# Patient Record
Sex: FEMALE | Race: WHITE | NOT HISPANIC OR LATINO | Employment: UNEMPLOYED | URBAN - METROPOLITAN AREA
[De-identification: names, ages, dates, MRNs, and addresses within clinical notes are randomized per-mention and may not be internally consistent; named-entity substitution may affect disease eponyms.]

---

## 2017-01-02 DIAGNOSIS — N30.10 IC (INTERSTITIAL CYSTITIS): ICD-10-CM

## 2017-01-03 RX ORDER — METHENAMINE, SODIUM PHOSPHATE, MONOBASIC, MONOHYDRATE, PHENYL SALICYLATE, METHYLENE BLUE, AND HYOSCYAMINE SULFATE 118; 40.8; 36; 10; .12 MG/1; MG/1; MG/1; MG/1; MG/1
CAPSULE ORAL
Qty: 120 CAPSULE | Refills: 0 | Status: SHIPPED | OUTPATIENT
Start: 2017-01-03 | End: 2017-05-19 | Stop reason: SDUPTHER

## 2017-01-05 RX ORDER — TIZANIDINE 4 MG/1
TABLET ORAL
Qty: 90 TABLET | Refills: 3 | Status: SHIPPED | OUTPATIENT
Start: 2017-01-05 | End: 2017-08-04 | Stop reason: SDUPTHER

## 2017-01-21 RX ORDER — ESOMEPRAZOLE MAGNESIUM 40 MG/1
CAPSULE, DELAYED RELEASE ORAL
Qty: 30 CAPSULE | Refills: 0 | Status: SHIPPED | OUTPATIENT
Start: 2017-01-21 | End: 2017-02-01

## 2017-01-24 DIAGNOSIS — N30.10 IC (INTERSTITIAL CYSTITIS): ICD-10-CM

## 2017-01-25 RX ORDER — METHENAMINE, SODIUM PHOSPHATE, MONOBASIC, MONOHYDRATE, PHENYL SALICYLATE, METHYLENE BLUE, AND HYOSCYAMINE SULFATE 118; 40.8; 36; 10; .12 MG/1; MG/1; MG/1; MG/1; MG/1
CAPSULE ORAL
Qty: 120 CAPSULE | Refills: 0 | Status: SHIPPED | OUTPATIENT
Start: 2017-01-25 | End: 2017-02-02 | Stop reason: SDUPTHER

## 2017-01-31 ENCOUNTER — TELEPHONE (OUTPATIENT)
Dept: GASTROENTEROLOGY | Facility: CLINIC | Age: 47
End: 2017-01-31

## 2017-02-01 ENCOUNTER — OFFICE VISIT (OUTPATIENT)
Dept: GASTROENTEROLOGY | Facility: CLINIC | Age: 47
End: 2017-02-01
Payer: COMMERCIAL

## 2017-02-01 VITALS
DIASTOLIC BLOOD PRESSURE: 82 MMHG | BODY MASS INDEX: 33.52 KG/M2 | WEIGHT: 166.25 LBS | HEIGHT: 59 IN | SYSTOLIC BLOOD PRESSURE: 118 MMHG | HEART RATE: 110 BPM

## 2017-02-01 DIAGNOSIS — K21.9 GASTROESOPHAGEAL REFLUX DISEASE, ESOPHAGITIS PRESENCE NOT SPECIFIED: ICD-10-CM

## 2017-02-01 DIAGNOSIS — E66.9 OBESITY (BMI 30-39.9): Primary | ICD-10-CM

## 2017-02-01 DIAGNOSIS — K58.0 IRRITABLE BOWEL SYNDROME WITH DIARRHEA: ICD-10-CM

## 2017-02-01 PROCEDURE — 99214 OFFICE O/P EST MOD 30 MIN: CPT | Mod: S$GLB,,, | Performed by: INTERNAL MEDICINE

## 2017-02-01 PROCEDURE — 99999 PR PBB SHADOW E&M-EST. PATIENT-LVL III: CPT | Mod: PBBFAC,,, | Performed by: INTERNAL MEDICINE

## 2017-02-01 RX ORDER — ALOSETRON HYDROCHLORIDE 0.5 MG/1
0.5 TABLET ORAL 2 TIMES DAILY
Qty: 60 TABLET | Refills: 0 | Status: SHIPPED | OUTPATIENT
Start: 2017-02-01 | End: 2017-05-01 | Stop reason: SDUPTHER

## 2017-02-01 RX ORDER — RABEPRAZOLE SODIUM 20 MG/1
20 TABLET, DELAYED RELEASE ORAL DAILY
Qty: 30 TABLET | Refills: 11 | Status: SHIPPED | OUTPATIENT
Start: 2017-02-01 | End: 2018-02-10 | Stop reason: SDUPTHER

## 2017-02-01 RX ORDER — PROMETHAZINE HYDROCHLORIDE 25 MG/1
25 TABLET ORAL EVERY 6 HOURS PRN
Qty: 60 TABLET | Refills: 0 | Status: SHIPPED | OUTPATIENT
Start: 2017-02-01 | End: 2017-04-18 | Stop reason: SDUPTHER

## 2017-02-01 NOTE — PROGRESS NOTES
REASON FOR VISIT:  Chronic diarrhea.    HISTORY OF PRESENT ILLNESS:  Ms. Simons was last seen in GI Clinic in June 2016.    She has been taking Lotronex with good control of her chronic diarrhea.    Currently, she takes it as needed instead of on a daily basis and she continues   to be very appreciative of taking this medication.  Prior to that, she was   having significant fecal incontinence.  As far as her chronic reflux symptoms   are concerned, she feels like Nexium is not helping her as much and she was not   very happy with Protonix and therefore today we talked about a trial of Aciphex   to be taken instead of Nexium.  She has previously had grade B esophagitis, and   therefore if she has no significant improvement on Aciphex after a month and a   half or so, we will proceed with an endoscopic evaluation.  She is also   requesting Phenergan for her intermittent nausea; otherwise, she is doing   relatively well.  Her other major struggle is weight loss.  She has not been   able to control her diet and today we discussed about consulting with Dr. Gena Alan with Bariatric Medicine to see if she could help her to create a   stable regimen for her to try and get stable weight loss.    OTHERWISE, PAST MEDICAL, SURGICAL, SOCIAL AND FAMILY HISTORY:  Reviewed.    MEDICATIONS AND ALLERGIES:  Reviewed.    REVIEW OF SYSTEMS:  CONSTITUTIONAL:  No fever, no chills, no weight loss.  CARDIOVASCULAR:  No angina or palpitations.  RESPIRATORY:  No shortness of breath or wheezing.  GASTROINTESTINAL:  No blood in the stool.  Diarrhea well controlled.    PHYSICAL EXAMINATION:  VITAL SIGNS:  See EPIC.  GENERAL:  Awake, alert and oriented x3, in no acute distress.  No physical exam done today.  About 25 minutes spent with the patient, more than   50% in counseling regarding use of Lotronex and about use of all the   above-mentioned medications.    IMPRESSION:  1.  IBS, diarrhea, controlled on Lotronex.  2.  GERD, not well  controlled on Nexium.  We will switch to Aciphex.  3.  Obesity with BMI of 34, having difficulty controlling her diet.  We will   recommend consultation with Dr. Gena Alan.    RECOMMENDATIONS:  1.  Start Aciphex instead of Nexium.  2.  Referral to Dr. Gena Alan with Bariatric Medicine for weight loss.  3.  Phenergan p.o. p.r.n. for nausea.  4.  Follow up in GI Clinic based on the response above.      ACT/HN  dd: 02/01/2017 16:15:19 (CST)  td: 02/02/2017 06:40:37 (CST)  Doc ID   #[id number]  Job ID #[id number]    CC:

## 2017-02-01 NOTE — MR AVS SNAPSHOT
[facility]  [address]  [facility] [address]  Phone: [phone]  Fax: [phone]                  Marilee Simons   2017 3:30 PM   Office Visit    Description:  Female : 1970   Provider:  Venkat He MD   Department:  [facility]           Reason for Visit     Follow-up           Diagnoses this Visit        Comments    Obesity (BMI 30-39.9)    -  Primary            To Do List           Future Appointments        Provider Department Dept Phone    2017 1:00 PM [facility] [phone]    2017 3:00 PM Ben Samaniego MD [facility] - Rheumatology [phone]    3/7/2017 1:00 PM MD [facility] - Bariatric Surgery [phone]      Goals (5 Years of Data)     None       These Medications        Disp Refills Start End    rabeprazole (ACIPHEX) 20 mg tablet 30 tablet 11 2017    Take 1 tablet (20 mg total) by mouth once daily. - Oral    Pharmacy: [facility] [id number] [place], [place] [address] - [address] AT [facility] Ph #: [phone]       alosetron (LOTRONEX) 0.5 MG tablet 60 tablet 0 2017     Take 1 tablet (0.5 mg total) by mouth 2 (two) times daily. - Oral    Pharmacy: [facility] "[employer] [facility] [id number] [facility] [facility] - [address] AT [facility] Ph #: [phone]       promethazine (PHENERGAN) 25 MG tablet 60 tablet 0 2017     Take 1 tablet (25 mg total) by mouth every 6 (six) hours as needed. - Oral    Pharmacy: [facility] [id number]  [place] - [address] AT [facility] Ph #: [phone]         [facility] On Call     [facility] On Call Nurse Care Line -  Assistance  Registered nurses in the [facility] provide clinical advisement, health education, appointment booking, and other advisory services.  Call for this free service at [phone].              Medications           Message regarding Medications     Verify the changes and/or additions to your medication regime listed below are the same as discussed with your clinician today.  If any of these changes or additions are incorrect, please notify your healthcare provider.        START taking these NEW medications        Refills    rabeprazole (ACIPHEX) 20 mg tablet 11    Sig: Take 1 tablet (20 mg total) by mouth once daily.    Class: Normal    Route: Oral      CHANGE how you are taking these medications     Start Taking Instead of    alosetron (LOTRONEX) 0.5 MG tablet alosetron (LOTRONEX) 0.5 MG tablet    Dosage:  Take 1 tablet (0.5 mg total) by mouth 2 (two) times daily. Dosage:  TAKE 1 TABLET(0.5 MG) BY MOUTH TWICE DAILY    Reason for Change:  Reorder     promethazine (PHENERGAN) 25 MG tablet promethazine (PHENERGAN) 25 MG tablet    Dosage:  Take 1 tablet (25 mg total) by mouth every 6 (six) hours as needed. Dosage:  TAKE 1 TABLET BY MOUTH EVERY 6 HOURS AS NEEDED    Reason for Change:  Reorder       STOP taking these medications     esomeprazole (NEXIUM) 40 MG capsule TAKE 1 CAPSULE(40 MG) BY MOUTH BEFORE BREAKFAST    esomeprazole (NEXIUM) 40 MG capsule            Verify that the below list of medications is an accurate representation of the medications you are currently taking.  If none reported, the list may be blank. If incorrect, please contact your healthcare provider. Carry this list with you in case of emergency.           Current Medications     alosetron (LOTRONEX) 0.5 MG tablet Take 1 tablet (0.5 mg total) by mouth 2 (two) times daily.    amitriptyline (ELAVIL) 10 MG tablet Take 1 tablet (10 mg total) by mouth nightly as needed.    aripiprazole (ABILIFY) 15 MG Tab Take 1 tablet (15 mg total) by mouth once daily.    BUPAP  mg Tab TAKE 1 TABLET BY MOUTH EVERY 4 HOURS    calcium glycerophosphate (PRELIEF) 65 mg Tab Take 2 tablets by mouth before meals and at bedtime as needed.    clonazePAM  (KLONOPIN) 1 MG tablet Take 1 tablet (1 mg total) by mouth 3 (three) times daily.    cyclobenzaprine (FLEXERIL) 10 MG tablet Take 1 tablet (10 mg total) by mouth 2 (two) times daily.    diazePAM (VALIUM) 5 MG tablet Take 1 tablet (5 mg total) by mouth every 12 (twelve) hours as needed for Anxiety. Insert the tablet either crushed or intact inside of the vagina at night prn pelvic pain and spasm    duloxetine (CYMBALTA) 60 MG capsule TAKE ONE CAPSULE BY MOUTH EVERY NIGHT AT BEDTIME TO HELP BODY ACHES    etanercept (ENBREL) 50 mg/mL (0.98 mL) injection Inject 1 mL (50 mg total) into the skin once a week.    gabapentin (NEURONTIN) 800 MG tablet Take 1 tablet (800 mg total) by mouth 3 (three) times daily.    hydroxychloroquine (PLAQUENIL) 200 mg tablet TAKE 1 TABLET BY MOUTH TWICE DAILY    hydrOXYzine HCl (ATARAX) 25 MG tablet Take 1 tablet (25 mg total) by mouth every evening.    ibuprofen (ADVIL,MOTRIN) 800 MG tablet Take 800 mg by mouth 3 (three) times daily.    levothyroxine (SYNTHROID) 50 MCG tablet Take 1 tablet (50 mcg total) by mouth once daily.    multivitamin with minerals tablet Take 1 tablet by mouth once daily.    oxybutynin (DITROPAN) 5 MG Tab TK 1 T PO BID    potassium citrate (UROCIT-K) 10 mEq (1,080 mg) TbSR Take 1 tablet (10 mEq total) by mouth 2 (two) times daily with meals.    promethazine (PHENERGAN) 25 MG tablet Take 1 tablet (25 mg total) by mouth every 6 (six) hours as needed.    rabeprazole (ACIPHEX) 20 mg tablet Take 1 tablet (20 mg total) by mouth once daily.    tizanidine (ZANAFLEX) 4 MG tablet TAKE 1 TABLET(4 MG) BY MOUTH EVERY 8 HOURS    trazodone (DESYREL) 50 MG tablet TAKE 1 TO 2 TABLETS BY MOUTH EVERY NIGHT AT BEDTIME AS NEEDED FOR INSOMNIA    triamcinolone acetonide 0.1% (KENALOG) 0.1 % cream APPLY TO THE AFFECTED AREA TWICE DAILY    URIBEL 118-10-40.8-36 mg Cap TAKE 1 CAPSULE BY MOUTH FOUR TIMES DAILY    URIBEL 118-10-40.8-36 mg Cap TAKE 1 CAPSULE BY MOUTH FOUR TIMES DAILY          "  Clinical Reference Information           Vital Signs - Last Recorded  Most recent update: 2/1/2017  3:54 PM by Taryn Ramos MA    BP Pulse Ht Wt BMI    118/82 110 4' 11" (1.499 m) 75.4 kg (166 lb 3.6 oz) 33.57 kg/m2      Blood Pressure          Most Recent Value    BP  118/82      Allergies as of 2/1/2017     Cephalexin    Penicillins      Immunizations Administered on Date of Encounter - 2/1/2017     None      Orders Placed During Today's Visit      Normal Orders This Visit    Ambulatory consult to Bariatric Medicine       "

## 2017-02-02 ENCOUNTER — HOSPITAL ENCOUNTER (OUTPATIENT)
Dept: RADIOLOGY | Facility: HOSPITAL | Age: 47
Discharge: HOME | End: 2017-02-02
Attending: INTERNAL MEDICINE
Payer: COMMERCIAL

## 2017-02-02 ENCOUNTER — OFFICE VISIT (OUTPATIENT)
Dept: RHEUMATOLOGY | Facility: CLINIC | Age: 47
End: 2017-02-02
Payer: COMMERCIAL

## 2017-02-02 VITALS
HEART RATE: 93 BPM | BODY MASS INDEX: 34.64 KG/M2 | SYSTOLIC BLOOD PRESSURE: 114 MMHG | DIASTOLIC BLOOD PRESSURE: 72 MMHG | WEIGHT: 171.5 LBS

## 2017-02-02 DIAGNOSIS — F32.A DEPRESSION, UNSPECIFIED DEPRESSION TYPE: Primary | ICD-10-CM

## 2017-02-02 DIAGNOSIS — E03.9 HYPOTHYROIDISM, UNSPECIFIED TYPE: ICD-10-CM

## 2017-02-02 DIAGNOSIS — L40.50 PSA (PSORIATIC ARTHRITIS): ICD-10-CM

## 2017-02-02 DIAGNOSIS — L65.9 ALOPECIA: ICD-10-CM

## 2017-02-02 DIAGNOSIS — F41.1 GENERALIZED ANXIETY DISORDER: ICD-10-CM

## 2017-02-02 DIAGNOSIS — M79.7 FIBROMYALGIA: ICD-10-CM

## 2017-02-02 PROCEDURE — 76536 US EXAM OF HEAD AND NECK: CPT | Mod: 26,,, | Performed by: RADIOLOGY

## 2017-02-02 PROCEDURE — 76536 US EXAM OF HEAD AND NECK: CPT | Mod: TC,PO

## 2017-02-02 PROCEDURE — 96372 THER/PROPH/DIAG INJ SC/IM: CPT | Mod: S$GLB,,, | Performed by: INTERNAL MEDICINE

## 2017-02-02 PROCEDURE — 99215 OFFICE O/P EST HI 40 MIN: CPT | Mod: 25,S$GLB,, | Performed by: INTERNAL MEDICINE

## 2017-02-02 PROCEDURE — 99999 PR PBB SHADOW E&M-EST. PATIENT-LVL III: CPT | Mod: PBBFAC,,, | Performed by: INTERNAL MEDICINE

## 2017-02-02 RX ORDER — TRAMADOL HYDROCHLORIDE 50 MG/1
50 TABLET ORAL EVERY 12 HOURS PRN
Qty: 60 TABLET | Refills: 1 | Status: SHIPPED | OUTPATIENT
Start: 2017-02-02 | End: 2017-02-12

## 2017-02-02 RX ORDER — KETOROLAC TROMETHAMINE 30 MG/ML
60 INJECTION, SOLUTION INTRAMUSCULAR; INTRAVENOUS
Status: COMPLETED | OUTPATIENT
Start: 2017-02-02 | End: 2017-02-02

## 2017-02-02 RX ORDER — FLUOXETINE HYDROCHLORIDE 40 MG/1
40 CAPSULE ORAL DAILY
Qty: 30 CAPSULE | Refills: 5 | Status: ON HOLD | OUTPATIENT
Start: 2017-02-02 | End: 2017-06-10

## 2017-02-02 RX ORDER — METHYLPREDNISOLONE ACETATE 80 MG/ML
160 INJECTION, SUSPENSION INTRA-ARTICULAR; INTRALESIONAL; INTRAMUSCULAR; SOFT TISSUE
Status: COMPLETED | OUTPATIENT
Start: 2017-02-02 | End: 2017-02-02

## 2017-02-02 RX ORDER — IBUPROFEN AND FAMOTIDINE 26.6; 8 MG/1; MG/1
1 TABLET ORAL 3 TIMES DAILY
Qty: 90 TABLET | Refills: 11 | Status: ON HOLD | OUTPATIENT
Start: 2017-02-02 | End: 2017-10-05

## 2017-02-02 RX ADMIN — KETOROLAC TROMETHAMINE 60 MG: 30 INJECTION, SOLUTION INTRAMUSCULAR; INTRAVENOUS at 04:02

## 2017-02-02 RX ADMIN — METHYLPREDNISOLONE ACETATE 160 MG: 80 INJECTION, SUSPENSION INTRA-ARTICULAR; INTRALESIONAL; INTRAMUSCULAR; SOFT TISSUE at 04:02

## 2017-02-02 ASSESSMENT — ROUTINE ASSESSMENT OF PATIENT INDEX DATA (RAPID3)
PAIN SCORE: 10
AM STIFFNESS SCORE: 1, YES
TOTAL RAPID3 SCORE: 7
FATIGUE SCORE: 6
WHEN YOU AWAKENED IN THE MORNING OVER THE LAST WEEK, PLEASE INDICATE THE AMOUNT OF TIME IT TAKES UNTIL YOU ARE AS LIMBER AS YOU WILL BE FOR THE DAY: ALL DAY
PSYCHOLOGICAL DISTRESS SCORE: 5.5
PATIENT GLOBAL ASSESSMENT SCORE: 5
MDHAQ FUNCTION SCORE: 1.8

## 2017-02-02 NOTE — PROGRESS NOTES
Administered 2 cc ( 30 mg/ml ) of toradol to the left upper outer gluteal. Informed of s/s to report verbalized understanding. No adverse reactions noted.    Lot # [id number]  Expiration 1 may 2018    Administered 2 cc ( 80 mg/ml ) of depomedrol to the right upper outer gluteal. Informed of s/s to report verbalized understanding. No adverse reactions noted.    Lot # [id number]  Expiration 10/2017

## 2017-02-02 NOTE — PROGRESS NOTES
Subjective:       Patient ID: Marilee Simons is a 46 y.o. female.    Chief Complaint: Follow-up    HPI Comments: Follow up   PSA and flaring , had inter therapy and had  mcp, pip, wrist, knees and ankles feet and L spine pain on plaquenil.  Pain is located in multiple joints, both shoulder(s), both elbow(s), both wrist(s), both MCP(s): 1st, 2nd, 3rd, 4th and 5th, both PIP(s): 1st, 2nd, 3rd, 4th and 5th, both DIP(s): 1st and 2nd, both hip(s), both knee(s) and both MTP(s): 1st, 2nd, 3rd, 4th and 5th, is described as aching, pulsating, shooting and throbbing, and is constant, moderate .  Associated symptoms include: crepitation, decreased range of motion, edema, effusion, tenderness and warmth.                Fibromyalgia    The disease course has been worsening.     She complains of pain. She complains of lasting longer than 2 hours after awakening.            Fibromyalgia        She complains of pain. She complains of lasting longer than 2 hours after awakening.        Review of Systems   Constitutional: Positive for activity change and chills. Negative for appetite change, diaphoresis and unexpected weight change.   HENT: Negative for congestion, dental problem, ear discharge, ear pain, facial swelling, mouth sores, nosebleeds, postnasal drip, rhinorrhea, sinus pressure, sneezing, sore throat, tinnitus and voice change.    Eyes: Negative for photophobia, pain, discharge, redness and itching.   Respiratory: Negative for apnea, cough, chest tightness, shortness of breath and wheezing.    Cardiovascular: Positive for leg swelling. Negative for chest pain and palpitations.   Gastrointestinal: Positive for abdominal pain. Negative for abdominal distention, constipation, diarrhea, nausea and vomiting.   Endocrine: Negative for cold intolerance, heat intolerance, polydipsia and polyuria.   Genitourinary: Negative for decreased urine volume, difficulty urinating, flank pain, frequency, hematuria and urgency.    Musculoskeletal: Positive for arthralgias, back pain, gait problem, neck pain and neck stiffness.   Skin: Negative for pallor, rash and wound.   Allergic/Immunologic: Negative for immunocompromised state.   Neurological: Positive for weakness. Negative for dizziness, tremors and numbness.   Hematological: Negative for adenopathy. Does not bruise/bleed easily.   Psychiatric/Behavioral: Negative for sleep disturbance. The patient is not nervous/anxious.          Objective:     Visit Vitals    /72 (BP Location: Right arm, Patient Position: Sitting, BP Method: Automatic)    Pulse 93    Wt 77.8 kg (171 lb 8.3 oz)    BMI 34.64 kg/m2        Physical Exam   Nursing note and vitals reviewed.  Constitutional: She is oriented to person, place, and time. She appears distressed.   HENT:   Head: Normocephalic and atraumatic.   Mouth/Throat: Oropharynx is clear and moist.   Eyes: EOM are normal. Pupils are equal, round, and reactive to light.   Neck: Neck supple. No thyromegaly present.   Cardiovascular: Normal rate, regular rhythm and normal heart sounds.  Exam reveals no gallop and no friction rub.    No murmur heard.  Pulmonary/Chest: Breath sounds normal. She has no wheezes. She has no rales. She exhibits no tenderness.   Abdominal: There is no tenderness. There is no rebound and no guarding.       Right Side Rheumatological Exam     The patient is tender to palpation of the shoulder, elbow, wrist, knee, 1st PIP, 1st MCP, 2nd PIP, 2nd MCP, 3rd PIP, 3rd MCP, 4th PIP, 4th MCP, 5th PIP and 5th MCP    She has swelling of the wrist, 1st PIP, 1st MCP, 2nd PIP, 2nd MCP, 3rd PIP, 3rd MCP, 4th PIP, 4th MCP, 5th PIP and 5th MCP    The patient has an enlarged wrist, knee, 1st PIP, 1st MCP, 2nd PIP, 2nd MCP, 3rd PIP, 3rd MCP, 4th PIP, 4th MCP, 5th PIP and 5th MCP    Shoulder Exam   Tenderness Location: biceps tendon and clavicle    Range of Motion   Active Abduction: abnormal   Adduction: abnormal  Sensation: normal    Knee  Exam   Patellofemoral Crepitus: positive  Effusion: positive  Sensation: normal    Hip Exam   Tenderness Location: posterior, greater trochanter and lateral  Sensation: normal    Elbow/Wrist Exam   Tenderness Location: lateral epicondyle and medial epicondyle  Sensation: normal    Foot Exam   Right foot exam exhibits signs of inflamed dorsum  Right foot exam exhibits signs of no podagra, no tophus and no plantar fasciitis    Muscle Strength (0-5 scale):  : 4/5     Left Side Rheumatological Exam     The patient is tender to palpation of the shoulder, elbow, wrist, knee, 1st PIP, 1st MCP, 2nd PIP, 2nd MCP, 3rd PIP, 3rd MCP, 4th PIP, 4th MCP, 5th PIP and 5th MCP.    She has swelling of the wrist, 1st PIP, 1st MCP, 2nd PIP, 2nd MCP, 3rd PIP, 3rd MCP, 4th PIP, 4th MCP, 5th PIP and 5th MCP    The patient has an enlarged wrist and knee.    Shoulder Exam   Tenderness Location: biceps tendon and clavicle    Range of Motion   Active Abduction: abnormal   Sensation: normal    Knee Exam     Patellofemoral Crepitus: positive  Effusion: negative  Sensation: normal    Hip Exam   Tenderness Location: posterior, greater trochanter and lateral  Sensation: normal    Elbow/Wrist Exam   Tenderness Location: lateral epicondyle and medial epicondyle  Sensation: normal    Foot Exam   Left foot exam exhibits signs of inflamed dorsum  Left foot exam exhibits signs of no podagra and no plantar fasciitis    Muscle Strength (0-5 scale):  :  4/5       Back/Neck Exam   General Inspection   Gait: normal       Tenderness Right paramedian tenderness of the Occ, Upper C-Spine, Lower L-Spine and SI Joint.Left paramedian tenderness of the Occ, Upper C-Spine, Lower L-Spine and SI Joint.      Comments:  15 out of 18 tender points    Lymphadenopathy:     She has no cervical adenopathy.   Neurological: She is alert and oriented to person, place, and time.   Skin: No rash noted. No erythema. No pallor.     Psychiatric: Affect normal. Her mood  appears anxious. She exhibits a depressed mood. She expresses no suicidal plans and no homicidal plans.   Musculoskeletal: She exhibits edema, tenderness and deformity.   Sausage digits, Synovitis pip 2,3 B and B wrist with warmth to the touch         Results for orders placed or performed in visit on 07/28/16   CBC W/ AUTO DIFFERENTIAL   Result Value Ref Range    WBC 10.95 3.90 - 12.70 K/uL    RBC 4.48 4.00 - 5.40 M/uL    Hemoglobin 13.0 12.0 - 16.0 g/dL    Hematocrit 40.2 37.0 - 48.5 %    MCV 90 82 - 98 fL    MCH 29.0 27.0 - 31.0 pg    MCHC 32.3 32.0 - 36.0 %    RDW 12.3 11.5 - 14.5 %    Platelets 308 150 - 350 K/uL    MPV 9.4 9.2 - 12.9 fL    Gran # 6.2 1.8 - 7.7 K/uL    Lymph # 3.4 1.0 - 4.8 K/uL    Mono # 0.9 0.3 - 1.0 K/uL    Eos # 0.4 0.0 - 0.5 K/uL    Baso # 0.04 0.00 - 0.20 K/uL    Gran% 56.2 38.0 - 73.0 %    Lymph% 30.8 18.0 - 48.0 %    Mono% 8.2 4.0 - 15.0 %    Eosinophil% 3.9 0.0 - 8.0 %    Basophil% 0.4 0.0 - 1.9 %    Differential Method Automated    COMPREHENSIVE METABOLIC PANEL   Result Value Ref Range    Sodium 138 136 - 145 mmol/L    Potassium 4.4 3.5 - 5.1 mmol/L    Chloride 102 95 - 110 mmol/L    CO2 22 (L) 23 - 29 mmol/L    Glucose 85 70 - 110 mg/dL    BUN, Bld 20 6 - 20 mg/dL    Creatinine 0.9 0.5 - 1.4 mg/dL    Calcium 10.0 8.7 - 10.5 mg/dL    Total Protein 7.2 6.0 - 8.4 g/dL    Albumin 4.0 3.5 - 5.2 g/dL    Total Bilirubin 0.2 0.1 - 1.0 mg/dL    Alkaline Phosphatase 73 55 - 135 U/L    AST 19 10 - 40 U/L    ALT 26 10 - 44 U/L    Anion Gap 14 8 - 16 mmol/L    eGFR if African American >60.0 >60 mL/min/1.73 m^2    eGFR if non African American >60.0 >60 mL/min/1.73 m^2   PATRICIA   Result Value Ref Range    PATRICIA Screen Negative <1:160 Negative <1:160   T4, FREE   Result Value Ref Range    Free T4 1.02 0.71 - 1.51 ng/dL   T3, FREE   Result Value Ref Range    T3, Free 3.0 2.3 - 4.2 pg/mL   Vitam B7, H (Biotin)   Result Value Ref Range    Vitamin B7, H (Biotin) >3600.0 (H) 221.0 - 3004.0 pg/mL   Sjogrens  syndrome-A extractable nuclear antibody   Result Value Ref Range    Anti-SSA Antibody 1.11 0.00 - 19.99 EU    Anti-SSA Interpretation Negative Negative   ANTI-SSB ANTIBODY   Result Value Ref Range    Anti-SSB Antibody 0.48 0.00 - 19.99 EU    Anti-SSB Interpretation Negative Negative   ANTI-DNA ANTIBODY, DOUBLE-STRANDED   Result Value Ref Range    ds DNA Ab Negative 1:10 Negative 1:10   Anti-Smith antibody   Result Value Ref Range    Anti Sm Antibody 0.74 0.00 - 19.99 EU    Anti-Sm Interpretation Negative Negative   THYROID PEROXIDASE ANTIBODY   Result Value Ref Range    Thyroperoxidase Antibodies <6.0 <6.0 IU/mL   TSH   Result Value Ref Range    TSH 2.220 0.400 - 4.000 uIU/mL   SEDIMENTATION RATE, MANUAL   Result Value Ref Range    Sed Rate 20 0 - 20 mm/Hr   C-REACTIVE PROTEIN   Result Value Ref Range    CRP 15.1 (H) 0.0 - 8.2 mg/L       Assessment:       Encounter Diagnoses   Name Primary?    Depression, unspecified depression type Yes    Alopecia     Fibromyalgia     Generalized anxiety disorder     PSA (psoriatic arthritis)     Hypothyroidism, unspecified type          Plan:       Depression, unspecified depression type  -     fluoxetine (PROZAC) 40 MG capsule; Take 1 capsule (40 mg total) by mouth once daily.  Dispense: 30 capsule; Refill: 5  -     ibuprofen-famotidine (DUEXIS) 800-26.6 mg Tab; Take 1 tablet by mouth 3 (three) times daily.  Dispense: 90 tablet; Refill: 11  -     Comprehensive metabolic panel; Future; Expected date: 2/2/17  -     CBC auto differential; Future; Expected date: 2/2/17  -     C-reactive protein; Future; Expected date: 2/2/17  -     Sedimentation rate, manual; Future; Expected date: 2/2/17  -     Rheumatoid factor; Future; Expected date: 2/2/17  -     Cyclic citrul peptide antibody, IgG; Future; Expected date: 2/2/17  -     TSH; Future; Expected date: 2/2/17  -     T4, free; Future; Expected date: 2/2/17  -     T3, free; Future; Expected date: 2/2/17  -     methylPREDNISolone  acetate injection 160 mg; Inject 2 mLs (160 mg total) into the muscle one time.  -     ketorolac injection 60 mg; Inject 2 mLs (60 mg total) into the muscle one time.  -     tramadol (ULTRAM) 50 mg tablet; Take 1 tablet (50 mg total) by mouth every 12 (twelve) hours as needed for Pain.  Dispense: 60 tablet; Refill: 1    Alopecia  -     etanercept (ENBREL) 50 mg/mL (0.98 mL) injection; Inject 1 mL (50 mg total) into the skin once a week.  Dispense: 4 mL; Refill: 11  -     ibuprofen-famotidine (DUEXIS) 800-26.6 mg Tab; Take 1 tablet by mouth 3 (three) times daily.  Dispense: 90 tablet; Refill: 11  -     Comprehensive metabolic panel; Future; Expected date: 2/2/17  -     CBC auto differential; Future; Expected date: 2/2/17  -     C-reactive protein; Future; Expected date: 2/2/17  -     Sedimentation rate, manual; Future; Expected date: 2/2/17  -     Rheumatoid factor; Future; Expected date: 2/2/17  -     Cyclic citrul peptide antibody, IgG; Future; Expected date: 2/2/17  -     TSH; Future; Expected date: 2/2/17  -     T4, free; Future; Expected date: 2/2/17  -     T3, free; Future; Expected date: 2/2/17  -     methylPREDNISolone acetate injection 160 mg; Inject 2 mLs (160 mg total) into the muscle one time.  -     ketorolac injection 60 mg; Inject 2 mLs (60 mg total) into the muscle one time.  -     tramadol (ULTRAM) 50 mg tablet; Take 1 tablet (50 mg total) by mouth every 12 (twelve) hours as needed for Pain.  Dispense: 60 tablet; Refill: 1    Fibromyalgia  -     etanercept (ENBREL) 50 mg/mL (0.98 mL) injection; Inject 1 mL (50 mg total) into the skin once a week.  Dispense: 4 mL; Refill: 11  -     ibuprofen-famotidine (DUEXIS) 800-26.6 mg Tab; Take 1 tablet by mouth 3 (three) times daily.  Dispense: 90 tablet; Refill: 11  -     Comprehensive metabolic panel; Future; Expected date: 2/2/17  -     CBC auto differential; Future; Expected date: 2/2/17  -     C-reactive protein; Future; Expected date: 2/2/17  -      Sedimentation rate, manual; Future; Expected date: 2/2/17  -     Rheumatoid factor; Future; Expected date: 2/2/17  -     Cyclic citrul peptide antibody, IgG; Future; Expected date: 2/2/17  -     TSH; Future; Expected date: 2/2/17  -     T4, free; Future; Expected date: 2/2/17  -     T3, free; Future; Expected date: 2/2/17  -     methylPREDNISolone acetate injection 160 mg; Inject 2 mLs (160 mg total) into the muscle one time.  -     ketorolac injection 60 mg; Inject 2 mLs (60 mg total) into the muscle one time.  -     tramadol (ULTRAM) 50 mg tablet; Take 1 tablet (50 mg total) by mouth every 12 (twelve) hours as needed for Pain.  Dispense: 60 tablet; Refill: 1    Generalized anxiety disorder  -     etanercept (ENBREL) 50 mg/mL (0.98 mL) injection; Inject 1 mL (50 mg total) into the skin once a week.  Dispense: 4 mL; Refill: 11  -     ibuprofen-famotidine (DUEXIS) 800-26.6 mg Tab; Take 1 tablet by mouth 3 (three) times daily.  Dispense: 90 tablet; Refill: 11  -     Comprehensive metabolic panel; Future; Expected date: 2/2/17  -     CBC auto differential; Future; Expected date: 2/2/17  -     C-reactive protein; Future; Expected date: 2/2/17  -     Sedimentation rate, manual; Future; Expected date: 2/2/17  -     Rheumatoid factor; Future; Expected date: 2/2/17  -     Cyclic citrul peptide antibody, IgG; Future; Expected date: 2/2/17  -     TSH; Future; Expected date: 2/2/17  -     T4, free; Future; Expected date: 2/2/17  -     T3, free; Future; Expected date: 2/2/17  -     methylPREDNISolone acetate injection 160 mg; Inject 2 mLs (160 mg total) into the muscle one time.  -     ketorolac injection 60 mg; Inject 2 mLs (60 mg total) into the muscle one time.  -     tramadol (ULTRAM) 50 mg tablet; Take 1 tablet (50 mg total) by mouth every 12 (twelve) hours as needed for Pain.  Dispense: 60 tablet; Refill: 1    PSA (psoriatic arthritis)  -     etanercept (ENBREL) 50 mg/mL (0.98 mL) injection; Inject 1 mL (50 mg total) into  the skin once a week.  Dispense: 4 mL; Refill: 11  -     ibuprofen-famotidine (DUEXIS) 800-26.6 mg Tab; Take 1 tablet by mouth 3 (three) times daily.  Dispense: 90 tablet; Refill: 11  -     Comprehensive metabolic panel; Future; Expected date: 2/2/17  -     CBC auto differential; Future; Expected date: 2/2/17  -     C-reactive protein; Future; Expected date: 2/2/17  -     Sedimentation rate, manual; Future; Expected date: 2/2/17  -     Rheumatoid factor; Future; Expected date: 2/2/17  -     Cyclic citrul peptide antibody, IgG; Future; Expected date: 2/2/17  -     TSH; Future; Expected date: 2/2/17  -     T4, free; Future; Expected date: 2/2/17  -     T3, free; Future; Expected date: 2/2/17  -     methylPREDNISolone acetate injection 160 mg; Inject 2 mLs (160 mg total) into the muscle one time.  -     ketorolac injection 60 mg; Inject 2 mLs (60 mg total) into the muscle one time.  -     tramadol (ULTRAM) 50 mg tablet; Take 1 tablet (50 mg total) by mouth every 12 (twelve) hours as needed for Pain.  Dispense: 60 tablet; Refill: 1    Hypothyroidism, unspecified type  -     etanercept (ENBREL) 50 mg/mL (0.98 mL) injection; Inject 1 mL (50 mg total) into the skin once a week.  Dispense: 4 mL; Refill: 11  -     ibuprofen-famotidine (DUEXIS) 800-26.6 mg Tab; Take 1 tablet by mouth 3 (three) times daily.  Dispense: 90 tablet; Refill: 11  -     Comprehensive metabolic panel; Future; Expected date: 2/2/17  -     CBC auto differential; Future; Expected date: 2/2/17  -     C-reactive protein; Future; Expected date: 2/2/17  -     Sedimentation rate, manual; Future; Expected date: 2/2/17  -     Rheumatoid factor; Future; Expected date: 2/2/17  -     Cyclic citrul peptide antibody, IgG; Future; Expected date: 2/2/17  -     TSH; Future; Expected date: 2/2/17  -     T4, free; Future; Expected date: 2/2/17  -     T3, free; Future; Expected date: 2/2/17  -     methylPREDNISolone acetate injection 160 mg; Inject 2 mLs (160 mg total)  into the muscle one time.  -     ketorolac injection 60 mg; Inject 2 mLs (60 mg total) into the muscle one time.  -     tramadol (ULTRAM) 50 mg tablet; Take 1 tablet (50 mg total) by mouth every 12 (twelve) hours as needed for Pain.  Dispense: 60 tablet; Refill: 1  we will try zanaflex tid if not helping will return to flexeril tid, continue plaquenil

## 2017-02-08 ENCOUNTER — TELEPHONE (OUTPATIENT)
Dept: PSYCHIATRY | Facility: CLINIC | Age: 47
End: 2017-02-08

## 2017-02-08 NOTE — TELEPHONE ENCOUNTER
----- Message from Catracho Clemente MA sent at 2/8/2017  1:49 PM CST -----  Contact: pt  Good afternoon your 3:00pm today cancelled. Pt stated she had an emergency, her daughter passed out at school.

## 2017-02-22 ENCOUNTER — OFFICE VISIT (OUTPATIENT)
Dept: PSYCHIATRY | Facility: CLINIC | Age: 47
End: 2017-02-22
Payer: COMMERCIAL

## 2017-02-22 DIAGNOSIS — F43.10 PTSD (POST-TRAUMATIC STRESS DISORDER): ICD-10-CM

## 2017-02-22 DIAGNOSIS — F41.9 ANXIETY: ICD-10-CM

## 2017-02-22 DIAGNOSIS — F33.0 MAJOR DEPRESSIVE DISORDER, RECURRENT EPISODE, MILD: Primary | ICD-10-CM

## 2017-02-22 PROCEDURE — 90834 PSYTX W PT 45 MINUTES: CPT | Mod: S$GLB,,, | Performed by: SOCIAL WORKER

## 2017-02-23 NOTE — PROGRESS NOTES
Individual Psychotherapy (PhD/LCSW)    2/22/2017    Site:  [facility]         Therapeutic Intervention: Met with patient.  Outpatient - Insight oriented psychotherapy 45 min - CPT code 99114    Chief complaint/reason for encounter: depression, anxiety and ptsd     Interval history and content of current session: Pt states she has been very stressed and unable to get back in as her daughter has been having medical problems.  Daughter has missed a lot of school and Mihir Ricks has told pt that maybe she should leave the school.  Daughter has migraines which cause her severe stomach problems with vomiting and pain.  Pt also states than in addition to this, she began having flashbacks and bad dreams after going to a [employer] parade at her aunt's [place] home and they stood in the same spot where she and her sister used to watch parades.  This triggered a lot of feelings which she is having trouble coping with.  Pt states she sister was bipolar and she wonders if she is also as she has had periods of spending money frivolously and has had affairs in the past.  She has been able to refrain from impulsive behaviors since her Abilify was increased but still has thoughts of them.     Treatment plan:  · Target symptoms: depression, anxiety , PTSD  · Why chosen therapy is appropriate versus another modality: relevant to diagnosis  · Outcome monitoring methods: self-report, observation  · Therapeutic intervention type: insight oriented psychotherapy    Risk parameters:  Patient reports no suicidal ideation  Patient reports no homicidal ideation  Patient reports no self-injurious behavior  Patient reports no violent behavior    Verbal deficits: None    Patient's response to intervention:  The patient's response to intervention is motivated.    Progress toward goals and other mental status changes:  The patient's progress toward goals is fair .    Diagnosis:     ICD-10-CM ICD-9-CM   1. Major depressive disorder, recurrent  episode, mild F33.0 296.31   2. Anxiety F41.9 300.00   3. PTSD (post-traumatic stress disorder) F43.10 309.81       Plan:  individual psychotherapy and medication management by physician    Return to clinic: as scheduled    Length of Service (minutes): 45

## 2017-03-08 ENCOUNTER — PATIENT MESSAGE (OUTPATIENT)
Dept: PSYCHIATRY | Facility: CLINIC | Age: 47
End: 2017-03-08

## 2017-03-08 RX ORDER — CLONAZEPAM 1 MG/1
1 TABLET ORAL 3 TIMES DAILY
Qty: 90 TABLET | Refills: 2 | Status: SHIPPED | OUTPATIENT
Start: 2017-03-08 | End: 2017-05-24 | Stop reason: SDUPTHER

## 2017-03-08 RX ORDER — TRAZODONE HYDROCHLORIDE 50 MG/1
TABLET ORAL
Qty: 60 TABLET | Refills: 2 | Status: SHIPPED | OUTPATIENT
Start: 2017-03-08 | End: 2017-05-24 | Stop reason: SDUPTHER

## 2017-03-15 ENCOUNTER — TELEPHONE (OUTPATIENT)
Dept: PSYCHIATRY | Facility: CLINIC | Age: 47
End: 2017-03-15

## 2017-03-15 DIAGNOSIS — N30.10 IC (INTERSTITIAL CYSTITIS): ICD-10-CM

## 2017-03-15 RX ORDER — METHENAMINE, SODIUM PHOSPHATE, MONOBASIC, MONOHYDRATE, PHENYL SALICYLATE, METHYLENE BLUE, AND HYOSCYAMINE SULFATE 118; 40.8; 36; 10; .12 MG/1; MG/1; MG/1; MG/1; MG/1
CAPSULE ORAL
Qty: 120 CAPSULE | Refills: 0 | Status: ON HOLD | OUTPATIENT
Start: 2017-03-15 | End: 2017-06-10

## 2017-03-15 NOTE — TELEPHONE ENCOUNTER
----- Message from Catracho Clemente MA sent at 3/15/2017  9:42 AM CDT -----  Contact: pt  Good morning your 3:00pm today cancelled. Pt is sick.

## 2017-03-16 RX ORDER — HYDROXYCHLOROQUINE SULFATE 200 MG/1
TABLET, FILM COATED ORAL
Qty: 60 TABLET | Refills: 3 | Status: SHIPPED | OUTPATIENT
Start: 2017-03-16 | End: 2017-08-04 | Stop reason: SDUPTHER

## 2017-04-05 DIAGNOSIS — E03.9 HYPOTHYROIDISM: ICD-10-CM

## 2017-04-05 RX ORDER — LEVOTHYROXINE SODIUM 50 UG/1
TABLET ORAL
Qty: 30 TABLET | Refills: 3 | Status: SHIPPED | OUTPATIENT
Start: 2017-04-05 | End: 2017-08-10 | Stop reason: SDUPTHER

## 2017-04-18 RX ORDER — PROMETHAZINE HYDROCHLORIDE 25 MG/1
TABLET ORAL
Qty: 60 TABLET | Refills: 0 | Status: SHIPPED | OUTPATIENT
Start: 2017-04-18 | End: 2017-08-28 | Stop reason: SDUPTHER

## 2017-04-20 ENCOUNTER — HOSPITAL ENCOUNTER (EMERGENCY)
Facility: HOSPITAL | Age: 47
Discharge: HOME | End: 2017-04-20
Attending: EMERGENCY MEDICINE
Payer: COMMERCIAL

## 2017-04-20 VITALS
HEIGHT: 59 IN | DIASTOLIC BLOOD PRESSURE: 76 MMHG | OXYGEN SATURATION: 98 % | RESPIRATION RATE: 18 BRPM | BODY MASS INDEX: 32.25 KG/M2 | WEIGHT: 160 LBS | SYSTOLIC BLOOD PRESSURE: 136 MMHG | TEMPERATURE: 98 F | HEART RATE: 75 BPM

## 2017-04-20 DIAGNOSIS — R11.2 NAUSEA VOMITING AND DIARRHEA: Primary | ICD-10-CM

## 2017-04-20 DIAGNOSIS — R19.7 NAUSEA VOMITING AND DIARRHEA: Primary | ICD-10-CM

## 2017-04-20 DIAGNOSIS — E87.6 HYPOKALEMIA: ICD-10-CM

## 2017-04-20 DIAGNOSIS — R10.9 ABDOMINAL CRAMPING: ICD-10-CM

## 2017-04-20 LAB
ALBUMIN SERPL BCP-MCNC: 4.4 G/DL
ALP SERPL-CCNC: 76 U/L
ALT SERPL W/O P-5'-P-CCNC: 18 U/L
AMYLASE SERPL-CCNC: 61 U/L
ANION GAP SERPL CALC-SCNC: 13 MMOL/L
AST SERPL-CCNC: 15 U/L
BACTERIA #/AREA URNS HPF: ABNORMAL /HPF
BASOPHILS # BLD AUTO: 0.04 K/UL
BASOPHILS NFR BLD: 0.4 %
BILIRUB SERPL-MCNC: 0.5 MG/DL
BILIRUB UR QL STRIP: ABNORMAL
BUN SERPL-MCNC: 15 MG/DL
CALCIUM SERPL-MCNC: 10.2 MG/DL
CHLORIDE SERPL-SCNC: 101 MMOL/L
CLARITY UR: ABNORMAL
CO2 SERPL-SCNC: 27 MMOL/L
COLOR UR: YELLOW
CREAT SERPL-MCNC: 0.9 MG/DL
DIFFERENTIAL METHOD: ABNORMAL
EOSINOPHIL # BLD AUTO: 0.1 K/UL
EOSINOPHIL NFR BLD: 0.8 %
ERYTHROCYTE [DISTWIDTH] IN BLOOD BY AUTOMATED COUNT: 12.9 %
EST. GFR  (AFRICAN AMERICAN): >60 ML/MIN/1.73 M^2
EST. GFR  (NON AFRICAN AMERICAN): >60 ML/MIN/1.73 M^2
GLUCOSE SERPL-MCNC: 81 MG/DL
GLUCOSE UR QL STRIP: NEGATIVE
HCT VFR BLD AUTO: 40.8 %
HGB BLD-MCNC: 14.1 G/DL
HGB UR QL STRIP: ABNORMAL
HYALINE CASTS #/AREA URNS LPF: 0 /LPF
KETONES UR QL STRIP: NEGATIVE
LEUKOCYTE ESTERASE UR QL STRIP: NEGATIVE
LIPASE SERPL-CCNC: 41 U/L
LYMPHOCYTES # BLD AUTO: 2.7 K/UL
LYMPHOCYTES NFR BLD: 25.1 %
MCH RBC QN AUTO: 29.7 PG
MCHC RBC AUTO-ENTMCNC: 34.6 %
MCV RBC AUTO: 86 FL
MICROSCOPIC COMMENT: ABNORMAL
MONOCYTES # BLD AUTO: 0.9 K/UL
MONOCYTES NFR BLD: 8 %
NEUTROPHILS # BLD AUTO: 6.9 K/UL
NEUTROPHILS NFR BLD: 65.2 %
NITRITE UR QL STRIP: NEGATIVE
PH UR STRIP: 6 [PH] (ref 5–8)
PLATELET # BLD AUTO: 305 K/UL
PMV BLD AUTO: 8.7 FL
POTASSIUM SERPL-SCNC: 3.2 MMOL/L
PROT SERPL-MCNC: 8.3 G/DL
PROT UR QL STRIP: ABNORMAL
RBC # BLD AUTO: 4.74 M/UL
RBC #/AREA URNS HPF: 5 /HPF (ref 0–4)
SODIUM SERPL-SCNC: 141 MMOL/L
SP GR UR STRIP: >=1.03 (ref 1–1.03)
URN SPEC COLLECT METH UR: ABNORMAL
UROBILINOGEN UR STRIP-ACNC: NEGATIVE EU/DL
WBC # BLD AUTO: 10.63 K/UL
WBC #/AREA URNS HPF: 2 /HPF (ref 0–5)

## 2017-04-20 PROCEDURE — 81000 URINALYSIS NONAUTO W/SCOPE: CPT

## 2017-04-20 PROCEDURE — 80053 COMPREHEN METABOLIC PANEL: CPT

## 2017-04-20 PROCEDURE — 96374 THER/PROPH/DIAG INJ IV PUSH: CPT

## 2017-04-20 PROCEDURE — 96361 HYDRATE IV INFUSION ADD-ON: CPT

## 2017-04-20 PROCEDURE — 25000003 PHARM REV CODE 250: Performed by: PHYSICIAN ASSISTANT

## 2017-04-20 PROCEDURE — 85025 COMPLETE CBC W/AUTO DIFF WBC: CPT

## 2017-04-20 PROCEDURE — 99284 EMERGENCY DEPT VISIT MOD MDM: CPT | Mod: 25

## 2017-04-20 PROCEDURE — 63600175 PHARM REV CODE 636 W HCPCS: Performed by: PHYSICIAN ASSISTANT

## 2017-04-20 PROCEDURE — 83690 ASSAY OF LIPASE: CPT

## 2017-04-20 PROCEDURE — 96375 TX/PRO/DX INJ NEW DRUG ADDON: CPT

## 2017-04-20 PROCEDURE — 82150 ASSAY OF AMYLASE: CPT

## 2017-04-20 PROCEDURE — 63600175 PHARM REV CODE 636 W HCPCS: Performed by: EMERGENCY MEDICINE

## 2017-04-20 RX ORDER — PROMETHAZINE HYDROCHLORIDE 25 MG/1
25 SUPPOSITORY RECTAL EVERY 6 HOURS PRN
Qty: 10 SUPPOSITORY | Refills: 0 | Status: ON HOLD | OUTPATIENT
Start: 2017-04-20 | End: 2017-06-10

## 2017-04-20 RX ORDER — PROMETHAZINE HYDROCHLORIDE 25 MG/ML
INJECTION, SOLUTION INTRAMUSCULAR; INTRAVENOUS
Status: DISCONTINUED
Start: 2017-04-20 | End: 2017-04-20 | Stop reason: HOSPADM

## 2017-04-20 RX ORDER — METOCLOPRAMIDE HYDROCHLORIDE 5 MG/ML
10 INJECTION INTRAMUSCULAR; INTRAVENOUS
Status: COMPLETED | OUTPATIENT
Start: 2017-04-20 | End: 2017-04-20

## 2017-04-20 RX ORDER — KETOROLAC TROMETHAMINE 30 MG/ML
15 INJECTION, SOLUTION INTRAMUSCULAR; INTRAVENOUS
Status: COMPLETED | OUTPATIENT
Start: 2017-04-20 | End: 2017-04-20

## 2017-04-20 RX ORDER — CIPROFLOXACIN 500 MG/1
500 TABLET ORAL
Status: ON HOLD | COMMUNITY
End: 2017-06-10

## 2017-04-20 RX ORDER — POTASSIUM CHLORIDE 20 MEQ/1
40 TABLET, EXTENDED RELEASE ORAL
Status: COMPLETED | OUTPATIENT
Start: 2017-04-20 | End: 2017-04-20

## 2017-04-20 RX ORDER — DICYCLOMINE HYDROCHLORIDE 20 MG/1
20 TABLET ORAL 2 TIMES DAILY
Qty: 20 TABLET | Refills: 0 | Status: SHIPPED | OUTPATIENT
Start: 2017-04-20 | End: 2017-05-20

## 2017-04-20 RX ADMIN — SODIUM CHLORIDE 1000 ML: 0.9 INJECTION, SOLUTION INTRAVENOUS at 05:04

## 2017-04-20 RX ADMIN — LIDOCAINE HYDROCHLORIDE: 20 SOLUTION ORAL; TOPICAL at 06:04

## 2017-04-20 RX ADMIN — METOCLOPRAMIDE 10 MG: 5 INJECTION, SOLUTION INTRAMUSCULAR; INTRAVENOUS at 07:04

## 2017-04-20 RX ADMIN — KETOROLAC TROMETHAMINE 15 MG: 30 INJECTION, SOLUTION INTRAMUSCULAR at 06:04

## 2017-04-20 RX ADMIN — PROMETHAZINE HYDROCHLORIDE 12.5 MG: 25 INJECTION INTRAMUSCULAR; INTRAVENOUS at 05:04

## 2017-04-20 RX ADMIN — SODIUM CHLORIDE 1000 ML: 0.9 INJECTION, SOLUTION INTRAVENOUS at 06:04

## 2017-04-20 RX ADMIN — POTASSIUM CHLORIDE 40 MEQ: 20 TABLET, EXTENDED RELEASE ORAL at 06:04

## 2017-04-20 NOTE — ED NOTES
Pt c/o n/v/d since Saturday.  Pt states that she has interstitial cystitis and was diagnosed with UTI on Friday.  Pt states she took two doses of Cipro and then began having n/v/d and hasnt taken any since.  Pt states that she last had episode of emesis was this am and had relief from phenergan.  Pt states she had approx 3 episodes of diarrhea daily since Saturday. Pt also c/o abd pain.

## 2017-04-20 NOTE — ED AVS SNAPSHOT
[facility] [address]  [place] [address]               Marilee Simons   2017  3:22 PM   ED    Description:  Female : 1970   Department:  [facility]           Your Care was Coordinated By:     Provider Role From To    Ana Rodriguez MD Attending Provider 17 [id number] --    MARTA Christina Physician Assistant 17 [id number] --      Reason for Visit     Emesis           Diagnoses this Visit        Comments    Nausea vomiting and diarrhea    -  Primary     Abdominal cramping           ED Disposition     None           To Do List           Follow-up Information     Follow up with Nadeem Zheng MD. Go in 1 week.    Specialty:  Family Medicine    Contact information:     [facility]  [place] [address]  [phone]         These Medications        Disp Refills Start End    dicyclomine (BENTYL) 20 mg tablet 20 tablet 0 2017    Take 1 tablet (20 mg total) by mouth 2 (two) times daily. - Oral    Pharmacy: [id number] - [place], [place] - [address] AT [facility] Ph #: [phone]       promethazine (PHENERGAN) 25 MG suppository 10 suppository 0 2017     Place 1 suppository (25 mg total) rectally every 6 (six) hours as needed for Nausea. - Rectal    [facility]: [id number] - [place] - [address] AT [facility] Ph #: [phone]         [facility]     [facility] Care [facility] - 24 Assistance  Unless otherwise directed by your provider, please contact [facility], our nurse care line that is available for  assistance.     Registered nurses in the [facility] provide: appointment scheduling, clinical advisement, health education, and other advisory services.  Call: [phone] (toll free)               Medications           Message regarding Medications     Verify the changes  and/or additions to your medication regime listed below are the same as discussed with your clinician today.  If any of these changes or additions are incorrect, please notify your healthcare provider.        START taking these NEW medications        Refills    dicyclomine (BENTYL) 20 mg tablet 0    Sig: Take 1 tablet (20 mg total) by mouth 2 (two) times daily.    Class: Print    Route: Oral    promethazine (PHENERGAN) 25 MG suppository 0    Sig: Place 1 suppository (25 mg total) rectally every 6 (six) hours as needed for Nausea.    Class: Print    Route: Rectal      These medications were administered today        Dose Freq    sodium chloride 0.9% bolus 1,000 mL 1,000 mL Once    Sig: Inject 1,000 mLs into the vein once.    Class: Normal    Route: Intravenous    Cosign for Ordering: Accepted by Ana Rodriguez MD on 4/20/2017  5:45 PM    promethazine (PHENERGAN) 12.5 mg in dextrose 5 % 50 mL IVPB 12.5 mg ED 1 Time    Sig: Inject 12.5 mg into the vein ED 1 Time.    Class: Normal    Route: Intravenous    Cosign for Ordering: Accepted by Ana Rodriguez MD on 4/20/2017  5:45 PM    promethazine (PHENERGAN) 25 mg/mL injection      Notes to Pharmacy: Created by cabinet override    sodium chloride 0.9% bolus 1,000 mL 1,000 mL ED 1 Time    Sig: Inject 1,000 mLs into the vein ED 1 Time.    Class: Normal    Route: Intravenous    Cosign for Ordering: Accepted by Ana Rodriguez MD on 4/20/2017  5:45 PM    potassium chloride SA CR tablet 40 mEq 40 mEq ED 1 Time    Sig: Take 2 tablets (40 mEq total) by mouth ED 1 Time.    Class: Normal    Route: Oral    Cosign for Ordering: Accepted by Ana Rodriguez MD on 4/20/2017  5:45 PM    (pyxis) gi cocktail (mylanta 30 mL, lidocaine 2 % viscous 10 mL, dicyclomine 10 mL) 50 mL  ED 1 Time    Sig: Take by mouth ED 1 Time.    Class: Normal    Route: Oral    Cosign for Ordering: Accepted by Ana Rodriguez MD on 4/20/2017  5:55 PM    ketorolac injection 15 mg 15 mg ED 1 Time    Sig: Inject 15 mg into  the vein ED 1 Time.    Class: Normal    Route: Intravenous    Cosign for Ordering: Accepted by Ana Rodriguez MD on 4/20/2017  5:55 PM           Verify that the below list of medications is an accurate representation of the medications you are currently taking.  If none reported, the list may be blank. If incorrect, please contact your healthcare provider. Carry this list with you in case of emergency.           Current Medications     ciprofloxacin HCl (CIPRO) 500 MG tablet Take 500 mg by mouth every 12 (twelve) hours.    (pyxis) gi cocktail (mylanta 30 mL, lidocaine 2 % viscous 10 mL, dicyclomine 10 mL) 50 mL Take by mouth ED 1 Time.    alosetron (LOTRONEX) 0.5 MG tablet Take 1 tablet (0.5 mg total) by mouth 2 (two) times daily.    amitriptyline (ELAVIL) 10 MG tablet Take 1 tablet (10 mg total) by mouth nightly as needed.    aripiprazole (ABILIFY) 15 MG Tab Take 1 tablet (15 mg total) by mouth once daily.    BUPAP  mg Tab TAKE 1 TABLET BY MOUTH EVERY 4 HOURS    calcium glycerophosphate (PRELIEF) 65 mg Tab Take 2 tablets by mouth before meals and at bedtime as needed.    clonazePAM (KLONOPIN) 1 MG tablet Take 1 tablet (1 mg total) by mouth 3 (three) times daily.    cyclobenzaprine (FLEXERIL) 10 MG tablet Take 1 tablet (10 mg total) by mouth 2 (two) times daily.    diazePAM (VALIUM) 5 MG tablet Take 1 tablet (5 mg total) by mouth every 12 (twelve) hours as needed for Anxiety. Insert the tablet either crushed or intact inside of the vagina at night prn pelvic pain and spasm    dicyclomine (BENTYL) 20 mg tablet Take 1 tablet (20 mg total) by mouth 2 (two) times daily.    duloxetine (CYMBALTA) 60 MG capsule TAKE ONE CAPSULE BY MOUTH EVERY NIGHT AT BEDTIME TO HELP BODY ACHES    etanercept (ENBREL) 50 mg/mL (0.98 mL) injection Inject 1 mL (50 mg total) into the skin once a week.    fluoxetine (PROZAC) 40 MG capsule Take 1 capsule (40 mg total) by mouth once daily.    gabapentin (NEURONTIN) 800 MG tablet Take 1  tablet (800 mg total) by mouth 3 (three) times daily.    hydroxychloroquine (PLAQUENIL) 200 mg tablet TAKE 1 TABLET BY MOUTH TWICE DAILY    hydrOXYzine HCl (ATARAX) 25 MG tablet Take 1 tablet (25 mg total) by mouth every evening.    ibuprofen (ADVIL,MOTRIN) 800 MG tablet Take 800 mg by mouth 3 (three) times daily.    ibuprofen-famotidine (DUEXIS) 800-26.6 mg Tab Take 1 tablet by mouth 3 (three) times daily.    ketorolac injection 15 mg Inject 15 mg into the vein ED 1 Time.    levothyroxine (SYNTHROID) 50 MCG tablet TAKE 1 TABLET(50 MCG) BY MOUTH EVERY DAY    multivitamin with minerals tablet Take 1 tablet by mouth once daily.    oxybutynin (DITROPAN) 5 MG Tab TK 1 T PO BID    potassium chloride SA CR tablet 40 mEq Take 2 tablets (40 mEq total) by mouth ED 1 Time.    potassium citrate (UROCIT-K) 10 mEq (1,080 mg) TbSR Take 1 tablet (10 mEq total) by mouth 2 (two) times daily with meals.    promethazine (PHENERGAN) 25 MG suppository Place 1 suppository (25 mg total) rectally every 6 (six) hours as needed for Nausea.    promethazine (PHENERGAN) 25 MG tablet TAKE 1 TABLET(25 MG) BY MOUTH EVERY 6 HOURS AS NEEDED    promethazine (PHENERGAN) 25 mg/mL injection     rabeprazole (ACIPHEX) 20 mg tablet Take 1 tablet (20 mg total) by mouth once daily.    sodium chloride 0.9% bolus 1,000 mL Inject 1,000 mLs into the vein ED 1 Time.    tizanidine (ZANAFLEX) 4 MG tablet TAKE 1 TABLET(4 MG) BY MOUTH EVERY 8 HOURS    trazodone (DESYREL) 50 MG tablet TAKE 1 TO 2 TABLETS BY MOUTH EVERY NIGHT AT BEDTIME AS NEEDED FOR INSOMNIA    triamcinolone acetonide 0.1% (KENALOG) 0.1 % cream APPLY TO THE AFFECTED AREA TWICE DAILY    URIBEL 118-10-40.8-36 mg Cap TAKE 1 CAPSULE BY MOUTH FOUR TIMES DAILY    URIBEL 118-10-40.8-36 mg Cap TAKE 1 CAPSULE BY MOUTH FOUR TIMES DAILY           Clinical Reference Information           Your Vitals Were     BP Pulse Temp Resp Height Weight    113/64 (BP Location: Right arm, Patient Position: Standing, BP Method:  "Automatic) 88 98.3 °F (36.8 °C) 20 4' 11" (1.499 m) 72.6 kg (160 lb)    SpO2 BMI             100% 32.32 kg/m2         Allergies as of 4/20/2017        Reactions    Cephalexin Itching    Zofran [Ondansetron Hcl (Pf)] Hives    Penicillins Rash      Immunizations Administered on Date of Encounter - 4/20/2017     None      ED Micro, Lab, POCT     Start Ordered       Status Ordering Provider    04/20/17 1621 04/20/17 1621  Urinalysis Microscopic  Once      Final result     04/20/17 1619 04/20/17 1621  CBC W/ AUTO DIFFERENTIAL  Once      Final result     04/20/17 1619 04/20/17 1621  Comp. Metabolic Panel  STAT      Final result     04/20/17 1619 04/20/17 1621  Lipase  STAT      Final result     04/20/17 1619 04/20/17 1621  Urinalysis - Clean Catch  STAT      Final result     04/20/17 1619 04/20/17 1621  Amylase  STAT      Final result       ED Imaging Orders     None      Discharge References/Attachments     VOMITING AND DIARRHEA, SELF-CARE FOR (ENGLISH)      Your Scheduled Appointments     Jun 29, 2017  1:30 PM CDT   Established Patient Visit with Ben Samaniego MD   [facility] ([facility] [place])    [address] [facility] [address] [place] [id number] [phone]               [facility] complies with applicable Federal civil rights laws and does not discriminate on the basis of race, color, national origin, age, disability, or sex.        Language Assistance Services     ATTENTION: Language assistance services are available, free of charge. Please call [phone].      ATENCIÓN: Si habla español, tiene a camara disposición servicios gratuitos de asistencia lingüística. Llame al [phone].     CHÚ Ý: N?u b?n nói Ti?ng Vi?t, có các d?ch v? h? tr? ngôn ng? mi?n phí dành cho b?n. G?i s? [phone].        "

## 2017-04-20 NOTE — ED PROVIDER NOTES
Encounter Date: 2017       History     Chief Complaint   Patient presents with    Emesis     diarrhea, and abdominal pain since Saturday     Review of patient's allergies indicates:   Allergen Reactions    Cephalexin Itching    Zofran [ondansetron hcl (pf)] Hives    Penicillins Rash     HPI Comments: Marilee Simons 47 y.o. female with PMH of IBS, interstitial cystitis, depression, GERD, back pain, lupus, fibromyalgia, UTI's,  And RA presented to the ED with C/O nausea, vomiting and diarrhea for the past several days. She states that she began with few episodes of nonbilous/nonbloody vomiting, upper abdominal pain and multiple episodes of loose stools. She states that the last vomiting episode was today however she has tolerated PO today after phenergan. She reports continues with abdominal cramping that is worse just right before BM. She denies any fever, chills, URI symptoms, hematuria, dysuria, vaginal discharge, vaginal bleeding or bloody stool. She reports that she went to urgent care Friday as she had some dysuria and was diagnosed with UTI. She took two doses of Cipro and stopped due to the onset of other symptoms.    The history is provided by the patient.     Past Medical History:   Diagnosis Date    Acid reflux     Allergy     Alopecia     Anxiety     Back pain     Depression     Dry eyes     from meds    Fever blister     Fibromyalgia     Interstitial cystitis     Irritable bowel syndrome     Kidney stone     Major depressive disorder, recurrent episode, in partial or unspecified remission 2013    OAB (overactive bladder) 2015    Rheumatoid arthritis     Systemic lupus erythematosus     Thyroid disease     Urinary tract infection     Vaginal infection      Past Surgical History:   Procedure Laterality Date    BLADDER SURGERY       SECTION, LOW TRANSVERSE      x 2    EYE SURGERY      Lasik-bilateral    HYSTERECTOMY      PARTIAL HYSTERECTOMY       Family  History   Problem Relation Age of Onset    Irritable bowel syndrome Mother     Irritable bowel syndrome Sister     Lupus Sister     Rheum arthritis Sister     Fibromyalgia Sister     Irritable bowel syndrome Sister     Celiac disease Neg Hx     Cirrhosis Neg Hx     Colon cancer Neg Hx     Colon polyps Neg Hx     Crohn's disease Neg Hx     Cystic fibrosis Neg Hx     Esophageal cancer Neg Hx     Hemochromatosis Neg Hx     Inflammatory bowel disease Neg Hx     Liver cancer Neg Hx     Liver disease Neg Hx     Rectal cancer Neg Hx     Stomach cancer Neg Hx     Ulcerative colitis Neg Hx     Boris's disease Neg Hx     Melanoma Neg Hx      Social History   Substance Use Topics    Smoking status: Never Smoker    Smokeless tobacco: Never Used    Alcohol use Yes      Comment: social     Review of Systems   Constitutional: Positive for appetite change. Negative for activity change, chills and fever.   HENT: Negative for congestion and sore throat.    Eyes: Negative for visual disturbance.   Respiratory: Negative for cough and shortness of breath.    Cardiovascular: Negative for chest pain.   Gastrointestinal: Positive for abdominal pain, diarrhea, nausea and vomiting. Negative for blood in stool and constipation.   Genitourinary: Negative for dysuria, flank pain, pelvic pain, vaginal bleeding and vaginal discharge.   Musculoskeletal: Negative for arthralgias, back pain and myalgias.   Skin: Negative for rash.   Neurological: Negative for dizziness, weakness, light-headedness and headaches.   Hematological: Does not bruise/bleed easily.       Physical Exam   Initial Vitals   BP Pulse Resp Temp SpO2   04/20/17 1415 04/20/17 1415 04/20/17 1415 04/20/17 1415 --   142/76 84 20 98.3 °F (36.8 °C)      Physical Exam    Nursing note and vitals reviewed.  Constitutional: She appears well-developed and well-nourished. She is cooperative.  Non-toxic appearance. She does not appear ill.   HENT:   Head:  Normocephalic and atraumatic.   Mouth/Throat: Oropharynx is clear and moist. Mucous membranes are not pale and dry.   Eyes: Conjunctivae and lids are normal.   Neck: Neck supple. No rigidity.   Cardiovascular: Normal rate and regular rhythm.   Pulmonary/Chest: Breath sounds normal. No respiratory distress. She has no wheezes. She has no rhonchi.   Abdominal: Soft. Normal appearance and bowel sounds are normal. There is no tenderness. There is no rigidity, no rebound, no guarding and no CVA tenderness.   Musculoskeletal: Normal range of motion.   Neurological: She is alert and oriented to person, place, and time. GCS eye subscore is 4. GCS verbal subscore is 5. GCS motor subscore is 6.   Skin: Skin is warm, dry and intact. No rash noted.   Psychiatric: She has a normal mood and affect. Her speech is normal and behavior is normal. Thought content normal.         ED Course   Procedures  Labs Reviewed   CBC W/ AUTO DIFFERENTIAL   COMPREHENSIVE METABOLIC PANEL   LIPASE   URINALYSIS   AMYLASE       Marilee Simons 47 y.o. female with PMH of IBS, interstitial cystitis, depression, GERD, back pain, lupus, fibromyalgia, UTI's,  And RA presented to the ED with C/O nausea, vomiting and diarrhea for the past several days. She states that she began with few episodes of nonbilous/nonbloody vomiting, upper abdominal pain and multiple episodes of loose stools. She states that the last vomiting episode was today however she has tolerated PO today after phenergan. She reports continues with abdominal cramping that is worse just right before BM. She denies any fever, chills, URI symptoms, hematuria, dysuria, vaginal discharge, vaginal bleeding or bloody stool. She reports that she went to urgent care Friday as she had some dysuria and was diagnosed with UTI. She took two doses of Cipro and stopped due to the onset of other symptoms. ROS positive for N/V/D and abdominal pain.  Physical exam reveals patient well appearing in minimal  distress. Mucous membranes dry and free of pallor. Lungs clear, heart regular rate and rhythm. abdomen is soft no TTP, rebound or rigidity with no CVA tenderness. FROM of neck and all extremities with strength 5/5 bilaterally. Skin free of rash, pallor or diaphoresis.     DDX: gastroenteritis, gastritis, esophagitis, GERD, pancreatitis, cholecystitis, UTI, dehydration, electrolyte abnormality    ED management: labs significant for mild hypokalemia; with all others WNL. UA with no signs of infection at this time. Symptoms reduced with fluids, bentyl and antiemetics with successful PO challenge in the ED    Impression/Plan: Patient informed of diagnosis The primary encounter diagnosis was Nausea vomiting and diarrhea. Diagnoses of Abdominal cramping and Hypokalemia were also pertinent to this visit.  Discharged with phenergan and bentyl. Patient will follow up with Primary.  Patient cautioned on when to return to ED.  Pt. Understands and agrees with current treatment plan                      Attending Attestation:     Physician Attestation Statement for NP/PA:   I have conducted a face to face encounter with this patient in addition to the NP/PA, due to NP/PA Request    Other NP/PA Attestation Additions:    History of Present Illness: abd pain with n/v/d since SAturday    Medical Decision Making: No fever, does not look toxic.  No focal abd ttp.  Labs with hypokalemia.  No elevated pancreatic or liver/gallbladder enzymes.  Symptoms improved with IVFs, pain meds, and anti-emetics in ER.  Likely viral - continue symptomatic care.                 ED Course     Clinical Impression:   The primary encounter diagnosis was Nausea vomiting and diarrhea. Diagnoses of Abdominal cramping and Hypokalemia were also pertinent to this visit.          MARTA Christina  04/25/17 0917       Ana Rodriguez MD  04/29/17 2657

## 2017-05-01 RX ORDER — ALOSETRON HYDROCHLORIDE 0.5 MG/1
TABLET ORAL
Qty: 60 TABLET | Refills: 0 | Status: ON HOLD | OUTPATIENT
Start: 2017-05-01 | End: 2017-06-12

## 2017-05-19 DIAGNOSIS — N30.10 IC (INTERSTITIAL CYSTITIS): ICD-10-CM

## 2017-05-19 NOTE — TELEPHONE ENCOUNTER
----- Message from Izzy Samano sent at 5/19/2017 12:42 PM CDT -----  Contact: pt [phone]  Prescription Refill Request  Name of medication and dose   URIBEL 118-10-40.8-36 mg Cap    Pharmacy   [facility] [address] [place] [address]   [id number] AT [facility] [place]   [phone] (Phone)  [phone] (Fax)    Are you completely out of your medication No    Additional Information:   Pt states she only has 3 pills left and can not be with this medication.

## 2017-05-24 ENCOUNTER — OFFICE VISIT (OUTPATIENT)
Dept: PSYCHIATRY | Facility: CLINIC | Age: 47
End: 2017-05-24
Payer: COMMERCIAL

## 2017-05-24 VITALS
WEIGHT: 160.81 LBS | SYSTOLIC BLOOD PRESSURE: 131 MMHG | DIASTOLIC BLOOD PRESSURE: 67 MMHG | BODY MASS INDEX: 32.42 KG/M2 | HEIGHT: 59 IN | HEART RATE: 80 BPM

## 2017-05-24 DIAGNOSIS — F33.41 RECURRENT MAJOR DEPRESSION IN PARTIAL REMISSION: Primary | ICD-10-CM

## 2017-05-24 DIAGNOSIS — F32.A DEPRESSION, UNSPECIFIED DEPRESSION TYPE: ICD-10-CM

## 2017-05-24 DIAGNOSIS — N30.10 IC (INTERSTITIAL CYSTITIS): ICD-10-CM

## 2017-05-24 PROCEDURE — 99999 PR PBB SHADOW E&M-EST. PATIENT-LVL III: CPT | Mod: PBBFAC,,, | Performed by: PSYCHIATRY & NEUROLOGY

## 2017-05-24 PROCEDURE — 99214 OFFICE O/P EST MOD 30 MIN: CPT | Mod: S$GLB,,, | Performed by: PSYCHIATRY & NEUROLOGY

## 2017-05-24 PROCEDURE — 1160F RVW MEDS BY RX/DR IN RCRD: CPT | Mod: S$GLB,,, | Performed by: PSYCHIATRY & NEUROLOGY

## 2017-05-24 RX ORDER — TRAZODONE HYDROCHLORIDE 50 MG/1
TABLET ORAL
Qty: 60 TABLET | Refills: 2
Start: 2017-05-24 | End: 2017-11-02 | Stop reason: SDUPTHER

## 2017-05-24 RX ORDER — CLONAZEPAM 1 MG/1
1 TABLET ORAL 3 TIMES DAILY
Qty: 90 TABLET | Refills: 3 | Status: SHIPPED | OUTPATIENT
Start: 2017-05-24 | End: 2017-11-02 | Stop reason: SDUPTHER

## 2017-05-24 RX ORDER — AMITRIPTYLINE HYDROCHLORIDE 10 MG/1
10 TABLET, FILM COATED ORAL NIGHTLY PRN
Qty: 30 TABLET | Refills: 11 | Status: SHIPPED | OUTPATIENT
Start: 2017-05-24 | End: 2018-06-12 | Stop reason: SDUPTHER

## 2017-05-24 NOTE — PROGRESS NOTES
Outpatient Psychiatry Follow-Up Visit (MD/NP)    5/24/2017    Clinical Status of Patient:  Outpatient (Ambulatory)    Chief Complaint:  Marilee Simons is a 47 y.o. female who presents today for follow-up of depression and anxiety.  Met with patient.      Interval History and Content of Current Session:  Interim Events/Subjective Report/Content of Current Session: She is taking care of her mother in her home.  Mother has been falling.  Also her daughter has missed a lot of school because of headaches and may not be able to return to her high school.  Her medications were reviewed and simplified when possible.      Psychotherapy:  · Target symptoms: depression, anxiety   · Why chosen therapy is appropriate versus another modality: relevant to diagnosis  · Outcome monitoring methods: self-report, observation  · Therapeutic intervention type: supportive psychotherapy  · Topics discussed/themes: relationships difficulties, parenting issues, illness/death of a loved one, stress related to medical comorbidities, building skills sets for symptom management, symptom recognition  · The patient's response to the intervention is accepting. The patient's progress toward treatment goals is good.   · Duration of intervention: 10 minutes.    Review of Systems   · PSYCHIATRIC: Pertinant items are noted in the narrative.    Past Medical, Family and Social History: The patient's past medical, family and social history have been reviewed and updated as appropriate within the electronic medical record - see encounter notes.    Compliance: yes    Side effects: None    Risk Parameters:  Patient reports no suicidal ideation  Patient reports no homicidal ideation  Patient reports no self-injurious behavior  Patient reports no violent behavior    Exam (detailed: at least 9 elements; comprehensive: all 15 elements)   Constitutional  Vitals:  Most recent vital signs, dated less than 90 days prior to this appointment, were reviewed.   Vitals:  "   05/24/17 1045   BP: 131/67   Pulse: 80   Weight: 72.9 kg (160 lb 12.8 oz)   Height: 4' 11" (1.499 m)        General:  unremarkable, age appropriate     Musculoskeletal  Muscle Strength/Tone:  no tremor   Gait & Station:  non-ataxic     Psychiatric  Speech:  no latency; no press   Mood & Affect:  anxious  congruent and appropriate   Thought Process:  normal and logical   Associations:  intact   Thought Content:  normal, no suicidality, no homicidality, delusions, or paranoia   Insight:  intact   Judgement: behavior is adequate to circumstances   Orientation:  grossly intact   Memory: intact for content of interview   Language: grossly intact   Attention Span & Concentration:  able to focus   Fund of Knowledge:  intact and appropriate to age and level of education     Assessment and Diagnosis   Status/Progress: Based on the examination today, the patient's problem(s) is/are adequately but not ideally controlled.  New problems have not been presented today.   Co-morbidities are complicating management of the primary condition.  There are no active rule-out diagnoses for this patient at this time.     General Impression: Anxiety/depression      ICD-10-CM ICD-9-CM   1. IC (interstitial cystitis) N30.10 595.1   2. Depression, unspecified depression type F32.9 311       Intervention/Counseling/Treatment Plan   · Medication Management: Continue current medications. The risks and benefits of medication were discussed with the patient.      Return to Clinic: 3 months  "

## 2017-06-09 ENCOUNTER — HOSPITAL ENCOUNTER (INPATIENT)
Facility: HOSPITAL | Age: 47
LOS: 3 days | Discharge: HOME | DRG: 392 | End: 2017-06-12
Attending: EMERGENCY MEDICINE | Admitting: HOSPITALIST
Payer: COMMERCIAL

## 2017-06-09 ENCOUNTER — TELEPHONE (OUTPATIENT)
Dept: GASTROENTEROLOGY | Facility: CLINIC | Age: 47
End: 2017-06-09

## 2017-06-09 DIAGNOSIS — K62.5 BRBPR (BRIGHT RED BLOOD PER RECTUM): ICD-10-CM

## 2017-06-09 DIAGNOSIS — N39.41 URGENCY INCONTINENCE: ICD-10-CM

## 2017-06-09 DIAGNOSIS — K52.9 ACUTE HEMORRHAGIC COLITIS: ICD-10-CM

## 2017-06-09 DIAGNOSIS — F33.0 MAJOR DEPRESSIVE DISORDER, RECURRENT EPISODE, MILD: ICD-10-CM

## 2017-06-09 DIAGNOSIS — R31.29 HEMATURIA, MICROSCOPIC: ICD-10-CM

## 2017-06-09 DIAGNOSIS — K21.9 GASTROESOPHAGEAL REFLUX DISEASE, ESOPHAGITIS PRESENCE NOT SPECIFIED: ICD-10-CM

## 2017-06-09 DIAGNOSIS — F41.1 GENERALIZED ANXIETY DISORDER: ICD-10-CM

## 2017-06-09 DIAGNOSIS — N32.81 OAB (OVERACTIVE BLADDER): ICD-10-CM

## 2017-06-09 DIAGNOSIS — R19.7 DIARRHEA, UNSPECIFIED TYPE: ICD-10-CM

## 2017-06-09 DIAGNOSIS — F41.9 ANXIETY: ICD-10-CM

## 2017-06-09 DIAGNOSIS — K52.9 COLITIS: ICD-10-CM

## 2017-06-09 DIAGNOSIS — A41.9 SEPSIS, DUE TO UNSPECIFIED ORGANISM: Primary | ICD-10-CM

## 2017-06-09 DIAGNOSIS — N31.8 FREQUENCY-URGENCY SYNDROME: ICD-10-CM

## 2017-06-09 DIAGNOSIS — K62.5 BRIGHT RED BLOOD PER RECTUM: ICD-10-CM

## 2017-06-09 DIAGNOSIS — K58.0 IRRITABLE BOWEL SYNDROME WITH DIARRHEA: ICD-10-CM

## 2017-06-09 DIAGNOSIS — M79.7 FIBROMYALGIA: ICD-10-CM

## 2017-06-09 LAB
ABO + RH BLD: NORMAL
ALBUMIN SERPL BCP-MCNC: 3.8 G/DL
ALP SERPL-CCNC: 83 U/L
ALT SERPL W/O P-5'-P-CCNC: 23 U/L
ANION GAP SERPL CALC-SCNC: 14 MMOL/L
APTT BLDCRRT: <21 SEC
AST SERPL-CCNC: 16 U/L
BACTERIA #/AREA URNS AUTO: ABNORMAL /HPF
BASOPHILS # BLD AUTO: 0.04 K/UL
BASOPHILS NFR BLD: 0.2 %
BILIRUB SERPL-MCNC: 0.6 MG/DL
BILIRUB UR QL STRIP: NEGATIVE
BLD GP AB SCN CELLS X3 SERPL QL: NORMAL
BUN SERPL-MCNC: 10 MG/DL
CALCIUM SERPL-MCNC: 9.4 MG/DL
CHLORIDE SERPL-SCNC: 105 MMOL/L
CLARITY UR REFRACT.AUTO: ABNORMAL
CO2 SERPL-SCNC: 19 MMOL/L
COLOR UR AUTO: ABNORMAL
CREAT SERPL-MCNC: 0.8 MG/DL
CRP SERPL-MCNC: 41 MG/L
DIFFERENTIAL METHOD: ABNORMAL
EOSINOPHIL # BLD AUTO: 0 K/UL
EOSINOPHIL NFR BLD: 0.2 %
ERYTHROCYTE [DISTWIDTH] IN BLOOD BY AUTOMATED COUNT: 12.5 %
ERYTHROCYTE [SEDIMENTATION RATE] IN BLOOD BY WESTERGREN METHOD: 22 MM/HR
EST. GFR  (AFRICAN AMERICAN): >60 ML/MIN/1.73 M^2
EST. GFR  (NON AFRICAN AMERICAN): >60 ML/MIN/1.73 M^2
GLUCOSE SERPL-MCNC: 97 MG/DL
GLUCOSE UR QL STRIP: NEGATIVE
HCT VFR BLD AUTO: 40.1 %
HGB BLD-MCNC: 13.4 G/DL
HGB UR QL STRIP: NEGATIVE
HIV1+2 IGG SERPL QL IA.RAPID: NEGATIVE
HYALINE CASTS UR QL AUTO: 7 /LPF
INR PPP: 1
KETONES UR QL STRIP: NEGATIVE
LACTATE SERPL-SCNC: 1.6 MMOL/L
LEUKOCYTE ESTERASE UR QL STRIP: NEGATIVE
LIPASE SERPL-CCNC: 14 U/L
LYMPHOCYTES # BLD AUTO: 2.5 K/UL
LYMPHOCYTES NFR BLD: 13.4 %
MCH RBC QN AUTO: 29 PG
MCHC RBC AUTO-ENTMCNC: 33.4 %
MCV RBC AUTO: 87 FL
MICROSCOPIC COMMENT: ABNORMAL
MONOCYTES # BLD AUTO: 1.3 K/UL
MONOCYTES NFR BLD: 6.8 %
NEUTROPHILS # BLD AUTO: 14.8 K/UL
NEUTROPHILS NFR BLD: 79.1 %
NITRITE UR QL STRIP: NEGATIVE
PH UR STRIP: 7 [PH] (ref 5–8)
PLATELET # BLD AUTO: 339 K/UL
PMV BLD AUTO: 9 FL
POTASSIUM SERPL-SCNC: 4 MMOL/L
PROT SERPL-MCNC: 7.4 G/DL
PROT UR QL STRIP: ABNORMAL
PROTHROMBIN TIME: 10.3 SEC
RBC # BLD AUTO: 4.62 M/UL
RBC #/AREA URNS AUTO: 5 /HPF (ref 0–4)
SODIUM SERPL-SCNC: 138 MMOL/L
SP GR UR STRIP: 1.02 (ref 1–1.03)
SQUAMOUS #/AREA URNS AUTO: 6 /HPF
TSH SERPL DL<=0.005 MIU/L-ACNC: 0.66 UIU/ML
URN SPEC COLLECT METH UR: ABNORMAL
UROBILINOGEN UR STRIP-ACNC: NEGATIVE EU/DL
WBC # BLD AUTO: 18.69 K/UL
WBC #/AREA URNS AUTO: 1 /HPF (ref 0–5)

## 2017-06-09 PROCEDURE — 63600175 PHARM REV CODE 636 W HCPCS: Performed by: EMERGENCY MEDICINE

## 2017-06-09 PROCEDURE — 87040 BLOOD CULTURE FOR BACTERIA: CPT

## 2017-06-09 PROCEDURE — 25000003 PHARM REV CODE 250: Performed by: EMERGENCY MEDICINE

## 2017-06-09 PROCEDURE — 99285 EMERGENCY DEPT VISIT HI MDM: CPT | Mod: 25

## 2017-06-09 PROCEDURE — 80053 COMPREHEN METABOLIC PANEL: CPT

## 2017-06-09 PROCEDURE — 86703 HIV-1/HIV-2 1 RESULT ANTBDY: CPT

## 2017-06-09 PROCEDURE — 96361 HYDRATE IV INFUSION ADD-ON: CPT

## 2017-06-09 PROCEDURE — 85610 PROTHROMBIN TIME: CPT

## 2017-06-09 PROCEDURE — 83605 ASSAY OF LACTIC ACID: CPT

## 2017-06-09 PROCEDURE — 25500020 PHARM REV CODE 255: Performed by: EMERGENCY MEDICINE

## 2017-06-09 PROCEDURE — 96375 TX/PRO/DX INJ NEW DRUG ADDON: CPT

## 2017-06-09 PROCEDURE — 84443 ASSAY THYROID STIM HORMONE: CPT

## 2017-06-09 PROCEDURE — 83690 ASSAY OF LIPASE: CPT

## 2017-06-09 PROCEDURE — 81003 URINALYSIS AUTO W/O SCOPE: CPT

## 2017-06-09 PROCEDURE — 86850 RBC ANTIBODY SCREEN: CPT

## 2017-06-09 PROCEDURE — 12000002 HC ACUTE/MED SURGE SEMI-PRIVATE ROOM

## 2017-06-09 PROCEDURE — 85730 THROMBOPLASTIN TIME PARTIAL: CPT

## 2017-06-09 PROCEDURE — 96372 THER/PROPH/DIAG INJ SC/IM: CPT

## 2017-06-09 PROCEDURE — 85025 COMPLETE CBC W/AUTO DIFF WBC: CPT

## 2017-06-09 PROCEDURE — 86900 BLOOD TYPING SEROLOGIC ABO: CPT

## 2017-06-09 PROCEDURE — 99285 EMERGENCY DEPT VISIT HI MDM: CPT | Mod: ,,, | Performed by: EMERGENCY MEDICINE

## 2017-06-09 PROCEDURE — 96366 THER/PROPH/DIAG IV INF ADDON: CPT

## 2017-06-09 PROCEDURE — 96365 THER/PROPH/DIAG IV INF INIT: CPT

## 2017-06-09 PROCEDURE — 81001 URINALYSIS AUTO W/SCOPE: CPT

## 2017-06-09 PROCEDURE — 86140 C-REACTIVE PROTEIN: CPT

## 2017-06-09 PROCEDURE — 85651 RBC SED RATE NONAUTOMATED: CPT

## 2017-06-09 PROCEDURE — 96368 THER/DIAG CONCURRENT INF: CPT

## 2017-06-09 RX ORDER — PROCHLORPERAZINE EDISYLATE 5 MG/ML
10 INJECTION INTRAMUSCULAR; INTRAVENOUS
Status: DISCONTINUED | OUTPATIENT
Start: 2017-06-09 | End: 2017-06-10

## 2017-06-09 RX ORDER — HYDROMORPHONE HYDROCHLORIDE 1 MG/ML
0.5 INJECTION, SOLUTION INTRAMUSCULAR; INTRAVENOUS; SUBCUTANEOUS
Status: COMPLETED | OUTPATIENT
Start: 2017-06-09 | End: 2017-06-09

## 2017-06-09 RX ORDER — METRONIDAZOLE 500 MG/100ML
500 INJECTION, SOLUTION INTRAVENOUS
Status: COMPLETED | OUTPATIENT
Start: 2017-06-09 | End: 2017-06-09

## 2017-06-09 RX ORDER — DICYCLOMINE HYDROCHLORIDE 10 MG/ML
20 INJECTION INTRAMUSCULAR
Status: DISCONTINUED | OUTPATIENT
Start: 2017-06-09 | End: 2017-06-10

## 2017-06-09 RX ORDER — DICYCLOMINE HYDROCHLORIDE 10 MG/ML
20 INJECTION INTRAMUSCULAR
Status: COMPLETED | OUTPATIENT
Start: 2017-06-09 | End: 2017-06-09

## 2017-06-09 RX ORDER — CIPROFLOXACIN 2 MG/ML
400 INJECTION, SOLUTION INTRAVENOUS
Status: COMPLETED | OUTPATIENT
Start: 2017-06-09 | End: 2017-06-09

## 2017-06-09 RX ORDER — PROCHLORPERAZINE EDISYLATE 5 MG/ML
10 INJECTION INTRAMUSCULAR; INTRAVENOUS ONCE
Status: COMPLETED | OUTPATIENT
Start: 2017-06-09 | End: 2017-06-09

## 2017-06-09 RX ADMIN — SODIUM CHLORIDE, SODIUM LACTATE, POTASSIUM CHLORIDE, AND CALCIUM CHLORIDE 2000 ML: .6; .31; .03; .02 INJECTION, SOLUTION INTRAVENOUS at 08:06

## 2017-06-09 RX ADMIN — SODIUM CHLORIDE 1000 ML: 0.9 INJECTION, SOLUTION INTRAVENOUS at 08:06

## 2017-06-09 RX ADMIN — CIPROFLOXACIN 400 MG: 2 INJECTION, SOLUTION INTRAVENOUS at 10:06

## 2017-06-09 RX ADMIN — METRONIDAZOLE 500 MG: 500 INJECTION, SOLUTION INTRAVENOUS at 10:06

## 2017-06-09 RX ADMIN — IOHEXOL 75 ML: 350 INJECTION, SOLUTION INTRAVENOUS at 09:06

## 2017-06-09 RX ADMIN — DICYCLOMINE HYDROCHLORIDE 20 MG: 20 INJECTION, SOLUTION INTRAMUSCULAR at 08:06

## 2017-06-09 RX ADMIN — HYDROMORPHONE HYDROCHLORIDE 0.5 MG: 1 INJECTION, SOLUTION INTRAMUSCULAR; INTRAVENOUS; SUBCUTANEOUS at 08:06

## 2017-06-09 RX ADMIN — PROCHLORPERAZINE EDISYLATE 10 MG: 5 INJECTION INTRAMUSCULAR; INTRAVENOUS at 08:06

## 2017-06-09 NOTE — ED NOTES
Pt identifiers Marilee Simons was checked and is correct  LOC: The patient is awake, alert, aware of environment with an appropriate affect.   APPEARANCE: Pt resting comfortably, in no acute distress,   SKIN: Skin warm, dry and intact, normal skin turgor, moist mucus membranes  RESPIRATORY: Airway is open and patent, respirations are spontaneous,  CARDIAC: Normal rate and rhythm, no peripheral edema noted  ABDOMEN: Soft, nontender, nondistended. Bowel sounds present   NEUROLOGIC: normal sensation.  Follows all commands appropriately  MUSCULOSKELETAL: No obvious deformities.

## 2017-06-09 NOTE — PROVIDER PROGRESS NOTES - EMERGENCY DEPT.
"Encounter Date: 6/9/2017    ED Physician Progress Notes       SCRIBE NOTE: I, Victoria Bowles, am scribing for, and in the presence of,  Dr. Terry.  Physician Statement: I, Dr. Terry, personally performed the services described in this documentation as scribed by Victoria Bowles in my presence, and it is both accurate and complete.     Physician Note:   Time seen by intake provider: 6:29 PM    This is a 47 y.o. female with a PMHx of IBS who presents with complaint of bleeding per rectum with associated diarrhea, nausea, chills, and severe abdominal pain. Abdominal pain described as feeling like pregnancy contractions. She explains that she woke up in the middle of the night with diarrhea, first soft, then liquid, then eventually bright red blood. She endorses a "roller coaster" history of constipation and diarrhea. She is followed by Dr. Venkat He (GI).     I initially evaluated this patient and ordered workup while in intake.  The patient will receive a full evaluation in an ED pod when space is available.  All results from tests ordered in intake will not be followed by the intake team, including myself. All results will be followed by the ED Pod team.        "

## 2017-06-09 NOTE — TELEPHONE ENCOUNTER
Received message from phone staff pt is having bleeding and wishes to speak to nurse.    Spoke to Ms. Simons  She reports 15 bowel movements in past 24 hours  Reports bright red bleeding that began this morning  Reports she feels as though she has a fever, however unable to find thermometer.  Reports dizziness     Let pt know she needs to go the ER for evaluation.  Pt states her mom is on her way to her house and can drive her.  Let her know if she feels dizzy and unable to have someone drive her, she needs to call 911.      Pt expressed if she should go to [facility] or to [facility], let pt know I advise her to go to nearest ER.    Pt verbalizes understanding of above and expresses she will be coming to [facility] for evaluation.

## 2017-06-09 NOTE — ED TRIAGE NOTES
Pt states started with diarrhea since 1am . Pt has nausea,chills and now having rectal bleeding with bloody diarrhea x 15 times today.

## 2017-06-09 NOTE — TELEPHONE ENCOUNTER
----- Message from Stephanie Garcia sent at 6/9/2017  1:52 PM CDT -----  Contact: Pt# [phone]  Pt states she is in pain and passing a lot of blood. pt would like to speak to the nurse about going to Er

## 2017-06-10 PROBLEM — K62.5 BRBPR (BRIGHT RED BLOOD PER RECTUM): Status: ACTIVE | Noted: 2017-06-10

## 2017-06-10 PROBLEM — K52.9 COLITIS: Status: ACTIVE | Noted: 2017-06-10

## 2017-06-10 LAB
ALBUMIN SERPL BCP-MCNC: 2.8 G/DL
ALP SERPL-CCNC: 54 U/L
ALT SERPL W/O P-5'-P-CCNC: 13 U/L
ANION GAP SERPL CALC-SCNC: 9 MMOL/L
APTT BLDCRRT: 25 SEC
AST SERPL-CCNC: 13 U/L
BASOPHILS # BLD AUTO: 0.04 K/UL
BASOPHILS NFR BLD: 0.4 %
BILIRUB SERPL-MCNC: 0.6 MG/DL
BUN SERPL-MCNC: 7 MG/DL
C DIFF GDH STL QL: NEGATIVE
C DIFF TOX A+B STL QL IA: NEGATIVE
CALCIUM SERPL-MCNC: 8 MG/DL
CHLORIDE SERPL-SCNC: 106 MMOL/L
CO2 SERPL-SCNC: 21 MMOL/L
CREAT SERPL-MCNC: 0.7 MG/DL
DIFFERENTIAL METHOD: ABNORMAL
EOSINOPHIL # BLD AUTO: 0.2 K/UL
EOSINOPHIL NFR BLD: 1.8 %
ERYTHROCYTE [DISTWIDTH] IN BLOOD BY AUTOMATED COUNT: 12.8 %
EST. GFR  (AFRICAN AMERICAN): >60 ML/MIN/1.73 M^2
EST. GFR  (NON AFRICAN AMERICAN): >60 ML/MIN/1.73 M^2
GLUCOSE SERPL-MCNC: 92 MG/DL
HCT VFR BLD AUTO: 31.7 %
HCT VFR BLD AUTO: 32.5 %
HGB BLD-MCNC: 10.3 G/DL
HGB BLD-MCNC: 10.5 G/DL
INR PPP: 1
LYMPHOCYTES # BLD AUTO: 2.5 K/UL
LYMPHOCYTES NFR BLD: 27.6 %
MAGNESIUM SERPL-MCNC: 1.3 MG/DL
MCH RBC QN AUTO: 28.7 PG
MCHC RBC AUTO-ENTMCNC: 32.5 %
MCV RBC AUTO: 88 FL
MONOCYTES # BLD AUTO: 0.9 K/UL
MONOCYTES NFR BLD: 9.8 %
NEUTROPHILS # BLD AUTO: 5.4 K/UL
NEUTROPHILS NFR BLD: 60.2 %
PHOSPHATE SERPL-MCNC: 2.9 MG/DL
PLATELET # BLD AUTO: 218 K/UL
PMV BLD AUTO: 8.7 FL
POCT GLUCOSE: 98 MG/DL (ref 70–110)
POTASSIUM SERPL-SCNC: 3.7 MMOL/L
PROT SERPL-MCNC: 5.2 G/DL
PROTHROMBIN TIME: 10.5 SEC
RBC # BLD AUTO: 3.59 M/UL
SODIUM SERPL-SCNC: 136 MMOL/L
WBC # BLD AUTO: 8.98 K/UL
WBC #/AREA STL HPF: ABNORMAL /[HPF]

## 2017-06-10 PROCEDURE — 87081 CULTURE SCREEN ONLY: CPT

## 2017-06-10 PROCEDURE — 85014 HEMATOCRIT: CPT

## 2017-06-10 PROCEDURE — 87427 SHIGA-LIKE TOXIN AG IA: CPT | Mod: 59

## 2017-06-10 PROCEDURE — 84100 ASSAY OF PHOSPHORUS: CPT

## 2017-06-10 PROCEDURE — 25000003 PHARM REV CODE 250: Performed by: HOSPITALIST

## 2017-06-10 PROCEDURE — 63600175 PHARM REV CODE 636 W HCPCS: Performed by: HOSPITALIST

## 2017-06-10 PROCEDURE — 85018 HEMOGLOBIN: CPT

## 2017-06-10 PROCEDURE — 89055 LEUKOCYTE ASSESSMENT FECAL: CPT

## 2017-06-10 PROCEDURE — 87449 NOS EACH ORGANISM AG IA: CPT

## 2017-06-10 PROCEDURE — 83630 LACTOFERRIN FECAL (QUAL): CPT

## 2017-06-10 PROCEDURE — 87209 SMEAR COMPLEX STAIN: CPT

## 2017-06-10 PROCEDURE — 87045 FECES CULTURE AEROBIC BACT: CPT

## 2017-06-10 PROCEDURE — 99223 1ST HOSP IP/OBS HIGH 75: CPT | Mod: ,,, | Performed by: HOSPITALIST

## 2017-06-10 PROCEDURE — 25000003 PHARM REV CODE 250: Performed by: ANESTHESIOLOGY

## 2017-06-10 PROCEDURE — 87329 GIARDIA AG IA: CPT

## 2017-06-10 PROCEDURE — 87040 BLOOD CULTURE FOR BACTERIA: CPT

## 2017-06-10 PROCEDURE — 63600175 PHARM REV CODE 636 W HCPCS: Performed by: ANESTHESIOLOGY

## 2017-06-10 PROCEDURE — 36415 COLL VENOUS BLD VENIPUNCTURE: CPT

## 2017-06-10 PROCEDURE — 83735 ASSAY OF MAGNESIUM: CPT

## 2017-06-10 PROCEDURE — 87046 STOOL CULTR AEROBIC BACT EA: CPT | Mod: 59

## 2017-06-10 PROCEDURE — 85025 COMPLETE CBC W/AUTO DIFF WBC: CPT

## 2017-06-10 PROCEDURE — 85730 THROMBOPLASTIN TIME PARTIAL: CPT

## 2017-06-10 PROCEDURE — 85610 PROTHROMBIN TIME: CPT

## 2017-06-10 PROCEDURE — 80053 COMPREHEN METABOLIC PANEL: CPT

## 2017-06-10 PROCEDURE — 11000001 HC ACUTE MED/SURG PRIVATE ROOM

## 2017-06-10 RX ORDER — DICYCLOMINE HYDROCHLORIDE 10 MG/ML
20 INJECTION INTRAMUSCULAR ONCE
Status: COMPLETED | OUTPATIENT
Start: 2017-06-10 | End: 2017-06-10

## 2017-06-10 RX ORDER — HYDROXYCHLOROQUINE SULFATE 200 MG/1
200 TABLET, FILM COATED ORAL 2 TIMES DAILY
Status: DISCONTINUED | OUTPATIENT
Start: 2017-06-10 | End: 2017-06-10

## 2017-06-10 RX ORDER — MAGNESIUM SULFATE HEPTAHYDRATE 40 MG/ML
2 INJECTION, SOLUTION INTRAVENOUS
Status: COMPLETED | OUTPATIENT
Start: 2017-06-10 | End: 2017-06-10

## 2017-06-10 RX ORDER — INSULIN ASPART 100 [IU]/ML
0-5 INJECTION, SOLUTION INTRAVENOUS; SUBCUTANEOUS
Status: DISCONTINUED | OUTPATIENT
Start: 2017-06-10 | End: 2017-06-12 | Stop reason: HOSPADM

## 2017-06-10 RX ORDER — IBUPROFEN 200 MG
16 TABLET ORAL
Status: DISCONTINUED | OUTPATIENT
Start: 2017-06-10 | End: 2017-06-12 | Stop reason: HOSPADM

## 2017-06-10 RX ORDER — FLUOXETINE HYDROCHLORIDE 20 MG/1
40 CAPSULE ORAL DAILY
Status: DISCONTINUED | OUTPATIENT
Start: 2017-06-10 | End: 2017-06-12 | Stop reason: HOSPADM

## 2017-06-10 RX ORDER — FLUOXETINE HYDROCHLORIDE 40 MG/1
40 CAPSULE ORAL DAILY
COMMUNITY
End: 2017-10-10 | Stop reason: SDUPTHER

## 2017-06-10 RX ORDER — OXYBUTYNIN CHLORIDE 5 MG/1
5 TABLET ORAL 2 TIMES DAILY
Status: DISCONTINUED | OUTPATIENT
Start: 2017-06-10 | End: 2017-06-12 | Stop reason: HOSPADM

## 2017-06-10 RX ORDER — LEVOTHYROXINE SODIUM 50 UG/1
50 TABLET ORAL
Status: DISCONTINUED | OUTPATIENT
Start: 2017-06-10 | End: 2017-06-12 | Stop reason: HOSPADM

## 2017-06-10 RX ORDER — METRONIDAZOLE 500 MG/100ML
500 INJECTION, SOLUTION INTRAVENOUS
Status: DISCONTINUED | OUTPATIENT
Start: 2017-06-10 | End: 2017-06-12

## 2017-06-10 RX ORDER — CLONAZEPAM 1 MG/1
1 TABLET ORAL 2 TIMES DAILY PRN
Status: DISCONTINUED | OUTPATIENT
Start: 2017-06-10 | End: 2017-06-12 | Stop reason: HOSPADM

## 2017-06-10 RX ORDER — AMITRIPTYLINE HYDROCHLORIDE 10 MG/1
10 TABLET, FILM COATED ORAL NIGHTLY PRN
Status: DISCONTINUED | OUTPATIENT
Start: 2017-06-10 | End: 2017-06-12 | Stop reason: HOSPADM

## 2017-06-10 RX ORDER — SODIUM CHLORIDE, SODIUM LACTATE, POTASSIUM CHLORIDE, CALCIUM CHLORIDE 600; 310; 30; 20 MG/100ML; MG/100ML; MG/100ML; MG/100ML
INJECTION, SOLUTION INTRAVENOUS CONTINUOUS
Status: DISCONTINUED | OUTPATIENT
Start: 2017-06-10 | End: 2017-06-12 | Stop reason: HOSPADM

## 2017-06-10 RX ORDER — ACETAMINOPHEN 325 MG/1
650 TABLET ORAL EVERY 6 HOURS PRN
Status: DISCONTINUED | OUTPATIENT
Start: 2017-06-10 | End: 2017-06-12 | Stop reason: HOSPADM

## 2017-06-10 RX ORDER — IBUPROFEN 200 MG
24 TABLET ORAL
Status: DISCONTINUED | OUTPATIENT
Start: 2017-06-10 | End: 2017-06-12 | Stop reason: HOSPADM

## 2017-06-10 RX ORDER — GABAPENTIN 400 MG/1
800 CAPSULE ORAL 3 TIMES DAILY
Status: DISCONTINUED | OUTPATIENT
Start: 2017-06-10 | End: 2017-06-12 | Stop reason: HOSPADM

## 2017-06-10 RX ORDER — CIPROFLOXACIN 2 MG/ML
400 INJECTION, SOLUTION INTRAVENOUS
Status: DISCONTINUED | OUTPATIENT
Start: 2017-06-10 | End: 2017-06-12

## 2017-06-10 RX ORDER — PROMETHAZINE HYDROCHLORIDE 25 MG/1
25 TABLET ORAL EVERY 6 HOURS PRN
Status: DISCONTINUED | OUTPATIENT
Start: 2017-06-10 | End: 2017-06-12 | Stop reason: HOSPADM

## 2017-06-10 RX ORDER — OXYCODONE HYDROCHLORIDE 5 MG/1
5 TABLET ORAL EVERY 4 HOURS PRN
Status: DISCONTINUED | OUTPATIENT
Start: 2017-06-10 | End: 2017-06-12 | Stop reason: HOSPADM

## 2017-06-10 RX ORDER — PANTOPRAZOLE SODIUM 40 MG/1
40 TABLET, DELAYED RELEASE ORAL DAILY
Status: DISCONTINUED | OUTPATIENT
Start: 2017-06-10 | End: 2017-06-12 | Stop reason: HOSPADM

## 2017-06-10 RX ORDER — GLUCAGON 1 MG
1 KIT INJECTION
Status: DISCONTINUED | OUTPATIENT
Start: 2017-06-10 | End: 2017-06-12 | Stop reason: HOSPADM

## 2017-06-10 RX ADMIN — GABAPENTIN 800 MG: 400 CAPSULE ORAL at 09:06

## 2017-06-10 RX ADMIN — SODIUM CHLORIDE 1000 ML: 0.9 INJECTION, SOLUTION INTRAVENOUS at 08:06

## 2017-06-10 RX ADMIN — SODIUM CHLORIDE, SODIUM LACTATE, POTASSIUM CHLORIDE, AND CALCIUM CHLORIDE: .6; .31; .03; .02 INJECTION, SOLUTION INTRAVENOUS at 08:06

## 2017-06-10 RX ADMIN — OXYBUTYNIN CHLORIDE 5 MG: 5 TABLET ORAL at 08:06

## 2017-06-10 RX ADMIN — SODIUM CHLORIDE, SODIUM LACTATE, POTASSIUM CHLORIDE, AND CALCIUM CHLORIDE: .6; .31; .03; .02 INJECTION, SOLUTION INTRAVENOUS at 12:06

## 2017-06-10 RX ADMIN — OXYBUTYNIN CHLORIDE 5 MG: 5 TABLET ORAL at 09:06

## 2017-06-10 RX ADMIN — DICYCLOMINE HYDROCHLORIDE 20 MG: 20 INJECTION, SOLUTION INTRAMUSCULAR at 11:06

## 2017-06-10 RX ADMIN — METRONIDAZOLE 500 MG: 500 INJECTION, SOLUTION INTRAVENOUS at 05:06

## 2017-06-10 RX ADMIN — CIPROFLOXACIN 400 MG: 2 INJECTION, SOLUTION INTRAVENOUS at 10:06

## 2017-06-10 RX ADMIN — PANTOPRAZOLE SODIUM 40 MG: 40 TABLET, DELAYED RELEASE ORAL at 08:06

## 2017-06-10 RX ADMIN — LEVOTHYROXINE SODIUM 50 MCG: 50 TABLET ORAL at 05:06

## 2017-06-10 RX ADMIN — FLUOXETINE 40 MG: 20 CAPSULE ORAL at 08:06

## 2017-06-10 RX ADMIN — ACETAMINOPHEN 650 MG: 325 TABLET ORAL at 09:06

## 2017-06-10 RX ADMIN — METRONIDAZOLE 500 MG: 500 INJECTION, SOLUTION INTRAVENOUS at 09:06

## 2017-06-10 RX ADMIN — GABAPENTIN 800 MG: 400 CAPSULE ORAL at 05:06

## 2017-06-10 RX ADMIN — MAGNESIUM SULFATE IN WATER 2 G: 40 INJECTION, SOLUTION INTRAVENOUS at 11:06

## 2017-06-10 RX ADMIN — GABAPENTIN 800 MG: 400 CAPSULE ORAL at 01:06

## 2017-06-10 RX ADMIN — METRONIDAZOLE 500 MG: 500 INJECTION, SOLUTION INTRAVENOUS at 01:06

## 2017-06-10 RX ADMIN — OXYCODONE HYDROCHLORIDE 5 MG: 5 TABLET ORAL at 01:06

## 2017-06-10 RX ADMIN — MAGNESIUM SULFATE IN WATER 2 G: 40 INJECTION, SOLUTION INTRAVENOUS at 01:06

## 2017-06-10 RX ADMIN — SODIUM CHLORIDE, SODIUM LACTATE, POTASSIUM CHLORIDE, AND CALCIUM CHLORIDE: .6; .31; .03; .02 INJECTION, SOLUTION INTRAVENOUS at 03:06

## 2017-06-10 NOTE — PROGRESS NOTES
Pt finished the bolus and BP rechecked 99/57 , pt reported she feels a little better . IM 4 Dr Kelley Notified, and she said she will increase the rate of the continuous IVF .  Will keep monitoring .

## 2017-06-10 NOTE — PROGRESS NOTES
Progress Note  Hospital Medicine                                                              Team: [facility]OSP MED 4    Patient Name: Marilee Simons  YOB: 1970    Admit Date: 6/9/2017                     LOS: 1    SUBJECTIVE:     Reason for Admission:  Diarrhea  Marilee Simons is a 47 y.o. female w/ significant PMHx of SLE, RA, UTIs, interstitial cystitis, fecal incontinence, hypothyroidism, fibromyalgia, IBS with chronic diarrhea presenting to the ED with 1 day history of multiple episodes of diarrhea associated with nausea, BRBPR and lightheadedness found to have infectious vs inflammatory colitis on CT imaging, now on IV cipro and flagyl and NPO for bowel rest.     Interval history: Doing well this AM; states abdominal discomfort has improved. Continues to have BRBPR loose stools. Denies fevers and chills. Having some stomach cramps but denies vomiting       OBJECTIVE:     Vital Signs Range (Last 24H):  Temp:  [98 °F (36.7 °C)-99.6 °F (37.6 °C)]   Pulse:  []   Resp:  [17-18]   BP: ()/(48-73)   SpO2:  [95 %-100 %] Body mass index is 32.32 kg/m².  Wt Readings from Last 1 Encounters:   06/09/17 1749 72.6 kg (160 lb)       I & O (Last 24H):  Intake/Output Summary (Last 24 hours) at 06/10/17 1021  Last data filed at 06/10/17 0835   Gross per 24 hour   Intake                0 ml   Output                0 ml   Net                0 ml         Physical Exam:  General: alert, oriented and appears stated age  HENT: NC/AT.Conjunctivae/corneas clear. PERRL, EOMI. Nares normal. Septum midline. Mucosa normal. No drainage or sinus tenderness.  Neck: no adenopathy, no carotid bruit, no JVD, supple, symmetrical, trachea midline and thyroid not enlarged, symmetric, no tenderness/mass/nodules  Back: symmetric, no curvature. ROM normal. No CVA tenderness.  Lungs: clear to auscultation bilaterally, respiratory effort normal  CV: RRR, S1 and S2 Normal. No chest wall tenderness  Abdomen: S, slightly tender to  palpation, ND. Bowel sounds normal   Extremities: WWP, intact distal pulses. no cyanosis or edema  Skin: Skin color, texture, turgor normal. No rashes or lesions  Lymph nodes: Cervical, supraclavicular, and axillary nodes normal.  Neurologic: Grossly normal      Diagnostic Results:  Lab Results   Component Value Date    WBC 8.98 06/10/2017    HGB 10.3 (L) 06/10/2017    HCT 31.7 (L) 06/10/2017    MCV 88 06/10/2017     06/10/2017       Recent Labs  Lab 06/10/17  0705   GLU 92      K 3.7      CO2 21*   BUN 7   CREATININE 0.7   CALCIUM 8.0*   MG 1.3*     Lab Results   Component Value Date    INR 1.0 06/10/2017    INR 1.0 06/09/2017     No results found for: HGBA1C  No results for input(s): POCTGLUCOSE in the last 72 hours.     CT abdomen/pel 6/9  Diffuse wall thickening and mild stranding around the distal transverse and descending colon compatible with nonspecific infectious or inflammatory colitis.    Interval placement of neurostimulator device in the subcutaneous tissues overlying the left gluteal muscles with the lead extending through the S3 neural foramen into the right pararectal fat.    Additional findings as detailed above.      ASSESSMENT/PLAN:   Marilee Simons is a 47 y.o. female w/ significant PMHx of SLE, RA, UTIs, interstitial cystitis, fecal incontinence, hypothyroidism, fibromyalgia, IBS with chronic diarrhea presenting to the ED with 1 day history of multiple episodes of diarrhea associated with nausea, BRBPR and lightheadedness found to have infectious vs inflammatory colitis on CT imaging, now on IV cipro and flagyl and NPO for bowel rest.     IBS/ Colitis   Chronic Diarrhea  BRBPR  - continue IV abx; cipro and flagyl  - continue IV fluids  - GI consulted   - feel it is likely ischemic but possible given hx of Lotronex use possible    - recommend continuing abx and IVF  - f/u stool cultures including c diff  - monitor H/H    SLE  RA  -Hold plaquenil for possible  infection       IC  Urge incontinence   -Continue TCA and Oxybutynin   -Uses URIBEL at home     Hypothyroidism   - continue Synthroid     GERD  - continue protonix      PTSD  - continue prozac     Diet: NPO  DVTPx: XENA SCD as recent bleed  PUPx: Protonix  Code: Full  Dispo:   - continue IVF support and IV abx   - GI following   - PT/OT    Peg Chen MD  IM4

## 2017-06-10 NOTE — ED PROVIDER NOTES
Encounter Date: 2017    SCRIBE #1 NOTE: I, Tracy Yu, am scribing for, and in the presence of,  Dr. Hawkins. I have scribed the following portions of the note - the Resident attestation.       History     Chief Complaint   Patient presents with    Rectal Bleeding     onset 1am, having nausea and bloating w/ abdominal cramps.  Diarrhea is bloody. Referred here by gastro    Abdominal Pain    Diarrhea     Review of patient's allergies indicates:   Allergen Reactions    Cephalexin Itching    Zofran [ondansetron hcl (pf)] Hives    Penicillins Rash     48 y/o F with h/o SLE, RA, UTIs, OAB, interstitial cystitis, fecal incontinence, vag infxs, thyroid dz, fibromyalgia, IBS, GERD, c-sections x2, hysterectomy, bladder surgery p/w acute onset constant BLQ abd cramping, profuse continual watery diarrhea followed by BRBPR, nausea, lightheadedness, gen weakness, chills x 1am this morning. Was feeling OK before this. Has had ~15 BMs x this AM and describes pain similar to menstrual cramps. Taking plaquenil and other meds for SLE. No recent hospitalizns, abx, sick contacts, travel, unint wt loss. No  habit changes. Has not yet had an EGD or colonoscopy yet.               Past Medical History:   Diagnosis Date    Acid reflux     Allergy     Alopecia     Anxiety     Back pain     Depression     Dry eyes     from meds    Fever blister     Fibromyalgia     Interstitial cystitis     Irritable bowel syndrome     Kidney stone     Major depressive disorder, recurrent episode, in partial or unspecified remission 2013    OAB (overactive bladder) 2015    Rheumatoid arthritis     Systemic lupus erythematosus     Thyroid disease     Urinary tract infection     Vaginal infection      Past Surgical History:   Procedure Laterality Date    BLADDER SURGERY       SECTION, LOW TRANSVERSE      x 2    EYE SURGERY      Lasik-bilateral    HYSTERECTOMY      PARTIAL HYSTERECTOMY       Family History    Problem Relation Age of Onset    Irritable bowel syndrome Mother     Irritable bowel syndrome Sister     Lupus Sister     Rheum arthritis Sister     Fibromyalgia Sister     Irritable bowel syndrome Sister     Celiac disease Neg Hx     Cirrhosis Neg Hx     Colon cancer Neg Hx     Colon polyps Neg Hx     Crohn's disease Neg Hx     Cystic fibrosis Neg Hx     Esophageal cancer Neg Hx     Hemochromatosis Neg Hx     Inflammatory bowel disease Neg Hx     Liver cancer Neg Hx     Liver disease Neg Hx     Rectal cancer Neg Hx     Stomach cancer Neg Hx     Ulcerative colitis Neg Hx     Boris's disease Neg Hx     Melanoma Neg Hx      Social History   Substance Use Topics    Smoking status: Never Smoker    Smokeless tobacco: Never Used    Alcohol use Yes      Comment: social     Review of Systems   Constitutional: Positive for appetite change, chills and fatigue. Negative for diaphoresis, fever and unexpected weight change.   HENT: Negative for congestion, rhinorrhea, sinus pressure and sore throat.    Eyes: Negative for visual disturbance.   Respiratory: Negative for cough and shortness of breath.    Cardiovascular: Negative for chest pain and palpitations.   Gastrointestinal: Positive for abdominal pain, anal bleeding, blood in stool, diarrhea and nausea. Negative for abdominal distention, constipation and vomiting.   Genitourinary: Negative for difficulty urinating, dysuria, flank pain, frequency, hematuria, pelvic pain, urgency, vaginal bleeding and vaginal discharge.   Musculoskeletal: Negative for back pain and neck pain.   Skin: Negative for rash.   Neurological: Negative for dizziness, syncope, weakness, light-headedness and headaches.   Hematological: Does not bruise/bleed easily.   Psychiatric/Behavioral: Negative for behavioral problems and confusion.       Physical Exam     Initial Vitals [06/09/17 1749]   BP Pulse Resp Temp SpO2   110/72 (!) 122 18 99.6 °F (37.6 °C) 98 %     Physical  Exam    Nursing note and vitals reviewed.  Constitutional: She appears well-developed. She is not diaphoretic. No distress.   Ill, uncomfortable, dry appearing middle aged WF in NAD   HENT:   Head: Normocephalic and atraumatic.   Mouth/Throat: No oropharyngeal exudate.   Dry MM; OP clear   Eyes: EOM are normal. Pupils are equal, round, and reactive to light. No scleral icterus.   Neck: Normal range of motion. Neck supple. No tracheal deviation present. No JVD present.   Cardiovascular: Regular rhythm and intact distal pulses. Exam reveals no gallop and no friction rub.    No murmur heard.  Tachy RR   Pulmonary/Chest: Breath sounds normal. No stridor. She has no wheezes. She has no rhonchi. She has no rales.   Abdominal: Soft. She exhibits no distension and no mass. There is tenderness (+mild TTP across BLQ / suprapubic). There is no rebound and no guarding.   Hyperactive nl pitched BS   Musculoskeletal: She exhibits no edema or tenderness.   Neurological: She is alert and oriented to person, place, and time.   Skin: Skin is warm and dry. Capillary refill takes less than 2 seconds. No rash noted.   Feels warm   Psychiatric: She has a normal mood and affect. Thought content normal.         ED Course   Procedures  Labs Reviewed   CBC W/ AUTO DIFFERENTIAL - Abnormal; Notable for the following:        Result Value    WBC 18.69 (*)     MPV 9.0 (*)     Gran # 14.8 (*)     Mono # 1.3 (*)     Gran% 79.1 (*)     Lymph% 13.4 (*)     All other components within normal limits   COMPREHENSIVE METABOLIC PANEL - Abnormal; Notable for the following:     CO2 19 (*)     All other components within normal limits   URINALYSIS, REFLEX TO URINE CULTURE - Abnormal; Notable for the following:     Color, UA Green (*)     Appearance, UA Hazy (*)     Protein, UA 1+ (*)     All other components within normal limits   C-REACTIVE PROTEIN - Abnormal; Notable for the following:     CRP 41.0 (*)     All other components within normal limits     Narrative:     add on [id number], [id number] per dr. downing  06/09/2017    20:14    SEDIMENTATION RATE, MANUAL - Abnormal; Notable for the following:     Sed Rate 22 (*)     All other components within normal limits    Narrative:     add on [id number], [id number] per dr. downing  06/09/2017    20:14    URINALYSIS MICROSCOPIC - Abnormal; Notable for the following:     RBC, UA 5 (*)     Hyaline Casts, UA 7 (*)     All other components within normal limits   LIPASE   LACTIC ACID, PLASMA   APTT   C-REACTIVE PROTEIN   PROTIME-INR   SEDIMENTATION RATE, MANUAL   TSH   RAPID HIV   PROTIME-INR   APTT   LIPASE    Narrative:     add on [id number], [id number] per dr. downing  06/09/2017    20:14    TSH    Narrative:     add on [id number], [id number] per dr. downing  06/09/2017    20:14    POCT OCCULT BLOOD STOOL   TYPE & SCREEN             Medical Decision Making:   History:   Old Medical Records: I decided to obtain old medical records.  Initial Assessment:   46 y/o F with h/o SLE, RA, UTIs, OAB, interstitial cystitis, fecal incontinence, vag infxs, thyroid dz, fibromyalgia, IBS, GERD, c-sections x2, hysterectomy, bladder surgery p/w acute onset constant BLQ abd cramping, profuse continual watery diarrhea followed by BRBPR, nausea, lightheadedness.  Differential Diagnosis:   Colitis   Gastroenteritis   Surgical abdomen   Clinical Tests:   Lab Tests: Ordered and Reviewed  Radiological Study: Ordered and Reviewed  ED Management:  Pain control   Fluid  ABx  CTA abdomen showed Diffuse wall thickening and mild stranding around the distal transverse and descending colon compatible with nonspecific infectious or inflammatory colitis.  Other:   I discussed test(s) with the performing physician.       APC / Resident Notes:   HO-3 MDM: Pt presented as above- ill- dry, tachycardic to 120s; abd soft with mild lower TTP; no peritoneal si. initial labs reveal leukocytosis 18, bicarb 19 (AG 14). Initial ddx incl  but not limited to: diverticulitis, colitis such as r/t community acquired C diff, other infx diarrhea, AGE, appendicitis, pSBO, abscess, hemorrhoids, angiodysplasia, hyperthyroid, malignancy, SLE flare, IBD, post-op complicn. Apparently on plaquenil and other meds for SLE. Expanded initial w/u to include inflamm markers, rapid HIV, lactate, pan-Cx's including stool studies, coags, CTabd/pelv. Currently being moved from Intake to Pod bed. Placing contact precautions. Will perform ERNST once placed in Pod bed. Given pt's presentation with presumed sepsis, with dehydration and significant diarrhea, in setting of possible immunocomp (on plaquenil), anticipate admission pending CTabd/pelv to help w/u above ddx and help dispo pt to appropriate service. Case and plan d/w Dr. Hawkins.  Nilay Ramos MD, PGY-3 EM  6/9/2017 7:40 PM    HO-3 update: Pt was moved to Pod bed and changed into gown; states she feels improved; HR improved to 90s while receiving IVF; contact precautions yet to be placed. Pending CTabd/pelv (she is next in line), rectal exam. We have yet to initiate empiric abx however pt is non-toxic and improving at the moment; team will ask pt about her allergies to PCN, cephalexin before initiating this. Care turned over to resident Dr. Leyva pending CTabd/pelv (she is next in line), rectal exam, abx choice and reassessmt; anticipated admission to appropriate service, likely medicine, for further eval and tx.  Nilay Ramos MD, PGY-3 EM  6/9/2017 9:48 PM         Scribe Attestation:   Scribe #1: I performed the above scribed service and the documentation accurately describes the services I performed. I attest to the accuracy of the note.    Attending Attestation:   Physician Attestation Statement for Resident:  As the supervising MD   Physician Attestation Statement: I have personally seen and examined this patient.   I agree with the above history. -: 47 y.o. female with multiple medical hx presents with abdominal  cramping and diarrhea that is bloody. Work-up initiated by triage provider. Lab work concerning for elevated WBC of 18. Stable heart rate improved with fluids. Will send for CT scan.   As the supervising MD I agree with the above PE.    As the supervising MD I agree with the above treatment, course, plan, and disposition.  I have reviewed and agree with the residents interpretation of the following: lab data and CT scans.          Physician Attestation for Scribe:  Physician Attestation Statement for Scribe #1: I, Dr. Hawkins, reviewed documentation, as scribed by Tracy Yu in my presence, and it is both accurate and complete.         Attending ED Notes:   CT scan with colitis. Antibiotics started. Will admit for further resuscitation.           ED Course     Clinical Impression:   The primary encounter diagnosis was Sepsis, due to unspecified organism. Diagnoses of Diarrhea, unspecified type, Bright red blood per rectum, and Colitis were also pertinent to this visit.    Disposition:   Disposition: Admitted  Condition: Fair       Nilay Ramos MD  Resident  06/11/17 3959       Yas Hawkins MD  06/12/17 3917

## 2017-06-10 NOTE — ASSESSMENT & PLAN NOTE
DDx includes infectious vs. Ischemic colitis. Less likely IBD , but not completely ruled out.  Her use of alosetron puts her at risk for ischemic colitis. If determined to be ischemic, she would need to avoid alosetron indefinitively.  Will need to rule out infection first.    Plan:  Rule out infectious causes with stool studies:  Cdiff, giardia crypto, stool O&P, stool culture, ecoli     IF stool studies negative, will plan flex sig Monday with biospies to determine etiology and rule out ischemic colitis which would  regarding alosetron medication    HOLD alosetron (lotronex)  Continue cipro / flagyl

## 2017-06-10 NOTE — PROGRESS NOTES
Pt's BP is 84/48 , HR 72 . Pt is awake and alert , pt C/O weakness and dizziness . IM 4 Dr grullon . Waiting for answer . Will keep monitoring .

## 2017-06-10 NOTE — SUBJECTIVE & OBJECTIVE
Past Medical History:   Diagnosis Date    Acid reflux     Allergy     Alopecia     Anxiety     Back pain     Depression     Dry eyes     from meds    Fever blister     Fibromyalgia     Interstitial cystitis     Irritable bowel syndrome     Kidney stone     Major depressive disorder, recurrent episode, in partial or unspecified remission 2013    OAB (overactive bladder) 2015    Rheumatoid arthritis     Systemic lupus erythematosus     Thyroid disease     Urinary tract infection     Vaginal infection        Past Surgical History:   Procedure Laterality Date    BLADDER SURGERY       SECTION, LOW TRANSVERSE      x 2    EYE SURGERY      Lasik-bilateral    HYSTERECTOMY      PARTIAL HYSTERECTOMY         Review of patient's allergies indicates:   Allergen Reactions    Cephalexin Itching    Zofran [ondansetron hcl (pf)] Hives    Penicillins Rash     Family History     Problem Relation (Age of Onset)    Fibromyalgia Sister    Irritable bowel syndrome Mother, Sister, Sister    Lupus Sister    Rheum arthritis Sister        Social History Main Topics    Smoking status: Never Smoker    Smokeless tobacco: Never Used    Alcohol use Yes      Comment: social    Drug use: No    Sexual activity: No     Review of Systems   Constitutional: Positive for appetite change and fatigue. Negative for chills and fever.   HENT: Negative for mouth sores, nosebleeds and trouble swallowing.    Eyes: Negative for pain and redness.   Respiratory: Negative for cough and shortness of breath.    Cardiovascular: Negative for chest pain and palpitations.   Gastrointestinal: Positive for abdominal pain, anal bleeding, blood in stool, diarrhea and nausea.   Endocrine: Negative for polydipsia and polyphagia.   Genitourinary: Negative for dysuria and hematuria.   Musculoskeletal: Negative for arthralgias and joint swelling.   Skin: Negative for rash.   Neurological: Negative for seizures and facial asymmetry.    Psychiatric/Behavioral: Negative for agitation and confusion.     Objective:     Vital Signs (Most Recent):  Temp: 98 °F (36.7 °C) (06/10/17 0818)  Pulse: 70 (06/10/17 1100)  Resp: 17 (06/10/17 0818)  BP: (!) 99/57 (06/10/17 1027)  SpO2: 96 % (06/10/17 0818) Vital Signs (24h Range):  Temp:  [98 °F (36.7 °C)-99.6 °F (37.6 °C)] 98 °F (36.7 °C)  Pulse:  [] 70  Resp:  [17-18] 17  SpO2:  [95 %-100 %] 96 %  BP: ()/(48-73) 99/57     Weight: 72.6 kg (160 lb) (06/09/17 1749)  Body mass index is 32.32 kg/m².      Intake/Output Summary (Last 24 hours) at 06/10/17 1328  Last data filed at 06/10/17 0835   Gross per 24 hour   Intake                0 ml   Output                0 ml   Net                0 ml       Lines/Drains/Airways     Peripheral Intravenous Line                 Peripheral IV - Single Lumen 06/10/17 1100 Right Forearm less than 1 day         Peripheral IV - Single Lumen 06/10/17 1100 Right Hand less than 1 day                Physical Exam   Constitutional: She is oriented to person, place, and time.   Nontoxic well apearing   HENT:   Head: Normocephalic and atraumatic.   Eyes: Conjunctivae are normal. No scleral icterus.   Neck: Neck supple.   Cardiovascular: Normal rate and regular rhythm.    Pulmonary/Chest: Breath sounds normal. No stridor. No respiratory distress.   Abdominal: Soft. She exhibits no distension. There is tenderness (there is mild TTP in lower quadrants without rebound). There is no rebound and no guarding.   Musculoskeletal: She exhibits no tenderness or deformity.   Neurological: She is alert and oriented to person, place, and time.   Skin: Skin is warm and dry. No rash noted.   Psychiatric: She has a normal mood and affect.   Vitals reviewed.      Significant Labs:  Amylase: No results for input(s): AMYLASE in the last 48 hours.  Blood Culture:   Recent Labs  Lab 06/09/17 2036   LABBLOO No Growth to date     CBC:   Recent Labs  Lab 06/09/17  1806 06/10/17  0705   WBC 18.69*  8.98   HGB 13.4 10.3*   HCT 40.1 31.7*    218     CMP:   Recent Labs  Lab 06/10/17  0705   GLU 92   CALCIUM 8.0*   ALBUMIN 2.8*   PROT 5.2*      K 3.7   CO2 21*      BUN 7   CREATININE 0.7   ALKPHOS 54*   ALT 13   AST 13   BILITOT 0.6     Coagulation:   Recent Labs  Lab 06/10/17  0705   INR 1.0   APTT 25.0     CRP:   Recent Labs  Lab 06/09/17  1806   CRP 41.0*     ESR:   Recent Labs  Lab 06/09/17  1806   SEDRATE 22*     H.Pylori Ab IgG: No results for input(s): HPYLORIIGG in the last 48 hours.  Lipase:   Recent Labs  Lab 06/09/17  1806   LIPASE 14     Liver Function Test:   Recent Labs  Lab 06/09/17  1806 06/10/17  0705   ALT 23 13   AST 16 13   ALKPHOS 83 54*   BILITOT 0.6 0.6   PROT 7.4 5.2*   ALBUMIN 3.8 2.8*     Stool C. diff: No results for input(s): CDIFFICILEAN, CDIFFTOX in the last 48 hours.  Stool Culture: No results for input(s): STOOLCULTURE in the last 48 hours.  Stool Ova/Cysts/Parasites: No results for input(s): STLEXAMOCP in the last 48 hours.  Stool Giardia/Crypto: No results for input(s): GIARDIAANTIG, CRSPAG in the last 48 hours.  Stool WBCs:   Recent Labs  Lab 06/10/17  0807   STOOLWBC Occ neutrophils seen*       Significant Imaging:  CT: I have reviewed all results within the past 24 hours and my personal findings are:  left sided nonspecific colitis.

## 2017-06-10 NOTE — H&P
HISTORY & PHYSICAL  Hospital Medicine     Team: [facility] 4    Patient Name: Marilee Simons  YOB: 1970    Admit Date: 6/9/2017                   LOS: 1    PRESENTING HISTORY     Chief Complaint/Reason for Admission: Diarrhea    History of Present Illness:  Ms. Marilee Simons is a 47 y.o. female w/ significant PMHx of SLE, RA, UTIs, interstitial cystitis, fecal incontinence, hypothyroidism, fibromyalgia, IBS with chronic diarrhea presenting to the ED with multiple episodes of diarrhea associated with nausea, BRBPR and lightheadedness. This all started last night around 1 am when she noticed cramping abd pain after which she had mutiple BMs and few times it was associated with BRB. She also felt febrile and had chills associated with these episodes. Denies having such bloody BMs in the past and denies having come in contact with anyone sick or hx of travel. She was taking plaquenil for her SLE and RA.     Of note, she feel down about 3 days ago and reports to have lost consciousness for a brief period of time. She apparently did hit her head but denies having any numbness/weakness since then. The symptoms did not recur and she attributes it to dehydration. No bruises/swelling noted on the head    Review of Systems:  Constitutional: + fever/ chills  Eyes: no visual changes  ENT: no nasal congestion or sore throat  Respiratory: no cough or shortness of breath  Cardiovascular: no chest pain or palpitations  Gastrointestinal:  + nausea + vomiting, +  abdominal pain + diarrhea  Genitourinary: no hematuria or dysuria  Musculoskeletal: no arthralgias or myalgias  Neurological: no seizures or tremors      PAST HISTORY:     Past Medical History:   Diagnosis Date    Acid reflux     Allergy     Alopecia     Anxiety     Back pain     Depression     Dry eyes     from meds    Fever blister     Fibromyalgia     Interstitial cystitis     Irritable bowel syndrome     Kidney stone     Major  depressive disorder, recurrent episode, in partial or unspecified remission 2013    OAB (overactive bladder) 2015    Rheumatoid arthritis     Systemic lupus erythematosus     Thyroid disease     Urinary tract infection     Vaginal infection        Past Surgical History:   Procedure Laterality Date    BLADDER SURGERY       SECTION, LOW TRANSVERSE      x 2    EYE SURGERY      Lasik-bilateral    HYSTERECTOMY      PARTIAL HYSTERECTOMY         Family History   Problem Relation Age of Onset    Irritable bowel syndrome Mother     Irritable bowel syndrome Sister     Lupus Sister     Rheum arthritis Sister     Fibromyalgia Sister     Irritable bowel syndrome Sister     Celiac disease Neg Hx     Cirrhosis Neg Hx     Colon cancer Neg Hx     Colon polyps Neg Hx     Crohn's disease Neg Hx     Cystic fibrosis Neg Hx     Esophageal cancer Neg Hx     Hemochromatosis Neg Hx     Inflammatory bowel disease Neg Hx     Liver cancer Neg Hx     Liver disease Neg Hx     Rectal cancer Neg Hx     Stomach cancer Neg Hx     Ulcerative colitis Neg Hx     Boris's disease Neg Hx     Melanoma Neg Hx        Social History     Social History    Marital status:      Spouse name: N/A    Number of children: N/A    Years of education: N/A     Occupational History     Other     Social History Main Topics    Smoking status: Never Smoker    Smokeless tobacco: Never Used    Alcohol use Yes      Comment: social    Drug use: No    Sexual activity: No     Other Topics Concern    Are You Pregnant Or Think You May Be? No    Breast-Feeding No     Social History Narrative    No narrative on file       MEDICATIONS & ALLERGIES:      ciprofloxacin  400 mg Intravenous Q12H    dicyclomine  20 mg Intramuscular ED 1 Time    fluoxetine  40 mg Oral Daily    gabapentin  800 mg Oral TID    hydroxychloroquine  200 mg Oral BID    levothyroxine  50 mcg Oral Before breakfast    metronidazole  500 mg  Intravenous Q8H    oxybutynin  5 mg Oral BID    pantoprazole  40 mg Oral Daily    prochlorperazine  10 mg Intravenous ED 1 Time        lactated ringers 100 mL/hr at 06/10/17 0046       amitriptyline, clonazePAM, dextrose 50%, dextrose 50%, glucagon (human recombinant), glucose, glucose, insulin aspart, oxycodone, promethazine    No current facility-administered medications on file prior to encounter.      Current Outpatient Prescriptions on File Prior to Encounter   Medication Sig    alosetron (LOTRONEX) 0.5 MG tablet TAKE 1 TABLET(0.5 MG) BY MOUTH TWICE DAILY    amitriptyline (ELAVIL) 10 MG tablet Take 1 tablet (10 mg total) by mouth nightly as needed.    BUPAP  mg Tab TAKE 1 TABLET BY MOUTH EVERY 4 HOURS    calcium glycerophosphate (PRELIEF) 65 mg Tab Take 2 tablets by mouth before meals and at bedtime as needed.    ciprofloxacin HCl (CIPRO) 500 MG tablet Take 500 mg by mouth every 12 (twelve) hours.    clonazePAM (KLONOPIN) 1 MG tablet Take 1 tablet (1 mg total) by mouth 3 (three) times daily.    cyclobenzaprine (FLEXERIL) 10 MG tablet Take 1 tablet (10 mg total) by mouth 2 (two) times daily.    diazePAM (VALIUM) 5 MG tablet Take 1 tablet (5 mg total) by mouth every 12 (twelve) hours as needed for Anxiety. Insert the tablet either crushed or intact inside of the vagina at night prn pelvic pain and spasm    etanercept (ENBREL) 50 mg/mL (0.98 mL) injection Inject 1 mL (50 mg total) into the skin once a week.    fluoxetine (PROZAC) 40 MG capsule Take 1 capsule (40 mg total) by mouth once daily.    gabapentin (NEURONTIN) 800 MG tablet Take 1 tablet (800 mg total) by mouth 3 (three) times daily.    hydroxychloroquine (PLAQUENIL) 200 mg tablet TAKE 1 TABLET BY MOUTH TWICE DAILY    hydrOXYzine HCl (ATARAX) 25 MG tablet Take 1 tablet (25 mg total) by mouth every evening.    ibuprofen (ADVIL,MOTRIN) 800 MG tablet Take 800 mg by mouth 3 (three) times daily.    ibuprofen-famotidine (DUEXIS) 800-26.6  mg Tab Take 1 tablet by mouth 3 (three) times daily.    levothyroxine (SYNTHROID) 50 MCG tablet TAKE 1 TABLET(50 MCG) BY MOUTH EVERY DAY    methen-m.blue-s.phos-phsal-hyo (URIBEL) 118-10-40.8-36 mg Cap Take 1 capsule by mouth 4 (four) times daily.    multivitamin with minerals tablet Take 1 tablet by mouth once daily.    oxybutynin (DITROPAN) 5 MG Tab TK 1 T PO BID    potassium citrate (UROCIT-K) 10 mEq (1,080 mg) TbSR Take 1 tablet (10 mEq total) by mouth 2 (two) times daily with meals.    promethazine (PHENERGAN) 25 MG suppository Place 1 suppository (25 mg total) rectally every 6 (six) hours as needed for Nausea.    promethazine (PHENERGAN) 25 MG tablet TAKE 1 TABLET(25 MG) BY MOUTH EVERY 6 HOURS AS NEEDED    rabeprazole (ACIPHEX) 20 mg tablet Take 1 tablet (20 mg total) by mouth once daily.    tizanidine (ZANAFLEX) 4 MG tablet TAKE 1 TABLET(4 MG) BY MOUTH EVERY 8 HOURS    trazodone (DESYREL) 50 MG tablet TAKE 1/2  TABLET BY MOUTH EVERY NIGHT AT BEDTIME AS NEEDED FOR INSOMNIA    triamcinolone acetonide 0.1% (KENALOG) 0.1 % cream APPLY TO THE AFFECTED AREA TWICE DAILY    URIBEL 118-10-40.8-36 mg Cap TAKE 1 CAPSULE BY MOUTH FOUR TIMES DAILY       Review of patient's allergies indicates:   Allergen Reactions    Cephalexin Itching    Zofran [ondansetron hcl (pf)] Hives    Penicillins Rash       OBJECTIVE:     Vital Signs:  Recent Results (from the past 24 hour(s))   Type & Screen    Collection Time: 06/09/17  6:05 PM   Result Value Ref Range    Group & Rh A POS     Indirect Du NEG    CBC auto differential    Collection Time: 06/09/17  6:06 PM   Result Value Ref Range    WBC 18.69 (H) 3.90 - 12.70 K/uL    RBC 4.62 4.00 - 5.40 M/uL    Hemoglobin 13.4 12.0 - 16.0 g/dL    Hematocrit 40.1 37.0 - 48.5 %    MCV 87 82 - 98 fL    MCH 29.0 27.0 - 31.0 pg    MCHC 33.4 32.0 - 36.0 %    RDW 12.5 11.5 - 14.5 %    Platelets 339 150 - 350 K/uL    MPV 9.0 (L) 9.2 - 12.9 fL    Gran # 14.8 (H) 1.8 - 7.7 K/uL    Lymph  # 2.5 1.0 - 4.8 K/uL    Mono # 1.3 (H) 0.3 - 1.0 K/uL    Eos # 0.0 0.0 - 0.5 K/uL    Baso # 0.04 0.00 - 0.20 K/uL    Gran% 79.1 (H) 38.0 - 73.0 %    Lymph% 13.4 (L) 18.0 - 48.0 %    Mono% 6.8 4.0 - 15.0 %    Eosinophil% 0.2 0.0 - 8.0 %    Basophil% 0.2 0.0 - 1.9 %    Differential Method Automated    Comprehensive metabolic panel    Collection Time: 06/09/17  6:06 PM   Result Value Ref Range    Sodium 138 136 - 145 mmol/L    Potassium 4.0 3.5 - 5.1 mmol/L    Chloride 105 95 - 110 mmol/L    CO2 19 (L) 23 - 29 mmol/L    Glucose 97 70 - 110 mg/dL    BUN, Bld 10 6 - 20 mg/dL    Creatinine 0.8 0.5 - 1.4 mg/dL    Calcium 9.4 8.7 - 10.5 mg/dL    Total Protein 7.4 6.0 - 8.4 g/dL    Albumin 3.8 3.5 - 5.2 g/dL    Total Bilirubin 0.6 0.1 - 1.0 mg/dL    Alkaline Phosphatase 83 55 - 135 U/L    AST 16 10 - 40 U/L    ALT 23 10 - 44 U/L    Anion Gap 14 8 - 16 mmol/L    eGFR if African American >60.0 >60 mL/min/1.73 m^2    eGFR if non African American >60.0 >60 mL/min/1.73 m^2   Lipase    Collection Time: 06/09/17  6:06 PM   Result Value Ref Range    Lipase 14 4 - 60 U/L   TSH    Collection Time: 06/09/17  6:06 PM   Result Value Ref Range    TSH 0.655 0.400 - 4.000 uIU/mL   C-reactive protein    Collection Time: 06/09/17  6:06 PM   Result Value Ref Range    CRP 41.0 (H) 0.0 - 8.2 mg/L   Sedimentation rate, manual    Collection Time: 06/09/17  6:06 PM   Result Value Ref Range    Sed Rate 22 (H) 0 - 20 mm/Hr   Lactic acid, plasma    Collection Time: 06/09/17  8:01 PM   Result Value Ref Range    Lactate (Lactic Acid) 1.6 0.5 - 2.2 mmol/L   Rapid HIV    Collection Time: 06/09/17  8:01 PM   Result Value Ref Range    HIV Rapid Testing Negative Negative   Protime-INR    Collection Time: 06/09/17  8:01 PM   Result Value Ref Range    Prothrombin Time 10.3 9.0 - 12.5 sec    INR 1.0 0.8 - 1.2   APTT    Collection Time: 06/09/17  8:01 PM   Result Value Ref Range    aPTT <21.0 21.0 - 32.0 sec   Urinalysis, Reflex to Urine Culture    Collection  Time: 06/09/17  8:36 PM   Result Value Ref Range    Specimen UA Urine, Clean Catch     Color, UA Green (A) Yellow, Straw, Almaz    Appearance, UA Hazy (A) Clear    pH, UA 7.0 5.0 - 8.0    Specific Gravity, UA 1.020 1.005 - 1.030    Protein, UA 1+ (A) Negative    Glucose, UA Negative Negative    Ketones, UA Negative Negative    Bilirubin (UA) Negative Negative    Occult Blood UA Negative Negative    Nitrite, UA Negative Negative    Urobilinogen, UA Negative <2.0 EU/dL    Leukocytes, UA Negative Negative   Urinalysis Microscopic    Collection Time: 06/09/17  8:36 PM   Result Value Ref Range    RBC, UA 5 (H) 0 - 4 /hpf    WBC, UA 1 0 - 5 /hpf    Bacteria, UA Rare None-Occ /hpf    Squam Epithel, UA 6 /hpf    Hyaline Casts, UA 7 (A) 0-1/lpf /lpf    Microscopic Comment SEE COMMENT         Physical Exam:  General:  Well developed, well nourished, no acute distress   HEENT:  Normocephalic, atraumatic, PERRL, EOMI, clear sclera, ears normal, throat clear without erythema or exudates  CVS:  RRR, S1 and S2 normal, no murmurs, rubs, gallops  Resp:  Lungs clear to auscultation, no wheezes, rales, rhonchi, cough  GI:  Abdomen soft, slight tenderness on palpation, non-distended, normoactive bowel sounds, no masses  MSK:  No muscle atrophy, cyanosis, peripheral edema, full range of motion  Skin:  No rashes, ulcers, erythema  Neuro: Alert and oriented to person, place, and time      IMAGING:   Ct Abdomen Pelvis With Contrast    Result Date: 6/9/2017  CT abdomen pelvis with contrast Technique: Axial 5 mm images of the lung bases through the pelvis were obtained after the ministration of 75 cc Omnipaque 350 intravenous contrast. No oral contrast was administered. Coronal and sagittal reformats were also obtained. Delayed images of the abdomen and pelvis were also obtained. Comparison: CT urogram 3/27/14 Findings: The lung bases are unremarkable. No significant volume of pleural fluid is seen. The visualized heart is not enlarged. No  abnormal pericardial fluid is identified. The liver is normal in size and attenuation without focal abnormality identified. The gallbladder is not well-distended but without radiopaque stones or pericholecystic fluid. There is no intra-or extrahepatic biliary ductal dilatation. The stomach, spleen, pancreas, and bilateral adrenal glands are unremarkable. Normal size and location without evidence for nephrolithiasis or hydronephrosis. The kidneys concentrate and excrete contrast properly and delayed imaging. The ureters are normal in course to the urinary bladder which is unremarkable. Patient is status post hysterectomy. Bilateral adnexa are unremarkable. The aorta is normal in caliber, contour, and course without significant atherosclerotic calcifications. Visualized loops of small bowel show no evidence of obstruction or inflammation. The appendix is visualized and appears unremarkable. There is diffuse wall thickening and surrounding fat stranding of the distal transverse and descending colon with mild fatty fat stranding. Findings are nonspecific and may represent an infectious or inflammatory colitis. The cecum, ascending, and proximal transverse colon are without evidence of obstruction or inflammation. There is no intraperitoneal free air. No abnormal lymph node enlargement in the abdomen or pelvis. No significant volume of free fluid in the abdomen or pelvis. The osseous structures are unremarkable. The extraperitoneal soft tissues demonstrate interval placement of a neurostimulator device in the subcutaneous tissues overlying the left gluteal muscles with a lead extending through the right S3 neural foramen into the right perirectal fat. The soft tissues are otherwise unremarkable.    Diffuse wall thickening and mild stranding around the distal transverse and descending colon compatible with nonspecific infectious or inflammatory colitis. Interval placement of neurostimulator device in the subcutaneous  tissues overlying the left gluteal muscles with the lead extending through the S3 neural foramen into the right pararectal fat. Additional findings as detailed above. ______________________________________ Electronically signed by resident: PAM MACIEL MD Date:     06/09/17 Time:    22:13 As the supervising and teaching physician, I personally reviewed the images and resident's interpretation and I agree with the findings. Electronically signed by: ALYSSA DEAN MD Date:     06/09/17 Time:    22:26          ASSESSMENT & PLAN:     Ms. Marilee Simons is a 47 y.o. female with IBS with chronic diarrhea presenting with BRBPR    Current Problems List:  Active Hospital Problems    Diagnosis  POA    *Diarrhea [R19.7]  Yes    Colitis [K52.9]  Unknown    Fibromyalgia [M79.7]  Yes    Anxiety [F41.9]  Yes    Frequency-urgency syndrome [N31.8]  Yes    Urgency incontinence [N39.41]  Yes    Generalized anxiety disorder [F41.1]  Yes    GERD (gastroesophageal reflux disease) [K21.9]  Yes    IC (interstitial cystitis) [N30.10]  Yes    Hypothyroidism [E03.9]  Yes    IBS (irritable bowel syndrome) [K58.9]  Yes      Resolved Hospital Problems    Diagnosis Date Resolved POA   No resolved problems to display.       Problem Assessment & Treatment Plan:    IBS/ Colitis   Chronic Diarrhea  BRBPR  Likely cause the diarrhea is infectious vs IBS flare vs other IBD? Considering BRB with stool   CT scan indicative of infectious vs inflammatory colitis  Will start her on Cipro and Flagyl   Will provide IVF support  Will send out stool studies to rule out other infectious causes of diarrhea   Suffers from chronic diarrhea for which she sees Dr. He, was on Lotronex  Will hold plaquenil for possible infection  Will consult GI; apprec assistance   F/u C diff although no recent abx or hospitalization   BRBPR => previously had internal hemorrhoids, likely from there? Will follow H/H and monitor for bleeding     SLE  RA  Hold plaquenil  for possible infection  May consider consulting Rheum for assistance in meds    IC  Urge incontinence   Continue TCA and Oxybutynin   Uses URIBEL at home    Hypothyroidism   Synthroid    GERD  Protonix     PTSD  Prozac    Diet: NPO  DVTPx: XENA SCD as recent bleed  PUPx: Protonix  Code: Full  Dispo: Admit for colitis management       Ashly Jimenes MD  Internal Medicine PGY-2  #[phone]

## 2017-06-10 NOTE — PLAN OF CARE
Problem: Fall Risk (Adult)  Goal: Absence of Falls  Patient will demonstrate the desired outcomes by discharge/transition of care.   Outcome: Ongoing (interventions implemented as appropriate)  Pt is free of falls and injuries per shift , fall precautions maintained and family at bedside .     Problem: Patient Care Overview  Goal: Plan of Care Review  Outcome: Ongoing (interventions implemented as appropriate)  Pt is awake ,alert and oriented x4 . Stable V/s . Pt has a low BP this morning 84/48  , N/S 1000 cc bolus given and BP comes up to 143/61. Pt still has watery bloody stool during the day , stool sample collected and sent to the lab . Pt is able to ambulate in the room independently . Pt is NPO and on continuous IVF LR @ 200 cc/hr . Contact isolation precautions maintained .

## 2017-06-10 NOTE — HPI
This is a 46 y/o female who presents with acute onset bloody diarrhea.  Hx of SLE, RA, and IBS - D on alosetron.   Pt was in usual state of health until Wednesday night when she developed lightheadedness, weakness, dizzyness and eventually minor syncopal episode. The next day she began having signifcant lower abd cramping and nausea. She then had profuse diarrhea which was quickly followed by passing bright red blood.  Never had this before. Has been on alosetron x 3 years with Dr. He and gets good benefit. No hx of IBD. No prior colonoscopy.    CT abd:  Diffuse wall thickening and mild stranding around the distal transverse and descending colon compatible with nonspecific infectious or inflammatory colitis.    Interval placement of neurostimulator device in the subcutaneous tissues overlying the left gluteal muscles with the lead extending through the S3 neural foramen into the right pararectal fat.

## 2017-06-10 NOTE — PROGRESS NOTES
Spoke with IM 4 Dr. SCHAEFER done and result is 98 , N/S bolus 1000 cc started as ordered . Will keep monitoring

## 2017-06-10 NOTE — CONSULTS
[facility]  Gastroenterology  Consult Note    Patient Name: Marilee Simons  MRN: [id number]  Admission Date: 6/9/2017  Hospital Length of Stay: 1 days  Code Status: Full Code   Attending Provider: Boris Lancaster MD   Consulting Provider: Jose Lora MD  Primary Care Physician: Nadeem Zheng MD  Principal Problem:Diarrhea    Inpatient consult to Gastroenterology  Consult performed by: OJSE LORA  Consult ordered by: MAYTE LNIG        Subjective:     HPI:  This is a 46 y/o female who presents with acute onset bloody diarrhea.  Hx of SLE, RA, and IBS - D on alosetron.   Pt was in usual state of health until Wednesday night when she developed lightheadedness, weakness, dizzyness and eventually minor syncopal episode. The next day she began having signifcant lower abd cramping and nausea. She then had profuse diarrhea which was quickly followed by passing bright red blood.  Never had this before. Has been on alosetron x 3 years with Dr. He and gets good benefit. No hx of IBD. No prior colonoscopy.    CT abd:  Diffuse wall thickening and mild stranding around the distal transverse and descending colon compatible with nonspecific infectious or inflammatory colitis.    Interval placement of neurostimulator device in the subcutaneous tissues overlying the left gluteal muscles with the lead extending through the S3 neural foramen into the right pararectal fat.    Past Medical History:   Diagnosis Date    Acid reflux     Allergy     Alopecia     Anxiety     Back pain     Depression     Dry eyes     from meds    Fever blister     Fibromyalgia     Interstitial cystitis     Irritable bowel syndrome     Kidney stone     Major depressive disorder, recurrent episode, in partial or unspecified remission 6/25/2013    OAB (overactive bladder) 7/21/2015    Rheumatoid arthritis     Systemic lupus erythematosus     Thyroid disease     Urinary tract infection      Vaginal infection        Past Surgical History:   Procedure Laterality Date    BLADDER SURGERY       SECTION, LOW TRANSVERSE      x 2    EYE SURGERY      Lasik-bilateral    HYSTERECTOMY      PARTIAL HYSTERECTOMY         Review of patient's allergies indicates:   Allergen Reactions    Cephalexin Itching    Zofran [ondansetron hcl (pf)] Hives    Penicillins Rash     Family History     Problem Relation (Age of Onset)    Fibromyalgia Sister    Irritable bowel syndrome Mother, Sister, Sister    Lupus Sister    Rheum arthritis Sister        Social History Main Topics    Smoking status: Never Smoker    Smokeless tobacco: Never Used    Alcohol use Yes      Comment: social    Drug use: No    Sexual activity: No     Review of Systems   Constitutional: Positive for appetite change and fatigue. Negative for chills and fever.   HENT: Negative for mouth sores, nosebleeds and trouble swallowing.    Eyes: Negative for pain and redness.   Respiratory: Negative for cough and shortness of breath.    Cardiovascular: Negative for chest pain and palpitations.   Gastrointestinal: Positive for abdominal pain, anal bleeding, blood in stool, diarrhea and nausea.   Endocrine: Negative for polydipsia and polyphagia.   Genitourinary: Negative for dysuria and hematuria.   Musculoskeletal: Negative for arthralgias and joint swelling.   Skin: Negative for rash.   Neurological: Negative for seizures and facial asymmetry.   Psychiatric/Behavioral: Negative for agitation and confusion.     Objective:     Vital Signs (Most Recent):  Temp: 98 °F (36.7 °C) (06/10/17 0818)  Pulse: 70 (06/10/17 1100)  Resp: 17 (06/10/17 0818)  BP: (!) 99/57 (06/10/17 1027)  SpO2: 96 % (06/10/17 08) Vital Signs (24h Range):  Temp:  [98 °F (36.7 °C)-99.6 °F (37.6 °C)] 98 °F (36.7 °C)  Pulse:  [] 70  Resp:  [17-18] 17  SpO2:  [95 %-100 %] 96 %  BP: ()/(48-73) 99/57     Weight: 72.6 kg (160 lb) (17 1749)  Body mass index is 32.32  kg/m².      Intake/Output Summary (Last 24 hours) at 06/10/17 1328  Last data filed at 06/10/17 0835   Gross per 24 hour   Intake                0 ml   Output                0 ml   Net                0 ml       Lines/Drains/Airways     Peripheral Intravenous Line                 Peripheral IV - Single Lumen 06/10/17 1100 Right Forearm less than 1 day         Peripheral IV - Single Lumen 06/10/17 1100 Right Hand less than 1 day                Physical Exam   Constitutional: She is oriented to person, place, and time.   Nontoxic well apearing   HENT:   Head: Normocephalic and atraumatic.   Eyes: Conjunctivae are normal. No scleral icterus.   Neck: Neck supple.   Cardiovascular: Normal rate and regular rhythm.    Pulmonary/Chest: Breath sounds normal. No stridor. No respiratory distress.   Abdominal: Soft. She exhibits no distension. There is tenderness (there is mild TTP in lower quadrants without rebound). There is no rebound and no guarding.   Musculoskeletal: She exhibits no tenderness or deformity.   Neurological: She is alert and oriented to person, place, and time.   Skin: Skin is warm and dry. No rash noted.   Psychiatric: She has a normal mood and affect.   Vitals reviewed.      Significant Labs:  Amylase: No results for input(s): AMYLASE in the last 48 hours.  Blood Culture:   Recent Labs  Lab 06/09/17 2036   LABBLOO No Growth to date     CBC:   Recent Labs  Lab 06/09/17  1806 06/10/17  0705   WBC 18.69* 8.98   HGB 13.4 10.3*   HCT 40.1 31.7*    218     CMP:   Recent Labs  Lab 06/10/17  0705   GLU 92   CALCIUM 8.0*   ALBUMIN 2.8*   PROT 5.2*      K 3.7   CO2 21*      BUN 7   CREATININE 0.7   ALKPHOS 54*   ALT 13   AST 13   BILITOT 0.6     Coagulation:   Recent Labs  Lab 06/10/17  0705   INR 1.0   APTT 25.0     CRP:   Recent Labs  Lab 06/09/17 1806   CRP 41.0*     ESR:   Recent Labs  Lab 06/09/17 1806   SEDRATE 22*     H.Pylori Ab IgG: No results for input(s): HPYLORIIGG in the last 48  hours.  Lipase:   Recent Labs  Lab 06/09/17  1806   LIPASE 14     Liver Function Test:   Recent Labs  Lab 06/09/17  1806 06/10/17  0705   ALT 23 13   AST 16 13   ALKPHOS 83 54*   BILITOT 0.6 0.6   PROT 7.4 5.2*   ALBUMIN 3.8 2.8*     Stool C. diff: No results for input(s): CDIFFICILEAN, CDIFFTOX in the last 48 hours.  Stool Culture: No results for input(s): STOOLCULTURE in the last 48 hours.  Stool Ova/Cysts/Parasites: No results for input(s): STLEXAMOCP in the last 48 hours.  Stool Giardia/Crypto: No results for input(s): GIARDIAANTIG, CRSPAG in the last 48 hours.  Stool WBCs:   Recent Labs  Lab 06/10/17  0807   STOOLWBC Occ neutrophils seen*       Significant Imaging:  CT: I have reviewed all results within the past 24 hours and my personal findings are:  left sided nonspecific colitis.    Assessment/Plan:     BRBPR (bright red blood per rectum)    DDx includes infectious vs. Ischemic colitis. Less likely IBD , but not completely ruled out.  Her use of alosetron puts her at risk for ischemic colitis. If determined to be ischemic, she would need to avoid alosetron indefinitively.  Will need to rule out infection first.    Plan:  Rule out infectious causes with stool studies:  Cdiff, giardia crypto, stool O&P, stool culture, ecoli     IF stool studies negative, will plan flex sig Monday with biospies to determine etiology and rule out ischemic colitis which would  regarding alosetron medication    HOLD alosetron (lotronex)  Continue cipro / flagyl        Acute hemorrhagic colitis    As above            Thank you for your consult. I will follow-up with patient. Please contact us if you have any additional questions.    Eliazar Lora MD  Gastroenterology  [facility]

## 2017-06-11 PROBLEM — K62.5 BRBPR (BRIGHT RED BLOOD PER RECTUM): Status: ACTIVE | Noted: 2017-06-11

## 2017-06-11 LAB
ALBUMIN SERPL BCP-MCNC: 3 G/DL
ALP SERPL-CCNC: 55 U/L
ALT SERPL W/O P-5'-P-CCNC: 14 U/L
ANION GAP SERPL CALC-SCNC: 7 MMOL/L
AST SERPL-CCNC: 15 U/L
BASOPHILS # BLD AUTO: 0.04 K/UL
BASOPHILS NFR BLD: 0.6 %
BILIRUB SERPL-MCNC: 0.4 MG/DL
BUN SERPL-MCNC: 5 MG/DL
CALCIUM SERPL-MCNC: 8.3 MG/DL
CHLORIDE SERPL-SCNC: 108 MMOL/L
CO2 SERPL-SCNC: 24 MMOL/L
CREAT SERPL-MCNC: 0.7 MG/DL
CRYPTOSP AG STL QL IA: NEGATIVE
DIFFERENTIAL METHOD: ABNORMAL
EOSINOPHIL # BLD AUTO: 0.2 K/UL
EOSINOPHIL NFR BLD: 2.8 %
ERYTHROCYTE [DISTWIDTH] IN BLOOD BY AUTOMATED COUNT: 12.4 %
EST. GFR  (AFRICAN AMERICAN): >60 ML/MIN/1.73 M^2
EST. GFR  (NON AFRICAN AMERICAN): >60 ML/MIN/1.73 M^2
G LAMBLIA AG STL QL IA: NEGATIVE
GLUCOSE SERPL-MCNC: 81 MG/DL
HCT VFR BLD AUTO: 32.4 %
HGB BLD-MCNC: 10.4 G/DL
LYMPHOCYTES # BLD AUTO: 1.6 K/UL
LYMPHOCYTES NFR BLD: 24.7 %
MAGNESIUM SERPL-MCNC: 2.1 MG/DL
MCH RBC QN AUTO: 28.3 PG
MCHC RBC AUTO-ENTMCNC: 32.1 %
MCV RBC AUTO: 88 FL
MONOCYTES # BLD AUTO: 0.7 K/UL
MONOCYTES NFR BLD: 10.4 %
NEUTROPHILS # BLD AUTO: 4 K/UL
NEUTROPHILS NFR BLD: 61.3 %
PHOSPHATE SERPL-MCNC: 3.2 MG/DL
PLATELET # BLD AUTO: 230 K/UL
PMV BLD AUTO: 8.8 FL
POTASSIUM SERPL-SCNC: 4.4 MMOL/L
PROT SERPL-MCNC: 5.5 G/DL
RBC # BLD AUTO: 3.67 M/UL
SODIUM SERPL-SCNC: 139 MMOL/L
WBC # BLD AUTO: 6.53 K/UL

## 2017-06-11 PROCEDURE — 99232 SBSQ HOSP IP/OBS MODERATE 35: CPT | Mod: ,,, | Performed by: HOSPITALIST

## 2017-06-11 PROCEDURE — 84100 ASSAY OF PHOSPHORUS: CPT

## 2017-06-11 PROCEDURE — 36415 COLL VENOUS BLD VENIPUNCTURE: CPT

## 2017-06-11 PROCEDURE — 85025 COMPLETE CBC W/AUTO DIFF WBC: CPT

## 2017-06-11 PROCEDURE — 25000003 PHARM REV CODE 250: Performed by: HOSPITALIST

## 2017-06-11 PROCEDURE — 25000003 PHARM REV CODE 250: Performed by: ANESTHESIOLOGY

## 2017-06-11 PROCEDURE — 83735 ASSAY OF MAGNESIUM: CPT

## 2017-06-11 PROCEDURE — 99223 1ST HOSP IP/OBS HIGH 75: CPT | Mod: ,,, | Performed by: INTERNAL MEDICINE

## 2017-06-11 PROCEDURE — 80053 COMPREHEN METABOLIC PANEL: CPT

## 2017-06-11 PROCEDURE — 63600175 PHARM REV CODE 636 W HCPCS: Performed by: HOSPITALIST

## 2017-06-11 PROCEDURE — 11000001 HC ACUTE MED/SURG PRIVATE ROOM

## 2017-06-11 RX ADMIN — SODIUM CHLORIDE, SODIUM LACTATE, POTASSIUM CHLORIDE, AND CALCIUM CHLORIDE: .6; .31; .03; .02 INJECTION, SOLUTION INTRAVENOUS at 08:06

## 2017-06-11 RX ADMIN — OXYBUTYNIN CHLORIDE 5 MG: 5 TABLET ORAL at 09:06

## 2017-06-11 RX ADMIN — SODIUM CHLORIDE, SODIUM LACTATE, POTASSIUM CHLORIDE, AND CALCIUM CHLORIDE: .6; .31; .03; .02 INJECTION, SOLUTION INTRAVENOUS at 06:06

## 2017-06-11 RX ADMIN — SODIUM CHLORIDE, SODIUM LACTATE, POTASSIUM CHLORIDE, AND CALCIUM CHLORIDE: .6; .31; .03; .02 INJECTION, SOLUTION INTRAVENOUS at 02:06

## 2017-06-11 RX ADMIN — CIPROFLOXACIN 400 MG: 2 INJECTION, SOLUTION INTRAVENOUS at 10:06

## 2017-06-11 RX ADMIN — ACETAMINOPHEN 650 MG: 325 TABLET ORAL at 01:06

## 2017-06-11 RX ADMIN — CIPROFLOXACIN 400 MG: 2 INJECTION, SOLUTION INTRAVENOUS at 09:06

## 2017-06-11 RX ADMIN — GABAPENTIN 800 MG: 400 CAPSULE ORAL at 09:06

## 2017-06-11 RX ADMIN — LEVOTHYROXINE SODIUM 50 MCG: 50 TABLET ORAL at 06:06

## 2017-06-11 RX ADMIN — METRONIDAZOLE 500 MG: 500 INJECTION, SOLUTION INTRAVENOUS at 02:06

## 2017-06-11 RX ADMIN — GABAPENTIN 800 MG: 400 CAPSULE ORAL at 06:06

## 2017-06-11 RX ADMIN — GABAPENTIN 800 MG: 400 CAPSULE ORAL at 01:06

## 2017-06-11 RX ADMIN — METRONIDAZOLE 500 MG: 500 INJECTION, SOLUTION INTRAVENOUS at 09:06

## 2017-06-11 RX ADMIN — PANTOPRAZOLE SODIUM 40 MG: 40 TABLET, DELAYED RELEASE ORAL at 09:06

## 2017-06-11 RX ADMIN — METRONIDAZOLE 500 MG: 500 INJECTION, SOLUTION INTRAVENOUS at 06:06

## 2017-06-11 RX ADMIN — FLUOXETINE 40 MG: 20 CAPSULE ORAL at 09:06

## 2017-06-11 NOTE — PROGRESS NOTES
Progress Note  Hospital Medicine                                                              [facility]: [facility]    Patient Name: Marilee Simons  YOB: 1970    Admit Date: 6/9/2017                     LOS: 2    SUBJECTIVE:     Reason for Admission:  Diarrhea  Marilee Simons is a 47 y.o. female w/ significant PMHx of SLE, RA, UTIs, interstitial cystitis, fecal incontinence, hypothyroidism, fibromyalgia, IBS with chronic diarrhea presenting to the ED with 1 day history of multiple episodes of diarrhea associated with nausea, BRBPR and lightheadedness found to have infectious vs inflammatory colitis on CT imaging, now on IV cipro and flagyl and NPO for bowel rest.     Interval history: Feeling much better this morning. Has not had BM since yesterday morning. BP stable with maintenance fluids. Afebrile overnight.     OBJECTIVE:     Vital Signs Range (Last 24H):  Temp:  [97.8 °F (36.6 °C)-98.9 °F (37.2 °C)]   Pulse:  [63-81]   Resp:  [17-19]   BP: (106-143)/(57-63)   SpO2:  [95 %-98 %] Body mass index is 32.32 kg/m².  Wt Readings from Last 1 Encounters:   06/09/17 1749 72.6 kg (160 lb)       I & O (Last 24H):    Intake/Output Summary (Last 24 hours) at 06/11/17 1206  Last data filed at 06/11/17 0835   Gross per 24 hour   Intake                0 ml   Output                0 ml   Net                0 ml         Physical Exam:  General: alert, oriented and appears stated age  HENT: NC/AT.Conjunctivae/corneas clear. PERRL, EOMI. Nares normal. Septum midline. Mucosa normal. No drainage or sinus tenderness.  Neck: no adenopathy, no carotid bruit, no JVD, supple, symmetrical, trachea midline and thyroid not enlarged, symmetric, no tenderness/mass/nodules  Back: symmetric, no curvature. ROM normal. No CVA tenderness.  Lungs: clear to auscultation bilaterally, respiratory effort normal  CV: RRR, S1 and S2 Normal. No chest wall tenderness  Abdomen: S, slightly tender to palpation, ND. Bowel sounds normal    Extremities: WWP, intact distal pulses. no cyanosis or edema  Skin: Skin color, texture, turgor normal. No rashes or lesions  Lymph nodes: Cervical, supraclavicular, and axillary nodes normal.  Neurologic: Grossly normal      Diagnostic Results:  Lab Results   Component Value Date    WBC 6.53 06/11/2017    HGB 10.4 (L) 06/11/2017    HCT 32.4 (L) 06/11/2017    MCV 88 06/11/2017     06/11/2017       Recent Labs  Lab 06/11/17  0535   GLU 81      K 4.4      CO2 24   BUN 5*   CREATININE 0.7   CALCIUM 8.3*   MG 2.1     Lab Results   Component Value Date    INR 1.0 06/10/2017    INR 1.0 06/09/2017     No results found for: HGBA1C  Recent Labs      06/10/17   0855   POCTGLUCOSE  98        CT abdomen/pel 6/9  Diffuse wall thickening and mild stranding around the distal transverse and descending colon compatible with nonspecific infectious or inflammatory colitis.    Interval placement of neurostimulator device in the subcutaneous tissues overlying the left gluteal muscles with the lead extending through the S3 neural foramen into the right pararectal fat.    Additional findings as detailed above.      ASSESSMENT/PLAN:   Marilee Simons is a 47 y.o. female w/ significant PMHx of SLE, RA, UTIs, interstitial cystitis, fecal incontinence, hypothyroidism, fibromyalgia, IBS with chronic diarrhea presenting to the ED with 1 day history of multiple episodes of diarrhea associated with nausea, BRBPR and lightheadedness found to have infectious vs inflammatory colitis on CT imaging, now on IV cipro and flagyl and NPO for bowel rest.     Colitis   BRBPR  CT shows colitis in transverse and descending colon.   C diff negative. Prelim stool cultures negative.   No further bloody BM. H/H remains stable.   - continue IV abx; cipro and flagyl  - continue IV fluids  - GI consulted: Will perform flex sig tomorrow to rule out Ischemic colitis given hx of alosetron use which puts her at risk. Appreciate assistance. NPO at  midnight.   Will advance diet to clear liquids today.     Hypotension  Dehydration  Secondary to GI losses. Now resolved. Patient with BP of ~80s/60s yesterday morning. BP now stable with continuous IVFs.       SLE  RA  -Hold plaquenil for possible infection       IC  Urge incontinence   -Continue TCA and Oxybutynin   -Uses URIBEL at home     Hypothyroidism   - continue Synthroid     GERD  - continue protonix      PTSD  - continue prozac     Diet: NPO  DVTPx: XENA SCD as recent bleed  PUPx: Protonix  Code: Full  Dispo:   - continue IVF support and IV abx   - GI following   - PT/OT    NAZIA Whitman  IM4

## 2017-06-11 NOTE — TREATMENT PLAN
GI Follow-up Note      Chart reviewed.  Cdiff negative. Prelim stool culture negative. Giardia and crypto negative    Ecoli pending      Plan:  Clear liquids today.  NPO past mn  Flex sig tomorrow  Continue Abx      Please call with any additional concerns /questions    Eliazar Lora MD  Gastroenterology Fellow (PGY-IV)  Pager: [phone]

## 2017-06-11 NOTE — PLAN OF CARE
Problem: Patient Care Overview  Goal: Plan of Care Review  Outcome: Ongoing (interventions implemented as appropriate)  Pt started on clear liquid diet today and she tolerated it well . Pt had a small liquid brown stool after she ate . Pt will be NPO after midnight for sigmoidoscopy tomorrow .

## 2017-06-12 ENCOUNTER — ANESTHESIA EVENT (OUTPATIENT)
Dept: ENDOSCOPY | Facility: HOSPITAL | Age: 47
DRG: 392 | End: 2017-06-12
Payer: COMMERCIAL

## 2017-06-12 ENCOUNTER — ANESTHESIA (OUTPATIENT)
Dept: ENDOSCOPY | Facility: HOSPITAL | Age: 47
DRG: 392 | End: 2017-06-12
Payer: COMMERCIAL

## 2017-06-12 VITALS
OXYGEN SATURATION: 97 % | RESPIRATION RATE: 18 BRPM | DIASTOLIC BLOOD PRESSURE: 74 MMHG | TEMPERATURE: 99 F | WEIGHT: 160 LBS | HEIGHT: 59 IN | SYSTOLIC BLOOD PRESSURE: 157 MMHG | HEART RATE: 82 BPM | BODY MASS INDEX: 32.25 KG/M2

## 2017-06-12 PROBLEM — K62.5 BRBPR (BRIGHT RED BLOOD PER RECTUM): Status: RESOLVED | Noted: 2017-06-11 | Resolved: 2017-06-12

## 2017-06-12 PROBLEM — K52.9 ACUTE HEMORRHAGIC COLITIS: Status: RESOLVED | Noted: 2017-06-10 | Resolved: 2017-06-12

## 2017-06-12 LAB
ALBUMIN SERPL BCP-MCNC: 2.9 G/DL
ALP SERPL-CCNC: 52 U/L
ALT SERPL W/O P-5'-P-CCNC: 18 U/L
ANION GAP SERPL CALC-SCNC: 8 MMOL/L
AST SERPL-CCNC: 22 U/L
BACTERIA ANAL CULT: NORMAL
BASOPHILS # BLD AUTO: 0.03 K/UL
BASOPHILS NFR BLD: 0.5 %
BILIRUB SERPL-MCNC: 0.3 MG/DL
BUN SERPL-MCNC: 4 MG/DL
CALCIUM SERPL-MCNC: 8.3 MG/DL
CHLORIDE SERPL-SCNC: 108 MMOL/L
CO2 SERPL-SCNC: 25 MMOL/L
CREAT SERPL-MCNC: 0.8 MG/DL
DIFFERENTIAL METHOD: ABNORMAL
EOSINOPHIL # BLD AUTO: 0.2 K/UL
EOSINOPHIL NFR BLD: 2.8 %
ERYTHROCYTE [DISTWIDTH] IN BLOOD BY AUTOMATED COUNT: 12.5 %
EST. GFR  (AFRICAN AMERICAN): >60 ML/MIN/1.73 M^2
EST. GFR  (NON AFRICAN AMERICAN): >60 ML/MIN/1.73 M^2
GLUCOSE SERPL-MCNC: 93 MG/DL
HCT VFR BLD AUTO: 31.8 %
HGB BLD-MCNC: 10.3 G/DL
LYMPHOCYTES # BLD AUTO: 2.1 K/UL
LYMPHOCYTES NFR BLD: 36.1 %
MAGNESIUM SERPL-MCNC: 1.6 MG/DL
MCH RBC QN AUTO: 28.4 PG
MCHC RBC AUTO-ENTMCNC: 32.4 %
MCV RBC AUTO: 88 FL
MONOCYTES # BLD AUTO: 0.5 K/UL
MONOCYTES NFR BLD: 9.1 %
NEUTROPHILS # BLD AUTO: 2.9 K/UL
NEUTROPHILS NFR BLD: 51.1 %
O+P STL TRI STN: NORMAL
PHOSPHATE SERPL-MCNC: 3.2 MG/DL
PLATELET # BLD AUTO: 229 K/UL
PMV BLD AUTO: 9.1 FL
POTASSIUM SERPL-SCNC: 3.9 MMOL/L
PROT SERPL-MCNC: 5.4 G/DL
RBC # BLD AUTO: 3.63 M/UL
SODIUM SERPL-SCNC: 141 MMOL/L
WBC # BLD AUTO: 5.71 K/UL

## 2017-06-12 PROCEDURE — 45331 SIGMOIDOSCOPY AND BIOPSY: CPT | Mod: ,,, | Performed by: INTERNAL MEDICINE

## 2017-06-12 PROCEDURE — 25000003 PHARM REV CODE 250: Performed by: ANESTHESIOLOGY

## 2017-06-12 PROCEDURE — 88305 TISSUE EXAM BY PATHOLOGIST: CPT | Mod: 26,,,

## 2017-06-12 PROCEDURE — 25000003 PHARM REV CODE 250: Performed by: HOSPITALIST

## 2017-06-12 PROCEDURE — 37000008 HC ANESTHESIA 1ST 15 MINUTES: Performed by: INTERNAL MEDICINE

## 2017-06-12 PROCEDURE — D9220A PRA ANESTHESIA: Mod: ANES,,, | Performed by: ANESTHESIOLOGY

## 2017-06-12 PROCEDURE — G8979 MOBILITY GOAL STATUS: HCPCS | Mod: CH

## 2017-06-12 PROCEDURE — 27201012 HC FORCEPS, HOT/COLD, DISP: Performed by: INTERNAL MEDICINE

## 2017-06-12 PROCEDURE — 25000003 PHARM REV CODE 250: Performed by: NURSE ANESTHETIST, CERTIFIED REGISTERED

## 2017-06-12 PROCEDURE — D9220A PRA ANESTHESIA: Mod: CRNA,,, | Performed by: NURSE ANESTHETIST, CERTIFIED REGISTERED

## 2017-06-12 PROCEDURE — G8978 MOBILITY CURRENT STATUS: HCPCS | Mod: CH

## 2017-06-12 PROCEDURE — 63600175 PHARM REV CODE 636 W HCPCS: Performed by: NURSE ANESTHETIST, CERTIFIED REGISTERED

## 2017-06-12 PROCEDURE — 85025 COMPLETE CBC W/AUTO DIFF WBC: CPT

## 2017-06-12 PROCEDURE — 80053 COMPREHEN METABOLIC PANEL: CPT

## 2017-06-12 PROCEDURE — 36415 COLL VENOUS BLD VENIPUNCTURE: CPT

## 2017-06-12 PROCEDURE — 97165 OT EVAL LOW COMPLEX 30 MIN: CPT

## 2017-06-12 PROCEDURE — 45331 SIGMOIDOSCOPY AND BIOPSY: CPT | Performed by: INTERNAL MEDICINE

## 2017-06-12 PROCEDURE — G8980 MOBILITY D/C STATUS: HCPCS | Mod: CH

## 2017-06-12 PROCEDURE — 83735 ASSAY OF MAGNESIUM: CPT

## 2017-06-12 PROCEDURE — 25000003 PHARM REV CODE 250: Performed by: INTERNAL MEDICINE

## 2017-06-12 PROCEDURE — 97161 PT EVAL LOW COMPLEX 20 MIN: CPT

## 2017-06-12 PROCEDURE — 37000009 HC ANESTHESIA EA ADD 15 MINS: Performed by: INTERNAL MEDICINE

## 2017-06-12 PROCEDURE — 0DBN8ZX EXCISION OF SIGMOID COLON, VIA NATURAL OR ARTIFICIAL OPENING ENDOSCOPIC, DIAGNOSTIC: ICD-10-PCS | Performed by: INTERNAL MEDICINE

## 2017-06-12 PROCEDURE — 84100 ASSAY OF PHOSPHORUS: CPT

## 2017-06-12 PROCEDURE — 63600175 PHARM REV CODE 636 W HCPCS: Performed by: HOSPITALIST

## 2017-06-12 PROCEDURE — 88305 TISSUE EXAM BY PATHOLOGIST: CPT

## 2017-06-12 RX ORDER — CIPROFLOXACIN 250 MG/1
500 TABLET, FILM COATED ORAL EVERY 12 HOURS
Status: DISCONTINUED | OUTPATIENT
Start: 2017-06-12 | End: 2017-06-12

## 2017-06-12 RX ORDER — METRONIDAZOLE 500 MG/1
500 TABLET ORAL EVERY 8 HOURS
Status: DISCONTINUED | OUTPATIENT
Start: 2017-06-12 | End: 2017-06-12 | Stop reason: HOSPADM

## 2017-06-12 RX ORDER — CIPROFLOXACIN 250 MG/1
500 TABLET, FILM COATED ORAL EVERY 12 HOURS
Status: DISCONTINUED | OUTPATIENT
Start: 2017-06-12 | End: 2017-06-12 | Stop reason: HOSPADM

## 2017-06-12 RX ORDER — METRONIDAZOLE 500 MG/1
500 TABLET ORAL EVERY 8 HOURS
Qty: 9 TABLET | Refills: 0 | Status: SHIPPED | OUTPATIENT
Start: 2017-06-12 | End: 2017-06-15

## 2017-06-12 RX ORDER — PROPOFOL 10 MG/ML
VIAL (ML) INTRAVENOUS
Status: DISCONTINUED | OUTPATIENT
Start: 2017-06-12 | End: 2017-06-12

## 2017-06-12 RX ORDER — PROPOFOL 10 MG/ML
VIAL (ML) INTRAVENOUS CONTINUOUS PRN
Status: DISCONTINUED | OUTPATIENT
Start: 2017-06-12 | End: 2017-06-12

## 2017-06-12 RX ORDER — LIDOCAINE HCL/PF 100 MG/5ML
SYRINGE (ML) INTRAVENOUS
Status: DISCONTINUED | OUTPATIENT
Start: 2017-06-12 | End: 2017-06-12

## 2017-06-12 RX ORDER — CIPROFLOXACIN 500 MG/1
500 TABLET ORAL EVERY 12 HOURS
Qty: 6 TABLET | Refills: 0 | Status: SHIPPED | OUTPATIENT
Start: 2017-06-12 | End: 2017-06-15

## 2017-06-12 RX ORDER — SODIUM CHLORIDE 9 MG/ML
INJECTION, SOLUTION INTRAVENOUS CONTINUOUS
Status: DISCONTINUED | OUTPATIENT
Start: 2017-06-12 | End: 2017-06-12 | Stop reason: HOSPADM

## 2017-06-12 RX ORDER — GLYCOPYRROLATE 0.2 MG/ML
INJECTION INTRAMUSCULAR; INTRAVENOUS
Status: DISCONTINUED | OUTPATIENT
Start: 2017-06-12 | End: 2017-06-12

## 2017-06-12 RX ORDER — ALOSETRON HYDROCHLORIDE 0.5 MG/1
TABLET ORAL
Qty: 60 TABLET | Refills: 0 | Status: SHIPPED | OUTPATIENT
Start: 2017-06-15 | End: 2018-03-14 | Stop reason: SDUPTHER

## 2017-06-12 RX ADMIN — LIDOCAINE HYDROCHLORIDE 75 MG: 20 INJECTION, SOLUTION INTRAVENOUS at 09:06

## 2017-06-12 RX ADMIN — ACETAMINOPHEN 650 MG: 325 TABLET ORAL at 12:06

## 2017-06-12 RX ADMIN — OXYBUTYNIN CHLORIDE 5 MG: 5 TABLET ORAL at 11:06

## 2017-06-12 RX ADMIN — PROPOFOL 50 MG: 10 INJECTION, EMULSION INTRAVENOUS at 09:06

## 2017-06-12 RX ADMIN — SODIUM CHLORIDE, SODIUM LACTATE, POTASSIUM CHLORIDE, AND CALCIUM CHLORIDE: .6; .31; .03; .02 INJECTION, SOLUTION INTRAVENOUS at 03:06

## 2017-06-12 RX ADMIN — PROPOFOL 200 MCG/KG/MIN: 10 INJECTION, EMULSION INTRAVENOUS at 09:06

## 2017-06-12 RX ADMIN — FLUOXETINE 40 MG: 20 CAPSULE ORAL at 11:06

## 2017-06-12 RX ADMIN — CIPROFLOXACIN 400 MG: 2 INJECTION, SOLUTION INTRAVENOUS at 11:06

## 2017-06-12 RX ADMIN — PANTOPRAZOLE SODIUM 40 MG: 40 TABLET, DELAYED RELEASE ORAL at 11:06

## 2017-06-12 RX ADMIN — GABAPENTIN 800 MG: 400 CAPSULE ORAL at 05:06

## 2017-06-12 RX ADMIN — METRONIDAZOLE 500 MG: 500 INJECTION, SOLUTION INTRAVENOUS at 05:06

## 2017-06-12 RX ADMIN — SODIUM CHLORIDE: 0.9 INJECTION, SOLUTION INTRAVENOUS at 09:06

## 2017-06-12 RX ADMIN — LEVOTHYROXINE SODIUM 50 MCG: 50 TABLET ORAL at 05:06

## 2017-06-12 RX ADMIN — GLYCOPYRROLATE 0.1 MG: 0.2 INJECTION, SOLUTION INTRAMUSCULAR; INTRAVENOUS at 09:06

## 2017-06-12 NOTE — PLAN OF CARE
Problem: Occupational Therapy Goal  Goal: Occupational Therapy Goal  Pt is currently performing ADLs, functional mobility & t/fs without assistance and displays age-appropriate strength, endurance & balance. OT services are not recommended at this time and patient is safe to D/C home. Family is able to assist in home setting if required.  Outcome: Outcome(s) achieved Date Met: 06/12/17  FREDDY Morley/L      6/12/2017

## 2017-06-12 NOTE — ANESTHESIA PREPROCEDURE EVALUATION
06/12/2017  Marilee Simons is a 47 y.o., female.    Anesthesia Evaluation    I have reviewed the Patient Summary Reports.    I have reviewed the Nursing Notes.      Review of Systems  Anesthesia Hx:  No problems with previous Anesthesia    Hematology/Oncology:  Hematology Normal   Oncology Normal     EENT/Dental:EENT/Dental Normal   Cardiovascular:  Cardiovascular Normal     Pulmonary:  Pulmonary Normal    Renal/:   Chronic Renal Disease    Hepatic/GI:   GERD    Musculoskeletal:  Musculoskeletal Normal    Neurological:   Neuromuscular Disease, Headaches    Endocrine:   Hypothyroidism    Dermatological:  Skin Normal    Psych:   Psychiatric History          Physical Exam  General:  Well nourished, Obesity    Airway/Jaw/Neck:  Airway Findings: Mouth Opening: Normal Tongue: Normal  General Airway Assessment: Adult  Mallampati: II  TM Distance: Normal, at least 6 cm        Eyes/Ears/Nose:  EYES/EARS/NOSE FINDINGS: Normal   Dental:  Dental Findings: In tact   Chest/Lungs:  Chest/Lungs Clear    Heart/Vascular:  Heart Findings: Normal Heart murmur: negative Vascular Findings: Normal    Abdomen:  Abdomen Findings: Normal    Musculoskeletal:  Musculoskeletal Findings: Normal   Skin:  Skin Findings: Normal    Mental Status:  Mental Status Findings: Normal        Anesthesia Plan  Type of Anesthesia, risks & benefits discussed:  Anesthesia Type:  general  Patient's Preference:   Intra-op Monitoring Plan:   Intra-op Monitoring Plan Comments:   Post Op Pain Control Plan:   Post Op Pain Control Plan Comments:   Induction:   IV  Beta Blocker:  Patient is not currently on a Beta-Blocker (No further documentation required).       Informed Consent: Patient understands risks and agrees with Anesthesia plan.  Questions answered. Anesthesia consent signed with patient.  ASA Score: 2     Day of Surgery Review of History &  Physical:    H&P update referred to the surgeon.         Ready For Surgery From Anesthesia Perspective.

## 2017-06-12 NOTE — TREATMENT PLAN
GI Progress note    Flexsigm done - please see full report for details.     Erythematous mucosa in the recto-sigmoid colon which was biopsied. Findings are less likely ischemic.    Gabi Sims MD  Gastroenterology & Hepatology Fellow (PGY-IV)  Pager: [phone]

## 2017-06-12 NOTE — PLAN OF CARE
"CM to bedside - pt provided assessment info. Pt is independent w/ no DME in place, lives w/ spouse & dependent child. Pt will likely d/c home w/ no needs. CM will email GI  for f/u.    CM provided patient anticipated JEFFY which will be update by nursing staff. Patient provided a Blue "My Health Packet" for d/c planning and health tool. Patient verbalized understanding.    Future Appointments  Date Time Provider Department Center   6/29/2017 1:30 PM Ben Samaniego MD [facility] RHEUM [place]   8/23/2017 12:00 PM Tank Gray MD [facility] PSYCH [facility]        06/12/17 1319   Discharge Assessment   Assessment Type Discharge Planning Assessment   Confirmed/corrected address and phone number on facesheet? Yes   Assessment information obtained from? Patient;Caregiver   Expected Length of Stay (days) 3   Communicated expected length of stay with patient/caregiver yes   Prior to hospitilization cognitive status: Alert/Oriented   Prior to hospitalization functional status: Independent   Current cognitive status: Alert/Oriented   Current Functional Status: Independent   Arrived From home or self-care   Lives With spouse;child(javi), dependent   Able to Return to Prior Arrangements yes   Is patient able to care for self after discharge? Yes   How many people do you have in your home that can help with your care after discharge? 1   Who are your caregiver(s) and their phone number(s)? spouse - Charles [phone]   Patient's perception of discharge disposition home or selfcare   Readmission Within The Last 30 Days no previous admission in last 30 days   Patient currently being followed by outpatient case management? No   Patient currently receives home health services? No   Does the patient currently use HME? No   Patient currently receives private duty nursing? No   Patient currently receives any other outside agency services? No   Do you have any problems affording any of your prescribed medications? No   Is the patient " taking medications as prescribed? yes   Do you have any financial concerns preventing you from receiving the healthcare you need? No   Does the patient have transportation to healthcare appointments? Yes   Transportation Available car   On Dialysis? No   Does the patient receive services at the Coumadin Clinic? No   Are there any open cases? No   Discharge Plan A Home with family   Discharge Plan B Home with family;Home Health   Patient/Family In Agreement With Plan yes

## 2017-06-12 NOTE — PROGRESS NOTES
Discharge instructions given to Pt. Pt verbalized understanding. Left FA IV removed. Pt discharged by transport via wheelchair with family.

## 2017-06-12 NOTE — TRANSFER OF CARE
"Anesthesia Transfer of Care Note    Patient: Marilee Simons    Procedure(s) Performed: Procedure(s) (LRB):  SIGMOIDOSCOPY-FLEXIBLE (N/A)    Patient location: PACU    Anesthesia Type: general    Transport from OR: Transported from OR on 2-3 L/min O2 by NC with adequate spontaneous ventilation    Post pain: adequate analgesia    Post assessment: no apparent anesthetic complications    Post vital signs: stable    Level of consciousness: awake, alert and oriented    Nausea/Vomiting: no nausea/vomiting    Complications: none    Transfer of care protocol was followed      Last vitals:   Visit Vitals  BP (!) 153/91   Pulse 62   Temp 37.2 °C (99 °F) (Oral)   Resp 18   Ht 4' 11" (1.499 m)   Wt 72.6 kg (160 lb)   LMP  (LMP Unknown)   SpO2 98%   Breastfeeding? No   BMI 32.32 kg/m²     "

## 2017-06-12 NOTE — NURSING TRANSFER
Nursing Transfer Note      6/12/2017     Transfer From: [facility]    Transfer via stretcher    Transfer with cardiac monitoring    Transported by pct    Medicines sent: n/a    Chart send with patient: Yes    Notified: family at bs    Patient reassessed at: 06/12/2017 at 1055 (date, time)    Upon arrival to floor: cardiac monitor applied, patient oriented to room, call bell in reach and bed in lowest position

## 2017-06-12 NOTE — INTERVAL H&P NOTE
The patient has been examined and the H&P has been reviewed:    There is no interval changes since last encounter.    Flex Sig: BRBPR  Sedation: GA  ASA: Per anesthesia  Mallampati: Per anesthesia    Endoscopy risks, benefits and alternative options discussed and understood by patient/family.          Active Hospital Problems    Diagnosis  POA    *Diarrhea [R19.7]  Yes    BRBPR (bright red blood per rectum) [K62.5]  Yes    Acute hemorrhagic colitis [K52.9]  Yes    Fibromyalgia [M79.7]  Yes    Anxiety [F41.9]  Yes    Frequency-urgency syndrome [N31.8]  Yes    Urgency incontinence [N39.41]  Yes    Generalized anxiety disorder [F41.1]  Yes    GERD (gastroesophageal reflux disease) [K21.9]  Yes    IC (interstitial cystitis) [N30.10]  Yes    Hypothyroidism [E03.9]  Yes    IBS (irritable bowel syndrome) [K58.9]  Yes      Resolved Hospital Problems    Diagnosis Date Resolved POA   No resolved problems to display.

## 2017-06-12 NOTE — PLAN OF CARE
Problem: Patient Care Overview  Goal: Plan of Care Review  Outcome: Ongoing (interventions implemented as appropriate)  Pt is AA&O x 3. VSS. Denies pain. Compliant with NPO status @ mn for procedure. Telemetry monitoring; SR noted. Continuous infusion of LR noted @ 200 ml/hr. NAD. No falls or injuries noted. Safety maintained with bed in lowest position and call light within reach.

## 2017-06-12 NOTE — H&P (VIEW-ONLY)
[facility]  Gastroenterology  Consult Note    Patient Name: Marilee Simons  MRN: [id number]  Admission Date: 6/9/2017  Hospital Length of Stay: 1 days  Code Status: Full Code   Attending Provider: Boris Lancaster MD   Consulting Provider: Jose Lora MD  Primary Care Physician: Nadeem Zheng MD  Principal Problem:Diarrhea    Inpatient consult to Gastroenterology  Consult performed by: JOSE LORA  Consult ordered by: MAYTE LING        Subjective:     HPI:  This is a 48 y/o female who presents with acute onset bloody diarrhea.  Hx of SLE, RA, and IBS - D on alosetron.   Pt was in usual state of health until Wednesday night when she developed lightheadedness, weakness, dizzyness and eventually minor syncopal episode. The next day she began having signifcant lower abd cramping and nausea. She then had profuse diarrhea which was quickly followed by passing bright red blood.  Never had this before. Has been on alosetron x 3 years with Dr. He and gets good benefit. No hx of IBD. No prior colonoscopy.    CT abd:  Diffuse wall thickening and mild stranding around the distal transverse and descending colon compatible with nonspecific infectious or inflammatory colitis.    Interval placement of neurostimulator device in the subcutaneous tissues overlying the left gluteal muscles with the lead extending through the S3 neural foramen into the right pararectal fat.    Past Medical History:   Diagnosis Date    Acid reflux     Allergy     Alopecia     Anxiety     Back pain     Depression     Dry eyes     from meds    Fever blister     Fibromyalgia     Interstitial cystitis     Irritable bowel syndrome     Kidney stone     Major depressive disorder, recurrent episode, in partial or unspecified remission 6/25/2013    OAB (overactive bladder) 7/21/2015    Rheumatoid arthritis     Systemic lupus erythematosus     Thyroid disease     Urinary tract infection      Vaginal infection        Past Surgical History:   Procedure Laterality Date    BLADDER SURGERY       SECTION, LOW TRANSVERSE      x 2    EYE SURGERY      Lasik-bilateral    HYSTERECTOMY      PARTIAL HYSTERECTOMY         Review of patient's allergies indicates:   Allergen Reactions    Cephalexin Itching    Zofran [ondansetron hcl (pf)] Hives    Penicillins Rash     Family History     Problem Relation (Age of Onset)    Fibromyalgia Sister    Irritable bowel syndrome Mother, Sister, Sister    Lupus Sister    Rheum arthritis Sister        Social History Main Topics    Smoking status: Never Smoker    Smokeless tobacco: Never Used    Alcohol use Yes      Comment: social    Drug use: No    Sexual activity: No     Review of Systems   Constitutional: Positive for appetite change and fatigue. Negative for chills and fever.   HENT: Negative for mouth sores, nosebleeds and trouble swallowing.    Eyes: Negative for pain and redness.   Respiratory: Negative for cough and shortness of breath.    Cardiovascular: Negative for chest pain and palpitations.   Gastrointestinal: Positive for abdominal pain, anal bleeding, blood in stool, diarrhea and nausea.   Endocrine: Negative for polydipsia and polyphagia.   Genitourinary: Negative for dysuria and hematuria.   Musculoskeletal: Negative for arthralgias and joint swelling.   Skin: Negative for rash.   Neurological: Negative for seizures and facial asymmetry.   Psychiatric/Behavioral: Negative for agitation and confusion.     Objective:     Vital Signs (Most Recent):  Temp: 98 °F (36.7 °C) (06/10/17 0818)  Pulse: 70 (06/10/17 1100)  Resp: 17 (06/10/17 0818)  BP: (!) 99/57 (06/10/17 1027)  SpO2: 96 % (06/10/17 08) Vital Signs (24h Range):  Temp:  [98 °F (36.7 °C)-99.6 °F (37.6 °C)] 98 °F (36.7 °C)  Pulse:  [] 70  Resp:  [17-18] 17  SpO2:  [95 %-100 %] 96 %  BP: ()/(48-73) 99/57     Weight: 72.6 kg (160 lb) (17 1749)  Body mass index is 32.32  kg/m².      Intake/Output Summary (Last 24 hours) at 06/10/17 1328  Last data filed at 06/10/17 0835   Gross per 24 hour   Intake                0 ml   Output                0 ml   Net                0 ml       Lines/Drains/Airways     Peripheral Intravenous Line                 Peripheral IV - Single Lumen 06/10/17 1100 Right Forearm less than 1 day         Peripheral IV - Single Lumen 06/10/17 1100 Right Hand less than 1 day                Physical Exam   Constitutional: She is oriented to person, place, and time.   Nontoxic well apearing   HENT:   Head: Normocephalic and atraumatic.   Eyes: Conjunctivae are normal. No scleral icterus.   Neck: Neck supple.   Cardiovascular: Normal rate and regular rhythm.    Pulmonary/Chest: Breath sounds normal. No stridor. No respiratory distress.   Abdominal: Soft. She exhibits no distension. There is tenderness (there is mild TTP in lower quadrants without rebound). There is no rebound and no guarding.   Musculoskeletal: She exhibits no tenderness or deformity.   Neurological: She is alert and oriented to person, place, and time.   Skin: Skin is warm and dry. No rash noted.   Psychiatric: She has a normal mood and affect.   Vitals reviewed.      Significant Labs:  Amylase: No results for input(s): AMYLASE in the last 48 hours.  Blood Culture:   Recent Labs  Lab 06/09/17 2036   LABBLOO No Growth to date     CBC:   Recent Labs  Lab 06/09/17  1806 06/10/17  0705   WBC 18.69* 8.98   HGB 13.4 10.3*   HCT 40.1 31.7*    218     CMP:   Recent Labs  Lab 06/10/17  0705   GLU 92   CALCIUM 8.0*   ALBUMIN 2.8*   PROT 5.2*      K 3.7   CO2 21*      BUN 7   CREATININE 0.7   ALKPHOS 54*   ALT 13   AST 13   BILITOT 0.6     Coagulation:   Recent Labs  Lab 06/10/17  0705   INR 1.0   APTT 25.0     CRP:   Recent Labs  Lab 06/09/17 1806   CRP 41.0*     ESR:   Recent Labs  Lab 06/09/17 1806   SEDRATE 22*     H.Pylori Ab IgG: No results for input(s): HPYLORIIGG in the last 48  hours.  Lipase:   Recent Labs  Lab 06/09/17  1806   LIPASE 14     Liver Function Test:   Recent Labs  Lab 06/09/17  1806 06/10/17  0705   ALT 23 13   AST 16 13   ALKPHOS 83 54*   BILITOT 0.6 0.6   PROT 7.4 5.2*   ALBUMIN 3.8 2.8*     Stool C. diff: No results for input(s): CDIFFICILEAN, CDIFFTOX in the last 48 hours.  Stool Culture: No results for input(s): STOOLCULTURE in the last 48 hours.  Stool Ova/Cysts/Parasites: No results for input(s): STLEXAMOCP in the last 48 hours.  Stool Giardia/Crypto: No results for input(s): GIARDIAANTIG, CRSPAG in the last 48 hours.  Stool WBCs:   Recent Labs  Lab 06/10/17  0807   STOOLWBC Occ neutrophils seen*       Significant Imaging:  CT: I have reviewed all results within the past 24 hours and my personal findings are:  left sided nonspecific colitis.    Assessment/Plan:     BRBPR (bright red blood per rectum)    DDx includes infectious vs. Ischemic colitis. Less likely IBD , but not completely ruled out.  Her use of alosetron puts her at risk for ischemic colitis. If determined to be ischemic, she would need to avoid alosetron indefinitively.  Will need to rule out infection first.    Plan:  Rule out infectious causes with stool studies:  Cdiff, giardia crypto, stool O&P, stool culture, ecoli     IF stool studies negative, will plan flex sig Monday with biospies to determine etiology and rule out ischemic colitis which would  regarding alosetron medication    HOLD alosetron (lotronex)  Continue cipro / flagyl        Acute hemorrhagic colitis    As above            Thank you for your consult. I will follow-up with patient. Please contact us if you have any additional questions.    Eliazar Lora MD  Gastroenterology  [facility]

## 2017-06-12 NOTE — PROGRESS NOTES
Pt being transferred to Endo by transport. Left FA IV intact and patent. Pt on tele. Pt given 2 water enemas prior to transfer.

## 2017-06-12 NOTE — PT/OT/SLP EVAL
Physical Therapy  Evaluation/Discharge Summary    Marilee Simons   MRN: [id number]   Admitting Diagnosis: Diarrhea    PT Received On: 17  PT Start Time: 1128     PT Stop Time: 1141    PT Total Time (min): 13 min       Billable Minutes:  Evaluation 13    Personal factors/comorbidities: none.   Body systems elements affected:musculoskeletal system, cardiovascular, pulmonary system  Clinical Presentation: stable  Functional Outcome Tools: [facility]  Evaluation Complexity Level: LOW      Diagnosis: Diarrhea  17 SIGMOIDOSCOPY-FLEXIBLE      Past Medical History:   Diagnosis Date    Acid reflux     Allergy     Alopecia     Anxiety     Arthralgia     Back pain     Depression     Dry eyes     from meds    Fever blister     Fibromyalgia     Interstitial cystitis     Irritable bowel syndrome     Kidney stone     Major depressive disorder, recurrent episode, in partial or unspecified remission 2013    OAB (overactive bladder) 2015    PTSD (post-traumatic stress disorder)     Rheumatoid arthritis     Systemic lupus erythematosus     Thyroid disease     Urinary tract infection     Vaginal infection       Past Surgical History:   Procedure Laterality Date    BLADDER SURGERY       SECTION, LOW TRANSVERSE      x 2    COLONOSCOPY      ESOPHAGOGASTRODUODENOSCOPY      EYE SURGERY      Lasik-bilateral    HYSTERECTOMY      PARTIAL HYSTERECTOMY         Referring physician: Peg Chen MD  Date referred to PT: 06/10/17       General Precautions: Standard, NPO  Orthopedic Precautions: N/A   Braces: N/A       Do you have any cultural, spiritual, Catholic conflicts, given your current situation?: none stated    Patient History:  Pt lives with  and daughter in a H with 1 MALORIE. PTA, pt was (I) with all mobility and ADLs. Owns no DME. Bath has tub/shower. Has plenty family assist as needed upon d/c.   DME owned (not currently used): none    Previous Level of  "Function:  Ambulation Skills: independent  Transfer Skills: independent  ADL Skills: independent  Work/Leisure Activity: independent    Subjective:  Communicated with RN prior to session.  Pt agreeable to PT/OT session.       Chief Complaint: None stated  Patient goals: "I'm fine. I don't need PT." To go home and return to PLOF.     Pain/Comfort  Pain Rating 1: 0/10  Pain Rating Post-Intervention 1: 0/10      Objective:   Patient found with: peripheral IV, telemetry     Cognitive Exam:  Oriented to: Person, Place, Time and Situation    Follows Commands/attention: Follows multistep  commands  Communication: clear/fluent  Safety awareness/insight to disability: intact    Physical Exam:  Postural examination/scapula alignment: Rounded shoulder and Head forward    Skin integrity: Visible skin intact  Edema: None noted     Sensation:   Intact      Lower Extremity Range of Motion:  Right Lower Extremity: WFL  Left Lower Extremity: WFL    Lower Extremity Strength:  Right Lower Extremity: WFL  Left Lower Extremity: WFL     Fine motor coordination:  Intact    Gross motor coordination: WFL    Functional Mobility:  Bed Mobility:  Supine to Sit:  Modified Independent HOB elevated  Sit to supine: Modified Independent HOB flat  Scooting: Independent       Transfers:   Sit to stand:Modified Independent with No Assistive Device from EOB      Gait:   Patient ambulated ~100 feet with No Assistive Device with Supervision or Set-up Assistance.  Pt demonstrated swing throughout gait with equal step length bilaterally, appropriate weight-shift, balance, and foot clearance. Gait appears to be at baseline with no new deficits noted to decrease pt's safety or increase risk for falls.          Balance:  Static Sitting: Independent   Dynamic Sitting: Independent   Static Standing: Independent   Dynamic Standing: Independent       Therapeutic Activities and Exercises:  Pt performed static standing & dynamic standing tasks with OT at bathroom " sink. Demonstrated appropriate balance and weight-shift with no LOB or weakness noted.     Education:  Education provided to pt regarding: PT role/POC; safety with mobility. Verbalized understanding.     Whiteboard updated with correct mobility information. RN/PCT notified.  Pt ok to walk to bathroom with RN/PCT. Use Supervision for safety if pt feels weak or dizzy.      AM-PAC 6 CLICK MOBILITY  How much help from another person does this patient currently need?   1 = Unable, Total/Dependent Assistance  2 = A lot, Maximum/Moderate Assistance  3 = A little, Minimum/Contact Guard/Supervision  4 = None, Modified [place]/Independent    Turning over in bed (including adjusting bedclothes, sheets and blankets)?: 4  Sitting down on and standing up from a chair with arms (e.g., wheelchair, bedside commode, etc.): 4  Moving from lying on back to sitting on the side of the bed?: 4  Moving to and from a bed to a chair (including a wheelchair)?: 4  Need to walk in hospital room?: 4  Climbing 3-5 steps with a railing?: 4  Total Score: 24     AM-PAC Raw Score CMS G-Code Modifier Level of Impairment Assistance   6 % Total / Unable   7 - 9 CM 80 - 100% Maximal Assist   10 - 14 CL 60 - 80% Moderate Assist   15 - 19 CK 40 - 60% Moderate Assist   20 - 22 CJ 20 - 40% Minimal Assist   23 CI 1-20% SBA / CGA   24 CH 0% Independent/ Mod I     Patient left supine with all lines intact and call button in reach.    Assessment:   Marilee Simons is a 47 y.o. female with a medical diagnosis of Diarrhea and presents with decrease endurance with general mobility, but no new gait/balance deficits.  Pt tolerated session well. Pt is at baseline with mobility and demonstrates good safety awareness. Pt does not demonstrate a need for acute PT services. At this time, pt will be d/c from acute PT. Please re-consult should pt's functional status change. Recommend d/c to home with family assist as needed..  .    Rehab identified problem  list/impairments: Rehab identified problem list/impairments: impaired endurance    Rehab potential is good.    Activity tolerance: Good    Discharge recommendations: Discharge Facility/Level Of Care Needs: home     Barriers to discharge: Barriers to Discharge: None    Equipment recommendations: Equipment Needed After Discharge: none     GOALS:    Physical Therapy Goals     Not on file          Multidisciplinary Problems (Resolved)        Problem: Physical Therapy Goal    Goal Priority Disciplines Outcome Goal Variances Interventions   Physical Therapy Goal   (Resolved)     PT/OT, PT Outcome(s) achieved     Description:      No acute goals needed at this time.                     PLAN:  D/c acute PT services at this time. Pt is supervision/mod (I) with all mobility.     Functional Assessment Tool Used: Ampac  Score: 24  Functional Limitation: Mobility: Walking and moving around  Mobility: Walking and Moving Around Current Status (): CH  Mobility: Walking and Moving Around Goal Status ():   Mobility: Walking and Moving Around Discharge Status ():          Anahy Saunders, PT, DPT  6/12/2017

## 2017-06-12 NOTE — PT/OT/SLP EVAL
Occupational Therapy  Evaluation/ Discharge Summary    Marilee Simons   MRN: [id number]   Admitting Diagnosis: Diarrhea    OT Date of Treatment: 17   OT Start Time: 1126  OT Stop Time: 1145  OT Total Time (min): 19 min    Billable Minutes:  Evaluation 19    Diagnosis: Diarrhea       Past Medical History:   Diagnosis Date    Acid reflux     Allergy     Alopecia     Anxiety     Arthralgia     Back pain     Depression     Dry eyes     from meds    Fever blister     Fibromyalgia     Interstitial cystitis     Irritable bowel syndrome     Kidney stone     Major depressive disorder, recurrent episode, in partial or unspecified remission 2013    OAB (overactive bladder) 2015    PTSD (post-traumatic stress disorder)     Rheumatoid arthritis     Systemic lupus erythematosus     Thyroid disease     Urinary tract infection     Vaginal infection       Past Surgical History:   Procedure Laterality Date    BLADDER SURGERY       SECTION, LOW TRANSVERSE      x 2    COLONOSCOPY      ESOPHAGOGASTRODUODENOSCOPY      EYE SURGERY      Lasik-bilateral    HYSTERECTOMY      PARTIAL HYSTERECTOMY         Referring physician: Dr Chen  Date referred to OT: 6/10/17    General Precautions: Standard, NPO (for Endo procedure.)  Orthopedic Precautions: N/A  Braces: N/A    Do you have any cultural, spiritual, Hoahaoism conflicts, given your current situation?: None stated     Patient History:  Living Environment  Lives With: spouse, child(javi), dependent  Living Arrangements: house  Home Accessibility: stairs to enter home  Number of Stairs to Enter Home: 1  Stair Railings at Home: none  Transportation Available: car, family or friend will provide  Living Environment Comment: Pt lives with  and dependent children in single story home with 1 MALORIE. She has Tub shower combo and used no DME in home setting.  Pt's  is able to assist in home setting as needed  Equipment Currently Used at  Home: none    Prior level of function:   Bed Mobility/Transfers: independent  Grooming: independent  Bathing: independent  Upper Body Dressing: independent  Lower Body Dressing: independent  Toileting: independent  Home Management Skills: independent  Driving License: Yes  Mode of Transportation: Car, Family  Occupation: Other (Comment) (Homemaker)     Dominant hand: right    Subjective:  Communicated with nurse prior to session.    Chief Complaint: Diarrhea  Patient/Family stated goals: Pt want to go back home as soon as possible.    Pain/Comfort  Pain Rating 1: 0/10  Pain Rating Post-Intervention 1: 0/10    Objective:  Patient found with: telemetry, PICC line    Cognitive Exam:  Oriented to: Person, Place, Time and Situation  Follows Commands/attention: Follows two-step commands  Communication: clear/fluent  Memory:  No Deficits noted  Safety awareness/insight to disability: intact  Coping skills/emotional control: Appropriate to situation    Visual/perceptual:  Intact    Physical Exam:  Postural examination/scapula alignment: No postural abnormalities identified  Skin integrity: Visible skin intact  Edema: None noted (B) UEs    Sensation:   Intact    Upper Extremity Range of Motion:  Right Upper Extremity: WFL  Left Upper Extremity: WFL    Upper Extremity Strength:  Right Upper Extremity: WFL  Left Upper Extremity: WFL   Strength: WFLs    Fine motor coordination:   Intact    Gross motor coordination: WFL    Functional Mobility:  Bed Mobility:  Rolling/Turning to Left: Modified independent  Rolling/Turning Right: Modified independent  Scooting/Bridging: Modified Independent  Supine to Sit: Modified Independent  Sit to Supine: Modified Independent    Transfers:  Sit <> Stand Assistance: Modified Independent  Sit <> Stand Assistive Device: No Assistive Device  Bed <> Chair Transfer Assistance: Modified Independent  Tub Transfer Technique: Stand Pivot  Tub Bench Transfer Assistance: Modified Independent  Tub  "Transfer Assistive Device: No Assistive Device    Functional Ambulation: Pt ambulated from EOB to restroom 12 ft with no A/D, stood to groom sinkside and then ambulated 12 ft back to EOB all with (S) for safety .    Activities of Daily Living:  Feeding Level of Assistance: Activity did not occur    UE Dressing Level of Assistance: Set-up Assistance (with Pt donning /doffing hospital gown like a robe. Performed in standing with no assist required.)    LE Dressing Level of Assistance: Set-up Assistance (with no assist required.  Performed from EOB. )    Grooming Position: Standing at sink  Grooming Level of Assistance: Modified independent     Toileting Level of Assistance: Activity did not occur       Balance:   Static Sit: NORMAL: No deviations seen in posture held statically  Dynamic Sit: NORMAL: No deviations seen in posture held dynamically  Static Stand: GOOD-: Takes MODERATE challenges from all directions inconsistently  Dynamic stand: GOOD-: Needs SUPERVISION only during gait and able to self right with moderate     Therapeutic Activities :  OT evaluation performed.  White board updated.  Pt educated on role of OT, safety with functional mobility and ADLs    -PAC 6 CLICK ADL  How much help from another person does this patient currently need?  1 = Unable, Total/Dependent Assistance  2 = A lot, Maximum/Moderate Assistance  3 = A little, Minimum/Contact Guard/Supervision  4 = None, Modified [place]/Independent    Putting on and taking off regular lower body clothing? : 4  Bathing (including washing, rinsing, drying)?: 3  Toileting, which includes using toilet, bedpan, or urinal? : 4  Putting on and taking off regular upper body clothing?: 4  Taking care of personal grooming such as brushing teeth?: 4  Eating meals?: 4  Total Score: 23    AM-PAC Raw Score CMS "G-Code Modifier Level of Impairment Assistance   6 % Total / Unable   7 - 9 CM 80 - 100% Maximal Assist   10-14 CL 60 - 80% Moderate Assist "   15 - 19 CK 40 - 60% Moderate Assist   20 - 22 CJ 20 - 40% Minimal Assist   23 CI 1-20% SBA / CGA   24 CH 0% Independent/ Mod I       Patient left HOB elevated with all lines intact, call button in reach, nurse notified and family present    Assessment:  Marilee Simons is a 47 y.o. female with a medical diagnosis of Diarrhea .  Pt is currently performing ADLs, functional mobility & t/fs without assistance and displays age-appropriate strength, endurance & balance. OT services are not recommended at this time and patient is safe to D/C home. Family is available to assist as needed.      Rehab identified problem list/impairments: Rehab identified problem list/impairments: impaired endurance    Rehab potential is good.    Activity tolerance: Good    Discharge recommendations: Discharge Facility/Level Of Care Needs:   Home   Barriers to discharge: Barriers to Discharge: None    Equipment recommendations: none     GOALS:    Occupational Therapy Goals     Not on file          Multidisciplinary Problems (Resolved)        Problem: Occupational Therapy Goal    Goal Priority Disciplines Outcome Interventions   Occupational Therapy Goal   (Resolved)     OT, PT/OT Outcome(s) achieved    Description:  Pt is currently performing ADLs, functional mobility & t/fs without assistance and displays age-appropriate strength, endurance & balance. OT services are not recommended at this time and patient is safe to D/C home. Family is able to assist in home setting if required.                    PLAN:   (D/C Pt from IPOT secondary to no demonstrated IP OT needs.)  Plan of Care reviewed with: Patient, family         FREDDY Morley/MASSIMO  06/12/2017

## 2017-06-12 NOTE — DISCHARGE SUMMARY
DISCHARGE SUMMARY  Hospital Medicine    Team: [facility] [place] [facility] 4    Patient Name: Marilee Simons  YOB: 1970    Admit Date: 6/9/2017    Discharge Date: 06/12/2017    Discharge Attending Physician: Dr. Vahid Cerna     Diagnoses:  Active Hospital Problems    Diagnosis  POA    Fibromyalgia [M79.7]  Yes    Anxiety [F41.9]  Yes    Frequency-urgency syndrome [N31.8]  Yes    Urgency incontinence [N39.41]  Yes    Generalized anxiety disorder [F41.1]  Yes    GERD (gastroesophageal reflux disease) [K21.9]  Yes    IC (interstitial cystitis) [N30.10]  Yes    Hypothyroidism [E03.9]  Yes    IBS (irritable bowel syndrome) [K58.9]  Yes      Resolved Hospital Problems    Diagnosis Date Resolved POA    *Diarrhea [R19.7] 06/12/2017 Yes    BRBPR (bright red blood per rectum) [K62.5] 06/12/2017 Yes    Acute hemorrhagic colitis [K52.9] 06/12/2017 Yes       Discharged Condition: admit problems have stabilized       HOSPITAL COURSE:    Initial Presentation:     Ms. Marilee Simons is a 47 y.o. female w/ significant PMHx of SLE, RA, UTIs, interstitial cystitis, fecal incontinence, hypothyroidism, fibromyalgia, IBS with chronic diarrhea presenting to the ED with multiple episodes of diarrhea associated with nausea, BRBPR and lightheadedness. This all started last night around 1 am when she noticed cramping abd pain after which she had mutiple BMs and few times it was associated with BRB. She also felt febrile and had chills associated with these episodes. Denies having such bloody BMs in the past and denies having come in contact with anyone sick or hx of travel. She was taking plaquenil for her SLE and RA. On alosetron for the last 3 years with Dr. He for IBS with diarrhea, with good benefit on treatment. No prior colonoscopy.     Course of Principle Problem for Admission:    CT imaging was concerning for non specific changes likely due to infectious vs inflammatory colitis. She was treated on IV antibiotics  with ciprofloxacin and metronidazole. She underwent flexible sigmoidoscopy on 6/12 which only showed erythematous mucosa in the recto-sigmoid colon which was biopsied. Findings are less likely ischemic. She was discharged on PO ciprofloxacin and metronidazole until 6/14/17. Her diarrhea, BRBPR and hemtochezia resolved prior to being discharged.     She was asked to hold her home med alosetron (lotronex) 0.5 mg tablet until the results of the biopsy are reviewed. Dr. Navarro personally called and confirmed that she will indeed hold her Lotronex until biopsy results are reviewed. Pt voiced understanding.     On the day of discharge, she was clinically and hemodynamically stable.     Vitals: T 99.5 F, HR 62, /91, RR 18 and SpO2 98% on RA     Physical Exam:  General: resting comfortably, in NAD, appears stated age   HENT: NC/AT.Conjunctivae/corneas clear. PERRL, EOMI. Nares normal. MMM.   Neck: no adenopathy, no JVD, supple, symmetrical, trachea midline   Back: symmetric, no curvature. ROM normal. No CVA tenderness.  Lungs: clear to auscultation bilaterally, respiratory effort normal  CV: RRR, S1 and S2 Normal. No chest wall tenderness  Abdomen: S, slightly tender to palpation, ND. Bowel sounds normal   Extremities:2+ dorsalis pedis and 2+ radial aa bl; no clubbing, cyanosis or edema  Skin: Skin color, texture, turgor normal. No rashes or lesions  Neurologic: Grossly normal      CONSULTS:   GI   PT, OT     Other Pertinent Lab Findings:      Blood cx (drawn 6/10/17): NGTD x 2 days   Perianal cx for VRE negative   Stool Culture  No enteric niya   Stool Culture  No Salmonella,Shigella,Vibrio,Campylobacter,Yersinia isolated.   C diff antigen negative   C diff toxins A+B EIA negative         Pertinent/Significant Diagnostic Studies:      CT Abdomen Pelvis With Contrast  Impression         Diffuse wall thickening and mild stranding around the distal transverse and descending colon compatible with nonspecific infectious  or inflammatory colitis.    Interval placement of neurostimulator device in the subcutaneous tissues overlying the left gluteal muscles with the lead extending through the S3 neural foramen into the right pararectal fat.           Special Treatments/Procedures:     Flexible Sigmoidoscopy 6/12/2017   Findings:       A diffuse area of mildly erythematous mucosa was found in the        recto-sigmoid colon up to 40 cm. This was biopsied with a cold        forceps for histology. Estimated blood loss was minimal.       The exam was otherwise without abnormality.  Impression:           - Erythematous mucosa in the recto-sigmoid colon,                         biopsied. (Not classic for ischemic colitis).  Recommendation:       - [facility] patient to [facility] for [facility] care.                        - Await pathology results.                        - Advance diet as tolerated.                        - Hold Lotonex until biopsies reviewed.        Disposition:  Home      Future Scheduled Appointments:  Future Appointments  Date Time Provider Department Center   6/29/2017 1:30 PM Ben Samaniego MD [facility]   8/23/2017 12:00 PM Tank Gray MD [facility]       Follow-up Plans from This Hospitalization:  GI will call regarding biopsy results and discuss whether Lotronex can be safely restarted. Pt personally called and told to hold lotronex after being discharged until she is told to restart.     Last CBC/BMP/HgbA1c (if applicable):  Recent Results (from the past 336 hour(s))   CBC auto differential    Collection Time: 06/12/17  5:08 AM   Result Value Ref Range    WBC 5.71 3.90 - 12.70 K/uL    Hemoglobin 10.3 (L) 12.0 - 16.0 g/dL    Hematocrit 31.8 (L) 37.0 - 48.5 %    Platelets 229 150 - 350 K/uL   CBC auto differential    Collection Time: 06/11/17  5:35 AM   Result Value Ref Range    WBC 6.53 3.90 - 12.70 K/uL    Hemoglobin 10.4 (L) 12.0 - 16.0 g/dL    Hematocrit 32.4 (L) 37.0 - 48.5 %    Platelets  230 150 - 350 K/uL   CBC auto differential    Collection Time: 06/10/17  7:05 AM   Result Value Ref Range    WBC 8.98 3.90 - 12.70 K/uL    Hemoglobin 10.3 (L) 12.0 - 16.0 g/dL    Hematocrit 31.7 (L) 37.0 - 48.5 %    Platelets 218 150 - 350 K/uL     No results found for this or any previous visit (from the past 336 hour(s)).  No results found for: HGBA1C    Discharge Medication List:     Medication List      START taking these medications    ciprofloxacin HCl 500 MG tablet  Commonly known as:  CIPRO  Take 1 tablet (500 mg total) by mouth every 12 (twelve) hours.     metronidazole 500 MG tablet  Commonly known as:  FLAGYL  Take 1 tablet (500 mg total) by mouth every 8 (eight) hours.        CHANGE how you take these medications    alosetron 0.5 MG tablet  Commonly known as:  LOTRONEX  TAKE 1 TABLET(0.5 MG) BY MOUTH TWICE DAILY  Start taking on:  AFTER Results are discussed and GI team permits lotronex   What changed:  See the new instructions.     amitriptyline 10 MG tablet  Commonly known as:  ELAVIL  Take 1 tablet (10 mg total) by mouth nightly as needed.  What changed:  reasons to take this     BUPAP  mg Tab  Generic drug:  butalbital-acetaminophen  TAKE 1 TABLET BY MOUTH EVERY 4 HOURS  What changed:  See the new instructions.     ibuprofen-famotidine 800-26.6 mg Tab  Commonly known as:  DUEXIS  Take 1 tablet by mouth 3 (three) times daily.  What changed:   when to take this   reasons to take this     promethazine 25 MG tablet  Commonly known as:  PHENERGAN  TAKE 1 TABLET(25 MG) BY MOUTH EVERY 6 HOURS AS NEEDED  What changed:  See the new instructions.     tizanidine 4 MG tablet  Commonly known as:  ZANAFLEX  TAKE 1 TABLET(4 MG) BY MOUTH EVERY 8 HOURS  What changed:  See the new instructions.        CONTINUE taking these medications    clonazePAM 1 MG tablet  Commonly known as:  KLONOPIN  Take 1 tablet (1 mg total) by mouth 3 (three) times daily.     diazePAM 5 MG tablet  Commonly known as:  VALIUM  Take 1  tablet (5 mg total) by mouth every 12 (twelve) hours as needed for Anxiety. Insert the tablet either crushed or intact inside of the vagina at night prn pelvic pain and spasm     etanercept 50 mg/mL (0.98 mL) Syrg injection  Commonly known as:  ENBREL  Inject 1 mL (50 mg total) into the skin once a week.     fluoxetine 40 MG capsule  Commonly known as:  PROZAC     gabapentin 800 MG tablet  Commonly known as:  NEURONTIN  Take 1 tablet (800 mg total) by mouth 3 (three) times daily.     hydroxychloroquine 200 mg tablet  Commonly known as:  PLAQUENIL  TAKE 1 TABLET BY MOUTH TWICE DAILY     hydrOXYzine HCl 25 MG tablet  Commonly known as:  ATARAX  Take 1 tablet (25 mg total) by mouth every evening.     levothyroxine 50 MCG tablet  Commonly known as:  SYNTHROID  TAKE 1 TABLET(50 MCG) BY MOUTH EVERY DAY     methen-m.blue-s.phos-phsal-hyo 118-10-40.8-36 mg Cap  Commonly known as:  URIBEL  Take 1 capsule by mouth 4 (four) times daily.     multivitamin with minerals tablet     oxybutynin 5 MG Tab  Commonly known as:  DITROPAN     potassium citrate 10 mEq (1,080 mg) Tbsr  Commonly known as:  UROCIT-K  Take 1 tablet (10 mEq total) by mouth 2 (two) times daily with meals.     rabeprazole 20 mg tablet  Commonly known as:  ACIPHEX  Take 1 tablet (20 mg total) by mouth once daily.     trazodone 50 MG tablet  Commonly known as:  DESYREL  TAKE 1/2  TABLET BY MOUTH EVERY NIGHT AT BEDTIME AS NEEDED FOR INSOMNIA     triamcinolone acetonide 0.1% 0.1 % cream  Commonly known as:  KENALOG  APPLY TO THE AFFECTED AREA TWICE DAILY        STOP taking these medications    ibuprofen 800 MG tablet  Commonly known as:  ADVIL,MOTRIN           Where to Get Your Medications      These medications were sent to [id number]  [facility]  [address] AT [facility] [place]  [address] [address]    Hours:  24-hours Phone:  [phone]    alosetron 0.5 MG tablet   ciprofloxacin HCl 500 MG  tablet   metronidazole 500 MG tablet         Patient Instructions:    Discharge Procedure Orders  Diet general     Activity as tolerated     Call MD for:  increased confusion or weakness     Call MD for:  persistent nausea and vomiting or diarrhea         Signing Physician:  Lisandra Navarro MD

## 2017-06-12 NOTE — PLAN OF CARE
Pt d/c to home.     06/12/17 1615   Final Note   Assessment Type Final Discharge Note   Discharge Disposition Home   Discharge planning education complete? Yes   What phone number can be called within the next 1-3 days to see how you are doing after discharge? [phone]   Discharge/Hospital Encounter Summary to (non-[facility]) PCP n/a   Referral to / orders for Home Health Complete? n/a   Right Care Referral Info   Post Acute Recommendation No Care

## 2017-06-12 NOTE — DISCHARGE INSTRUCTIONS
Sigmoidoscopy    Sigmoidoscopy is a procedure used to view the lower colon and rectum. This test can help find the source of belly pain, rectal bleeding, and changes in bowel habits. Sigmoidoscopy is also used as part of the screening for colorectal cancer. It is done using a sigmoidoscope, a flexible tube with a viewing lens and light.  If youre 50 or over, the American Cancer Society recommends having this test in addition to stool tests, every 5 years to screen for colorectal cancer. Your healthcare provider may also recommend other colon cancer screening methods such as colonoscopy.   Getting ready  Here is how to prepare:  · Be sure to tell your healthcare provider about any medicines you take. Also tell your healthcare provider about any health conditions you may have.  · Ask your healthcare provider about the risks of the test. These include bleeding and bowel puncture.  · Your rectum and colon must be empty for the test, so be sure to follow the diet and bowel prep instructions. Otherwise the test may need to be rescheduled.  During the test  Here is what to expect:  · The test is done in the healthcare providers office or in a hospital endoscopy unit. You may wear a gown or a drape over your lower body.  · The procedure usually takes 10 to 20 minutes.  · The healthcare provider does a digital rectal exam to check for anal and rectal problems. The rectum is lubricated and the scope inserted.  · You may have a feeling similar to needing to have a bowel movement. You may also feel pressure when air is pumped into the colon This is done so that the healthcare provider can get a better view. Its expected that you will pass gas during the procedure.  After the test  Here is what to expect:  · Usually youll discuss the results with your healthcare provider right away, unless youre having other tests.  · If biopsies (tissue samples) were taken, you'll want to ask when to contact the for results.   · Try to  pass all the gas right after the test. Otherwise you may have bloating and cramping.  · After the test you can go back to your normal eating and other activities.  When to call your healthcare provider  Call if you have any of the following after the procedure:  · Pain in your belly  · Fever  · Dizziness or weakness  · Excessive rectal bleeding. Slight bleeding or spotting is normal, especially if a biopsy was taken.   Date Last Reviewed: 7/1/2016 © 2000-2016 [employer]. [address] [place] [address], [place] [address]. All rights reserved. This information is not intended as a substitute for professional medical care. Always follow your healthcare professional's instructions.

## 2017-06-12 NOTE — PATIENT INSTRUCTIONS
Discharge Summary/Instructions after an Endoscopic Procedure  Patient Name: Marilee Simons  Patient MRN: [id number]  Patient YOB: 1970 Monday, June 12, 2017  Venkat He MD  RESTRICTIONS ON ACTIVITY:  - DO NOT drive a car, operate machinery, make legal/financial decisions, or   drink alcohol until the day after the procedure.    - The following day: return to full activity including work, except no heavy   lifting, straining or running for 3 days if polyps were removed.  - Diet: Eat and drink normally unless instructed otherwise.  TREATMENT FOR COMMON SIDE EFFECTS:  - Mild abdominal pain, bloating or excessive gas: rest, eat lightly and use   a heating pad.  - Sore Throat - treat with throat lozenges. Gargle with warm salt water.  SYMPTOMS TO WATCH FOR AND REPORT TO YOUR PHYSICIAN:  1. Severe abdominal pain or bloating.  2. Pain in chest.  3. Chills or fever occurring within 24 hours after a procedure.  4. A large amount of rectal bleeding, which would show as bright red,   maroon, or black stools. (A small amount of blood from the rectum is not   serious, especially if hemorrhoids are present.)  5. Because air was used during the procedure, expelling large amounts of air   from your rectum or belching is normal.  6. If a bowel prep was taken, you may not have a bowel movement for 1-3   days.  This is normal.  7. Go directly to the emergency room if you notice any of the following:   Chills and/or fever over 101 F   Persistent vomiting   Severe abdominal pain, other than gas cramps   Severe chest pain   Black, tarry stools   Any bleeding - exceeding one tablespoon  Your doctor recommends these additional instructions:  If any biopsies were performed, my office will call you in 5 to 6 business   days with any results.  We are waiting for your pathology results.   Advance your diet as tolerated.  For questions, problems or results please call your physician - Venkat He MD at Work:  [phone].  [facility], EMERGENCY ROOM PHONE NUMBER: [phone]  IF A COMPLICATION OR EMERGENCY SITUATION ARISES AND YOU ARE UNABLE TO REACH   YOUR PHYSICIAN - GO TO THE EMERGENCY ROOM.  Venkat He MD  6/12/2017 10:11:16 AM  This report has been verified and signed electronically.

## 2017-06-12 NOTE — ANESTHESIA RELEASE NOTE
Post Anesthetic Evaluation    Patient: Marilee Simons    Procedure(s) Performed: Procedure(s) (LRB):  SIGMOIDOSCOPY-FLEXIBLE (N/A)    Anesthesia type: GA    Patient location: PACU    Post pain: Adequate analgesia    Post assessment: no apparent anesthetic complications    Last Vitals:   Vitals:    06/12/17 1015   BP: (!) 170/82   Pulse: 66   Resp: 18   Temp: 36.5 °C (97.7 °F)       Post vital signs: stable    Level of consciousness: awake    Complications: none    Follow-up Needed: No

## 2017-06-12 NOTE — MEDICAL/APP STUDENT
[facility] Hospital Medicine  Progress Note    Patient Name: Marilee Simons  MRN: [id number]  Patient Class: IP- Inpatient   Admission Date: 6/9/2017  Length of Stay: 3 days  Attending Physician: Vahid Cerna MD  Primary Care Provider: Nadeem Zheng MD    [facility] Medicine Team: [facility] Anisha Adhikarihéctor    Subjective:     Principal Problem:Diarrhea    HPI: Marilee Simons is a 47 y.o. female w/ significant PMHx of SLE, RA, UTIs, interstitial cystitis, fecal incontinence, hypothyroidism, fibromyalgia, IBS with chronic diarrhea presenting to the ED with 1 day history of multiple episodes of diarrhea associated with nausea, BRBPR and lightheadedness found to have infectious vs inflammatory colitis on CT imaging, now on IV cipro and flagyl.    Interval History: Ms. Simons was NPO overnight in preparation for her procedure - a flex sigmoidoscopy. She reports her stomach cramps and nausea as being much improved. She reports having had no diarrhea this morning. Yesterday afternoon she had 2 episodes of diarrhea with blood and at night she one mucusy episode with no blood. She has had no vomiting. She reports not having an appetite. No acute events overnight.     Review of Systems   Constitutional: Negative.  Negative for chills, fatigue and fever.   HENT: Negative for congestion, sneezing and sore throat.    Eyes: Negative.    Respiratory: Negative.  Negative for cough, shortness of breath and wheezing.    Cardiovascular: Negative.  Negative for chest pain, palpitations and leg swelling.   Gastrointestinal: Positive for abdominal distention, abdominal pain and diarrhea. Negative for anal bleeding, blood in stool, constipation, nausea and vomiting.   Genitourinary: Negative for difficulty urinating, dysuria and urgency.   Musculoskeletal: Negative.  Negative for arthralgias and myalgias.   Skin: Negative.  Negative for color change and pallor.   Neurological: Positive for headaches.  Negative for dizziness and light-headedness.   Psychiatric/Behavioral: Negative.      Objective:     Vital Signs (Most Recent):  Temp: 98.3 °F (36.8 °C) (06/12/17 0421)  Pulse: (!) 57 (06/12/17 0421)  Resp: 18 (06/12/17 0421)  BP: 119/78 (06/12/17 0421)  SpO2: 95 % (06/12/17 0421) Vital Signs (24h Range):  Temp:  [98.3 °F (36.8 °C)-99.1 °F (37.3 °C)] 98.3 °F (36.8 °C)  Pulse:  [56-81] 57  Resp:  [16-18] 18  SpO2:  [93 %-98 %] 95 %  BP: (109-137)/(55-78) 119/78     Weight: 72.6 kg (160 lb)  Body mass index is 32.32 kg/m².    Intake/Output Summary (Last 24 hours) at 06/12/17 0652  Last data filed at 06/12/17 0600   Gross per 24 hour   Intake              320 ml   Output                0 ml   Net              320 ml      Physical Exam   Constitutional: She is oriented to person, place, and time. She appears well-developed and well-nourished.   HENT:   Head: Normocephalic and atraumatic.   Eyes: EOM are normal. Pupils are equal, round, and reactive to light.   Neck: Normal range of motion. Neck supple.   Cardiovascular: Normal rate, regular rhythm, normal heart sounds and intact distal pulses.    Pulmonary/Chest: Effort normal and breath sounds normal. No respiratory distress. She has no wheezes.   Abdominal: Soft. Bowel sounds are normal. She exhibits distension. She exhibits no mass. There is tenderness. There is no guarding.   Musculoskeletal: Normal range of motion. She exhibits no edema.   Neurological: She is alert and oriented to person, place, and time.   Skin: Skin is warm and dry.   Psychiatric: She has a normal mood and affect.       Significant Labs: All pertinent labs within the past 24 hours have been reviewed.    Significant Imaging: I have reviewed and interpreted all pertinent imaging results/findings within the past 24 hours.    Assessment/Plan:      Active Diagnoses:    Diagnosis Date Noted POA    PRINCIPAL PROBLEM:  Diarrhea [R19.7] 01/18/2013 Yes    BRBPR (bright red blood per rectum) [K62.5]  06/11/2017 Yes    Acute hemorrhagic colitis [K52.9] 06/10/2017 Yes    Fibromyalgia [M79.7] 08/11/2016 Yes    Anxiety [F41.9] 11/16/2015 Yes    Frequency-urgency syndrome [N31.8] 09/03/2015 Yes    Urgency incontinence [N39.41] 08/19/2015 Yes    Generalized anxiety disorder [F41.1] 06/28/2015 Yes    GERD (gastroesophageal reflux disease) [K21.9] 01/21/2015 Yes    IC (interstitial cystitis) [N30.10] 03/18/2014 Yes    Hypothyroidism [E03.9] 07/16/2013 Yes    IBS (irritable bowel syndrome) [K58.9] 01/18/2013 Yes      Problems Resolved During this Admission:    Diagnosis Date Noted Date Resolved POA     VTE Risk Mitigation         Ordered     Medium Risk of VTE  Once      06/10/17 0020     Place sequential compression device  Until discontinued      06/10/17 0020     Place XENA hose  Until discontinued      06/10/17 0020        Assessment:  Marilee Simons is a 47 y.o. female w/ significant PMHx of SLE, RA, UTIs, interstitial cystitis, fecal incontinence, hypothyroidism, fibromyalgia, IBS with chronic diarrhea presenting to the ED with 1 day history of multiple episodes of diarrhea associated with nausea, bright red blood per rectum and lightheadedness found to have infectious vs inflammatory colitis on CT imaging, now on IV cipro and flagyl.     Colitis   BRBPR  CT shows colitis in transverse and descending colon.   C diff negative. Prelim stool cultures negative.   Last bloody BM yesterday. H/H remains stable.   - continue IV abx; cipro and flagyl. Transition to PO abx today  - continue IV fluids  - GI consulted: Flex sig today to rule out Ischemic colitis given hx of alosetron use which puts her at risk.   Will advance diet to clear liquids today.      Hypotension  Dehydration  Secondary to GI losses. Now resolved. Patient with BP of ~80s/60s yesterday morning. BP now stable with continuous IVFs.      SLE  RA  -Hold plaquenil for possible infection     IC  Urge incontinence   -Continue TCA and Oxybutynin    -Uses URIBEL at home     Hypothyroidism   - continue Synthroid     GERD  - continue protonix      PTSD  - continue prozac     Diet: NPO  DVTPx: XENA SCD as recent bleed  PUPx: Protonix  Code: Full  Dispo:   - continue IVF support and IV abx. Transition to PO.  - GI following   - PT/OT  - Possibly home today pending flex sig results    Anisha Denis  Department of Hospital Medicine   [facility]

## 2017-06-12 NOTE — ANESTHESIA POSTPROCEDURE EVALUATION
"Anesthesia Post Evaluation    Patient: Marilee Simons    Procedure(s) Performed: Procedure(s) (LRB):  SIGMOIDOSCOPY-FLEXIBLE (N/A)    Final Anesthesia Type: general  Patient location during evaluation: PACU  Patient participation: Yes- Able to Participate  Level of consciousness: awake and alert  Post-procedure vital signs: reviewed and stable  Pain management: adequate  Airway patency: patent  PONV status at discharge: No PONV  Anesthetic complications: no      Cardiovascular status: blood pressure returned to baseline  Respiratory status: spontaneous ventilation and room air  Hydration status: euvolemic  Follow-up not needed.        Visit Vitals  BP (!) 170/82 (BP Location: Left arm, Patient Position: Lying, BP Method: Automatic)   Pulse 66   Temp 36.5 °C (97.7 °F) (Temporal)   Resp 18   Ht 4' 11" (1.499 m)   Wt 72.6 kg (160 lb)   LMP  (LMP Unknown)   SpO2 100%   Breastfeeding? No   BMI 32.32 kg/m²       Pain/Jewel Score: Pain Assessment Performed: Yes (6/12/2017 10:15 AM)  Presence of Pain: denies (6/12/2017 10:15 AM)  Pain Rating Prior to Med Admin: 5 ([id number]) (6/11/2017  1:02 PM)  Pain Rating Post Med Admin: 2 (6/11/2017  2:02 PM)  Jewel Score: 9 (6/12/2017 10:15 AM)      "

## 2017-06-13 LAB
BACTERIA STL CULT: NORMAL
BACTERIA STL CULT: NORMAL

## 2017-06-14 ENCOUNTER — TELEPHONE (OUTPATIENT)
Dept: GASTROENTEROLOGY | Facility: CLINIC | Age: 47
End: 2017-06-14

## 2017-06-14 ENCOUNTER — PATIENT OUTREACH (OUTPATIENT)
Dept: ADMINISTRATIVE | Facility: CLINIC | Age: 47
End: 2017-06-14
Payer: COMMERCIAL

## 2017-06-14 LAB — BACTERIA BLD CULT: NORMAL

## 2017-06-14 NOTE — PATIENT INSTRUCTIONS

## 2017-06-14 NOTE — TELEPHONE ENCOUNTER
----- Message from Venkat He MD sent at 6/14/2017  1:31 PM CDT -----  Biopsies looked like mild colitis. Restart Lotronex in a week. Follow up in clinic in Aug/Sept.

## 2017-06-15 LAB
BACTERIA BLD CULT: NORMAL
LACTOFERRIN, STOOL: POSITIVE

## 2017-06-20 NOTE — PHYSICIAN QUERY
PT Name: Marilee Simons  MR #: [id number]     Physician Query Form - Etiology of Condition Clarification      CDS/: Kristine Scales               Contact information: [email or web address]    This form is a permanent document in the medical record.     Query Date: June 20, 2017    By submitting this query, we are merely seeking further clarification of documentation.  Please utilize your independent clinical judgment when addressing the question(s) below.     The medical record contains the following:    Findings Supporting Clinical Information Location in Medical Record   Acute hemorrhagic colitis  This all started last night around 1 am when she noticed cramping abd pain after which she had mutiple BMs and few times it was associated with BRB. She also felt febrile and had chills associated with these episodes    CT imaging was concerning for non specific changes likely due to infectious vs inflammatory colitis     She was treated on IV antibiotics with ciprofloxacin and metronidazole. She underwent flexible sigmoidoscopy on 6/12 which only showed erythematous mucosa in the recto-sigmoid colon which was biopsied. Findings are less likely ischemic    Flexible Sigmoidoscopy 6/12/2017   Findings:       A diffuse area of mildly erythematous mucosa was found in the        recto-sigmoid colon up to 40 cm. This was biopsied with a cold        forceps for histology. Estimated blood loss was minimal.       The exam was otherwise without abnormality.  Impression:           - Erythematous mucosa in the recto-sigmoid colon,                         biopsied. (Not classic for ischemic colitis).    FINAL PATHOLOGIC DIAGNOSIS  COLONIC MUCOSA WITH FOCAL CRYPTITIS, PROBABLY NONSPECIFIC.  NO DYSPLASIA IDENTIFIED. Discharge summary                                  Sigmoidoscopy report                              Pathology report     Please document your best medical opinion regarding the etiology of the colitis for which the  primary focus of treatment is/was directed.        [ x ] infectious     [   ] ischemic     [   ] inflammatory     [   ] unspecified     [   ] Other________________________________________          [    ]  Clinically Undetermined

## 2017-06-29 ENCOUNTER — LAB VISIT (OUTPATIENT)
Dept: LAB | Facility: HOSPITAL | Age: 47
End: 2017-06-29
Attending: INTERNAL MEDICINE
Payer: COMMERCIAL

## 2017-06-29 ENCOUNTER — OFFICE VISIT (OUTPATIENT)
Dept: RHEUMATOLOGY | Facility: CLINIC | Age: 47
End: 2017-06-29
Payer: COMMERCIAL

## 2017-06-29 VITALS
SYSTOLIC BLOOD PRESSURE: 107 MMHG | HEIGHT: 59 IN | WEIGHT: 160.69 LBS | HEART RATE: 92 BPM | BODY MASS INDEX: 32.4 KG/M2 | DIASTOLIC BLOOD PRESSURE: 71 MMHG

## 2017-06-29 DIAGNOSIS — K52.9 COLITIS: ICD-10-CM

## 2017-06-29 DIAGNOSIS — M06.00 SERONEGATIVE RHEUMATOID ARTHRITIS: ICD-10-CM

## 2017-06-29 DIAGNOSIS — R19.7 DIARRHEA, UNSPECIFIED TYPE: ICD-10-CM

## 2017-06-29 DIAGNOSIS — L40.50 PSA (PSORIATIC ARTHRITIS): ICD-10-CM

## 2017-06-29 DIAGNOSIS — M06.00 SERONEGATIVE RHEUMATOID ARTHRITIS: Primary | ICD-10-CM

## 2017-06-29 LAB
25(OH)D3+25(OH)D2 SERPL-MCNC: 23 NG/ML
ALBUMIN SERPL BCP-MCNC: 3.9 G/DL
ALP SERPL-CCNC: 73 U/L
ALT SERPL W/O P-5'-P-CCNC: 22 U/L
ANION GAP SERPL CALC-SCNC: 11 MMOL/L
AST SERPL-CCNC: 21 U/L
BASOPHILS # BLD AUTO: 0.05 K/UL
BASOPHILS NFR BLD: 0.6 %
BILIRUB SERPL-MCNC: 0.3 MG/DL
BUN SERPL-MCNC: 14 MG/DL
CALCIUM SERPL-MCNC: 9.9 MG/DL
CHLORIDE SERPL-SCNC: 106 MMOL/L
CO2 SERPL-SCNC: 26 MMOL/L
CREAT SERPL-MCNC: 1 MG/DL
CRP SERPL-MCNC: 11.8 MG/L
DIFFERENTIAL METHOD: ABNORMAL
EOSINOPHIL # BLD AUTO: 0.1 K/UL
EOSINOPHIL NFR BLD: 1.4 %
ERYTHROCYTE [DISTWIDTH] IN BLOOD BY AUTOMATED COUNT: 12.6 %
ERYTHROCYTE [SEDIMENTATION RATE] IN BLOOD BY WESTERGREN METHOD: 10 MM/HR
EST. GFR  (AFRICAN AMERICAN): >60 ML/MIN/1.73 M^2
EST. GFR  (NON AFRICAN AMERICAN): >60 ML/MIN/1.73 M^2
GLUCOSE SERPL-MCNC: 82 MG/DL
HCT VFR BLD AUTO: 40.4 %
HGB BLD-MCNC: 13 G/DL
LYMPHOCYTES # BLD AUTO: 2.5 K/UL
LYMPHOCYTES NFR BLD: 32.3 %
MAGNESIUM SERPL-MCNC: 2 MG/DL
MCH RBC QN AUTO: 28.3 PG
MCHC RBC AUTO-ENTMCNC: 32.2 %
MCV RBC AUTO: 88 FL
MONOCYTES # BLD AUTO: 0.7 K/UL
MONOCYTES NFR BLD: 8.7 %
NEUTROPHILS # BLD AUTO: 4.4 K/UL
NEUTROPHILS NFR BLD: 56.7 %
PLATELET # BLD AUTO: 372 K/UL
PMV BLD AUTO: 9.2 FL
POTASSIUM SERPL-SCNC: 4.9 MMOL/L
PROT SERPL-MCNC: 7.1 G/DL
RBC # BLD AUTO: 4.59 M/UL
SODIUM SERPL-SCNC: 143 MMOL/L
WBC # BLD AUTO: 7.71 K/UL

## 2017-06-29 PROCEDURE — 83735 ASSAY OF MAGNESIUM: CPT

## 2017-06-29 PROCEDURE — 99214 OFFICE O/P EST MOD 30 MIN: CPT | Mod: 25,S$GLB,, | Performed by: INTERNAL MEDICINE

## 2017-06-29 PROCEDURE — 85651 RBC SED RATE NONAUTOMATED: CPT | Mod: PO

## 2017-06-29 PROCEDURE — 96372 THER/PROPH/DIAG INJ SC/IM: CPT | Mod: S$GLB,,, | Performed by: INTERNAL MEDICINE

## 2017-06-29 PROCEDURE — 85025 COMPLETE CBC W/AUTO DIFF WBC: CPT

## 2017-06-29 PROCEDURE — 80053 COMPREHEN METABOLIC PANEL: CPT

## 2017-06-29 PROCEDURE — 86140 C-REACTIVE PROTEIN: CPT

## 2017-06-29 PROCEDURE — 99999 PR PBB SHADOW E&M-EST. PATIENT-LVL IV: CPT | Mod: PBBFAC,,, | Performed by: INTERNAL MEDICINE

## 2017-06-29 PROCEDURE — 82306 VITAMIN D 25 HYDROXY: CPT

## 2017-06-29 PROCEDURE — 36415 COLL VENOUS BLD VENIPUNCTURE: CPT | Mod: PO

## 2017-06-29 RX ORDER — DICYCLOMINE HYDROCHLORIDE 20 MG/1
20 TABLET ORAL EVERY 6 HOURS
Qty: 120 TABLET | Refills: 3 | Status: SHIPPED | OUTPATIENT
Start: 2017-06-29 | End: 2017-07-29

## 2017-06-29 RX ORDER — BUTALBITAL AND ACETAMINOPHEN 300; 50 MG/1; MG/1
1 TABLET ORAL EVERY 4 HOURS
Qty: 60 TABLET | Refills: 3 | Status: SHIPPED | OUTPATIENT
Start: 2017-06-29 | End: 2019-02-06 | Stop reason: SDUPTHER

## 2017-06-29 RX ORDER — METHYLPREDNISOLONE ACETATE 80 MG/ML
160 INJECTION, SUSPENSION INTRA-ARTICULAR; INTRALESIONAL; INTRAMUSCULAR; SOFT TISSUE
Status: COMPLETED | OUTPATIENT
Start: 2017-06-29 | End: 2017-06-29

## 2017-06-29 RX ORDER — HYDROCODONE BITARTRATE AND ACETAMINOPHEN 7.5; 325 MG/1; MG/1
1 TABLET ORAL EVERY 8 HOURS PRN
Qty: 60 TABLET | Refills: 0 | Status: SHIPPED | OUTPATIENT
Start: 2017-06-29 | End: 2017-08-04

## 2017-06-29 RX ORDER — KETOROLAC TROMETHAMINE 30 MG/ML
60 INJECTION, SOLUTION INTRAMUSCULAR; INTRAVENOUS
Status: COMPLETED | OUTPATIENT
Start: 2017-06-29 | End: 2017-06-29

## 2017-06-29 RX ADMIN — METHYLPREDNISOLONE ACETATE 160 MG: 80 INJECTION, SUSPENSION INTRA-ARTICULAR; INTRALESIONAL; INTRAMUSCULAR; SOFT TISSUE at 03:06

## 2017-06-29 RX ADMIN — KETOROLAC TROMETHAMINE 60 MG: 30 INJECTION, SOLUTION INTRAMUSCULAR; INTRAVENOUS at 03:06

## 2017-06-29 NOTE — PROGRESS NOTES
Administered 2 cc DepoMedrol 80mg/cc  to right ventrogluteal. Pt tolerated well. No acute reaction noted to site. Pt instructed on S/S to report. Advised patient to wait in lobby 15 minutes after receiving injection to monitor for any reactions..  Pt verbalized understanding.     Lot: [id number]  Exp: 06/2018  Administered 2 cc  Toradol 30mg/cc  to right dorsogluteal. Pt tolerated well. No acute reaction noted to site. Pt instructed on S/S to report. Advised patient to wait in lobby 15 minutes after receiving injection to monitor for any reactions. Pt verbalized understanding.     Lot: -[id number]  Exp: [id number]

## 2017-06-29 NOTE — PROGRESS NOTES
Subjective:       Patient ID: Marilee Simons is a 47 y.o. female.    Chief Complaint: Follow-up    Follow up   PSA and flaring , colitis, her  mcp, pip, wrist, knees and ankles feet and L spine pain on plaquenil.  Pain is located in multiple joints, both shoulder(s), both elbow(s), both wrist(s), both MCP(s): 1st, 2nd, 3rd, 4th and 5th, both PIP(s): 1st, 2nd, 3rd, 4th and 5th, both DIP(s): 1st and 2nd, both hip(s), both knee(s) and both MTP(s): 1st, 2nd, 3rd, 4th and 5th, is described as aching, pulsating, shooting and throbbing, and is constant, moderate .  Associated symptoms include: crepitation, decreased range of motion, edema, effusion, tenderness and warmth.                  Fibromyalgia    The disease course has been worsening.     She complains of pain. She complains of lasting longer than 2 hours after awakening.            Fibromyalgia        She complains of pain. She complains of lasting longer than 2 hours after awakening.        Review of Systems   Constitutional: Positive for activity change and chills. Negative for appetite change, diaphoresis and unexpected weight change.   HENT: Negative for congestion, dental problem, ear discharge, ear pain, facial swelling, mouth sores, nosebleeds, postnasal drip, rhinorrhea, sinus pressure, sneezing, sore throat, tinnitus and voice change.    Eyes: Negative for photophobia, pain, discharge, redness and itching.   Respiratory: Negative for apnea, cough, chest tightness, shortness of breath and wheezing.    Cardiovascular: Positive for leg swelling. Negative for chest pain and palpitations.   Gastrointestinal: Positive for abdominal pain. Negative for abdominal distention, constipation, diarrhea, nausea and vomiting.   Endocrine: Negative for cold intolerance, heat intolerance, polydipsia and polyuria.   Genitourinary: Negative for decreased urine volume, difficulty urinating, flank pain, frequency, hematuria and urgency.   Musculoskeletal: Positive for  "arthralgias, back pain, gait problem, neck pain and neck stiffness.   Skin: Negative for pallor, rash and wound.   Allergic/Immunologic: Negative for immunocompromised state.   Neurological: Positive for weakness. Negative for dizziness, tremors and numbness.   Hematological: Negative for adenopathy. Does not bruise/bleed easily.   Psychiatric/Behavioral: Negative for sleep disturbance. The patient is not nervous/anxious.          Objective:     /71   Pulse 92   Ht 4' 11" (1.499 m)   Wt 72.9 kg (160 lb 11.5 oz)   LMP  (LMP Unknown)   BMI 32.46 kg/m²      Physical Exam   Nursing note and vitals reviewed.  Constitutional: She is oriented to person, place, and time. No distress.   HENT:   Head: Normocephalic and atraumatic.   Mouth/Throat: Oropharynx is clear and moist.   Eyes: EOM are normal. Pupils are equal, round, and reactive to light.   Neck: Neck supple. No thyromegaly present.   Cardiovascular: Normal rate, regular rhythm and normal heart sounds.  Exam reveals no gallop and no friction rub.    No murmur heard.  Pulmonary/Chest: Breath sounds normal. She has no wheezes. She has no rales. She exhibits no tenderness.   Abdominal: There is no tenderness. There is no rebound and no guarding.       Right Side Rheumatological Exam     The patient is tender to palpation of the shoulder, elbow, wrist, knee, 1st PIP, 1st MCP, 2nd PIP, 2nd MCP, 3rd PIP, 3rd MCP, 4th PIP, 4th MCP, 5th PIP and 5th MCP    She has swelling of the wrist, 1st PIP, 1st MCP, 2nd PIP, 2nd MCP, 3rd PIP, 3rd MCP, 4th PIP, 4th MCP, 5th PIP and 5th MCP    The patient has an enlarged wrist, knee, 1st PIP, 1st MCP, 2nd PIP, 2nd MCP, 3rd PIP, 3rd MCP, 4th PIP, 4th MCP, 5th PIP and 5th MCP    Shoulder Exam   Tenderness Location: biceps tendon and clavicle    Range of Motion   Active Abduction: abnormal   Adduction: abnormal  Sensation: normal    Knee Exam   Patellofemoral Crepitus: positive  Effusion: positive  Sensation: normal    Hip Exam "   Tenderness Location: posterior, greater trochanter and lateral  Sensation: normal    Elbow/Wrist Exam   Tenderness Location: lateral epicondyle and medial epicondyle  Sensation: normal    Foot Exam   Right foot exam exhibits signs of inflamed dorsum  Right foot exam exhibits signs of no podagra, no tophus and no plantar fasciitis    Muscle Strength (0-5 scale):  : 4/5     Left Side Rheumatological Exam     The patient is tender to palpation of the shoulder, elbow, wrist, knee, 1st PIP, 1st MCP, 2nd PIP, 2nd MCP, 3rd PIP, 3rd MCP, 4th PIP, 4th MCP, 5th PIP and 5th MCP.    She has swelling of the wrist, 1st PIP, 1st MCP, 2nd PIP, 2nd MCP, 3rd PIP, 3rd MCP, 4th PIP, 4th MCP, 5th PIP and 5th MCP    The patient has an enlarged wrist and knee.    Shoulder Exam   Tenderness Location: biceps tendon and clavicle    Range of Motion   Active Abduction: abnormal   Sensation: normal    Knee Exam     Patellofemoral Crepitus: positive  Effusion: negative  Sensation: normal    Hip Exam   Tenderness Location: posterior, greater trochanter and lateral  Sensation: normal    Elbow/Wrist Exam   Tenderness Location: lateral epicondyle and medial epicondyle  Sensation: normal    Foot Exam   Left foot exam exhibits signs of inflamed dorsum  Left foot exam exhibits signs of no podagra and no plantar fasciitis    Muscle Strength (0-5 scale):  :  4/5       Back/Neck Exam   General Inspection   Gait: normal       Tenderness Right paramedian tenderness of the Occ, Upper C-Spine, Lower L-Spine and SI Joint.Left paramedian tenderness of the Occ, Upper C-Spine, Lower L-Spine and SI Joint.      Comments:  15 out of 18 tender points    Lymphadenopathy:     She has no cervical adenopathy.   Neurological: She is alert and oriented to person, place, and time.   Skin: No rash noted. No erythema. No pallor.     Psychiatric: Affect normal. Her mood appears anxious. She exhibits a depressed mood. She expresses no suicidal plans and no homicidal  plans.   Musculoskeletal: She exhibits tenderness and deformity.   Sausage digits, Synovitis pip 2,3 B and B wrist with warmth to the touch         Results for orders placed or performed during the hospital encounter of 06/09/17   Clostridium difficile EIA   Result Value Ref Range    C. diff Antigen Negative Negative    C difficile Toxins A+B, EIA Negative Negative   Culture, Perianal, For VRE   Result Value Ref Range    Perianal Culture for VRE No VRE isolated    Stool culture **CANNOT BE ORDERED STAT**   Result Value Ref Range    Stool Culture No enteric niya     Stool Culture       No Salmonella,Shigella,Vibrio,Campylobacter,Yersinia isolated.   Blood Culture #1 **CANNOT BE ORDERED STAT**   Result Value Ref Range    Blood Culture, Routine No growth after 5 days.    Blood Culture #2 **CANNOT BE ORDERED STAT**   Result Value Ref Range    Blood Culture, Routine No growth after 5 days.    CBC auto differential   Result Value Ref Range    WBC 18.69 (H) 3.90 - 12.70 K/uL    RBC 4.62 4.00 - 5.40 M/uL    Hemoglobin 13.4 12.0 - 16.0 g/dL    Hematocrit 40.1 37.0 - 48.5 %    MCV 87 82 - 98 fL    MCH 29.0 27.0 - 31.0 pg    MCHC 33.4 32.0 - 36.0 %    RDW 12.5 11.5 - 14.5 %    Platelets 339 150 - 350 K/uL    MPV 9.0 (L) 9.2 - 12.9 fL    Gran # 14.8 (H) 1.8 - 7.7 K/uL    Lymph # 2.5 1.0 - 4.8 K/uL    Mono # 1.3 (H) 0.3 - 1.0 K/uL    Eos # 0.0 0.0 - 0.5 K/uL    Baso # 0.04 0.00 - 0.20 K/uL    Gran% 79.1 (H) 38.0 - 73.0 %    Lymph% 13.4 (L) 18.0 - 48.0 %    Mono% 6.8 4.0 - 15.0 %    Eosinophil% 0.2 0.0 - 8.0 %    Basophil% 0.2 0.0 - 1.9 %    Differential Method Automated    Comprehensive metabolic panel   Result Value Ref Range    Sodium 138 136 - 145 mmol/L    Potassium 4.0 3.5 - 5.1 mmol/L    Chloride 105 95 - 110 mmol/L    CO2 19 (L) 23 - 29 mmol/L    Glucose 97 70 - 110 mg/dL    BUN, Bld 10 6 - 20 mg/dL    Creatinine 0.8 0.5 - 1.4 mg/dL    Calcium 9.4 8.7 - 10.5 mg/dL    Total Protein 7.4 6.0 - 8.4 g/dL    Albumin 3.8 3.5 -  5.2 g/dL    Total Bilirubin 0.6 0.1 - 1.0 mg/dL    Alkaline Phosphatase 83 55 - 135 U/L    AST 16 10 - 40 U/L    ALT 23 10 - 44 U/L    Anion Gap 14 8 - 16 mmol/L    eGFR if African American >60.0 >60 mL/min/1.73 m^2    eGFR if non African American >60.0 >60 mL/min/1.73 m^2   Lactic acid, plasma   Result Value Ref Range    Lactate (Lactic Acid) 1.6 0.5 - 2.2 mmol/L   Urinalysis, Reflex to Urine Culture   Result Value Ref Range    Specimen UA Urine, Clean Catch     Color, UA Green (A) Yellow, Straw, Almaz    Appearance, UA Hazy (A) Clear    pH, UA 7.0 5.0 - 8.0    Specific Gravity, UA 1.020 1.005 - 1.030    Protein, UA 1+ (A) Negative    Glucose, UA Negative Negative    Ketones, UA Negative Negative    Bilirubin (UA) Negative Negative    Occult Blood UA Negative Negative    Nitrite, UA Negative Negative    Urobilinogen, UA Negative <2.0 EU/dL    Leukocytes, UA Negative Negative   Rapid HIV   Result Value Ref Range    HIV Rapid Testing Negative Negative   Lactoferrin, fecal, quantitative   Result Value Ref Range    Lactoferrin, stool Positive (A) Negative   Protime-INR   Result Value Ref Range    Prothrombin Time 10.3 9.0 - 12.5 sec    INR 1.0 0.8 - 1.2   APTT   Result Value Ref Range    aPTT <21.0 21.0 - 32.0 sec   Lipase   Result Value Ref Range    Lipase 14 4 - 60 U/L   TSH   Result Value Ref Range    TSH 0.655 0.400 - 4.000 uIU/mL   C-reactive protein   Result Value Ref Range    CRP 41.0 (H) 0.0 - 8.2 mg/L   Sedimentation rate, manual   Result Value Ref Range    Sed Rate 22 (H) 0 - 20 mm/Hr   WBC, Stool   Result Value Ref Range    Stool WBC Occ neutrophils seen (A) No neutrophils seen   Urinalysis Microscopic   Result Value Ref Range    RBC, UA 5 (H) 0 - 4 /hpf    WBC, UA 1 0 - 5 /hpf    Bacteria, UA Rare None-Occ /hpf    Squam Epithel, UA 6 /hpf    Hyaline Casts, UA 7 (A) 0-1/lpf /lpf    Microscopic Comment SEE COMMENT    PT/INR   Result Value Ref Range    Prothrombin Time 10.5 9.0 - 12.5 sec    INR 1.0 0.8 - 1.2    PTT   Result Value Ref Range    aPTT 25.0 21.0 - 32.0 sec   CBC auto differential   Result Value Ref Range    WBC 8.98 3.90 - 12.70 K/uL    RBC 3.59 (L) 4.00 - 5.40 M/uL    Hemoglobin 10.3 (L) 12.0 - 16.0 g/dL    Hematocrit 31.7 (L) 37.0 - 48.5 %    MCV 88 82 - 98 fL    MCH 28.7 27.0 - 31.0 pg    MCHC 32.5 32.0 - 36.0 %    RDW 12.8 11.5 - 14.5 %    Platelets 218 150 - 350 K/uL    MPV 8.7 (L) 9.2 - 12.9 fL    Gran # 5.4 1.8 - 7.7 K/uL    Lymph # 2.5 1.0 - 4.8 K/uL    Mono # 0.9 0.3 - 1.0 K/uL    Eos # 0.2 0.0 - 0.5 K/uL    Baso # 0.04 0.00 - 0.20 K/uL    Gran% 60.2 38.0 - 73.0 %    Lymph% 27.6 18.0 - 48.0 %    Mono% 9.8 4.0 - 15.0 %    Eosinophil% 1.8 0.0 - 8.0 %    Basophil% 0.4 0.0 - 1.9 %    Differential Method Automated    Comprehensive metabolic panel   Result Value Ref Range    Sodium 136 136 - 145 mmol/L    Potassium 3.7 3.5 - 5.1 mmol/L    Chloride 106 95 - 110 mmol/L    CO2 21 (L) 23 - 29 mmol/L    Glucose 92 70 - 110 mg/dL    BUN, Bld 7 6 - 20 mg/dL    Creatinine 0.7 0.5 - 1.4 mg/dL    Calcium 8.0 (L) 8.7 - 10.5 mg/dL    Total Protein 5.2 (L) 6.0 - 8.4 g/dL    Albumin 2.8 (L) 3.5 - 5.2 g/dL    Total Bilirubin 0.6 0.1 - 1.0 mg/dL    Alkaline Phosphatase 54 (L) 55 - 135 U/L    AST 13 10 - 40 U/L    ALT 13 10 - 44 U/L    Anion Gap 9 8 - 16 mmol/L    eGFR if African American >60.0 >60 mL/min/1.73 m^2    eGFR if non African American >60.0 >60 mL/min/1.73 m^2   Magnesium   Result Value Ref Range    Magnesium 1.3 (L) 1.6 - 2.6 mg/dL   Phosphorus   Result Value Ref Range    Phosphorus 2.9 2.7 - 4.5 mg/dL   Stool Exam-Ova,Cysts,Parasites   Result Value Ref Range    Stool Exam-Ova,Cysts,Parasites No ova or parasites seen    Giardia / Cryptosporidum, EIA   Result Value Ref Range    Giardia Antigen - EIA Negative Negative    Cryptosporidium Antigen Negative Negative   Hemoglobin   Result Value Ref Range    Hemoglobin 10.5 (L) 12.0 - 16.0 g/dL   Hematocrit   Result Value Ref Range    Hematocrit 32.5 (L) 37.0 - 48.5 %    CBC auto differential   Result Value Ref Range    WBC 6.53 3.90 - 12.70 K/uL    RBC 3.67 (L) 4.00 - 5.40 M/uL    Hemoglobin 10.4 (L) 12.0 - 16.0 g/dL    Hematocrit 32.4 (L) 37.0 - 48.5 %    MCV 88 82 - 98 fL    MCH 28.3 27.0 - 31.0 pg    MCHC 32.1 32.0 - 36.0 %    RDW 12.4 11.5 - 14.5 %    Platelets 230 150 - 350 K/uL    MPV 8.8 (L) 9.2 - 12.9 fL    Gran # 4.0 1.8 - 7.7 K/uL    Lymph # 1.6 1.0 - 4.8 K/uL    Mono # 0.7 0.3 - 1.0 K/uL    Eos # 0.2 0.0 - 0.5 K/uL    Baso # 0.04 0.00 - 0.20 K/uL    Gran% 61.3 38.0 - 73.0 %    Lymph% 24.7 18.0 - 48.0 %    Mono% 10.4 4.0 - 15.0 %    Eosinophil% 2.8 0.0 - 8.0 %    Basophil% 0.6 0.0 - 1.9 %    Differential Method Automated    Comprehensive metabolic panel   Result Value Ref Range    Sodium 139 136 - 145 mmol/L    Potassium 4.4 3.5 - 5.1 mmol/L    Chloride 108 95 - 110 mmol/L    CO2 24 23 - 29 mmol/L    Glucose 81 70 - 110 mg/dL    BUN, Bld 5 (L) 6 - 20 mg/dL    Creatinine 0.7 0.5 - 1.4 mg/dL    Calcium 8.3 (L) 8.7 - 10.5 mg/dL    Total Protein 5.5 (L) 6.0 - 8.4 g/dL    Albumin 3.0 (L) 3.5 - 5.2 g/dL    Total Bilirubin 0.4 0.1 - 1.0 mg/dL    Alkaline Phosphatase 55 55 - 135 U/L    AST 15 10 - 40 U/L    ALT 14 10 - 44 U/L    Anion Gap 7 (L) 8 - 16 mmol/L    eGFR if African American >60.0 >60 mL/min/1.73 m^2    eGFR if non African American >60.0 >60 mL/min/1.73 m^2   Magnesium   Result Value Ref Range    Magnesium 2.1 1.6 - 2.6 mg/dL   Phosphorus   Result Value Ref Range    Phosphorus 3.2 2.7 - 4.5 mg/dL   CBC auto differential   Result Value Ref Range    WBC 5.71 3.90 - 12.70 K/uL    RBC 3.63 (L) 4.00 - 5.40 M/uL    Hemoglobin 10.3 (L) 12.0 - 16.0 g/dL    Hematocrit 31.8 (L) 37.0 - 48.5 %    MCV 88 82 - 98 fL    MCH 28.4 27.0 - 31.0 pg    MCHC 32.4 32.0 - 36.0 %    RDW 12.5 11.5 - 14.5 %    Platelets 229 150 - 350 K/uL    MPV 9.1 (L) 9.2 - 12.9 fL    Gran # 2.9 1.8 - 7.7 K/uL    Lymph # 2.1 1.0 - 4.8 K/uL    Mono # 0.5 0.3 - 1.0 K/uL    Eos # 0.2 0.0 - 0.5 K/uL    Baso #  0.03 0.00 - 0.20 K/uL    Gran% 51.1 38.0 - 73.0 %    Lymph% 36.1 18.0 - 48.0 %    Mono% 9.1 4.0 - 15.0 %    Eosinophil% 2.8 0.0 - 8.0 %    Basophil% 0.5 0.0 - 1.9 %    Differential Method Automated    Comprehensive metabolic panel   Result Value Ref Range    Sodium 141 136 - 145 mmol/L    Potassium 3.9 3.5 - 5.1 mmol/L    Chloride 108 95 - 110 mmol/L    CO2 25 23 - 29 mmol/L    Glucose 93 70 - 110 mg/dL    BUN, Bld 4 (L) 6 - 20 mg/dL    Creatinine 0.8 0.5 - 1.4 mg/dL    Calcium 8.3 (L) 8.7 - 10.5 mg/dL    Total Protein 5.4 (L) 6.0 - 8.4 g/dL    Albumin 2.9 (L) 3.5 - 5.2 g/dL    Total Bilirubin 0.3 0.1 - 1.0 mg/dL    Alkaline Phosphatase 52 (L) 55 - 135 U/L    AST 22 10 - 40 U/L    ALT 18 10 - 44 U/L    Anion Gap 8 8 - 16 mmol/L    eGFR if African American >60.0 >60 mL/min/1.73 m^2    eGFR if non African American >60.0 >60 mL/min/1.73 m^2   Magnesium   Result Value Ref Range    Magnesium 1.6 1.6 - 2.6 mg/dL   Phosphorus   Result Value Ref Range    Phosphorus 3.2 2.7 - 4.5 mg/dL   Type & Screen   Result Value Ref Range    Group & Rh A POS     Indirect Du NEG    POCT glucose   Result Value Ref Range    POCT Glucose 98 70 - 110 mg/dL       Assessment:       Encounter Diagnoses   Name Primary?    Seronegative rheumatoid arthritis Yes    PSA (psoriatic arthritis)     Colitis     Diarrhea, unspecified type          Plan:       Seronegative rheumatoid arthritis  -     dicyclomine (BENTYL) 20 mg tablet; Take 1 tablet (20 mg total) by mouth every 6 (six) hours.  Dispense: 120 tablet; Refill: 3  -     ketorolac injection 60 mg; Inject 2 mLs (60 mg total) into the muscle one time.  -     methylPREDNISolone acetate injection 160 mg; Inject 2 mLs (160 mg total) into the muscle one time.  -     CBC auto differential; Future; Expected date: 06/29/2017  -     Comprehensive metabolic panel; Future; Expected date: 06/29/2017  -     Sedimentation rate, manual; Future; Expected date: 06/29/2017  -     C-reactive protein; Future;  Expected date: 06/29/2017  -     Vitamin D; Future; Expected date: 06/29/2017  -     CBC auto differential; Future; Expected date: 06/29/2017  -     Comprehensive metabolic panel; Future; Expected date: 06/29/2017  -     Sedimentation rate, manual; Future; Expected date: 06/29/2017  -     C-reactive protein; Future; Expected date: 06/29/2017  -     Magnesium; Future; Expected date: 06/29/2017    PSA (psoriatic arthritis)  -     dicyclomine (BENTYL) 20 mg tablet; Take 1 tablet (20 mg total) by mouth every 6 (six) hours.  Dispense: 120 tablet; Refill: 3  -     ketorolac injection 60 mg; Inject 2 mLs (60 mg total) into the muscle one time.  -     methylPREDNISolone acetate injection 160 mg; Inject 2 mLs (160 mg total) into the muscle one time.  -     CBC auto differential; Future; Expected date: 06/29/2017  -     Comprehensive metabolic panel; Future; Expected date: 06/29/2017  -     Sedimentation rate, manual; Future; Expected date: 06/29/2017  -     C-reactive protein; Future; Expected date: 06/29/2017  -     Vitamin D; Future; Expected date: 06/29/2017  -     CBC auto differential; Future; Expected date: 06/29/2017  -     Comprehensive metabolic panel; Future; Expected date: 06/29/2017  -     Sedimentation rate, manual; Future; Expected date: 06/29/2017  -     C-reactive protein; Future; Expected date: 06/29/2017  -     Magnesium; Future; Expected date: 06/29/2017    Colitis  -     dicyclomine (BENTYL) 20 mg tablet; Take 1 tablet (20 mg total) by mouth every 6 (six) hours.  Dispense: 120 tablet; Refill: 3  -     ketorolac injection 60 mg; Inject 2 mLs (60 mg total) into the muscle one time.  -     methylPREDNISolone acetate injection 160 mg; Inject 2 mLs (160 mg total) into the muscle one time.  -     CBC auto differential; Future; Expected date: 06/29/2017  -     Comprehensive metabolic panel; Future; Expected date: 06/29/2017  -     Sedimentation rate, manual; Future; Expected date: 06/29/2017  -     C-reactive  protein; Future; Expected date: 06/29/2017  -     Vitamin D; Future; Expected date: 06/29/2017  -     CBC auto differential; Future; Expected date: 06/29/2017  -     Comprehensive metabolic panel; Future; Expected date: 06/29/2017  -     Sedimentation rate, manual; Future; Expected date: 06/29/2017  -     C-reactive protein; Future; Expected date: 06/29/2017  -     Magnesium; Future; Expected date: 06/29/2017    Diarrhea, unspecified type  -     dicyclomine (BENTYL) 20 mg tablet; Take 1 tablet (20 mg total) by mouth every 6 (six) hours.  Dispense: 120 tablet; Refill: 3  -     ketorolac injection 60 mg; Inject 2 mLs (60 mg total) into the muscle one time.  -     methylPREDNISolone acetate injection 160 mg; Inject 2 mLs (160 mg total) into the muscle one time.  -     CBC auto differential; Future; Expected date: 06/29/2017  -     Comprehensive metabolic panel; Future; Expected date: 06/29/2017  -     Sedimentation rate, manual; Future; Expected date: 06/29/2017  -     C-reactive protein; Future; Expected date: 06/29/2017  -     Vitamin D; Future; Expected date: 06/29/2017  -     CBC auto differential; Future; Expected date: 06/29/2017  -     Comprehensive metabolic panel; Future; Expected date: 06/29/2017  -     Sedimentation rate, manual; Future; Expected date: 06/29/2017  -     C-reactive protein; Future; Expected date: 06/29/2017  -     Magnesium; Future; Expected date: 06/29/2017    Other orders  -     butalbital-acetaminophen (BUPAP)  mg Tab; Take 1 tablet by mouth every 4 (four) hours.  Dispense: 60 tablet; Refill: 3  -     hydrocodone-acetaminophen 7.5-325mg (NORCO) 7.5-325 mg per tablet; Take 1 tablet by mouth every 8 (eight) hours as needed for Pain.  Dispense: 60 tablet; Refill: 0     dehydrate  Will start Humira once cleared.

## 2017-07-06 ENCOUNTER — TELEPHONE (OUTPATIENT)
Dept: RHEUMATOLOGY | Facility: CLINIC | Age: 47
End: 2017-07-06

## 2017-07-06 NOTE — TELEPHONE ENCOUNTER
----- Message from Yosef COLES Alberreymundo sent at 7/6/2017  2:29 PM CDT -----  Contact: Sonia[facility]  Sonia called in regarding the attached patient and regarding a pre auth for patients [facility] Rx. Office notes along with medical need forms and also other meds that patient had tried first must be sent over.  Fax number is: [phone] and call back number is: [phone], reference number [id number]

## 2017-07-07 DIAGNOSIS — N30.10 IC (INTERSTITIAL CYSTITIS): ICD-10-CM

## 2017-07-07 RX ORDER — METHENAMINE, SODIUM PHOSPHATE, MONOBASIC, MONOHYDRATE, PHENYL SALICYLATE, METHYLENE BLUE, AND HYOSCYAMINE SULFATE 118; 40.8; 36; 10; .12 MG/1; MG/1; MG/1; MG/1; MG/1
CAPSULE ORAL
Qty: 120 CAPSULE | Refills: 0 | Status: SHIPPED | OUTPATIENT
Start: 2017-07-07 | End: 2019-05-24 | Stop reason: SDUPTHER

## 2017-07-10 ENCOUNTER — TELEPHONE (OUTPATIENT)
Dept: RHEUMATOLOGY | Facility: CLINIC | Age: 47
End: 2017-07-10

## 2017-07-10 NOTE — TELEPHONE ENCOUNTER
Fadumo with dain states patient's insurance will not approve any self injectables. Patient will qualify for free drug. Patient needs to fill out [employer]. LVM for patient to return clinic's call.

## 2017-07-10 NOTE — TELEPHONE ENCOUNTER
----- Message from Ruthy Merida sent at 7/7/2017  2:43 PM CDT -----  Contact: Fadumo/Lisha Thorne is requesting to speak to the nurse regarding pt's medication. Pls call Fadumo back at [phone].

## 2017-07-24 ENCOUNTER — TELEPHONE (OUTPATIENT)
Dept: RHEUMATOLOGY | Facility: CLINIC | Age: 47
End: 2017-07-24

## 2017-07-24 RX ORDER — ALOSETRON HYDROCHLORIDE 0.5 MG/1
TABLET ORAL
Qty: 60 TABLET | Refills: 0 | Status: SHIPPED | OUTPATIENT
Start: 2017-07-24 | End: 2017-08-28 | Stop reason: SDUPTHER

## 2017-07-24 NOTE — TELEPHONE ENCOUNTER
Attempted to contact pt regarding safety net foundation. Left message for pt to call office or [facility] support nurse to sign up for safety net foundation.

## 2017-07-24 NOTE — TELEPHONE ENCOUNTER
Returned med management call. Theresa was unavailable. Spoke with Gertrude. Unable to locate information needed and reason for call to office. Nurse called kyra at [facility] support. Pt medication not covered. Pt needs to be enrolled in safety net program for medication. Will contact pt and inform.

## 2017-07-24 NOTE — TELEPHONE ENCOUNTER
----- Message from Brenna Pineda sent at 7/24/2017 11:10 AM CDT -----  Contact: Theresa with [facility] [phone]  Theresa with [facility] [phone]  requesting a call from nurse need an update on injection medication, need medication and frequency   Reference #[id number]

## 2017-08-04 DIAGNOSIS — N30.10 INTERSTITIAL CYSTITIS: ICD-10-CM

## 2017-08-04 DIAGNOSIS — R39.89 BLADDER PAIN: ICD-10-CM

## 2017-08-04 DIAGNOSIS — R10.2 PELVIC PAIN IN FEMALE: ICD-10-CM

## 2017-08-04 DIAGNOSIS — N30.10 IC (INTERSTITIAL CYSTITIS): ICD-10-CM

## 2017-08-04 RX ORDER — TRAMADOL HYDROCHLORIDE 50 MG/1
TABLET ORAL
Qty: 60 TABLET | Refills: 3 | Status: SHIPPED | OUTPATIENT
Start: 2017-08-04 | End: 2018-01-10 | Stop reason: SDUPTHER

## 2017-08-04 RX ORDER — HYDROXYCHLOROQUINE SULFATE 200 MG/1
TABLET, FILM COATED ORAL
Qty: 60 TABLET | Refills: 3 | Status: SHIPPED | OUTPATIENT
Start: 2017-08-04 | End: 2018-01-27 | Stop reason: SDUPTHER

## 2017-08-04 RX ORDER — TIZANIDINE 4 MG/1
TABLET ORAL
Qty: 90 TABLET | Refills: 3 | Status: SHIPPED | OUTPATIENT
Start: 2017-08-04 | End: 2018-01-10 | Stop reason: SDUPTHER

## 2017-08-07 RX ORDER — DIAZEPAM 5 MG/1
TABLET ORAL
Qty: 60 TABLET | Refills: 0 | Status: SHIPPED | OUTPATIENT
Start: 2017-08-07 | End: 2018-03-14

## 2017-08-07 RX ORDER — OXYBUTYNIN CHLORIDE 5 MG/1
TABLET ORAL
Qty: 60 TABLET | Refills: 0 | Status: SHIPPED | OUTPATIENT
Start: 2017-08-07 | End: 2017-11-15

## 2017-08-07 RX ORDER — HYDROXYZINE HYDROCHLORIDE 25 MG/1
TABLET, FILM COATED ORAL
Qty: 30 TABLET | Refills: 0 | Status: SHIPPED | OUTPATIENT
Start: 2017-08-07 | End: 2018-02-12 | Stop reason: SDUPTHER

## 2017-08-10 DIAGNOSIS — E03.9 HYPOTHYROIDISM, UNSPECIFIED TYPE: ICD-10-CM

## 2017-08-11 ENCOUNTER — TELEPHONE (OUTPATIENT)
Dept: GASTROENTEROLOGY | Facility: CLINIC | Age: 47
End: 2017-08-11

## 2017-08-11 ENCOUNTER — PATIENT MESSAGE (OUTPATIENT)
Dept: RHEUMATOLOGY | Facility: CLINIC | Age: 47
End: 2017-08-11

## 2017-08-11 RX ORDER — LEVOTHYROXINE SODIUM 50 UG/1
TABLET ORAL
Qty: 30 TABLET | Refills: 3 | Status: SHIPPED | OUTPATIENT
Start: 2017-08-11 | End: 2018-01-10 | Stop reason: SDUPTHER

## 2017-08-11 RX ORDER — CYCLOBENZAPRINE HCL 10 MG
10 TABLET ORAL 2 TIMES DAILY
Qty: 90 TABLET | Refills: 3 | Status: SHIPPED | OUTPATIENT
Start: 2017-08-11 | End: 2018-01-10 | Stop reason: SDUPTHER

## 2017-08-11 NOTE — TELEPHONE ENCOUNTER
----- Message from Caron Santana MA sent at 8/11/2017  3:24 PM CDT -----  Contact: self  [phone]  States she is calling to find out if she can see Dr. He sooner due to fecal incontinence.  States she was in the hospital due to colitis.

## 2017-08-14 ENCOUNTER — TELEPHONE (OUTPATIENT)
Dept: PHARMACY | Facility: CLINIC | Age: 47
End: 2017-08-14

## 2017-08-15 ENCOUNTER — TELEPHONE (OUTPATIENT)
Dept: PHARMACY | Facility: CLINIC | Age: 47
End: 2017-08-15

## 2017-08-15 ENCOUNTER — PATIENT MESSAGE (OUTPATIENT)
Dept: RHEUMATOLOGY | Facility: CLINIC | Age: 47
End: 2017-08-15

## 2017-08-15 ENCOUNTER — TELEPHONE (OUTPATIENT)
Dept: GASTROENTEROLOGY | Facility: CLINIC | Age: 47
End: 2017-08-15

## 2017-08-15 DIAGNOSIS — R19.7 DIARRHEA, UNSPECIFIED TYPE: Primary | ICD-10-CM

## 2017-08-15 DIAGNOSIS — L40.50 PSA (PSORIATIC ARTHRITIS): Primary | ICD-10-CM

## 2017-08-15 NOTE — TELEPHONE ENCOUNTER
Good afternoon,    We are currently working on the approval of Ms. Brigette Arcos; however, there is no up to date TB on file. Is there anyway she can be scheduled for this test in the near future?     Thanks,   Viviane Jara, PharmD  [facility]

## 2017-08-15 NOTE — TELEPHONE ENCOUNTER
Please zachary pt we need a TB test to get her meds approved and ask if she has had one and where are the results, last Tb test was 6/16/16. We need to repeat

## 2017-08-15 NOTE — TELEPHONE ENCOUNTER
Ma spoke with pt and she states that shes wearing diapers and have liquid diarrhea . Pt states shes taking Lotronex with imodium (not everyday). Pt states she weak not getting any nutrition when she eats she vomits , was given pain meds norco  Please advise

## 2017-08-15 NOTE — TELEPHONE ENCOUNTER
----- Message from Marilee Du sent at 8/15/2017  3:22 PM CDT -----  Contact: self   Neri - returning your phone call - please call patient at

## 2017-08-16 ENCOUNTER — PATIENT MESSAGE (OUTPATIENT)
Dept: RHEUMATOLOGY | Facility: CLINIC | Age: 47
End: 2017-08-16

## 2017-08-28 RX ORDER — PROMETHAZINE HYDROCHLORIDE 25 MG/1
TABLET ORAL
Qty: 60 TABLET | Refills: 0 | Status: SHIPPED | OUTPATIENT
Start: 2017-08-28 | End: 2018-01-11 | Stop reason: SDUPTHER

## 2017-08-28 RX ORDER — ALOSETRON HYDROCHLORIDE 0.5 MG/1
TABLET ORAL
Qty: 60 TABLET | Refills: 0 | Status: ON HOLD | OUTPATIENT
Start: 2017-08-28 | End: 2017-10-05

## 2017-09-12 ENCOUNTER — OFFICE VISIT (OUTPATIENT)
Dept: GASTROENTEROLOGY | Facility: CLINIC | Age: 47
End: 2017-09-12
Payer: COMMERCIAL

## 2017-09-12 ENCOUNTER — HOSPITAL ENCOUNTER (EMERGENCY)
Facility: HOSPITAL | Age: 47
Discharge: HOME | End: 2017-09-12
Attending: EMERGENCY MEDICINE
Payer: COMMERCIAL

## 2017-09-12 VITALS
TEMPERATURE: 99 F | HEIGHT: 59 IN | SYSTOLIC BLOOD PRESSURE: 137 MMHG | HEART RATE: 78 BPM | WEIGHT: 150 LBS | OXYGEN SATURATION: 99 % | BODY MASS INDEX: 30.24 KG/M2 | DIASTOLIC BLOOD PRESSURE: 78 MMHG | RESPIRATION RATE: 18 BRPM

## 2017-09-12 VITALS
HEART RATE: 84 BPM | DIASTOLIC BLOOD PRESSURE: 73 MMHG | SYSTOLIC BLOOD PRESSURE: 108 MMHG | WEIGHT: 154.56 LBS | BODY MASS INDEX: 31.16 KG/M2 | HEIGHT: 59 IN

## 2017-09-12 DIAGNOSIS — R42 LIGHTHEADEDNESS: ICD-10-CM

## 2017-09-12 DIAGNOSIS — R53.1 WEAKNESS GENERALIZED: ICD-10-CM

## 2017-09-12 DIAGNOSIS — E86.0 DEHYDRATION: Primary | ICD-10-CM

## 2017-09-12 DIAGNOSIS — K58.0 IRRITABLE BOWEL SYNDROME WITH DIARRHEA: Primary | ICD-10-CM

## 2017-09-12 DIAGNOSIS — R11.0 NAUSEA: ICD-10-CM

## 2017-09-12 LAB
ALBUMIN SERPL BCP-MCNC: 3.9 G/DL
ALP SERPL-CCNC: 96 U/L
ALT SERPL W/O P-5'-P-CCNC: 13 U/L
ANION GAP SERPL CALC-SCNC: 11 MMOL/L
AST SERPL-CCNC: 13 U/L
BASOPHILS # BLD AUTO: 0.04 K/UL
BASOPHILS NFR BLD: 0.4 %
BILIRUB SERPL-MCNC: 0.4 MG/DL
BILIRUB UR QL STRIP: NEGATIVE
BUN SERPL-MCNC: 17 MG/DL
CALCIUM SERPL-MCNC: 9.3 MG/DL
CHLORIDE SERPL-SCNC: 102 MMOL/L
CLARITY UR REFRACT.AUTO: ABNORMAL
CO2 SERPL-SCNC: 28 MMOL/L
COLOR UR AUTO: ABNORMAL
CREAT SERPL-MCNC: 0.8 MG/DL
DIFFERENTIAL METHOD: ABNORMAL
EOSINOPHIL # BLD AUTO: 0.2 K/UL
EOSINOPHIL NFR BLD: 1.7 %
ERYTHROCYTE [DISTWIDTH] IN BLOOD BY AUTOMATED COUNT: 13.8 %
ERYTHROCYTE [SEDIMENTATION RATE] IN BLOOD BY WESTERGREN METHOD: 19 MM/HR
EST. GFR  (AFRICAN AMERICAN): >60 ML/MIN/1.73 M^2
EST. GFR  (NON AFRICAN AMERICAN): >60 ML/MIN/1.73 M^2
GLUCOSE SERPL-MCNC: 100 MG/DL
GLUCOSE UR QL STRIP: NEGATIVE
HCT VFR BLD AUTO: 36.2 %
HGB BLD-MCNC: 12.5 G/DL
HGB UR QL STRIP: NEGATIVE
KETONES UR QL STRIP: NEGATIVE
LEUKOCYTE ESTERASE UR QL STRIP: NEGATIVE
LYMPHOCYTES # BLD AUTO: 2.7 K/UL
LYMPHOCYTES NFR BLD: 27.5 %
MCH RBC QN AUTO: 28.8 PG
MCHC RBC AUTO-ENTMCNC: 34.5 G/DL
MCV RBC AUTO: 83 FL
MONOCYTES # BLD AUTO: 0.8 K/UL
MONOCYTES NFR BLD: 7.8 %
NEUTROPHILS # BLD AUTO: 6.1 K/UL
NEUTROPHILS NFR BLD: 62.3 %
NITRITE UR QL STRIP: NEGATIVE
PH UR STRIP: 5 [PH] (ref 5–8)
PLATELET # BLD AUTO: 302 K/UL
PMV BLD AUTO: 8.8 FL
POTASSIUM SERPL-SCNC: 3.8 MMOL/L
PROT SERPL-MCNC: 7.4 G/DL
PROT UR QL STRIP: NEGATIVE
RBC # BLD AUTO: 4.34 M/UL
SODIUM SERPL-SCNC: 141 MMOL/L
SP GR UR STRIP: 1.02 (ref 1–1.03)
URN SPEC COLLECT METH UR: ABNORMAL
UROBILINOGEN UR STRIP-ACNC: NEGATIVE EU/DL
WBC # BLD AUTO: 9.78 K/UL

## 2017-09-12 PROCEDURE — 99284 EMERGENCY DEPT VISIT MOD MDM: CPT | Mod: ,,, | Performed by: EMERGENCY MEDICINE

## 2017-09-12 PROCEDURE — 99284 EMERGENCY DEPT VISIT MOD MDM: CPT | Mod: 25

## 2017-09-12 PROCEDURE — 25000003 PHARM REV CODE 250: Performed by: EMERGENCY MEDICINE

## 2017-09-12 PROCEDURE — 63600175 PHARM REV CODE 636 W HCPCS: Performed by: EMERGENCY MEDICINE

## 2017-09-12 PROCEDURE — 81003 URINALYSIS AUTO W/O SCOPE: CPT

## 2017-09-12 PROCEDURE — 85025 COMPLETE CBC W/AUTO DIFF WBC: CPT

## 2017-09-12 PROCEDURE — 85651 RBC SED RATE NONAUTOMATED: CPT

## 2017-09-12 PROCEDURE — 3008F BODY MASS INDEX DOCD: CPT | Mod: S$GLB,,, | Performed by: PHYSICIAN ASSISTANT

## 2017-09-12 PROCEDURE — 96375 TX/PRO/DX INJ NEW DRUG ADDON: CPT

## 2017-09-12 PROCEDURE — 80053 COMPREHEN METABOLIC PANEL: CPT

## 2017-09-12 PROCEDURE — 99215 OFFICE O/P EST HI 40 MIN: CPT | Mod: S$GLB,,, | Performed by: PHYSICIAN ASSISTANT

## 2017-09-12 PROCEDURE — 99999 PR PBB SHADOW E&M-EST. PATIENT-LVL III: CPT | Mod: PBBFAC,,, | Performed by: PHYSICIAN ASSISTANT

## 2017-09-12 PROCEDURE — 96361 HYDRATE IV INFUSION ADD-ON: CPT

## 2017-09-12 PROCEDURE — 96374 THER/PROPH/DIAG INJ IV PUSH: CPT

## 2017-09-12 RX ORDER — DIPHENHYDRAMINE HYDROCHLORIDE 50 MG/ML
25 INJECTION INTRAMUSCULAR; INTRAVENOUS
Status: COMPLETED | OUTPATIENT
Start: 2017-09-12 | End: 2017-09-12

## 2017-09-12 RX ORDER — PROCHLORPERAZINE EDISYLATE 5 MG/ML
10 INJECTION INTRAMUSCULAR; INTRAVENOUS ONCE
Status: COMPLETED | OUTPATIENT
Start: 2017-09-12 | End: 2017-09-12

## 2017-09-12 RX ADMIN — DIPHENHYDRAMINE HYDROCHLORIDE 25 MG: 50 INJECTION, SOLUTION INTRAMUSCULAR; INTRAVENOUS at 07:09

## 2017-09-12 RX ADMIN — PROCHLORPERAZINE EDISYLATE 10 MG: 5 INJECTION INTRAMUSCULAR; INTRAVENOUS at 07:09

## 2017-09-12 RX ADMIN — SODIUM CHLORIDE, SODIUM LACTATE, POTASSIUM CHLORIDE, AND CALCIUM CHLORIDE 1000 ML: .6; .31; .03; .02 INJECTION, SOLUTION INTRAVENOUS at 07:09

## 2017-09-12 NOTE — PATIENT INSTRUCTIONS
"Avoid lactose (milk, cheese, and butter)    Look up the book called "Master you IBS"    Low Fodmap Diet    Fodmaps (fructo,oligo,mono saccharides and polyols) are contained in certain foods and can cause significant bloating in some people. Reducing these foods can reduce bloating. Start off by cutting out anything with high fructose corn syrup in the ingredients.        Foods suitable on a low-fodmap diet  fruit  banana, blueberry, boysenberry, canteloupe, cranberry, durian, grape,  grapefruit, honeydew melon, kiwifruit, lemon,lime, mandarin, orange,  passionfruit, pawpaw, raspberry, rhubarb, rockmelon, star anise,  strawberry, tangelo  vegetables  alfalfa, artichoke, bamboo shoots, bean shoots, bok sheryl,  carrot, celery, choko, sheryl sum, endive, angélica, green beans,  lettuce, olives, parsnip, potato, pumpkin, red capsicum (bell pepper),  silver beet, spinach, summer squash (yellow), swede, sweet potato, taro, tomato,  turnip, yam, zucchini   herbs  basil, chili, coriander, angélica, lemongrass,   marjoram, mint, oregano, parsley, rosemary, thyme  milk  lactose-free milk, oat milk*, rice milk, soy milk*  cheeses  hard cheeses, and brie and camembert  yoghurt  lactose-free varieties  ice-cream substitutes  gelati, sorbet  butter substitutes  olive oilgrain foods  cereals  gluten-free bread or cereal products  bread  100% spelt bread  Rice, oats, polenta, other  arrowroot, millet, psyllium, quinoa, sorgum, tapioca  sweeteners  sugar* (sucrose), glucose, artificial sweeteners not ending in -ol  honey substitutes  soto syrup*, maple syrup*, molasses, treacle  *small quantities  *check for additives  Note: if fruit is dried, eat in small quantities    excess fructose lactose fructans galactans polyols      Eliminate foods containing fodmaps  fruit  apple, apricot, avocado, blackberry, cherry, lychee, nashi, nectarine, peach, pear, plum,  prune, watermelon, oseas, tinned fruit in natural juice,   sweeteners  sorbitol " (420)  mannitol (421)  isomalt (953)  maltitol (965)  xylitol (967)  legumes  baked beans, chickpeas, kidney beans, lentils  vegetables  asparagus, beetroot, broccoli, brussels sprouts, cabbage, eggplant, fennel, garlic,  julito, okra, onion (all), shallots, spring onion, cauliflower, green capsicum (bell pepper), mushroom, sweet corn  cereals  wheat and rye, in large amounts eg. Bread, crackers, cookies, couscous, pasta  fruit  custard apple, persimmon, watermelon  miscellaneous  chicory, dandelion, inulin  sweeteners  fructose, high fructose corn syrup  large total fructose dose  concentrated fruit sources, large servings of fruit, dried fruit, fruit juice  honey  corn syrup, fruisana  milk  milk from cows, goats or sheep, custard, ice cream, yoghurt  cheeses  soft unripened cheeses eg. cottage, cream, mascarpone    Additional FODMAPs Diet Reading List    IBS-Free At Last!: Second Edition: Change Your Carbs, Change Your Life with the FODMAP Elimination Diet, 2 nd edition by Riya Sebastian MS, RD     2012 copyright; [employer]   ISBN: 646-6-6441676-2-1    The Complete Idiots Guide To Living Well With IBS by Teagan Evans RD, LDN  [facility], 2010    The FODMAPs Approach:--Minimize Consumption of Fermentable Carbs to Manage Functional Gut Disorder Symptoms  by Teagan Evans RD, LDN article found in  Todays Dietitian, vol. 12, no. 8, p. 30    The Inside Tract: Your Good Gut Guide To Great Digestive Health by Osbaldo Spain MD and Rosie Adler, MS, RD, LDN  2011 copyright, [employer]   ISBN: [phone]    List assembled by: Laura Wu MS, RD, LDN, CDE [facility]  Last Updated: 7/13/12

## 2017-09-12 NOTE — ED TRIAGE NOTES
Pt arrived to the ED with CC of dehydration and diarrhea. Pt states she has been battling this for the past several months. States there are days that she doesn't have diarrhea but those days she doesn't eat much. Pt reports dry skin and dry lips.

## 2017-09-12 NOTE — PROGRESS NOTES
[facility] Consultation Note    Reason for Consult:  The primary encounter diagnosis was Irritable bowel syndrome with diarrhea. Diagnoses of Weakness generalized, Lightheadedness, and Nausea were also pertinent to this visit.    PCP:   Nadeem Zheng       Referring MD:  Jacqueline Self  No address on file    HPI:  This is a 47 y.o. female here for evaluation of diarrhea  She is a patient of Dr. He with a Hx of IBS-D on lotronex  Her flex-sig revealed rectos-sigmoid colitis.    Today she says she was doing well for while until Jun 2017  Having severe post-prandial diarrhea  She says she had had to lie on the bathroom floor, because she felt too weak to keep getting up to go to the bathroom   Taking lontronex 1 tablet twice daily, an imodium as needed  Nausea with meals- - taking phergan daily  Today she says she feels dehydrated  Denies vomiting   + Lightheadded, dizziness, weakness  + Weight loss  Denies rectal bleeding    Hx of gastroparesis with abnormal gastric emptying study in 2004 - 70% retention at 90 minutes    Avoiding  dairy products    ROS:  Constitutional: No fevers, chills, No weight loss  ENT: No allergies  CV: No chest pain  Pulm: No cough, No shortness of breath  Ophtho: No vision changes  GI: see HPI  Derm: No rash  Heme: No lymphadenopathy, No bruising  MSK: No arthritis  : No dysuria, No hematuria  Endo: No hot or cold intolerance  Neuro: No syncope, No seizure  Psych: No anxiety, No depression    Medical History:  has a past medical history of Acid reflux; Allergy; Alopecia; Anxiety; Arthralgia; Back pain; Depression; Dry eyes; Fever blister; Fibromyalgia; Interstitial cystitis; Irritable bowel syndrome; Kidney stone; Major depressive disorder, recurrent episode, in partial or unspecified remission (6/25/2013); OAB (overactive bladder) (7/21/2015); PTSD (post-traumatic stress disorder); Rheumatoid arthritis; Systemic lupus erythematosus; Thyroid disease;  Urinary tract infection; and Vaginal infection.    Surgical History:  has a past surgical history that includes Partial hysterectomy;  section, low transverse; Hysterectomy; Bladder surgery; Eye surgery; Colonoscopy; and Esophagogastroduodenoscopy.    Family History: family history includes Fibromyalgia in her sister; Irritable bowel syndrome in her mother, sister, and sister; Lupus in her sister; Rheum arthritis in her sister..     Social History:  reports that she has never smoked. She has never used smokeless tobacco. She reports that she does not drink alcohol or use drugs.    Review of patient's allergies indicates:   Allergen Reactions    Cephalexin Itching    Zofran [ondansetron hcl (pf)] Hives    Penicillins Rash       Current Outpatient Prescriptions on File Prior to Visit   Medication Sig Dispense Refill    adalimumab (HUMIRA PEN CROHN'S-UC-HS START) PnKt injection Inject 0.8 mLs (40 mg total) into the skin every 14 (fourteen) days. 1.6 mL 11    alosetron (LOTRONEX) 0.5 MG tablet TAKE 1 TABLET(0.5 MG) BY MOUTH TWICE DAILY 60 tablet 0    alosetron (LOTRONEX) 0.5 MG tablet TAKE 1 TABLET(0.5 MG) BY MOUTH TWICE DAILY 60 tablet 0    amitriptyline (ELAVIL) 10 MG tablet Take 1 tablet (10 mg total) by mouth nightly as needed. (Patient taking differently: Take 10 mg by mouth nightly as needed for Insomnia. ) 30 tablet 11    butalbital-acetaminophen (BUPAP)  mg Tab Take 1 tablet by mouth every 4 (four) hours. 60 tablet 3    clonazePAM (KLONOPIN) 1 MG tablet Take 1 tablet (1 mg total) by mouth 3 (three) times daily. 90 tablet 3    cyclobenzaprine (FLEXERIL) 10 MG tablet Take 1 tablet (10 mg total) by mouth 2 (two) times daily. 90 tablet 3    diazePAM (VALIUM) 5 MG tablet INSERT 1 TABLET EITHER CRUSHED/INTACT INSIDE THE VAGINA AT NIGHT AS NEEDED FOR PELVIC PAIN& SPASM 60 tablet 0    fluoxetine (PROZAC) 40 MG capsule Take 40 mg by mouth once daily.      gabapentin (NEURONTIN) 800 MG tablet  "Take 1 tablet (800 mg total) by mouth 3 (three) times daily. 90 tablet 11    hydroxychloroquine (PLAQUENIL) 200 mg tablet TAKE 1 TABLET BY MOUTH TWICE DAILY 60 tablet 3    hydrOXYzine HCl (ATARAX) 25 MG tablet TAKE 1 TABLET(25 MG) BY MOUTH EVERY EVENING 30 tablet 0    ibuprofen-famotidine (DUEXIS) 800-26.6 mg Tab Take 1 tablet by mouth 3 (three) times daily. (Patient taking differently: Take 1 tablet by mouth 3 (three) times daily as needed (for pain). ) 90 tablet 11    levothyroxine (SYNTHROID) 50 MCG tablet TAKE 1 TABLET(50 MCG) BY MOUTH EVERY DAY 30 tablet 3    methen-m.blue-s.phos-phsal-hyo (URIBEL) 118-10-40.8-36 mg Cap Take 1 capsule by mouth 4 (four) times daily. 120 capsule 3    multivitamin with minerals tablet Take 1 tablet by mouth once daily.      oxybutynin (DITROPAN) 5 MG Tab TAKE 1 TABLET BY MOUTH TWICE DAILY 60 tablet 0    promethazine (PHENERGAN) 25 MG tablet TAKE 1 TABLET(25 MG) BY MOUTH EVERY 6 HOURS AS NEEDED 60 tablet 0    rabeprazole (ACIPHEX) 20 mg tablet Take 1 tablet (20 mg total) by mouth once daily. 30 tablet 11    tizanidine (ZANAFLEX) 4 MG tablet TAKE 1 TABLET(4 MG) BY MOUTH EVERY 8 HOURS 90 tablet 3    tramadol (ULTRAM) 50 mg tablet TAKE 1 TABLET BY MOUTH EVERY 12 HOURS AS NEEDED FOR PAIN 60 tablet 3    trazodone (DESYREL) 50 MG tablet TAKE 1/2  TABLET BY MOUTH EVERY NIGHT AT BEDTIME AS NEEDED FOR INSOMNIA 60 tablet 2    triamcinolone acetonide 0.1% (KENALOG) 0.1 % cream APPLY TO THE AFFECTED AREA TWICE DAILY 454 g 0    URIBEL 118-10-40.8-36 mg Cap TAKE 1 CAPSULE BY MOUTH FOUR TIMES DAILY 120 capsule 0     No current facility-administered medications on file prior to visit.          Objective Findings:    Vital Signs:  /73   Pulse 84   Ht 4' 11" (1.499 m)   Wt 70.1 kg (154 lb 8.7 oz)   LMP  (LMP Unknown)   BMI 31.21 kg/m²   Body mass index is 31.21 kg/m².    Physical Exam:  General Appearance: Well appearing in no acute distress  Head:   Normocephalic, without " "obvious abnormality  Eyes:    No scleral icterus  ENT: Neck supple, Lips, mucosa, and tongue normal  Lungs: CTA bilaterally in anterior and posterior fields, no wheezes, no crackles.  Heart:  Regular rate and rhythm, S1, S2 normal, no murmurs heard  Abdomen: Soft, mild lower abdominal tenderness, non distended with positive bowel sounds in all four quadrants. No guarding or rebound tenderness  Extremities: no edema  Skin: No rash  Neurologic: AAO x 3      Labs:  Lab Results   Component Value Date    WBC 9.78 09/12/2017    HGB 12.5 09/12/2017    HCT 36.2 (L) 09/12/2017     09/12/2017    CHOL 221 (H) 01/07/2009    TRIG 217 (H) 01/07/2009    HDL 51 01/07/2009    ALT 13 09/12/2017    AST 13 09/12/2017     09/12/2017    K 3.8 09/12/2017     09/12/2017    CREATININE 0.8 09/12/2017    BUN 17 09/12/2017    CO2 28 09/12/2017    TSH 0.655 06/09/2017    INR 1.0 06/10/2017       Imaging:    Endoscopy:    flex-sig revealed rectos-sigmoid colitis - non specific focal cryptisis  Assessment:  1. Irritable bowel syndrome with diarrhea    2. Weakness generalized    3. Lightheadedness    4. Nausea      46yo F with hx of IBS-D. She was doing well for a while until 3 months ago. Now having severe post-prandial diarrhea,.      Pt is exhibiting signs of dehydration. She was advised to go to the ED for IV fluids    Recommendations:  1. Stool studies to rule out infection and inflammation and malabsorption    2. HIDA scan to rule out biliary dyskinesia    3. EGD to rule out ulcers    4. Colonoscopy with visualization of the terminal ileum to rule out colitis and confirm recto-sigmoid mucosal healing     Avoid lactose (milk, cheese, and butter)    Look up the book called "Master you IBS." IBS info given    Return in about 6 weeks (around 10/24/2017). with Dr. He after her EGD/colon. Will message him for approval of follow up visit      Order summary:  Orders Placed This Encounter    Clostridium difficile EIA    Stool " culture    NM Hepatobiliary Imaging with GB (HIDA)    WBC, Stool    Stool Exam-Ova,Cysts,Parasites    Stool Exam-Ova,Cysts,Parasites    Stool Exam-Ova,Cysts,Parasites    Giardia / Cryptosporidum, EIA    Giardia / Cryptosporidum, EIA    Fecal fat, qualitative    Case request GI: COLONOSCOPY, ESOPHAGOGASTRODUODENOSCOPY (EGD)         Thank you so much for allowing me to participate in the care of Marilee Simons    Shayne Anna PA-C

## 2017-09-12 NOTE — ED PROVIDER NOTES
Encounter Date: 2017    SCRIBE #1 NOTE: I, Leslie Li, am scribing for, and in the presence of,  Dr. Barth. I have scribed the entire note.       History     Chief Complaint   Patient presents with    Dehydration     N/D.      Time seen by provider: 6:29 PM    This is a 47 y.o. female with PM Hx of IBS, interstitial cystitis, anxiety, GERD, Lupus, rheumatoid arthritis, and fibromyalgia who presents with complaint of intractable diarrhea. Pt is followed by gastroenterology and was recently admitted in  for diarrhea and blood in stool. Presented to clinic visit today and was referred ED for further evaluation and treatment. Pt c/o nausea, vomiting (exacerbated by PO intake), abdominal pain, HA, and generalized weakness. She denies blood in stool or any other symptoms at this time.       The history is provided by the patient and medical records.     Review of patient's allergies indicates:   Allergen Reactions    Cephalexin Itching    Zofran [ondansetron hcl (pf)] Hives    Penicillins Rash     Past Medical History:   Diagnosis Date    Acid reflux     Allergy     Alopecia     Anxiety     Arthralgia     Back pain     Depression     Dry eyes     from meds    Fever blister     Fibromyalgia     Interstitial cystitis     Irritable bowel syndrome     Kidney stone     Major depressive disorder, recurrent episode, in partial or unspecified remission 2013    OAB (overactive bladder) 2015    PTSD (post-traumatic stress disorder)     Rheumatoid arthritis     Systemic lupus erythematosus     Thyroid disease     Urinary tract infection     Vaginal infection      Past Surgical History:   Procedure Laterality Date    BLADDER SURGERY       SECTION, LOW TRANSVERSE      x 2    COLONOSCOPY      ESOPHAGOGASTRODUODENOSCOPY      EYE SURGERY      Lasik-bilateral    HYSTERECTOMY      PARTIAL HYSTERECTOMY       Family History   Problem Relation Age of Onset    Irritable bowel  syndrome Mother     Irritable bowel syndrome Sister     Lupus Sister     Rheum arthritis Sister     Fibromyalgia Sister     Irritable bowel syndrome Sister     Celiac disease Neg Hx     Cirrhosis Neg Hx     Colon cancer Neg Hx     Colon polyps Neg Hx     Crohn's disease Neg Hx     Cystic fibrosis Neg Hx     Esophageal cancer Neg Hx     Hemochromatosis Neg Hx     Inflammatory bowel disease Neg Hx     Liver cancer Neg Hx     Liver disease Neg Hx     Rectal cancer Neg Hx     Stomach cancer Neg Hx     Ulcerative colitis Neg Hx     Boris's disease Neg Hx     Melanoma Neg Hx      Social History   Substance Use Topics    Smoking status: Never Smoker    Smokeless tobacco: Never Used    Alcohol use No     Review of Systems   Constitutional: Negative for fever.   HENT: Negative for congestion.    Eyes: Negative for visual disturbance.   Respiratory: Negative for shortness of breath.    Cardiovascular: Negative for chest pain.   Gastrointestinal: Positive for diarrhea, nausea and vomiting. Negative for blood in stool.   Genitourinary: Negative for difficulty urinating.   Musculoskeletal: Negative for back pain.   Skin: Negative for rash.   Neurological: Positive for weakness (generalized) and headaches.       Physical Exam     Initial Vitals [09/12/17 1625]   BP Pulse Resp Temp SpO2   119/76 86 16 98.5 °F (36.9 °C) 97 %      MAP       90.33         Physical Exam    Nursing note and vitals reviewed.  HENT:   Head: Normocephalic and atraumatic.   Mouth/Throat: Oropharynx is clear and moist.   Eyes: Conjunctivae and EOM are normal. Pupils are equal, round, and reactive to light.   Neck: Neck supple. No thyromegaly present.   Cardiovascular: Regular rhythm. Exam reveals no gallop.    No murmur heard.  Pulmonary/Chest: Breath sounds normal. No respiratory distress. She has no wheezes. She has no rhonchi. She has no rales.   Abdominal: Soft. Bowel sounds are normal. She exhibits no mass. There is no  tenderness.   Musculoskeletal: She exhibits no edema or tenderness.   Extremities: Pulses 2 +   Lymphadenopathy:     She has no cervical adenopathy.   Neurological: She is alert and oriented to person, place, and time. She has normal strength. No cranial nerve deficit or sensory deficit.   Gross motor and sensory intact.    Skin: Skin is warm and dry. There is pallor (slightly).   Normal turgor.          ED Course   Procedures  Labs Reviewed   CBC W/ AUTO DIFFERENTIAL - Abnormal; Notable for the following:        Result Value    Hematocrit 36.2 (*)     MPV 8.8 (*)     All other components within normal limits   URINALYSIS - Abnormal; Notable for the following:     Color, UA Green (*)     Appearance, UA Hazy (*)     All other components within normal limits   COMPREHENSIVE METABOLIC PANEL   SEDIMENTATION RATE, MANUAL   sed rate normal at 19.          Medical Decision Making:   History:   Old Medical Records: I decided to obtain old medical records.  Differential Diagnosis:   Initial considerations based upon present complaint include but are not limited to dehydration, metabolic abnormality, and exacerbation of autoimmune disease.   Clinical Tests:   Lab Tests: Ordered and Reviewed  ED Management:  8:10 PM  CBC normal. Chemistry panel is normal. UA shows specific gravity of 1.025 indicating dehydration. Creatinine 0.08. BUN mildly elevated up to 17 from what it has been in the past, again consistent with mild dehydration.     8:27 PM  Pt feeling significantly improved. Will discharge home to continue medications and will call tomorrow if sedimentation rate is markedly abnormal. Otherwise pt is to follow up with GI doctors and PCP.             Scribe Attestation:   Scribe #1: I performed the above scribed service and the documentation accurately describes the services I performed. I attest to the accuracy of the note.    Attending Attestation:           Physician Attestation for Scribe:  Physician Attestation  Statement for Scribe #1: I, Dr. Barth, reviewed documentation, as scribed by Leslie Li in my presence, and it is both accurate and complete.                 ED Course      Clinical Impression:   The encounter diagnosis was Dehydration.    Disposition:   Disposition: Discharged  Condition: Stable                        Frederick Barth MD  09/14/17 0003

## 2017-09-13 NOTE — DISCHARGE INSTRUCTIONS
Our goal in the emergency department is to always give you outstanding care and exceptional service. You may receive a survey by mail or e-mail in the next week regarding your experience in our ED. We would greatly appreciate your completing and returning the survey. Your feedback provides us with a way to recognize our staff who give very good care and it helps us learn how to improve when your experience was below our aspiration of excellence.     Increase your fluid intake.  Avoid milk or milk products.    Follow up with your GI doctors and your primary care physician if you start feeling worse.

## 2017-09-20 ENCOUNTER — TELEPHONE (OUTPATIENT)
Dept: RADIOLOGY | Facility: HOSPITAL | Age: 47
End: 2017-09-20

## 2017-09-21 ENCOUNTER — HOSPITAL ENCOUNTER (OUTPATIENT)
Dept: RADIOLOGY | Facility: HOSPITAL | Age: 47
Discharge: HOME | End: 2017-09-21
Attending: PHYSICIAN ASSISTANT
Payer: COMMERCIAL

## 2017-09-21 DIAGNOSIS — R11.0 NAUSEA: ICD-10-CM

## 2017-09-21 DIAGNOSIS — Z79.899 LONG TERM CURRENT USE OF IMMUNOSUPPRESSIVE DRUG: Primary | ICD-10-CM

## 2017-09-21 PROCEDURE — B4155 EF INCOMPLETE/MODULAR: HCPCS

## 2017-09-21 PROCEDURE — 78227 HEPATOBIL SYST IMAGE W/DRUG: CPT | Mod: 26,,, | Performed by: RADIOLOGY

## 2017-09-22 ENCOUNTER — OFFICE VISIT (OUTPATIENT)
Dept: GASTROENTEROLOGY | Facility: CLINIC | Age: 47
End: 2017-09-22
Payer: COMMERCIAL

## 2017-09-22 ENCOUNTER — TELEPHONE (OUTPATIENT)
Dept: GASTROENTEROLOGY | Facility: CLINIC | Age: 47
End: 2017-09-22

## 2017-09-22 VITALS
DIASTOLIC BLOOD PRESSURE: 74 MMHG | WEIGHT: 149.06 LBS | HEART RATE: 98 BPM | BODY MASS INDEX: 30.05 KG/M2 | SYSTOLIC BLOOD PRESSURE: 103 MMHG | HEIGHT: 59 IN

## 2017-09-22 DIAGNOSIS — K58.0 IRRITABLE BOWEL SYNDROME WITH DIARRHEA: Primary | ICD-10-CM

## 2017-09-22 DIAGNOSIS — R10.84 GENERALIZED ABDOMINAL PAIN: Primary | ICD-10-CM

## 2017-09-22 DIAGNOSIS — R19.7 ACUTE DIARRHEA: ICD-10-CM

## 2017-09-22 PROCEDURE — 99999 PR PBB SHADOW E&M-EST. PATIENT-LVL V: CPT | Mod: PBBFAC,,, | Performed by: PHYSICIAN ASSISTANT

## 2017-09-22 PROCEDURE — 3008F BODY MASS INDEX DOCD: CPT | Mod: S$GLB,,, | Performed by: PHYSICIAN ASSISTANT

## 2017-09-22 PROCEDURE — 99214 OFFICE O/P EST MOD 30 MIN: CPT | Mod: S$GLB,,, | Performed by: PHYSICIAN ASSISTANT

## 2017-09-22 RX ORDER — CIPROFLOXACIN 500 MG/1
500 TABLET ORAL 2 TIMES DAILY
Qty: 28 TABLET | Refills: 0 | Status: SHIPPED | OUTPATIENT
Start: 2017-09-22 | End: 2017-10-06

## 2017-09-22 RX ORDER — SODIUM, POTASSIUM,MAG SULFATES 17.5-3.13G
SOLUTION, RECONSTITUTED, ORAL ORAL
Qty: 1 BOTTLE | Refills: 0 | Status: ON HOLD | OUTPATIENT
Start: 2017-09-22 | End: 2017-10-05

## 2017-09-22 RX ORDER — METRONIDAZOLE 500 MG/1
500 TABLET ORAL 3 TIMES DAILY
Qty: 42 TABLET | Refills: 0 | Status: SHIPPED | OUTPATIENT
Start: 2017-09-22 | End: 2017-10-06

## 2017-09-22 NOTE — PROGRESS NOTES
[facility] Consultation Note    Reason for Consult:  The primary encounter diagnosis was Generalized abdominal pain. A diagnosis of Acute diarrhea was also pertinent to this visit.    PCP:   Nadeem Zheng       Referring MD:  No referring provider defined for this encounter.    HPI:  This is a 47 y.o. female here for evaluation of diarrhea  Last seen in office 10 days ago for this issue  She has not turned in her stool studies  She reports that after her  HIDA scan she started feeling weak and nauseous  Took 2 trazadone  Woke up with fecal incontinence    HIDA scan was normal  9/12/17 consult  She is a patient of Dr. He with a Hx of IBS-D on lotronex  Her flex-sig revealed rectos-sigmoid colitis.    Today she says she was doing well for while until Jun 2017  Having severe post-prandial diarrhea  She says she had had to lie on the bathroom floor, because she felt too weak to keep getting up to go to the bathroom   Taking lontronex 1 tablet twice daily, an imodium as needed  Nausea with meals- - taking phergan daily  Today she says she feels dehydrated  Denies vomiting   + Lightheadded, dizziness, weakness  + Weight loss  Denies rectal bleeding    Hx of gastroparesis with abnormal gastric emptying study in 2004 - 70% retention at 90 minutes    Avoiding  dairy products    ROS:  Constitutional: No fevers, chills, No weight loss  ENT: No allergies  CV: No chest pain  Pulm: No cough, No shortness of breath  Ophtho: No vision changes  GI: see HPI  Derm: No rash  Heme: No lymphadenopathy, No bruising  MSK: No arthritis  : No dysuria, No hematuria  Endo: No hot or cold intolerance  Neuro: No syncope, No seizure  Psych: No anxiety, No depression    Medical History:  has a past medical history of Acid reflux; Allergy; Alopecia; Anxiety; Arthralgia; Back pain; Depression; Dry eyes; Fever blister; Fibromyalgia; Interstitial cystitis; Irritable bowel syndrome; Kidney stone; Major depressive  disorder, recurrent episode, in partial or unspecified remission (2013); OAB (overactive bladder) (2015); PTSD (post-traumatic stress disorder); Rheumatoid arthritis; Systemic lupus erythematosus; Thyroid disease; Urinary tract infection; and Vaginal infection.    Surgical History:  has a past surgical history that includes Partial hysterectomy;  section, low transverse; Hysterectomy; Bladder surgery; Eye surgery; Colonoscopy; and Esophagogastroduodenoscopy.    Family History: family history includes Fibromyalgia in her sister; Irritable bowel syndrome in her mother, sister, and sister; Lupus in her sister; Rheum arthritis in her sister..     Social History:  reports that she has never smoked. She has never used smokeless tobacco. She reports that she does not drink alcohol or use drugs.    Review of patient's allergies indicates:   Allergen Reactions    Cephalexin Itching    Zofran [ondansetron hcl (pf)] Hives    Penicillins Rash       Current Outpatient Prescriptions on File Prior to Visit   Medication Sig Dispense Refill    adalimumab (HUMIRA PEN CROHN'S-UC-HS START) PnKt injection Inject 0.8 mLs (40 mg total) into the skin every 14 (fourteen) days. 1.6 mL 11    alosetron (LOTRONEX) 0.5 MG tablet TAKE 1 TABLET(0.5 MG) BY MOUTH TWICE DAILY 60 tablet 0    alosetron (LOTRONEX) 0.5 MG tablet TAKE 1 TABLET(0.5 MG) BY MOUTH TWICE DAILY 60 tablet 0    amitriptyline (ELAVIL) 10 MG tablet Take 1 tablet (10 mg total) by mouth nightly as needed. (Patient taking differently: Take 10 mg by mouth nightly as needed for Insomnia. ) 30 tablet 11    butalbital-acetaminophen (BUPAP)  mg Tab Take 1 tablet by mouth every 4 (four) hours. 60 tablet 3    clonazePAM (KLONOPIN) 1 MG tablet Take 1 tablet (1 mg total) by mouth 3 (three) times daily. 90 tablet 3    cyclobenzaprine (FLEXERIL) 10 MG tablet Take 1 tablet (10 mg total) by mouth 2 (two) times daily. 90 tablet 3    diazePAM (VALIUM) 5 MG tablet  "INSERT 1 TABLET EITHER CRUSHED/INTACT INSIDE THE VAGINA AT NIGHT AS NEEDED FOR PELVIC PAIN& SPASM 60 tablet 0    fluoxetine (PROZAC) 40 MG capsule Take 40 mg by mouth once daily.      gabapentin (NEURONTIN) 800 MG tablet Take 1 tablet (800 mg total) by mouth 3 (three) times daily. 90 tablet 11    hydroxychloroquine (PLAQUENIL) 200 mg tablet TAKE 1 TABLET BY MOUTH TWICE DAILY 60 tablet 3    hydrOXYzine HCl (ATARAX) 25 MG tablet TAKE 1 TABLET(25 MG) BY MOUTH EVERY EVENING 30 tablet 0    ibuprofen-famotidine (DUEXIS) 800-26.6 mg Tab Take 1 tablet by mouth 3 (three) times daily. (Patient taking differently: Take 1 tablet by mouth 3 (three) times daily as needed (for pain). ) 90 tablet 11    levothyroxine (SYNTHROID) 50 MCG tablet TAKE 1 TABLET(50 MCG) BY MOUTH EVERY DAY 30 tablet 3    methen-m.blue-s.phos-phsal-hyo (URIBEL) 118-10-40.8-36 mg Cap Take 1 capsule by mouth 4 (four) times daily. 120 capsule 3    multivitamin with minerals tablet Take 1 tablet by mouth once daily.      oxybutynin (DITROPAN) 5 MG Tab TAKE 1 TABLET BY MOUTH TWICE DAILY 60 tablet 0    promethazine (PHENERGAN) 25 MG tablet TAKE 1 TABLET(25 MG) BY MOUTH EVERY 6 HOURS AS NEEDED 60 tablet 0    rabeprazole (ACIPHEX) 20 mg tablet Take 1 tablet (20 mg total) by mouth once daily. 30 tablet 11    tizanidine (ZANAFLEX) 4 MG tablet TAKE 1 TABLET(4 MG) BY MOUTH EVERY 8 HOURS 90 tablet 3    tramadol (ULTRAM) 50 mg tablet TAKE 1 TABLET BY MOUTH EVERY 12 HOURS AS NEEDED FOR PAIN 60 tablet 3    trazodone (DESYREL) 50 MG tablet TAKE 1/2  TABLET BY MOUTH EVERY NIGHT AT BEDTIME AS NEEDED FOR INSOMNIA 60 tablet 2    triamcinolone acetonide 0.1% (KENALOG) 0.1 % cream APPLY TO THE AFFECTED AREA TWICE DAILY 454 g 0    URIBEL 118-10-40.8-36 mg Cap TAKE 1 CAPSULE BY MOUTH FOUR TIMES DAILY 120 capsule 0     No current facility-administered medications on file prior to visit.          Objective Findings:    Vital Signs:  /74   Pulse 98   Ht 4' 11" " (1.499 m)   Wt 67.6 kg (149 lb 0.5 oz)   LMP  (LMP Unknown)   BMI 30.10 kg/m²   Body mass index is 30.1 kg/m².    Physical Exam:  General Appearance: Well appearing in no acute distress  Head:   Normocephalic, without obvious abnormality  Eyes:    No scleral icterus  ENT: Neck supple, Lips, mucosa, and tongue normal  Lungs: CTA bilaterally in anterior and posterior fields, no wheezes, no crackles.  Heart:  Regular rate and rhythm, S1, S2 normal, no murmurs heard  Abdomen: Soft, mild epigastric abdominal tenderness, non distended with positive bowel sounds in all four quadrants. No guarding or rebound tenderness  Extremities: no edema  Skin: No rash  Neurologic: AAO x 3      Labs:  Lab Results   Component Value Date    WBC 9.78 09/12/2017    HGB 12.5 09/12/2017    HCT 36.2 (L) 09/12/2017     09/12/2017    CHOL 221 (H) 01/07/2009    TRIG 217 (H) 01/07/2009    HDL 51 01/07/2009    ALT 13 09/12/2017    AST 13 09/12/2017     09/12/2017    K 3.8 09/12/2017     09/12/2017    CREATININE 0.8 09/12/2017    BUN 17 09/12/2017    CO2 28 09/12/2017    TSH 0.655 06/09/2017    INR 1.0 06/10/2017       Imaging:    Endoscopy:    flex-sig revealed rectos-sigmoid colitis - non specific focal cryptisis  Assessment:  1. Generalized abdominal pain    2. Acute diarrhea      46yo F with hx of IBS-D. She was doing well for a while until 3 months ago. Now dyskinesia having severe post-prandial diarrhea,.       Recommendations:  1. Stool studies to rule out infection and inflammation and malabsorption    2. Start cipro/flagyl x 2 week after turning in stool studies    3. Schedule CT scan to rule out colitis    Return in about 6 weeks (around 11/3/2017). with Dr. He after her EGD/colon. Will message him for approval of follow up visit      Order summary:  Orders Placed This Encounter    CT Abdomen Pelvis With Contrast    ciprofloxacin HCl (CIPRO) 500 MG tablet    metronidazole (FLAGYL) 500 MG tablet         Thank  you so much for allowing me to participate in the care of Tiffanie Ronak Anna PA-C

## 2017-09-22 NOTE — TELEPHONE ENCOUNTER
----- Message from Marilee Du sent at 9/22/2017  8:11 AM CDT -----  Contact: self [phone]  Neri - had hida scan yesterday - has fecal incontinence, abdominal pain, nausea - should she go to er - please call patient at [phone]

## 2017-09-22 NOTE — TELEPHONE ENCOUNTER
----- Message from Caron Santana MA sent at 9/22/2017  8:38 AM CDT -----  Contact: self  [phone]    States after the test with nuclear medicine she is with pain, belching and fecal incontinence.  States she needs to know what to do and wants to know if she should go to the emergency room.

## 2017-09-22 NOTE — TELEPHONE ENCOUNTER
Spoke to Ms. Simons  -She reports new right sided abdominal pain 5/10 that is tender to the touch  -Reports nausea not relieved by 50 mg of phenergan (pt states she is allergic to Zofran)    Urgent appt made for pt with Shayne today at 2pm.  Pt verbalizes understanding.

## 2017-09-25 ENCOUNTER — TELEPHONE (OUTPATIENT)
Dept: GASTROENTEROLOGY | Facility: CLINIC | Age: 47
End: 2017-09-25

## 2017-09-25 NOTE — TELEPHONE ENCOUNTER
----- Message from Venkat He MD sent at 9/23/2017  2:32 PM CDT -----  Regarding: FW: request for add on appointment  Please schedule follow up after EGD/Colonoscopy.    ----- Message -----  From: Shayne Anna PA-C  Sent: 9/23/2017  11:23 AM  To: Venkat He MD  Subject: request for add on appointment                   Dr He,   I saw one of your patients in clinic with IBS-D on lontronex. Would you mind seeing if you can approve and schedule her for an add on appointment with you soon after her EGD/colon on 10/5/16. Thank you.

## 2017-09-27 ENCOUNTER — OFFICE VISIT (OUTPATIENT)
Dept: PSYCHIATRY | Facility: CLINIC | Age: 47
End: 2017-09-27
Payer: COMMERCIAL

## 2017-09-27 ENCOUNTER — LAB VISIT (OUTPATIENT)
Dept: LAB | Facility: HOSPITAL | Age: 47
End: 2017-09-27
Attending: PHYSICIAN ASSISTANT
Payer: COMMERCIAL

## 2017-09-27 DIAGNOSIS — F33.41 RECURRENT MAJOR DEPRESSION IN PARTIAL REMISSION: Primary | ICD-10-CM

## 2017-09-27 DIAGNOSIS — R19.7 DIARRHEA, UNSPECIFIED TYPE: ICD-10-CM

## 2017-09-27 DIAGNOSIS — K58.0 IRRITABLE BOWEL SYNDROME WITH DIARRHEA: ICD-10-CM

## 2017-09-27 DIAGNOSIS — F43.10 PTSD (POST-TRAUMATIC STRESS DISORDER): ICD-10-CM

## 2017-09-27 PROCEDURE — 90834 PSYTX W PT 45 MINUTES: CPT | Mod: S$GLB,,, | Performed by: SOCIAL WORKER

## 2017-09-27 PROCEDURE — 89125 SPECIMEN FAT STAIN: CPT

## 2017-09-27 PROCEDURE — 87329 GIARDIA AG IA: CPT

## 2017-09-27 PROCEDURE — 83993 ASSAY FOR CALPROTECTIN FECAL: CPT

## 2017-09-27 PROCEDURE — 87329 GIARDIA AG IA: CPT | Mod: 91

## 2017-09-27 PROCEDURE — 89055 LEUKOCYTE ASSESSMENT FECAL: CPT

## 2017-09-27 PROCEDURE — 87209 SMEAR COMPLEX STAIN: CPT | Mod: 91

## 2017-09-28 ENCOUNTER — TELEPHONE (OUTPATIENT)
Dept: GASTROENTEROLOGY | Facility: CLINIC | Age: 47
End: 2017-09-28

## 2017-09-28 LAB
CRYPTOSP AG STL QL IA: NEGATIVE
CRYPTOSP AG STL QL IA: NEGATIVE
FAT STL SUDAN IV STN: NORMAL
G LAMBLIA AG STL QL IA: NEGATIVE
G LAMBLIA AG STL QL IA: NEGATIVE
O+P STL TRI STN: NORMAL
O+P STL TRI STN: NORMAL
WBC #/AREA STL HPF: NORMAL /[HPF]

## 2017-10-01 ENCOUNTER — PATIENT MESSAGE (OUTPATIENT)
Dept: GASTROENTEROLOGY | Facility: CLINIC | Age: 47
End: 2017-10-01

## 2017-10-01 NOTE — PROGRESS NOTES
Individual Psychotherapy (PhD/LCSW)    9/27/2017    Site:  [facility]         Therapeutic Intervention: Met with patient.  Outpatient - Insight oriented psychotherapy 45 min - CPT code 37855    Chief complaint/reason for encounter: depression, anxiety and ptsd     Interval history and content of current session: Pt has not been seen by me since last February.  She returns with her  today and is very distraught.  It takes the entire session for her to tell the story of what happened due to her story wandering and not being clear.  The gist of what happened is that her daughter has had a boyfriend in [place] for about 9 months.  Due to the distance between them, the daughter has gradually convinced the parents to allow him to spend weekends at their house.  This has stressed pt out as she has had to take him home to [place] many times and is very anxious while he is there and feels she has to constantly supervise them.  In June she began to have a lot of problems with colitis and was feeling very bad physically.  In August she had texted her sister how frustrated she was one day using a lot of vulgar language and negative comments about the parents of the boy who she did not think did enough of the transportation.  However, she accidentally sent the message to the boy's father.  This brought about conflict with the boy's parents which pt's  has soothed over but they now do not want their son to come to her house.  Instead they want her daughter to come to their house.  Both pt and  are not entirely comfortable with this which as enraged daughter as she wants to see boyfriend.  They feel caught in a bind.  Father has a better relationship with the daughter than pt.  Daughter will not talk to pt for several weeks.  Pt feels extremely guilty about this situation and feels very depressed.  She states that since her favorite band head committed suicide recently she has been researching how  to hang yourself.  She is very emotional in the session and somewhat histrionic.  Attempted to give her support and empathy and to convince her that she allow time for the situation to calm down.  She is in the midst of testing for her colon problems and cannot go to the [facility] at this time.  She has also had [facility] previous stints in the [facility] and it is not clear to me that would be helpful to her at this time.  I will get her back in next week and will attempt to schedule the daughter to see me also.    Treatment plan:  · Target symptoms: depression, anxiety , PTSD  · Why chosen therapy is appropriate versus another modality: relevant to diagnosis  · Outcome monitoring methods: self-report, observation  · Therapeutic intervention type: insight oriented psychotherapy    Risk parameters:  Patient reports no suicidal ideation  Patient reports no homicidal ideation  Patient reports no self-injurious behavior  Patient reports no violent behavior    Verbal deficits: None    Patient's response to intervention:  The patient's response to intervention is motivated.    Progress toward goals and other mental status changes:  The patient's progress toward goals is fair .    Diagnosis:     ICD-10-CM ICD-9-CM   1. Recurrent major depression in partial remission F33.41 296.35   2. PTSD (post-traumatic stress disorder) F43.10 309.81       Plan:  individual psychotherapy and medication management by physician    Return to clinic: as scheduled    Length of Service (minutes): 45

## 2017-10-03 ENCOUNTER — PATIENT MESSAGE (OUTPATIENT)
Dept: PSYCHIATRY | Facility: CLINIC | Age: 47
End: 2017-10-03

## 2017-10-04 LAB — CALPROTECTIN STL-MCNT: 173.3 MCG/G

## 2017-10-05 ENCOUNTER — ANESTHESIA (OUTPATIENT)
Dept: ENDOSCOPY | Facility: HOSPITAL | Age: 47
End: 2017-10-05
Payer: COMMERCIAL

## 2017-10-05 ENCOUNTER — SURGERY (OUTPATIENT)
Age: 47
End: 2017-10-05

## 2017-10-05 ENCOUNTER — HOSPITAL ENCOUNTER (OUTPATIENT)
Facility: HOSPITAL | Age: 47
Discharge: HOME | End: 2017-10-05
Attending: INTERNAL MEDICINE | Admitting: INTERNAL MEDICINE
Payer: COMMERCIAL

## 2017-10-05 ENCOUNTER — ANESTHESIA EVENT (OUTPATIENT)
Dept: ENDOSCOPY | Facility: HOSPITAL | Age: 47
End: 2017-10-05
Payer: COMMERCIAL

## 2017-10-05 VITALS
DIASTOLIC BLOOD PRESSURE: 69 MMHG | WEIGHT: 150 LBS | RESPIRATION RATE: 16 BRPM | SYSTOLIC BLOOD PRESSURE: 137 MMHG | RESPIRATION RATE: 15 BRPM | OXYGEN SATURATION: 99 % | BODY MASS INDEX: 30.24 KG/M2 | HEART RATE: 76 BPM | TEMPERATURE: 98 F | HEIGHT: 59 IN

## 2017-10-05 DIAGNOSIS — R11.0 NAUSEA: Primary | ICD-10-CM

## 2017-10-05 DIAGNOSIS — R19.7 DIARRHEA, UNSPECIFIED TYPE: ICD-10-CM

## 2017-10-05 DIAGNOSIS — K58.0 IRRITABLE BOWEL SYNDROME WITH DIARRHEA: ICD-10-CM

## 2017-10-05 PROCEDURE — 43239 EGD BIOPSY SINGLE/MULTIPLE: CPT | Mod: 51,,, | Performed by: INTERNAL MEDICINE

## 2017-10-05 PROCEDURE — 27201012 HC FORCEPS, HOT/COLD, DISP: Performed by: INTERNAL MEDICINE

## 2017-10-05 PROCEDURE — 25000003 PHARM REV CODE 250: Performed by: INTERNAL MEDICINE

## 2017-10-05 PROCEDURE — 45380 COLONOSCOPY AND BIOPSY: CPT | Performed by: INTERNAL MEDICINE

## 2017-10-05 PROCEDURE — 45380 COLONOSCOPY AND BIOPSY: CPT | Mod: ,,, | Performed by: INTERNAL MEDICINE

## 2017-10-05 PROCEDURE — D9220A PRA ANESTHESIA: Mod: CRNA,,, | Performed by: NURSE ANESTHETIST, CERTIFIED REGISTERED

## 2017-10-05 PROCEDURE — 63600175 PHARM REV CODE 636 W HCPCS: Performed by: NURSE ANESTHETIST, CERTIFIED REGISTERED

## 2017-10-05 PROCEDURE — 43239 EGD BIOPSY SINGLE/MULTIPLE: CPT | Performed by: INTERNAL MEDICINE

## 2017-10-05 PROCEDURE — 25000003 PHARM REV CODE 250: Performed by: NURSE ANESTHETIST, CERTIFIED REGISTERED

## 2017-10-05 PROCEDURE — D9220A PRA ANESTHESIA: Mod: ANES,,, | Performed by: ANESTHESIOLOGY

## 2017-10-05 PROCEDURE — 88305 TISSUE EXAM BY PATHOLOGIST: CPT | Mod: 26,,, | Performed by: PATHOLOGY

## 2017-10-05 PROCEDURE — 37000008 HC ANESTHESIA 1ST 15 MINUTES: Performed by: INTERNAL MEDICINE

## 2017-10-05 PROCEDURE — 88305 TISSUE EXAM BY PATHOLOGIST: CPT | Performed by: PATHOLOGY

## 2017-10-05 PROCEDURE — 37000009 HC ANESTHESIA EA ADD 15 MINS: Performed by: INTERNAL MEDICINE

## 2017-10-05 RX ORDER — SODIUM CHLORIDE 9 MG/ML
INJECTION, SOLUTION INTRAVENOUS CONTINUOUS PRN
Status: DISCONTINUED | OUTPATIENT
Start: 2017-10-05 | End: 2017-10-05

## 2017-10-05 RX ORDER — LIDOCAINE HCL/PF 100 MG/5ML
SYRINGE (ML) INTRAVENOUS
Status: DISCONTINUED | OUTPATIENT
Start: 2017-10-05 | End: 2017-10-05

## 2017-10-05 RX ORDER — PROPOFOL 10 MG/ML
VIAL (ML) INTRAVENOUS
Status: DISCONTINUED | OUTPATIENT
Start: 2017-10-05 | End: 2017-10-05

## 2017-10-05 RX ORDER — MIDAZOLAM HYDROCHLORIDE 1 MG/ML
INJECTION, SOLUTION INTRAMUSCULAR; INTRAVENOUS
Status: DISCONTINUED | OUTPATIENT
Start: 2017-10-05 | End: 2017-10-05

## 2017-10-05 RX ORDER — KETAMINE HCL IN 0.9 % NACL 50 MG/5 ML
SYRINGE (ML) INTRAVENOUS
Status: DISCONTINUED | OUTPATIENT
Start: 2017-10-05 | End: 2017-10-05

## 2017-10-05 RX ORDER — SODIUM CHLORIDE 9 MG/ML
INJECTION, SOLUTION INTRAVENOUS CONTINUOUS
Status: DISCONTINUED | OUTPATIENT
Start: 2017-10-05 | End: 2017-10-05 | Stop reason: HOSPADM

## 2017-10-05 RX ORDER — PROPOFOL 10 MG/ML
VIAL (ML) INTRAVENOUS CONTINUOUS PRN
Status: DISCONTINUED | OUTPATIENT
Start: 2017-10-05 | End: 2017-10-05

## 2017-10-05 RX ADMIN — PROPOFOL 150 MCG/KG/MIN: 10 INJECTION, EMULSION INTRAVENOUS at 02:10

## 2017-10-05 RX ADMIN — SODIUM CHLORIDE: 0.9 INJECTION, SOLUTION INTRAVENOUS at 02:10

## 2017-10-05 RX ADMIN — PROPOFOL 80 MG: 10 INJECTION, EMULSION INTRAVENOUS at 02:10

## 2017-10-05 RX ADMIN — SODIUM CHLORIDE: 0.9 INJECTION, SOLUTION INTRAVENOUS at 01:10

## 2017-10-05 RX ADMIN — Medication 20 MG: at 02:10

## 2017-10-05 RX ADMIN — LIDOCAINE HYDROCHLORIDE 40 MG: 20 INJECTION, SOLUTION INTRAVENOUS at 02:10

## 2017-10-05 RX ADMIN — PROPOFOL 30 MG: 10 INJECTION, EMULSION INTRAVENOUS at 02:10

## 2017-10-05 RX ADMIN — MIDAZOLAM HYDROCHLORIDE 2 MG: 1 INJECTION, SOLUTION INTRAMUSCULAR; INTRAVENOUS at 02:10

## 2017-10-05 RX ADMIN — Medication 30 MG: at 02:10

## 2017-10-05 NOTE — ANESTHESIA PREPROCEDURE EVALUATION
10/05/2017  Marilee Simosn is a 47 y.o., female.    Anesthesia Evaluation    I have reviewed the Patient Summary Reports.    I have reviewed the Nursing Notes.      Review of Systems  Anesthesia Hx:  No problems with previous Anesthesia    Hematology/Oncology:  Hematology Normal   Oncology Normal     EENT/Dental:EENT/Dental Normal   Cardiovascular:  Cardiovascular Normal     Pulmonary:  Pulmonary Normal    Renal/:   Chronic Renal Disease    Hepatic/GI:   GERD    Musculoskeletal:  Musculoskeletal Normal    Neurological:   Neuromuscular Disease, Headaches    Endocrine:   Hypothyroidism    Dermatological:  Skin Normal    Psych:   Psychiatric History anxiety depression          Physical Exam  General:  Obesity    Airway/Jaw/Neck:  Airway Findings: Mouth Opening: Normal Tongue: Normal  General Airway Assessment: Adult  Mallampati: II  TM Distance: Normal, at least 6 cm        Eyes/Ears/Nose:  EYES/EARS/NOSE FINDINGS: Normal   Dental:  Dental Findings: In tact   Chest/Lungs:  Chest/Lungs Clear    Heart/Vascular:  Heart Findings: Normal Heart murmur: negative Vascular Findings: Normal    Abdomen:  Abdomen Findings: Normal    Musculoskeletal:  Musculoskeletal Findings: Normal   Skin:  Skin Findings: Normal    Mental Status:  Mental Status Findings: Normal        Anesthesia Plan  Type of Anesthesia, risks & benefits discussed:  Anesthesia Type:  general  Patient's Preference:   Intra-op Monitoring Plan:   Intra-op Monitoring Plan Comments:   Post Op Pain Control Plan:   Post Op Pain Control Plan Comments:   Induction:   IV  Beta Blocker:  Patient is not currently on a Beta-Blocker (No further documentation required).       Informed Consent: Patient understands risks and agrees with Anesthesia plan.  Questions answered. Anesthesia consent signed with patient.  ASA Score: 2     Day of Surgery Review of History &  Physical:    H&P update referred to the surgeon.         Ready For Surgery From Anesthesia Perspective.

## 2017-10-05 NOTE — ANESTHESIA POSTPROCEDURE EVALUATION
"Anesthesia Post Evaluation    Patient: Marilee Simons    Procedure(s) Performed: Procedure(s) (LRB):  ESOPHAGOGASTRODUODENOSCOPY (EGD) (N/A)  COLONOSCOPY (N/A)    Final Anesthesia Type: general  Patient location during evaluation: GI PACU  Patient participation: Yes- Able to Participate  Level of consciousness: awake and alert  Post-procedure vital signs: reviewed and stable  Pain management: adequate  Airway patency: patent  PONV status at discharge: No PONV  Anesthetic complications: no      Cardiovascular status: blood pressure returned to baseline  Respiratory status: unassisted, spontaneous ventilation and room air  Hydration status: euvolemic  Follow-up not needed.        Visit Vitals  /69 (BP Location: Left arm, Patient Position: Sitting)   Pulse 76   Temp 36.7 °C (98.1 °F) (Temporal)   Resp 16   Ht 4' 11" (1.499 m)   Wt 68 kg (150 lb)   LMP  (LMP Unknown)   SpO2 99%   Breastfeeding? No   BMI 30.30 kg/m²       Pain/Jewel Score: Pain Assessment Performed: Yes (10/5/2017  3:32 PM)  Presence of Pain: denies (10/5/2017  3:32 PM)  Jewel Score: 10 (10/5/2017  3:17 PM)      "

## 2017-10-05 NOTE — PATIENT INSTRUCTIONS
Discharge Summary/Instructions after an Endoscopic Procedure  Patient Name: Marilee Simons  Patient MRN: [id number]  Patient YOB: 1970 Thursday, October 05, 2017  Venkat He MD  RESTRICTIONS:  During your procedure today, you received medications for sedation.  These   medications may affect your judgment, balance and coordination.  Therefore,   for 24 hours, you have the following restrictions:   - DO NOT drive a car, operate machinery, make legal/financial decisions,   sign important papers or drink alcohol.    ACTIVITY:  The following day: return to full activity including work, except no heavy   lifting, straining or running for 3 days if polyps were removed.  DIET:  Eat and drink normally unless instructed otherwise.  TREATMENT FOR COMMON SIDE EFFECTS:  - Mild abdominal pain, belching, bloating or excessive gas: rest, eat   lightly and use a heating pad.  - Sore Throat: treat with throat lozenges and/or gargle with warm salt   water.  SYMPTOMS TO WATCH FOR AND REPORT TO YOUR PHYSICIAN:  1. Abdominal pain or bloating, other than gas cramps.  2. Chest pain.  3. Back pain.  4. Chills or fever occurring within 24 hours after the procedure.  5. Rectal bleeding, which would show as bright red, maroon, or black stools.   (A tablespoon of blood from the rectum is not serious, especially if   hemorrhoids are present.)  6. Vomiting.  7. Weakness or dizziness.  8. Because air was used during the procedure, expelling large amounts of air   from your rectum or belching is normal.  9. If a bowel prep was taken, you may not have a bowel movement for 1-3   days.  This is normal.  GO DIRECTLY TO THE EMERGENCY ROOM IF YOU HAVE ANY OF THE FOLLOWING:   Difficulty breathing   Chills and/or fever over 101 F   Persistent vomiting and/or vomiting blood   Severe abdominal pain   Severe chest pain   Black, tarry stools   Bleeding- more than one tablespoon  Your doctor recommends these additional instructions:  If any  biopsies were taken, your doctors clinic will call you in 1 to 2   weeks with any results.  You have a contact number available for emergencies.  The signs and symptoms   of potential delayed complications were discussed with you.  You may return   to normal activities tomorrow.  Written discharge instructions were   provided to you.   You are being discharged to home.   Resume your previous diet.   Continue your present medications.   We are waiting for your pathology results.  For questions, problems or results please call your physician - Venkat He MD at Work:  [phone].  [facility], EMERGENCY ROOM PHONE NUMBER: [phone]  IF A COMPLICATION OR EMERGENCY SITUATION ARISES AND YOU ARE UNABLE TO REACH   YOUR PHYSICIAN - GO DIRECTLY TO THE EMERGENCY ROOM.  Venkat He MD  10/5/2017 2:26:45 PM  This report has been verified and signed electronically.

## 2017-10-05 NOTE — TRANSFER OF CARE
"Anesthesia Transfer of Care Note    Patient: Marilee Simons    Procedure(s) Performed: Procedure(s) (LRB):  ESOPHAGOGASTRODUODENOSCOPY (EGD) (N/A)  COLONOSCOPY (N/A)    Patient location: PACU    Anesthesia Type: general    Transport from OR: Transported from OR on 2-3 L/min O2 by NC with adequate spontaneous ventilation    Post pain: adequate analgesia    Post assessment: no apparent anesthetic complications    Post vital signs: stable    Level of consciousness: awake and alert    Nausea/Vomiting: no nausea/vomiting    Complications: none    Transfer of care protocol was followed      Last vitals:   Visit Vitals  /71 (BP Location: Left arm, Patient Position: Lying)   Pulse 103   Temp 36.7 °C (98.1 °F) (Temporal)   Resp 16   Ht 4' 11" (1.499 m)   Wt 68 kg (150 lb)   LMP  (LMP Unknown)   SpO2 100%   Breastfeeding? No   BMI 30.30 kg/m²     "

## 2017-10-05 NOTE — INTERVAL H&P NOTE
The patient has been examined and the H&P has been reviewed:    There is no interval changes since last encounter.  EGD/Colonoscopy: Abdominal pain and diarrhea  Sedation: GA  ASA: Per anesthesia  Mallampati: Per anesthesia    Endoscopy risks, benefits and alternative options discussed and understood by patient/family.          There are no hospital problems to display for this patient.

## 2017-10-05 NOTE — H&P (VIEW-ONLY)
[facility] Consultation Note    Reason for Consult:  The primary encounter diagnosis was Irritable bowel syndrome with diarrhea. Diagnoses of Weakness generalized, Lightheadedness, and Nausea were also pertinent to this visit.    PCP:   Nadeem Zheng       Referring MD:  Jacqueline Self  No address on file    HPI:  This is a 47 y.o. female here for evaluation of diarrhea  She is a patient of Dr. He with a Hx of IBS-D on lotronex  Her flex-sig revealed rectos-sigmoid colitis.    Today she says she was doing well for while until Jun 2017  Having severe post-prandial diarrhea  She says she had had to lie on the bathroom floor, because she felt too weak to keep getting up to go to the bathroom   Taking lontronex 1 tablet twice daily, an imodium as needed  Nausea with meals- - taking phergan daily  Today she says she feels dehydrated  Denies vomiting   + Lightheadded, dizziness, weakness  + Weight loss  Denies rectal bleeding    Hx of gastroparesis with abnormal gastric emptying study in 2004 - 70% retention at 90 minutes    Avoiding  dairy products    ROS:  Constitutional: No fevers, chills, No weight loss  ENT: No allergies  CV: No chest pain  Pulm: No cough, No shortness of breath  Ophtho: No vision changes  GI: see HPI  Derm: No rash  Heme: No lymphadenopathy, No bruising  MSK: No arthritis  : No dysuria, No hematuria  Endo: No hot or cold intolerance  Neuro: No syncope, No seizure  Psych: No anxiety, No depression    Medical History:  has a past medical history of Acid reflux; Allergy; Alopecia; Anxiety; Arthralgia; Back pain; Depression; Dry eyes; Fever blister; Fibromyalgia; Interstitial cystitis; Irritable bowel syndrome; Kidney stone; Major depressive disorder, recurrent episode, in partial or unspecified remission (6/25/2013); OAB (overactive bladder) (7/21/2015); PTSD (post-traumatic stress disorder); Rheumatoid arthritis; Systemic lupus erythematosus; Thyroid disease;  Urinary tract infection; and Vaginal infection.    Surgical History:  has a past surgical history that includes Partial hysterectomy;  section, low transverse; Hysterectomy; Bladder surgery; Eye surgery; Colonoscopy; and Esophagogastroduodenoscopy.    Family History: family history includes Fibromyalgia in her sister; Irritable bowel syndrome in her mother, sister, and sister; Lupus in her sister; Rheum arthritis in her sister..     Social History:  reports that she has never smoked. She has never used smokeless tobacco. She reports that she does not drink alcohol or use drugs.    Review of patient's allergies indicates:   Allergen Reactions    Cephalexin Itching    Zofran [ondansetron hcl (pf)] Hives    Penicillins Rash       Current Outpatient Prescriptions on File Prior to Visit   Medication Sig Dispense Refill    adalimumab (HUMIRA PEN CROHN'S-UC-HS START) PnKt injection Inject 0.8 mLs (40 mg total) into the skin every 14 (fourteen) days. 1.6 mL 11    alosetron (LOTRONEX) 0.5 MG tablet TAKE 1 TABLET(0.5 MG) BY MOUTH TWICE DAILY 60 tablet 0    alosetron (LOTRONEX) 0.5 MG tablet TAKE 1 TABLET(0.5 MG) BY MOUTH TWICE DAILY 60 tablet 0    amitriptyline (ELAVIL) 10 MG tablet Take 1 tablet (10 mg total) by mouth nightly as needed. (Patient taking differently: Take 10 mg by mouth nightly as needed for Insomnia. ) 30 tablet 11    butalbital-acetaminophen (BUPAP)  mg Tab Take 1 tablet by mouth every 4 (four) hours. 60 tablet 3    clonazePAM (KLONOPIN) 1 MG tablet Take 1 tablet (1 mg total) by mouth 3 (three) times daily. 90 tablet 3    cyclobenzaprine (FLEXERIL) 10 MG tablet Take 1 tablet (10 mg total) by mouth 2 (two) times daily. 90 tablet 3    diazePAM (VALIUM) 5 MG tablet INSERT 1 TABLET EITHER CRUSHED/INTACT INSIDE THE VAGINA AT NIGHT AS NEEDED FOR PELVIC PAIN& SPASM 60 tablet 0    fluoxetine (PROZAC) 40 MG capsule Take 40 mg by mouth once daily.      gabapentin (NEURONTIN) 800 MG tablet  "Take 1 tablet (800 mg total) by mouth 3 (three) times daily. 90 tablet 11    hydroxychloroquine (PLAQUENIL) 200 mg tablet TAKE 1 TABLET BY MOUTH TWICE DAILY 60 tablet 3    hydrOXYzine HCl (ATARAX) 25 MG tablet TAKE 1 TABLET(25 MG) BY MOUTH EVERY EVENING 30 tablet 0    ibuprofen-famotidine (DUEXIS) 800-26.6 mg Tab Take 1 tablet by mouth 3 (three) times daily. (Patient taking differently: Take 1 tablet by mouth 3 (three) times daily as needed (for pain). ) 90 tablet 11    levothyroxine (SYNTHROID) 50 MCG tablet TAKE 1 TABLET(50 MCG) BY MOUTH EVERY DAY 30 tablet 3    methen-m.blue-s.phos-phsal-hyo (URIBEL) 118-10-40.8-36 mg Cap Take 1 capsule by mouth 4 (four) times daily. 120 capsule 3    multivitamin with minerals tablet Take 1 tablet by mouth once daily.      oxybutynin (DITROPAN) 5 MG Tab TAKE 1 TABLET BY MOUTH TWICE DAILY 60 tablet 0    promethazine (PHENERGAN) 25 MG tablet TAKE 1 TABLET(25 MG) BY MOUTH EVERY 6 HOURS AS NEEDED 60 tablet 0    rabeprazole (ACIPHEX) 20 mg tablet Take 1 tablet (20 mg total) by mouth once daily. 30 tablet 11    tizanidine (ZANAFLEX) 4 MG tablet TAKE 1 TABLET(4 MG) BY MOUTH EVERY 8 HOURS 90 tablet 3    tramadol (ULTRAM) 50 mg tablet TAKE 1 TABLET BY MOUTH EVERY 12 HOURS AS NEEDED FOR PAIN 60 tablet 3    trazodone (DESYREL) 50 MG tablet TAKE 1/2  TABLET BY MOUTH EVERY NIGHT AT BEDTIME AS NEEDED FOR INSOMNIA 60 tablet 2    triamcinolone acetonide 0.1% (KENALOG) 0.1 % cream APPLY TO THE AFFECTED AREA TWICE DAILY 454 g 0    URIBEL 118-10-40.8-36 mg Cap TAKE 1 CAPSULE BY MOUTH FOUR TIMES DAILY 120 capsule 0     No current facility-administered medications on file prior to visit.          Objective Findings:    Vital Signs:  /73   Pulse 84   Ht 4' 11" (1.499 m)   Wt 70.1 kg (154 lb 8.7 oz)   LMP  (LMP Unknown)   BMI 31.21 kg/m²   Body mass index is 31.21 kg/m².    Physical Exam:  General Appearance: Well appearing in no acute distress  Head:   Normocephalic, without " "obvious abnormality  Eyes:    No scleral icterus  ENT: Neck supple, Lips, mucosa, and tongue normal  Lungs: CTA bilaterally in anterior and posterior fields, no wheezes, no crackles.  Heart:  Regular rate and rhythm, S1, S2 normal, no murmurs heard  Abdomen: Soft, mild lower abdominal tenderness, non distended with positive bowel sounds in all four quadrants. No guarding or rebound tenderness  Extremities: no edema  Skin: No rash  Neurologic: AAO x 3      Labs:  Lab Results   Component Value Date    WBC 9.78 09/12/2017    HGB 12.5 09/12/2017    HCT 36.2 (L) 09/12/2017     09/12/2017    CHOL 221 (H) 01/07/2009    TRIG 217 (H) 01/07/2009    HDL 51 01/07/2009    ALT 13 09/12/2017    AST 13 09/12/2017     09/12/2017    K 3.8 09/12/2017     09/12/2017    CREATININE 0.8 09/12/2017    BUN 17 09/12/2017    CO2 28 09/12/2017    TSH 0.655 06/09/2017    INR 1.0 06/10/2017       Imaging:    Endoscopy:    flex-sig revealed rectos-sigmoid colitis - non specific focal cryptisis  Assessment:  1. Irritable bowel syndrome with diarrhea    2. Weakness generalized    3. Lightheadedness    4. Nausea      48yo F with hx of IBS-D. She was doing well for a while until 3 months ago. Now having severe post-prandial diarrhea,.      Pt is exhibiting signs of dehydration. She was advised to go to the ED for IV fluids    Recommendations:  1. Stool studies to rule out infection and inflammation and malabsorption    2. HIDA scan to rule out biliary dyskinesia    3. EGD to rule out ulcers    4. Colonoscopy with visualization of the terminal ileum to rule out colitis and confirm recto-sigmoid mucosal healing     Avoid lactose (milk, cheese, and butter)    Look up the book called "Master you IBS." IBS info given    Return in about 6 weeks (around 10/24/2017). with Dr. He after her EGD/colon. Will message him for approval of follow up visit      Order summary:  Orders Placed This Encounter    Clostridium difficile EIA    Stool " culture    NM Hepatobiliary Imaging with GB (HIDA)    WBC, Stool    Stool Exam-Ova,Cysts,Parasites    Stool Exam-Ova,Cysts,Parasites    Stool Exam-Ova,Cysts,Parasites    Giardia / Cryptosporidum, EIA    Giardia / Cryptosporidum, EIA    Fecal fat, qualitative    Case request GI: COLONOSCOPY, ESOPHAGOGASTRODUODENOSCOPY (EGD)         Thank you so much for allowing me to participate in the care of Marilee Simons    Shayne Anna PA-C

## 2017-10-05 NOTE — DISCHARGE INSTRUCTIONS
Upper GI Endoscopy     During endoscopy, a long, flexible tube is used to view the inside of your upper GI tract.      Upper GI endoscopy allows your healthcare provider to look directly into the beginning of your gastrointestinal (GI) tract. The esophagus, stomach, and duodenum (the first part of the small intestine) make up the upper GI tract.   Before the exam  Follow these and any other instructions you are given before your endoscopy. If you dont follow the healthcare providers instructions carefully, the test may need to be canceled or done over:  · Don't eat or drink anything after midnight the night before your exam. If your exam is in the afternoon, drink only clear liquids in the morning. Don't eat or drink anything for 8 hours before the exam. In some cases, you may be able to take medicines with sips of water until 2 hours before the procedure. Speak with your healthcare provider about this.   · Bring your X-rays and any other test results you have.  · Because you will be sedated, arrange for an adult to drive you home after the exam.  · Tell your healthcare provider before the exam if you are taking any medicines or have any medical problems.  The procedure  Here is what to expect:  · You will lie on the endoscopy table. Usually patients lie on the left side.  · You will be monitored and given oxygen.  · Your throat may be numbed with a spray or gargle. You are given medicine through an intravenous (IV) line that will help you relax and remain comfortable. You may be awake or asleep during the procedure.  · The healthcare provider will put the endoscope in your mouth and down your esophagus. It is thinner than most pieces of food that you swallow. It will not affect your breathing. The medicine helps keep you from gagging.  · Air is put into your GI tract to expand it. It can make you burp.  · During the procedure, the healthcare provider can take biopsies (tissue samples), remove abnormalities,  such as polyps, or treat abnormalities through a variety of devices placed through the endoscope. You will not feel this.   · The endoscope carries images of your upper GI tract to a video screen. If you are awake, you may be able to look at the images.  · After the procedure is done, you will rest for a time. An adult must drive you home.  When to call your healthcare provider  Contact your healthcare provider if you have:  · Black or tarry stools, or blood in your stool  · Fever  · Pain in your belly that does not go away  · Nausea and vomiting, or vomiting blood   Date Last Reviewed: 7/1/2016 © 2000-2017 [employer]. [address] [place] [address], [place] [address]. All rights reserved. This information is not intended as a substitute for professional medical care. Always follow your healthcare professional's instructions.        Colonoscopy     A camera attached to a flexible tube with a viewing lens is used to take video pictures.     Colonoscopy is a test to view the inside of your lower digestive tract (colon and rectum). Sometimes it can show the last part of the small intestine (ileum). During the test, small pieces of tissue may be removed for testing. This is called a biopsy. Small growths, such as polyps, may also be removed.   Why is colonoscopy done?  The test is done to help look for colon cancer. And it can help find the source of abdominal pain, bleeding, and changes in bowel habits. It may be needed once a year, depending on factors such as your:  · Age  · Health history  · Family health history  · Symptoms  · Results from any prior colonoscopy  Risks and possible complications  These include:  · Bleeding               · A puncture or tear in the colon   · Risks of anesthesia  · A cancer lesion not being seen  Getting ready   To prepare for the test:  · Talk with your healthcare provider about the risks of the test (see below). Also ask your healthcare provider about alternatives to the  test.  · Tell your healthcare provider about any medicines you take. Also tell him or her about any health conditions you may have.  · Make sure your rectum and colon are empty for the test. Follow the diet and bowel prep instructions exactly. If you dont, the test may need to be rescheduled.  · Plan for a friend or family member to drive you home after the test.     Colonoscopy provides an inside view of the entire colon.     You may discuss the results with your doctor right away or at a future visit.  During the test   The test is usually done in the hospital on an outpatient basis. This means you go home the same day. The procedure takes about 30 minutes. During that time:  · You are given relaxing (sedating) medicine through an IV line. You may be drowsy, or fully asleep.  · The healthcare provider will first give you a physical exam to check for anal and rectal problems.  · Then the anus is lubricated and the scope inserted.  · If you are awake, you may have a feeling similar to needing to have a bowel movement. You may also feel pressure as air is pumped into the colon. Its OK to pass gas during the procedure.  · Biopsy, polyp removal, or other treatments may be done during the test.  After the test   You may have gas right after the test. It can help to try to pass it to help prevent later bloating. Your healthcare provider may discuss the results with you right away. Or you may need to schedule a follow-up visit to talk about the results. After the test, you can go back to your normal eating and other activities. You may be tired from the sedation and need to rest for a few hours.  Date Last Reviewed: 11/1/2016 © 2000-2017 [employer]. [address] [place] [address]. All rights reserved. This information is not intended as a substitute for professional medical care. Always follow your healthcare professional's instructions.

## 2017-10-10 RX ORDER — FLUOXETINE HYDROCHLORIDE 40 MG/1
CAPSULE ORAL
Qty: 30 CAPSULE | Refills: 0 | Status: SHIPPED | OUTPATIENT
Start: 2017-10-10 | End: 2017-11-02

## 2017-10-12 ENCOUNTER — TELEPHONE (OUTPATIENT)
Dept: ENDOSCOPY | Facility: HOSPITAL | Age: 47
End: 2017-10-12

## 2017-10-16 ENCOUNTER — TELEPHONE (OUTPATIENT)
Dept: GASTROENTEROLOGY | Facility: CLINIC | Age: 47
End: 2017-10-16

## 2017-10-18 NOTE — TELEPHONE ENCOUNTER
"Good afternoon,    The PA submitted for Humira was denied. The denial states that "patient's plan does not appear to cover any biologics. - Benefit exclusion."     There is an option to start an appeal. Would you like me to proceed with an appeal at this time?    Thanks,   Viviane Jara, PharmD  [facility]   " WDL

## 2017-10-19 ENCOUNTER — OFFICE VISIT (OUTPATIENT)
Dept: PSYCHIATRY | Facility: CLINIC | Age: 47
End: 2017-10-19
Payer: COMMERCIAL

## 2017-10-19 DIAGNOSIS — F33.1 MAJOR DEPRESSIVE DISORDER, RECURRENT EPISODE, MODERATE: Primary | ICD-10-CM

## 2017-10-19 PROCEDURE — 90834 PSYTX W PT 45 MINUTES: CPT | Mod: S$GLB,,, | Performed by: SOCIAL WORKER

## 2017-10-19 NOTE — PROGRESS NOTES
Individual Psychotherapy (PhD/LCSW)    10/19/2017    Site:  [facility] [address]         Therapeutic Intervention: Met with patient.  Outpatient - Insight oriented psychotherapy 45 min - CPT code 51896    Chief complaint/reason for encounter: depression, anxiety and ptsd     Interval history and content of current session: Pt continues to be excessively stressed by her situation with her daughter not speaking to her and with her own health issues.  She is being worked up for her chronic diarrhea currently.  Her emotions are labile.  She is sobbing and talking about thoughts of suicide one moment and then carrying on a normal conversation the next.  She feels she has no coping skills on how to manage her emotions.  Her  and daughter talk and laugh and go places together and this makes her feel alone and not cared for.  She has tried to reach out and talk to her daughter but she refuses to acknowledge pt's presence.  We will have  bring daughter in to see me next week to see if anything can be done about this.  Discussed the BMU and pt was in it 2 years ago.  She is willing to try it again and will be referred to the program.    Treatment plan:  · Target symptoms: depression, anxiety , PTSD  · Why chosen therapy is appropriate versus another modality: relevant to diagnosis  · Outcome monitoring methods: self-report, observation  · Therapeutic intervention type: insight oriented psychotherapy    Risk parameters:  Patient reports no suicidal ideation  Patient reports no homicidal ideation  Patient reports no self-injurious behavior  Patient reports no violent behavior    Verbal deficits: None    Patient's response to intervention:  The patient's response to intervention is motivated.    Progress toward goals and other mental status changes:  The patient's progress toward goals is fair .    Diagnosis:     ICD-10-CM ICD-9-CM   1. Major depressive disorder, recurrent episode, moderate F33.1 296.32        Plan:  individual psychotherapy and medication management by physician    Return to clinic: as scheduled    Length of Service (minutes): 45

## 2017-10-20 ENCOUNTER — DOCUMENTATION ONLY (OUTPATIENT)
Dept: PSYCHIATRY | Facility: HOSPITAL | Age: 47
End: 2017-10-20

## 2017-10-20 NOTE — PROGRESS NOTES
Dakota received referral from Dr. Evans. Sw placed a call to pt to enroll in program. No answer sw left a vm for immediate call back.        Ruthy Pearson LMSW

## 2017-10-26 DIAGNOSIS — F43.10 PTSD (POST-TRAUMATIC STRESS DISORDER): ICD-10-CM

## 2017-10-26 DIAGNOSIS — F41.1 GENERALIZED ANXIETY DISORDER: ICD-10-CM

## 2017-10-26 DIAGNOSIS — M06.00 SERONEGATIVE RHEUMATOID ARTHRITIS: ICD-10-CM

## 2017-10-26 DIAGNOSIS — F33.1 MAJOR DEPRESSIVE DISORDER, RECURRENT EPISODE, MODERATE: ICD-10-CM

## 2017-10-26 DIAGNOSIS — M79.7 FIBROMYALGIA: ICD-10-CM

## 2017-10-26 DIAGNOSIS — R53.82 CHRONIC FATIGUE: ICD-10-CM

## 2017-10-27 RX ORDER — GABAPENTIN 800 MG/1
TABLET ORAL
Qty: 90 TABLET | Refills: 3 | Status: SHIPPED | OUTPATIENT
Start: 2017-10-27 | End: 2018-04-03 | Stop reason: SDUPTHER

## 2017-10-30 ENCOUNTER — TELEPHONE (OUTPATIENT)
Dept: GASTROENTEROLOGY | Facility: CLINIC | Age: 47
End: 2017-10-30

## 2017-10-30 RX ORDER — ALOSETRON HYDROCHLORIDE 0.5 MG/1
TABLET ORAL
Qty: 60 TABLET | Refills: 0 | Status: SHIPPED | OUTPATIENT
Start: 2017-10-30 | End: 2018-01-11 | Stop reason: SDUPTHER

## 2017-10-30 NOTE — TELEPHONE ENCOUNTER
----- Message from Sherrie Ramirez sent at 10/30/2017  2:34 PM CDT -----  Contact: Self- [phone]  Neri- pt called to reschedule her appt she canceled on 10/26- please call pt back at [phone]

## 2017-11-02 ENCOUNTER — OFFICE VISIT (OUTPATIENT)
Dept: PSYCHIATRY | Facility: CLINIC | Age: 47
End: 2017-11-02
Payer: COMMERCIAL

## 2017-11-02 VITALS
SYSTOLIC BLOOD PRESSURE: 127 MMHG | DIASTOLIC BLOOD PRESSURE: 76 MMHG | BODY MASS INDEX: 31.17 KG/M2 | HEART RATE: 104 BPM | HEIGHT: 59 IN | WEIGHT: 154.63 LBS

## 2017-11-02 DIAGNOSIS — F33.0 MAJOR DEPRESSIVE DISORDER, RECURRENT EPISODE, MILD: Primary | ICD-10-CM

## 2017-11-02 PROCEDURE — 99214 OFFICE O/P EST MOD 30 MIN: CPT | Mod: S$GLB,,, | Performed by: PSYCHIATRY & NEUROLOGY

## 2017-11-02 PROCEDURE — 99999 PR PBB SHADOW E&M-EST. PATIENT-LVL II: CPT | Mod: PBBFAC,,, | Performed by: PSYCHIATRY & NEUROLOGY

## 2017-11-02 RX ORDER — DULOXETIN HYDROCHLORIDE 60 MG/1
60 CAPSULE, DELAYED RELEASE ORAL DAILY
Qty: 30 CAPSULE | Refills: 11 | Status: SHIPPED | OUTPATIENT
Start: 2017-11-02 | End: 2018-10-04 | Stop reason: SDUPTHER

## 2017-11-02 RX ORDER — TRAZODONE HYDROCHLORIDE 50 MG/1
TABLET ORAL
Qty: 60 TABLET | Refills: 2 | Status: SHIPPED | OUTPATIENT
Start: 2017-11-02 | End: 2018-10-04

## 2017-11-02 RX ORDER — CLONAZEPAM 1 MG/1
1 TABLET ORAL 3 TIMES DAILY
Qty: 90 TABLET | Refills: 3 | Status: SHIPPED | OUTPATIENT
Start: 2017-11-02 | End: 2020-04-16 | Stop reason: SDUPTHER

## 2017-11-02 NOTE — PROGRESS NOTES
Outpatient Psychiatry Follow-Up Visit (MD/NP)    11/2/2017    Clinical Status of Patient:  Outpatient (Ambulatory)    Chief Complaint:  Marilee Simons is a 47 y.o. female who presents today for follow-up of depression and anxiety.  Met with patient.      Interval History and Content of Current Session:  Interim Events/Subjective Report/Content of Current Session: There is much family turmoil involving the daughter.  Dr. Evans is working with them to improve the situation.  BMU has been suggested.  I encouraged pt to follow through.  She is also dealing with multiple medical problems.  The latest has been severe disrhhea that may be Crohn's.  Sge requested to go back on Cymbalta instead of her Prozac.    Psychotherapy:  · Target symptoms: depression  · Why chosen therapy is appropriate versus another modality: relevant to diagnosis  · Outcome monitoring methods: self-report, observation  · Therapeutic intervention type: supportive psychotherapy  · Topics discussed/themes: relationships difficulties, parenting issues, stress related to medical comorbidities, difficulty managing affect in interpersonal relationships, building skills sets for symptom management, symptom recognition, financial stressors  · The patient's response to the intervention is accepting. The patient's progress toward treatment goals is fair.   · Duration of intervention: 10 minutes.    Review of Systems   · PSYCHIATRIC: Pertinant items are noted in the narrative.    Past Medical, Family and Social History: The patient's past medical, family and social history have been reviewed and updated as appropriate within the electronic medical record - see encounter notes.    Compliance: yes    Side effects: None    Risk Parameters:  Patient reports no suicidal ideation  Patient reports no homicidal ideation  Patient reports no self-injurious behavior  Patient reports no violent behavior    Exam (detailed: at least 9 elements; comprehensive: all 15  "elements)   Constitutional  Vitals:  Most recent vital signs, dated less than 90 days prior to this appointment, were reviewed.   Vitals:    11/02/17 1101   BP: 127/76   Pulse: 104   Weight: 70.1 kg (154 lb 9.6 oz)   Height: 4' 11" (1.499 m)        General:  unremarkable, age appropriate     Musculoskeletal  Muscle Strength/Tone:  no tremor   Gait & Station:  non-ataxic     Psychiatric  Speech:  no latency; no press   Mood & Affect:  anxious, depressed  congruent and appropriate   Thought Process:  normal and logical   Associations:  intact   Thought Content:  normal, no suicidality, no homicidality, delusions, or paranoia   Insight:  intact   Judgement: behavior is adequate to circumstances   Orientation:  grossly intact   Memory: intact for content of interview   Language: grossly intact   Attention Span & Concentration:  able to focus   Fund of Knowledge:  intact and appropriate to age and level of education     Assessment and Diagnosis   Status/Progress: Based on the examination today, the patient's problem(s) is/are adequately but not ideally controlled.  New problems have been presented today.   Co-morbidities are complicating management of the primary condition.  There are no active rule-out diagnoses for this patient at this time.     General Impression: Anxious      ICD-10-CM ICD-9-CM   1. Major depressive disorder, recurrent episode, mild F33.0 296.31       Intervention/Counseling/Treatment Plan   · Medication Management: Continue current medications. The risks and benefits of medication were discussed with the patient. except stop Prozac and start Cymbalta   · BMU      Return to Clinic: 1 month  "

## 2017-11-15 ENCOUNTER — HOSPITAL ENCOUNTER (OUTPATIENT)
Dept: PSYCHIATRY | Facility: HOSPITAL | Age: 47
Discharge: HOME | End: 2017-11-15
Attending: PSYCHIATRY & NEUROLOGY
Payer: COMMERCIAL

## 2017-11-15 VITALS
HEIGHT: 59 IN | SYSTOLIC BLOOD PRESSURE: 111 MMHG | TEMPERATURE: 98 F | RESPIRATION RATE: 18 BRPM | WEIGHT: 153.13 LBS | DIASTOLIC BLOOD PRESSURE: 72 MMHG | BODY MASS INDEX: 30.87 KG/M2 | HEART RATE: 114 BPM

## 2017-11-15 DIAGNOSIS — F41.9 ANXIETY: ICD-10-CM

## 2017-11-15 DIAGNOSIS — F41.1 GENERALIZED ANXIETY DISORDER: Primary | ICD-10-CM

## 2017-11-15 DIAGNOSIS — F60.89 CLUSTER B PERSONALITY DISORDER: ICD-10-CM

## 2017-11-15 DIAGNOSIS — F33.0 MAJOR DEPRESSIVE DISORDER, RECURRENT EPISODE, MILD: ICD-10-CM

## 2017-11-15 LAB
AMPHET+METHAMPHET UR QL: NEGATIVE
BARBITURATES UR QL SCN>200 NG/ML: NEGATIVE
BENZODIAZ UR QL SCN>200 NG/ML: NORMAL
BZE UR QL SCN: NEGATIVE
CANNABINOIDS UR QL SCN: NEGATIVE
CREAT UR-MCNC: 305 MG/DL
ETHANOL UR-MCNC: <10 MG/DL
METHADONE UR QL SCN>300 NG/ML: NEGATIVE
OPIATES UR QL SCN: NEGATIVE
PCP UR QL SCN>25 NG/ML: NEGATIVE
TOXICOLOGY INFORMATION: NORMAL

## 2017-11-15 PROCEDURE — 90853 GROUP PSYCHOTHERAPY: CPT

## 2017-11-15 PROCEDURE — 99223 1ST HOSP IP/OBS HIGH 75: CPT | Mod: ,,, | Performed by: PSYCHIATRY & NEUROLOGY

## 2017-11-15 PROCEDURE — 90853 GROUP PSYCHOTHERAPY: CPT | Mod: ,,, | Performed by: PSYCHOLOGIST

## 2017-11-15 PROCEDURE — 80307 DRUG TEST PRSMV CHEM ANLYZR: CPT

## 2017-11-15 NOTE — PROGRESS NOTES
Group Psychotherapy (PhD/LCSW)    Site: [facility]    Clinical status of patient: Partial Hospitalization    Date: 11/15/2017    Group Focus: Psychodynamic Group Psychotherapy    Length of service: 59428 - 45-50 minutes    Number of patients in attendance: 5    Referred by: Behavioral Medicine Unit Treatment Team    Target symptoms: Depression, Anxiety and Poor Coping Skills    Patient's response to treatment: Active Listening and Self-disclosure    Progress toward goals: Progressing adequately    Interval History: Pt introduced herself to the group. She brought up numerous traumas dating back to childhood and had difficulty staying rooted in any one topic. She was somewhat receptive to group feedback about working on what is going on in her life now and on making small steps toward doing so.    Diagnosis: F41.1, F60.9    Plan: Continue treatment on U
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
## 2017-11-15 NOTE — PROGRESS NOTES
Group Psychotherapy (PhD/LCSW)    Site: [facility]    Clinical status of patient: Intensive Outpatient Program    Date: 11/15/2017    Group Focus: ACT Group Psychotherapy    Length of service: 97642 - 45-50 minutes    Number of patients in attendance: 10    Referred by: Behavioral Medicine Unit Treatment Team    Target symptoms: Depression, Anxiety and Poor Coping Skills    Patient's response to treatment: Active Listening and Self-disclosure    Progress toward goals: Progressing adequately    Interval History: Session focus was Avoidance and Acceptance.  Patients were encouraged to identify a pain-provoking target and generate scenarios to practice acceptance.    Diagnosis: 296.32    Plan: Continue treatment on BMU         English

## 2017-11-16 ENCOUNTER — HOSPITAL ENCOUNTER (OUTPATIENT)
Dept: PSYCHIATRY | Facility: HOSPITAL | Age: 47
Discharge: HOME | End: 2017-11-16
Attending: PSYCHIATRY & NEUROLOGY
Payer: COMMERCIAL

## 2017-11-16 VITALS — HEART RATE: 109 BPM | DIASTOLIC BLOOD PRESSURE: 77 MMHG | SYSTOLIC BLOOD PRESSURE: 111 MMHG | RESPIRATION RATE: 16 BRPM

## 2017-11-16 DIAGNOSIS — F60.89 CLUSTER B PERSONALITY DISORDER: ICD-10-CM

## 2017-11-16 DIAGNOSIS — F41.1 GENERALIZED ANXIETY DISORDER: Primary | ICD-10-CM

## 2017-11-16 DIAGNOSIS — F41.9 ANXIETY: ICD-10-CM

## 2017-11-16 DIAGNOSIS — F33.0 MAJOR DEPRESSIVE DISORDER, RECURRENT EPISODE, MILD: ICD-10-CM

## 2017-11-16 PROCEDURE — 90853 GROUP PSYCHOTHERAPY: CPT | Mod: 59,,, | Performed by: PSYCHOLOGIST

## 2017-11-16 PROCEDURE — 90853 GROUP PSYCHOTHERAPY: CPT | Mod: ,,, | Performed by: PSYCHOLOGIST

## 2017-11-16 PROCEDURE — 90853 GROUP PSYCHOTHERAPY: CPT

## 2017-11-16 NOTE — PROGRESS NOTES
Group Psychotherapy (PhD/LCSW)    Site: [facility]    Clinical status of patient: Partial Hospitalization    Date: 11/16/2017    Group Focus: Stress Management       Length of service: 24446 - 45-50 minutes    Number of patients in attendance: 12    Referred by: Behavioral Medicine Unit Treatment Team    Target symptoms: Depression, Anxiety and Poor Coping Skills    Patient's response to treatment: Active Listening and Self-disclosure    Progress toward goals: Progressing adequately    Interval History:Discussed basic model of stress management and illustrated how to apply the strategies associated with each aspect of the model. Emphasized the role of self-talk and ways of changing negative to positive self-talk in order to manage stress effectively.     Diagnosis: F41.1, F60.9    Plan: Continue treatment on BMU
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
## 2017-11-16 NOTE — PROGRESS NOTES
"Group Psychotherapy (PhD/LCSW)    Site: [facility]    Clinical status of patient: Intensive Outpatient Program (IOP)     Date: 11/16/2017    Group Focus: Strength Training    Length of service: 70529 - 45-50 minutes    Number of patients in attendance: 5    Referred by: Behavioral Medicine Unit Treatment Team    Target symptoms: Depression, Anxiety and Poor Coping Skills    Patient's response to treatment: Active Listening and Self-disclosure    Progress toward goals: Progressing adequately    Interval History: PDiscussed various ways of creatively coping with the "inherent unfairness of life" as a step toward personal growth.    Diagnosis: F41.1, F60.9    Plan: Continue treatment on BMU        "

## 2017-11-16 NOTE — PROGRESS NOTES
Group Psychotherapy (PhD/LCSW)    Site: [facility]    Clinical status of patient: Partial Hospitalization    Date: 11/16/2017    Group Focus: Psychodynamic Group Psychotherapy    Length of service: 77408 - 45-50 minutes    Number of patients in attendance: 6    Referred by: Behavioral Medicine Unit Treatment Team    Target symptoms: Depression, Anxiety and Poor Coping Skills    Patient's response to treatment: Active Listening and Self-disclosure    Progress toward goals: Progressing adequately    Interval History: Pt reports that she slept much better last night and is feeling calmer today. She was much quieter in group today, and did not participate much in a conversation about grief and loss.    Diagnosis: F41.1, F60.9    Plan: Continue treatment on U

## 2017-11-20 ENCOUNTER — HOSPITAL ENCOUNTER (OUTPATIENT)
Dept: PSYCHIATRY | Facility: HOSPITAL | Age: 47
Discharge: HOME | End: 2017-11-20
Attending: PSYCHIATRY & NEUROLOGY
Payer: COMMERCIAL

## 2017-11-20 VITALS — HEART RATE: 121 BPM | DIASTOLIC BLOOD PRESSURE: 62 MMHG | RESPIRATION RATE: 16 BRPM | SYSTOLIC BLOOD PRESSURE: 109 MMHG

## 2017-11-20 DIAGNOSIS — F41.9 ANXIETY: ICD-10-CM

## 2017-11-20 DIAGNOSIS — F41.1 GENERALIZED ANXIETY DISORDER: Primary | ICD-10-CM

## 2017-11-20 DIAGNOSIS — F33.0 MAJOR DEPRESSIVE DISORDER, RECURRENT EPISODE, MILD: ICD-10-CM

## 2017-11-20 DIAGNOSIS — F60.89 CLUSTER B PERSONALITY DISORDER: ICD-10-CM

## 2017-11-20 PROCEDURE — 90853 GROUP PSYCHOTHERAPY: CPT | Performed by: SOCIAL WORKER

## 2017-11-20 PROCEDURE — 90853 GROUP PSYCHOTHERAPY: CPT | Mod: ,,, | Performed by: PSYCHOLOGIST

## 2017-11-20 PROCEDURE — 90853 GROUP PSYCHOTHERAPY: CPT

## 2017-11-20 PROCEDURE — 99232 SBSQ HOSP IP/OBS MODERATE 35: CPT | Mod: ,,,

## 2017-11-20 RX ORDER — PRAZOSIN HYDROCHLORIDE 1 MG/1
1 CAPSULE ORAL NIGHTLY
Qty: 30 CAPSULE | Refills: 11 | Status: SHIPPED | OUTPATIENT
Start: 2017-11-20 | End: 2018-12-11

## 2017-11-20 NOTE — PROGRESS NOTES
Group Psychotherapy (PhD/LCSW)    Site: [facility]    Clinical status of patient: Partial Hospitalization    Date: 11/20/2017    Group Focus: Psychodynamic Group Psychotherapy    Length of service: 91420 - 45-50 minutes    Number of patients in attendance: 7    Referred by: Behavioral Medicine Unit Treatment Team    Target symptoms: Depression, Anxiety and Poor Coping Skills    Patient's response to treatment: Active Listening and Self-disclosure    Progress toward goals: Progressing adequately    Interval History: Pt discussed her difficulty holding boundaries with her family, and used the upcoming Thanksgiving holiday as an example. She took in feedback from group about how she might work to more assertively communicate to her family and give alternative options.    Diagnosis: F41.1, F60.9    Plan: Continue treatment on BMU

## 2017-11-20 NOTE — PLAN OF CARE
11/20/17 1000   Activity/Group Therapy Checklist   Group Cognitive Therapy  (Weekend Check In)   Attendance Attended   Follows Direction Followed directions   Group Interactions/Observations Interacted appropriately   Affect/Mood Range Normal range   Affect/Mood Display Appropriate   Goal Progression Progressing

## 2017-11-20 NOTE — PROGRESS NOTES
Group Psychotherapy (PhD/LCSW)    Site: [facility]    Clinical status of patient: Intensive Outpatient Program (IOP)     Date: 11/20/2017    Group Focus: Communication Skills     Length of service: 39749 - 45-50 minutes    Number of patients in attendance:  12    Referred by: Behavioral Medicine Unit Treatment Team    Target symptoms: Depression, Anxiety and Poor Coping Skills    Patient's response to treatment: Active Listening     Progress toward goals: Progressing adequately    Interval History:  Discussed communication strategies for re-establishing trust where there has been a significant betrayal of trust. Emphasized the value of empathic listening when responding to mistrust and the value of I-messages when one fears being betrayed again.    Diagnosis: F41.1, F60.9    Plan: Continue treatment on BMU

## 2017-11-21 ENCOUNTER — PATIENT MESSAGE (OUTPATIENT)
Dept: ADMINISTRATIVE | Facility: OTHER | Age: 47
End: 2017-11-21

## 2017-11-21 ENCOUNTER — HOSPITAL ENCOUNTER (OUTPATIENT)
Dept: PSYCHIATRY | Facility: HOSPITAL | Age: 47
Discharge: HOME | End: 2017-11-21
Attending: PSYCHIATRY & NEUROLOGY
Payer: COMMERCIAL

## 2017-11-21 DIAGNOSIS — F33.0 MAJOR DEPRESSIVE DISORDER, RECURRENT EPISODE, MILD: Primary | ICD-10-CM

## 2017-11-21 DIAGNOSIS — F41.9 ANXIETY: ICD-10-CM

## 2017-11-21 DIAGNOSIS — F60.89 CLUSTER B PERSONALITY DISORDER: ICD-10-CM

## 2017-11-21 DIAGNOSIS — F41.1 GENERALIZED ANXIETY DISORDER: ICD-10-CM

## 2017-11-21 PROCEDURE — 90832 PSYTX W PT 30 MINUTES: CPT

## 2017-11-21 PROCEDURE — 99232 SBSQ HOSP IP/OBS MODERATE 35: CPT | Mod: ,,,

## 2017-11-21 PROCEDURE — 90853 GROUP PSYCHOTHERAPY: CPT | Mod: ,,, | Performed by: PSYCHOLOGIST

## 2017-11-21 PROCEDURE — 90853 GROUP PSYCHOTHERAPY: CPT
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
## 2017-11-21 NOTE — PROGRESS NOTES
Group Psychotherapy (PhD/LCSW)    Site: [facility]    Clinical status of patient: Intensive Outpatient Program    Date: 11/21/2017    Group Focus: ACT Group Psychotherapy    Length of service: 74702 - 45-50 minutes    Number of patients in attendance: 10    Referred by: Behavioral Medicine Unit Treatment Team    Target symptoms: Depression, Anxiety and Poor Coping Skills    Patient's response to treatment: Active Listening and Self-disclosure    Progress toward goals: Progressing adequately    Interval History: Session focus was Fusion and Defusion.  Patients were introduced to defusion, relational frame theory, and defusion exercises.    Diagnosis: F41.1, F60.9    Plan: Continue treatment on [facility]

## 2017-11-21 NOTE — PLAN OF CARE
11/21/17 1100   Activity/Group Therapy Checklist   Group Goals/Reflection   Attendance Attended   Follows Direction Followed directions   Group Interactions/Observations Interacted appropriately   Affect/Mood Range Normal range   Affect/Mood Display Appropriate   Goal Progression Progressing
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
## 2017-11-21 NOTE — PROGRESS NOTES
Group Psychotherapy (PhD/LCSW)    Site: [facility]    Clinical status of patient: IOP    Date: 11/21/2017    Group Focus: Psychodynamic Group Psychotherapy    Length of service: 80722 - 45-50 minutes    Number of patients in attendance: 6    Referred by: Behavioral Medicine Unit Treatment Team    Target symptoms: Depression, Anxiety and Poor Coping Skills    Patient's response to treatment: Active Listening and Self-disclosure    Progress toward goals: Progressing adequately    Interval History: Pt agreed to have Thanksgiving at her house but feels nervous about it. She explored this further with the group, and took in some suggestions about increasing accountability of others who have offered to help.    Diagnosis: F41.1, F60.9    Plan: Continue treatment on BMU

## 2017-11-22 ENCOUNTER — HOSPITAL ENCOUNTER (OUTPATIENT)
Dept: PSYCHIATRY | Facility: HOSPITAL | Age: 47
Discharge: HOME | End: 2017-11-22
Attending: PSYCHIATRY & NEUROLOGY
Payer: COMMERCIAL

## 2017-11-22 DIAGNOSIS — F33.0 MAJOR DEPRESSIVE DISORDER, RECURRENT EPISODE, MILD: ICD-10-CM

## 2017-11-22 DIAGNOSIS — F41.1 GENERALIZED ANXIETY DISORDER: Primary | ICD-10-CM

## 2017-11-22 DIAGNOSIS — F41.9 ANXIETY: ICD-10-CM

## 2017-11-22 DIAGNOSIS — F60.89 CLUSTER B PERSONALITY DISORDER: ICD-10-CM

## 2017-11-22 PROCEDURE — 90853 GROUP PSYCHOTHERAPY: CPT

## 2017-11-22 PROCEDURE — 90853 GROUP PSYCHOTHERAPY: CPT | Mod: ,,, | Performed by: PSYCHOLOGIST

## 2017-11-22 PROCEDURE — 90834 PSYTX W PT 45 MINUTES: CPT | Mod: ,,, | Performed by: PSYCHOLOGIST

## 2017-11-22 NOTE — PROGRESS NOTES
[facility]  Psychology  Progress Note  Individual Psychotherapy (PhD/LCSW)    Patient Name: Marilee Simons  MRN: [id number]    Patient Class: OP- Behavioral Recurring   Admission Date: 11/22/2017  Attending Physician: Fausto Mancia MD  Primary Care Provider: Nadeem Zheng MD    Therapeutic Intervention: Met with patient.  Inpatient/Partial Hospital - Insight oriented psychotherapy 45 min - CPT code 34316 and Inpatient/Partial Hospital - Behavior modifying psychotherapy 45 min - CPT code 95038    Chief Complaint/Reason for Encounter: depression and anxiety     Interval History and Content of Current Session: Met with pt during the course of her BMU program. Worked with pt to help set appropriate goals and parameters for the BMU, as she tends to talk a lot about traumas from the past or drift into stressors irrelevant to her current situation. Topic that resonated with her was making room to tend to herself instead of over-giving to others, as well as improving her ability to ask for help rather than martyr herself and seethe with resentment about things. On a related way, we explored the way her passivity tends to feed these two behaviors, and discussed working on increasing assertiveness.     Risk Parameters:  Patient reports no suicidal ideation  Patient reports no homicidal ideation  Patient reports no self-injurious behavior  Patient reports no violent behavior    Verbal Deficits: None    Patient's response to intervention:  The patient's response to intervention is accepting.    Progress toward goals and other mental status changes:  The patient's progress toward goals is fair .    Diagnostic Impression - Plan:       CHANTE, MDD, Personality Disorder    Treatment Plan:  · Target symptoms: depression, anxiety   · Why chosen therapy is appropriate versus another modality: relevant to diagnosis, patient responds to this modality  · Outcome monitoring methods: self-report,  observation  · Therapeutic intervention type: insight oriented psychotherapy, behavior modifying psychotherapy    Plan:  BMU - complete program as scheduled    Return to Clinic: as needed    Length of Service (minutes): 45    Corine Metcalf, PhD  Psychology  [facility]

## 2017-11-22 NOTE — PROGRESS NOTES
Group Psychotherapy (PhD/LCSW)    Site: [facility]    Clinical status of patient: IOP    Date: 11/22/2017    Group Focus: Psychodynamic Group Psychotherapy    Length of service: 71769 - 45-50 minutes    Number of patients in attendance: 7    Referred by: Behavioral Medicine Unit Treatment Team    Target symptoms: Depression, Anxiety and Poor Coping Skills    Patient's response to treatment: Active Listening and Self-disclosure    Progress toward goals: Progressing adequately    Interval History: Pt reports that she had a migraine yesterday and did not do much activity. She engaged in a conversation about self-defeating behavior.    Diagnosis: F41.1, F60.9    Plan: Continue treatment on U

## 2017-11-23 NOTE — PROGRESS NOTES
Group Psychotherapy (PhD/LCSW)    Site: [facility]    Clinical status of patient: IOP    Date: 11/22/2017    Group Focus: The Dynamics of Relationship     Length of service: 67889 - 45-50 minutes    Number of patients in attendance: 11    Referred by: Behavioral Medicine Unit Treatment Team    Target symptoms: Depression, Anxiety and Poor Coping Skills    Patient's response to treatment: Active Listening and Self-disclosure    Progress toward goals: Progressing adequately    Interval History:  Discussed the impact of fx of origin dynamics on current relationship issues and how to use that insight for personal growth and to improve cuirrent relationship dynamics. r.    Diagnosis: F41.1, F60.9    Plan: Continue treatment on BMU

## 2017-11-27 ENCOUNTER — HOSPITAL ENCOUNTER (OUTPATIENT)
Dept: PSYCHIATRY | Facility: HOSPITAL | Age: 47
Discharge: HOME | End: 2017-11-27
Attending: PSYCHIATRY & NEUROLOGY
Payer: COMMERCIAL

## 2017-11-27 DIAGNOSIS — F41.9 ANXIETY: ICD-10-CM

## 2017-11-27 DIAGNOSIS — F41.1 GENERALIZED ANXIETY DISORDER: Primary | ICD-10-CM

## 2017-11-27 DIAGNOSIS — F60.89 CLUSTER B PERSONALITY DISORDER: ICD-10-CM

## 2017-11-27 DIAGNOSIS — F33.0 MAJOR DEPRESSIVE DISORDER, RECURRENT EPISODE, MILD: ICD-10-CM

## 2017-11-27 PROCEDURE — 90853 GROUP PSYCHOTHERAPY: CPT | Mod: 59,,, | Performed by: PSYCHOLOGIST

## 2017-11-27 PROCEDURE — 90853 GROUP PSYCHOTHERAPY: CPT

## 2017-11-27 PROCEDURE — 90853 GROUP PSYCHOTHERAPY: CPT | Mod: ,,, | Performed by: PSYCHOLOGIST

## 2017-11-27 NOTE — PROGRESS NOTES
Group Psychotherapy (PhD/LCSW)    Site: [facility]    Clinical status of patient: IOP    Date: 11/27/2017    Group Focus: Communication Skills       Length of service: 57780 - 45-50 minutes    Number of patients in attendance: 11    Referred by: Behavioral Medicine Unit Treatment Team    Target symptoms: Depression, Anxiety and Poor Coping Skills    Patient's response to treatment: Active Listening      Progress toward goals: Progressing adequately    Interval History:  Discussed the differences between I-messages and You-messages. Noted how to turn You-messages into I-messages. Illustrated the value of I-messages for defining limits, promoting dialog, and communicating assertively.     Diagnosis: F41.1, F60.9    Plan: Continue treatment on BMU

## 2017-11-27 NOTE — PROGRESS NOTES
"Group Psychotherapy (PhD/LCSW)    Site: [facility]    Clinical status of patient: IOP    Date: 11/27/2017    Group Focus: Strength Training    Length of service: 79013 - 45-50 minutes    Number of patients in attendance: 10    Referred by: Behavioral Medicine Unit Treatment Team    Target symptoms: Depression, Anxiety      Patient's response to treatment: Active Listening      Progress toward goals: Progressing adequately    Interval History:  Discussed the challenge of finding a balance between taking responsibility for self-care and consideration of feelings and needs of others. Noted the importance of being able to say "no" when one tends to go too far in seeking to please others. Also discussed the role of fx or origin dynamics in tendencies to go too far in one direction or another.         Diagnosis: F41.1, F60.9    Plan: Continue treatment on BMU        "

## 2017-11-27 NOTE — PROGRESS NOTES
Group Psychotherapy (PhD/LCSW)    Site: [facility]    Clinical status of patient: IOP    Date: 11/27/2017    Group Focus: Psychodynamic Group Psychotherapy    Length of service: 41778 - 45-50 minutes    Number of patients in attendance: 7    Referred by: Behavioral Medicine Unit Treatment Team    Target symptoms: Depression, Anxiety and Poor Coping Skills    Patient's response to treatment: Active Listening and Self-disclosure    Progress toward goals: Progressing adequately    Interval History: Pt reports that she was depressed all Thanksgiving and looked forward to returning here. She shared with the group that she is very sad about her lack of support at home and the lack of care/understanding given to her by her spouse and daughter. Group gave support and suggestions to increase understanding.    Diagnosis: F41.1, F60.9    Plan: Continue treatment on BMU

## 2017-11-29 ENCOUNTER — HOSPITAL ENCOUNTER (OUTPATIENT)
Dept: PSYCHIATRY | Facility: HOSPITAL | Age: 47
Discharge: HOME | End: 2017-11-29
Attending: PSYCHIATRY & NEUROLOGY
Payer: COMMERCIAL

## 2017-11-29 DIAGNOSIS — F33.0 MAJOR DEPRESSIVE DISORDER, RECURRENT EPISODE, MILD: ICD-10-CM

## 2017-11-29 DIAGNOSIS — F60.89 CLUSTER B PERSONALITY DISORDER: ICD-10-CM

## 2017-11-29 DIAGNOSIS — F33.1 MAJOR DEPRESSIVE DISORDER, RECURRENT EPISODE, MODERATE: Primary | ICD-10-CM

## 2017-11-29 DIAGNOSIS — F41.9 ANXIETY: ICD-10-CM

## 2017-11-29 DIAGNOSIS — F41.1 GENERALIZED ANXIETY DISORDER: ICD-10-CM

## 2017-11-29 PROCEDURE — 90853 GROUP PSYCHOTHERAPY: CPT | Mod: ,,, | Performed by: PSYCHIATRY & NEUROLOGY

## 2017-11-29 PROCEDURE — 90853 GROUP PSYCHOTHERAPY: CPT | Mod: ,,, | Performed by: PSYCHOLOGIST

## 2017-11-29 PROCEDURE — 90853 GROUP PSYCHOTHERAPY: CPT

## 2017-11-29 NOTE — PROGRESS NOTES
Group Psychotherapy (PhD/LCSW)    Site: [facility]    Clinical status of patient: IOP    Date: 11/29/2017    Group Focus: Psychodynamic Group Psychotherapy    Length of service: 75100 - 45-50 minutes    Number of patients in attendance: 7    Referred by: Behavioral Medicine Unit Treatment Team    Target symptoms: Depression, Anxiety and Poor Coping Skills    Patient's response to treatment: Active Listening and Self-disclosure    Progress toward goals: Progressing adequately    Interval History: Pt reports that she is not feeling well today physically. However, she was more engaged with the group than usual, relating to other people and giving good interpersonal feedback.    Diagnosis: F41.1, F60.9    Plan: Continue treatment on U

## 2017-11-29 NOTE — PROGRESS NOTES
Group Psychotherapy (PhD/LCSW)    Site: [facility]    Clinical status of patient: IOP    Date: 11/29/2017    Group Focus: DBT-Based Group Psychotherapy    Length of service: 20022 - 45-50 minutes    Number of patients in attendance: 14    Referred by: Behavioral Medicine Unit Treatment Team    Target symptoms: Depression, Anxiety and Poor Coping Skills    Patient's response to treatment: Active Listening and Self-disclosure    Progress toward goals: Progressing adequately    Interval History: Session focus was Emotion Regulation:  Opposite Action.  Patients identified action urges for each emotion and learned how to engage in opposite action when the emotions are not justified or unhelpful.    Diagnosis: F41.1, F60.9    Plan: Continue treatment on U

## 2017-11-30 ENCOUNTER — HOSPITAL ENCOUNTER (OUTPATIENT)
Dept: PSYCHIATRY | Facility: HOSPITAL | Age: 47
Discharge: HOME | End: 2017-11-30
Attending: PSYCHIATRY & NEUROLOGY
Payer: COMMERCIAL

## 2017-11-30 DIAGNOSIS — F41.1 GENERALIZED ANXIETY DISORDER: Primary | ICD-10-CM

## 2017-11-30 DIAGNOSIS — F41.9 ANXIETY: ICD-10-CM

## 2017-11-30 DIAGNOSIS — F33.0 MAJOR DEPRESSIVE DISORDER, RECURRENT EPISODE, MILD: ICD-10-CM

## 2017-11-30 DIAGNOSIS — F60.89 CLUSTER B PERSONALITY DISORDER: ICD-10-CM

## 2017-11-30 PROCEDURE — 90853 GROUP PSYCHOTHERAPY: CPT | Mod: ,,, | Performed by: PSYCHOLOGIST

## 2017-11-30 PROCEDURE — 99232 SBSQ HOSP IP/OBS MODERATE 35: CPT | Mod: ,,, | Performed by: PSYCHIATRY & NEUROLOGY

## 2017-11-30 PROCEDURE — 90853 GROUP PSYCHOTHERAPY: CPT

## 2017-11-30 NOTE — PROGRESS NOTES
"Group Psychotherapy (PhD/LCSW)    Site: [facility]    Clinical status of patient: IOP    Date: 11/30/2017    Group Focus: Strength Training      Length of service: 92065 - 45-50 minutes    Number of patients in attendance: 8    Referred by: Behavioral Medicine Unit Treatment Team    Target symptoms: Depression, Anxiety and Poor Coping Skills    Patient's response to treatment: Active Listening; Self Disclosure     Progress toward goals: Progressing adequately    Interval History: SDiscussed coping with the stigma of mental illness in general, and depression in particular. Reviewed the disease model of depression and how it differs from ordinary, day-to-day periods of sadness (eg "the blues"). Noted how others who have not had significant depression tend to relate depression to their own experiences and can misjudge those people who are suffering with "clinical depression."     Diagnosis: F41.1, F60.9    Plan: Continue treatment on BMU        "

## 2017-11-30 NOTE — PROGRESS NOTES
Group Psychotherapy (PhD/LCSW)    Site: [facility]    Clinical status of patient: IOP    Date: 11/30/2017    Group Focus: Psychodynamic Group Psychotherapy    Length of service: 58992 - 45-50 minutes    Number of patients in attendance: 7    Referred by: Behavioral Medicine Unit Treatment Team    Target symptoms: Depression, Anxiety and Poor Coping Skills    Patient's response to treatment: Active Listening and Self-disclosure    Progress toward goals: Progressing adequately    Interval History: Pt reports that she feels hopeful about a new medication, as well as the recommendation for family therapy. She did not cry in group today and was more engaged in conversation.    Diagnosis: F41.1, F60.9    Plan: Continue treatment on U

## 2017-11-30 NOTE — PROGRESS NOTES
[facility]  Progress Note  Behavioral Medicine Unit    Date: 17  Time: 11:10 AM    Name: Marilee Simons    Age: 47 y.o.       : 1970            Admit Date:     SUBJECTIVE:  Patient is a 47 y.o. old female with a history of major depressive disorder, CHANTE, and PTSD. Patient witnessed her sister commit suicide by walking into oncoming traffic and being hit and subsequently dragged by a car. At the time of admission she reported passive suicidal ideation, amotivation, decreased appetite with weight loss, low energy. She also reported re-experiencing her sister's death through nightmares, which have increased in frequency to nightly over the past six months. She was taking cymbalta 60mg daily and klonopin 1mg three times daily as needed, as well as trazodone for sleep.      Today the patient reports that she is not started prazosin that was prescribed by Ms. Paula as her  was apparently told that the prescription was not at the pharmacy.  This is in contradiction to the electronic medical record.    She reports however that she generally feels better while attending the program however when she returns home her mood declines.  She reports crying on the way home at times.  She denies recent desire for death.  She is angry about her Thanksgiving experience because the celebration was held at her home in contrast to her wishes.  She frequently speaks about how her family does not understand her illness.  She states her  does not understand her decreased libido and feels that she is punishing him.        Current Medications:   Current Outpatient Prescriptions on File Prior to Encounter   Medication Sig Dispense Refill    adalimumab (HUMIRA PEN CROHN'S-UC-HS START) PnKt injection Inject 0.8 mLs (40 mg total) into the skin every 14 (fourteen) days. 1.6 mL 11    alosetron (LOTRONEX) 0.5 MG tablet TAKE 1 TABLET(0.5 MG) BY MOUTH TWICE DAILY 60 tablet 0    alosetron  (LOTRONEX) 0.5 MG tablet TAKE 1 TABLET(0.5 MG) BY MOUTH TWICE DAILY 60 tablet 0    amitriptyline (ELAVIL) 10 MG tablet Take 1 tablet (10 mg total) by mouth nightly as needed. (Patient taking differently: Take 10 mg by mouth nightly as needed for Insomnia. ) 30 tablet 11    butalbital-acetaminophen (BUPAP)  mg Tab Take 1 tablet by mouth every 4 (four) hours. 60 tablet 3    clonazePAM (KLONOPIN) 1 MG tablet Take 1 tablet (1 mg total) by mouth 3 (three) times daily. 90 tablet 3    cyclobenzaprine (FLEXERIL) 10 MG tablet Take 1 tablet (10 mg total) by mouth 2 (two) times daily. 90 tablet 3    diazePAM (VALIUM) 5 MG tablet INSERT 1 TABLET EITHER CRUSHED/INTACT INSIDE THE VAGINA AT NIGHT AS NEEDED FOR PELVIC PAIN& SPASM 60 tablet 0    DULoxetine (CYMBALTA) 60 MG capsule Take 1 capsule (60 mg total) by mouth once daily. 30 capsule 11    gabapentin (NEURONTIN) 800 MG tablet TAKE 1 TABLET BY MOUTH THREE TIMES DAILY 90 tablet 3    hydroxychloroquine (PLAQUENIL) 200 mg tablet TAKE 1 TABLET BY MOUTH TWICE DAILY 60 tablet 3    hydrOXYzine HCl (ATARAX) 25 MG tablet TAKE 1 TABLET(25 MG) BY MOUTH EVERY EVENING 30 tablet 0    levothyroxine (SYNTHROID) 50 MCG tablet TAKE 1 TABLET(50 MCG) BY MOUTH EVERY DAY 30 tablet 3    methen-m.blue-s.phos-phsal-hyo (URIBEL) 118-10-40.8-36 mg Cap Take 1 capsule by mouth 4 (four) times daily. 120 capsule 3    multivitamin with minerals tablet Take 1 tablet by mouth once daily.      prazosin (MINIPRESS) 1 MG Cap Take 1 capsule (1 mg total) by mouth every evening. 30 capsule 11    promethazine (PHENERGAN) 25 MG tablet TAKE 1 TABLET(25 MG) BY MOUTH EVERY 6 HOURS AS NEEDED 60 tablet 0    rabeprazole (ACIPHEX) 20 mg tablet Take 1 tablet (20 mg total) by mouth once daily. 30 tablet 11    tizanidine (ZANAFLEX) 4 MG tablet TAKE 1 TABLET(4 MG) BY MOUTH EVERY 8 HOURS 90 tablet 3    tramadol (ULTRAM) 50 mg tablet TAKE 1 TABLET BY MOUTH EVERY 12 HOURS AS NEEDED FOR PAIN 60 tablet 3     traZODone (DESYREL) 50 MG tablet TAKE 1/2  TABLET BY MOUTH EVERY NIGHT AT BEDTIME AS NEEDED FOR INSOMNIA 60 tablet 2    triamcinolone acetonide 0.1% (KENALOG) 0.1 % cream APPLY TO THE AFFECTED AREA TWICE DAILY 454 g 0    URIBEL 118-10-40.8-36 mg Cap TAKE 1 CAPSULE BY MOUTH FOUR TIMES DAILY 120 capsule 0     No current facility-administered medications on file prior to encounter.        Psychiatric ROS:  See Dr. Mancia's Admission Note of date 11/15/17 for ROS.    OBJECTIVE:  Vitals (Most Recent)   vitals were not taken for this visit.    Labs/Imaging/Studies:   No results found for this or any previous visit (from the past 48 hour(s)).   No results found for: PHENYTOIN, PHENOBARB, VALPROATE, CBMZ    Pain: 0/10    Musculoskeletal Exam:  Abnormal Involuntary Movements: None  Gait: Non-ataxic, unassisted     Mental Status Exam:  Appearance: Neatly groomed, casually dressed, appears stated age, overweight  Behavior/Cooperation: Cooperative, good eye contact, no PMA or PMR  Speech: Not pressured or loud, clearly audible, no slurring  Mood: Depressed, anxious  Affect: Mildly constricted, appropriate  Thought Process: Tangential  Thought Content: No SI, HI, AH, VH, delusions, or RIS  Orientation: grossly intact  Memory: Intact for the content of the interview  Attention Span/Concentration: Attends to interview without distraction  Fund of Knowledge: Adequate for interview  Estimate of Intelligence: Average from verbal skills and history  Cognition: grossly intact  Insight: patient has awareness of illness  Judgment: the patient's behavior is adequate to circumstances     ASSESSMENT/PLAN:      General Assessment:  Patient is a 47 y.o. female admitted to the [facility] partial [facility] program for the third time.  Patient has a long history of depression and anxiety.  Significant in her history is the fact that her father committed suicide when the patient was a child and her sister committed suicide 2 years ago.  The  patient witnessed the results of both of those attempts.  In addition she has multiple physical health concerns.     Diagnoses:  Major Depression recurrent moderate  PTSD  CHANTE  IBS  Interstitial cystitis  Fibromyalgia        Plan:  Continue BMU treatment  Continue cymbalta 60mg daily  Continue klonopin 1mg tid prn for anxiety  Start prazosin 1 mg nightly.  Reviewed most recent blood pressure.  Warned patient about the potential for hypotension as evidenced by dizziness and near fainting spells.    Fausto Mancia MD

## 2017-11-30 NOTE — PATIENT CARE CONFERENCE
Problem List:  Major Depression recurrent moderate  PTSD  CHANTE  IBS  Interstitial cystitis  Fibromyalgia        Pt was staffed with treatment team today. Staff discussed pt's h/o BMU tx with last occurring in 2015. Staff discussed pt's current issues related to missed BMU tx days, somatic issues, strained family dynamic, and personality characteristics. Staff discussed recommendation to family therapy. Staff discussed pt's reports of PTSD. Staff discussed med management and possibly starting prazosin.     Follow-Up Appointments:  Medication Management:Dr. Gray  Psychotherapy: [place]      Staff Present:  MD Dr. Tea Cerda, Dennis Frausto, YASMEEN Pearson LCSW

## 2017-11-30 NOTE — PROGRESS NOTES
Group Psychotherapy (PhD/LCSW)    Site: [facility]    Clinical status of patient: IOP    Date: 11/30/2017    Group Focus: DBT-Based Group Psychotherapy    Length of service: 36638 - 45-50 minutes    Number of patients in attendance: 13    Referred by: Behavioral Medicine Unit Treatment Team    Target symptoms: Depression, Anxiety and Poor Coping Skills    Patient's response to treatment: Active Listening    Progress toward goals: Progressing adequately    Interval History: Session focus was Emotion Regulation:  ABC PLEASE.  Patients were encouraged to reduce vulnerability to distress by accumulating positive emotions, building mastery, coping ahead, and by attending to physical well-being.  She was tearful after seeing the psychiatrist briefly during session and declined to participate at the end, but appeared to be attentive.    Diagnosis: F41.1, F60.9    Plan: Continue treatment on U

## 2017-12-04 ENCOUNTER — HOSPITAL ENCOUNTER (OUTPATIENT)
Dept: PSYCHIATRY | Facility: HOSPITAL | Age: 47
Discharge: HOME | End: 2017-12-04
Attending: PSYCHIATRY & NEUROLOGY
Payer: COMMERCIAL

## 2017-12-04 VITALS — SYSTOLIC BLOOD PRESSURE: 105 MMHG | RESPIRATION RATE: 16 BRPM | DIASTOLIC BLOOD PRESSURE: 79 MMHG | HEART RATE: 96 BPM

## 2017-12-04 DIAGNOSIS — N30.10 IC (INTERSTITIAL CYSTITIS): ICD-10-CM

## 2017-12-04 DIAGNOSIS — F33.0 MAJOR DEPRESSIVE DISORDER, RECURRENT EPISODE, MILD: Primary | ICD-10-CM

## 2017-12-04 DIAGNOSIS — F41.9 ANXIETY: ICD-10-CM

## 2017-12-04 DIAGNOSIS — F41.1 GENERALIZED ANXIETY DISORDER: ICD-10-CM

## 2017-12-04 DIAGNOSIS — F60.89 CLUSTER B PERSONALITY DISORDER: ICD-10-CM

## 2017-12-04 PROCEDURE — 90853 GROUP PSYCHOTHERAPY: CPT

## 2017-12-04 PROCEDURE — 90853 GROUP PSYCHOTHERAPY: CPT | Performed by: SOCIAL WORKER

## 2017-12-04 PROCEDURE — 90853 GROUP PSYCHOTHERAPY: CPT | Mod: ,,, | Performed by: PSYCHOLOGIST

## 2017-12-04 RX ORDER — METHENAMINE, SODIUM PHOSPHATE, MONOBASIC, MONOHYDRATE, PHENYL SALICYLATE, METHYLENE BLUE, AND HYOSCYAMINE SULFATE 118; 40.8; 36; 10; .12 MG/1; MG/1; MG/1; MG/1; MG/1
CAPSULE ORAL
Qty: 120 CAPSULE | Refills: 0 | Status: SHIPPED | OUTPATIENT
Start: 2017-12-04 | End: 2018-02-12 | Stop reason: SDUPTHER

## 2017-12-04 NOTE — PLAN OF CARE
12/04/17 1000   Activity/Group Therapy Checklist   Group Goals/Reflection  (Identifying Values)   Attendance Attended   Follows Direction Followed directions   Group Interactions/Observations Interacted appropriately   Affect/Mood Range Normal range   Affect/Mood Display Appropriate   Goal Progression Progressing

## 2017-12-04 NOTE — PROGRESS NOTES
Group Psychotherapy (PhD/LCSW)    Site: [facility]    Clinical status of patient: IOP    Date: 12/4/2017    Group Focus: Communication Skills       Length of service: 18001 - 45-50 minutes    Number of patients in attendance: 14    Referred by: Behavioral Medicine Unit Treatment Team    Target symptoms: Depression, Anxiety and Poor Coping Skills    Patient's response to treatment: Active Listening and Self-disclosure    Progress toward goals: Progressing adequately    Interval History: Discussed how differences in gender, fx of origin, and ethnicity can impede the communication process when not taken into account. Also discussed the use of I-messages and Reflective Listening skills to overcome obstacles in the communication process.    Diagnosis: F41.1, F60.9    Plan: Continue treatment on BMU

## 2017-12-04 NOTE — PROGRESS NOTES
Group Psychotherapy (PhD/LCSW)    Site: [facility]    Clinical status of patient: IOP    Date: 12/4/2017    Group Focus: Psychodynamic Group Psychotherapy    Length of service: 13081 - 45-50 minutes    Number of patients in attendance: 7    Referred by: Behavioral Medicine Unit Treatment Team    Target symptoms: Depression, Anxiety and Poor Coping Skills    Patient's response to treatment: Active Listening and Self-disclosure    Progress toward goals: Progressing adequately    Interval History: Pt reports that her mother had a fall on Friday and she has been preoccupied with helping take care of her ever since then. She noted an increase in anxiety at the idea of losing her mother since she has lost so many other people.    Diagnosis: F41.1, F60.9    Plan: Continue treatment on U

## 2017-12-05 ENCOUNTER — HOSPITAL ENCOUNTER (OUTPATIENT)
Dept: PSYCHIATRY | Facility: HOSPITAL | Age: 47
Discharge: HOME | End: 2017-12-05
Attending: PSYCHIATRY & NEUROLOGY
Payer: COMMERCIAL

## 2017-12-05 DIAGNOSIS — F41.1 GENERALIZED ANXIETY DISORDER: ICD-10-CM

## 2017-12-05 DIAGNOSIS — F41.9 ANXIETY: ICD-10-CM

## 2017-12-05 DIAGNOSIS — F33.0 MAJOR DEPRESSIVE DISORDER, RECURRENT EPISODE, MILD: Primary | ICD-10-CM

## 2017-12-05 DIAGNOSIS — F60.89 CLUSTER B PERSONALITY DISORDER: ICD-10-CM

## 2017-12-05 PROCEDURE — 90853 GROUP PSYCHOTHERAPY: CPT

## 2017-12-05 PROCEDURE — 90853 GROUP PSYCHOTHERAPY: CPT | Mod: ,,, | Performed by: PSYCHOLOGIST

## 2017-12-05 PROCEDURE — 99238 HOSP IP/OBS DSCHRG MGMT 30/<: CPT | Mod: ,,, | Performed by: PSYCHIATRY & NEUROLOGY

## 2017-12-05 NOTE — PLAN OF CARE
12/05/17 1100   Activity/Group Therapy Checklist   Group Educational   Attendance Attended   Follows Direction Followed directions   Group Interactions/Observations Interacted appropriately   Affect/Mood Range Normal range   Affect/Mood Display Appropriate   Goal Progression Progressing

## 2017-12-05 NOTE — PROGRESS NOTES
Group Psychotherapy (PhD/LCSW)    Site: [facility]    Clinical status of patient: IOP    Date: 12/5/2017    Group Focus: CBT Group Psychotherapy    Length of service: 53533 - 45-50 minutes    Number of patients in attendance: 12    Referred by: Behavioral Medicine Unit Treatment Team    Target symptoms: Depression, Anxiety and Poor Coping Skills    Patient's response to treatment: Active Listening and Self-disclosure    Progress toward goals: Progressing adequately    Interval History: Session focus was Cognitive Restructuring:  Identifying unhelpful and helpful thoughts.  Patients were provided with a worksheet on unhelpful thinking styles and how to identify helpful thoughts.      Diagnosis: F41.1, F60.9    Plan: Continue treatment on U

## 2017-12-05 NOTE — PROGRESS NOTES
Group Psychotherapy (PhD/LCSW)    Site: [facility]    Clinical status of patient: IOP    Date: 12/5/2017    Group Focus: Psychodynamic Group Psychotherapy    Length of service: 16575 - 45-50 minutes    Number of patients in attendance: 8    Referred by: Behavioral Medicine Unit Treatment Team    Target symptoms: Depression, Anxiety and Poor Coping Skills    Patient's response to treatment: Active Listening and Self-disclosure    Progress toward goals: Progressing adequately    Interval History: Pt reviewed her progress in treatment and discussed plans for aftercare.    Diagnosis: F41.1, F60.9    Plan: Complete BMU treatment; follow up with aftercare.

## 2017-12-05 NOTE — PROGRESS NOTES
"[facility]  Discharge Note  Behavioral Medicine Unit    Date: 17  Time: 10:46 AM    Name: Marilee Simons    Age: 47 y.o.       : 1970            Admit Date:     SUBJECTIVE:  Patient is a 47 y.o. old female with a history of major depressive disorder, CHANTE, and PTSD. Patient witnessed her sister commit suicide by walking into oncoming traffic and being hit and subsequently dragged by a car. At the time of admission she reported passive suicidal ideation, amotivation, decreased appetite with weight loss, low energy. She also reported re-experiencing her sister's death through nightmares, which have increased in frequency to nightly over the past six months. She was taking cymbalta 60mg daily and klonopin 1mg three times daily as needed, as well as trazodone for sleep.      Today is the patient's last day in the BMU. She states that she feels both "good and bad" about finishing the program. It has been good that she's been able to realize that she's not alone and other patients feel similarly to the way she does. She states that she is anxious about "going home" and leaving the structure of the BMU program. She states that she may not want to participate in the aftercare group therapy because she doesn't know if ABU patients also participate in that program. Because of drug addicts in her family, she feels uncomfortable around ABU patients. She states that her mood is "better" than when she first stated the BMU program. Her mood is "hopeful" today.    The patient endorses lower back and leg pain today. She has been using Ibuprofen in addition to Tylenol recently even though she knows she is not supposed to. She states that she doesn't know if she has enough medication to last her until she sees Dr. Gray in clinic, but she will send him a message if she thinks that she is going to run out. She states that she has learned more about how to cope with stress in this program. She also " learned better communication skills from Dr. Mckinney. She denies SI/HI/AVH.        Current Medications:   Current Outpatient Prescriptions on File Prior to Encounter   Medication Sig Dispense Refill    adalimumab (HUMIRA PEN CROHN'S-[facility]-HS START) PnKt injection Inject 0.8 mLs (40 mg total) into the skin every 14 (fourteen) days. 1.6 mL 11    alosetron (LOTRONEX) 0.5 MG tablet TAKE 1 TABLET(0.5 MG) BY MOUTH TWICE DAILY 60 tablet 0    alosetron (LOTRONEX) 0.5 MG tablet TAKE 1 TABLET(0.5 MG) BY MOUTH TWICE DAILY 60 tablet 0    amitriptyline (ELAVIL) 10 MG tablet Take 1 tablet (10 mg total) by mouth nightly as needed. (Patient taking differently: Take 10 mg by mouth nightly as needed for Insomnia. ) 30 tablet 11    butalbital-acetaminophen (BUPAP)  mg Tab Take 1 tablet by mouth every 4 (four) hours. 60 tablet 3    clonazePAM (KLONOPIN) 1 MG tablet Take 1 tablet (1 mg total) by mouth 3 (three) times daily. 90 tablet 3    cyclobenzaprine (FLEXERIL) 10 MG tablet Take 1 tablet (10 mg total) by mouth 2 (two) times daily. 90 tablet 3    diazePAM (VALIUM) 5 MG tablet INSERT 1 TABLET EITHER CRUSHED/INTACT INSIDE THE VAGINA AT NIGHT AS NEEDED FOR PELVIC PAIN& SPASM 60 tablet 0    DULoxetine (CYMBALTA) 60 MG capsule Take 1 capsule (60 mg total) by mouth once daily. 30 capsule 11    gabapentin (NEURONTIN) 800 MG tablet TAKE 1 TABLET BY MOUTH THREE TIMES DAILY 90 tablet 3    hydroxychloroquine (PLAQUENIL) 200 mg tablet TAKE 1 TABLET BY MOUTH TWICE DAILY 60 tablet 3    hydrOXYzine HCl (ATARAX) 25 MG tablet TAKE 1 TABLET(25 MG) BY MOUTH EVERY EVENING 30 tablet 0    levothyroxine (SYNTHROID) 50 MCG tablet TAKE 1 TABLET(50 MCG) BY MOUTH EVERY DAY 30 tablet 3    multivitamin with minerals tablet Take 1 tablet by mouth once daily.      prazosin (MINIPRESS) 1 MG Cap Take 1 capsule (1 mg total) by mouth every evening. 30 capsule 11    promethazine (PHENERGAN) 25 MG tablet TAKE 1 TABLET(25 MG) BY MOUTH EVERY 6 HOURS AS  "NEEDED 60 tablet 0    rabeprazole (ACIPHEX) 20 mg tablet Take 1 tablet (20 mg total) by mouth once daily. 30 tablet 11    tizanidine (ZANAFLEX) 4 MG tablet TAKE 1 TABLET(4 MG) BY MOUTH EVERY 8 HOURS 90 tablet 3    tramadol (ULTRAM) 50 mg tablet TAKE 1 TABLET BY MOUTH EVERY 12 HOURS AS NEEDED FOR PAIN 60 tablet 3    traZODone (DESYREL) 50 MG tablet TAKE 1/2  TABLET BY MOUTH EVERY NIGHT AT BEDTIME AS NEEDED FOR INSOMNIA 60 tablet 2    triamcinolone acetonide 0.1% (KENALOG) 0.1 % cream APPLY TO THE AFFECTED AREA TWICE DAILY 454 g 0    URIBEL 118-10-40.8-36 mg Cap TAKE 1 CAPSULE BY MOUTH FOUR TIMES DAILY 120 capsule 0    URIBEL 118-10-40.8-36 mg Cap TAKE 1 CAPSULE BY MOUTH FOUR TIMES DAILY 120 capsule 0     No current facility-administered medications on file prior to encounter.        Psychiatric ROS:  See Dr. Mancia's Admission Note of date 11/15/17 for ROS.    OBJECTIVE:  Vitals (Most Recent)   vitals were not taken for this visit.    Labs/Imaging/Studies:   No results found for this or any previous visit (from the past 48 hour(s)).   No results found for: PHENYTOIN, PHENOBARB, VALPROATE, CBMZ    Pain: 4/10    Musculoskeletal Exam:  Abnormal Involuntary Movements: None  Gait: Non-ataxic, unassisted     Mental Status Exam:  Appearance: Neatly groomed, casually dressed, appears stated age, overweight  Behavior/Cooperation: Cooperative, good eye contact, no PMA or PMR  Speech: Not pressured or loud, clearly audible, no slurring  Mood: "Hopeful"  Affect: Mildly constricted, appropriate  Thought Process: Tangential  Thought Content: No SI, HI, AH, VH, delusions, or RIS  Orientation: grossly intact  Memory: Intact for the content of the interview  Attention Span/Concentration: Attends to interview without distraction  Fund of Knowledge: Adequate for interview  Estimate of Intelligence: Average from verbal skills and history  Cognition: grossly intact  Insight: patient has awareness of illness  Judgment: the " patient's behavior is adequate to circumstances     ASSESSMENT/PLAN:      General Assessment:  Patient is a 47 y.o. female admitted to the BMU partial hospitalization program for the third time.  Patient has a long history of depression and anxiety.  Significant in her history is the fact that her father committed suicide when the patient was a child and her sister committed suicide 2 years ago.  The patient witnessed the results of both of those attempts.  In addition she has multiple physical health concerns.     Diagnoses:  Major Depression recurrent moderate  PTSD  CHANTE  IBS  Interstitial cystitis  Fibromyalgia        Plan:  Discharge from BMU today  Continue cymbalta 60mg daily  Continue klonopin 1mg tid prn for anxiety  Start prazosin 1 mg nightly.  Reviewed most recent blood pressure.  Warned patient about the potential for hypotension as evidenced by dizziness and near fainting spells.  Patient will message Dr. Gray if she needs refills of medication.    Leroy Zamorano MD     STAFF COMMENTS:  I have seen and evaluated the patient, and reviewed the history and exam.  I agree and concur with the assessment and plan.  In contrast to the above we will continue prazosin.  Discussed follow up with Dr. Gray and Dr. Evans as well as AC group.      Fausto Mancia M.D.

## 2017-12-12 NOTE — TELEPHONE ENCOUNTER
Dr. Samaniego and Staff,    [facility] has been attempting to reach Mrs. Simons regarding prescription for Humira. After numerous unsuccessful attempts, we are sending a postcard to the address on file. At this point in time, no further contact will be made from [facility] until patient responds to our mail correspondence.    If there is anything else we can do to further assist, please let us know.     Thanks,  Viviane Jara, PharmD  [facility]  [phone]

## 2017-12-16 NOTE — PROGRESS NOTES
Group Psychotherapy (MD)     Site: [facility]     Clinical status of patient: Intensive Outpatient Program (IOP)     Date: 11/27/17     Group Focus: Medical Model of Psychiatric Disease and Addiction: Overview and Concept of Self Medication     Length of service: 50 minutes     Number of patients in attendance: 11     Referred by: Behavioral Medicine Unit Treatment Team     Target symptoms:  anxiety, depression, risk of substance use     Patient's response to treatment: Active Listening     Progress toward goals: Progressing adequately     Interval History: Reviewed medical model of psychiatric disease . Reviewed basic neuroanatomy and applied it to improve patients understanding of major psychiatric disorders. Utilized medical model to reduce stigma and to identify helpful behaviors and interventions to effectively reduce anxiety long term. Introduced concepts of self-medication and self-care, discussed how self medication may alleviate psychological symptoms in short-term but ultimately perpetuates it. Reviewed constructive behaviors that ultimately help patients manage their disorder effectively long term. . Patients volunteered own symptoms, as well as examples of their self-medicating behaviors, and brainstormed alternative, more constructive behaviors to replace them.      Diagnosis: F41.1, F60.9   Plan: Continue treatment on U

## 2018-01-08 ENCOUNTER — TELEPHONE (OUTPATIENT)
Dept: RHEUMATOLOGY | Facility: CLINIC | Age: 48
End: 2018-01-08

## 2018-01-08 ENCOUNTER — PATIENT MESSAGE (OUTPATIENT)
Dept: RHEUMATOLOGY | Facility: CLINIC | Age: 48
End: 2018-01-08

## 2018-01-10 ENCOUNTER — PATIENT MESSAGE (OUTPATIENT)
Dept: GASTROENTEROLOGY | Facility: CLINIC | Age: 48
End: 2018-01-10

## 2018-01-10 ENCOUNTER — PATIENT MESSAGE (OUTPATIENT)
Dept: RHEUMATOLOGY | Facility: CLINIC | Age: 48
End: 2018-01-10

## 2018-01-10 DIAGNOSIS — E03.9 HYPOTHYROIDISM, UNSPECIFIED TYPE: ICD-10-CM

## 2018-01-10 RX ORDER — HYDROCODONE BITARTRATE AND ACETAMINOPHEN 7.5; 325 MG/1; MG/1
1 TABLET ORAL EVERY 8 HOURS PRN
Qty: 90 TABLET | Refills: 0 | Status: CANCELLED | OUTPATIENT
Start: 2018-01-10

## 2018-01-11 RX ORDER — ALOSETRON HYDROCHLORIDE 0.5 MG/1
TABLET ORAL
Qty: 60 TABLET | Refills: 0 | Status: SHIPPED | OUTPATIENT
Start: 2018-01-11 | End: 2018-02-10 | Stop reason: SDUPTHER

## 2018-01-11 RX ORDER — PROMETHAZINE HYDROCHLORIDE 25 MG/1
TABLET ORAL
Qty: 60 TABLET | Refills: 0 | Status: SHIPPED | OUTPATIENT
Start: 2018-01-11 | End: 2018-06-26

## 2018-01-14 RX ORDER — LEVOTHYROXINE SODIUM 50 UG/1
TABLET ORAL
Qty: 30 TABLET | Refills: 0 | Status: SHIPPED | OUTPATIENT
Start: 2018-01-14 | End: 2018-02-12 | Stop reason: SDUPTHER

## 2018-01-14 RX ORDER — CYCLOBENZAPRINE HCL 10 MG
10 TABLET ORAL 2 TIMES DAILY
Qty: 90 TABLET | Refills: 3 | Status: SHIPPED | OUTPATIENT
Start: 2018-01-14 | End: 2018-10-01 | Stop reason: SDUPTHER

## 2018-01-14 RX ORDER — TRAMADOL HYDROCHLORIDE 50 MG/1
TABLET ORAL
Qty: 60 TABLET | Refills: 0 | Status: SHIPPED | OUTPATIENT
Start: 2018-01-14 | End: 2018-03-14

## 2018-01-14 RX ORDER — TIZANIDINE 4 MG/1
TABLET ORAL
Qty: 90 TABLET | Refills: 0 | Status: SHIPPED | OUTPATIENT
Start: 2018-01-14 | End: 2018-02-12 | Stop reason: SDUPTHER

## 2018-01-15 NOTE — TELEPHONE ENCOUNTER
pAfter  6 months I will be unable to prescribe narcotics without an evaluation please inform pt and address scheduling. thanks

## 2018-01-28 RX ORDER — HYDROXYCHLOROQUINE SULFATE 200 MG/1
TABLET, FILM COATED ORAL
Qty: 60 TABLET | Refills: 3 | Status: SHIPPED | OUTPATIENT
Start: 2018-01-28 | End: 2018-06-12 | Stop reason: SDUPTHER

## 2018-01-31 ENCOUNTER — PATIENT MESSAGE (OUTPATIENT)
Dept: PSYCHIATRY | Facility: CLINIC | Age: 48
End: 2018-01-31

## 2018-02-12 DIAGNOSIS — E03.9 HYPOTHYROIDISM, UNSPECIFIED TYPE: ICD-10-CM

## 2018-02-12 DIAGNOSIS — N30.10 IC (INTERSTITIAL CYSTITIS): ICD-10-CM

## 2018-02-12 RX ORDER — ALOSETRON HYDROCHLORIDE 0.5 MG/1
TABLET ORAL
Qty: 60 TABLET | Refills: 0 | Status: SHIPPED | OUTPATIENT
Start: 2018-02-12 | End: 2018-03-14 | Stop reason: SDUPTHER

## 2018-02-12 RX ORDER — RABEPRAZOLE SODIUM 20 MG/1
TABLET, DELAYED RELEASE ORAL
Qty: 30 TABLET | Refills: 0 | Status: SHIPPED | OUTPATIENT
Start: 2018-02-12 | End: 2018-03-14 | Stop reason: SDUPTHER

## 2018-02-14 RX ORDER — METHENAMINE, SODIUM PHOSPHATE, MONOBASIC, MONOHYDRATE, PHENYL SALICYLATE, METHYLENE BLUE, AND HYOSCYAMINE SULFATE 118; 40.8; 36; 10; .12 MG/1; MG/1; MG/1; MG/1; MG/1
CAPSULE ORAL
Qty: 120 CAPSULE | Refills: 0 | Status: SHIPPED | OUTPATIENT
Start: 2018-02-14 | End: 2018-03-13 | Stop reason: SDUPTHER

## 2018-02-14 RX ORDER — HYDROXYZINE HYDROCHLORIDE 25 MG/1
TABLET, FILM COATED ORAL
Qty: 30 TABLET | Refills: 0 | Status: SHIPPED | OUTPATIENT
Start: 2018-02-14 | End: 2018-03-13 | Stop reason: SDUPTHER

## 2018-02-15 RX ORDER — LEVOTHYROXINE SODIUM 50 UG/1
TABLET ORAL
Qty: 30 TABLET | Refills: 3 | Status: SHIPPED | OUTPATIENT
Start: 2018-02-15 | End: 2018-06-20 | Stop reason: SDUPTHER

## 2018-02-15 RX ORDER — TIZANIDINE 4 MG/1
TABLET ORAL
Qty: 90 TABLET | Refills: 3 | Status: SHIPPED | OUTPATIENT
Start: 2018-02-15 | End: 2018-06-12 | Stop reason: SDUPTHER

## 2018-02-21 ENCOUNTER — HOSPITAL ENCOUNTER (OUTPATIENT)
Dept: RADIOLOGY | Facility: HOSPITAL | Age: 48
Discharge: HOME | End: 2018-02-21
Attending: FAMILY MEDICINE
Payer: COMMERCIAL

## 2018-02-21 ENCOUNTER — OFFICE VISIT (OUTPATIENT)
Dept: INTERNAL MEDICINE | Facility: CLINIC | Age: 48
End: 2018-02-21
Payer: COMMERCIAL

## 2018-02-21 VITALS
HEART RATE: 80 BPM | WEIGHT: 151.88 LBS | HEIGHT: 59 IN | TEMPERATURE: 98 F | DIASTOLIC BLOOD PRESSURE: 72 MMHG | SYSTOLIC BLOOD PRESSURE: 121 MMHG | BODY MASS INDEX: 30.62 KG/M2 | RESPIRATION RATE: 16 BRPM

## 2018-02-21 DIAGNOSIS — M79.7 FIBROMYALGIA: ICD-10-CM
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
## 2018-02-21 DIAGNOSIS — S49.91XA INJURY OF RIGHT SHOULDER, INITIAL ENCOUNTER: ICD-10-CM

## 2018-02-21 DIAGNOSIS — R53.82 CHRONIC FATIGUE: ICD-10-CM

## 2018-02-21 PROCEDURE — 73030 X-RAY EXAM OF SHOULDER: CPT | Mod: 26,RT,, | Performed by: RADIOLOGY

## 2018-02-21 PROCEDURE — 73030 X-RAY EXAM OF SHOULDER: CPT | Mod: TC,PO,RT

## 2018-02-21 PROCEDURE — 99214 OFFICE O/P EST MOD 30 MIN: CPT | Mod: S$GLB,,, | Performed by: FAMILY MEDICINE

## 2018-02-21 PROCEDURE — 99999 PR PBB SHADOW E&M-EST. PATIENT-LVL IV: CPT | Mod: PBBFAC,,, | Performed by: FAMILY MEDICINE

## 2018-02-21 PROCEDURE — 3008F BODY MASS INDEX DOCD: CPT | Mod: S$GLB,,, | Performed by: FAMILY MEDICINE

## 2018-02-21 RX ORDER — METHYLPREDNISOLONE 4 MG/1
TABLET ORAL
Qty: 1 PACKAGE | Refills: 0 | Status: SHIPPED | OUTPATIENT
Start: 2018-02-21 | End: 2018-03-14

## 2018-02-21 NOTE — PROGRESS NOTES
Subjective:       Patient ID: Marilee Simons is a 48 y.o. female.    Chief Complaint: Back Pain (arm pain from fall )    HPI 48-year-old white female with person on the disorder and fibromyalgia under psychiatric care rheumatologic care presents to clinic today secondary to a complaint of lower back pain and right arm pain status post fall approximately 2 weeks ago.  The patient has not seen her rheumatologist in over a year.  She is tangential with her medical history and reports that she has been in pain since her fall on Saturday; however, she then reports that this past Sunday she was cleaning her entire house without issue.  She is on multiple medications for fibromyalgia and reports that since Sunday her back pain has worsened and she has been taking 2 Zanaflex at a time without relief.  Physical exam findings are inconsistent as when the patient is distracted she denies pain but then reports pain with the exact same maneuver when not distracted.  Review of Systems   Constitutional: Negative for appetite change, chills, fatigue and fever.   HENT: Negative for congestion, ear pain, hearing loss, postnasal drip, rhinorrhea, sinus pressure, sore throat and tinnitus.    Eyes: Negative for redness, itching and visual disturbance.   Respiratory: Negative for cough, chest tightness and shortness of breath.    Cardiovascular: Negative for chest pain and palpitations.   Gastrointestinal: Negative for abdominal pain, constipation, diarrhea, nausea and vomiting.   Genitourinary: Negative for decreased urine volume, difficulty urinating, dysuria, frequency, hematuria and urgency.   Musculoskeletal: Positive for arthralgias (right shoulder pain with radiation to right arm) and back pain. Negative for myalgias, neck pain and neck stiffness.   Skin: Negative for rash.   Neurological: Negative for dizziness, light-headedness and headaches.   Psychiatric/Behavioral: Negative.        Objective:      Physical Exam    Constitutional: She is oriented to person, place, and time. She appears well-developed and well-nourished. No distress.   HENT:   Head: Normocephalic and atraumatic.   Right Ear: External ear normal.   Left Ear: External ear normal.   Nose: Nose normal.   Mouth/Throat: Oropharynx is clear and moist. No oropharyngeal exudate.   Eyes: Conjunctivae and EOM are normal. Pupils are equal, round, and reactive to light. Right eye exhibits no discharge. Left eye exhibits no discharge. No scleral icterus.   Neck: Normal range of motion. Neck supple. No JVD present. No tracheal deviation present. No thyromegaly present.   Cardiovascular: Normal rate, regular rhythm, normal heart sounds and intact distal pulses.  Exam reveals no gallop and no friction rub.    No murmur heard.  Pulmonary/Chest: Effort normal and breath sounds normal. No stridor. No respiratory distress. She has no wheezes. She has no rales.   Abdominal: Soft. Bowel sounds are normal. She exhibits no distension and no mass. There is no tenderness. There is no rebound and no guarding.   Musculoskeletal: Normal range of motion. She exhibits no edema or tenderness.        Right shoulder: She exhibits pain.        Lumbar back: She exhibits pain.   Patient's pain complaints are inconsistent with physical exam.   Lymphadenopathy:     She has no cervical adenopathy.   Neurological: She is alert and oriented to person, place, and time.   Skin: Skin is warm and dry. No rash noted. She is not diaphoretic. No erythema. No pallor.   Psychiatric: She has a normal mood and affect. Her behavior is normal. Judgment and thought content normal.   Nursing note and vitals reviewed.      Assessment:       1. Fall, initial encounter    2. Injury of right shoulder, initial encounter    3. Fibromyalgia    4. Chronic fatigue        Plan:     1.  Right shoulder x-ray now secondary to fall.  2.  I have discussed medication compliance with the patient and recommends that she only take the  "medication as prescribed and no more than what is prescribed.  3.  Medrol Dosepak use as directed.  4.  Follow-up with psychiatry and rheumatology as scheduled.  5.  Physical therapy per patient request.  6.  Return to clinic as needed if symptoms persist or worsen.  "This note will not be shared with the patient."  "

## 2018-02-23 ENCOUNTER — TELEPHONE (OUTPATIENT)
Dept: INTERNAL MEDICINE | Facility: CLINIC | Age: 48
End: 2018-02-23

## 2018-02-23 NOTE — TELEPHONE ENCOUNTER
She should continue use of Tylenol and Ibuprofen as needed for pain and also use a heating pad as needed.  Please inform the patient. Thank you.

## 2018-02-23 NOTE — TELEPHONE ENCOUNTER
----- Message from Xin eVrma sent at 2/23/2018  3:27 PM CST -----  Contact: self  [phone]  Patient would like to get test results.  Name of test (lab, mammo, etc.):  X-ray  Date of test:  02/21  Ordering provider:  Dr. Zheng   Where was the test performed: [facility]   Comments:

## 2018-02-23 NOTE — TELEPHONE ENCOUNTER
Spoke with pt re: xray WNL.  Pt states that she is still in severe pain & can barely move her arm. She has numbness & tingling.    Pt questioning if it is a torn rotator cuff??    Please advise

## 2018-02-23 NOTE — TELEPHONE ENCOUNTER
Shoulder X-ray is normal.  No fracture or dislocation.  Results posted to [facility]. Please inform the patient. Thank you.

## 2018-03-02 RX ORDER — TRAMADOL HYDROCHLORIDE 50 MG/1
TABLET ORAL
Qty: 60 TABLET | Refills: 0 | OUTPATIENT
Start: 2018-03-02

## 2018-03-06 ENCOUNTER — TELEPHONE (OUTPATIENT)
Dept: UROLOGY | Facility: CLINIC | Age: 48
End: 2018-03-06

## 2018-03-06 ENCOUNTER — PATIENT MESSAGE (OUTPATIENT)
Dept: PSYCHIATRY | Facility: CLINIC | Age: 48
End: 2018-03-06

## 2018-03-06 NOTE — TELEPHONE ENCOUNTER
----- Message from Caron Santana MA sent at 3/6/2018 10:07 AM CST -----  Contact: self  [phone]  States she is going to the emergency department due to bowel problems.  States she is sorry and wanted you to know why she is canceling.

## 2018-03-13 DIAGNOSIS — N30.10 IC (INTERSTITIAL CYSTITIS): ICD-10-CM

## 2018-03-13 RX ORDER — CLONAZEPAM 1 MG/1
TABLET ORAL
Qty: 90 TABLET | Refills: 2 | Status: SHIPPED | OUTPATIENT
Start: 2018-03-13 | End: 2018-03-14

## 2018-03-13 RX ORDER — CLONAZEPAM 1 MG/1
TABLET ORAL
Qty: 90 TABLET | Refills: 0 | OUTPATIENT
Start: 2018-03-13

## 2018-03-14 ENCOUNTER — HOSPITAL ENCOUNTER (OUTPATIENT)
Dept: RADIOLOGY | Facility: OTHER | Age: 48
Discharge: HOME | End: 2018-03-14
Attending: PHYSICAL MEDICINE & REHABILITATION
Payer: COMMERCIAL

## 2018-03-14 ENCOUNTER — OFFICE VISIT (OUTPATIENT)
Dept: GASTROENTEROLOGY | Facility: CLINIC | Age: 48
End: 2018-03-14
Payer: COMMERCIAL

## 2018-03-14 ENCOUNTER — OFFICE VISIT (OUTPATIENT)
Dept: SPINE | Facility: CLINIC | Age: 48
End: 2018-03-14
Attending: PHYSICAL MEDICINE & REHABILITATION
Payer: COMMERCIAL

## 2018-03-14 VITALS
WEIGHT: 150 LBS | HEART RATE: 127 BPM | BODY MASS INDEX: 30.24 KG/M2 | DIASTOLIC BLOOD PRESSURE: 78 MMHG | HEIGHT: 59 IN | SYSTOLIC BLOOD PRESSURE: 104 MMHG

## 2018-03-14 VITALS
HEART RATE: 112 BPM | BODY MASS INDEX: 30.4 KG/M2 | WEIGHT: 150.81 LBS | HEIGHT: 59 IN | SYSTOLIC BLOOD PRESSURE: 120 MMHG | DIASTOLIC BLOOD PRESSURE: 82 MMHG

## 2018-03-14 DIAGNOSIS — M50.30 DDD (DEGENERATIVE DISC DISEASE), CERVICAL: ICD-10-CM

## 2018-03-14 DIAGNOSIS — M54.2 NECK PAIN: ICD-10-CM

## 2018-03-14 DIAGNOSIS — K58.0 IRRITABLE BOWEL SYNDROME WITH DIARRHEA: Primary | ICD-10-CM

## 2018-03-14 DIAGNOSIS — M54.50 CHRONIC BILATERAL LOW BACK PAIN WITHOUT SCIATICA: ICD-10-CM

## 2018-03-14 DIAGNOSIS — M62.838 MUSCLE SPASM: ICD-10-CM

## 2018-03-14 DIAGNOSIS — M62.838 MUSCLE SPASM: Primary | ICD-10-CM

## 2018-03-14 DIAGNOSIS — G89.29 CHRONIC BILATERAL LOW BACK PAIN WITHOUT SCIATICA: ICD-10-CM

## 2018-03-14 DIAGNOSIS — K21.9 GASTROESOPHAGEAL REFLUX DISEASE, ESOPHAGITIS PRESENCE NOT SPECIFIED: ICD-10-CM

## 2018-03-14 PROCEDURE — 99999 PR PBB SHADOW E&M-EST. PATIENT-LVL III: CPT | Mod: PBBFAC,,, | Performed by: PHYSICAL MEDICINE & REHABILITATION

## 2018-03-14 PROCEDURE — 99213 OFFICE O/P EST LOW 20 MIN: CPT | Mod: S$GLB,,, | Performed by: INTERNAL MEDICINE

## 2018-03-14 PROCEDURE — 72052 X-RAY EXAM NECK SPINE 6/>VWS: CPT | Mod: 26,,, | Performed by: RADIOLOGY

## 2018-03-14 PROCEDURE — 72052 X-RAY EXAM NECK SPINE 6/>VWS: CPT | Mod: TC,FY

## 2018-03-14 PROCEDURE — 99204 OFFICE O/P NEW MOD 45 MIN: CPT | Mod: S$GLB,,, | Performed by: PHYSICAL MEDICINE & REHABILITATION

## 2018-03-14 PROCEDURE — 99999 PR PBB SHADOW E&M-EST. PATIENT-LVL III: CPT | Mod: PBBFAC,,, | Performed by: INTERNAL MEDICINE

## 2018-03-14 RX ORDER — RABEPRAZOLE SODIUM 20 MG/1
20 TABLET, DELAYED RELEASE ORAL DAILY
Qty: 30 TABLET | Refills: 6 | Status: SHIPPED | OUTPATIENT
Start: 2018-03-14 | End: 2018-10-23 | Stop reason: SDUPTHER

## 2018-03-14 RX ORDER — HYDROXYZINE HYDROCHLORIDE 25 MG/1
TABLET, FILM COATED ORAL
Qty: 30 TABLET | Refills: 0 | Status: SHIPPED | OUTPATIENT
Start: 2018-03-14 | End: 2018-04-18 | Stop reason: SDUPTHER

## 2018-03-14 RX ORDER — METHENAMINE, SODIUM PHOSPHATE, MONOBASIC, MONOHYDRATE, PHENYL SALICYLATE, METHYLENE BLUE, AND HYOSCYAMINE SULFATE 118; 40.8; 36; 10; .12 MG/1; MG/1; MG/1; MG/1; MG/1
CAPSULE ORAL
Qty: 120 CAPSULE | Refills: 0 | Status: SHIPPED | OUTPATIENT
Start: 2018-03-14 | End: 2018-03-14 | Stop reason: SDUPTHER

## 2018-03-14 RX ORDER — ALOSETRON HYDROCHLORIDE 0.5 MG/1
TABLET ORAL
Qty: 60 TABLET | Refills: 1 | Status: SHIPPED | OUTPATIENT
Start: 2018-03-14 | End: 2018-06-28 | Stop reason: SDUPTHER

## 2018-03-14 RX ORDER — DICLOFENAC SODIUM 75 MG/1
75 TABLET, DELAYED RELEASE ORAL 2 TIMES DAILY
Qty: 60 TABLET | Refills: 0 | Status: SHIPPED | OUTPATIENT
Start: 2018-03-14 | End: 2019-05-24

## 2018-03-14 NOTE — PROGRESS NOTES
REASON FOR VISIT:  IBS, diarrhea.    HISTORY OF PRESENT ILLNESS:  Ms. Simons was last seen by me more than a year and   a half ago.  In the interim, had seen Ms. Anna.  She has significant   diarrhea leading to incontinence for which Lotronex has been prescribed.  She   would usually take it once a day but at times take it twice a day when her   symptoms worsen.  She has had flex sig and colonoscopy, which have not revealed   inflammatory bowel disease.  She has never had ischemic colitis and today, we   discussed about potential complication of ischemic colitis on Lotronex and the   patient has been reeducated.  Today, we also reiterated the fact that she would   need to follow up with me every six months.  Currently, is doing well except for   a recent injury after a fall to where she is having pain from her right neck   all the way down her upper extremity on the right side.  Denies any nausea or   vomiting.  She does have chronic gastroesophageal reflux disease, well   controlled on Aciphex.    PAST MEDICAL, SURGICAL, SOCIAL AND FAMILY HISTORY:  Reviewed.    MEDICATIONS AND ALLERGIES:  Reviewed.    REVIEW OF SYSTEMS:  CONSTITUTIONAL:  No fever, no chills, no weight loss.  Appetite is normal.  EYES:  No visual changes.  ENT:  No odynophagia or hoarseness of voice.  CARDIOVASCULAR:  No angina or palpitations.  RESPIRATORY:  No shortness of breath or wheezing.  MUSCULOSKELETAL:  Right shoulder and upper arm pain.  GASTROINTESTINAL:  See HPI.  No blood in the stools.    PHYSICAL EXAMINATION:  VITAL SIGNS:  See EPIC.  GENERAL:  Awake, alert and oriented x3, in no acute distress.    About 15 minutes spent with the patient, more than 50% in counseling regarding   use of Lotronex.    IMPRESSION:  1.  IBSD controlled on Lotronex.  2.  GERD.    RECOMMENDATIONS:  1.  Continue Lotronex 0.5 mg once a day, titrate it to twice a day as needed.  2.  Continue Aciphex 20 mg daily.  3.  Follow up in GI Clinic in six  months.  4.  We will proceed with lab testing during the next visit for long-term use of   PPI including CBC, CMP, magnesium, vitamin D, B12 and consider DEXA scan as   well.      ACT/HN  dd: 03/14/2018 11:41:21 (CDT)  td: 03/15/2018 05:47:51 (CDT)  Doc ID   #[id number]  Job ID #[id number]    CC:

## 2018-03-14 NOTE — PROGRESS NOTES
Subjective:      Patient ID: Marilee Simons is a 48 y.o. female.    Chief Complaint: Low-back Pain and Neck Pain (right)    Referred by: Self, Jacqueline     Ms Simons is a 49 yo female here for evaluation of right shoulder pain.  She had a fall on 2/10 after parade.  She was leaning over and backpack strap got wrapped around her foot.  She started having some right arm pain.  The pain goes from the neck to the shoulder and she has pins and needles to her finger.  She has fibromyalgia, RA, psoriatic arthritis and lupus.  She has a bladder stim.  She has been spending lots of time in bed.  The pain is the worst in the right shoulder.  She has trouble using the right arm.  She has trouble driving, using key board.  She has pain with washing hair, and putting things in cabinet.  She does feel like the pain gets worse at night.  The pain is constant.  The pain is 8/10 now, worst 10/10 using in right arm, best 5/10 lying down and keeping her arm still in bed.  She has been taking ibuprofen, even though she is not suppose.  She has been taking ibuprofen 800 mg po BID.  She tried flexeril twice a day and one tizanidine, but did not feel like it was helping so stopped taking it.  She also does have full spine pain.  She feels like every vertebra hurts.  It is hard to do house work    X-ray 2/2018 right shoulder  Right   shoulder AP, axillary, and scapula Y views. No prior exam.    The skeletal structures are intact with good alignment . No fracture or dislocation is identified. Joint spaces are satisfactory without significant arthritis.  Impression      No fracture or acute abnormality.     Mri lumbar 2016  Alignment:  Within normal limits. Normal lumbar lordosis is preserved.  Vertebrae:  Body heights are well maintained. No osseous fracture or compression deformity.  No marrow signal abnormality suspicious for an infiltrative process.    Discs:  Intervertebral disk space heights are maintained.  Cord:  Visualized lower  thoracic spinal cord, conus medullaris and lumbar spinal nerve roots demonstrate normal signal.  The conus terminates at L1.  Soft tissue structures:  No significant abnormalities.    L1-2:  There is no focal disk abnormality.  No significant spinal canal stenosis.  No significant neural foraminal narrowing.    L2-3:  There is no focal disk abnormality.  No significant spinal canal stenosis.  No significant neural foraminal narrowing.    L3-4:  There is mild diffuse disc bulge.  No significant spinal canal stenosis.  No significant neural foraminal narrowing.    L4-5:  There is mild diffuse disc bulge.  No significant spinal canal stenosis.  No significant neural foraminal narrowing.    L5-S1:  There is mild diffuse disc bulge.  No significant spinal canal stenosis.  No significant neural foraminal narrowing.  Impression          Unremarkable lumbar spine MRI.      Past Medical History:  No date: Acid reflux  No date: Allergy  No date: Alopecia  No date: Anxiety  No date: Arthralgia  No date: Back pain  No date: Depression  No date: Dry eyes      Comment: from meds  No date: Fever blister  No date: Fibromyalgia  No date: Interstitial cystitis  No date: Irritable bowel syndrome  No date: Kidney stone  2013: Major depressive disorder, recurrent episode, *  2015: OAB (overactive bladder)  No date: PTSD (post-traumatic stress disorder)  No date: Rheumatoid arthritis  No date: Systemic lupus erythematosus  No date: Thyroid disease  No date: Urinary tract infection  No date: Vaginal infection    Past Surgical History:  No date: BLADDER SURGERY  No date:  SECTION, LOW TRANSVERSE      Comment: x 2  No date: COLONOSCOPY  10/5/2017: COLONOSCOPY N/A      Comment: Procedure: COLONOSCOPY;  Surgeon: Venkat He MD;  Location: [address];                 Service: Endoscopy;  Laterality: N/A;  No date: ESOPHAGOGASTRODUODENOSCOPY  No date: EYE SURGERY      Comment: Lasik-bilateral  No  date: HYSTERECTOMY  No date: PARTIAL HYSTERECTOMY    Review of patient's family history indicates:    Irritable bowel syndrome       Mother                    Irritable bowel syndrome       Sister                    Lupus                          Sister                    Rheum arthritis                Sister                    Fibromyalgia                   Sister                    Irritable bowel syndrome       Sister                    Celiac disease                 Neg Hx                    Cirrhosis                      Neg Hx                    Colon cancer                   Neg Hx                    Colon polyps                   Neg Hx                    Crohn's disease                Neg Hx                    Cystic fibrosis                Neg Hx                    Esophageal cancer              Neg Hx                    Hemochromatosis                Neg Hx                    Inflammatory bowel disease     Neg Hx                    Liver cancer                   Neg Hx                    Liver disease                  Neg Hx                    Rectal cancer                  Neg Hx                    Stomach cancer                 Neg Hx                    Ulcerative colitis             Neg Hx                    Boris's disease               Neg Hx                    Melanoma                       Neg Hx                      Social History    Marital status:              Spouse name:                       Years of education:                 Number of children:               Occupational History  Occupation          Employer            Comment                                   OTHER                   Social History Main Topics    Smoking status: Never Smoker                                                                Smokeless tobacco: Never Used                        Alcohol use: No              Drug use: No              Sexual activity: No                   Other Topics            Concern  Are  you pregnant or th* No  Breast-feeding          No        Current Outpatient Prescriptions:  adalimumab (HUMIRA PEN CROHN'S-[facility] START) PnKt injection, Inject 0.8 mLs (40 mg total) into the skin every 14 (fourteen) days., Disp: 1.6 mL, Rfl: 11  alosetron (LOTRONEX) 0.5 MG tablet, TAKE 1 TABLET(0.5 MG) BY MOUTH TWICE DAILY, Disp: 60 tablet, Rfl: 0  alosetron (LOTRONEX) 0.5 MG tablet, TAKE 1 TABLET(0.5 MG) BY MOUTH TWICE DAILY, Disp: 60 tablet, Rfl: 0  amitriptyline (ELAVIL) 10 MG tablet, Take 1 tablet (10 mg total) by mouth nightly as needed. (Patient taking differently: Take 10 mg by mouth nightly as needed for Insomnia. ), Disp: 30 tablet, Rfl: 11  butalbital-acetaminophen (BUPAP)  mg Tab, Take 1 tablet by mouth every 4 (four) hours., Disp: 60 tablet, Rfl: 3  clonazePAM (KLONOPIN) 1 MG tablet, Take 1 tablet (1 mg total) by mouth 3 (three) times daily., Disp: 90 tablet, Rfl: 3  clonazePAM (KLONOPIN) 1 MG tablet, TAKE 1 TABLET(1 MG) BY MOUTH THREE TIMES DAILY, Disp: 90 tablet, Rfl: 2  cyclobenzaprine (FLEXERIL) 10 MG tablet, Take 1 tablet (10 mg total) by mouth 2 (two) times daily., Disp: 90 tablet, Rfl: 3  diazePAM (VALIUM) 5 MG tablet, INSERT 1 TABLET EITHER CRUSHED/INTACT INSIDE THE VAGINA AT NIGHT AS NEEDED FOR PELVIC PAIN& SPASM, Disp: 60 tablet, Rfl: 0  DULoxetine (CYMBALTA) 60 MG capsule, Take 1 capsule (60 mg total) by mouth once daily., Disp: 30 capsule, Rfl: 11  gabapentin (NEURONTIN) 800 MG tablet, TAKE 1 TABLET BY MOUTH THREE TIMES DAILY, Disp: 90 tablet, Rfl: 3  hydroxychloroquine (PLAQUENIL) 200 mg tablet, TAKE 1 TABLET BY MOUTH TWICE DAILY, Disp: 60 tablet, Rfl: 3  hydrOXYzine HCl (ATARAX) 25 MG tablet, TAKE 1 TABLET(25 MG) BY MOUTH EVERY EVENING, Disp: 30 tablet, Rfl: 0  levothyroxine (SYNTHROID) 50 MCG tablet, TAKE 1 TABLET(50 MCG) BY MOUTH EVERY DAY, Disp: 30 tablet, Rfl: 3  methylPREDNISolone (MEDROL DOSEPACK) 4 mg tablet, use as directed, Disp: 1 Package, Rfl: 0  multivitamin with minerals  tablet, Take 1 tablet by mouth once daily., Disp: , Rfl:   prazosin (MINIPRESS) 1 MG Cap, Take 1 capsule (1 mg total) by mouth every evening., Disp: 30 capsule, Rfl: 11  promethazine (PHENERGAN) 25 MG tablet, TAKE 1 TABLET(25 MG) BY MOUTH EVERY 6 HOURS AS NEEDED, Disp: 60 tablet, Rfl: 0  RABEprazole (ACIPHEX) 20 mg tablet, TAKE 1 TABLET(20 MG) BY MOUTH EVERY DAY, Disp: 30 tablet, Rfl: 0  tiZANidine (ZANAFLEX) 4 MG tablet, TAKE 1 TABLET BY MOUTH EVERY 8 HOURS, Disp: 90 tablet, Rfl: 3  traMADol (ULTRAM) 50 mg tablet, TAKE 1 TABLET BY MOUTH EVERY 12 HOURS AS NEEDED FOR PAIN, Disp: 60 tablet, Rfl: 0  traZODone (DESYREL) 50 MG tablet, TAKE 1/2  TABLET BY MOUTH EVERY NIGHT AT BEDTIME AS NEEDED FOR INSOMNIA, Disp: 60 tablet, Rfl: 2  triamcinolone acetonide 0.1% (KENALOG) 0.1 % cream, APPLY TO THE AFFECTED AREA TWICE DAILY, Disp: 454 g, Rfl: 0  URIBEL 118-10-40.8-36 mg Cap, TAKE 1 CAPSULE BY MOUTH FOUR TIMES DAILY, Disp: 120 capsule, Rfl: 0  URIBEL 118-10-40.8-36 mg Cap, TAKE 1 CAPSULE BY MOUTH FOUR TIMES DAILY, Disp: 120 capsule, Rfl: 0    No current facility-administered medications for this visit.       Review of patient's allergies indicates:   -- Cephalexin -- Itching   -- Zofran (ondansetron hcl (pf)) -- Hives   -- Penicillins -- Rash                    Review of Systems   Constitution: Negative for weight gain and weight loss.   Cardiovascular: Negative for chest pain.   Respiratory: Negative for shortness of breath.    Musculoskeletal: Positive for back pain, joint pain (right shoulder), myalgias and neck pain. Negative for joint swelling.   Gastrointestinal: Negative for abdominal pain and bowel incontinence.   Genitourinary: Negative for bladder incontinence.   Neurological: Positive for paresthesias (tingling in fingers thumb and index). Negative for numbness.           Objective:          General    Vitals reviewed.  Constitutional: She is oriented to person, place, and time. She appears well-developed and  well-nourished.   HENT:   Head: Normocephalic and atraumatic.   Pulmonary/Chest: Effort normal.   Neurological: She is alert and oriented to person, place, and time.   Psychiatric: She has a normal mood and affect. Her behavior is normal. Judgment and thought content normal.     General Musculoskeletal Exam   Gait: normal     Right Ankle/Foot Exam     Tests   Heel Walk: able to perform  Tiptoe Walk: able to perform    Left Ankle/Foot Exam     Tests   Heel Walk: able to perform  Tiptoe Walk: able to perform  Back (L-Spine & T-Spine) / Neck (C-Spine) Exam     Tenderness   The patient is tender to palpation of the right trapezial. Right paramedian tenderness of the Upper C-Spine, Lower C-Spine and Upper T-Spine.     Neck (C-Spine) Range of Motion   Flexion:     > 40 (with pain)  Extension: 40Right Lateral Bend: 20 Left Lateral Bend: 20 Right Rotation: 40 Left Rotation: 40     Spinal Sensation   Right Side Sensation  C-Spine Level: normal   L-Spine Level: normal  S-Spine Level: normal  Left Side Sensation  C-Spine Level: normal  L-Spine Level: normal  S-Spine Level: normal    Back (L-Spine & T-Spine) Tests   Right Side Tests  Straight leg raise:      Sitting SLR: > 70 degrees      Left Side Tests  Straight leg raise:     Sitting SLR: > 70 degrees          Other She has no scoliosis .  Spinal Kyphosis:  Absent      Right Hand/Wrist Exam     Tests   Tinels Sign (Medial Nerve): positive        Left Hand/Wrist Exam     Tests   Tinels Sign (Medial Nerve): negative        Right Elbow Exam     Tests Tinel's Sign (cubital tunnel): negative      Left Elbow Exam     Tests Tinel's Sign (cubital tunnel): negative    Right Shoulder Exam     Range of Motion   Active Abduction: 170   Forward Flexion: 180   Internal Rotation 0 degrees: Mid thoracic     Tests & Signs   Hawkin's test: negative  Impingement: negative  Rotator Cuff Painful Arc/Range: mild    Left Shoulder Exam     Range of Motion   Active Abduction: 170   Forward  Flexion: 180   Internal Rotation 0 degrees: Mid thoracic     Tests & Signs   Hawkin's test: negative  Impingement: negative      Muscle Strength   Right Upper Extremity   Biceps: 5/5/5   Deltoid:  5/5  Triceps:  5/5  Wrist Extension: 5/5/5   Finger Flexors:  5/5  Left Upper Extremity  Biceps: 5/5/5   Deltoid:  5/5  Triceps:  5/5  Wrist Extension: 5/5/5   Finger Flexors:  5/5  Right Lower Extremity   Hip Flexion: 5/5   Quadriceps:  5/5   Anterior tibial:  5/5/5  EHL:  5/5  Left Lower Extremity   Hip Flexion: 5/5   Quadriceps:  5/5   Anterior tibial:  5/5/5   EHL:  5/5    Reflexes     Left Side  Biceps:  2+  Triceps:  2+  Brachioradialis:  2+  Quadriceps:  2+  Achilles:  2+  Left Kent's Sign:  Absent  Babinski Sign:  absent    Right Side   Biceps:  2+  Triceps:  2+  Brachioradialis:  2+  Quadriceps:  2+  Achilles:  2+  Right Kent's Sign:  absent  Babinski Sign:  absent    Vascular Exam     Right Pulses        Carotid:                  2+    Left Pulses        Carotid:                  2+        Assessment:       Encounter Diagnoses   Name Primary?    Muscle spasm Yes    Neck pain     DDD (degenerative disc disease), cervical     Chronic bilateral low back pain without sciatica          Plan:       Marilee was seen today for low-back pain and neck pain.    Diagnoses and all orders for this visit:    Muscle spasm  -     X-Ray Cervical Spine 5 View With Flex And Ext; Future  -     Ambulatory Referral to Physical/Occupational Therapy    Neck pain  -     X-Ray Cervical Spine 5 View With Flex And Ext; Future  -     Ambulatory Referral to Physical/Occupational Therapy    DDD (degenerative disc disease), cervical  -     X-Ray Cervical Spine 5 View With Flex And Ext; Future  -     Ambulatory Referral to Physical/Occupational Therapy    Chronic bilateral low back pain without sciatica  -     Ambulatory Referral to Physical/Occupational Therapy    Other orders  -     diclofenac (VOLTAREN) 75 MG EC tablet; Take 1 tablet  (75 mg total) by mouth 2 (two) times daily.       More than 50% of the total time of 45 minutes was spent in counseling on diagnosis and treatment options. We discussed neck pain and the nature of neck pain.  We discussed that it will likely improve and that it is not one thing that causes the pain but an accumulation of multiple things that we do.  We discussed posture sitting and the importance of trying to sit better.  We discussed sitting in bed with her cell phone.  We discussed the benefits of therapy and exercise and continuing to move.  I encourage her to stay out of bed.  She has been depressed since witnessing the death of her sister  1.  X-ray of the cervical spine  2.  PT for cervical retractions, scapula stabilization, myofascial release and core and back strength  3.  Encourage her to stay out of bed  4.  Try tizanidine 4mg po TID (she does not like how flexeril makes her feel)  5.  Diclofenac 75mg po BID for 7 days then as needed  6.  She does have chronic pain and anxiety, with functional limitation.  She does seem to spend a lit of time in bed.  She might benefit from FRP in future once acute pain has improved  7.  RTC 6 weeks

## 2018-03-15 RX ORDER — TRAMADOL HYDROCHLORIDE 50 MG/1
TABLET ORAL
Qty: 60 TABLET | Refills: 0 | OUTPATIENT
Start: 2018-03-15

## 2018-03-20 ENCOUNTER — TELEPHONE (OUTPATIENT)
Dept: PHARMACY | Facility: HOSPITAL | Age: 48
End: 2018-03-20

## 2018-03-20 ENCOUNTER — PATIENT MESSAGE (OUTPATIENT)
Dept: PSYCHIATRY | Facility: CLINIC | Age: 48
End: 2018-03-20

## 2018-03-22 DIAGNOSIS — M54.2 NECK PAIN: Primary | ICD-10-CM

## 2018-03-22 DIAGNOSIS — M54.50 CHRONIC BILATERAL LOW BACK PAIN WITHOUT SCIATICA: ICD-10-CM

## 2018-03-22 DIAGNOSIS — G89.29 CHRONIC BILATERAL LOW BACK PAIN WITHOUT SCIATICA: ICD-10-CM

## 2018-03-22 DIAGNOSIS — M50.30 DDD (DEGENERATIVE DISC DISEASE), CERVICAL: ICD-10-CM

## 2018-04-03 DIAGNOSIS — R53.82 CHRONIC FATIGUE: ICD-10-CM

## 2018-04-03 DIAGNOSIS — F41.1 GENERALIZED ANXIETY DISORDER: ICD-10-CM

## 2018-04-03 DIAGNOSIS — F43.10 PTSD (POST-TRAUMATIC STRESS DISORDER): ICD-10-CM

## 2018-04-03 DIAGNOSIS — M06.00 SERONEGATIVE RHEUMATOID ARTHRITIS: ICD-10-CM

## 2018-04-03 DIAGNOSIS — M79.7 FIBROMYALGIA: ICD-10-CM

## 2018-04-03 DIAGNOSIS — F33.1 MAJOR DEPRESSIVE DISORDER, RECURRENT EPISODE, MODERATE: ICD-10-CM

## 2018-04-04 RX ORDER — GABAPENTIN 800 MG/1
TABLET ORAL
Qty: 90 TABLET | Refills: 3 | Status: SHIPPED | OUTPATIENT
Start: 2018-04-04 | End: 2018-08-19 | Stop reason: SDUPTHER

## 2018-04-06 ENCOUNTER — CLINICAL SUPPORT (OUTPATIENT)
Dept: REHABILITATION | Facility: HOSPITAL | Age: 48
End: 2018-04-06
Payer: COMMERCIAL

## 2018-04-06 DIAGNOSIS — R29.898 DECREASED RANGE OF MOTION OF NECK: ICD-10-CM

## 2018-04-06 DIAGNOSIS — M54.2 NECK PAIN: ICD-10-CM

## 2018-04-06 DIAGNOSIS — M62.81 MUSCLE WEAKNESS: ICD-10-CM

## 2018-04-06 PROCEDURE — 97161 PT EVAL LOW COMPLEX 20 MIN: CPT | Mod: PN

## 2018-04-06 PROCEDURE — 97110 THERAPEUTIC EXERCISES: CPT | Mod: PN

## 2018-04-06 NOTE — PLAN OF CARE
TIME RECORD    Date: 4/6/2018    Start Time:  0810  Stop Time:  0850    PROCEDURES:    TIMED  Procedure Min.   TE 8                     UNTIMED  Procedure Min.   IE 32         Total Timed Minutes:  8  Total Timed Units:  1  Total Untimed Units:  1  Charges Billed/# of units:  2 (LCE-1, TE-1)    [facility]    PHYSICAL THERAPY EVALUATION  Onset Date: Chronic  Primary Diagnosis: Neck Pain  Treatment Diagnosis:   1. Decreased range of motion of neck     2. Muscle weakness     3. Neck pain       Past Medical History:   Diagnosis Date    Acid reflux     Allergy     Alopecia     Anxiety     Arthralgia     Back pain     Depression     Dry eyes     from meds    Fever blister     Fibromyalgia     Interstitial cystitis     Irritable bowel syndrome     Kidney stone     Major depressive disorder, recurrent episode, in partial or unspecified remission 6/25/2013    OAB (overactive bladder) 7/21/2015    PTSD (post-traumatic stress disorder)     Rheumatoid arthritis     Systemic lupus erythematosus     Thyroid disease     Urinary tract infection     Vaginal infection      Precautions: Standard  Prior Therapy: Yes  Medications: Marilee Simons has a current medication list which includes the following prescription(s): adalimumab, alosetron, amitriptyline, butalbital-acetaminophen, clonazepam, cyclobenzaprine, diclofenac, duloxetine, gabapentin, hydroxychloroquine, hydroxyzine hcl, levothyroxine, multivitamin with minerals, prazosin, promethazine, rabeprazole, tizanidine, trazodone, triamcinolone acetonide 0.1%, and uribel.  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 Pain radiates from neck down to her fingers with her R thumb being most affected. She thinks her sleeping position is affecting her R arm. When she is sitting "I feel like this arm is just dead and hanging." Positioning neck in a flexed position improves pain.    Pain:  Location: arms and neck   Description: Tingling, Numb and Shooting  Activities Which Increase Pain: Night Time and Lifting  Activities Which Decrease Pain: pain medication and flexing neck, arm exercises  Pain Scale: 5/10 at best 7/10 now  10/10 at worst    Objective     Posture: sitting: slumped with forward head and rounded shoulders  Palpation: +TTP B UT, cervical paraspinals  Sensation: B UE intact to light touch    Range of Motion/Strength: *denotes pain      Cervical AROM Pain/Dysfunction with movement   Flexion 50    Extension 35    R Side Bending 20* Pain in R neck   L Side Bending 45* Pain in R neck   R rotation 53* Pain in B neck   L rotation 50* Pain in B neck     Shoulder Right  Left  Pain/Dysfunction with Movement    AROM MMT AROM MMT    flexion WFL 4/5* WFL 4/5 Pain in R UE   abduction WFL 4/5* WFL 4/5 Pain in R UE   Internal rotation T12* 4/5* T12 4/5 Pain in R UE   ER  T2* 4/5* T2 4/5 Pain in R UE         Flexibility: min decreased B pec major    Special Tests:   Spurlings: pos R  Max Compression: pos B  ULTT 1: neg R  ULTT 2: pos R  ULTT 3: pos R  ULTT 4: neg R    Other: Functional Limitation Reports:  Tool: FOTO Neck SURVEY  Limitation: 54%    TREATMENT     Time In: 0842  Time Out: 0850    PT Evaluation Completed? Yes  Discussed Plan of Care with patient: Yes    Marilee received 8 minutes of therapeutic exercise & instruction including:  See log below    Marilee received 0 minutes of manual therapy including:      Written Home Exercises Provided:   Marilee maryo good understanding of the education provided. Patient demo good return demo of skill of " "exercises.    See EMR in Patient Instructions for HEP given: Yes      Date 4/10/18   Visit 1   POC exp  6/6/18    FOTO        MT        Stretches    UT Str.    LV Str.    Pec Str.        Supine    Chin Tuck 10x5"   DNF    Cervical Erika    Median/Radial nerve flossing        Sidelying    Shld ER    Shld ABd    Shld Flex    Gator        Seated    Scap squeeze 10x5"   No Money        Stand    Lats    Rows    Horizontal Abd    Scaption    Wall Slides        Initials KV         Assessment       Initial Assessment (Pertinent finding, problem list and factors affecting outcome): Pt is a 49 yo female presenting to PT with pain, decreased cervical ROM, decreased strength, poor posture, and functional deficits with reaching and lifting. Pt would benefit from skilled PT consisting of manual therapy including STM/MFR cervical/thoracic paraspinals, suboccipitals, UT, scalenes, SCM; therapeutic exercise including UE/cervical strengthening/stretching, postural education, and modalities prn to address limitations and increase functional mobility.      History  Co-morbidities and personal factors that may impact the plan of care Examination  Body Structures and Functions, activity limitations and participation restrictions that may impact the plan of care    Clinical Presentation   Co-morbidities:   -Arthritis, Back pain, BMI over 30,   Gastrointestinal Disease, Headaches, Kidney, Bladder, Prostate or Urination Problems, Other disorders, Previous accidents        Personal Factors:   -Anxiety or Panic Disorders,  Depression Body Regions:   head  neck  back  upper extremities  trunk    Body Systems:    gross symmetry  ROM  strength  transfers            Participation Restrictions:   None stated     Activity limitations:   Learning and applying knowledge  no deficits    General Tasks and Commands  no deficits    Communication  no deficits    Mobility  lifting and carrying objects    Self care  no deficits    Domestic Life  no " deficits    Interactions/Relationships  no deficits    Life Areas  no deficits    Community and Social Life  no deficits         stable and uncomplicated                      low   high  high Decision Making/ Complexity Score:  low       Rehab Potiential: good  Spiritual/Cultural Needs: none  Barriers to Rehab: none    Short Term Goals (4 Weeks): 5/6/18  1. Pt will be independent with HEP to supplement PT in improving pain free cervical mobility  2. Pt will improve cervical AROM 10 deg all deficit motion to improve cervical mobility.  3. Pt will improve UE MMTs by 1/2 grade in all planes to improve strength for functional tasks.  4. Pt will demonstrate improved sitting posture to decrease pain experienced in head and neck.    Long Term Goals (8 Weeks): 6/6/18  1. Pt will improve FOTO to </=40% limitation to improve perceived limitation with changing and maintaining mobility.  2. Pt will improve cervical AROM to WFL in all planes to improve cervical mobility.  3. Pt will improve UE MMTs to grossly 5/5 in all planes to improve strength for functional tasks.  4. Pt will report pain </= 2/10 at worst to promote return to PLOF.  5. Pt will report pain </= 2/10 with cervical AROM in all planes to promote functional QOL.      Plan     Certification Period: 4/6/18 to 6/6/18  Recommended Treatment Plan: 2 times per week for 8 weeks: Group Therapy, Manual Therapy, Moist Heat/ Ice, Neuromuscular Re-ed, Patient Education, Therapeutic Activites and Therapeutic Exercise  Other Recommendations: modalities prn, ASTYM prn, kinesiotape prn, Functional Dry Needling prn       Naye Catherine, PT, DPT  4/6/2018      I CERTIFY THE NEED FOR THESE SERVICES FURNISHED UNDER THIS PLAN OF TREATMENT AND WHILE UNDER MY CARE    Physician's comments: ________________________________________________________________________________________________________________________________________________      Physician's Name:  ___________________________________

## 2018-04-06 NOTE — PATIENT INSTRUCTIONS
Chin Tuck, Supine    Lie on your back, head on small, rolled towel. Gently tuck chin and bring toward your throat while pushing the back of your head down. Do not lift your head or look towards your feet. Hold 5 seconds. Do not hold your breath.  Repeat 10 times per session. Do 2 sessions per day.      Scapular Retraction (Standing)    With arms at sides, squeeze shoulder blades together. Do not shrug and do not hold your breath. Hold 5 seconds.  Repeat 10 times per session. Do 2 sessions per day.

## 2018-04-10 PROBLEM — R29.898 DECREASED RANGE OF MOTION OF NECK: Status: ACTIVE | Noted: 2018-04-10

## 2018-04-10 PROBLEM — M62.81 MUSCLE WEAKNESS: Status: ACTIVE | Noted: 2018-04-10

## 2018-04-10 PROBLEM — M54.2 NECK PAIN: Status: ACTIVE | Noted: 2018-04-10

## 2018-04-12 ENCOUNTER — CLINICAL SUPPORT (OUTPATIENT)
Dept: REHABILITATION | Facility: HOSPITAL | Age: 48
End: 2018-04-12
Payer: COMMERCIAL

## 2018-04-12 DIAGNOSIS — M54.2 NECK PAIN: ICD-10-CM

## 2018-04-12 DIAGNOSIS — M62.81 MUSCLE WEAKNESS: ICD-10-CM

## 2018-04-12 DIAGNOSIS — R29.898 DECREASED RANGE OF MOTION OF NECK: ICD-10-CM

## 2018-04-12 PROCEDURE — 97110 THERAPEUTIC EXERCISES: CPT | Mod: PN

## 2018-04-12 PROCEDURE — 97140 MANUAL THERAPY 1/> REGIONS: CPT | Mod: PN

## 2018-04-12 PROCEDURE — 97012 MECHANICAL TRACTION THERAPY: CPT | Mod: PN

## 2018-04-12 NOTE — PATIENT INSTRUCTIONS
RADIAL NERVE GLIDE - B        Start with your arm hanging down at your side with your elbows straight and palm facing back. Next, bend your wrist back as you side bend your head towards the target arm as shown. Next, bend your wrist forward as you side bend your head away from the target arm.     Your other hand should be checking to make sure that your shoulder stays down and drawn back the entire time.

## 2018-04-12 NOTE — PROGRESS NOTES
"DAILY TREATMENT NOTE    DATE: 4/12/2018    Start Time:  1410  Stop Time:  1500    PROCEDURES:    TIMED  Procedure Min.   MT 10   TE 10   Supervised TE  20 NC             UNTIMED  Procedure Min.   Traction 10         Total Timed Minutes:  20  Total Timed Units:  2  Total Untimed Units:  1  Charges Billed/# of units:  3 (MT-1, TE-1, traction-1)      Progress/Current Status    Subjective:     Patient ID: Marilee Simons is a 48 y.o. female.  Diagnosis:   1. Decreased range of motion of neck     2. Muscle weakness     3. Neck pain       Pain: 5 /10  Pt states pain in neck is "okay" with numbness into R UE.    Objective:     Pt initiated treatment with MT x 10' consisting of STM/MFR to B thoracic and cervical parapsinals, B UT f/b traction x 10' (10#/30" on 2#/10" off) f/b supervised TE x     Date 4/12/18 4/10/18   Visit 2 1   POC exp  6/6/18      FOTO             MT 10'          Traction 10'           Stretches      UT Str.      LV Str.      Pec Str.             Supine      Chin Tuck 2x10 5" 10x5"   DNF      Cervical Erika      Median/Radial nerve flossing             Sidelying      Shld ER      Shld ABd      Shld Flex      Gator             Seated      Scap squeeze 2x10 5" 10x5"   No Money             Stand      Radial nerve glide A 2x20    Radial nerve glide B 2x20    Lats      Rows      Horizontal Abd      Scaption      Wall Slides             Initials KV KV         Assessment:     Pt reported 0/10 pain in neck and no numbness or tingling into R UE at the end of the treatment session.    Patient Education/Response:     Added Radial Nerve glide B to HEP. Pt educated to perform any time she is experiencing numbness or tingling. Pt verbalized understanding.    Plans and Goals:     Cont POC. Progress as able.    Short Term Goals (4 Weeks): 5/6/18  1. Pt will be independent with HEP to supplement PT in improving pain free cervical mobility  2. Pt will improve cervical AROM 10 deg all deficit motion to improve cervical " mobility.  3. Pt will improve UE MMTs by 1/2 grade in all planes to improve strength for functional tasks.  4. Pt will demonstrate improved sitting posture to decrease pain experienced in head and neck.     Long Term Goals (8 Weeks): 6/6/18  1. Pt will improve FOTO to </=40% limitation to improve perceived limitation with changing and maintaining mobility.  2. Pt will improve cervical AROM to WFL in all planes to improve cervical mobility.  3. Pt will improve UE MMTs to grossly 5/5 in all planes to improve strength for functional tasks.  4. Pt will report pain </= 2/10 at worst to promote return to PLOF.  5. Pt will report pain </= 2/10 with cervical AROM in all planes to promote functional QOL.

## 2018-04-18 ENCOUNTER — CLINICAL SUPPORT (OUTPATIENT)
Dept: REHABILITATION | Facility: HOSPITAL | Age: 48
End: 2018-04-18
Payer: COMMERCIAL

## 2018-04-18 DIAGNOSIS — R29.898 DECREASED RANGE OF MOTION OF NECK: ICD-10-CM

## 2018-04-18 DIAGNOSIS — M54.2 NECK PAIN: ICD-10-CM

## 2018-04-18 DIAGNOSIS — N30.10 IC (INTERSTITIAL CYSTITIS): ICD-10-CM

## 2018-04-18 DIAGNOSIS — M62.81 MUSCLE WEAKNESS: ICD-10-CM

## 2018-04-18 PROCEDURE — 97110 THERAPEUTIC EXERCISES: CPT | Mod: PN

## 2018-04-18 PROCEDURE — 97140 MANUAL THERAPY 1/> REGIONS: CPT | Mod: PN

## 2018-04-18 NOTE — PROGRESS NOTES
"DAILY TREATMENT NOTE    DATE: 4/18/2018    Start Time:  400  Stop Time:  445    PROCEDURES:    TIMED  Procedure Min.   Manual therapy 15   Therex 25         Total Timed Minutes:  40  Total Timed Units:  3  Total Untimed Units:  0  Charges Billed/# of units:  3  MTx1, TEx2      Progress/Current Status    Subjective:     Patient ID: Marilee Simons is a 48 y.o. female.  Diagnosis:   1. Decreased range of motion of neck     2. Muscle weakness     3. Neck pain       Pain: 6-7 /10  Patient reports "horrible" pain later in the evening and next day after last treatment.  It was "9-10/10 I couldn't lift my head." States numbness and tingling has decreased.    Objective:      Patient edu that we will trial with no traction today.  Manual therapy provided x 15 minutes including STM with elongation to B Cervical paraspinals, gentle sub occipital release, manual stretch to B UT and levator with STM/MFR to same, B upper thoracic release.  Patient then instructed in and performed self UT stretching as well as review of technique for other therex as logged as performed with 1:1 by PTA x 30 minutes.      Date 4/18/ 4/12/18 4/10/18   Visit 3 2 1   POC exp  6/6/18       FOTO 3/5              MT 15' 10'           Traction Held today 10'            Stretches       UT Str. MT/self 20"x3 B      LV Str. MT      Pec Str. --              Supine       Chin Tuck 5" 2x15 2x10 5" 10x5"   DNF       Cervical Erika 5"x5 B SB/rot      Median/Radial nerve flossing Radial see below              Sidelying       Shld ER --      Shld ABd --      Shld Flex --      Gator --              Seated       Scap squeeze 5" 2x10 2x10 5" 10x5"   No Money               Stand       Radial nerve glide A NT 2x20    Radial nerve glide B 2x10 R 2x20    Lats --      Rows --      Horizontal Abd --      Scaption --      Wall Slides --              Initials DH 1/6 KV KV       Assessment:     Held traction and limited progression of therex today due to reports of exacerbation " "after last visit. Required verbal and tactile cues for correct technique with therex. Tension noted with manual therapy greater R UT area than left, patient reports relief after treatment.  "Less now," but not specific to scale.    Patient Education/Response:     Patient educated to continue HEP.  Verbalized understanding.    Plans and Goals:       Cont POC. Progress as able.    Short Term Goals (4 Weeks): 5/6/18  1. Pt will be independent with HEP to supplement PT in improving pain free cervical mobility  2. Pt will improve cervical AROM 10 deg all deficit motion to improve cervical mobility.  3. Pt will improve UE MMTs by 1/2 grade in all planes to improve strength for functional tasks.  4. Pt will demonstrate improved sitting posture to decrease pain experienced in head and neck.     Long Term Goals (8 Weeks): 6/6/18  1. Pt will improve FOTO to </=40% limitation to improve perceived limitation with changing and maintaining mobility.  2. Pt will improve cervical AROM to WFL in all planes to improve cervical mobility.  3. Pt will improve UE MMTs to grossly 5/5 in all planes to improve strength for functional tasks.  4. Pt will report pain </= 2/10 at worst to promote return to PLOF.  5. Pt will report pain </= 2/10 with cervical AROM in all planes to promote functional QOL.    "

## 2018-04-19 RX ORDER — METHENAMINE, SODIUM PHOSPHATE, MONOBASIC, MONOHYDRATE, PHENYL SALICYLATE, METHYLENE BLUE, AND HYOSCYAMINE SULFATE 118; 40.8; 36; 10; .12 MG/1; MG/1; MG/1; MG/1; MG/1
CAPSULE ORAL
Qty: 120 CAPSULE | Refills: 0 | Status: SHIPPED | OUTPATIENT
Start: 2018-04-19 | End: 2018-05-20 | Stop reason: SDUPTHER

## 2018-04-19 RX ORDER — HYDROXYZINE HYDROCHLORIDE 25 MG/1
TABLET, FILM COATED ORAL
Qty: 30 TABLET | Refills: 0 | Status: SHIPPED | OUTPATIENT
Start: 2018-04-19 | End: 2018-04-20 | Stop reason: SDUPTHER

## 2018-04-20 ENCOUNTER — OFFICE VISIT (OUTPATIENT)
Dept: URGENT CARE | Facility: CLINIC | Age: 48
End: 2018-04-20
Payer: COMMERCIAL

## 2018-04-20 VITALS
SYSTOLIC BLOOD PRESSURE: 139 MMHG | HEART RATE: 122 BPM | HEIGHT: 59 IN | TEMPERATURE: 99 F | WEIGHT: 160 LBS | RESPIRATION RATE: 16 BRPM | DIASTOLIC BLOOD PRESSURE: 95 MMHG | BODY MASS INDEX: 32.25 KG/M2 | OXYGEN SATURATION: 97 %

## 2018-04-20 DIAGNOSIS — L30.9 DERMATITIS: Primary | ICD-10-CM

## 2018-04-20 PROCEDURE — 96372 THER/PROPH/DIAG INJ SC/IM: CPT | Mod: S$GLB,,, | Performed by: FAMILY MEDICINE

## 2018-04-20 PROCEDURE — 99214 OFFICE O/P EST MOD 30 MIN: CPT | Mod: 25,S$GLB,, | Performed by: NURSE PRACTITIONER

## 2018-04-20 RX ORDER — BETAMETHASONE SODIUM PHOSPHATE AND BETAMETHASONE ACETATE 3; 3 MG/ML; MG/ML
6 INJECTION, SUSPENSION INTRA-ARTICULAR; INTRALESIONAL; INTRAMUSCULAR; SOFT TISSUE
Status: COMPLETED | OUTPATIENT
Start: 2018-04-20 | End: 2018-04-20

## 2018-04-20 RX ORDER — METHYLPREDNISOLONE 4 MG/1
4 TABLET ORAL
Qty: 1 PACKAGE | Refills: 0 | Status: SHIPPED | OUTPATIENT
Start: 2018-04-21 | End: 2018-04-27

## 2018-04-20 RX ORDER — HYDROXYZINE HYDROCHLORIDE 25 MG/1
50 TABLET, FILM COATED ORAL 3 TIMES DAILY PRN
Qty: 30 TABLET | Refills: 0 | Status: SHIPPED | OUTPATIENT
Start: 2018-04-20 | End: 2018-04-30

## 2018-04-20 RX ADMIN — BETAMETHASONE SODIUM PHOSPHATE AND BETAMETHASONE ACETATE 6 MG: 3; 3 INJECTION, SUSPENSION INTRA-ARTICULAR; INTRALESIONAL; INTRAMUSCULAR; SOFT TISSUE at 05:04

## 2018-04-20 NOTE — PATIENT INSTRUCTIONS
Please follow up with your Primary care provider within 2-5 days if your signs and symptoms have not resolved or worsen.     If your condition worsens or fails to improve we recommend that you receive another evaluation at the emergency room immediately or contact your primary medical clinic to discuss your concerns.    You must understand that you have received an Urgent Care treatment only and that you may be released before all of your medical problems are known or treated.   You, the patient, will arrange for follow up care as instructed.       General Allergic Reactions  An allergic reaction is a set of symptoms caused by an allergen. An allergen is something that causes a persons immune system to react. When a person comes in contact with an allergen, it causes the body to release chemicals. These include the chemical histamine. Histamine causes swelling and itching. It may affect the entire body. This is called a general allergic reaction. Often symptoms affect only 1 part of the body. This is called a local allergic reaction.  You are having an allergic reaction. Almost anything can cause one. Different people are allergic to different things. It is usually something that you ate or swallowed, came into contact with by getting or putting it on your skin or clothes, or something you breathed in the air. This can be very annoying and sometimes scary.  Most of us think of allergic reactions when we have a rash or itchy skin. Symptoms can include:  · Itching of the eyes, nose, and roof of the mouth  · Runny or stuffy nose  · Watery eyes   · Sneezing or coughing   · A blocked feeling in the ear  · Red, itchy rash called hives  · Red and purple spots  · Rash, redness, welts, blisters  · Itching, burning, stinging, pain  · Dry, flaky, cracking, scaly skin  Severe symptoms include:  · Swelling of the face, lips, or other parts of the body  · Hoarse voice  · Trouble swallowing, feeling like your throat is  closing  · Trouble breathing, wheezing  · Nausea, vomiting, diarrhea, stomach cramps  · Feeling faint or lightheaded, rapid heart rate  Sometimes the cause may be obvious. But there are so many things that can cause a reaction that you may not be able to figure out. The most important things to help find your allergen are:  · Remembering when it started  · What you were doing at the time or just before that  · Any activities you were involved in  · Any new products or contacts  Below are some common causes. But remember that almost anything can cause a reaction. You may not even be aware that you came into contact with one of these things:  · Dust, mold, pollen  · Plants (common ones are poison ivy and poison oak, but there are many others)   · Animals  · Foods such as shrimp, shellfish, peanuts, milk products, gluten, and eggs. Also food colorings, flavorings, and additives.  · Insect bites or stings such as bees, mosquitos, fleas, ticks  · Medicines such as penicillin, sulfa medicines, amoxicillin, aspirin, and ibuprofen. But any medicine can cause a reaction.  · Jewelry such as nickel or gold. This can be new, or something youve worn for a while, including zippers and buttons.  · Latex such as in gloves, clothes, toys, balloons, or some tapes. Some people allergic to latex may also have problems with foods like bananas, avocados, kiwi, papaya, or chestnuts.  · Lotions, perfumes, cosmetics, soaps, shampoos, skincare products, nail products  · Chemicals or dyes in clothing, linen, , hair dyes, soaps, iodine  Many viruses and common colds can cause a rash that is not an allergic reaction. Sometimes it is hard to tell the difference between allergies, sensitivity, or an intolerance to something. This is especially true with food. Many things can cause diarrhea, vomiting, stomach cramps, and skin irritation.  Home care    The goal of treatment is to help relieve the symptoms and get you feeling better. The  rash will usually fade over several days. But it can sometimes last a couple of weeks. Over the next couple of days, there may be times when it is gets a little worse, and then better again. Here are some things to do:  · If you know what you are allergic to, stay away from it. Future reactions could be worse than this one.  · Avoid tight clothing and anything that heats up your skin (hot showers or baths, direct sunlight). Heat will make itching worse.  · An ice pack will relieve local areas of intense itching and redness. To make an ice pack, put ice cubes in a plastic bag that seals at the top. Wrap it in a thin, clean towel. Dont put the ice directly on the skin because it can damage the skin.  · Oral diphenhydramine is an over-the-counter antihistamine sold at pharmacy and grocery stores. Unless a prescription antihistamine was given, diphenhydramine may be used to reduce itching if large areas of the skin are involved. It may make you sleepy. So be careful using it in the daytime or when going to school, working, or driving. Note: Dont use diphenhydramine if you have glaucoma or if you are a man with trouble urinating due to an enlarged prostate. There are other antihistamines that wont make you so sleepy. These are good choices for daytime use. Ask your pharmacist for suggestions.  · Dont use diphenhydramine cream on your skin. It can cause a further reaction in some people.  · To help prevent an infection, don't scratch the affected area. Scratching may worsen the reaction and damage your skin. It can also lead to an infection. Always check the affected for signs of an infection.  · Call your healthcare provider and ask what you can use to help decrease the itching.  · To decrease allergic reactions, try the following:    · Use heat-steam to clean your home  · Use high-efficiency particulate (HEPA) vacuums and filters  · Stay away from food and pet triggers  · Kill any cockroaches  · Clean your house  often  Follow-up care  Follow up with your healthcare provider, or as advised. If you had a severe reaction today, or if you have had several mild to medium allergic reactions in the past, ask your provider about allergy testing. This can help you find out what you are allergic to. If your reaction included dizziness, fainting, or trouble breathing or swallowing, ask your provider about carrying auto-injectable epinephrine.  Call 911  Call 911 if any of these occur:  · Trouble breathing or swallowing, wheezing  · Cool, moist, pale skin  · Shortness of breath  · Hoarse voice or trouble speaking  · Confused   · Very drowsy or trouble awakening  · Fainting or loss of consciousness  · Rapid heart rate  · Feeling of dizziness or weakness or a sudden drop in blood pressure  · Feeling of doom  · Feeling lightheaded  · Severe nausea or vomiting, or diarrhea  · Seizure  · Swelling in the face, eyelids, lips, mouth, throat or tongue  · Drooling  When to seek medical advice  Call your healthcare provider right away if any of these occur:  · Spreading areas of itching, redness or swelling  · Nausea or stomach cramps or abdominal pain  · Continuing or recurring symptoms  · Spreading areas of redness, swelling, or itching  · Signs of infection at the affected site:  ¨ Spreading redness  ¨ Increased pain or swelling  ¨ Fluid or colored drainage from the site  ¨ Fever of 100.4°F (38°C) or above lasting for 24 to 48 hours, or as directed by your provider  Date Last Reviewed: 3/1/2017  © 1721-9416 [employer]. [address] [place] [address], [place] [address]. All rights reserved. This information is not intended as a substitute for professional medical care. Always follow your healthcare professional's instructions.

## 2018-04-20 NOTE — PROGRESS NOTES
"Subjective:       Patient ID: Marilee Simons is a 48 y.o. female.    Vitals:  height is 4' 11" (1.499 m) and weight is 72.6 kg (160 lb). Her temperature is 98.5 °F (36.9 °C). Her blood pressure is 139/95 (abnormal) and her pulse is 122 (abnormal). Her respiration is 16 and oxygen saturation is 97%.     Chief Complaint: Urticaria    Pt states she has had a flare up for 1 week.pt thinks it is possibly new medication she started 3 weeks ago,but is not certain.      Urticaria   The affected locations include the left upper leg, left lower leg, right arm, left arm, right upper leg and right lower leg. The rash is characterized by itchiness, redness and scaling. She was exposed to a new medication. Pertinent negatives include no fever, joint pain, shortness of breath or sore throat. Past treatments include oral steroids, antihistamine and anti-itch cream. The treatment provided mild relief.     Review of Systems   Constitution: Negative for chills and fever.   HENT: Negative for sore throat.    Respiratory: Negative for shortness of breath.    Skin: Positive for color change, dry skin, flushing and itching. Negative for rash.   Musculoskeletal: Negative for joint pain.       Objective:      Physical Exam   Constitutional: She is oriented to person, place, and time. She appears well-developed and well-nourished.   HENT:   Head: Normocephalic and atraumatic. Head is without abrasion, without contusion and without laceration.   Right Ear: External ear normal.   Left Ear: External ear normal.   Nose: Nose normal.   Mouth/Throat: Oropharynx is clear and moist.   Eyes: Conjunctivae, EOM and lids are normal. Pupils are equal, round, and reactive to light.   Neck: Trachea normal, full passive range of motion without pain and phonation normal. Neck supple.   Cardiovascular: Normal rate, regular rhythm and normal heart sounds.    Pulmonary/Chest: Effort normal and breath sounds normal. No stridor. No respiratory distress. She has " no decreased breath sounds. She has no wheezes. She has no rhonchi. She has no rales.   Musculoskeletal: Normal range of motion.   Neurological: She is alert and oriented to person, place, and time.   Skin: Skin is warm, dry and intact. Capillary refill takes less than 2 seconds. Rash noted. No abrasion, no bruising, no burn, no ecchymosis, no laceration, no lesion and no purpura noted. Rash is maculopapular (to bilateral extremities.  None noted on trunk of body. ). Rash is not macular, not papular, not nodular, not pustular, not vesicular and not urticarial. No cyanosis or erythema. Nails show no clubbing.   Psychiatric: She has a normal mood and affect. Her speech is normal and behavior is normal. Judgment and thought content normal. Cognition and memory are normal.   Nursing note and vitals reviewed.      Assessment:       1. Dermatitis        Plan:         Dermatitis  -     methylPREDNISolone (MEDROL DOSEPACK) 4 mg tablet; Take 1 tablet (4 mg total) by mouth as needed (Take as directed). Take as directed  Dispense: 1 Package; Refill: 0  -     hydrOXYzine HCl (ATARAX) 25 MG tablet; Take 2 tablets (50 mg total) by mouth 3 (three) times daily as needed for Itching.  Dispense: 30 tablet; Refill: 0    Other orders  -     betamethasone acetate-betamethasone sodium phosphate injection 6 mg; Inject 1 mL (6 mg total) into the muscle one time.      Patient Instructions   Please follow up with your Primary care provider within 2-5 days if your signs and symptoms have not resolved or worsen.     If your condition worsens or fails to improve we recommend that you receive another evaluation at the emergency room immediately or contact your primary medical clinic to discuss your concerns.    You must understand that you have received an Urgent Care treatment only and that you may be released before all of your medical problems are known or treated.   You, the patient, will arrange for follow up care as instructed.        General Allergic Reactions  An allergic reaction is a set of symptoms caused by an allergen. An allergen is something that causes a persons immune system to react. When a person comes in contact with an allergen, it causes the body to release chemicals. These include the chemical histamine. Histamine causes swelling and itching. It may affect the entire body. This is called a general allergic reaction. Often symptoms affect only 1 part of the body. This is called a local allergic reaction.  You are having an allergic reaction. Almost anything can cause one. Different people are allergic to different things. It is usually something that you ate or swallowed, came into contact with by getting or putting it on your skin or clothes, or something you breathed in the air. This can be very annoying and sometimes scary.  Most of us think of allergic reactions when we have a rash or itchy skin. Symptoms can include:  · Itching of the eyes, nose, and roof of the mouth  · Runny or stuffy nose  · Watery eyes   · Sneezing or coughing   · A blocked feeling in the ear  · Red, itchy rash called hives  · Red and purple spots  · Rash, redness, welts, blisters  · Itching, burning, stinging, pain  · Dry, flaky, cracking, scaly skin  Severe symptoms include:  · Swelling of the face, lips, or other parts of the body  · Hoarse voice  · Trouble swallowing, feeling like your throat is closing  · Trouble breathing, wheezing  · Nausea, vomiting, diarrhea, stomach cramps  · Feeling faint or lightheaded, rapid heart rate  Sometimes the cause may be obvious. But there are so many things that can cause a reaction that you may not be able to figure out. The most important things to help find your allergen are:  · Remembering when it started  · What you were doing at the time or just before that  · Any activities you were involved in  · Any new products or contacts  Below are some common causes. But remember that almost anything can cause a  reaction. You may not even be aware that you came into contact with one of these things:  · Dust, mold, pollen  · Plants (common ones are poison ivy and poison oak, but there are many others)   · Animals  · Foods such as shrimp, shellfish, peanuts, milk products, gluten, and eggs. Also food colorings, flavorings, and additives.  · Insect bites or stings such as bees, mosquitos, fleas, ticks  · Medicines such as penicillin, sulfa medicines, amoxicillin, aspirin, and ibuprofen. But any medicine can cause a reaction.  · Jewelry such as nickel or gold. This can be new, or something youve worn for a while, including zippers and buttons.  · Latex such as in gloves, clothes, toys, balloons, or some tapes. Some people allergic to latex may also have problems with foods like bananas, avocados, kiwi, papaya, or chestnuts.  · Lotions, perfumes, cosmetics, soaps, shampoos, skincare products, nail products  · Chemicals or dyes in clothing, linen, , hair dyes, soaps, iodine  Many viruses and common colds can cause a rash that is not an allergic reaction. Sometimes it is hard to tell the difference between allergies, sensitivity, or an intolerance to something. This is especially true with food. Many things can cause diarrhea, vomiting, stomach cramps, and skin irritation.  Home care    The goal of treatment is to help relieve the symptoms and get you feeling better. The rash will usually fade over several days. But it can sometimes last a couple of weeks. Over the next couple of days, there may be times when it is gets a little worse, and then better again. Here are some things to do:  · If you know what you are allergic to, stay away from it. Future reactions could be worse than this one.  · Avoid tight clothing and anything that heats up your skin (hot showers or baths, direct sunlight). Heat will make itching worse.  · An ice pack will relieve local areas of intense itching and redness. To make an ice pack, put ice  cubes in a plastic bag that seals at the top. Wrap it in a thin, clean towel. Dont put the ice directly on the skin because it can damage the skin.  · Oral diphenhydramine is an over-the-counter antihistamine sold at pharmacy and grocery stores. Unless a prescription antihistamine was given, diphenhydramine may be used to reduce itching if large areas of the skin are involved. It may make you sleepy. So be careful using it in the daytime or when going to school, working, or driving. Note: Dont use diphenhydramine if you have glaucoma or if you are a man with trouble urinating due to an enlarged prostate. There are other antihistamines that wont make you so sleepy. These are good choices for daytime use. Ask your pharmacist for suggestions.  · Dont use diphenhydramine cream on your skin. It can cause a further reaction in some people.  · To help prevent an infection, don't scratch the affected area. Scratching may worsen the reaction and damage your skin. It can also lead to an infection. Always check the affected for signs of an infection.  · Call your healthcare provider and ask what you can use to help decrease the itching.  · To decrease allergic reactions, try the following:    · Use heat-steam to clean your home  · Use high-efficiency particulate (HEPA) vacuums and filters  · Stay away from food and pet triggers  · Kill any cockroaches  · Clean your house often  Follow-up care  Follow up with your healthcare provider, or as advised. If you had a severe reaction today, or if you have had several mild to medium allergic reactions in the past, ask your provider about allergy testing. This can help you find out what you are allergic to. If your reaction included dizziness, fainting, or trouble breathing or swallowing, ask your provider about carrying auto-injectable epinephrine.  Call 911  Call 911 if any of these occur:  · Trouble breathing or swallowing, wheezing  · Cool, moist, pale skin  · Shortness of  breath  · Hoarse voice or trouble speaking  · Confused   · Very drowsy or trouble awakening  · Fainting or loss of consciousness  · Rapid heart rate  · Feeling of dizziness or weakness or a sudden drop in blood pressure  · Feeling of doom  · Feeling lightheaded  · Severe nausea or vomiting, or diarrhea  · Seizure  · Swelling in the face, eyelids, lips, mouth, throat or tongue  · Drooling  When to seek medical advice  Call your healthcare provider right away if any of these occur:  · Spreading areas of itching, redness or swelling  · Nausea or stomach cramps or abdominal pain  · Continuing or recurring symptoms  · Spreading areas of redness, swelling, or itching  · Signs of infection at the affected site:  ¨ Spreading redness  ¨ Increased pain or swelling  ¨ Fluid or colored drainage from the site  ¨ Fever of 100.4°F (38°C) or above lasting for 24 to 48 hours, or as directed by your provider  Date Last Reviewed: 3/1/2017  © 7142-8260 [employer], [place]. [address] [place] [address], [place] [address]. All rights reserved. This information is not intended as a substitute for professional medical care. Always follow your healthcare professional's instructions.

## 2018-04-24 ENCOUNTER — CLINICAL SUPPORT (OUTPATIENT)
Dept: REHABILITATION | Facility: HOSPITAL | Age: 48
End: 2018-04-24
Payer: COMMERCIAL

## 2018-04-24 DIAGNOSIS — M54.2 NECK PAIN: ICD-10-CM

## 2018-04-24 DIAGNOSIS — R29.898 DECREASED RANGE OF MOTION OF NECK: ICD-10-CM

## 2018-04-24 DIAGNOSIS — M62.81 MUSCLE WEAKNESS: ICD-10-CM

## 2018-04-24 PROCEDURE — 97110 THERAPEUTIC EXERCISES: CPT | Mod: PN

## 2018-04-24 PROCEDURE — 97140 MANUAL THERAPY 1/> REGIONS: CPT | Mod: PN

## 2018-04-24 NOTE — PROGRESS NOTES
"DAILY TREATMENT NOTE    DATE: 4/24/2018    Start Time:  1100  Stop Time:  1140    PROCEDURES:    TIMED  Procedure Min.   Manual therapy 15   Therex 15   Therex supervised 10     Total Timed Minutes:  30  Total Timed Units:  2  Total Untimed Units:  0  Charges Billed/# of units:  2 MTx1, TEx1      Progress/Current Status    Subjective:     Patient ID: Marilee Simons is a 48 y.o. female.  Diagnosis:   1. Decreased range of motion of neck     2. Muscle weakness     3. Neck pain       Pain: 3 /10  Patient reports she has been feeling better since on steroids for a hive outbreak.  "It seems to be helping my neck too."    Objective:      Manual therapy provided x 15 minutes including STM with elongation to B Cervical paraspinals, gentle sub occipital release, manual stretch to B UT and levator with STM/MFR to same, B upper thoracic release.  Patient then instructed in and performed self UT stretching as well as review of technique for other therex as logged as performed with 1:1 by PTA x 15 minutes, additional 10 minutes with supervision.      Date 4/24/18 4/18/18 4/12/18 4/10/18   Visit 4 3 2 1   POC exp  6/6/18        FOTO 4/5 3/5               MT 15' 15' 10'            Traction Held Held today 10'             Stretches        UT Str. MT/self 30"x2 B MT/self 20"x3 B      LV Str. MT/self 30"x3 MT      Pec Str.  --               Supine        Chin Tuck 5" 2x15 5" 2x15 2x10 5" 10x5"   DNF        Cervical Erika 5"x5 sb/rot 5"x5 B SB/rot      Median/Radial nerve flossing Radial see below Radial see below               Sidelying        Shld ER  --      Shld ABd  --      Shld Flex  --      Gator  --               Seated        Scap squeeze 5" 2x10 5" 2x10 2x10 5" 10x5"   No Money 2x10                Stand        Radial nerve glide A NT NT 2x20    Radial nerve glide B 2x10 R 2x10 R 2x20    Lats RTB 2x10 --      Rows RTB 2x10 --      Horizontal Abd  --      Scaption  --      Wall Slides  --               Initials DH 2/6 DH 1/6 " "KV KV       Assessment:     Patient able to increase activity with expanded therex as noted.  Reports "less pain now" after treatment, not specific to scale.    Patient Education/Response:     Patient educated to continue HEP.  Verbalized understanding.    Plans and Goals:     Cont POC. Progress as able.    Short Term Goals (4 Weeks): 5/6/18  1. Pt will be independent with HEP to supplement PT in improving pain free cervical mobility  2. Pt will improve cervical AROM 10 deg all deficit motion to improve cervical mobility.  3. Pt will improve UE MMTs by 1/2 grade in all planes to improve strength for functional tasks.  4. Pt will demonstrate improved sitting posture to decrease pain experienced in head and neck.     Long Term Goals (8 Weeks): 6/6/18  1. Pt will improve FOTO to </=40% limitation to improve perceived limitation with changing and maintaining mobility.  2. Pt will improve cervical AROM to WFL in all planes to improve cervical mobility.  3. Pt will improve UE MMTs to grossly 5/5 in all planes to improve strength for functional tasks.  4. Pt will report pain </= 2/10 at worst to promote return to PLOF.  5. Pt will report pain </= 2/10 with cervical AROM in all planes to promote functional QOL.  "

## 2018-04-26 ENCOUNTER — TELEPHONE (OUTPATIENT)
Dept: REHABILITATION | Facility: HOSPITAL | Age: 48
End: 2018-04-26

## 2018-04-26 DIAGNOSIS — M54.2 NECK PAIN: ICD-10-CM

## 2018-04-26 DIAGNOSIS — M62.81 MUSCLE WEAKNESS: ICD-10-CM

## 2018-04-26 DIAGNOSIS — R29.898 DECREASED RANGE OF MOTION OF NECK: ICD-10-CM

## 2018-05-01 ENCOUNTER — PATIENT MESSAGE (OUTPATIENT)
Dept: RHEUMATOLOGY | Facility: CLINIC | Age: 48
End: 2018-05-01

## 2018-05-02 ENCOUNTER — PATIENT MESSAGE (OUTPATIENT)
Dept: RHEUMATOLOGY | Facility: CLINIC | Age: 48
End: 2018-05-02

## 2018-05-02 ENCOUNTER — TELEPHONE (OUTPATIENT)
Dept: ALLERGY | Facility: CLINIC | Age: 48
End: 2018-05-02

## 2018-05-02 DIAGNOSIS — L40.50 PSA (PSORIATIC ARTHRITIS): ICD-10-CM

## 2018-05-02 DIAGNOSIS — M06.00 SERONEGATIVE RHEUMATOID ARTHRITIS: Primary | ICD-10-CM

## 2018-05-02 NOTE — TELEPHONE ENCOUNTER
----- Message from Tosha Brito sent at 5/2/2018 11:16 AM CDT -----   Type:  Sooner Apoointment Request    Caller is requesting a sooner appointment.  Caller declined first available appointment listed below.  Caller will not accept being placed on the waitlist and is requesting a message be sent to doctor.    Name of Caller:  pt  When is the first available appointment?nurse  Injection    visit  Symptoms: rheumatoid  Flare up / baldomero grain and  fybermaysia  flareupBest Call Back Number: [phone]  Additional Information:   Calling  To  Have   A nurse  Injection  Visit  Set up

## 2018-05-03 NOTE — TELEPHONE ENCOUNTER
Spoke to pt, advised per Dr. Samaniego we can call medrol dose juan pablo in to pharmacy and she needs to have lab work done. Also advised placed on cancellation list so that she can try to get in sooner.

## 2018-05-04 ENCOUNTER — PATIENT MESSAGE (OUTPATIENT)
Dept: RHEUMATOLOGY | Facility: CLINIC | Age: 48
End: 2018-05-04

## 2018-05-07 RX ORDER — METHYLPREDNISOLONE 4 MG/1
TABLET ORAL
Qty: 1 PACKAGE | Refills: 0 | Status: SHIPPED | OUTPATIENT
Start: 2018-05-07 | End: 2018-05-24

## 2018-05-10 ENCOUNTER — TELEPHONE (OUTPATIENT)
Dept: REHABILITATION | Facility: HOSPITAL | Age: 48
End: 2018-05-10

## 2018-05-10 DIAGNOSIS — M62.81 MUSCLE WEAKNESS: ICD-10-CM

## 2018-05-10 DIAGNOSIS — M54.2 NECK PAIN: ICD-10-CM

## 2018-05-10 DIAGNOSIS — R29.898 DECREASED RANGE OF MOTION OF NECK: ICD-10-CM

## 2018-05-10 NOTE — TELEPHONE ENCOUNTER
Left message stating pt was non-compliant with attendance policy and is being removed from the PT schedule. She was instructed to call clinic if she would like to return to therapy.

## 2018-05-20 ENCOUNTER — PATIENT MESSAGE (OUTPATIENT)
Dept: PSYCHIATRY | Facility: CLINIC | Age: 48
End: 2018-05-20

## 2018-05-20 DIAGNOSIS — N30.10 IC (INTERSTITIAL CYSTITIS): ICD-10-CM

## 2018-05-29 RX ORDER — METHENAMINE, SODIUM PHOSPHATE, MONOBASIC, MONOHYDRATE, PHENYL SALICYLATE, METHYLENE BLUE, AND HYOSCYAMINE SULFATE 118; 40.8; 36; 10; .12 MG/1; MG/1; MG/1; MG/1; MG/1
CAPSULE ORAL
Qty: 120 CAPSULE | Refills: 0 | Status: SHIPPED | OUTPATIENT
Start: 2018-05-29 | End: 2018-06-27 | Stop reason: SDUPTHER

## 2018-05-29 RX ORDER — HYDROXYZINE HYDROCHLORIDE 25 MG/1
TABLET, FILM COATED ORAL
Qty: 30 TABLET | Refills: 0 | Status: SHIPPED | OUTPATIENT
Start: 2018-05-29 | End: 2018-07-10 | Stop reason: SDUPTHER

## 2018-06-06 RX ORDER — CLONAZEPAM 1 MG/1
TABLET ORAL
Qty: 90 TABLET | Refills: 0 | Status: SHIPPED | OUTPATIENT
Start: 2018-06-06 | End: 2018-07-05 | Stop reason: SDUPTHER

## 2018-06-12 DIAGNOSIS — N30.10 IC (INTERSTITIAL CYSTITIS): ICD-10-CM

## 2018-06-12 RX ORDER — AMITRIPTYLINE HYDROCHLORIDE 10 MG/1
TABLET, FILM COATED ORAL
Qty: 30 TABLET | Refills: 0 | Status: SHIPPED | OUTPATIENT
Start: 2018-06-12 | End: 2018-07-18 | Stop reason: SDUPTHER

## 2018-06-12 RX ORDER — HYDROXYCHLOROQUINE SULFATE 200 MG/1
TABLET, FILM COATED ORAL
Qty: 60 TABLET | Refills: 3 | Status: SHIPPED | OUTPATIENT
Start: 2018-06-12 | End: 2018-10-29 | Stop reason: SDUPTHER

## 2018-06-12 RX ORDER — TIZANIDINE 4 MG/1
TABLET ORAL
Qty: 90 TABLET | Refills: 3 | Status: SHIPPED | OUTPATIENT
Start: 2018-06-12 | End: 2018-10-29 | Stop reason: SDUPTHER

## 2018-06-19 ENCOUNTER — DOCUMENTATION ONLY (OUTPATIENT)
Dept: PSYCHIATRY | Facility: CLINIC | Age: 48
End: 2018-06-19

## 2018-06-19 NOTE — PROGRESS NOTES
Pt had an appointment with me today which she canceled this morning saying she was ill.  This is the 2nd appointment she has canceled at the last minute.

## 2018-06-20 DIAGNOSIS — E03.9 HYPOTHYROIDISM, UNSPECIFIED TYPE: ICD-10-CM

## 2018-06-20 RX ORDER — LEVOTHYROXINE SODIUM 50 UG/1
TABLET ORAL
Qty: 30 TABLET | Refills: 0 | Status: SHIPPED | OUTPATIENT
Start: 2018-06-20 | End: 2018-07-18 | Stop reason: SDUPTHER

## 2018-06-26 ENCOUNTER — HOSPITAL ENCOUNTER (EMERGENCY)
Facility: HOSPITAL | Age: 48
Discharge: HOME | End: 2018-06-26
Attending: EMERGENCY MEDICINE
Payer: COMMERCIAL

## 2018-06-26 VITALS
WEIGHT: 150 LBS | DIASTOLIC BLOOD PRESSURE: 60 MMHG | HEIGHT: 59 IN | TEMPERATURE: 98 F | OXYGEN SATURATION: 96 % | HEART RATE: 88 BPM | SYSTOLIC BLOOD PRESSURE: 95 MMHG | RESPIRATION RATE: 18 BRPM | BODY MASS INDEX: 30.24 KG/M2

## 2018-06-26 DIAGNOSIS — N39.0 URINARY TRACT INFECTION WITH HEMATURIA, SITE UNSPECIFIED: Primary | ICD-10-CM

## 2018-06-26 DIAGNOSIS — R31.9 URINARY TRACT INFECTION WITH HEMATURIA, SITE UNSPECIFIED: Primary | ICD-10-CM

## 2018-06-26 LAB
ALBUMIN SERPL BCP-MCNC: 3.8 G/DL
ALP SERPL-CCNC: 59 U/L
ALT SERPL W/O P-5'-P-CCNC: 23 U/L
ANION GAP SERPL CALC-SCNC: 9 MMOL/L
AST SERPL-CCNC: 20 U/L
BACTERIA #/AREA URNS HPF: ABNORMAL /HPF
BASOPHILS # BLD AUTO: 0.04 K/UL
BASOPHILS NFR BLD: 0.6 %
BILIRUB SERPL-MCNC: 0.2 MG/DL
BILIRUB UR QL STRIP: ABNORMAL
BUN SERPL-MCNC: 14 MG/DL
CALCIUM SERPL-MCNC: 8.7 MG/DL
CHLORIDE SERPL-SCNC: 104 MMOL/L
CLARITY UR: CLEAR
CO2 SERPL-SCNC: 23 MMOL/L
COLOR UR: ABNORMAL
CREAT SERPL-MCNC: 0.8 MG/DL
DIFFERENTIAL METHOD: ABNORMAL
EOSINOPHIL # BLD AUTO: 0.2 K/UL
EOSINOPHIL NFR BLD: 2.6 %
ERYTHROCYTE [DISTWIDTH] IN BLOOD BY AUTOMATED COUNT: 12.4 %
EST. GFR  (AFRICAN AMERICAN): >60 ML/MIN/1.73 M^2
EST. GFR  (NON AFRICAN AMERICAN): >60 ML/MIN/1.73 M^2
GLUCOSE SERPL-MCNC: 98 MG/DL
GLUCOSE UR QL STRIP: ABNORMAL
HCT VFR BLD AUTO: 36.1 %
HGB BLD-MCNC: 11.7 G/DL
HGB UR QL STRIP: ABNORMAL
HYALINE CASTS #/AREA URNS LPF: 0 /LPF
KETONES UR QL STRIP: ABNORMAL
LEUKOCYTE ESTERASE UR QL STRIP: ABNORMAL
LIPASE SERPL-CCNC: 34 U/L
LYMPHOCYTES # BLD AUTO: 2.8 K/UL
LYMPHOCYTES NFR BLD: 40.5 %
MCH RBC QN AUTO: 28.1 PG
MCHC RBC AUTO-ENTMCNC: 32.4 G/DL
MCV RBC AUTO: 87 FL
MICROSCOPIC COMMENT: ABNORMAL
MONOCYTES # BLD AUTO: 0.6 K/UL
MONOCYTES NFR BLD: 8.3 %
NEUTROPHILS # BLD AUTO: 3.3 K/UL
NEUTROPHILS NFR BLD: 47.7 %
NITRITE UR QL STRIP: POSITIVE
PH UR STRIP: 7 [PH] (ref 5–8)
PLATELET # BLD AUTO: 264 K/UL
PMV BLD AUTO: 8.6 FL
POTASSIUM SERPL-SCNC: 4.1 MMOL/L
PROT SERPL-MCNC: 6.6 G/DL
PROT UR QL STRIP: ABNORMAL
RBC # BLD AUTO: 4.16 M/UL
RBC #/AREA URNS HPF: 2 /HPF (ref 0–4)
SODIUM SERPL-SCNC: 136 MMOL/L
SP GR UR STRIP: 1.01 (ref 1–1.03)
SQUAMOUS #/AREA URNS HPF: 4 /HPF
URN SPEC COLLECT METH UR: ABNORMAL
UROBILINOGEN UR STRIP-ACNC: >=8 EU/DL
WBC # BLD AUTO: 6.99 K/UL
WBC #/AREA URNS HPF: 3 /HPF (ref 0–5)
WBC CLUMPS URNS QL MICRO: ABNORMAL
YEAST URNS QL MICRO: ABNORMAL

## 2018-06-26 PROCEDURE — 81000 URINALYSIS NONAUTO W/SCOPE: CPT

## 2018-06-26 PROCEDURE — 83690 ASSAY OF LIPASE: CPT

## 2018-06-26 PROCEDURE — 96375 TX/PRO/DX INJ NEW DRUG ADDON: CPT

## 2018-06-26 PROCEDURE — 99284 EMERGENCY DEPT VISIT MOD MDM: CPT | Mod: 25

## 2018-06-26 PROCEDURE — 80053 COMPREHEN METABOLIC PANEL: CPT

## 2018-06-26 PROCEDURE — 63600175 PHARM REV CODE 636 W HCPCS: Performed by: NURSE PRACTITIONER

## 2018-06-26 PROCEDURE — 25000003 PHARM REV CODE 250: Performed by: NURSE PRACTITIONER

## 2018-06-26 PROCEDURE — 96374 THER/PROPH/DIAG INJ IV PUSH: CPT

## 2018-06-26 PROCEDURE — 96361 HYDRATE IV INFUSION ADD-ON: CPT

## 2018-06-26 PROCEDURE — 85025 COMPLETE CBC W/AUTO DIFF WBC: CPT

## 2018-06-26 RX ORDER — CIPROFLOXACIN 500 MG/1
500 TABLET ORAL 2 TIMES DAILY
Qty: 14 TABLET | Refills: 0 | Status: SHIPPED | OUTPATIENT
Start: 2018-06-26 | End: 2018-07-06

## 2018-06-26 RX ORDER — KETOROLAC TROMETHAMINE 10 MG/1
10 TABLET, FILM COATED ORAL
Qty: 10 TABLET | Refills: 0 | Status: SHIPPED | OUTPATIENT
Start: 2018-06-26 | End: 2019-02-05

## 2018-06-26 RX ORDER — KETOROLAC TROMETHAMINE 30 MG/ML
15 INJECTION, SOLUTION INTRAMUSCULAR; INTRAVENOUS
Status: COMPLETED | OUTPATIENT
Start: 2018-06-26 | End: 2018-06-26

## 2018-06-26 RX ORDER — PROMETHAZINE HYDROCHLORIDE 25 MG/1
25 TABLET ORAL EVERY 6 HOURS PRN
Qty: 10 TABLET | Refills: 0 | Status: SHIPPED | OUTPATIENT
Start: 2018-06-26 | End: 2018-09-12

## 2018-06-26 RX ADMIN — PROMETHAZINE HYDROCHLORIDE 12.5 MG: 25 INJECTION INTRAMUSCULAR; INTRAVENOUS at 09:06

## 2018-06-26 RX ADMIN — SODIUM CHLORIDE 1000 ML: 0.9 INJECTION, SOLUTION INTRAVENOUS at 09:06

## 2018-06-26 RX ADMIN — KETOROLAC TROMETHAMINE 15 MG: 30 INJECTION, SOLUTION INTRAMUSCULAR at 09:06

## 2018-06-27 ENCOUNTER — PATIENT MESSAGE (OUTPATIENT)
Dept: PSYCHIATRY | Facility: CLINIC | Age: 48
End: 2018-06-27

## 2018-06-27 ENCOUNTER — TELEPHONE (OUTPATIENT)
Dept: UROLOGY | Facility: CLINIC | Age: 48
End: 2018-06-27

## 2018-06-27 DIAGNOSIS — N30.10 IC (INTERSTITIAL CYSTITIS): ICD-10-CM

## 2018-06-27 NOTE — ED NOTES
To ER with c/o lower abd pain and left flank pain starting today.  Pt started taking Pyridium today.  Awake and alert, oriented x 4.  resp even and unlabored with clear lungs noted.  abd soft and tender to lower abd with  + BS noted.

## 2018-06-27 NOTE — DISCHARGE INSTRUCTIONS
Please take prescribed medications as labeled. Follow-up with your urologist within 2-3 days. Return to ED if symptoms worsen or change.

## 2018-06-27 NOTE — TELEPHONE ENCOUNTER
----- Message from Izzy Samano sent at 6/27/2018  3:40 PM CDT -----  Needs Advice    Reason for call:    Seen in ED 06/26/18 for a severe bladder infection. Pt is a IC pt  Communication Preference: [phone]  Additional Information:  Pt would like medication for bladder spasms. Pt is requesting a medication and a return call today.     [facility] [address] [facility] [place] [place] [facility] [address] AT [facility] & [address]   [phone] (Phone)  [phone] (Fax)

## 2018-06-27 NOTE — ED PROVIDER NOTES
Encounter Date: 2018       History     Chief Complaint   Patient presents with    Flank Pain     left sided; states has hx of interstitial cystitis; c/o dysuria, nausea and flank pain; states took 600mg ibuprofen 2 hours pta     Patient is a 48-year-old female with past medical history of back pain, depression, acid reflux, fibromyalgia, interstitial cystitis, IBS, kidney stone, RA, lupus, and thyroid disease who presents to ED with left-sided flank pain that started around 1600 today.  Patient reports that she has history of interstitial cystitis and sees a urologist and next appointment is next month.  Patient associates chills, dysuria, back pain, nausea, and vomiting x1.  Patient reports that she took Pyridium and ibuprofen 2 hr ago with no relief.  Patient denies any exacerbating or alleviating factors.  Patient denies fever, neck pain/stiffness, dizziness, weakness, SOB, chest pain, diarrhea, and hematuria. Pt denies any other complaints at this time.       The history is provided by the patient.     Review of patient's allergies indicates:   Allergen Reactions    Cephalexin Itching    Zofran [ondansetron hcl (pf)] Hives    Penicillins Rash     Past Medical History:   Diagnosis Date    Acid reflux     Allergy     Alopecia     Anxiety     Arthralgia     Back pain     Depression     Dry eyes     from meds    Fever blister     Fibromyalgia     Interstitial cystitis     Irritable bowel syndrome     Kidney stone     Major depressive disorder, recurrent episode, in partial or unspecified remission 2013    OAB (overactive bladder) 2015    PTSD (post-traumatic stress disorder)     Rheumatoid arthritis     Systemic lupus erythematosus     Thyroid disease     Urinary tract infection     Vaginal infection      Past Surgical History:   Procedure Laterality Date    BLADDER SURGERY       SECTION, LOW TRANSVERSE      x 2    COLONOSCOPY      COLONOSCOPY N/A 10/5/2017     Procedure: COLONOSCOPY;  Surgeon: Venkat He MD;  Location: [facility] ([address] [place] [address]);  Service: Endoscopy;  Laterality: N/A;    ESOPHAGOGASTRODUODENOSCOPY      EYE SURGERY      Lasik-bilateral    HYSTERECTOMY      PARTIAL HYSTERECTOMY       Family History   Problem Relation Age of Onset    Irritable bowel syndrome Mother     Irritable bowel syndrome Sister     Lupus Sister     Rheum arthritis Sister     Fibromyalgia Sister     Irritable bowel syndrome Sister     Celiac disease Neg Hx     Cirrhosis Neg Hx     Colon cancer Neg Hx     Colon polyps Neg Hx     Crohn's disease Neg Hx     Cystic fibrosis Neg Hx     Esophageal cancer Neg Hx     Hemochromatosis Neg Hx     Inflammatory bowel disease Neg Hx     Liver cancer Neg Hx     Liver disease Neg Hx     Rectal cancer Neg Hx     Stomach cancer Neg Hx     Ulcerative colitis Neg Hx     Boris's disease Neg Hx     Melanoma Neg Hx      Social History   Substance Use Topics    Smoking status: Never Smoker    Smokeless tobacco: Never Used    Alcohol use No     Review of Systems   Constitutional: Negative for chills and fever.   HENT: Negative for sore throat.    Respiratory: Negative for shortness of breath.    Cardiovascular: Negative for chest pain.   Gastrointestinal: Positive for nausea and vomiting. Negative for abdominal pain and diarrhea.   Genitourinary: Positive for dysuria and flank pain. Negative for decreased urine volume, difficulty urinating, dyspareunia, enuresis, frequency, hematuria, pelvic pain, urgency, vaginal bleeding, vaginal discharge and vaginal pain.   Musculoskeletal: Negative for back pain, neck pain and neck stiffness.   Skin: Negative for rash.   Neurological: Negative for dizziness, syncope, speech difficulty, weakness, light-headedness, numbness and headaches.   Hematological: Does not bruise/bleed easily.       Physical Exam     Initial Vitals [06/26/18 1949]   BP Pulse Resp Temp SpO2   129/74 94 18 99.2 °F (37.3  °C) 95 %      MAP       --         Physical Exam    Nursing note and vitals reviewed.  Constitutional: Vital signs are normal. She appears well-developed and well-nourished. She is not diaphoretic. She is cooperative.  Non-toxic appearance. She does not have a sickly appearance.   HENT:   Head: Normocephalic.   Mouth/Throat: Uvula is midline, oropharynx is clear and moist and mucous membranes are normal.   Eyes: Lids are normal.   Neck: Trachea normal, normal range of motion, full passive range of motion without pain and phonation normal. Neck supple.   Cardiovascular: Normal rate, regular rhythm and normal heart sounds.   Pulmonary/Chest: Effort normal and breath sounds normal.   Abdominal: Soft. Normal appearance and bowel sounds are normal. She exhibits no distension and no mass. There is no tenderness. There is CVA tenderness. There is no rigidity, no rebound and no guarding.   Neurological: She is alert and oriented to person, place, and time. Gait normal. GCS eye subscore is 4. GCS verbal subscore is 5. GCS motor subscore is 6.   Skin: Skin is warm, dry and intact. Capillary refill takes less than 2 seconds. No rash noted.   Psychiatric: She has a normal mood and affect.         ED Course   Procedures  Labs Reviewed   URINALYSIS - Abnormal; Notable for the following:        Result Value    Color, UA Orange (*)     Protein, UA 2+ (*)     Glucose, UA 1+ (*)     Ketones, UA 1+ (*)     Bilirubin (UA) 3+ (*)     Occult Blood UA Trace (*)     Nitrite, UA Positive (*)     Urobilinogen, UA >=8.0 (*)     Leukocytes, UA 2+ (*)     All other components within normal limits   CBC W/ AUTO DIFFERENTIAL - Abnormal; Notable for the following:     Hemoglobin 11.7 (*)     Hematocrit 36.1 (*)     MPV 8.6 (*)     All other components within normal limits   URINALYSIS MICROSCOPIC - Abnormal; Notable for the following:     Bacteria, UA Few (*)     All other components within normal limits   COMPREHENSIVE METABOLIC PANEL   LIPASE           Imaging Results    None          Medical Decision Making:   Initial Assessment:   Patient presents to ED with left-sided flank pain that started around 1600 today.  Patient appears well, nontoxic.  Patient afebrile.  Normal bowel sounds. Abdomen soft and nontender. No guarding, rigidity, or rebound.   Differential Diagnosis:   UTI, pyelonephritis  Clinical Tests:   Lab Tests: Ordered and Reviewed  ED Management:  CBC, CMP, lipase, 15 mg IV toradol, 12.5 mg phenergan, 1 liter NS   Labs unremarkable.  Urinalysis positive for nitrites and leukocytes and will be treated for UTI.  Low suspicion for kidney stones. Patient is stable will be DC home with prescriptions for Cipro, Toradol, and Phenergan.  Pt reports that her urologist normally gives her cipro for her UTIs and that it helps. Patient instructed to follow up with urologist within 2-3 days and to return to ED if symptoms worsen or change.  Patient verbalized discharge instructions and is in compliance and agreement with treatment plan.      Rx: cipro, toradol, phenergan                       Clinical Impression:   The encounter diagnosis was Urinary tract infection with hematuria, site unspecified.                             Santos Ellis NP  06/26/18 [id number]

## 2018-06-28 RX ORDER — METHENAMINE, SODIUM PHOSPHATE, MONOBASIC, MONOHYDRATE, PHENYL SALICYLATE, METHYLENE BLUE, AND HYOSCYAMINE SULFATE 118; 40.8; 36; 10; .12 MG/1; MG/1; MG/1; MG/1; MG/1
CAPSULE ORAL
Qty: 120 CAPSULE | Refills: 0 | Status: SHIPPED | OUTPATIENT
Start: 2018-06-28 | End: 2018-07-30 | Stop reason: SDUPTHER

## 2018-06-28 NOTE — TELEPHONE ENCOUNTER
----- Message from Abby Stinson LPN sent at 6/28/2018 11:16 AM CDT -----  Contact: Pt:[phone]      ----- Message -----  From: Bandar Garcia MD  Sent: 6/28/2018  10:47 AM  To: Abby Stinson LPN    Yes.  I tried to call her this morning but I was too busy.  Urine check from ER the other day, no urine culture result is available.  Please have her come in for cath urine for urine culture.  Will determine whether to treat her for UTI or IC flare up.  ----- Message -----  From: Abby Stinson LPN  Sent: 6/28/2018   8:48 AM  To: Bandar Garcia MD    Patient states she called yesterday and wants something for bladder spasms. She has not been seen since 11.3.2016. She has no showed and cancelled several times. Can I offer her NIK? She does have an interstim   ----- Message -----  From: Jaon Escudero RN  Sent: 6/27/2018   5:36 PM  To: Abby Stinson LPN        ----- Message -----  From: Forrest Hogan  Sent: 6/27/2018   4:21 PM  To: Jose SPAIN Staff    .Patient Returning Call from [facility]    Who Left Message for Patient:Floridalma   Communication Preference:Pt:[phone]  Additional Information:Pt missed a call from the nurse.

## 2018-06-28 NOTE — TELEPHONE ENCOUNTER
Left message to please call our department at [phone] to schedule an davon't with an DAVON to obtain cath urine for culture.

## 2018-06-28 NOTE — TELEPHONE ENCOUNTER
Left detailed message to call urology department to schedule an davon't with one of 's DAVON for a catheterized urine sample

## 2018-06-28 NOTE — TELEPHONE ENCOUNTER
Left detailed message that she needs to see NIK for catheterized urine per dr. Garcia as she has not been seen in over a year.

## 2018-06-28 NOTE — TELEPHONE ENCOUNTER
----- Message from Bandar Garcia MD sent at 6/28/2018 10:47 AM CDT -----  Contact: Pt:[phone]  Yes.  I tried to call her this morning but I was too busy.  Urine check from ER the other day, no urine culture result is available.  Please have her come in for cath urine for urine culture.  Will determine whether to treat her for UTI or IC flare up.  ----- Message -----  From: Abby Stinson LPN  Sent: 6/28/2018   8:48 AM  To: Bandar Garcia MD    Patient states she called yesterday and wants something for bladder spasms. She has not been seen since 11.3.2016. She has no showed and cancelled several times. Can I offer her NIK? She does have an interstim   ----- Message -----  From: Joan Escudero RN  Sent: 6/27/2018   5:36 PM  To: Abby Stinson LPN        ----- Message -----  From: Forrest Hogan  Sent: 6/27/2018   4:21 PM  To: Jose SPAIN Staff    .Patient Returning Call from [facility]    Who Left Message for Patient:Floridalma   Communication Preference:Pt:[phone]  Additional Information:Pt missed a call from the nurse.

## 2018-06-28 NOTE — TELEPHONE ENCOUNTER
----- Message from Abby Stinson LPN sent at 6/28/2018 11:35 AM CDT -----  LELO Cao LPN  Caller: Unspecified (Today, 11:30 AM)         ----- Message -----   From: Bandar Garcia MD   Sent: 6/28/2018  10:47 AM   To: Abby Stinson LPN     Yes.  I tried to call her this morning but I was too busy.   Urine check from ER the other day, no urine culture result is available.   Please have her come in for cath urine for urine culture.   Will determine whether to treat her for UTI or IC flare up.   ----- Message -----   From: Abby Stinson LPN   Sent: 6/28/2018   8:48 AM   To: Bandar Garcia MD     Patient states she called yesterday and wants something for bladder spasms. She has not been seen since 11.3.2016. She has no showed and cancelled several times. Can I offer her NIK? She does have an interstim   ----- Message -----   From: Joan Escudero RN   Sent: 6/27/2018   5:36 PM   To: Abby Stinson LPN

## 2018-06-28 NOTE — TELEPHONE ENCOUNTER
----- Message from Abby Stinson LPN sent at 6/28/2018 11:30 AM CDT -----  ----- Message -----   From: Bandar Garcia MD   Sent: 6/28/2018  10:47 AM   To: Abby Stinson LPN     Yes.  I tried to call her this morning but I was too busy.   Urine check from ER the other day, no urine culture result is available.   Please have her come in for cath urine for urine culture.   Will determine whether to treat her for UTI or IC flare up.   ----- Message -----   From: Abby Stinson LPN   Sent: 6/28/2018   8:48 AM   To: Bandar Garcia MD     Patient states she called yesterday and wants something for bladder spasms. She has not been seen since 11.3.2016. She has no showed and cancelled several times. Can I offer her NIK? She does have an interstim   ----- Message -----   From: Joan Escudero RN   Sent: 6/27/2018   5:36 PM   To: Abby Stinson LPN

## 2018-06-28 NOTE — TELEPHONE ENCOUNTER
----- Message from Abby Stinson LPN sent at 6/28/2018 11:16 AM CDT -----  Contact: Pt:[phone]      ----- Message -----  From: Bandar Garcia MD  Sent: 6/28/2018  10:47 AM  To: Abby Stinson LPN    Yes.  I tried to call her this morning but I was too busy.  Urine check from ER the other day, no urine culture result is available.  Please have her come in for cath urine for urine culture.  Will determine whether to treat her for UTI or IC flare up.  ----- Message -----  From: Abby Stinson LPN  Sent: 6/28/2018   8:48 AM  To: Bandar Garcia MD    Patient states she called yesterday and wants something for bladder spasms. She has not been seen since 11.3.2016. She has no showed and cancelled several times. Can I offer her NIK? She does have an interstim   ----- Message -----  From: oJan Escudero RN  Sent: 6/27/2018   5:36 PM  To: Abby Stinson LPN        ----- Message -----  From: Forrest Hogan  Sent: 6/27/2018   4:21 PM  To: Jose SPAIN Staff    .Patient Returning Call from [facility]    Who Left Message for Patient:Floridalma   Communication Preference:Pt:[phone]  Additional Information:Pt missed a call from the nurse.

## 2018-07-05 RX ORDER — CLONAZEPAM 1 MG/1
TABLET ORAL
Qty: 90 TABLET | Refills: 0 | Status: SHIPPED | OUTPATIENT
Start: 2018-07-05 | End: 2018-07-30 | Stop reason: SDUPTHER

## 2018-07-10 DIAGNOSIS — N30.10 IC (INTERSTITIAL CYSTITIS): ICD-10-CM

## 2018-07-10 NOTE — TELEPHONE ENCOUNTER
----- Message from Viviane Jara, PharmD sent at 7/9/2018  1:24 PM CDT -----  Regarding: Humira   Good afternoon,     We are working on Ms. Hunt's assistance. We have a Crohns starter kit Rx on file with maintenance dosing. Did you mean to send for the Humira pen 2 pack or did you want her on the Crohns loading dose?    Thanks,  Viviane Jara, PharmD  [facility]- Clinical Pharmacist  [phone]

## 2018-07-11 ENCOUNTER — PATIENT MESSAGE (OUTPATIENT)
Dept: RHEUMATOLOGY | Facility: CLINIC | Age: 48
End: 2018-07-11

## 2018-07-11 DIAGNOSIS — M06.00 SERONEGATIVE RHEUMATOID ARTHRITIS: Primary | ICD-10-CM

## 2018-07-11 RX ORDER — HYDROXYZINE HYDROCHLORIDE 25 MG/1
TABLET, FILM COATED ORAL
Qty: 30 TABLET | Refills: 0 | Status: SHIPPED | OUTPATIENT
Start: 2018-07-11 | End: 2018-09-12

## 2018-07-12 NOTE — TELEPHONE ENCOUNTER
Spoke to pt, advised nurse has been trying to reach her and sent [facility] message. Advised Dr. Samaniego out of office for , rescheduled to  and placed on cancellation list. Pt advises she is in flare and request a steroid pack to help. Verified pharmacy and advised would submit to Dr. Samaniego. No further questions.

## 2018-07-13 RX ORDER — METHYLPREDNISOLONE 4 MG/1
TABLET ORAL
Qty: 1 PACKAGE | Refills: 0 | Status: SHIPPED | OUTPATIENT
Start: 2018-07-13 | End: 2018-08-02

## 2018-07-16 RX ORDER — ALOSETRON HYDROCHLORIDE 0.5 MG/1
TABLET ORAL
Qty: 60 TABLET | Refills: 0 | Status: SHIPPED | OUTPATIENT
Start: 2018-07-16 | End: 2018-08-14 | Stop reason: SDUPTHER

## 2018-07-16 RX ORDER — PROMETHAZINE HYDROCHLORIDE 25 MG/1
TABLET ORAL
Qty: 60 TABLET | Refills: 0 | Status: SHIPPED | OUTPATIENT
Start: 2018-07-16 | End: 2018-09-19 | Stop reason: SDUPTHER

## 2018-07-18 DIAGNOSIS — N30.10 IC (INTERSTITIAL CYSTITIS): ICD-10-CM

## 2018-07-18 DIAGNOSIS — E03.9 HYPOTHYROIDISM, UNSPECIFIED TYPE: ICD-10-CM

## 2018-07-19 RX ORDER — AMITRIPTYLINE HYDROCHLORIDE 10 MG/1
TABLET, FILM COATED ORAL
Qty: 30 TABLET | Refills: 2 | Status: SHIPPED | OUTPATIENT
Start: 2018-07-19 | End: 2018-10-24 | Stop reason: SDUPTHER

## 2018-07-20 RX ORDER — LEVOTHYROXINE SODIUM 50 UG/1
TABLET ORAL
Qty: 30 TABLET | Refills: 0 | Status: SHIPPED | OUTPATIENT
Start: 2018-07-20 | End: 2018-08-19 | Stop reason: SDUPTHER

## 2018-07-25 ENCOUNTER — DOCUMENTATION ONLY (OUTPATIENT)
Dept: PSYCHIATRY | Facility: CLINIC | Age: 48
End: 2018-07-25

## 2018-07-30 DIAGNOSIS — N30.10 IC (INTERSTITIAL CYSTITIS): ICD-10-CM

## 2018-07-30 RX ORDER — METHENAMINE, SODIUM PHOSPHATE, MONOBASIC, MONOHYDRATE, PHENYL SALICYLATE, METHYLENE BLUE, AND HYOSCYAMINE SULFATE 118; 40.8; 36; 10; .12 MG/1; MG/1; MG/1; MG/1; MG/1
CAPSULE ORAL
Qty: 120 CAPSULE | Refills: 0 | Status: SHIPPED | OUTPATIENT
Start: 2018-07-30 | End: 2018-08-29 | Stop reason: SDUPTHER

## 2018-07-31 RX ORDER — CLONAZEPAM 1 MG/1
TABLET ORAL
Qty: 90 TABLET | Refills: 0 | Status: SHIPPED | OUTPATIENT
Start: 2018-07-31 | End: 2018-12-04 | Stop reason: SDUPTHER

## 2018-08-01 ENCOUNTER — DOCUMENTATION ONLY (OUTPATIENT)
Dept: PSYCHIATRY | Facility: CLINIC | Age: 48
End: 2018-08-01

## 2018-08-14 RX ORDER — ALOSETRON HYDROCHLORIDE 0.5 MG/1
TABLET ORAL
Qty: 60 TABLET | Refills: 0 | Status: SHIPPED | OUTPATIENT
Start: 2018-08-14 | End: 2018-10-04 | Stop reason: SDUPTHER

## 2018-08-19 DIAGNOSIS — M06.00 SERONEGATIVE RHEUMATOID ARTHRITIS: ICD-10-CM

## 2018-08-19 DIAGNOSIS — R53.82 CHRONIC FATIGUE: ICD-10-CM

## 2018-08-19 DIAGNOSIS — F43.10 PTSD (POST-TRAUMATIC STRESS DISORDER): ICD-10-CM

## 2018-08-19 DIAGNOSIS — E03.9 HYPOTHYROIDISM, UNSPECIFIED TYPE: ICD-10-CM

## 2018-08-19 DIAGNOSIS — M79.7 FIBROMYALGIA: ICD-10-CM

## 2018-08-19 DIAGNOSIS — F41.1 GENERALIZED ANXIETY DISORDER: ICD-10-CM

## 2018-08-19 DIAGNOSIS — F33.1 MAJOR DEPRESSIVE DISORDER, RECURRENT EPISODE, MODERATE: ICD-10-CM

## 2018-08-20 ENCOUNTER — PATIENT MESSAGE (OUTPATIENT)
Dept: PSYCHIATRY | Facility: CLINIC | Age: 48
End: 2018-08-20

## 2018-08-20 RX ORDER — GABAPENTIN 800 MG/1
TABLET ORAL
Qty: 90 TABLET | Refills: 0 | Status: SHIPPED | OUTPATIENT
Start: 2018-08-20 | End: 2018-10-01 | Stop reason: SDUPTHER

## 2018-08-20 RX ORDER — LEVOTHYROXINE SODIUM 50 UG/1
TABLET ORAL
Qty: 30 TABLET | Refills: 0 | Status: SHIPPED | OUTPATIENT
Start: 2018-08-20 | End: 2018-10-01 | Stop reason: SDUPTHER

## 2018-08-21 ENCOUNTER — TELEPHONE (OUTPATIENT)
Dept: UROLOGY | Facility: CLINIC | Age: 48
End: 2018-08-21

## 2018-08-21 ENCOUNTER — OFFICE VISIT (OUTPATIENT)
Dept: UROLOGY | Facility: CLINIC | Age: 48
End: 2018-08-21
Payer: COMMERCIAL

## 2018-08-21 VITALS
BODY MASS INDEX: 32.22 KG/M2 | SYSTOLIC BLOOD PRESSURE: 129 MMHG | HEIGHT: 59 IN | WEIGHT: 159.81 LBS | DIASTOLIC BLOOD PRESSURE: 83 MMHG | HEART RATE: 98 BPM

## 2018-08-21 DIAGNOSIS — N30.10 IC (INTERSTITIAL CYSTITIS): ICD-10-CM

## 2018-08-21 DIAGNOSIS — R35.0 URINARY FREQUENCY: Primary | ICD-10-CM

## 2018-08-21 DIAGNOSIS — N32.81 OAB (OVERACTIVE BLADDER): Primary | ICD-10-CM

## 2018-08-21 DIAGNOSIS — R39.15 URINARY URGENCY: ICD-10-CM

## 2018-08-21 DIAGNOSIS — R39.89 BLADDER PAIN: ICD-10-CM

## 2018-08-21 DIAGNOSIS — N39.41 URGENCY INCONTINENCE: ICD-10-CM

## 2018-08-21 DIAGNOSIS — R10.2 PELVIC PAIN: ICD-10-CM

## 2018-08-21 DIAGNOSIS — N32.89 DECREASED BLADDER CAPACITY: ICD-10-CM

## 2018-08-21 PROCEDURE — 3008F BODY MASS INDEX DOCD: CPT | Mod: CPTII,S$GLB,, | Performed by: UROLOGY

## 2018-08-21 PROCEDURE — 88112 CYTOPATH CELL ENHANCE TECH: CPT | Performed by: PATHOLOGY

## 2018-08-21 PROCEDURE — 99999 PR PBB SHADOW E&M-EST. PATIENT-LVL V: CPT | Mod: PBBFAC,,, | Performed by: UROLOGY

## 2018-08-21 PROCEDURE — 95970 ALYS NPGT W/O PRGRMG: CPT | Mod: S$GLB,,, | Performed by: UROLOGY

## 2018-08-21 PROCEDURE — 88112 CYTOPATH CELL ENHANCE TECH: CPT | Mod: 26,,, | Performed by: PATHOLOGY

## 2018-08-21 PROCEDURE — 99215 OFFICE O/P EST HI 40 MIN: CPT | Mod: 25,S$GLB,, | Performed by: UROLOGY

## 2018-08-21 RX ORDER — LORAZEPAM 1 MG/1
1 TABLET ORAL EVERY 6 HOURS PRN
Qty: 30 TABLET | Refills: 0 | Status: SHIPPED | OUTPATIENT
Start: 2018-08-21 | End: 2018-08-21

## 2018-08-21 RX ORDER — POTASSIUM CITRATE 10 MEQ/1
10 TABLET, EXTENDED RELEASE ORAL
Qty: 90 TABLET | Refills: 11 | Status: SHIPPED | OUTPATIENT
Start: 2018-08-21 | End: 2018-12-11 | Stop reason: SDUPTHER

## 2018-08-21 RX ORDER — HYDROXYZINE HYDROCHLORIDE 25 MG/1
25 TABLET, FILM COATED ORAL NIGHTLY
Qty: 30 TABLET | Refills: 12 | Status: SHIPPED | OUTPATIENT
Start: 2018-08-21 | End: 2018-09-20

## 2018-08-21 RX ORDER — DIAZEPAM 5 MG/1
5 TABLET ORAL
Qty: 30 TABLET | Refills: 0 | Status: SHIPPED | OUTPATIENT
Start: 2018-08-21 | End: 2018-10-10 | Stop reason: SDUPTHER

## 2018-08-21 RX ORDER — FAMOTIDINE 40 MG/1
40 TABLET, FILM COATED ORAL NIGHTLY PRN
Qty: 30 TABLET | Refills: 11 | Status: SHIPPED | OUTPATIENT
Start: 2018-08-21 | End: 2019-09-22 | Stop reason: SDUPTHER

## 2018-08-21 NOTE — PROGRESS NOTES
CC: pelvic pain, IC    Marilee Simons is a 48 y.o. woman who is here for pelvic pain.    s/p InterStim Therapy for refractory frequency and urgency on 8/3/16.  Lead placed on right side  Generator placed on left side  Good sensory and motor function c/w S3 stimulation seen  She is doing great.  Is very pleased with the bladder control outcome.    C/o pain after intercourse.  She wants to use valium crushed in the vagina to help her with her pelvic pain.  Here for Interstim check.    SUDS cysto on 1/20/15  --- Bladder ---   CYSTOMETROGRAM ( Filling Phase ):   Cystometric Numeric Data:   - First Desire (Sensation): 68 mL at 1cm of water.   - Normal Desire: 75mL at 1 cm of water.   - Strong Desire: 96 mL at 2 cm of water.   - Urgency (Imminent Void) : 108 mL at 1cm of water.   - Maximum Cystometric Capacity: 109 mL.   Compliance:   - low.   Leak Point Pressure:   - Valsalva ( Abdominal ) Leak Point Pressure: none.   UROFLOW:   - Voided Volume: 134 mL.   - Residual Urine: 5 mL.   - Maximum Flow Rate: 8 mL/sec.   - Flow Pattern: low amplitude  VOIDING PRESSURE STUDY ( Voiding Phase ):   Detrusor Pressure:   - Maximum Detrusor Pressure: 32 cm of water.   - Detrusor Pressure at Maximum Flow: 2 cm of water.   - Detrusor Contraction Characteristics: Sustained contraction(s).     ELECTROMYOGRAM:   - incomplete relaxation.     ---Diagnostic Cystourethroscopy ---   Normal urethra.    minimal hyperemic area of the bladder  Width of Bladder Neck Opening: Approximately 18 Fr.   Normal bladder.   Normal ureteral orifices bilaterally.     CONCLUSIONS:   1.  Decreased bladder capacity with sensory urgency  2.  IC  3.  OAB    Past Medical History:   Diagnosis Date    Acid reflux     Allergy     Alopecia     Anxiety     Arthralgia     Back pain     Depression     Dry eyes     from meds    Fever blister     Fibromyalgia     Interstitial cystitis     Irritable bowel syndrome     Kidney stone     Major depressive disorder,  recurrent episode, in partial or unspecified remission 2013    OAB (overactive bladder) 2015    PTSD (post-traumatic stress disorder)     Rheumatoid arthritis     Systemic lupus erythematosus     Thyroid disease     Urinary tract infection     Vaginal infection      Past Surgical History:   Procedure Laterality Date    BLADDER SURGERY       SECTION, LOW TRANSVERSE      x 2    COLONOSCOPY      ESOPHAGOGASTRODUODENOSCOPY      EYE SURGERY      Lasik-bilateral    HYSTERECTOMY      PARTIAL HYSTERECTOMY       Social History     Tobacco Use    Smoking status: Never Smoker    Smokeless tobacco: Never Used   Substance Use Topics    Alcohol use: No    Drug use: No     Family History   Problem Relation Age of Onset    Irritable bowel syndrome Mother     Irritable bowel syndrome Sister     Lupus Sister     Rheum arthritis Sister     Fibromyalgia Sister     Irritable bowel syndrome Sister     Celiac disease Neg Hx     Cirrhosis Neg Hx     Colon cancer Neg Hx     Colon polyps Neg Hx     Crohn's disease Neg Hx     Cystic fibrosis Neg Hx     Esophageal cancer Neg Hx     Hemochromatosis Neg Hx     Inflammatory bowel disease Neg Hx     Liver cancer Neg Hx     Liver disease Neg Hx     Rectal cancer Neg Hx     Stomach cancer Neg Hx     Ulcerative colitis Neg Hx     Boris's disease Neg Hx     Melanoma Neg Hx      Allergy:  Review of patient's allergies indicates:   Allergen Reactions    Cephalexin Itching    Zofran [ondansetron hcl (pf)] Hives    Penicillins Rash     Outpatient Encounter Medications as of 2018   Medication Sig Dispense Refill    adalimumab (HUMIRA PEN) PnKt injection Inject 0.8 mLs (40 mg total) into the skin every 14 (fourteen) days. 1.6 mL 11    adalimumab (HUMIRA PEN) PnKt injection Inject 0.8 mLs (40 mg total) into the skin every 14 (fourteen) days. 1.6 mL 11    alosetron (LOTRONEX) 0.5 MG tablet TAKE 1 TABLET(0.5 MG) BY MOUTH TWICE DAILY 60  tablet 0    amitriptyline (ELAVIL) 10 MG tablet TAKE 1 TABLET(10 MG) BY MOUTH EVERY NIGHT AS NEEDED 30 tablet 2    butalbital-acetaminophen (BUPAP)  mg Tab Take 1 tablet by mouth every 4 (four) hours. 60 tablet 3    clonazePAM (KLONOPIN) 1 MG tablet Take 1 tablet (1 mg total) by mouth 3 (three) times daily. 90 tablet 3    clonazePAM (KLONOPIN) 1 MG tablet TAKE 1 TABLET(1 MG) BY MOUTH THREE TIMES DAILY 90 tablet 0    cyclobenzaprine (FLEXERIL) 10 MG tablet Take 1 tablet (10 mg total) by mouth 2 (two) times daily. 90 tablet 3    diclofenac (VOLTAREN) 75 MG EC tablet Take 1 tablet (75 mg total) by mouth 2 (two) times daily. 60 tablet 0    DULoxetine (CYMBALTA) 60 MG capsule Take 1 capsule (60 mg total) by mouth once daily. 30 capsule 11    gabapentin (NEURONTIN) 800 MG tablet TAKE 1 TABLET BY MOUTH THREE TIMES DAILY 90 tablet 0    hydroxychloroquine (PLAQUENIL) 200 mg tablet TAKE 1 TABLET BY MOUTH TWICE DAILY 60 tablet 3    hydrOXYzine HCl (ATARAX) 25 MG tablet TAKE 1 TABLET(25 MG) BY MOUTH EVERY EVENING 30 tablet 0    ketorolac (TORADOL) 10 mg tablet Take 1 tablet (10 mg total) by mouth 3 (three) times daily after meals. 10 tablet 0    levothyroxine (SYNTHROID) 50 MCG tablet TAKE 1 TABLET(50 MCG) BY MOUTH EVERY DAY 30 tablet 0    multivitamin with minerals tablet Take 1 tablet by mouth once daily.      prazosin (MINIPRESS) 1 MG Cap Take 1 capsule (1 mg total) by mouth every evening. 30 capsule 11    promethazine (PHENERGAN) 25 MG tablet Take 1 tablet (25 mg total) by mouth every 6 (six) hours as needed for Nausea. 10 tablet 0    promethazine (PHENERGAN) 25 MG tablet TAKE 1 TABLET(25 MG) BY MOUTH EVERY 6 HOURS AS NEEDED 60 tablet 0    RABEprazole (ACIPHEX) 20 mg tablet Take 1 tablet (20 mg total) by mouth once daily. 30 tablet 6    tiZANidine (ZANAFLEX) 4 MG tablet TAKE 1 TABLET BY MOUTH EVERY 8 HOURS 90 tablet 3    traZODone (DESYREL) 50 MG tablet TAKE 1/2  TABLET BY MOUTH EVERY NIGHT AT  BEDTIME AS NEEDED FOR INSOMNIA 60 tablet 2    triamcinolone acetonide 0.1% (KENALOG) 0.1 % cream APPLY TO THE AFFECTED AREA TWICE DAILY 454 g 0    URIBEL 118-10-40.8-36 mg Cap TAKE 1 CAPSULE BY MOUTH FOUR TIMES DAILY 120 capsule 0    URIBEL 118-10-40.8-36 mg Cap TAKE 1 CAPSULE BY MOUTH FOUR TIMES DAILY 120 capsule 0    calcium glycerophosphate (PRELIEF) 65 mg Tab Take 2 tablets by mouth before meals and at bedtime as needed. 100 each prn    diazePAM (VALIUM) 5 MG tablet Take 1 tablet (5 mg total) by mouth as needed for Anxiety. Take 1 tablet at night as needed for pelvic pain 30 tablet 0    famotidine (PEPCID) 40 MG tablet Take 1 tablet (40 mg total) by mouth nightly as needed for Heartburn. 30 tablet 11    hydrOXYzine HCl (ATARAX) 25 MG tablet Take 1 tablet (25 mg total) by mouth nightly. 30 tablet 12    potassium citrate (UROCIT-K 10) 10 mEq (1,080 mg) TbSR Take 1 tablet (10 mEq total) by mouth 3 (three) times daily with meals. 90 tablet 11    [DISCONTINUED] LORazepam (ATIVAN) 1 MG tablet Take 1 tablet (1 mg total) by mouth every 6 (six) hours as needed for Anxiety. 30 tablet 0     No facility-administered encounter medications on file as of 8/21/2018.      Review of Systems   General ROS: GENERAL:  No weight gain or loss  SKIN:  No rashes or lacerations  HEAD:  No headaches  EYES:  No exophthalmos, jaundice or blindness  EARS:  No dizziness, tinnitus or hearing loss  NOSE:  No changes in smell  MOUTH & THROAT:  No dyskinetic movements or obvious goiter  CHEST:  No shortness of breath, hyperventilation or cough  CARDIOVASCULAR:  No tachycardia or chest pain  ABDOMEN:  No nausea, vomiting, pain, constipation or diarrhea  URINARY:  No frequency, dysuria or sexual dysfunction  ENDOCRINE:  No polydipsia, polyuria  MUSCULOSKELETAL:  No pain or stiffness of the joints  NEUROLOGIC:  No weakness, sensory changes, seizures, confusion, memory loss, tremor or other abnormal movements  Physical Exam     Vitals:     08/21/18 1341   BP: 129/83   Pulse: 98     Physical Examination:   General appearance - alert, well appearing, and in no distress  Mental status - alert, oriented to person, place, and time  Eyes - pupils equal and reactive, extraocular eye movements intact  Ears - bilateral TM's and external ear canals normal  Nose - normal and patent, no erythema, discharge or polyps  Mouth - mucous membranes moist, pharynx normal without lesions  Neck - supple, no significant adenopathy  Chest - clear to auscultation, no wheezes, rales or rhonchi, symmetric air entry  Breast- no mass or lumps or tenderness  Heart - normal rate, regular rhythm, normal S1, S2, no murmurs, rubs, clicks or gallops  Abdomen - soft, nontender, nondistended, no masses or organomegaly of liver and spleen, no hernia noted.  Pelvic exam: positive tenderness at the base of the bladder, left lateral vaginal wall.  No tenderness over the urethra.  Rectal exam: tenderness around the levator muscles.  Able to localize the anal sphincter.  Back exam - full range of motion, no tenderness, palpable spasm or pain on motion  LE - No edema noted.  Neurological - alert, oriented, normal speech, no focal findings or movement disorder noted  Musculoskeletal - no joint tenderness, deformity or swelling    LABS:  Lab Results   Component Value Date    CREATININE 0.8 06/26/2018    CREATININE 0.8 09/12/2017    CREATININE 1.0 06/29/2017     Urine Culture, Routine   Date Value Ref Range Status   02/16/2016 No growth  Final     Procedure  All settings of InterStim Therapy OK.    Assessment and Plan:  Diagnoses and all orders for this visit:    OAB (overactive bladder)    Urgency incontinence    Decreased bladder capacity    IC (interstitial cystitis)  -     POCT urine dipstick without microscope  -     Cytology, urine  -     hydrOXYzine HCl (ATARAX) 25 MG tablet; Take 1 tablet (25 mg total) by mouth nightly.  -     famotidine (PEPCID) 40 MG tablet; Take 1 tablet (40 mg total)  by mouth nightly as needed for Heartburn.  -     calcium glycerophosphate (PRELIEF) 65 mg Tab; Take 2 tablets by mouth before meals and at bedtime as needed.  -     potassium citrate (UROCIT-K 10) 10 mEq (1,080 mg) TbSR; Take 1 tablet (10 mEq total) by mouth 3 (three) times daily with meals.  -     Discontinue: LORazepam (ATIVAN) 1 MG tablet; Take 1 tablet (1 mg total) by mouth every 6 (six) hours as needed for Anxiety.  -     diazePAM (VALIUM) 5 MG tablet; Take 1 tablet (5 mg total) by mouth as needed for Anxiety. Take 1 tablet at night as needed for pelvic pain    Pelvic pain  -     Discontinue: LORazepam (ATIVAN) 1 MG tablet; Take 1 tablet (1 mg total) by mouth every 6 (six) hours as needed for Anxiety.  -     diazePAM (VALIUM) 5 MG tablet; Take 1 tablet (5 mg total) by mouth as needed for Anxiety. Take 1 tablet at night as needed for pelvic pain    recommend to continue IC smart diet.  Use alkaline water.  Use the above IC meds.  Further evaluation and treatment with cysto hydrodistention, bladder instillation poss. Bladder bx.  In addition, will plan botox injection  200 units if her bladder capacity is less than 600 ml under anesthesia.    OK to use valium inside of her vagina, especially prior to her intercourse or after.  Continue IC diet.    I spent 40 minutes with the patient of which more than half was spent in direct consultation with the patient in regards to our treatment and plan.    Follow-up:  Follow-up for cysto hydrodistention, bladder instilation, botox injection 200 u.

## 2018-08-22 NOTE — TELEPHONE ENCOUNTER
FOR DOCUMENTATION ONLY:  Financial Assistance for Humira approved from 8/21/18 to 12/31/18  Source: [employer]  Phone: [phone]  Fax: [phone]

## 2018-08-22 NOTE — TELEPHONE ENCOUNTER
LVM to notify patient of Humira assistance approval. She will need to call to schedule delivery and injection training if desired, and notify MD of start date so follow up labs may be scheduled. Will make patient aware to call [facility] or MD if renewal assistance is needed.

## 2018-08-29 DIAGNOSIS — N30.10 IC (INTERSTITIAL CYSTITIS): ICD-10-CM

## 2018-08-29 RX ORDER — METHENAMINE, SODIUM PHOSPHATE, MONOBASIC, MONOHYDRATE, PHENYL SALICYLATE, METHYLENE BLUE, AND HYOSCYAMINE SULFATE 118; 40.8; 36; 10; .12 MG/1; MG/1; MG/1; MG/1; MG/1
CAPSULE ORAL
Qty: 120 CAPSULE | Refills: 0 | Status: CANCELLED | OUTPATIENT
Start: 2018-08-29

## 2018-09-03 RX ORDER — CLONAZEPAM 1 MG/1
TABLET ORAL
Qty: 90 TABLET | Refills: 0 | Status: SHIPPED | OUTPATIENT
Start: 2018-09-03 | End: 2018-10-04 | Stop reason: SDUPTHER

## 2018-09-04 ENCOUNTER — PATIENT MESSAGE (OUTPATIENT)
Dept: GASTROENTEROLOGY | Facility: CLINIC | Age: 48
End: 2018-09-04

## 2018-09-04 ENCOUNTER — PATIENT MESSAGE (OUTPATIENT)
Dept: PSYCHIATRY | Facility: CLINIC | Age: 48
End: 2018-09-04

## 2018-09-04 RX ORDER — BUTALBITAL AND ACETAMINOPHEN 300; 50 MG/1; MG/1
TABLET ORAL
Qty: 60 TABLET | Refills: 0 | OUTPATIENT
Start: 2018-09-04

## 2018-09-07 NOTE — PRE-PROCEDURE INSTRUCTIONS
Pre-op interview completed via phone.  PreOp Instructions given:  - Medication information (what to hold and what to take)  - NPO guidelines. Follow surgeon's instructions.  - Arrival place directions given  - Time to be given the day before procedure by the  Surgeon's Office  - Bathing with antibacterial soap/Hibiclens   - Don't wear any jewelry or bring any valuables AM of surgery  - No makeup or moisturizer to face  - No perfume/cologne, powder, lotions or aftershave     Patient was instructed to call and update the PreOp nurse about any changes to health, changes to medication, new office visits or hospitalizations between now and surgery.     Pt. verbalized understanding

## 2018-09-11 ENCOUNTER — ANESTHESIA EVENT (OUTPATIENT)
Dept: SURGERY | Facility: HOSPITAL | Age: 48
End: 2018-09-11
Payer: COMMERCIAL

## 2018-09-11 ENCOUNTER — TELEPHONE (OUTPATIENT)
Dept: UROLOGY | Facility: CLINIC | Age: 48
End: 2018-09-11

## 2018-09-11 NOTE — ANESTHESIA PREPROCEDURE EVALUATION
Anesthesia Assessment: Preoperative EQUATION     Planned Procedure: Procedure(s) (LRB):  CYSTOSCOPY,WITH BLADDER HYDRODISTENSION (N/A)  INSTILLATION, URINARY BLADDER (N/A)  INJECTION, BOTULINUM TOXIN, TYPE A  200 UNITS (N/A)  Requested Anesthesia Type:General  Surgeon: Bandar Garcia MD  Service: Urology  Known or anticipated Date of Surgery:9/12/2018     Surgeon notes: reviewed     Electronic QUestionnaire Assessment completed via nurse interview with patient.      NO AQ     Triage considerations:      The patient has no apparent active cardiac condition (No unstable coronary Syndrome such as severe unstable angina or recent [<1 month] myocardial infarction, decompensated CHF, severe valvular   disease or significant arrhythmia)     Previous anesthesia records:GETA and No problems   10/05/2017  Airway/Jaw/Neck:  Airway Findings: Mouth Opening: Normal Tongue: Normal  General Airway Assessment: Adult  Mallampati: II  TM Distance: Normal, at least 6 cm           Last PCP note: 6-12 months ago , within [facility]   Subspecialty notes: Gastroenterology, Rheumatology, Psychiatry     Other important co-morbidities:    Acid reflux      Allergy      Alopecia      Anxiety      Arthralgia      Back pain      Depression      Dry eyes       from meds    Fever blister      Fibromyalgia      Interstitial cystitis      Irritable bowel syndrome      Kidney stone      Major depressive disorder, recurrent episode, in partial or unspecified remission 6/25/2013    OAB (overactive bladder) 7/21/2015    PTSD (post-traumatic stress disorder)      Rheumatoid arthritis      Systemic lupus erythematosus      Thyroid disease      Urinary tract infection      Vaginal infection        Tests already available:  Available tests,  within 3 months , within [facility] .   6/26/18 CMP, CBC, Lipase                            Instructions given. (See in Nurse's note)     Optimization:  Anesthesia Preop Clinic Assessment  Indicated-not  required for this surgery     Plan:    Testing:  none needed                              Patient  has previously scheduled Medical Appointment: none     Navigation: Tests Scheduled. none                       Straight Line to surgery.                          No tests, anesthesia preop clinic visit, or consult required.      Ivania Márquez RN                                                 Electronically signed by Ivania Márquez RN at 9/11/2018  5:14 PM                                                                                                                09/11/2018  Marilee Simons is a 48 y.o., female.    Anesthesia Evaluation         Review of Systems  Anesthesia Hx:  No problems with previous Anesthesia   Social:  Non-Smoker    Renal/:   Chronic Renal Disease renal calculi  Other Renal / Gu Conditions: Interstitial Cystitis  Hepatic/GI:  Esophageal / Stomach Disorders Gerd  Bowel Conditions:  Irritable Bowel Syndrome    Musculoskeletal:  Joint Disease:  Arthritis, Rheumatoid Arthritis  Rheumatic Disease, Lupus  Ankylosing Spondylitis    Neurological:   Headaches  Rheumatoid Arthritis, Fibromayalgia    Endocrine:   Hypothyroidism    Psych:  Anxiety Disorder and Chronic Anxiety Disorder.  Depression and Major Depression, Treated.  Psychotic Disorder and Post-traumatic Stress Disorder.          Physical Exam  General:  Well nourished    Airway/Jaw/Neck:  Airway Findings: Mouth Opening: Normal Tongue: Normal  General Airway Assessment: Adult, Good  Mallampati: II  TM Distance: Normal, at least 6 cm     Eyes/Ears/Nose:  EYES/EARS/NOSE FINDINGS: Normal   Dental:  Dental Findings: In tact   Chest/Lungs:  Chest/Lungs Findings: Clear to auscultation, Normal Respiratory Rate     Heart/Vascular:  Heart Findings: Rate: Normal  Rhythm: Regular Rhythm  Sounds: Normal  Heart murmur: negative       Mental Status:  Mental Status Findings:  Cooperative, Alert and Oriented         Anesthesia Plan  Type of  Anesthesia, risks & benefits discussed:  Anesthesia Type:  general  Patient's Preference:   Intra-op Monitoring Plan:   Intra-op Monitoring Plan Comments:   Post Op Pain Control Plan:   Post Op Pain Control Plan Comments:   Induction:   IV  Beta Blocker:  Patient is not currently on a Beta-Blocker (No further documentation required).       Informed Consent: Patient understands risks and agrees with Anesthesia plan.  Questions answered. Anesthesia consent signed with patient.  ASA Score: 2     Day of Surgery Review of History & Physical:    H&P update referred to the surgeon.     Anesthesia Plan Notes:   48F depression, IBS, hypothyroid, overactive bladder for cysto/distention under [place] TIVA        Ready For Surgery From Anesthesia Perspective.

## 2018-09-11 NOTE — TELEPHONE ENCOUNTER
Called pt to confirm 9:30am arrival time for procedure. Gave pt NPO instructions and gave pt opportunity to ask questions. Pt verbalized understanding.

## 2018-09-11 NOTE — PRE ADMISSION SCREENING
Anesthesia Assessment: Preoperative EQUATION    Planned Procedure: Procedure(s) (LRB):  CYSTOSCOPY,WITH BLADDER HYDRODISTENSION (N/A)  INSTILLATION, URINARY BLADDER (N/A)  INJECTION, BOTULINUM TOXIN, TYPE A  200 UNITS (N/A)  Requested Anesthesia Type:General  Surgeon: Bandar Garcia MD  Service: Urology  Known or anticipated Date of Surgery:9/12/2018    Surgeon notes: reviewed    Electronic QUestionnaire Assessment completed via nurse interview with patient.      NO AQ    Triage considerations:     The patient has no apparent active cardiac condition (No unstable coronary Syndrome such as severe unstable angina or recent [<1 month] myocardial infarction, decompensated CHF, severe valvular   disease or significant arrhythmia)    Previous anesthesia records:GETA and No problems   10/05/2017  Airway/Jaw/Neck:  Airway Findings: Mouth Opening: Normal Tongue: Normal  General Airway Assessment: Adult  Mallampati: II  TM Distance: Normal, at least 6 cm          Last PCP note: 6-12 months ago , within [facility]   Subspecialty notes: Gastroenterology, Rheumatology, Psychiatry    Other important co-morbidities:    Acid reflux      Allergy      Alopecia      Anxiety      Arthralgia      Back pain      Depression      Dry eyes       from meds    Fever blister      Fibromyalgia      Interstitial cystitis      Irritable bowel syndrome      Kidney stone      Major depressive disorder, recurrent episode, in partial or unspecified remission 6/25/2013    OAB (overactive bladder) 7/21/2015    PTSD (post-traumatic stress disorder)      Rheumatoid arthritis      Systemic lupus erythematosus      Thyroid disease      Urinary tract infection      Vaginal infection      Tests already available:  Available tests,  within 3 months , within [facility] .   6/26/18 CMP, CBC, Lipase            Instructions given. (See in Nurse's note)    Optimization:  Anesthesia Preop Clinic Assessment  Indicated-not required for this surgery      Plan:    Testing:  none needed        Patient  has previously scheduled Medical Appointment: none    Navigation: Tests Scheduled. none            Straight Line to surgery.               No tests, anesthesia preop clinic visit, or consult required.     Ivania Márquez RN

## 2018-09-12 ENCOUNTER — TELEPHONE (OUTPATIENT)
Dept: UROLOGY | Facility: CLINIC | Age: 48
End: 2018-09-12

## 2018-09-12 ENCOUNTER — HOSPITAL ENCOUNTER (OUTPATIENT)
Facility: HOSPITAL | Age: 48
Discharge: HOME | End: 2018-09-12
Attending: UROLOGY | Admitting: UROLOGY
Payer: COMMERCIAL

## 2018-09-12 ENCOUNTER — HOSPITAL ENCOUNTER (EMERGENCY)
Facility: HOSPITAL | Age: 48
Discharge: HOME | End: 2018-09-12
Attending: EMERGENCY MEDICINE
Payer: COMMERCIAL

## 2018-09-12 ENCOUNTER — ANESTHESIA (OUTPATIENT)
Dept: SURGERY | Facility: HOSPITAL | Age: 48
End: 2018-09-12
Payer: COMMERCIAL

## 2018-09-12 VITALS
OXYGEN SATURATION: 100 % | WEIGHT: 150 LBS | TEMPERATURE: 98 F | BODY MASS INDEX: 30.24 KG/M2 | SYSTOLIC BLOOD PRESSURE: 124 MMHG | DIASTOLIC BLOOD PRESSURE: 7 MMHG | HEIGHT: 59 IN | RESPIRATION RATE: 16 BRPM | HEART RATE: 64 BPM

## 2018-09-12 VITALS
HEART RATE: 61 BPM | WEIGHT: 150 LBS | HEIGHT: 59 IN | TEMPERATURE: 98 F | RESPIRATION RATE: 18 BRPM | BODY MASS INDEX: 30.24 KG/M2 | SYSTOLIC BLOOD PRESSURE: 141 MMHG | OXYGEN SATURATION: 97 % | DIASTOLIC BLOOD PRESSURE: 67 MMHG

## 2018-09-12 DIAGNOSIS — N30.10 INTERSTITIAL CYSTITIS: ICD-10-CM

## 2018-09-12 DIAGNOSIS — R39.89 BLADDER PAIN: ICD-10-CM

## 2018-09-12 DIAGNOSIS — R31.0 GROSS HEMATURIA: Primary | ICD-10-CM

## 2018-09-12 DIAGNOSIS — N39.41 URGENCY INCONTINENCE: ICD-10-CM

## 2018-09-12 DIAGNOSIS — N30.10 IC (INTERSTITIAL CYSTITIS): Primary | ICD-10-CM

## 2018-09-12 DIAGNOSIS — N31.8 FREQUENCY-URGENCY SYNDROME: ICD-10-CM

## 2018-09-12 LAB
ABO + RH BLD: NORMAL
ALBUMIN SERPL BCP-MCNC: 3.9 G/DL
ALP SERPL-CCNC: 58 U/L
ALT SERPL W/O P-5'-P-CCNC: 19 U/L
ANION GAP SERPL CALC-SCNC: 8 MMOL/L
AST SERPL-CCNC: 17 U/L
BACTERIA #/AREA URNS AUTO: ABNORMAL /HPF
BASOPHILS # BLD AUTO: 0.03 K/UL
BASOPHILS NFR BLD: 0.3 %
BILIRUB SERPL-MCNC: 0.3 MG/DL
BILIRUB UR QL STRIP: NEGATIVE
BLD GP AB SCN CELLS X3 SERPL QL: NORMAL
BUN SERPL-MCNC: 13 MG/DL
CALCIUM SERPL-MCNC: 9.5 MG/DL
CHLORIDE SERPL-SCNC: 106 MMOL/L
CLARITY UR REFRACT.AUTO: ABNORMAL
CO2 SERPL-SCNC: 23 MMOL/L
COLOR UR AUTO: ABNORMAL
CREAT SERPL-MCNC: 0.9 MG/DL
DIFFERENTIAL METHOD: ABNORMAL
EOSINOPHIL # BLD AUTO: 0 K/UL
EOSINOPHIL NFR BLD: 0.1 %
ERYTHROCYTE [DISTWIDTH] IN BLOOD BY AUTOMATED COUNT: 12.2 %
EST. GFR  (AFRICAN AMERICAN): >60 ML/MIN/1.73 M^2
EST. GFR  (NON AFRICAN AMERICAN): >60 ML/MIN/1.73 M^2
GLUCOSE SERPL-MCNC: 121 MG/DL
GLUCOSE UR QL STRIP: ABNORMAL
HCT VFR BLD AUTO: 35.6 %
HGB BLD-MCNC: 12 G/DL
HGB UR QL STRIP: ABNORMAL
HYALINE CASTS UR QL AUTO: 0 /LPF
IMM GRANULOCYTES # BLD AUTO: 0.06 K/UL
IMM GRANULOCYTES NFR BLD AUTO: 0.6 %
KETONES UR QL STRIP: NEGATIVE
LEUKOCYTE ESTERASE UR QL STRIP: ABNORMAL
LYMPHOCYTES # BLD AUTO: 0.6 K/UL
LYMPHOCYTES NFR BLD: 6.5 %
MCH RBC QN AUTO: 29.8 PG
MCHC RBC AUTO-ENTMCNC: 33.7 G/DL
MCV RBC AUTO: 88 FL
MICROSCOPIC COMMENT: ABNORMAL
MONOCYTES # BLD AUTO: 0.2 K/UL
MONOCYTES NFR BLD: 1.6 %
NEUTROPHILS # BLD AUTO: 8.9 K/UL
NEUTROPHILS NFR BLD: 90.9 %
NITRITE UR QL STRIP: NEGATIVE
NRBC BLD-RTO: 0 /100 WBC
PH UR STRIP: 8 [PH] (ref 5–8)
PLATELET # BLD AUTO: 339 K/UL
PMV BLD AUTO: 9.2 FL
POTASSIUM SERPL-SCNC: 4.6 MMOL/L
PROT SERPL-MCNC: 6.6 G/DL
PROT UR QL STRIP: ABNORMAL
RBC # BLD AUTO: 4.03 M/UL
RBC #/AREA URNS AUTO: >100 /HPF (ref 0–4)
SODIUM SERPL-SCNC: 137 MMOL/L
SP GR UR STRIP: 1.01 (ref 1–1.03)
URN SPEC COLLECT METH UR: ABNORMAL
UROBILINOGEN UR STRIP-ACNC: NEGATIVE EU/DL
WBC # BLD AUTO: 9.78 K/UL
WBC #/AREA URNS AUTO: 13 /HPF (ref 0–5)

## 2018-09-12 PROCEDURE — 52287 CYSTOSCOPY CHEMODENERVATION: CPT | Mod: 51,,, | Performed by: UROLOGY

## 2018-09-12 PROCEDURE — 36000707: Performed by: UROLOGY

## 2018-09-12 PROCEDURE — 96360 HYDRATION IV INFUSION INIT: CPT

## 2018-09-12 PROCEDURE — D9220A PRA ANESTHESIA: Mod: ANES,,, | Performed by: ANESTHESIOLOGY

## 2018-09-12 PROCEDURE — 25000003 PHARM REV CODE 250: Performed by: EMERGENCY MEDICINE

## 2018-09-12 PROCEDURE — 71000044 HC DOSC ROUTINE RECOVERY FIRST HOUR: Performed by: UROLOGY

## 2018-09-12 PROCEDURE — 81001 URINALYSIS AUTO W/SCOPE: CPT

## 2018-09-12 PROCEDURE — 25000003 PHARM REV CODE 250: Performed by: UROLOGY

## 2018-09-12 PROCEDURE — 71000045 HC DOSC ROUTINE RECOVERY EA ADD'L HR: Performed by: UROLOGY

## 2018-09-12 PROCEDURE — 80053 COMPREHEN METABOLIC PANEL: CPT

## 2018-09-12 PROCEDURE — D9220A PRA ANESTHESIA: Mod: CRNA,,, | Performed by: NURSE ANESTHETIST, CERTIFIED REGISTERED

## 2018-09-12 PROCEDURE — 25000003 PHARM REV CODE 250: Performed by: STUDENT IN AN ORGANIZED HEALTH CARE EDUCATION/TRAINING PROGRAM

## 2018-09-12 PROCEDURE — 52260 CYSTOSCOPY AND TREATMENT: CPT | Mod: ,,, | Performed by: UROLOGY

## 2018-09-12 PROCEDURE — 87086 URINE CULTURE/COLONY COUNT: CPT

## 2018-09-12 PROCEDURE — 63600175 PHARM REV CODE 636 W HCPCS: Performed by: STUDENT IN AN ORGANIZED HEALTH CARE EDUCATION/TRAINING PROGRAM

## 2018-09-12 PROCEDURE — 99284 EMERGENCY DEPT VISIT MOD MDM: CPT | Mod: 25

## 2018-09-12 PROCEDURE — 86850 RBC ANTIBODY SCREEN: CPT

## 2018-09-12 PROCEDURE — 63600175 PHARM REV CODE 636 W HCPCS: Performed by: NURSE ANESTHETIST, CERTIFIED REGISTERED

## 2018-09-12 PROCEDURE — 71000015 HC POSTOP RECOV 1ST HR: Performed by: UROLOGY

## 2018-09-12 PROCEDURE — 37000008 HC ANESTHESIA 1ST 15 MINUTES: Performed by: UROLOGY

## 2018-09-12 PROCEDURE — 36000706: Performed by: UROLOGY

## 2018-09-12 PROCEDURE — 99283 EMERGENCY DEPT VISIT LOW MDM: CPT | Mod: ,,, | Performed by: EMERGENCY MEDICINE

## 2018-09-12 PROCEDURE — 63600175 PHARM REV CODE 636 W HCPCS: Mod: JG | Performed by: UROLOGY

## 2018-09-12 PROCEDURE — 37000009 HC ANESTHESIA EA ADD 15 MINS: Performed by: UROLOGY

## 2018-09-12 PROCEDURE — 85025 COMPLETE CBC W/AUTO DIFF WBC: CPT

## 2018-09-12 RX ORDER — HYDROCODONE BITARTRATE AND ACETAMINOPHEN 5; 325 MG/1; MG/1
TABLET ORAL
Status: DISCONTINUED
Start: 2018-09-12 | End: 2018-09-12 | Stop reason: HOSPADM

## 2018-09-12 RX ORDER — MIDAZOLAM HYDROCHLORIDE 1 MG/ML
INJECTION, SOLUTION INTRAMUSCULAR; INTRAVENOUS
Status: DISCONTINUED | OUTPATIENT
Start: 2018-09-12 | End: 2018-09-12

## 2018-09-12 RX ORDER — HEPARIN SODIUM 1000 [USP'U]/ML
INJECTION, SOLUTION INTRAVENOUS; SUBCUTANEOUS
Status: DISCONTINUED
Start: 2018-09-12 | End: 2018-09-12 | Stop reason: HOSPADM

## 2018-09-12 RX ORDER — HYDROCODONE BITARTRATE AND ACETAMINOPHEN 5; 325 MG/1; MG/1
1 TABLET ORAL ONCE
Status: DISCONTINUED | OUTPATIENT
Start: 2018-09-12 | End: 2018-09-12 | Stop reason: HOSPADM

## 2018-09-12 RX ORDER — PROPOFOL 10 MG/ML
VIAL (ML) INTRAVENOUS CONTINUOUS PRN
Status: DISCONTINUED | OUTPATIENT
Start: 2018-09-12 | End: 2018-09-12

## 2018-09-12 RX ORDER — LIDOCAINE HCL/PF 100 MG/5ML
SYRINGE (ML) INTRAVENOUS
Status: DISCONTINUED | OUTPATIENT
Start: 2018-09-12 | End: 2018-09-12

## 2018-09-12 RX ORDER — CIPROFLOXACIN 500 MG/1
500 TABLET ORAL EVERY 12 HOURS
Qty: 6 TABLET | Refills: 0 | Status: SHIPPED | OUTPATIENT
Start: 2018-09-12 | End: 2018-09-15

## 2018-09-12 RX ORDER — OXYCODONE AND ACETAMINOPHEN 5; 325 MG/1; MG/1
1 TABLET ORAL EVERY 4 HOURS PRN
Qty: 7 TABLET | Refills: 0 | Status: SHIPPED | OUTPATIENT
Start: 2018-09-12 | End: 2018-12-11

## 2018-09-12 RX ORDER — TRAMADOL HYDROCHLORIDE 50 MG/1
TABLET ORAL
Status: DISCONTINUED
Start: 2018-09-12 | End: 2018-09-12 | Stop reason: HOSPADM

## 2018-09-12 RX ORDER — TRAMADOL HYDROCHLORIDE 50 MG/1
50 TABLET ORAL EVERY 6 HOURS PRN
Status: DISCONTINUED | OUTPATIENT
Start: 2018-09-12 | End: 2018-09-12 | Stop reason: HOSPADM

## 2018-09-12 RX ORDER — OXYCODONE AND ACETAMINOPHEN 5; 325 MG/1; MG/1
1 TABLET ORAL EVERY 4 HOURS PRN
Status: DISCONTINUED | OUTPATIENT
Start: 2018-09-12 | End: 2018-09-12 | Stop reason: HOSPADM

## 2018-09-12 RX ORDER — BUPIVACAINE HYDROCHLORIDE 7.5 MG/ML
INJECTION, SOLUTION EPIDURAL; RETROBULBAR
Status: DISCONTINUED
Start: 2018-09-12 | End: 2018-09-12 | Stop reason: HOSPADM

## 2018-09-12 RX ORDER — LIDOCAINE HYDROCHLORIDE 10 MG/ML
INJECTION INFILTRATION; PERINEURAL
Status: DISCONTINUED
Start: 2018-09-12 | End: 2018-09-12 | Stop reason: HOSPADM

## 2018-09-12 RX ORDER — CIPROFLOXACIN 2 MG/ML
400 INJECTION, SOLUTION INTRAVENOUS
Status: COMPLETED | OUTPATIENT
Start: 2018-09-12 | End: 2018-09-12

## 2018-09-12 RX ORDER — HYDROCODONE BITARTRATE AND ACETAMINOPHEN 5; 325 MG/1; MG/1
1 TABLET ORAL EVERY 6 HOURS PRN
Status: DISCONTINUED | OUTPATIENT
Start: 2018-09-12 | End: 2018-09-12

## 2018-09-12 RX ORDER — LIDOCAINE HYDROCHLORIDE 20 MG/ML
INJECTION, SOLUTION EPIDURAL; INFILTRATION; INTRACAUDAL; PERINEURAL
Status: DISCONTINUED
Start: 2018-09-12 | End: 2018-09-12 | Stop reason: HOSPADM

## 2018-09-12 RX ORDER — TRAMADOL HYDROCHLORIDE 50 MG/1
50 TABLET ORAL EVERY 4 HOURS PRN
Qty: 9 TABLET | Refills: 0 | Status: SHIPPED | OUTPATIENT
Start: 2018-09-12 | End: 2018-09-12 | Stop reason: HOSPADM

## 2018-09-12 RX ORDER — SODIUM CHLORIDE 9 MG/ML
INJECTION, SOLUTION INTRAVENOUS CONTINUOUS
Status: DISCONTINUED | OUTPATIENT
Start: 2018-09-12 | End: 2018-09-12 | Stop reason: HOSPADM

## 2018-09-12 RX ORDER — SODIUM CHLORIDE 0.9 % (FLUSH) 0.9 %
3 SYRINGE (ML) INJECTION
Status: DISCONTINUED | OUTPATIENT
Start: 2018-09-12 | End: 2018-09-12 | Stop reason: HOSPADM

## 2018-09-12 RX ORDER — PROPOFOL 10 MG/ML
VIAL (ML) INTRAVENOUS
Status: DISCONTINUED | OUTPATIENT
Start: 2018-09-12 | End: 2018-09-12

## 2018-09-12 RX ORDER — OXYCODONE AND ACETAMINOPHEN 5; 325 MG/1; MG/1
1 TABLET ORAL
Status: COMPLETED | OUTPATIENT
Start: 2018-09-12 | End: 2018-09-12

## 2018-09-12 RX ORDER — LIDOCAINE HYDROCHLORIDE 20 MG/ML
JELLY TOPICAL
Status: DISCONTINUED | OUTPATIENT
Start: 2018-09-12 | End: 2018-09-12 | Stop reason: HOSPADM

## 2018-09-12 RX ORDER — DEXAMETHASONE SODIUM PHOSPHATE 4 MG/ML
INJECTION, SOLUTION INTRA-ARTICULAR; INTRALESIONAL; INTRAMUSCULAR; INTRAVENOUS; SOFT TISSUE
Status: DISCONTINUED | OUTPATIENT
Start: 2018-09-12 | End: 2018-09-12

## 2018-09-12 RX ORDER — FENTANYL CITRATE 50 UG/ML
INJECTION, SOLUTION INTRAMUSCULAR; INTRAVENOUS
Status: DISCONTINUED | OUTPATIENT
Start: 2018-09-12 | End: 2018-09-12

## 2018-09-12 RX ADMIN — LIDOCAINE HYDROCHLORIDE 60 MG: 20 INJECTION, SOLUTION INTRAVENOUS at 11:09

## 2018-09-12 RX ADMIN — MIDAZOLAM HYDROCHLORIDE 2 MG: 1 INJECTION, SOLUTION INTRAMUSCULAR; INTRAVENOUS at 10:09

## 2018-09-12 RX ADMIN — SODIUM CHLORIDE: 0.9 INJECTION, SOLUTION INTRAVENOUS at 10:09

## 2018-09-12 RX ADMIN — TRAMADOL HYDROCHLORIDE 50 MG: 50 TABLET, FILM COATED ORAL at 12:09

## 2018-09-12 RX ADMIN — DEXAMETHASONE SODIUM PHOSPHATE 4 MG: 4 INJECTION, SOLUTION INTRAMUSCULAR; INTRAVENOUS at 11:09

## 2018-09-12 RX ADMIN — OXYCODONE HYDROCHLORIDE AND ACETAMINOPHEN 1 TABLET: 5; 325 TABLET ORAL at 06:09

## 2018-09-12 RX ADMIN — FENTANYL CITRATE 25 MCG: 50 INJECTION, SOLUTION INTRAMUSCULAR; INTRAVENOUS at 11:09

## 2018-09-12 RX ADMIN — SODIUM CHLORIDE 1000 ML: 0.9 INJECTION, SOLUTION INTRAVENOUS at 06:09

## 2018-09-12 RX ADMIN — PROPOFOL 50 MG: 10 INJECTION, EMULSION INTRAVENOUS at 11:09

## 2018-09-12 RX ADMIN — HYDROCODONE BITARTRATE AND ACETAMINOPHEN 1 TABLET: 5; 325 TABLET ORAL at 12:09

## 2018-09-12 RX ADMIN — PROPOFOL 150 MCG/KG/MIN: 10 INJECTION, EMULSION INTRAVENOUS at 11:09

## 2018-09-12 RX ADMIN — CIPROFLOXACIN 400 MG: 2 INJECTION, SOLUTION INTRAVENOUS at 10:09

## 2018-09-12 NOTE — ANESTHESIA POSTPROCEDURE EVALUATION
"Anesthesia Post Evaluation    Patient: Marilee Simons    Procedure(s) Performed: Procedure(s) (LRB):  CYSTOSCOPY,WITH BLADDER HYDRODISTENSION (N/A)  INSTILLATION, URINARY BLADDER (N/A)  INJECTION, BOTULINUM TOXIN, TYPE A  200 UNITS (N/A)    Final Anesthesia Type: MAC  Patient location during evaluation: PACU  Patient participation: Yes- Able to Participate  Level of consciousness: awake and alert  Post-procedure vital signs: reviewed and stable  Pain management: adequate  Airway patency: patent  PONV status at discharge: No PONV  Anesthetic complications: no      Cardiovascular status: hemodynamically stable  Respiratory status: unassisted, spontaneous ventilation and room air  Hydration status: euvolemic  Follow-up not needed.        Visit Vitals  /76   Pulse (!) 59   Temp 36.5 °C (97.7 °F) (Skin)   Resp 16   Ht 4' 11" (1.499 m)   Wt 68 kg (150 lb)   LMP  (LMP Unknown)   SpO2 97%   Breastfeeding? No   BMI 30.30 kg/m²       Pain/Jewel Score: Pain Assessment Performed: Yes (9/12/2018 11:39 AM)  Presence of Pain: non-verbal indicators absent (9/12/2018 11:39 AM)  Pain Rating Prior to Med Admin: 6 (9/12/2018 12:45 PM)  Jewel Score: 5 (9/12/2018 11:39 AM)        "

## 2018-09-12 NOTE — PLAN OF CARE
Discharge instructions reviewed w/ pt and , paper rx given, Pt in NADN. States pain tolerable at this time. Tolerated liquids w/ no issues.Urinated w/ no issues. To be d/c'd home w/ .

## 2018-09-12 NOTE — TELEPHONE ENCOUNTER
Spoke with patient who reports finding 2 - quarter sized drops of blood on sheets at home after being discharged today s/p cysto with botox. No voiding issues at this time. Told her to increase fluids and monitor, blood could be from botox injections. Told her to go to the ER if fever, chills, retention, increased bleeding. Verbalized understanding.

## 2018-09-12 NOTE — ED PROVIDER NOTES
Encounter Date: 2018    SCRIBE #1 NOTE: I, Franklin Reilly, am scribing for, and in the presence of,  Dr. Terry. I have scribed the entire note.       History     Chief Complaint   Patient presents with    Post-op Problem     Pt had bladder procedure today-now having bleeding.  States she saturated one diaper since onset 1hr ago.     Time patient was seen by the provider: 6:47 PM      The patient is a 48 y.o. female who presents to the ED with a complaint of bloody urine, soaking 1 diaper since onset 1 hour ago. She had a cystoscope with Botox today for chronic suprapubic pain and bladder spasm, which she has had numerous times without issues. She endorses history of hysterectomy.       The history is provided by the patient.     Review of patient's allergies indicates:   Allergen Reactions    Cephalexin Itching    Zofran [ondansetron hcl (pf)] Hives    Penicillins Rash     Past Medical History:   Diagnosis Date    Acid reflux     Allergy     Alopecia     Anxiety     Arthralgia     Back pain     Depression     Dry eyes     from meds    Fever blister     Fibromyalgia     Interstitial cystitis     Irritable bowel syndrome     Kidney stone     Major depressive disorder, recurrent episode, in partial or unspecified remission 2013    OAB (overactive bladder) 2015    PTSD (post-traumatic stress disorder)     Rheumatoid arthritis     Systemic lupus erythematosus     Thyroid disease     Urinary tract infection     Vaginal infection      Past Surgical History:   Procedure Laterality Date    BLADDER SURGERY       SECTION, LOW TRANSVERSE      x 2    COLONOSCOPY      COLONOSCOPY N/A 10/5/2017    Procedure: COLONOSCOPY;  Surgeon: Venkat He MD;  Location: [address]);  Service: Endoscopy;  Laterality: N/A;    COLONOSCOPY N/A 10/5/2017    Performed by Venkat He MD at [facility] ([address] [place] [address])    CYSTOSCOPY N/A 2015    Performed by Bandar Garcia MD at  [facility] OR 1ST FLR    ESOPHAGOGASTRODUODENOSCOPY      ESOPHAGOGASTRODUODENOSCOPY (EGD) N/A 10/5/2017    Performed by Venkat He MD at [facility] ENDO (4TH FLR)    ESOPHAGOGASTRODUODENOSCOPY (EGD) N/A 1/21/2015    Performed by Venkat He MD at [facility] ENDO (4TH FLR)    EYE SURGERY      Lasik-bilateral    HYSTERECTOMY      IMPLANT-INTERSTIM-PERCUTANEOUS-I AND II N/A 8/3/2016    Performed by Bandar Garcia MD at [facility] OR 2ND FLR    INJECTION-BOTOX N/A 8/19/2015    Performed by Bandar Gracia MD at [facility] OR 1ST FLR    PARTIAL HYSTERECTOMY      SIGMOIDOSCOPY-FLEXIBLE N/A 6/12/2017    Performed by Venkat He MD at [facility] (2ND FLR)     Family History   Problem Relation Age of Onset    Irritable bowel syndrome Mother     Irritable bowel syndrome Sister     Lupus Sister     Rheum arthritis Sister     Fibromyalgia Sister     Irritable bowel syndrome Sister     Celiac disease Neg Hx     Cirrhosis Neg Hx     Colon cancer Neg Hx     Colon polyps Neg Hx     Crohn's disease Neg Hx     Cystic fibrosis Neg Hx     Esophageal cancer Neg Hx     Hemochromatosis Neg Hx     Inflammatory bowel disease Neg Hx     Liver cancer Neg Hx     Liver disease Neg Hx     Rectal cancer Neg Hx     Stomach cancer Neg Hx     Ulcerative colitis Neg Hx     Boris's disease Neg Hx     Melanoma Neg Hx      Social History     Tobacco Use    Smoking status: Never Smoker    Smokeless tobacco: Never Used   Substance Use Topics    Alcohol use: No    Drug use: No     Review of Systems   Constitutional: Negative for fever.   HENT: Negative for sore throat.    Eyes: Negative for visual disturbance.   Respiratory: Negative for shortness of breath.    Cardiovascular: Negative for chest pain.   Gastrointestinal: Negative for nausea.   Genitourinary: Positive for hematuria. Negative for dysuria.   Musculoskeletal: Negative for back pain.   Skin: Negative for rash.   Neurological: Negative for weakness.       Physical Exam      Initial Vitals [09/12/18 1711]   BP Pulse Resp Temp SpO2   111/64 94 18 98.3 °F (36.8 °C) 96 %      MAP       --         Physical Exam    Nursing note and vitals reviewed.    Appearance: No acute distress.  Skin: Pale. No rashes seen.  Good turgor.  No abrasions.  No ecchymoses.  Eyes: No conjunctival injection.  ENT: Oropharynx clear.    Chest: Clear to auscultation bilaterally.  Good air movement.  No wheezes.  No rhonchi.  Cardiovascular: Regular rate and rhythm.  No murmurs. No gallops. No rubs.  Abdomen: Suprapubic tenderness. Soft.  Not distended.  No guarding.  No rebound.  Genitourinary: Bloody urine leaking out of her meatus.   Musculoskeletal: Good range of motion all joints.  No deformities.  Neck supple.  No meningismus.  Neurologic: Motor intact.  Sensation intact.  Cerebellar intact.  Cranial nerves intact.  Mental Status:  Alert and oriented x 3.  Appropriate, conversant.      ED Course   Procedures  Labs Reviewed   CBC W/ AUTO DIFFERENTIAL - Abnormal; Notable for the following components:       Result Value    Hematocrit 35.6 (*)     Immature Granulocytes 0.6 (*)     Gran # (ANC) 8.9 (*)     Immature Grans (Abs) 0.06 (*)     Lymph # 0.6 (*)     Mono # 0.2 (*)     Gran% 90.9 (*)     Lymph% 6.5 (*)     Mono% 1.6 (*)     All other components within normal limits   COMPREHENSIVE METABOLIC PANEL - Abnormal; Notable for the following components:    Glucose 121 (*)     All other components within normal limits   URINALYSIS, REFLEX TO URINE CULTURE - Abnormal; Notable for the following components:    Color, UA Red (*)     Appearance, UA Cloudy (*)     Protein, UA 2+ (*)     Glucose, UA 1+ (*)     Occult Blood UA 3+ (*)     Leukocytes, UA Trace (*)     All other components within normal limits    Narrative:     Preferred Collection Type->Urine, Clean Catch   URINALYSIS MICROSCOPIC - Abnormal; Notable for the following components:    RBC, UA >100 (*)     WBC, UA 13 (*)     Bacteria, UA Few (*)     All  other components within normal limits    Narrative:     Preferred Collection Type->Urine, Clean Catch   CULTURE, URINE   TYPE & SCREEN          Imaging Results    None          Medical Decision Making:   History:   Old Medical Records: I decided to obtain old medical records.  Initial Assessment:   Cystoscope today with Botox injection for bladder spasms. She's had this numerous times before without issues. Today she's having leakage of bloody fluid, looks like bloody urine. I will speak with urology.    7:33 PM  Discussed with urology. They will come see her.     9:51 PM  Discussed with urology. They have seen the patient and cleared her for discharge.  Gross hematuria without clots; they feel it will stop spontaneously but recommend return for urinary retention or worsening symptoms.    Clinical Tests:   Lab Tests: Reviewed and Ordered  Other:   I have discussed this case with another health care provider.            Scribe Attestation:   Scribe #1: I performed the above scribed service and the documentation accurately describes the services I performed. I attest to the accuracy of the note.               Clinical Impression:   The encounter diagnosis was Gross hematuria.      Disposition:   Disposition: Discharged  Condition: Stable                        Vahid Terry MD  09/13/18 0113

## 2018-09-12 NOTE — ED TRIAGE NOTES
Post-op Problem (Pt had bladder procedure today-now having bleeding. States she saturated one diaper since onset 1hr ago.) Pt denies fever/chills/n/v

## 2018-09-12 NOTE — INTERVAL H&P NOTE
The patient has been examined and the H&P has been reviewed:    I concur with the findings and no changes have occurred since H&P was written.     Urine negative for nitrites. Positive for blood and leukocytes    Anesthesia/Surgery risks, benefits and alternative options discussed and understood by patient/family.          Active Hospital Problems    Diagnosis  POA    Interstitial cystitis [N30.10]  Yes      Resolved Hospital Problems   No resolved problems to display.

## 2018-09-12 NOTE — DISCHARGE INSTRUCTIONS
PATIENT INSTRUCTIONS  POST-ANESTHESIA    IMMEDIATELY FOLLOWING SURGERY:  Do not drive or operate machinery for the first twenty four hours after surgery.  Do not make any important decisions for twenty four hours after surgery or while taking narcotic pain medications or sedatives.  If you develop intractable nausea and vomiting or a severe headache please notify your doctor immediately.    FOLLOW-UP:  Please make an appointment with your surgeon as instructed. You do not need to follow up with anesthesia unless specifically instructed to do so.    WOUND CARE INSTRUCTIONS (if applicable):  Keep a dry clean dressing on the anesthesia/puncture wound site if there is drainage.  Once the wound has quit draining you may leave it open to air.  Generally you should leave the bandage intact for twenty four hours unless there is drainage.  If the epidural site drains for more than 36-48 hours please call the anesthesia department.    QUESTIONS?:  Please feel free to call your physician or the hospital  if you have any questions, and they will be happy to assist you.       [facility] Anesthesia Department  [address] [facility]  [phone]          Cystoscopy    Cystoscopy is a procedure that lets your doctor look directly inside your urethra and bladder. It can be used to:  · Help diagnose a problem with your urethra, bladder, or kidneys.  · Take a sample (biopsy) of bladder or urethral tissue.  · Treat certain problems (such as removing kidney stones).  · Place a stent to bypass an obstruction.  · Take special X-rays of the kidneys.  Based on the findings, your doctor may recommend other tests or treatments.  What is a cystoscope?  A cystoscope is a telescope-like instrument that contains lenses and fiberoptics (small glass wires that make bright light). The cystoscope may be straight and rigid, or flexible to bend around curves in the urethra. The doctor may look directly into the cystoscope, or  project the image onto a monitor.  Getting ready  · Ask your doctor if you should stop taking any medicines before the procedure.  · Ask whether you should avoid eating or drinking anything after midnight before the procedure.  · Follow any other instructions your doctor gives you.  Tell your doctor before the exam if you:  · Take any medicines, such as aspirin or blood thinners  · Have allergies to any medicines  · Are pregnant   The procedure  Cystoscopy is done in the doctors office, surgery center, or hospital. The doctor and a nurse are present during the procedure. It takes only a few minutes, longer if a biopsy, X-ray, or treatment needs to be done.  During the procedure:  · You lie on an exam table on your back, knees bent and legs apart. You are covered with a drape.  · Your urethra and the area around it are washed. Anesthetic jelly may be applied to numb the urethra. Other pain medicine is usually not needed. In some cases, you may be offered a mild sedative to help you relax. If a more extensive procedure is to be done, such as a biopsy or kidney stone removal, general anesthesia may be needed.  · The cystoscope is inserted. A sterile fluid is put into the bladder to expand it. You may feel pressure from this fluid.  · When the procedure is done, the cystoscope is removed.  After the procedure  If you had a sedative, general anesthesia, or spinal anesthesia, you must have someone drive you home. Once youre home:  · Drink plenty of fluids.  · You may have burning or light bleeding when you urinate--this is normal.  · Medicines may be prescribed to ease any discomfort or prevent infection. Take these as directed.  · Call your doctor if you have heavy bleeding or blood clots, burning that lasts more than a day, a fever over 100°F  (38° C), or trouble urinating.  Date Last Reviewed: 1/1/2017  © 5264-0440 [employer]. [address] [place] [address], [place] [address]. All rights reserved. This  information is not intended as a substitute for professional medical care. Always follow your healthcare professional's instructions.

## 2018-09-12 NOTE — OP NOTE
[facility]  Operative Note    Date: 09/12/2018    Pre-Op Diagnosis: interstitial cystitis  Patient Active Problem List    Diagnosis Date Noted    Decreased bladder capacity 08/21/2018    Decreased range of motion of neck 04/10/2018    Muscle weakness 04/10/2018    Neck pain 04/10/2018    Nausea 10/05/2017    Blood poisoning 06/09/2017    Pelvic pain in female 11/03/2016    Fibromyalgia 08/11/2016    Interstitial cystitis 08/03/2016    Cluster B personality disorder 06/12/2016    Straining to void 05/30/2016    Major depressive disorder, recurrent episode, mild 11/16/2015    Anxiety 11/16/2015    Frequency-urgency syndrome 09/03/2015    Urgency incontinence 08/19/2015    OAB (overactive bladder) 07/21/2015    PTSD (post-traumatic stress disorder) 06/28/2015    Generalized anxiety disorder 06/28/2015    Major depressive disorder, recurrent episode, moderate 02/25/2015    GERD (gastroesophageal reflux disease) 01/21/2015    Headache 10/31/2014    Acute UTI 03/20/2014    Potassium (K) deficiency 03/19/2014    IC (interstitial cystitis) 03/18/2014    Bladder pain 03/18/2014    Hematuria, microscopic 03/18/2014    Arthralgia 09/11/2013    Chronic fatigue 09/11/2013    Hypothyroidism 07/16/2013    Atypical chest pain 05/14/2013    IBS (irritable bowel syndrome) 01/18/2013     Post-Op Diagnosis: same    Procedure(s) Performed:   1.  Cystoscopy with hydrodistension  2.  Botox injections into detrusor muscle    Specimen(s): none    Staff Surgeon: Bandar Garcia MD    Assistant Surgeon: Raul Cavazos MD    Anesthesia: Monitored Local Anesthesia with Sedation    Indications: Marilee Simons is a 48 y.o. female with interstitial cystitis presenting for hydrodistension.      Findings:   - Initial bladder capacity 900 mL with terminal hematuria.   - Glomerulations were diffusely seen, Hunner's ulcers seen overlying bilateral UOs, diffuse mild degree of hyperemia  - Subsequent bladder  capacity was 950 mL.    Estimated Blood Loss: min    Drains:   16 Fr Shetty catheter to be removed in PACU    Procedure in detail: After risks, benefits and possible complications of hydro distention were discussed with the patient informed consent was obtained. All questions were answered in the pre-operative area.  The patient was transferred to the cystoscopy suite and placed in the supine position.  SCDs were applied and working.  Anesthesia was administered.  After adequate anesthesia the patient was placed in the dorsal lithotomy position and prepped and draped in the usual sterile fashion. Time out was preformed and marlin-procedural antibiotics were confirmed.     A rigid cystoscope in a 22 Fr sheath was introduced into the bladder per urethra. This passed easily.  The entire urethra was visualized which showed no masses or strictures. The right and left ureteral orifices were identified in the normal anatomic position and were seen effluxing clear urine.  Formal cystoscopy was performed which revealed no masses or lesions suspicious for malignancy, no trabeculations, no bladder stones and no bladder diverticuli.     100 units of botox was injected into the detrusor muscle throughout the bladder.  Good wheals were raised.     The bladder was then filled with sterile water until equilibrium was reached at 70 cm of water. The capacity of the bladder was 900. This was repeated and the second bladder capacity was 950. There was terminal hematuria seen. There was ulcerations seen. There was glomerulations seen.    The bladder was drained and the cystoscope removed.  A 16 Fr shetty catheter was placed.  A 200mL concoction of [id number] U heparin, 1 % lidocaine, 0.5% bupivicaine, and 8.4% NaHCO3 was instilled into the bladder with instructions to keep the instillation in the bladder for at least 30 minutes postoperatively.      The patient was removed from lithotomy and transferred to recovery in stable  condition.    Disposition:  The patient will follow up with Dr. Garcia in 3 months.  Prescriptions were given for cipro and tramadol.    Raul Cavazos MD

## 2018-09-12 NOTE — ED NOTES
Patient identifiers verified and correct for Marilee Simons.   LOC: The patient is awake, alert and aware of environment with an appropriate affect, the patient is oriented x 3 and speaking appropriately.   APPEARANCE: Patient appears comfortable and in no acute distress, patient is clean and well groomed.  SKIN: The skin is warm and dry, color consistent with ethnicity, patient has normal skin turgor and moist mucus membranes, skin intact, no breakdown or bruising noted.   MUSCULOSKELETAL: Patient moving all extremities spontaneously, no swelling noted.  RESPIRATORY: Airway is open and patent, respirations are spontaneous, patient has a normal effort and rate, no accessory muscle use noted, pt placed on continuous pulse ox with O2 sats noted at 97% on room air.  CARDIAC: Pt placed on cardiac monitor. Patient has a normal rate and regular rhythm, no edema noted, capillary refill < 3 seconds.   GASTRO: Soft and non tender to palpation, no distention noted, normoactive bowel sounds present in all four quadrants. Pt states bowel movements have been regular.  : Pt reports bright red blood from the urethra.   NEURO: Pt opens eyes spontaneously, behavior appropriate to situation, follows commands, facial expression symmetrical, bilateral hand grasp equal and even, purposeful motor response noted, normal sensation in all extremities when touched with a finger.

## 2018-09-12 NOTE — DISCHARGE SUMMARY
[facility]  Discharge Note  Short Stay    Admit Date: 9/12/2018    Discharge Date and Time: 09/12/2018 11:43 AM      Attending Physician: Bandar Garcia MD     Discharge Provider: Raul Cavazos    Diagnoses:  Active Hospital Problems    Diagnosis  POA    *Interstitial cystitis [N30.10]  Yes    Decreased bladder capacity [N32.89]  Yes    Pelvic pain in female [R10.2]  Yes    Frequency-urgency syndrome [N31.8]  Yes    Urgency incontinence [N39.41]  Yes    OAB (overactive bladder) [N32.81]  Yes    Bladder pain [R39.89]  Yes    IC (interstitial cystitis) [N30.10]  Yes    Hematuria, microscopic [R31.29]  Yes      Resolved Hospital Problems   No resolved problems to display.       Discharged Condition: stable    Hospital Course: Patient was admitted for cysto botox and hydrodistension and tolerated the procedure well with no complications. The patient was discharged home in good condition on the same day.       Final Diagnoses: Same as principal problem.    Disposition: Home or Self Care    Follow up/Patient Instructions:    Medications:  Reconciled Home Medications:   Current Discharge Medication List      START taking these medications    Details   ciprofloxacin HCl (CIPRO) 500 MG tablet Take 1 tablet (500 mg total) by mouth every 12 (twelve) hours. for 3 days  Qty: 6 tablet, Refills: 0      traMADol (ULTRAM) 50 mg tablet Take 1 tablet (50 mg total) by mouth every 4 (four) hours as needed for Pain.  Qty: 9 tablet, Refills: 0         CONTINUE these medications which have NOT CHANGED    Details   alosetron (LOTRONEX) 0.5 MG tablet TAKE 1 TABLET(0.5 MG) BY MOUTH TWICE DAILY  Qty: 60 tablet, Refills: 0      amitriptyline (ELAVIL) 10 MG tablet TAKE 1 TABLET(10 MG) BY MOUTH EVERY NIGHT AS NEEDED  Qty: 30 tablet, Refills: 2    Associated Diagnoses: IC (interstitial cystitis)      butalbital-acetaminophen (BUPAP)  mg Tab Take 1 tablet by mouth every 4 (four) hours.  Qty: 60 tablet, Refills: 3       calcium glycerophosphate (PRELIEF) 65 mg Tab Take 2 tablets by mouth before meals and at bedtime as needed.  Qty: 100 each, Refills: prn    Associated Diagnoses: IC (interstitial cystitis)      !! clonazePAM (KLONOPIN) 1 MG tablet Take 1 tablet (1 mg total) by mouth 3 (three) times daily.  Qty: 90 tablet, Refills: 3      !! clonazePAM (KLONOPIN) 1 MG tablet TAKE 1 TABLET(1 MG) BY MOUTH THREE TIMES DAILY  Qty: 90 tablet, Refills: 0      DULoxetine (CYMBALTA) 60 MG capsule Take 1 capsule (60 mg total) by mouth once daily.  Qty: 30 capsule, Refills: 11      famotidine (PEPCID) 40 MG tablet Take 1 tablet (40 mg total) by mouth nightly as needed for Heartburn.  Qty: 30 tablet, Refills: 11    Associated Diagnoses: IC (interstitial cystitis)      gabapentin (NEURONTIN) 800 MG tablet TAKE 1 TABLET BY MOUTH THREE TIMES DAILY  Qty: 90 tablet, Refills: 0    Associated Diagnoses: Fibromyalgia; Seronegative rheumatoid arthritis; Chronic fatigue; PTSD (post-traumatic stress disorder); Major depressive disorder, recurrent episode, moderate; Generalized anxiety disorder      hydroxychloroquine (PLAQUENIL) 200 mg tablet TAKE 1 TABLET BY MOUTH TWICE DAILY  Qty: 60 tablet, Refills: 3      hydrOXYzine HCl (ATARAX) 25 MG tablet Take 1 tablet (25 mg total) by mouth nightly.  Qty: 30 tablet, Refills: 12    Associated Diagnoses: IC (interstitial cystitis)      levothyroxine (SYNTHROID) 50 MCG tablet TAKE 1 TABLET(50 MCG) BY MOUTH EVERY DAY  Qty: 30 tablet, Refills: 0    Associated Diagnoses: Hypothyroidism, unspecified type      oposly-uqowd-p.blue-sal-naphos (USTELL) 120-0.12 mg Cap Take 1 tablet by mouth 4 (four) times daily.  Qty: 80 each, Refills: 3      multivitamin with minerals tablet Take 1 tablet by mouth once daily.      potassium citrate (UROCIT-K 10) 10 mEq (1,080 mg) TbSR Take 1 tablet (10 mEq total) by mouth 3 (three) times daily with meals.  Qty: 90 tablet, Refills: 11    Associated Diagnoses: IC (interstitial cystitis)       promethazine (PHENERGAN) 25 MG tablet TAKE 1 TABLET(25 MG) BY MOUTH EVERY 6 HOURS AS NEEDED  Qty: 60 tablet, Refills: 0      tiZANidine (ZANAFLEX) 4 MG tablet TAKE 1 TABLET BY MOUTH EVERY 8 HOURS  Qty: 90 tablet, Refills: 3      traZODone (DESYREL) 50 MG tablet TAKE 1/2  TABLET BY MOUTH EVERY NIGHT AT BEDTIME AS NEEDED FOR INSOMNIA  Qty: 60 tablet, Refills: 2      URIBEL 118-10-40.8-36 mg Cap TAKE 1 CAPSULE BY MOUTH FOUR TIMES DAILY  Qty: 120 capsule, Refills: 0    Associated Diagnoses: IC (interstitial cystitis)      adalimumab (HUMIRA PEN) PnKt injection Inject 0.8 mLs (40 mg total) into the skin every 14 (fourteen) days.  Qty: 1.6 mL, Refills: 11      !! clonazePAM (KLONOPIN) 1 MG tablet TAKE 1 TABLET(1 MG) BY MOUTH THREE TIMES DAILY  Qty: 90 tablet, Refills: 0      cyclobenzaprine (FLEXERIL) 10 MG tablet Take 1 tablet (10 mg total) by mouth 2 (two) times daily.  Qty: 90 tablet, Refills: 3      diazePAM (VALIUM) 5 MG tablet Take 1 tablet (5 mg total) by mouth as needed for Anxiety. Take 1 tablet at night as needed for pelvic pain  Qty: 30 tablet, Refills: 0    Associated Diagnoses: IC (interstitial cystitis); Pelvic pain      diclofenac (VOLTAREN) 75 MG EC tablet Take 1 tablet (75 mg total) by mouth 2 (two) times daily.  Qty: 60 tablet, Refills: 0      ketorolac (TORADOL) 10 mg tablet Take 1 tablet (10 mg total) by mouth 3 (three) times daily after meals.  Qty: 10 tablet, Refills: 0      prazosin (MINIPRESS) 1 MG Cap Take 1 capsule (1 mg total) by mouth every evening.  Qty: 30 capsule, Refills: 11      RABEprazole (ACIPHEX) 20 mg tablet Take 1 tablet (20 mg total) by mouth once daily.  Qty: 30 tablet, Refills: 6      triamcinolone acetonide 0.1% (KENALOG) 0.1 % cream APPLY TO THE AFFECTED AREA TWICE DAILY  Qty: 454 g, Refills: 0       !! - Potential duplicate medications found. Please discuss with provider.        Discharge Procedure Orders   Diet Adult Regular     Call MD for:  temperature >100.4     Call MD  for:  persistent nausea and vomiting or diarrhea     Call MD for:  severe uncontrolled pain     Call MD for:  difficulty breathing or increased cough     Call MD for:  severe persistent headache     Call MD for:  persistent dizziness, light-headedness, or visual disturbances     Call MD for:  increased confusion or weakness     No dressing needed     Activity as tolerated     Follow-up Information     Bandar Garcia MD In 3 months.    Specialty:  Urology  Why:  s/p cysto botox with hydrodistension  Contact information:  [address] [place] [id number]  [phone]                   Discharge Procedure Orders (must include Diet, Follow-up, Activity):   Discharge Procedure Orders (must include Diet, Follow-up, Activity)   Diet Adult Regular     Call MD for:  temperature >100.4     Call MD for:  persistent nausea and vomiting or diarrhea     Call MD for:  severe uncontrolled pain     Call MD for:  difficulty breathing or increased cough     Call MD for:  severe persistent headache     Call MD for:  persistent dizziness, light-headedness, or visual disturbances     Call MD for:  increased confusion or weakness     No dressing needed     Activity as tolerated

## 2018-09-12 NOTE — TRANSFER OF CARE
"Anesthesia Transfer of Care Note    Patient: Marilee Simons    Procedure(s) Performed: Procedure(s) (LRB):  CYSTOSCOPY,WITH BLADDER HYDRODISTENSION (N/A)  INSTILLATION, URINARY BLADDER (N/A)  INJECTION, BOTULINUM TOXIN, TYPE A  200 UNITS (N/A)    Patient location: PACU    Anesthesia Type: general    Transport from OR: Transported from OR on 6-10 L/min O2 by face mask with adequate spontaneous ventilation    Post pain: adequate analgesia    Post assessment: tolerated procedure well and no apparent anesthetic complications    Post vital signs: stable    Level of consciousness: sedated and responds to stimulation    Nausea/Vomiting: no nausea/vomiting    Complications: none    Transfer of care protocol was followed      Last vitals:   Visit Vitals  BP (!) 94/52   Pulse 82   Temp 36.5 °C (97.7 °F) (Skin)   Resp 16   Ht 4' 11" (1.499 m)   Wt 68 kg (150 lb)   LMP  (LMP Unknown)   SpO2 100%   Breastfeeding? No   BMI 30.30 kg/m²     "

## 2018-09-13 NOTE — HPI
49 yo F w/ history of chronic pelvic pain, Interstiitial cystitis and OAB, and history of interstim, who underwent bladder botox injections and hydrodistension earlier today. She presents with concern over gross hematuria and some urine leakage. She denies passing clots or any other symptoms. Feels like she can empty her bladder. Voided urine is dark pink, no clots.    Bladder scan in ED showed 900ml, she voided 500ml, then 400ml, PVR was 0  CBC was stable from 2 months ago. Hgb 12  Cr stable at her baseline 0.9

## 2018-09-13 NOTE — CONSULTS
[facility]  Urology  Consult Note    Patient Name: Marilee Simons  MRN: [id number]  Admission Date: 2018  Hospital Length of Stay: 0   Code Status: Prior   Attending Provider: Vahid Terry MD   Consulting Provider: Ezequiel Chance MD  Primary Care Physician: Nadeem Zheng MD  Principal Problem:<principal problem not specified>    Inpatient consult to Urology  Consult performed by: Ezequiel Chance MD  Consult ordered by: Vahid Terry MD          Subjective:     HPI:  49 yo F w/ history of chronic pelvic pain, Interstiitial cystitis and OAB, and history of interstim, who underwent bladder botox injections and hydrodistension earlier today. She presents with concern over gross hematuria and some urine leakage. She denies passing clots or any other symptoms. Feels like she can empty her bladder. Voided urine is dark pink, no clots.    Bladder scan in ED showed 900ml, she voided 500ml, then 400ml, PVR was 0  CBC was stable from 2 months ago. Hgb 12  Cr stable at her baseline 0.9    Past Medical History:   Diagnosis Date    Acid reflux     Allergy     Alopecia     Anxiety     Arthralgia     Back pain     Depression     Dry eyes     from meds    Fever blister     Fibromyalgia     Interstitial cystitis     Irritable bowel syndrome     Kidney stone     Major depressive disorder, recurrent episode, in partial or unspecified remission 2013    OAB (overactive bladder) 2015    PTSD (post-traumatic stress disorder)     Rheumatoid arthritis     Systemic lupus erythematosus     Thyroid disease     Urinary tract infection     Vaginal infection        Past Surgical History:   Procedure Laterality Date    BLADDER SURGERY       SECTION, LOW TRANSVERSE      x 2    COLONOSCOPY      COLONOSCOPY N/A 10/5/2017    Procedure: COLONOSCOPY;  Surgeon: Venkat He MD;  Location: [address];  Service: Endoscopy;  Laterality: N/A;    COLONOSCOPY N/A 10/5/2017     Performed by Venkat [facility] MD at [facility] ENDO (4TH FLR)    CYSTOSCOPY N/A 8/19/2015    Performed by Bandar Garcia MD at [facility] OR 1ST FLR    ESOPHAGOGASTRODUODENOSCOPY      ESOPHAGOGASTRODUODENOSCOPY (EGD) N/A 10/5/2017    Performed by Venkat He MD at [facility] ENDO (4TH FLR)    ESOPHAGOGASTRODUODENOSCOPY (EGD) N/A 1/21/2015    Performed by Venkat He MD at [facility] ENDO (4TH FLR)    EYE SURGERY      Lasik-bilateral    HYSTERECTOMY      IMPLANT-INTERSTIM-PERCUTANEOUS-I AND II N/A 8/3/2016    Performed by Bandar Garcia MD at [facility] OR 2ND FLR    INJECTION-BOTOX N/A 8/19/2015    Performed by Bandar Garcia MD at [facility] OR 1ST FLR    PARTIAL HYSTERECTOMY      SIGMOIDOSCOPY-FLEXIBLE N/A 6/12/2017    Performed by Ven[facility] at [facility] (2ND FLR)       Review of patient's allergies indicates:   Allergen Reactions    Cephalexin Itching    Zofran [ondansetron hcl (pf)] Hives    Penicillins Rash       Family History     Problem Relation (Age of Onset)    Fibromyalgia Sister    Irritable bowel syndrome Mother, Sister, Sister    Lupus Sister    Rheum arthritis Sister          Tobacco Use    Smoking status: Never Smoker    Smokeless tobacco: Never Used   Substance and Sexual Activity    Alcohol use: No    Drug use: No    Sexual activity: No       Review of Systems   Constitutional: Negative for activity change, chills, fatigue and fever.   HENT: Negative for facial swelling.    Eyes: Negative for discharge and itching.   Respiratory: Negative for chest tightness and shortness of breath.    Gastrointestinal: Negative for abdominal distention, blood in stool and nausea.   Genitourinary: Positive for hematuria and pelvic pain. Negative for decreased urine volume, difficulty urinating and flank pain.   Musculoskeletal: Positive for arthralgias. Negative for neck pain.   Skin: Negative for color change.   Neurological: Negative for dizziness.   Psychiatric/Behavioral: Negative for agitation.        Objective:     Temp:  [97.7 °F (36.5 °C)-98.3 °F (36.8 °C)] 98.3 °F (36.8 °C)  Pulse:  [59-94] 94  Resp:  [15-18] 18  SpO2:  [95 %-100 %] 96 %  BP: ()/(7-81) 111/64     Body mass index is 30.3 kg/m².    Date 09/12/18 0700 - 09/13/18 0659   Shift 5505-9939 1701-7439 9457-0092 24 Hour Total   INTAKE   IV Piggyback  1000  1000   Shift Total(mL/kg)  1000(14.7)  1000(14.7)   OUTPUT   Urine  900  900   Shift Total(mL/kg)  900(13.2)  900(13.2)   Weight (kg)  68 68 68     Bladder Scan Volume (mL): 295 mL (09/12/18 2125)    Drains          None          Physical Exam   Nursing note and vitals reviewed.  Constitutional: She is oriented to person, place, and time. She appears well-developed and well-nourished. No distress.   HENT:   Head: Normocephalic and atraumatic.   Eyes: Pupils are equal, round, and reactive to light. Right eye exhibits no discharge. Left eye exhibits no discharge.   Neck: Normal range of motion. Neck supple.   Cardiovascular: Normal rate and intact distal pulses.    Pulmonary/Chest: Effort normal. No respiratory distress.   Abdominal: Soft. She exhibits no distension. There is no tenderness. There is no rebound and no guarding.   Genitourinary: Vagina normal.   Genitourinary Comments: Blood at urethral meatus,   Musculoskeletal: Normal range of motion. She exhibits no edema.   Neurological: She is alert and oriented to person, place, and time. No cranial nerve deficit.   Skin: Skin is warm and dry. She is not diaphoretic. No erythema.     Psychiatric: She has a normal mood and affect. Her behavior is normal. Judgment and thought content normal.       Significant Labs:    BMP:  Recent Labs   Lab  09/12/18   1853   NA  137   K  4.6   CL  106   CO2  23   BUN  13   CREATININE  0.9   CALCIUM  9.5       CBC:  Recent Labs   Lab  09/12/18   1853   WBC  9.78   HGB  12.0   HCT  35.6*   PLT  339       Urine Studies:   Recent Labs   Lab  09/12/18 2028   COLORU  Red*   APPEARANCEUA  Cloudy*   PHUR  8.0    SPECGRAV  1.010   PROTEINUA  2+*   GLUCUA  1+*   KETONESU  Negative   BILIRUBINUA  Negative   OCCULTUA  3+*   NITRITE  Negative   UROBILINOGEN  Negative   LEUKOCYTESUR  Trace*   RBCUA  >100*   WBCUA  13*   BACTERIA  Few*   HYALINECASTS  0     All pertinent labs results from the past 24 hours have been reviewed.    Significant Imaging:  All pertinent imaging results/findings from the past 24 hours have been reviewed.                    Assessment and Plan:     IC (interstitial cystitis)    47 yo F POD 0 from hydrodistension and bladder botox injections presenting with gross hematuria    She is voiding and emptying her bladder, and is not passing blood clots, she is HDS and her H&H is stable  Ok to discharge from ED from Urology standpoint  Encouraged her to push fluids PO and ensure that she is voiding  She knows to come in if she is unable to urinate, is passing large clots, or spikes high fevers              VTE Risk Mitigation (From admission, onward)    None          Thank you for your consult. I will sign off. Please contact us if you have any additional questions.    Ezequiel Chance MD  Urology  [facility]

## 2018-09-13 NOTE — SUBJECTIVE & OBJECTIVE
Past Medical History:   Diagnosis Date    Acid reflux     Allergy     Alopecia     Anxiety     Arthralgia     Back pain     Depression     Dry eyes     from meds    Fever blister     Fibromyalgia     Interstitial cystitis     Irritable bowel syndrome     Kidney stone     Major depressive disorder, recurrent episode, in partial or unspecified remission 2013    OAB (overactive bladder) 2015    PTSD (post-traumatic stress disorder)     Rheumatoid arthritis     Systemic lupus erythematosus     Thyroid disease     Urinary tract infection     Vaginal infection        Past Surgical History:   Procedure Laterality Date    BLADDER SURGERY       SECTION, LOW TRANSVERSE      x 2    COLONOSCOPY      COLONOSCOPY N/A 10/5/2017    Procedure: COLONOSCOPY;  Surgeon: Venkat He MD;  Location: [facility] (4TH FLR);  Service: Endoscopy;  Laterality: N/A;    COLONOSCOPY N/A 10/5/2017    Performed by Venkat He MD at [facility] (4TH FLR)    CYSTOSCOPY N/A 2015    [facility]formed by [facility] at [facility]ST FLR    ESOPHAGOGASTRODUODENOSCOPY      ESOPHAGOGASTRODUODENOSCOPY (EGD) N/A 10/5/2017    Performed by Venkat He MD at [facility] (4TH FLR)    ESOPHAGOGASTRODUODENOSCOPY (EGD) N/A 2015    Performed by Venkat He MD at [facility] (4TH FLR)    EYE SURGERY      Lasik-bilateral    HYSTERECTOMY      IMPLANT-INTERSTIM-PERCUTANEOUS-I AND II N/A 8/3/2016    Performed by Bandar [facility] MD at [facility] OR 2ND FLR    INJECTION-BOTOX N/A 2015    Performed by Bandar Garcia MD at [facility] OR 1ST FLR    PARTIAL HYSTERECTOMY      SIGMOIDOSCOPY-FLEXIBLE N/A 2017    Performed by Venkat He MD at [facility] (2ND FLR)       Review of patient's allergies indicates:   Allergen Reactions    Cephalexin Itching    Zofran [ondansetron hcl (pf)] Hives    Penicillins Rash       Family History     Problem Relation (Age of Onset)    Fibromyalgia Sister    Irritable  bowel syndrome Mother, Sister, Sister    Lupus Sister    Rheum arthritis Sister          Tobacco Use    Smoking status: Never Smoker    Smokeless tobacco: Never Used   Substance and Sexual Activity    Alcohol use: No    Drug use: No    Sexual activity: No       Review of Systems   Constitutional: Negative for activity change, chills, fatigue and fever.   HENT: Negative for facial swelling.    Eyes: Negative for discharge and itching.   Respiratory: Negative for chest tightness and shortness of breath.    Gastrointestinal: Negative for abdominal distention, blood in stool and nausea.   Genitourinary: Positive for hematuria and pelvic pain. Negative for decreased urine volume, difficulty urinating and flank pain.   Musculoskeletal: Positive for arthralgias. Negative for neck pain.   Skin: Negative for color change.   Neurological: Negative for dizziness.   Psychiatric/Behavioral: Negative for agitation.       Objective:     Temp:  [97.7 °F (36.5 °C)-98.3 °F (36.8 °C)] 98.3 °F (36.8 °C)  Pulse:  [59-94] 94  Resp:  [15-18] 18  SpO2:  [95 %-100 %] 96 %  BP: ()/(7-81) 111/64     Body mass index is 30.3 kg/m².    Date 09/12/18 0700 - 09/13/18 0659   Shift 9566-0459 7914-9834 8202-1873 24 Hour Total   INTAKE   IV Piggyback  1000  1000   Shift Total(mL/kg)  1000(14.7)  1000(14.7)   OUTPUT   Urine  900  900   Shift Total(mL/kg)  900(13.2)  900(13.2)   Weight (kg)  68 68 68     Bladder Scan Volume (mL): 295 mL (09/12/18 2125)    Drains          None          Physical Exam   Nursing note and vitals reviewed.  Constitutional: She is oriented to person, place, and time. She appears well-developed and well-nourished. No distress.   HENT:   Head: Normocephalic and atraumatic.   Eyes: Pupils are equal, round, and reactive to light. Right eye exhibits no discharge. Left eye exhibits no discharge.   Neck: Normal range of motion. Neck supple.   Cardiovascular: Normal rate and intact distal pulses.    Pulmonary/Chest: Effort  normal. No respiratory distress.   Abdominal: Soft. She exhibits no distension. There is no tenderness. There is no rebound and no guarding.   Genitourinary: Vagina normal.   Genitourinary Comments: Blood at urethral meatus,   Musculoskeletal: Normal range of motion. She exhibits no edema.   Neurological: She is alert and oriented to person, place, and time. No cranial nerve deficit.   Skin: Skin is warm and dry. She is not diaphoretic. No erythema.     Psychiatric: She has a normal mood and affect. Her behavior is normal. Judgment and thought content normal.       Significant Labs:    BMP:  Recent Labs   Lab  09/12/18   1853   NA  137   K  4.6   CL  106   CO2  23   BUN  13   CREATININE  0.9   CALCIUM  9.5       CBC:  Recent Labs   Lab  09/12/18 1853   WBC  9.78   HGB  12.0   HCT  35.6*   PLT  339       Urine Studies:   Recent Labs   Lab  09/12/18 2028   COLORU  Red*   APPEARANCEUA  Cloudy*   PHUR  8.0   SPECGRAV  1.010   PROTEINUA  2+*   GLUCUA  1+*   KETONESU  Negative   BILIRUBINUA  Negative   OCCULTUA  3+*   NITRITE  Negative   UROBILINOGEN  Negative   LEUKOCYTESUR  Trace*   RBCUA  >100*   WBCUA  13*   BACTERIA  Few*   HYALINECASTS  0     All pertinent labs results from the past 24 hours have been reviewed.    Significant Imaging:  All pertinent imaging results/findings from the past 24 hours have been reviewed.

## 2018-09-13 NOTE — ASSESSMENT & PLAN NOTE
47 yo F POD 0 from hydrodistension and bladder botox injections presenting with gross hematuria    She is voiding and emptying her bladder, and is not passing blood clots, she is HDS and her H&H is stable  Ok to discharge from ED from Urology standpoint  Encouraged her to push fluids PO and ensure that she is voiding  She knows to come in if she is unable to urinate, is passing large clots, or spikes high fevers

## 2018-09-14 LAB — BACTERIA UR CULT: NO GROWTH

## 2018-09-20 RX ORDER — PROMETHAZINE HYDROCHLORIDE 25 MG/1
TABLET ORAL
Qty: 60 TABLET | Refills: 0 | Status: SHIPPED | OUTPATIENT
Start: 2018-09-20 | End: 2019-01-09 | Stop reason: SDUPTHER

## 2018-09-27 ENCOUNTER — OFFICE VISIT (OUTPATIENT)
Dept: PSYCHIATRY | Facility: CLINIC | Age: 48
End: 2018-09-27
Payer: COMMERCIAL

## 2018-09-27 DIAGNOSIS — F60.89 CLUSTER B PERSONALITY DISORDER: ICD-10-CM

## 2018-09-27 DIAGNOSIS — F41.9 ANXIETY: ICD-10-CM

## 2018-09-27 DIAGNOSIS — F33.0 MAJOR DEPRESSIVE DISORDER, RECURRENT EPISODE, MILD: Primary | ICD-10-CM

## 2018-09-27 PROCEDURE — 90834 PSYTX W PT 45 MINUTES: CPT | Mod: S$GLB,,, | Performed by: SOCIAL WORKER

## 2018-09-28 NOTE — PROGRESS NOTES
Individual Psychotherapy (PhD/LCSW)    9/27/2018    Site:  [facility]         Therapeutic Intervention: Met with patient.  Outpatient - Insight oriented psychotherapy 45 min - CPT code 82821    Chief complaint/reason for encounter: depression, anxiety and ptsd     Interval history and content of current session: Pt was last seen by me in October of 2017.  She has missed 3 appointments this year due to last minute cancellations due to emergencies that arose at the last minute.  She has had numerous medical issues, most recently some surgery on her bladder where she developed complications and had to go to the ED.  Little has changed.  She and her daughter are talking again.  Pt complains about  and does not really want to be  to him.  She is wondering if she is bipolar as her sister who committed suicide was.  She talks at length about a number of issues in her family and her own anxieties with little attention to thinking of resolving them.  Her life appears to be in frequent chaos.    Treatment plan:  · Target symptoms: depression, anxiety , PTSD  · Why chosen therapy is appropriate versus another modality: relevant to diagnosis  · Outcome monitoring methods: self-report, observation  · Therapeutic intervention type: insight oriented psychotherapy    Risk parameters:  Patient reports no suicidal ideation  Patient reports no homicidal ideation  Patient reports no self-injurious behavior  Patient reports no violent behavior    Verbal deficits: None    Patient's response to intervention:  The patient's response to intervention is motivated.    Progress toward goals and other mental status changes:  The patient's progress toward goals is fair .    Diagnosis:     ICD-10-CM ICD-9-CM   1. Major depressive disorder, recurrent episode, mild F33.0 296.31   2. Cluster B personality disorder F60.9 301.9   3. Anxiety F41.9 300.00       Plan:  individual psychotherapy and medication management by  physician    Return to clinic: as scheduled    Length of Service (minutes): 45

## 2018-10-01 DIAGNOSIS — F41.1 GENERALIZED ANXIETY DISORDER: ICD-10-CM

## 2018-10-01 DIAGNOSIS — F43.10 PTSD (POST-TRAUMATIC STRESS DISORDER): ICD-10-CM

## 2018-10-01 DIAGNOSIS — M06.00 SERONEGATIVE RHEUMATOID ARTHRITIS: ICD-10-CM

## 2018-10-01 DIAGNOSIS — R53.82 CHRONIC FATIGUE: ICD-10-CM

## 2018-10-01 DIAGNOSIS — F33.1 MAJOR DEPRESSIVE DISORDER, RECURRENT EPISODE, MODERATE: ICD-10-CM

## 2018-10-01 DIAGNOSIS — M79.7 FIBROMYALGIA: ICD-10-CM

## 2018-10-01 DIAGNOSIS — E03.9 HYPOTHYROIDISM, UNSPECIFIED TYPE: ICD-10-CM

## 2018-10-02 RX ORDER — LEVOTHYROXINE SODIUM 50 UG/1
TABLET ORAL
Qty: 30 TABLET | Refills: 0 | Status: SHIPPED | OUTPATIENT
Start: 2018-10-02 | End: 2018-10-29 | Stop reason: SDUPTHER

## 2018-10-02 RX ORDER — GABAPENTIN 800 MG/1
TABLET ORAL
Qty: 90 TABLET | Refills: 0 | Status: SHIPPED | OUTPATIENT
Start: 2018-10-02 | End: 2018-11-28 | Stop reason: SDUPTHER

## 2018-10-02 RX ORDER — CYCLOBENZAPRINE HCL 10 MG
TABLET ORAL
Qty: 90 TABLET | Refills: 0 | Status: SHIPPED | OUTPATIENT
Start: 2018-10-02 | End: 2019-03-15

## 2018-10-04 ENCOUNTER — OFFICE VISIT (OUTPATIENT)
Dept: PSYCHIATRY | Facility: CLINIC | Age: 48
End: 2018-10-04
Payer: COMMERCIAL

## 2018-10-04 VITALS
BODY MASS INDEX: 30.89 KG/M2 | WEIGHT: 153.25 LBS | HEART RATE: 109 BPM | HEIGHT: 59 IN | SYSTOLIC BLOOD PRESSURE: 135 MMHG | DIASTOLIC BLOOD PRESSURE: 81 MMHG

## 2018-10-04 DIAGNOSIS — F33.0 MAJOR DEPRESSIVE DISORDER, RECURRENT EPISODE, MILD: Primary | ICD-10-CM

## 2018-10-04 PROCEDURE — 3008F BODY MASS INDEX DOCD: CPT | Mod: CPTII,S$GLB,, | Performed by: PSYCHIATRY & NEUROLOGY

## 2018-10-04 PROCEDURE — 99214 OFFICE O/P EST MOD 30 MIN: CPT | Mod: S$GLB,,, | Performed by: PSYCHIATRY & NEUROLOGY

## 2018-10-04 PROCEDURE — 99999 PR PBB SHADOW E&M-EST. PATIENT-LVL II: CPT | Mod: PBBFAC,,, | Performed by: PSYCHIATRY & NEUROLOGY

## 2018-10-04 RX ORDER — ALOSETRON HYDROCHLORIDE 0.5 MG/1
TABLET ORAL
Qty: 60 TABLET | Refills: 0 | Status: SHIPPED | OUTPATIENT
Start: 2018-10-04 | End: 2018-11-27 | Stop reason: SDUPTHER

## 2018-10-04 RX ORDER — CLONAZEPAM 1 MG/1
TABLET ORAL
Qty: 90 TABLET | Refills: 2 | Status: SHIPPED | OUTPATIENT
Start: 2018-10-04 | End: 2018-12-23 | Stop reason: SDUPTHER

## 2018-10-04 RX ORDER — TRAZODONE HYDROCHLORIDE 50 MG/1
TABLET ORAL
Qty: 30 TABLET | Refills: 2 | Status: SHIPPED | OUTPATIENT
Start: 2018-10-04 | End: 2019-02-06 | Stop reason: SDUPTHER

## 2018-10-04 RX ORDER — DULOXETIN HYDROCHLORIDE 60 MG/1
60 CAPSULE, DELAYED RELEASE ORAL DAILY
Qty: 30 CAPSULE | Refills: 11 | Status: SHIPPED | OUTPATIENT
Start: 2018-10-04 | End: 2019-09-04 | Stop reason: SDUPTHER

## 2018-10-04 NOTE — PROGRESS NOTES
Outpatient Psychiatry Follow-Up Visit (MD/NP)    10/4/2018    Clinical Status of Patient:  Outpatient (Ambulatory)    Chief Complaint:  Marilee Simons is a 48 y.o. female who presents today for follow-up of depression and anxiety.  Met with patient.      Interval History and Content of Current Session:  Interim Events/Subjective Report/Content of Current Session: She continues with multiple medical problems and a very unhappy family life.  Dealing with intertitial cystitis and today some viral illness.  Her meds were reviewed.  She plans to continue therapy with Dr. Evans.  Pt wondered if she were bipolar.  I explained I didn't think so.    Psychotherapy:  · Target symptoms: depression, anxiety   · Why chosen therapy is appropriate versus another modality: relevant to diagnosis  · Outcome monitoring methods: self-report, observation  · Therapeutic intervention type: supportive psychotherapy  · Topics discussed/themes: relationships difficulties, illness/death of a loved one, stress related to medical comorbidities, difficulty managing affect in interpersonal relationships, building skills sets for symptom management, symptom recognition  · The patient's response to the intervention is accepting. The patient's progress toward treatment goals is good.   · Duration of intervention: 10 minutes.    Review of Systems   · PSYCHIATRIC: Pertinant items are noted in the narrative.    Past Medical, Family and Social History: The patient's past medical, family and social history have been reviewed and updated as appropriate within the electronic medical record - see encounter notes.    Compliance: yes    Side effects: None    Risk Parameters:  Patient reports no suicidal ideation  Patient reports no homicidal ideation  Patient reports no self-injurious behavior  Patient reports no violent behavior    Exam (detailed: at least 9 elements; comprehensive: all 15 elements)   Constitutional  Vitals:  Most recent vital signs, dated  "less than 90 days prior to this appointment, were reviewed.   Vitals:    10/04/18 1050   BP: 135/81   Pulse: 109   Weight: 69.5 kg (153 lb 3.5 oz)   Height: 4' 11" (1.499 m)        General:  unremarkable, age appropriate     Musculoskeletal  Muscle Strength/Tone:  no tremor   Gait & Station:  non-ataxic     Psychiatric  Speech:  no latency; no press   Mood & Affect:  anxious, depressed  congruent and appropriate   Thought Process:  normal and logical   Associations:  intact   Thought Content:  normal, no suicidality, no homicidality, delusions, or paranoia   Insight:  intact   Judgement: behavior is adequate to circumstances   Orientation:  grossly intact   Memory: intact for content of interview   Language: grossly intact   Attention Span & Concentration:  able to focus   Fund of Knowledge:  intact and appropriate to age and level of education     Assessment and Diagnosis   Status/Progress: Based on the examination today, the patient's problem(s) is/are adequately but not ideally controlled.  New problems have not been presented today.   Co-morbidities are complicating management of the primary condition.  There are no active rule-out diagnoses for this patient at this time.     General Impression: Mildly depressed    No diagnosis found.    Intervention/Counseling/Treatment Plan   · Medication Management: Continue current medications.      Return to Clinic: 3 months  "

## 2018-10-10 ENCOUNTER — OFFICE VISIT (OUTPATIENT)
Dept: DERMATOLOGY | Facility: CLINIC | Age: 48
End: 2018-10-10
Payer: COMMERCIAL

## 2018-10-10 ENCOUNTER — TELEPHONE (OUTPATIENT)
Dept: UROLOGY | Facility: CLINIC | Age: 48
End: 2018-10-10

## 2018-10-10 DIAGNOSIS — L85.8 KERATOSIS PILARIS: ICD-10-CM

## 2018-10-10 DIAGNOSIS — L29.9 PRURITUS: Primary | ICD-10-CM

## 2018-10-10 DIAGNOSIS — N30.10 IC (INTERSTITIAL CYSTITIS): ICD-10-CM

## 2018-10-10 DIAGNOSIS — R10.2 PELVIC PAIN: ICD-10-CM

## 2018-10-10 PROCEDURE — 99203 OFFICE O/P NEW LOW 30 MIN: CPT | Mod: S$GLB,,, | Performed by: NURSE PRACTITIONER

## 2018-10-10 PROCEDURE — 99999 PR PBB SHADOW E&M-EST. PATIENT-LVL II: CPT | Mod: PBBFAC,,, | Performed by: NURSE PRACTITIONER

## 2018-10-10 RX ORDER — CLOBETASOL PROPIONATE 0.46 MG/ML
SOLUTION TOPICAL
Qty: 50 ML | Refills: 3 | Status: SHIPPED | OUTPATIENT
Start: 2018-10-10 | End: 2019-02-05

## 2018-10-10 RX ORDER — HYDROXYZINE HYDROCHLORIDE 25 MG/1
25 TABLET, FILM COATED ORAL NIGHTLY
Qty: 30 TABLET | Refills: 2 | Status: SHIPPED | OUTPATIENT
Start: 2018-10-10 | End: 2018-11-09

## 2018-10-10 RX ORDER — DIAZEPAM 5 MG/1
TABLET ORAL
Qty: 30 TABLET | Refills: 0 | Status: SHIPPED | OUTPATIENT
Start: 2018-10-10 | End: 2018-11-23 | Stop reason: SDUPTHER

## 2018-10-10 NOTE — PROGRESS NOTES
Subjective:       Patient ID:  Marilee Simons is a 48 y.o. female who presents for   Chief Complaint   Patient presents with    Itching      tbse, Xyears, getting worse      Itching  - Initial  Affected locations: right arm, left arm, left lower leg, right lower leg, right knee, left knee, right upper leg, left upper leg and torso  Signs / symptoms: itching  Severity: mild to moderate  Aggravated by: heat  Relieving factors/Treatments tried: Rx topical steroids and antihistamines (using Eucerin body wash, followed by Neutrogena seasame oil fraquency. Currently feels like when washing hair, the shampoo causes itching when going down back. Taking Atarax x4 prn itching- typically requires 1 q2 weeks.  Benadryl Q AM )  Improvement on treatment: mild        Review of Systems   Constitutional: Positive for weight loss. Negative for fever, chills, weight gain, fatigue and night sweats.   Musculoskeletal:        H/o RA & SLE. Currently managed on Plaquenil.    Skin: Positive for itching.   Hematologic/Lymphatic: Bruises/bleeds easily.        Objective:    Physical Exam   Constitutional: She appears well-developed and well-nourished. No distress.   Neurological: She is alert and oriented to person, place, and time. She is not disoriented.   Psychiatric: She has a normal mood and affect.   Skin:   Areas Examined (abnormalities noted in diagram):   Scalp / Hair Palpated and Inspected  Head / Face Inspection Performed  Neck Inspection Performed  Chest / Axilla Inspection Performed  Abdomen Inspection Performed  Genitals / Buttocks / Groin Inspection Performed  Back Inspection Performed  RUE Inspected  LUE Inspection Performed  RLE Inspected  LLE Inspection Performed  Nails and Digits Inspection Performed              Diagram Legend     Erythematous scaling macule/papule c/w actinic keratosis       Vascular papule c/w angioma      Pigmented verrucoid papule/plaque c/w seborrheic keratosis      Yellow umbilicated papule c/w  sebaceous hyperplasia      Irregularly shaped tan macule c/w lentigo     1-2 mm smooth white papules consistent with Milia      Movable subcutaneous cyst with punctum c/w epidermal inclusion cyst      Subcutaneous movable cyst c/w pilar cyst      Firm pink to brown papule c/w dermatofibroma      Pedunculated fleshy papule(s) c/w skin tag(s)      Evenly pigmented macule c/w junctional nevus     Mildly variegated pigmented, slightly irregular-bordered macule c/w mildly atypical nevus      Flesh colored to evenly pigmented papule c/w intradermal nevus       Pink pearly papule/plaque c/w basal cell carcinoma      Erythematous hyperkeratotic cursted plaque c/w SCC      Surgical scar with no sign of skin cancer recurrence      Open and closed comedones      Inflammatory papules and pustules      Verrucoid papule consistent consistent with wart     Erythematous eczematous patches and plaques     Dystrophic onycholytic nail with subungual debris c/w onychomycosis     Umbilicated papule    Erythematous-base heme-crusted tan verrucoid plaque consistent with inflamed seborrheic keratosis     Erythematous Silvery Scaling Plaque c/w Psoriasis     See annotation      Assessment / Plan:        Pruritus- generalized  -     clobetasol (TEMOVATE) 0.05 % external solution; Pt to mix in 1 jar of cerave cream and apply to affected areas bid  Dispense: 50 mL; Refill: 3  -     hydrOXYzine HCl (ATARAX) 25 MG tablet; Take 1 tablet (25 mg total) by mouth every evening. Prn pruritus. Do not drive or operate machinery while taking this medicine.  Dispense: 30 tablet; Refill: 2    No clinical evidence of lesions today. No further treatment needed at this time. Patient instructed to return to clinic if lesions recur or new lesions appear.    Claritin Q AM  Atarax q PM prn    D/c seasame oil  Ice as needed for itching    Safe list given    Keratosis pilaris  Recommend using a mild, moisturizing soap. Dove soap  OTC keratolytics (Am lactin,  Carmol, Ureamide, Kerasal) recommended for daily use after bath or shower.             Follow-up in about 2 months (around 12/10/2018).

## 2018-10-10 NOTE — TELEPHONE ENCOUNTER
S/p botox injection on 9/12/16.  Asking for valium.  Refill given.  Please have her follow up with me in 2 months.

## 2018-10-12 ENCOUNTER — TELEPHONE (OUTPATIENT)
Dept: RHEUMATOLOGY | Facility: CLINIC | Age: 48
End: 2018-10-12

## 2018-10-12 NOTE — TELEPHONE ENCOUNTER
Spoke to pt, advises she is just flared up and hurting all over. Advised per Dr. Samaniego that if severe to go to ED. Pt is on wait list to be seen sooner. Last visit more than a year ago. Pt verbalized understanding.

## 2018-10-12 NOTE — TELEPHONE ENCOUNTER
----- Message from Rhonda Castillo sent at 10/12/2018  9:52 AM CDT -----  Contact: Self  Patient needs to know if Dr Samaniego can call her in a steroid pack to     Patient is having a flair up       [facility] [id number] - [facility] - [address] AT [facility] [place]  [address] [address]  Phone: [phone] Fax: [phone]      If any questions please call [phone]

## 2018-10-23 ENCOUNTER — TELEPHONE (OUTPATIENT)
Dept: GASTROENTEROLOGY | Facility: CLINIC | Age: 48
End: 2018-10-23

## 2018-10-23 ENCOUNTER — OFFICE VISIT (OUTPATIENT)
Dept: GASTROENTEROLOGY | Facility: CLINIC | Age: 48
End: 2018-10-23
Payer: COMMERCIAL

## 2018-10-23 VITALS
DIASTOLIC BLOOD PRESSURE: 79 MMHG | HEART RATE: 94 BPM | HEIGHT: 59 IN | WEIGHT: 158.31 LBS | SYSTOLIC BLOOD PRESSURE: 124 MMHG | BODY MASS INDEX: 31.92 KG/M2

## 2018-10-23 DIAGNOSIS — K21.9 GASTROESOPHAGEAL REFLUX DISEASE, ESOPHAGITIS PRESENCE NOT SPECIFIED: Primary | ICD-10-CM

## 2018-10-23 DIAGNOSIS — K52.9 CHRONIC DIARRHEA: ICD-10-CM

## 2018-10-23 PROCEDURE — 99213 OFFICE O/P EST LOW 20 MIN: CPT | Mod: S$GLB,,, | Performed by: INTERNAL MEDICINE

## 2018-10-23 PROCEDURE — 3008F BODY MASS INDEX DOCD: CPT | Mod: CPTII,S$GLB,, | Performed by: INTERNAL MEDICINE

## 2018-10-23 PROCEDURE — 99999 PR PBB SHADOW E&M-EST. PATIENT-LVL III: CPT | Mod: PBBFAC,,, | Performed by: INTERNAL MEDICINE

## 2018-10-23 RX ORDER — RABEPRAZOLE SODIUM 20 MG/1
20 TABLET, DELAYED RELEASE ORAL DAILY
Qty: 30 TABLET | Refills: 6 | Status: SHIPPED | OUTPATIENT
Start: 2018-10-23 | End: 2019-06-27 | Stop reason: SDUPTHER

## 2018-10-23 NOTE — PROGRESS NOTES
REASON FOR VISIT:  Chronic diarrhea, on alosetron.    HISTORY OF PRESENT ILLNESS:  Ms. Simons is a 48-year-old with chronic diarrhea   secondary to IBS, managed with alosetron, which she only takes it on a p.r.n.   basis.  Never had any issues with ischemic colitis.  Doing well with no   abdominal pains.  She does have chronic heartburn, managed on Aciphex without   any issues.  Recently, she was switched from Aciphex to famotidine and her   symptoms are not well controlled.  Today, we discussed about switching back to   Aciphex and we will go ahead and set her up for routine labs.  She will need   vitamin D, B12, magnesium levels checked and also we will schedule her for a   DEXA scan.  She is overall doing well except for issues with depression and   domestic issues with prior issues with her family.  She is also having issues   with interstitial cystitis with bladder pains.    PAST MEDICAL, SURGICAL, SOCIAL AND FAMILY HISTORY:  Reviewed.    MEDICATIONS AND ALLERGIES:  Reviewed.    REVIEW OF SYSTEMS:  CONSTITUTIONAL:  No fever, no chills, no weight loss.  Appetite is normal.  EYES:  No visual changes.  ENT:  No odynophagia or hoarseness of voice.  CARDIOVASCULAR:  No angina or palpitation.  RESPIRATORY:  No shortness of breath or wheezing.  GASTROINTESTINAL:  See HPI.  No blood in the stool.    PHYSICAL EXAMINATION:  VITAL SIGNS:  See EPIC.  GENERAL:  Awake, alert and oriented x3, in no acute distress.  No physical exam done today.  About 15 minutes spent with the patient with more   than 50% in counseling.    IMPRESSION:  1.  Chronic diarrhea -- controlled on Lotronex p.o. p.r.n.  2.  Gastroesophageal reflux disease -- continue Aciphex 20 mg daily.    RECOMMENDATIONS:  1.  Check vitamin B12, vitamin D and magnesium levels and schedule DEXA scan.  2.  Follow up in GI Clinic in six months.      ACT/HN  dd: 10/23/2018 14:09:56 (CDT)  td: 10/24/2018 09:14:24 (CDT)  Doc ID   #[id number]  Job ID #[id number]    CC:

## 2018-10-23 NOTE — TELEPHONE ENCOUNTER
----- Message from Venkat He MD sent at 10/23/2018  4:18 PM CDT -----  Vitamin D level was low. Take Vitamin D 600 Units daily (available OTC).

## 2018-10-24 DIAGNOSIS — N30.10 IC (INTERSTITIAL CYSTITIS): ICD-10-CM

## 2018-10-25 ENCOUNTER — PATIENT MESSAGE (OUTPATIENT)
Dept: PSYCHIATRY | Facility: CLINIC | Age: 48
End: 2018-10-25

## 2018-10-25 RX ORDER — AMITRIPTYLINE HYDROCHLORIDE 10 MG/1
TABLET, FILM COATED ORAL
Qty: 30 TABLET | Refills: 2 | Status: SHIPPED | OUTPATIENT
Start: 2018-10-25 | End: 2018-12-11 | Stop reason: SDUPTHER

## 2018-10-26 DIAGNOSIS — Z12.39 BREAST CANCER SCREENING: ICD-10-CM

## 2018-10-29 DIAGNOSIS — E03.9 HYPOTHYROIDISM, UNSPECIFIED TYPE: ICD-10-CM

## 2018-10-30 RX ORDER — LEVOTHYROXINE SODIUM 50 UG/1
TABLET ORAL
Qty: 30 TABLET | Refills: 3 | Status: SHIPPED | OUTPATIENT
Start: 2018-10-30 | End: 2019-02-27 | Stop reason: SDUPTHER

## 2018-10-30 RX ORDER — HYDROXYCHLOROQUINE SULFATE 200 MG/1
TABLET, FILM COATED ORAL
Qty: 60 TABLET | Refills: 3 | Status: SHIPPED | OUTPATIENT
Start: 2018-10-30 | End: 2019-03-14 | Stop reason: SDUPTHER

## 2018-10-30 RX ORDER — TIZANIDINE 4 MG/1
TABLET ORAL
Qty: 90 TABLET | Refills: 3 | Status: SHIPPED | OUTPATIENT
Start: 2018-10-30 | End: 2019-03-01 | Stop reason: SDUPTHER

## 2018-11-15 ENCOUNTER — TELEPHONE (OUTPATIENT)
Dept: DERMATOLOGY | Facility: CLINIC | Age: 48
End: 2018-11-15

## 2018-11-23 DIAGNOSIS — N30.10 IC (INTERSTITIAL CYSTITIS): ICD-10-CM

## 2018-11-23 DIAGNOSIS — R10.2 PELVIC PAIN: ICD-10-CM

## 2018-11-26 ENCOUNTER — DOCUMENTATION ONLY (OUTPATIENT)
Dept: RHEUMATOLOGY | Facility: CLINIC | Age: 48
End: 2018-11-26

## 2018-11-26 NOTE — PROGRESS NOTES
Patient has applied for assistance with [employer] for Humira signed replacement form and faxed to number on form received confirmation faxed sent.[phone]

## 2018-11-27 RX ORDER — DIAZEPAM 5 MG/1
TABLET ORAL
Qty: 30 TABLET | Refills: 0 | Status: SHIPPED | OUTPATIENT
Start: 2018-11-27 | End: 2019-02-05

## 2018-11-28 ENCOUNTER — PATIENT MESSAGE (OUTPATIENT)
Dept: RHEUMATOLOGY | Facility: CLINIC | Age: 48
End: 2018-11-28

## 2018-11-28 ENCOUNTER — TELEPHONE (OUTPATIENT)
Dept: PSYCHIATRY | Facility: CLINIC | Age: 48
End: 2018-11-28

## 2018-11-28 DIAGNOSIS — M06.00 SERONEGATIVE RHEUMATOID ARTHRITIS: ICD-10-CM

## 2018-11-28 DIAGNOSIS — M79.7 FIBROMYALGIA: ICD-10-CM

## 2018-11-28 DIAGNOSIS — F33.1 MAJOR DEPRESSIVE DISORDER, RECURRENT EPISODE, MODERATE: ICD-10-CM

## 2018-11-28 DIAGNOSIS — R53.82 CHRONIC FATIGUE: ICD-10-CM

## 2018-11-28 DIAGNOSIS — F43.10 PTSD (POST-TRAUMATIC STRESS DISORDER): ICD-10-CM

## 2018-11-28 DIAGNOSIS — F41.1 GENERALIZED ANXIETY DISORDER: ICD-10-CM

## 2018-11-28 RX ORDER — ALOSETRON HYDROCHLORIDE 0.5 MG/1
TABLET ORAL
Qty: 60 TABLET | Refills: 0 | Status: SHIPPED | OUTPATIENT
Start: 2018-11-28 | End: 2019-01-22 | Stop reason: SDUPTHER

## 2018-11-28 NOTE — TELEPHONE ENCOUNTER
----- Message from Dionne Monroy MA sent at 11/28/2018  2:47 PM CST -----  Contact: pt  pt called to cx appt/had to  daughter from school emergency

## 2018-11-29 ENCOUNTER — DOCUMENTATION ONLY (OUTPATIENT)
Dept: PSYCHIATRY | Facility: CLINIC | Age: 48
End: 2018-11-29

## 2018-11-29 RX ORDER — GABAPENTIN 800 MG/1
TABLET ORAL
Qty: 90 TABLET | Refills: 2 | Status: SHIPPED | OUTPATIENT
Start: 2018-11-29 | End: 2019-03-11 | Stop reason: SDUPTHER

## 2018-11-29 NOTE — PROGRESS NOTES
Pt cancelled appointment with me yesterday 15 minutes ahead of time, saying there was a crisis with her daughter at school.  This is a frequent occurrence with her, canceling at the last minute due to some emergency.

## 2018-11-30 ENCOUNTER — PATIENT MESSAGE (OUTPATIENT)
Dept: PSYCHIATRY | Facility: CLINIC | Age: 48
End: 2018-11-30

## 2018-11-30 ENCOUNTER — OFFICE VISIT (OUTPATIENT)
Dept: DERMATOLOGY | Facility: CLINIC | Age: 48
End: 2018-11-30
Payer: COMMERCIAL

## 2018-11-30 VITALS — WEIGHT: 158 LBS | BODY MASS INDEX: 31.91 KG/M2

## 2018-11-30 DIAGNOSIS — L29.9 PRURITUS: ICD-10-CM

## 2018-11-30 DIAGNOSIS — L91.8 CUTANEOUS SKIN TAGS: Primary | ICD-10-CM

## 2018-11-30 DIAGNOSIS — L82.1 SEBORRHEIC KERATOSES: ICD-10-CM

## 2018-11-30 PROCEDURE — 99999 PR PBB SHADOW E&M-EST. PATIENT-LVL III: CPT | Mod: PBBFAC,,, | Performed by: DERMATOLOGY

## 2018-11-30 PROCEDURE — 99213 OFFICE O/P EST LOW 20 MIN: CPT | Mod: S$GLB,,, | Performed by: DERMATOLOGY

## 2018-11-30 PROCEDURE — 3008F BODY MASS INDEX DOCD: CPT | Mod: CPTII,S$GLB,, | Performed by: DERMATOLOGY

## 2018-11-30 RX ORDER — GABAPENTIN 800 MG/1
800 TABLET ORAL 3 TIMES DAILY
Qty: 90 TABLET | Refills: 2 | OUTPATIENT
Start: 2018-11-30

## 2018-11-30 NOTE — PROGRESS NOTES
Subjective:       Patient ID:  Marilee Simons is a 48 y.o. female who presents for   Chief Complaint   Patient presents with    Follow-up     itching     History of Present Illness: The patient presents with chief complaint of spots.  Location: neck and face  Duration: years  Signs/Symptoms: none    Prior treatments: none  Also pruritus taking clairitin and atarax, no rash but describes dermatographism.          Review of Systems   Constitutional: Negative for fever.   Skin: Positive for itching. Negative for rash.   Hematologic/Lymphatic: Does not bruise/bleed easily.        Objective:    Physical Exam   Constitutional: She appears well-developed and well-nourished. No distress.   Neurological: She is alert and oriented to person, place, and time. She is not disoriented.   Psychiatric: She has a normal mood and affect.   Skin:   Areas Examined (abnormalities noted in diagram):   Head / Face Inspection Performed  Neck Inspection Performed  Chest / Axilla Inspection Performed  Back Inspection Performed  RUE Inspected  LUE Inspection Performed                  Diagram Legend     Erythematous scaling macule/papule c/w actinic keratosis       Vascular papule c/w angioma      Pigmented verrucoid papule/plaque c/w seborrheic keratosis      Yellow umbilicated papule c/w sebaceous hyperplasia      Irregularly shaped tan macule c/w lentigo     1-2 mm smooth white papules consistent with Milia      Movable subcutaneous cyst with punctum c/w epidermal inclusion cyst      Subcutaneous movable cyst c/w pilar cyst      Firm pink to brown papule c/w dermatofibroma      Pedunculated fleshy papule(s) c/w skin tag(s)      Evenly pigmented macule c/w junctional nevus     Mildly variegated pigmented, slightly irregular-bordered macule c/w mildly atypical nevus      Flesh colored to evenly pigmented papule c/w intradermal nevus       Pink pearly papule/plaque c/w basal cell carcinoma      Erythematous hyperkeratotic cursted plaque  c/w SCC      Surgical scar with no sign of skin cancer recurrence      Open and closed comedones      Inflammatory papules and pustules      Verrucoid papule consistent consistent with wart     Erythematous eczematous patches and plaques     Dystrophic onycholytic nail with subungual debris c/w onychomycosis     Umbilicated papule    Erythematous-base heme-crusted tan verrucoid plaque consistent with inflamed seborrheic keratosis     Erythematous Silvery Scaling Plaque c/w Psoriasis     See annotation      Assessment / Plan:        Cutaneous skin tags  reassurance  Very small pt does not like needles, rec try castor oil for several days and rub in shower    Seborrheic keratoses  Explained removal of lesions on her face would leave her with permanent spots,   She will try differin gel  Could consider face peel or laser resurfacing    Pruritus  No better with antihistamines (see previous notes)  Refer to allergy             No Follow-up on file.

## 2018-12-03 ENCOUNTER — PATIENT MESSAGE (OUTPATIENT)
Dept: PSYCHIATRY | Facility: CLINIC | Age: 48
End: 2018-12-03

## 2018-12-03 ENCOUNTER — TELEPHONE (OUTPATIENT)
Dept: PSYCHIATRY | Facility: CLINIC | Age: 48
End: 2018-12-03

## 2018-12-03 ENCOUNTER — TELEPHONE (OUTPATIENT)
Dept: ALLERGY | Facility: CLINIC | Age: 48
End: 2018-12-03

## 2018-12-03 NOTE — PROGRESS NOTES
Subjective:     Patient ID: Marilee Simons is a 48 y.o. female     Chief Complaint: No chief complaint on file.       HPI  48 yr old lady followed by Dr. Samaniego for a dx of PsA & RA. Also has FMS, IBS, interstitial cystitis and anxiety/depression.   She loves Dr. Samaniego but states that she is so busy she is unable to be followed by her when she wants.     Was dx with RA 4 yrs ago and started on  mg/d may have helped a little.   Was dx w PsA by Dr. Samaniego 2 years ago due to elbow rashes & some minor leg lesions and joint pain in fingers w swelling in PIPs and some in MCPs.  Was on MTX briefly 2 years ago but broke out in mouth ulcers so it was stopped. However continues to get mouth and tongue ulcers. Not too painful. Denies genital ulcers.   Currently has pain & aching all over with occasional swelling. Has spine pain (entire, does not sound inflammatory & was told she had SpA); has bilateral knee, ankle, R wrist, hand (MCP, PIPs, DIPs), neck, shoulder, elbows, occasional trochanteric joint pain, not in groin. Occasional knee swelling and finger swelling.   When she sees Dr. Samaniego she gets Toradol and steroids and feels better.    AM stiffness x 1 -2 hrs. States that fingers turn blue and purple in cold. Has had patches of hair loss & thinning; has dry eyes and mouth. Denies miscarriages (0/2 both 6 weeks early; son swallowed amniotic fluid & was in NICU--both did well). No thrombosis. No psoriasis currently. Was prescribed humira but did not start it yet.     Main sxs: severe fatigue, poor memory, IBS, depression, excessive anxiety, numbness & tingling, sensitivity to bright lights, loud noises, weather changes, poor sleep, headaches, overactive bladder, exercise intolerance, poor balance, restless legs, muscle spasms, jaw pains.   Bites finger nails.     Has taken multiple NSAIDs without much effect. Has been on a number of psychiatric meds and recently started on abilify.  Sees Dr. Villasenor and Dr. Evans.        Current Outpatient Medications   Medication Sig Dispense Refill    adalimumab (HUMIRA PEN) PnKt injection Inject 0.8 mLs (40 mg total) into the skin every 14 (fourteen) days. 1.6 mL 11    alosetron (LOTRONEX) 0.5 MG tablet TAKE 1 TABLET(0.5 MG) BY MOUTH TWICE DAILY 60 tablet 0    amitriptyline (ELAVIL) 10 MG tablet TAKE 1 TABLET(10 MG) BY MOUTH EVERY NIGHT AS NEEDED 30 tablet 2    butalbital-acetaminophen (BUPAP)  mg Tab Take 1 tablet by mouth every 4 (four) hours. 60 tablet 3    calcium glycerophosphate (PRELIEF) 65 mg Tab Take 2 tablets by mouth before meals and at bedtime as needed. 100 each prn    clobetasol (TEMOVATE) 0.05 % external solution Pt to mix in 1 jar of cerave cream and apply to affected areas bid 50 mL 3    clonazePAM (KLONOPIN) 1 MG tablet Take 1 tablet (1 mg total) by mouth 3 (three) times daily. 90 tablet 3    clonazePAM (KLONOPIN) 1 MG tablet TAKE 1 TABLET(1 MG) BY MOUTH THREE TIMES DAILY 90 tablet 0    clonazePAM (KLONOPIN) 1 MG tablet TAKE 1 TABLET(1 MG) BY MOUTH THREE TIMES DAILY 90 tablet 2    cyclobenzaprine (FLEXERIL) 10 MG tablet TAKE 1 TABLET(10 MG) BY MOUTH TWICE DAILY 90 tablet 0    diazePAM (VALIUM) 5 MG tablet TAKE 1 TABLET BY MOUTH AT BEDTIME AS NEEDED FOR ANXIETY OR PELVIC PAIN 30 tablet 0    diclofenac (VOLTAREN) 75 MG EC tablet Take 1 tablet (75 mg total) by mouth 2 (two) times daily. 60 tablet 0    DULoxetine (CYMBALTA) 60 MG capsule Take 1 capsule (60 mg total) by mouth once daily. 30 capsule 11    famotidine (PEPCID) 40 MG tablet Take 1 tablet (40 mg total) by mouth nightly as needed for Heartburn. 30 tablet 11    gabapentin (NEURONTIN) 800 MG tablet TAKE 1 TABLET BY MOUTH THREE TIMES DAILY 90 tablet 2    hydroxychloroquine (PLAQUENIL) 200 mg tablet TAKE 1 TABLET BY MOUTH TWICE DAILY 60 tablet 3    ketorolac (TORADOL) 10 mg tablet Take 1 tablet (10 mg total) by mouth 3 (three) times daily after meals. 10 tablet 0    levothyroxine (SYNTHROID) 50  MCG tablet TAKE 1 TABLET(50 MCG) BY MOUTH EVERY DAY 30 tablet 3    qzlnsu-vmkfb-r.blue-sal-naphos (USTELL) 120-0.12 mg Cap Take 1 tablet by mouth 4 (four) times daily. 80 each 3    multivitamin with minerals tablet Take 1 tablet by mouth once daily.      oxyCODONE-acetaminophen (PERCOCET) 5-325 mg per tablet Take 1 tablet by mouth every 4 (four) hours as needed for Pain. 7 tablet 0    potassium citrate (UROCIT-K 10) 10 mEq (1,080 mg) TbSR Take 1 tablet (10 mEq total) by mouth 3 (three) times daily with meals. 90 tablet 11    prazosin (MINIPRESS) 1 MG Cap Take 1 capsule (1 mg total) by mouth every evening. 30 capsule 11    promethazine (PHENERGAN) 25 MG tablet TAKE 1 TABLET(25 MG) BY MOUTH EVERY 6 HOURS AS NEEDED 60 tablet 0    RABEprazole (ACIPHEX) 20 mg tablet Take 1 tablet (20 mg total) by mouth once daily. 30 tablet 6    tiZANidine (ZANAFLEX) 4 MG tablet TAKE 1 TABLET BY MOUTH EVERY 8 HOURS 90 tablet 3    traZODone (DESYREL) 50 MG tablet TAKE 1  TABLET BY MOUTH EVERY NIGHT AT BEDTIME AS NEEDED FOR INSOMNIA 30 tablet 2    triamcinolone acetonide 0.1% (KENALOG) 0.1 % cream APPLY TO THE AFFECTED AREA TWICE DAILY 454 g 0    URIBEL 118-10-40.8-36 mg Cap TAKE 1 CAPSULE BY MOUTH FOUR TIMES DAILY 120 capsule 0     No current facility-administered medications for this visit.        Review of patient's allergies indicates:   Allergen Reactions    Cephalexin Itching    Zofran [ondansetron hcl (pf)] Hives    Penicillins Rash       Review of Systems   Constitutional: Positive for diaphoresis, fatigue and fever (low grqade).   HENT: Positive for mouth sores. Negative for hearing loss, tinnitus and trouble swallowing.    Eyes: Positive for redness.        Dry eyes   Respiratory: Negative for cough, chest tightness, shortness of breath and wheezing.    Cardiovascular: Positive for palpitations.   Gastrointestinal: Positive for nausea (on & off; zofran--hives; phenergan prn). Negative for constipation and diarrhea.         Dx gastroparesis   Endocrine: Positive for cold intolerance.   Genitourinary: Positive for difficulty urinating, dyspareunia, frequency, hematuria and urgency. Negative for genital sores.        Hysterectomy for bleeding wo BSO;    Musculoskeletal: Positive for arthralgias, back pain, joint swelling and myalgias.   Skin: Positive for rash.   Neurological: Positive for tremors, numbness and headaches. Negative for seizures.   Hematological: Bruises/bleeds easily.   Psychiatric/Behavioral: Positive for agitation, behavioral problems, decreased concentration, dysphoric mood and sleep disturbance. Negative for hallucinations. The patient is nervous/anxious.        Past Medical History:   Diagnosis Date    Acid reflux     Allergy     Alopecia     Anxiety     Arthralgia     Back pain     Depression     Dry eyes     from meds    Fever blister     Fibromyalgia     Interstitial cystitis     Irritable bowel syndrome     Kidney stone     Major depressive disorder, recurrent episode, in partial or unspecified remission 2013    OAB (overactive bladder) 2015    PTSD (post-traumatic stress disorder)     Rheumatoid arthritis     Systemic lupus erythematosus     Thyroid disease     Urinary tract infection     Vaginal infection        Past Surgical History:   Procedure Laterality Date    BLADDER SURGERY       SECTION, LOW TRANSVERSE      x 2    COLONOSCOPY      COLONOSCOPY N/A 10/5/2017    Procedure: COLONOSCOPY;  Surgeon: Venkat He MD;  Location: [facility] ([address] [place] [address]);  Service: Endoscopy;  Laterality: N/A;    COLONOSCOPY N/A 10/5/2017    [facility]    CYSTOSCOPY N/A 2015    [facility]    CYSTOSCOPY,WITH BLADDER HYDRODISTENSION N/A 2018    [facility]    ESOPHAGOGASTRODUODENOSCOPY      ESOPHAGOGASTRODUODENOSCOPY (EGD) N/A 10/5/2017     Performed by [facility] at [facility] (4TH FLR)    ESOPHAGOGASTRODUODENOSCOPY (EGD) N/A 1/21/2015    Performed by Venkat He MD at [facility] (4TH FLR)    EYE SURGERY      Lasik-bilateral    HYSTERECTOMY      IMPLANT-INTERSTIM-PERCUTANEOUS-I AND II N/A 8/3/2016    Performed by [facility] at [facility] OR [facility]ND F[facility]    INJECTION OF BOTULINUM TOXIN TYPE A N/A 9/12/2018    Procedure: INJECTION, BOTULINUM TOXIN, TYPE A  200 UNITS;  Surgeon: Bandar Garcia MD;  [facility];  Service: Urology;  Laterality: N/A;    INJECTION, BOTULINUM TOXIN, TYPE A  200 UNITS N/A 9/12/2018    Performed by [facility] at [facility] OR [facility]LR    INJECTION-BOTOX N/A 8/19/2015    Performed by [facility] Garcia [facility] at [facility]    INSTILLATION OF URINARY BLADDER N/A 9/12/2018    Procedure: INSTILLATION, URINARY BLADDER;  Surgeon: Bandar Garcia MD;  [facility];  Service: Urology;  Laterality: N/A;    INSTILLATION, URINARY BLADDER N/A 9/12/2018    Performed by [facility] at [facility] OR [facility]    PARTIAL HYSTERECTOMY      SIGMOIDOSCOPY-FLEXIBLE N/A 6/12/2017    Performed by Venkat He MD at [facility] (2ND FLR)       Family History   Problem Relation Age of Onset    Irritable bowel syndrome Mother     Irritable bowel syndrome Sister     Lupus Sister     Rheum arthritis Sister     Fibromyalgia Sister     Irritable bowel syndrome Sister     Celiac disease Neg Hx     Cirrhosis Neg Hx     Colon cancer Neg Hx     Colon polyps Neg Hx     Crohn's disease Neg Hx     Cystic fibrosis Neg Hx     Esophageal cancer Neg Hx     Hemochromatosis Neg Hx     Inflammatory bowel disease Neg Hx     Liver cancer Neg Hx     Liver disease Neg Hx     Rectal cancer Neg Hx     Stomach cancer Neg Hx     Ulcerative colitis Neg Hx     Boris's disease Neg Hx     Melanoma Neg Hx    Sister with bipolar disorder committed suicide by throwing herself in front of cars -3 yrs ago.  Nephew OD's  "on heroin 2 years ago.  Father committed suicide--shot himself in front of patient while she was 7 yrs.     Has a 22 yr old boy and 17 yr old daughter.    Social History     Tobacco Use    Smoking status: Never Smoker    Smokeless tobacco: Never Used   Substance Use Topics    Alcohol use: No    Drug use: No       Objective:     /79   Pulse 93   Ht 4' 11" (1.499 m)   Wt 72.8 kg (160 lb 7.9 oz)   LMP  (LMP Unknown)   BMI 32.42 kg/m²     Physical Exam   Vitals reviewed.  Constitutional: She is oriented to person, place, and time and well-developed, well-nourished, and in no distress. No distress.   HENT:   Head: Normocephalic and atraumatic.   Mouth/Throat: Oropharynx is clear and moist. No oropharyngeal exudate.   No facial rashes  Parotids not enlarged  No oral ulcers   Eyes: Conjunctivae and EOM are normal. Pupils are equal, round, and reactive to light. Right eye exhibits no discharge. Left eye exhibits no discharge. No scleral icterus.   Neck: Neck supple. No JVD present. No tracheal deviation present. No thyromegaly present.   Cardiovascular: Normal rate, regular rhythm, normal heart sounds and intact distal pulses.  Exam reveals no gallop and no friction rub.    No murmur heard.  Pulmonary/Chest: Effort normal and breath sounds normal. No respiratory distress. She has no wheezes. She has no rales. She exhibits no tenderness.   Abdominal: Soft. Bowel sounds are normal. She exhibits no distension and no mass. There is no splenomegaly or hepatomegaly. There is no tenderness. There is no rebound and no guarding.   Lymphadenopathy:     She has no cervical adenopathy.        Right: No inguinal adenopathy present.        Left: No inguinal adenopathy present.   Neurological: She is alert and oriented to person, place, and time. She has normal reflexes. No cranial nerve deficit. Gait normal.   Proximal and distal muscle strength 5/5.   Skin: Skin is warm and dry. No rash noted. She is not diaphoretic. "     Psychiatric: Mood, memory, affect and judgment normal.   Musculoskeletal: Normal range of motion. She exhibits no edema or tenderness.   Cspine FROM no tenderness  Tspine FROM no tenderness  Lspine FROM no tenderness.  TMJ: unremarkable  Shoulders: FROM; no synovitis;  Elbows: FROM; no synovitis; no tophi or nodules  Wrists: FROM; no synovitis;    MCPs: FROM; no synovitis; no metacarpalgia;  ok;  PIPs:FROM; no synovitis;   DIPs: FROM; no synovitis;   HIPS: FROM  Knees: FROM; no synovitis; no instability;  Ankles: FROM: no synovitis   Toes: ok;             10/23/18: Vit D 25;   9/12/18: CBC Ht 35.6; CMP glu 121; UA 2+ pr; 1+ glu; >100 RBCs; 13 WBC;   9/21/17: QG neg;   9/12/17: ESR 19;   6/29/17: CRP 11.8;   2/2/17: RF neg; CCP neg;   7/28/16: PATRICIA neg; SSA neg;   10/9/14: QIG wnl; HLA B-27 neg;   3/14/18: CSpine: personally reviewed: Mild cervical spondylosis.  9/11/13: Arthritis survey: personally reviewed: unremarkable.   Assessment:   RA by hx   Cannot confirm   RF/CCP neg; PATRICIA/SSA neg   ESR's wnl w one exception of 22   CRP's generally elevated  PsA by hx   Insufficient evidence to support   Cannot confirm  Colitis by hx   IBS   Diarrhea hx  FMS    Vitamin D insufficiency    Interstitial cystitis   Microscopic hematuria    Depression/anxiety   Strong psychiatric family hx.    Obesity.       Plan:   Reassurance.  Multiple psychosocial issues.   The patient was educated on fibromyalgia.  Symptoms/presentations, non-pharmacologic and pharmacologic treatments and their efficacies were reviewed.   Non-pharmacologic approaches were stressed.  ExPRESS was reviewed in detail.  Regular/daily exercise was especially emphasized. Strategies for success were offered.  Cognitive behavioral therapy is very helpful.  Patient was provided with written information and referred to a website for further information.  Depression and it's effect on pain was reviewed as well. .  Handouts provided.  Needs mental health  follow-up.  In an abundance of caution will get imaging studies & some labs.   She can follow-up with Dr. Samaniego, as I am not convinced of her rheumatic dxs.  She can follow-up with her PCP & other clinicians  RTC prn.    This was over an hour evaluation with over 50% of the time spent on education and counseling.

## 2018-12-03 NOTE — TELEPHONE ENCOUNTER
Spoke to pt.  She is anxious and depressed, not suicidal.  She thought a mood stabilizer would help her.  She took Abilify in 2016 and stopped it because of weight gain.  I thought we should try Abilify which should help with anxiety and her mood.

## 2018-12-04 ENCOUNTER — OFFICE VISIT (OUTPATIENT)
Dept: PSYCHIATRY | Facility: CLINIC | Age: 48
End: 2018-12-04
Payer: COMMERCIAL

## 2018-12-04 ENCOUNTER — OFFICE VISIT (OUTPATIENT)
Dept: RHEUMATOLOGY | Facility: CLINIC | Age: 48
End: 2018-12-04
Payer: COMMERCIAL

## 2018-12-04 VITALS
HEIGHT: 59 IN | BODY MASS INDEX: 32.36 KG/M2 | WEIGHT: 160.5 LBS | SYSTOLIC BLOOD PRESSURE: 139 MMHG | DIASTOLIC BLOOD PRESSURE: 79 MMHG | HEART RATE: 93 BPM

## 2018-12-04 DIAGNOSIS — R53.82 CHRONIC FATIGUE: ICD-10-CM

## 2018-12-04 DIAGNOSIS — Z55.9 EDUCATIONAL CIRCUMSTANCE: ICD-10-CM

## 2018-12-04 DIAGNOSIS — M79.7 FIBROMYALGIA: ICD-10-CM

## 2018-12-04 DIAGNOSIS — F33.1 MAJOR DEPRESSIVE DISORDER, RECURRENT EPISODE, MODERATE: ICD-10-CM

## 2018-12-04 DIAGNOSIS — Z87.2 HISTORY OF PSORIATIC ARTHRITIS: ICD-10-CM

## 2018-12-04 DIAGNOSIS — M25.50 PAIN IN JOINT INVOLVING MULTIPLE SITES: Primary | ICD-10-CM

## 2018-12-04 DIAGNOSIS — E55.9 VITAMIN D INSUFFICIENCY: ICD-10-CM

## 2018-12-04 DIAGNOSIS — Z87.39 HISTORY OF RHEUMATOID ARTHRITIS: ICD-10-CM

## 2018-12-04 DIAGNOSIS — F33.0 MAJOR DEPRESSIVE DISORDER, RECURRENT EPISODE, MILD: Primary | ICD-10-CM

## 2018-12-04 PROCEDURE — 99999 PR PBB SHADOW E&M-EST. PATIENT-LVL I: CPT | Mod: PBBFAC,,, | Performed by: SOCIAL WORKER

## 2018-12-04 PROCEDURE — 99999 PR PBB SHADOW E&M-EST. PATIENT-LVL IV: CPT | Mod: PBBFAC,,, | Performed by: INTERNAL MEDICINE

## 2018-12-04 PROCEDURE — 99215 OFFICE O/P EST HI 40 MIN: CPT | Mod: S$GLB,,, | Performed by: INTERNAL MEDICINE

## 2018-12-04 PROCEDURE — 90834 PSYTX W PT 45 MINUTES: CPT | Mod: S$GLB,,, | Performed by: SOCIAL WORKER

## 2018-12-04 PROCEDURE — 3008F BODY MASS INDEX DOCD: CPT | Mod: CPTII,S$GLB,, | Performed by: INTERNAL MEDICINE

## 2018-12-04 RX ORDER — ARIPIPRAZOLE 5 MG/1
5 TABLET ORAL DAILY
Qty: 30 TABLET | Refills: 3 | Status: SHIPPED | OUTPATIENT
Start: 2018-12-04 | End: 2019-02-05

## 2018-12-04 SDOH — SOCIAL DETERMINANTS OF HEALTH (SDOH): PROBLEMS RELATED TO EDUCATION AND LITERACY, UNSPECIFIED: Z55.9

## 2018-12-04 ASSESSMENT — ROUTINE ASSESSMENT OF PATIENT INDEX DATA (RAPID3)
MDHAQ FUNCTION SCORE: 2.1
TOTAL RAPID3 SCORE: 8.33
WHEN YOU AWAKENED IN THE MORNING OVER THE LAST WEEK, PLEASE INDICATE THE AMOUNT OF TIME IT TAKES UNTIL YOU ARE AS LIMBER AS YOU WILL BE FOR THE DAY: 1-2 HOURS
AM STIFFNESS SCORE: 1, YES
PAIN SCORE: 9
PSYCHOLOGICAL DISTRESS SCORE: 6.6
PATIENT GLOBAL ASSESSMENT SCORE: 9
FATIGUE SCORE: 6

## 2018-12-04 NOTE — PATIENT INSTRUCTIONS
Read on depression and fibromyalgia    Daily aerobic low impact exercise.    Take vitamin D3 10,000 IU daily x 3 months then f/u by 1,000 IU Vitamin D3 daily.    Follow

## 2018-12-04 NOTE — LETTER
December 4, 2018      Nadeem Zheng MD  [address] [facility] [place] [address]           [facility] - Rheumatology  [address]  [place] [address]  Phone: [phone]  Fax: [phone]          Patient: Marilee Simons   MR Number: [id number]   YOB: 1970   Date of Visit: 12/4/2018       Dear Dr. Nadeem Zheng:    Thank you for referring Marilee Simons to me for evaluation. Attached you will find relevant portions of my assessment and plan of care.    If you have questions, please do not hesitate to call me. I look forward to following Marilee Simons along with you.    Sincerely,    Luz Marina Mittal MD    Enclosure  CC:  No Recipients    If you would like to receive this communication electronically, please contact [email or web address] or [phone] to request more information on [facility] Link access.    For providers and/or their staff who would like to refer a patient to [facility], please contact us through our one-stop-shop provider referral line, [facility] , at [phone].    If you feel you have received this communication in error or would no longer like to receive these types of communications, please e-mail [email or web address]
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
## 2018-12-10 RX ORDER — METHENAMINE, SODIUM PHOSPHATE, MONOBASIC, MONOHYDRATE, PHENYL SALICYLATE, METHYLENE BLUE, AND HYOSCYAMINE SULFATE 120; 40.8; 36; 10; .12 MG/1; MG/1; MG/1; MG/1; MG/1
CAPSULE ORAL
Qty: 90 EACH | Refills: 2 | Status: SHIPPED | OUTPATIENT
Start: 2018-12-10 | End: 2018-12-11 | Stop reason: SDUPTHER

## 2018-12-11 ENCOUNTER — OFFICE VISIT (OUTPATIENT)
Dept: OPTOMETRY | Facility: CLINIC | Age: 48
End: 2018-12-11
Payer: COMMERCIAL

## 2018-12-11 ENCOUNTER — CLINICAL SUPPORT (OUTPATIENT)
Dept: OPHTHALMOLOGY | Facility: CLINIC | Age: 48
End: 2018-12-11
Payer: COMMERCIAL

## 2018-12-11 ENCOUNTER — OFFICE VISIT (OUTPATIENT)
Dept: UROLOGY | Facility: CLINIC | Age: 48
End: 2018-12-11
Payer: COMMERCIAL

## 2018-12-11 VITALS
HEIGHT: 59 IN | BODY MASS INDEX: 32.71 KG/M2 | WEIGHT: 162.25 LBS | SYSTOLIC BLOOD PRESSURE: 123 MMHG | DIASTOLIC BLOOD PRESSURE: 78 MMHG | HEART RATE: 101 BPM

## 2018-12-11 DIAGNOSIS — Z79.899 LONG-TERM USE OF PLAQUENIL: Primary | ICD-10-CM

## 2018-12-11 DIAGNOSIS — H04.123 DRY EYE SYNDROME, BILATERAL: Primary | ICD-10-CM

## 2018-12-11 DIAGNOSIS — Z46.0 FITTING AND ADJUSTMENT OF SPECTACLES AND CONTACT LENSES: Primary | ICD-10-CM

## 2018-12-11 DIAGNOSIS — N32.81 OAB (OVERACTIVE BLADDER): ICD-10-CM

## 2018-12-11 DIAGNOSIS — M79.7 FIBROMYALGIA: ICD-10-CM

## 2018-12-11 DIAGNOSIS — Z79.899 LONG-TERM USE OF PLAQUENIL: ICD-10-CM

## 2018-12-11 DIAGNOSIS — N30.10 IC (INTERSTITIAL CYSTITIS): ICD-10-CM

## 2018-12-11 DIAGNOSIS — N30.10 INTERSTITIAL CYSTITIS: Primary | ICD-10-CM

## 2018-12-11 DIAGNOSIS — N39.41 URGENCY INCONTINENCE: ICD-10-CM

## 2018-12-11 DIAGNOSIS — H52.4 PRESBYOPIA: ICD-10-CM

## 2018-12-11 DIAGNOSIS — M25.50 PAIN IN JOINTS: ICD-10-CM

## 2018-12-11 DIAGNOSIS — H52.03 HYPEROPIA, BILATERAL: ICD-10-CM

## 2018-12-11 PROCEDURE — 99999 PR PBB SHADOW E&M-EST. PATIENT-LVL I: CPT | Mod: PBBFAC,,, | Performed by: OPTOMETRIST

## 2018-12-11 PROCEDURE — 99214 OFFICE O/P EST MOD 30 MIN: CPT | Mod: S$GLB,,, | Performed by: UROLOGY

## 2018-12-11 PROCEDURE — 92015 DETERMINE REFRACTIVE STATE: CPT | Mod: S$GLB,,, | Performed by: OPTOMETRIST

## 2018-12-11 PROCEDURE — 99999 PR PBB SHADOW E&M-EST. PATIENT-LVL V: CPT | Mod: PBBFAC,,, | Performed by: UROLOGY

## 2018-12-11 PROCEDURE — 92134 CPTRZ OPH DX IMG PST SGM RTA: CPT | Mod: S$GLB,,, | Performed by: OPTOMETRIST

## 2018-12-11 PROCEDURE — 99999 PR PBB SHADOW E&M-EST. PATIENT-LVL I: CPT | Mod: PBBFAC,,,

## 2018-12-11 PROCEDURE — 99999 PR PBB SHADOW E&M-EST. PATIENT-LVL II: CPT | Mod: PBBFAC,,, | Performed by: OPTOMETRIST

## 2018-12-11 PROCEDURE — 92083 EXTENDED VISUAL FIELD XM: CPT | Mod: S$GLB,,, | Performed by: OPTOMETRIST

## 2018-12-11 PROCEDURE — 92310 CONTACT LENS FITTING OU: CPT | Mod: ,,, | Performed by: OPTOMETRIST

## 2018-12-11 PROCEDURE — 92002 INTRM OPH EXAM NEW PATIENT: CPT | Mod: S$GLB,,, | Performed by: OPTOMETRIST

## 2018-12-11 PROCEDURE — 3008F BODY MASS INDEX DOCD: CPT | Mod: CPTII,S$GLB,, | Performed by: UROLOGY

## 2018-12-11 RX ORDER — DIAZEPAM 5 MG/1
5 TABLET ORAL
Qty: 30 TABLET | Refills: 0 | Status: SHIPPED | OUTPATIENT
Start: 2018-12-11 | End: 2019-01-10

## 2018-12-11 RX ORDER — AMITRIPTYLINE HYDROCHLORIDE 10 MG/1
TABLET, FILM COATED ORAL
Qty: 90 TABLET | Refills: 3 | Status: SHIPPED | OUTPATIENT
Start: 2018-12-11 | End: 2019-09-04 | Stop reason: SDUPTHER

## 2018-12-11 RX ORDER — HYDROXYZINE HYDROCHLORIDE 25 MG/1
25 TABLET, FILM COATED ORAL NIGHTLY
Qty: 90 TABLET | Refills: 3 | Status: SHIPPED | OUTPATIENT
Start: 2018-12-11 | End: 2019-02-05

## 2018-12-11 RX ORDER — POTASSIUM CITRATE 10 MEQ/1
10 TABLET, EXTENDED RELEASE ORAL
Qty: 90 TABLET | Refills: 11 | Status: SHIPPED | OUTPATIENT
Start: 2018-12-11 | End: 2020-05-11 | Stop reason: SDUPTHER

## 2018-12-11 NOTE — PROGRESS NOTES
HPI     LDE: 2 yrs at [facility] [employer]  48yr old female present for Annual DFE due to taking Plaquenil BID PO .   Patient states getting glasses without bifocal, says she would like a   bifocal now. Patient vision decreased when reading small print. Patient   had s/p Lasik OU in 2001. Patient denies floaters, flashes and headaches.   Patient says eyes are constantly dry caused from taking Plaquenil.     Last edited by Ivan Lopez MA on 12/11/2018 11:35 AM. (History)            Assessment /Plan     For exam results, see Encounter Report.    Dry eye syndrome, bilateral  -     lifitegrast (XIIDRA) 5 % Dpet; Apply 1 drop to eye 2 (two) times daily.  Dispense: 60 each; Refill: 12   Or try Systane complete BID OU    Fibromyalgia  Pain in joints  Long-term use of Plaquenil   OCT WNL , HVF 10-2 WNL    Monitor yearly while on plaquenil    Presbyopia  Hyperopia, bilateral   Dispensed trials of dailies total one    Gave trials of MED add, but not strong enough to read phone, so switched to high add   Consider monovision if unable to get good vision with MF    RTC 1  Week for DFE, CL f/u

## 2018-12-11 NOTE — PROGRESS NOTES
CC: pelvic pain, IC    Marilee Simons is a 48 y.o. woman who is here for pelvic pain.    Procedure(s) Performed: 9/12/18  1.  Cystoscopy with hydrodistension  2.  Botox injections into detrusor muscle  Findings:   - Initial bladder capacity 900 mL with terminal hematuria.   - Glomerulations were diffusely seen, Hunner's ulcers seen overlying bilateral UOs, diffuse mild degree of hyperemia  - Subsequent bladder capacity was 950 mL.    Since the procedure, she has done very well.  She needs refills on meds.    s/p InterStim Therapy for refractory frequency and urgency on 8/3/16.  Lead placed on right side  Generator placed on left side  Good sensory and motor function c/w S3 stimulation seen  She is doing great.  Is very pleased with the bladder control outcome.    C/o pain after intercourse.  She wants to use valium crushed in the vagina to help her with her pelvic pain.    SUDS cysto on 1/20/15  --- Bladder ---   CYSTOMETROGRAM ( Filling Phase ):   Cystometric Numeric Data:   - First Desire (Sensation): 68 mL at 1cm of water.   - Normal Desire: 75mL at 1 cm of water.   - Strong Desire: 96 mL at 2 cm of water.   - Urgency (Imminent Void) : 108 mL at 1cm of water.   - Maximum Cystometric Capacity: 109 mL.   Compliance:   - low.   Leak Point Pressure:   - Valsalva ( Abdominal ) Leak Point Pressure: none.   UROFLOW:   - Voided Volume: 134 mL.   - Residual Urine: 5 mL.   - Maximum Flow Rate: 8 mL/sec.   - Flow Pattern: low amplitude  VOIDING PRESSURE STUDY ( Voiding Phase ):   Detrusor Pressure:   - Maximum Detrusor Pressure: 32 cm of water.   - Detrusor Pressure at Maximum Flow: 2 cm of water.   - Detrusor Contraction Characteristics: Sustained contraction(s).     ELECTROMYOGRAM:   - incomplete relaxation.     ---Diagnostic Cystourethroscopy ---   Normal urethra.    minimal hyperemic area of the bladder  Width of Bladder Neck Opening: Approximately 18 Fr.   Normal bladder.   Normal ureteral orifices bilaterally.      CONCLUSIONS:   1.  Decreased bladder capacity with sensory urgency  2.  IC  3.  OAB    Past Medical History:   Diagnosis Date    Acid reflux     Allergy     Alopecia     Anxiety     Arthralgia     Back pain     Depression     Dry eyes     from meds    Fever blister     Fibromyalgia     Interstitial cystitis     Irritable bowel syndrome     Kidney stone     Major depressive disorder, recurrent episode, in partial or unspecified remission 2013    OAB (overactive bladder) 2015    PTSD (post-traumatic stress disorder)     Rheumatoid arthritis     Systemic lupus erythematosus     Thyroid disease     Urinary tract infection     Vaginal infection      Past Surgical History:   Procedure Laterality Date    BLADDER SURGERY       SECTION, LOW TRANSVERSE      x 2    COLONOSCOPY      COLONOSCOPY N/A 10/5/2017    Procedure: COLONOSCOPY;  Surgeon: Venkat He MD;  Location: [facility] (4TH FLR);  Service: Endoscopy;  Laterality: N/A;    COLONOSCOPY N/A 10/5/2017    Performed by Venkat He MD at [facility] (4TH FLR)    CYSTOSCOPY N/A 2015    Performed [facility] at [facility] 1ST FLR    CYSTOSCOPY,WITH BLADDER HYDRODISTENSION N/A 2018    Performed by [facility] at [facility] 1ST FLR    ESOPHAGOGASTRODUODENOSCOPY      ESOPHAGOGASTRODUODENOSCOPY (EGD) N/A 10/5/2017    Performed by Venkat He MD at [facility] (4TH FLR)    ESOPHAGOGASTRODUODENOSCOPY (EGD) N/A 2015    Performed by Venkat He MD at [facility] (4TH FLR)    EYE SURGERY      Lasik-bilateral    HYSTERECTOMY      IMPLANT-INTERSTIM-PERCUTANEOUS-I AND II N/A 8/3/2016    Performed by [facility] at [facility] OR 2ND FLR    INJECTION OF BOTULINUM TOXIN TYPE A N/A 2018    Procedure: INJECTION, BOTULINUM TOXIN, TYPE A  200 UNITS;  Surgeon: Bandar Garcia MD;  Location: [facility] OR [address] [place] [address];  Service: Urology;  Laterality: N/A;    INJECTION, BOTULINUM TOXIN, TYPE A  200 UNITS  N/A 9/12/2018    Performed by Bandar Garcia MD at [facility] OR 1ST FLR    INJECTION-BOTOX N/A 8/19/2015    Performed by Bandar Garcia MD at [facility] OR 1ST FLR    INSTILLATION OF URINARY BLADDER N/A 9/12/2018    Procedure: INSTILLATION, URINARY BLADDER;  Surgeon: Bandar Garcia MD;  Location: [facility] OR 1ST FLR;  Service: Urology;  Laterality: N/A;    INSTILLATION, URINARY BLADDER N/A 9/12/2018    Performed by Bandar Garcia MD at [facility] OR 1ST FLR    PARTIAL HYSTERECTOMY      SIGMOIDOSCOPY-FLEXIBLE N/A 6/12/2017    Performed by Venkat He MD at [facility] (2ND FLR)     Social History     Tobacco Use    Smoking status: Never Smoker    Smokeless tobacco: Never Used   Substance Use Topics    Alcohol use: No    Drug use: No     Family History   Problem Relation Age of Onset    Irritable bowel syndrome Mother     Irritable bowel syndrome Sister     Lupus Sister     Rheum arthritis Sister     Fibromyalgia Sister     Irritable bowel syndrome Sister     Celiac disease Neg Hx     Cirrhosis Neg Hx     Colon cancer Neg Hx     Colon polyps Neg Hx     Crohn's disease Neg Hx     Cystic fibrosis Neg Hx     Esophageal cancer Neg Hx     Hemochromatosis Neg Hx     Inflammatory bowel disease Neg Hx     Liver cancer Neg Hx     Liver disease Neg Hx     Rectal cancer Neg Hx     Stomach cancer Neg Hx     Ulcerative colitis Neg Hx     Boris's disease Neg Hx     Melanoma Neg Hx     Amblyopia Neg Hx     Blindness Neg Hx     Cataracts Neg Hx     Glaucoma Neg Hx     Macular degeneration Neg Hx     Retinal detachment Neg Hx     Strabismus Neg Hx      Allergy:  Review of patient's allergies indicates:   Allergen Reactions    Cephalexin Itching    Zofran [ondansetron hcl (pf)] Hives    Penicillins Rash     Outpatient Encounter Medications as of 12/11/2018   Medication Sig Dispense Refill    adalimumab (HUMIRA PEN) PnKt injection Inject 0.8 mLs (40 mg total) into the skin every 14 (fourteen) days. 1.6 mL 11     alosetron (LOTRONEX) 0.5 MG tablet TAKE 1 TABLET(0.5 MG) BY MOUTH TWICE DAILY 60 tablet 0    amitriptyline (ELAVIL) 10 MG tablet TAKE 1 TABLET(10 MG) BY MOUTH EVERY NIGHT AS NEEDED 90 tablet 3    ARIPiprazole (ABILIFY) 5 MG Tab Take 1 tablet (5 mg total) by mouth once daily. 30 tablet 3    butalbital-acetaminophen (BUPAP)  mg Tab Take 1 tablet by mouth every 4 (four) hours. 60 tablet 3    calcium glycerophosphate (PRELIEF) 65 mg Tab Take 2 tablets by mouth before meals and at bedtime as needed. 100 each 3    clobetasol (TEMOVATE) 0.05 % external solution Pt to mix in 1 jar of cerave cream and apply to affected areas bid 50 mL 3    clonazePAM (KLONOPIN) 1 MG tablet Take 1 tablet (1 mg total) by mouth 3 (three) times daily. 90 tablet 3    clonazePAM (KLONOPIN) 1 MG tablet TAKE 1 TABLET(1 MG) BY MOUTH THREE TIMES DAILY 90 tablet 2    cyclobenzaprine (FLEXERIL) 10 MG tablet TAKE 1 TABLET(10 MG) BY MOUTH TWICE DAILY 90 tablet 0    diazePAM (VALIUM) 5 MG tablet TAKE 1 TABLET BY MOUTH AT BEDTIME AS NEEDED FOR ANXIETY OR PELVIC PAIN 30 tablet 0    diclofenac (VOLTAREN) 75 MG EC tablet Take 1 tablet (75 mg total) by mouth 2 (two) times daily. 60 tablet 0    DULoxetine (CYMBALTA) 60 MG capsule Take 1 capsule (60 mg total) by mouth once daily. 30 capsule 11    famotidine (PEPCID) 40 MG tablet Take 1 tablet (40 mg total) by mouth nightly as needed for Heartburn. 30 tablet 11    gabapentin (NEURONTIN) 800 MG tablet TAKE 1 TABLET BY MOUTH THREE TIMES DAILY 90 tablet 2    hydroxychloroquine (PLAQUENIL) 200 mg tablet TAKE 1 TABLET BY MOUTH TWICE DAILY 60 tablet 3    ketorolac (TORADOL) 10 mg tablet Take 1 tablet (10 mg total) by mouth 3 (three) times daily after meals. 10 tablet 0    levothyroxine (SYNTHROID) 50 MCG tablet TAKE 1 TABLET(50 MCG) BY MOUTH EVERY DAY 30 tablet 3    lifitegrast (XIIDRA) 5 % Dpet Apply 1 drop to eye 2 (two) times daily. 60 each 12    cylvjb-ddwfu-s.blue-sal-naphos (RUDOLPHTisha  120-0.12 mg Cap Take 1 capsule by mouth 4 (four) times daily. 90 each 2    multivitamin with minerals tablet Take 1 tablet by mouth once daily.      potassium citrate (UROCIT-K 10) 10 mEq (1,080 mg) TbSR Take 1 tablet (10 mEq total) by mouth 3 (three) times daily with meals. 90 tablet 11    promethazine (PHENERGAN) 25 MG tablet TAKE 1 TABLET(25 MG) BY MOUTH EVERY 6 HOURS AS NEEDED 60 tablet 0    RABEprazole (ACIPHEX) 20 mg tablet Take 1 tablet (20 mg total) by mouth once daily. 30 tablet 6    tiZANidine (ZANAFLEX) 4 MG tablet TAKE 1 TABLET BY MOUTH EVERY 8 HOURS 90 tablet 3    traZODone (DESYREL) 50 MG tablet TAKE 1  TABLET BY MOUTH EVERY NIGHT AT BEDTIME AS NEEDED FOR INSOMNIA 30 tablet 2    triamcinolone acetonide 0.1% (KENALOG) 0.1 % cream APPLY TO THE AFFECTED AREA TWICE DAILY 454 g 0    URIBEL 118-10-40.8-36 mg Cap TAKE 1 CAPSULE BY MOUTH FOUR TIMES DAILY 120 capsule 0    [DISCONTINUED] amitriptyline (ELAVIL) 10 MG tablet TAKE 1 TABLET(10 MG) BY MOUTH EVERY NIGHT AS NEEDED 30 tablet 2    [DISCONTINUED] calcium glycerophosphate (PRELIEF) 65 mg Tab Take 2 tablets by mouth before meals and at bedtime as needed. 100 each prn    [DISCONTINUED] oxyCODONE-acetaminophen (PERCOCET) 5-325 mg per tablet Take 1 tablet by mouth every 4 (four) hours as needed for Pain. 7 tablet 0    [DISCONTINUED] potassium citrate (UROCIT-K 10) 10 mEq (1,080 mg) TbSR Take 1 tablet (10 mEq total) by mouth 3 (three) times daily with meals. 90 tablet 11    [DISCONTINUED] USTELL 120-0.12 mg Cap TAKE 1 CAPSULE BY MOUTH FOUR TIMES DAILY 90 each 2    hydrOXYzine HCl (ATARAX) 25 MG tablet Take 1 tablet (25 mg total) by mouth nightly. 90 tablet 3    [DISCONTINUED] prazosin (MINIPRESS) 1 MG Cap Take 1 capsule (1 mg total) by mouth every evening. 30 capsule 11     No facility-administered encounter medications on file as of 12/11/2018.      Review of Systems   General ROS: GENERAL:  No weight gain or loss  SKIN:  No rashes or  lacerations  HEAD:  No headaches  EYES:  No exophthalmos, jaundice or blindness  EARS:  No dizziness, tinnitus or hearing loss  NOSE:  No changes in smell  MOUTH & THROAT:  No dyskinetic movements or obvious goiter  CHEST:  No shortness of breath, hyperventilation or cough  CARDIOVASCULAR:  No tachycardia or chest pain  ABDOMEN:  No nausea, vomiting, pain, constipation or diarrhea  URINARY:  No frequency, dysuria or sexual dysfunction  ENDOCRINE:  No polydipsia, polyuria  MUSCULOSKELETAL:  No pain or stiffness of the joints  NEUROLOGIC:  No weakness, sensory changes, seizures, confusion, memory loss, tremor or other abnormal movements  Physical Exam     Vitals:    12/11/18 1319   BP: 123/78   Pulse: 101     Physical Examination:   General appearance - alert, well appearing, and in no distress  Mental status - alert, oriented to person, place, and time  Eyes - pupils equal and reactive, extraocular eye movements intact  Ears - bilateral TM's and external ear canals normal  Nose - normal and patent, no erythema, discharge or polyps  Mouth - mucous membranes moist, pharynx normal without lesions  Neck - supple, no significant adenopathy  Chest - clear to auscultation, no wheezes, rales or rhonchi, symmetric air entry  Breast- no mass or lumps or tenderness  Heart - normal rate, regular rhythm, normal S1, S2, no murmurs, rubs, clicks or gallops  Abdomen - soft, nontender, nondistended, no masses or organomegaly of liver and spleen, no hernia noted.  Pelvic exam: positive tenderness at the base of the bladder, left lateral vaginal wall.  No tenderness over the urethra.  Rectal exam: tenderness around the levator muscles.  Able to localize the anal sphincter.  Back exam - full range of motion, no tenderness, palpable spasm or pain on motion  LE - No edema noted.  Neurological - alert, oriented, normal speech, no focal findings or movement disorder noted  Musculoskeletal - no joint tenderness, deformity or  swelling    LABS:  Lab Results   Component Value Date    CREATININE 0.9 09/12/2018    CREATININE 0.8 06/26/2018    CREATININE 0.8 09/12/2017     Urine Culture, Routine   Date Value Ref Range Status   09/12/2018 No growth  Final     Procedure  All settings of InterStim Therapy OK.    Assessment and Plan:  Marilee was seen today for follow-up.    Diagnoses and all orders for this visit:    Interstitial cystitis    OAB (overactive bladder)    Urgency incontinence    IC (interstitial cystitis)  -     amitriptyline (ELAVIL) 10 MG tablet; TAKE 1 TABLET(10 MG) BY MOUTH EVERY NIGHT AS NEEDED  -     calcium glycerophosphate (PRELIEF) 65 mg Tab; Take 2 tablets by mouth before meals and at bedtime as needed.  -     potassium citrate (UROCIT-K 10) 10 mEq (1,080 mg) TbSR; Take 1 tablet (10 mEq total) by mouth 3 (three) times daily with meals.  -     siumxn-uhfir-l.blue-sal-naphos (USTELL) 120-0.12 mg Cap; Take 1 capsule by mouth 4 (four) times daily.  -     hydrOXYzine HCl (ATARAX) 25 MG tablet; Take 1 tablet (25 mg total) by mouth nightly.    recommend to continue IC smart diet.  Use alkaline water.  Use low acid coffee.    Use the above IC meds.  Urocit K has been used to alkalinize her urine.  OK to use valium inside of her vagina, especially prior to her intercourse or after.    I spent 25 minutes with the patient of which more than half was spent in direct consultation with the patient in regards to our treatment and plan.    Follow-up:  Follow-up in about 6 months (around 6/11/2019).

## 2018-12-13 ENCOUNTER — OFFICE VISIT (OUTPATIENT)
Dept: PSYCHIATRY | Facility: CLINIC | Age: 48
End: 2018-12-13
Payer: COMMERCIAL

## 2018-12-13 DIAGNOSIS — F41.9 ANXIETY: ICD-10-CM

## 2018-12-13 DIAGNOSIS — F33.0 MAJOR DEPRESSIVE DISORDER, RECURRENT EPISODE, MILD: Primary | ICD-10-CM

## 2018-12-13 PROCEDURE — 90834 PSYTX W PT 45 MINUTES: CPT | Mod: S$GLB,,, | Performed by: SOCIAL WORKER

## 2018-12-13 PROCEDURE — 99999 PR PBB SHADOW E&M-EST. PATIENT-LVL I: CPT | Mod: PBBFAC,,, | Performed by: SOCIAL WORKER

## 2018-12-13 NOTE — PROGRESS NOTES
Individual Psychotherapy (PhD/LCSW)    12/13/2018    Site:  [facility]         Therapeutic Intervention: Met with patient.  Outpatient - Insight oriented psychotherapy 45 min - CPT code 81656    Chief complaint/reason for encounter: depression, anxiety and ptsd     Interval history and content of current session: Pt returns for follow-up.  She is mainly concerned about her daughter who I saw recently also and who refuses to return to see me.   and daughter are clashing because she is refusing to get confirmed in the [employer].  Also she seems to have broken up with a boyfriend they liked and now is talking to an  boy on the phone they do not know.   is totally against this.  He is blaming all of daughter's problems on pt which upsets her a great deal.  Family is chaotic with little effective discipline with daughter.  Gave advice about handling daughter's issues.    Treatment plan:  · Target symptoms: depression, anxiety , PTSD  · Why chosen therapy is appropriate versus another modality: relevant to diagnosis  · Outcome monitoring methods: self-report, observation  · Therapeutic intervention type: insight oriented psychotherapy    Risk parameters:  Patient reports no suicidal ideation  Patient reports no homicidal ideation  Patient reports no self-injurious behavior  Patient reports no violent behavior    Verbal deficits: None    Patient's response to intervention:  The patient's response to intervention is motivated.    Progress toward goals and other mental status changes:  The patient's progress toward goals is fair .    Diagnosis:     ICD-10-CM ICD-9-CM   1. Major depressive disorder, recurrent episode, mild F33.0 296.31   2. Anxiety F41.9 300.00       Plan:  individual psychotherapy and medication management by physician    Return to clinic: as scheduled    Length of Service (minutes): 45

## 2018-12-14 ENCOUNTER — TELEPHONE (OUTPATIENT)
Dept: DERMATOLOGY | Facility: CLINIC | Age: 48
End: 2018-12-14

## 2018-12-14 NOTE — TELEPHONE ENCOUNTER
----- Message from Meenu Valenzuela sent at 12/14/2018 11:19 AM CST -----  Contact: pt   Patient Returning Call from [facility]    Who Left Message for Patient: pt is returning the nurses phone call from two weeks ago to schedule an appt for allergy the pt said   Communication Preference: can you please call pt at [phone]  Additional Information: none    ROSELYN

## 2018-12-17 ENCOUNTER — PATIENT MESSAGE (OUTPATIENT)
Dept: PSYCHIATRY | Facility: CLINIC | Age: 48
End: 2018-12-17

## 2018-12-18 ENCOUNTER — OFFICE VISIT (OUTPATIENT)
Dept: ALLERGY | Facility: CLINIC | Age: 48
End: 2018-12-18
Payer: COMMERCIAL

## 2018-12-18 ENCOUNTER — OFFICE VISIT (OUTPATIENT)
Dept: URGENT CARE | Facility: CLINIC | Age: 48
End: 2018-12-18
Payer: COMMERCIAL

## 2018-12-18 VITALS
HEIGHT: 59 IN | BODY MASS INDEX: 32.66 KG/M2 | SYSTOLIC BLOOD PRESSURE: 144 MMHG | DIASTOLIC BLOOD PRESSURE: 88 MMHG | HEART RATE: 102 BPM | OXYGEN SATURATION: 95 % | WEIGHT: 162 LBS | TEMPERATURE: 99 F | RESPIRATION RATE: 18 BRPM

## 2018-12-18 DIAGNOSIS — R21 RASH: Primary | ICD-10-CM

## 2018-12-18 DIAGNOSIS — R21 RASH AND NONSPECIFIC SKIN ERUPTION: Primary | ICD-10-CM

## 2018-12-18 PROCEDURE — 96372 THER/PROPH/DIAG INJ SC/IM: CPT | Mod: S$GLB,,, | Performed by: FAMILY MEDICINE

## 2018-12-18 PROCEDURE — 99214 OFFICE O/P EST MOD 30 MIN: CPT | Mod: 25,S$GLB,, | Performed by: FAMILY MEDICINE

## 2018-12-18 PROCEDURE — 99499 UNLISTED E&M SERVICE: CPT | Mod: S$GLB,,, | Performed by: ALLERGY & IMMUNOLOGY

## 2018-12-18 PROCEDURE — 3008F BODY MASS INDEX DOCD: CPT | Mod: CPTII,S$GLB,, | Performed by: FAMILY MEDICINE

## 2018-12-18 PROCEDURE — 99999 PR PBB SHADOW E&M-EST. PATIENT-LVL II: CPT | Mod: PBBFAC,,, | Performed by: ALLERGY & IMMUNOLOGY

## 2018-12-18 RX ORDER — BETAMETHASONE SODIUM PHOSPHATE AND BETAMETHASONE ACETATE 3; 3 MG/ML; MG/ML
6 INJECTION, SUSPENSION INTRA-ARTICULAR; INTRALESIONAL; INTRAMUSCULAR; SOFT TISSUE
Status: COMPLETED | OUTPATIENT
Start: 2018-12-18 | End: 2018-12-18

## 2018-12-18 RX ORDER — METHYLPREDNISOLONE 4 MG/1
TABLET ORAL
Qty: 1 PACKAGE | Refills: 0 | Status: SHIPPED | OUTPATIENT
Start: 2018-12-18 | End: 2018-12-24

## 2018-12-18 RX ORDER — HYDROXYZINE HYDROCHLORIDE 25 MG/1
25 TABLET, FILM COATED ORAL 3 TIMES DAILY PRN
Qty: 30 TABLET | Refills: 0 | Status: SHIPPED | OUTPATIENT
Start: 2018-12-18 | End: 2019-09-03

## 2018-12-18 RX ORDER — TRIAMCINOLONE ACETONIDE 1 MG/G
CREAM TOPICAL 2 TIMES DAILY
Qty: 30 G | Refills: 0 | Status: SHIPPED | OUTPATIENT
Start: 2018-12-18 | End: 2018-12-28

## 2018-12-18 RX ORDER — TRIAMCINOLONE ACETONIDE 1 MG/G
CREAM TOPICAL
Qty: 60 G | Refills: 0 | Status: SHIPPED | OUTPATIENT
Start: 2018-12-18 | End: 2019-02-05

## 2018-12-18 RX ADMIN — BETAMETHASONE SODIUM PHOSPHATE AND BETAMETHASONE ACETATE 6 MG: 3; 3 INJECTION, SUSPENSION INTRA-ARTICULAR; INTRALESIONAL; INTRAMUSCULAR; SOFT TISSUE at 05:12

## 2018-12-18 NOTE — PROGRESS NOTES
"Patient was scheduled for patch test after a visit with dermatology.  Kay Avila LPN called patient and left a voicemail instructing patient to return call prior to appointment.  Patient came to appointment and was triaged in room.  Patient brought past skin test (inhalant and food) and stated she needed patch testing for chemicals due to rash.  Instructed patient that allergy does not do patch testing and does not treat rashes.  Notified patient that nurse would return after speaking with Dr. Francisco.  While in Dr. Francisco' office, patient came into the physician's office asking to be seen and for a steroid prescription.  Dr. Francisco' replied, "You are in my office and I will see you in the exam room."  Patient went back to exam room and asked for her papers back which were the current rooming report from [employer].  Informed patient these were not her papers.  She stated as she was leaving, "You do not have to be so rude, Doctor."  Patient was in lobby and came back in through locked doors and asked for Griselda.  When Griselda went to speak with patient, patient was crying and upset.  She stated, "Her brother had passed away today and she forced herself to come to this appointment because she was itching badly."  She stated the physician was rude to her and Griselda corrected her and said he was firm.  Griselda stated the physician and nurse were discussing matters and patient should have respected the conversation and waited in the exam room as instructed.  Patient stated she will be reporting Dr. Francisco.  Yessica López, manager, was notified of occurrence.    "

## 2018-12-18 NOTE — PROGRESS NOTES
"Subjective:       Patient ID: Marilee Simons is a 48 y.o. female.    Vitals:  height is 4' 11" (1.499 m) and weight is 73.5 kg (162 lb). Her oral temperature is 99.1 °F (37.3 °C). Her blood pressure is 144/88 (abnormal) and her pulse is 102. Her respiration is 18 and oxygen saturation is 95%.     Chief Complaint: Rash    Rash   This is a new problem. The current episode started yesterday. The problem has been gradually worsening since onset. The affected locations include the right arm, right upper leg, left upper leg, left arm, torso and back. The rash is characterized by itchiness and redness. She was exposed to nothing. Pertinent negatives include no anorexia, congestion, cough, diarrhea, eye pain, facial edema, fatigue, fever, joint pain, nail changes, rhinorrhea, shortness of breath, sore throat or vomiting. Past treatments include anti-itch cream and topical steroids (Benadryl, Zyrtec, Allegra). The treatment provided no relief. Her past medical history is significant for allergies. There is no history of asthma, eczema or varicella.       Constitution: Negative for chills, fatigue and fever.   HENT: Negative for facial swelling, congestion and sore throat.    Neck: Negative for painful lymph nodes.   Eyes: Negative for eye itching, eye pain and eyelid swelling.   Respiratory: Negative for cough and shortness of breath.    Gastrointestinal: Negative for vomiting and diarrhea.   Musculoskeletal: Negative for joint pain and joint swelling.   Skin: Positive for rash and hives. Negative for color change, pale, wound, abrasion, laceration, lesion, skin thickening/induration, puncture wound, bruising, abscess and avulsion.   Allergic/Immunologic: Positive for hives and itching. Negative for environmental allergies and immunocompromised state.   Hematologic/Lymphatic: Negative for swollen lymph nodes.       Objective:      Physical Exam   Constitutional: She is oriented to person, place, and time. She appears " well-developed and well-nourished.   HENT:   Head: Normocephalic and atraumatic.   Mouth/Throat: Oropharynx is clear and moist.   Eyes: EOM are normal. Pupils are equal, round, and reactive to light.   Neck: Normal range of motion. Neck supple. No thyromegaly present.   Cardiovascular: Normal rate, regular rhythm and normal heart sounds.   No murmur heard.  Pulmonary/Chest: Breath sounds normal. No respiratory distress. She has no wheezes. She has no rales.   Abdominal: Soft. Bowel sounds are normal. She exhibits no distension. There is no tenderness. There is no rebound and no guarding.   Musculoskeletal: Normal range of motion. She exhibits no edema, tenderness or deformity.   Lymphadenopathy:     She has no cervical adenopathy.   Neurological: She is alert and oriented to person, place, and time. She displays normal reflexes. No cranial nerve deficit. She exhibits normal muscle tone. Coordination normal.   Skin: Skin is warm. Capillary refill takes less than 2 seconds.   Erythematous, macular, flat, rash on both arms, back, legs.   Psychiatric: She has a normal mood and affect. Her behavior is normal. Judgment and thought content normal.   Vitals reviewed.      Assessment:       1. Rash and nonspecific skin eruption        Plan:         Rash and nonspecific skin eruption  -     betamethasone acetate-betamethasone sodium phosphate injection 6 mg  -     methylPREDNISolone (MEDROL DOSEPACK) 4 mg tablet; use as directed  Dispense: 1 Package; Refill: 0  -     triamcinolone acetonide 0.1% (KENALOG) 0.1 % cream; Apply topically 2 (two) times daily. for 10 days  Dispense: 30 g; Refill: 0  -     hydrOXYzine HCl (ATARAX) 25 MG tablet; Take 1 tablet (25 mg total) by mouth 3 (three) times daily as needed for Itching.  Dispense: 30 tablet; Refill: 0          Patient Instructions       Self-Care for Skin Rashes     Pat your skin dry. Do not rub.     When your skin reacts to a substance your body is sensitive to, it can cause a  rash. You can treat most rashes at home by keeping the skin clean and dry. Many rashes may get better on their own within 2 to 3 days. You may need medical attention if your rash itches, drains, or hurts, particularly if the rash is getting worse.  What can cause a skin rash?  · Sun poisoning, caused by too much exposure to the sun  · An irritant or allergic reaction to a certain type of food, plant, or chemical, such as  shellfish, poison ivy, and or cleaning products  · An infection caused by a fungus (ringworm), virus (chickenpox), or bacteria (strep)  · Bites or infestation caused by insects or pests, such as ticks, lice, or mites  · Dry skin, which is often seen during the winter months and in older people  How can I control itching and skin damage?  · Take soothing lukewarm baths in a colloidal oatmeal product. You can buy this at the [employer].  · Do your best not to scratch. Clip fingernails short, especially in young children, to reduce skin damage if scratching does occur.  · Use moisturizing skin lotion instead of scratching your dry skin.  · Use sunscreen whenever going out into direct sun.  · Use only mild cleansing agents whenever possible.  · Wash with mild, nonirritating soap and warm water.  · Wear clothing that breathes, such as cotton shirts or canvas shoes.  · If fluid is seeping from the rash, cover it loosely with clean gauze to absorb the discharge.  · Many rashes are contagious. Prevent the rash from spreading to others by washing your hands often before or after touching others with any skin rash.  Use medicine  · Antihistamines such as diphenhydramine can help control itching. But use with caution because they can make you drowsy.  · Using over-the-counter hydrocortisone cream on small rashes may help reduce swelling and itching  · Most over-the-counter antifungal medicines can treat athletes foot and many other fungal infections of the skin.  Check with your healthcare provider  Call your  healthcare provider if:  · You were told that you have a fungal infection on your skin to make sure you have the correct type of medicine.  · You have questions or concerns about medicines or their side effects.     Call 911  Call 911 if either of these occur:  · Your tongue or lips start to swell  · You have difficulty breathing      Call your healthcare provider  Call your healthcare provider if any of these occur:  · Temperature of more than 101.0°F (38.3°C), or as directed  · Sore throat, a cough, or unusual fatigue  · Red, oozy, or painful rash gets worse. These are signs of infection.  · Rash covers your face, genitals, or most of your body  · Crusty sores or red rings that begin to spread  · You were exposed to someone who has a contagious rash, such as scabies or lice.  · Red bulls-eye rash with a white center (a sign of Lyme disease)  · You were told that you have resistant bacteria (MRSA) on your skin.   Date Last Reviewed: 5/12/2015  © 6179-0778 [employer] [address] [place] [address]. All rights reserved. This information is not intended as a substitute for professional medical care. Always follow your healthcare professional's instructions.      Follow up with your doctor in a few days as needed.  Return to the urgent care or go to the ER if symptoms get worse.    Sunil Frausto MD

## 2018-12-24 RX ORDER — CLONAZEPAM 1 MG/1
TABLET ORAL
Qty: 90 TABLET | Refills: 0 | Status: SHIPPED | OUTPATIENT
Start: 2018-12-24 | End: 2019-01-17 | Stop reason: SDUPTHER

## 2018-12-31 ENCOUNTER — TELEPHONE (OUTPATIENT)
Dept: OPTOMETRY | Facility: CLINIC | Age: 48
End: 2018-12-31

## 2018-12-31 NOTE — TELEPHONE ENCOUNTER
[facility] from [facility] xiidra drops- good from 11/26/18-12/29/21- Case ID#- [id number]

## 2019-01-04 ENCOUNTER — PATIENT MESSAGE (OUTPATIENT)
Dept: PSYCHIATRY | Facility: CLINIC | Age: 49
End: 2019-01-04

## 2019-01-09 RX ORDER — PROMETHAZINE HYDROCHLORIDE 25 MG/1
TABLET ORAL
Qty: 60 TABLET | Refills: 0 | Status: SHIPPED | OUTPATIENT
Start: 2019-01-09 | End: 2020-04-15 | Stop reason: SDUPTHER

## 2019-01-09 RX ORDER — PROMETHAZINE HYDROCHLORIDE 25 MG/1
TABLET ORAL
Qty: 60 TABLET | Refills: 0 | Status: SHIPPED | OUTPATIENT
Start: 2019-01-09 | End: 2019-02-05

## 2019-01-09 NOTE — TELEPHONE ENCOUNTER
Looks like patient is followed by another rheumatologist now--she should request refill from her.

## 2019-01-10 ENCOUNTER — PATIENT MESSAGE (OUTPATIENT)
Dept: PSYCHIATRY | Facility: CLINIC | Age: 49
End: 2019-01-10

## 2019-01-10 ENCOUNTER — TELEPHONE (OUTPATIENT)
Dept: UROLOGY | Facility: CLINIC | Age: 49
End: 2019-01-10

## 2019-01-10 DIAGNOSIS — N30.10 IC (INTERSTITIAL CYSTITIS): Primary | ICD-10-CM

## 2019-01-10 DIAGNOSIS — R10.2 PELVIC PAIN IN FEMALE: ICD-10-CM

## 2019-01-10 RX ORDER — DIAZEPAM 5 MG/1
5 TABLET ORAL
Qty: 30 TABLET | Refills: 1 | Status: SHIPPED | OUTPATIENT
Start: 2019-01-10 | End: 2020-09-01 | Stop reason: SDDI

## 2019-01-10 RX ORDER — CYCLOBENZAPRINE HCL 10 MG
TABLET ORAL
Qty: 90 TABLET | Refills: 0 | OUTPATIENT
Start: 2019-01-10

## 2019-01-10 NOTE — TELEPHONE ENCOUNTER
IC (interstitial cystitis)  -     diazePAM (VALIUM) 5 MG tablet; Take 1 tablet (5 mg total) by mouth as needed for Anxiety.  Dispense: 30 tablet; Refill: 1    Pelvic pain in female  -     diazePAM (VALIUM) 5 MG tablet; Take 1 tablet (5 mg total) by mouth as needed for Anxiety.  Dispense: 30 tablet; Refill: 1    she can follow up with me in 2 months.  Had cysto botox injection with hydrodistention back in 9/2018.

## 2019-01-10 NOTE — TELEPHONE ENCOUNTER
Requesting refill on valium 5mg #30, patient was seen last month and last refill was #30 on 12/11/2018  If ok, please print so I can fax to [facility]

## 2019-01-11 ENCOUNTER — PATIENT MESSAGE (OUTPATIENT)
Dept: PSYCHIATRY | Facility: CLINIC | Age: 49
End: 2019-01-11

## 2019-01-16 ENCOUNTER — OFFICE VISIT (OUTPATIENT)
Dept: PSYCHIATRY | Facility: CLINIC | Age: 49
End: 2019-01-16
Payer: COMMERCIAL

## 2019-01-16 ENCOUNTER — PATIENT MESSAGE (OUTPATIENT)
Dept: PSYCHIATRY | Facility: CLINIC | Age: 49
End: 2019-01-16

## 2019-01-16 DIAGNOSIS — F33.0 MAJOR DEPRESSIVE DISORDER, RECURRENT EPISODE, MILD: Primary | ICD-10-CM

## 2019-01-16 PROCEDURE — 99999 PR PBB SHADOW E&M-EST. PATIENT-LVL I: CPT | Mod: PBBFAC,,, | Performed by: SOCIAL WORKER

## 2019-01-16 PROCEDURE — 90834 PR PSYCHOTHERAPY W/PATIENT, 45 MIN: ICD-10-PCS | Mod: S$GLB,,, | Performed by: SOCIAL WORKER

## 2019-01-16 PROCEDURE — 90834 PSYTX W PT 45 MINUTES: CPT | Mod: S$GLB,,, | Performed by: SOCIAL WORKER

## 2019-01-16 PROCEDURE — 90834 PSYTX W PT 45 MINUTES: CPT | Mod: PBBFAC | Performed by: SOCIAL WORKER

## 2019-01-16 PROCEDURE — 99999 PR PBB SHADOW E&M-EST. PATIENT-LVL I: ICD-10-PCS | Mod: PBBFAC,,, | Performed by: SOCIAL WORKER

## 2019-01-16 RX ORDER — CLONAZEPAM 1 MG/1
TABLET ORAL
Qty: 90 TABLET | Refills: 0 | OUTPATIENT
Start: 2019-01-16

## 2019-01-17 RX ORDER — CLONAZEPAM 1 MG/1
TABLET ORAL
Qty: 90 TABLET | Refills: 0 | Status: SHIPPED | OUTPATIENT
Start: 2019-01-17 | End: 2019-02-13 | Stop reason: SDUPTHER

## 2019-01-17 NOTE — PROGRESS NOTES
"Individual Psychotherapy (PhD/LCSW)    1/16/2019    Site:  [facility]         Therapeutic Intervention: Met with patient.  Outpatient - Insight oriented psychotherapy 45 min - CPT code 45395    Chief complaint/reason for encounter: depression, anxiety and ptsd     Interval history and content of current session: Pt returns for follow-up.  Her sister overdosed on xanax and klonipin and pt called paramedics and had her taken to the hospital.  She was then admitted to an inpatient unit.  Pt was very upset as she was convinced her sister would never talk to her again and sister's  called her and told her it was all her fault that she was in a [place] with "crazy" people.  She come in sobbing about this but quickly calms down when told that she is being overly dramatic as I had seen the sister earlier today and she is not angry with pt.  Session spent talking with pt about her extreme reactions to things that then get out of control.  She is finally going to apply for disability due to her multiple medical and psychiatric conditions.    Treatment plan:  · Target symptoms: depression, anxiety , PTSD  · Why chosen therapy is appropriate versus another modality: relevant to diagnosis  · Outcome monitoring methods: self-report, observation  · Therapeutic intervention type: insight oriented psychotherapy    Risk parameters:  Patient reports no suicidal ideation  Patient reports no homicidal ideation  Patient reports no self-injurious behavior  Patient reports no violent behavior    Verbal deficits: None    Patient's response to intervention:  The patient's response to intervention is motivated.    Progress toward goals and other mental status changes:  The patient's progress toward goals is fair .    Diagnosis:     ICD-10-CM ICD-9-CM   1. Major depressive disorder, recurrent episode, mild F33.0 296.31       Plan:  individual psychotherapy and medication management by physician    Return to clinic: as " scheduled    Length of Service (minutes): 45

## 2019-01-22 RX ORDER — ALOSETRON HYDROCHLORIDE 0.5 MG/1
TABLET ORAL
Qty: 60 TABLET | Refills: 0 | Status: SHIPPED | OUTPATIENT
Start: 2019-01-22 | End: 2019-02-27 | Stop reason: SDUPTHER

## 2019-01-29 ENCOUNTER — OFFICE VISIT (OUTPATIENT)
Dept: URGENT CARE | Facility: CLINIC | Age: 49
End: 2019-01-29
Payer: COMMERCIAL

## 2019-01-29 VITALS
OXYGEN SATURATION: 95 % | TEMPERATURE: 98 F | HEIGHT: 59 IN | RESPIRATION RATE: 19 BRPM | DIASTOLIC BLOOD PRESSURE: 80 MMHG | WEIGHT: 162 LBS | HEART RATE: 92 BPM | BODY MASS INDEX: 32.66 KG/M2 | SYSTOLIC BLOOD PRESSURE: 120 MMHG

## 2019-01-29 DIAGNOSIS — G43.909 MIGRAINE WITHOUT STATUS MIGRAINOSUS, NOT INTRACTABLE, UNSPECIFIED MIGRAINE TYPE: Primary | ICD-10-CM

## 2019-01-29 DIAGNOSIS — R09.81 SINUS CONGESTION: ICD-10-CM

## 2019-01-29 PROCEDURE — 99214 PR OFFICE/OUTPT VISIT, EST, LEVL IV, 30-39 MIN: ICD-10-PCS | Mod: 25,S$GLB,, | Performed by: FAMILY MEDICINE

## 2019-01-29 PROCEDURE — 96372 THER/PROPH/DIAG INJ SC/IM: CPT | Mod: S$GLB,,, | Performed by: FAMILY MEDICINE

## 2019-01-29 PROCEDURE — 99214 OFFICE O/P EST MOD 30 MIN: CPT | Mod: 25,S$GLB,, | Performed by: FAMILY MEDICINE

## 2019-01-29 PROCEDURE — 96372 PR INJECTION,THERAP/PROPH/DIAG2ST, IM OR SUBCUT: ICD-10-PCS | Mod: S$GLB,,, | Performed by: FAMILY MEDICINE

## 2019-01-29 RX ORDER — DEXAMETHASONE SODIUM PHOSPHATE 100 MG/10ML
8 INJECTION INTRAMUSCULAR; INTRAVENOUS
Status: COMPLETED | OUTPATIENT
Start: 2019-01-29 | End: 2019-01-29

## 2019-01-29 RX ORDER — BUTALBITAL, ACETAMINOPHEN AND CAFFEINE 50; 325; 40 MG/1; MG/1; MG/1
1 TABLET ORAL EVERY 6 HOURS PRN
Qty: 30 TABLET | Refills: 0 | Status: SHIPPED | OUTPATIENT
Start: 2019-01-29 | End: 2019-02-05

## 2019-01-29 RX ORDER — FLUTICASONE PROPIONATE 50 MCG
2 SPRAY, SUSPENSION (ML) NASAL DAILY
Qty: 1 BOTTLE | Refills: 0 | Status: SHIPPED | OUTPATIENT
Start: 2019-01-29 | End: 2019-02-28

## 2019-01-29 RX ADMIN — DEXAMETHASONE SODIUM PHOSPHATE 8 MG: 100 INJECTION INTRAMUSCULAR; INTRAVENOUS at 04:01

## 2019-01-29 NOTE — PATIENT INSTRUCTIONS
Migraine Headache  This often severe type of headache is different from other types of headaches in that symptoms other than pain occur with the headache. Nausea and vomiting, lightheadedness, sensitivity to light (photophobia), and other visual disturbances are common migraine symptoms. The pain may last from a few hours to several days. It is not clear why migraines occur but certain factors called triggers can raise the risk of having a migraine attack. A migraine may be triggered by emotional stress or depression, or by hormone changes during the menstrual cycle. Other triggers include birth control pills, overuse of migraine medicines, alcohol or caffeine, foods with tyramine (such as aged cheese and wine), eyestrain, weather changes, missed meals, or too little or too much sleep.  Home care  Follow these tips when taking care of yourself at home:  · Dont drive yourself home if you were given pain medicine for your headache or are having visual symptoms. Instead, have someone else drive you home. Try to sleep when you get home. You should feel much better when you wake up.  · Cold can help ease migraine symptoms. Put an ice pack on your forehead or at the base of your skull. Put heat on the back of your neck to help ease any neck spasm.  · Drink only clear liquids or eat a light diet until your symptoms get better. This will help you avoid nausea and vomiting.  How to prevent migraines  Pay attention to what seems to trigger your headache. Try to avoid the triggers when you can. If you have frequent headaches, consider keeping a headache diary. In it, write down what you were doing, feeling, or eating in the hours before each headache. Show this to your healthcare provider to help find the cause of your headaches.  If stress seems to be a trigger for your headaches, figure out what is causing stress in your life. Learn new ways to handle your stress. Ideas include regular exercise, biofeedback,  self-hypnosis, yoga, and meditation. Talk with your healthcare provider to find out more information about managing stress. Many books and digital media are also available on this subject.  Tyramine is a substance found in many foods. It can trigger a migraine in some people. These foods contain tyramine:  · Chocolate  · Yogurt  · All cheeses, but especially aged cheeses  · Smoked or pickled fish and meat, including herring, caviar, bologna, pepperoni, and salami  · Liver  · Avocados  · Bananas  · Figs  · Raisins  · Red wine  Try staying away from these foods for 1 to 2 months to see if you have fewer headaches.  How to treat future headaches  · Take time out at the first sign of a headache, if possible. Find a quiet, dark, comfortable place to sit or lie down. Let yourself relax or sleep.  · Put an ice pack on your forehead or on the area of greatest pain. A heating pad and massage may help if you are having a muscle spasm and tightness in your neck.  · If you have been prescribed a medicine to stop a migraine headache, use this at the first warning sign of the headache for best results. First signs may be an aura or pain.  · If you need to take medicine often for your migraine, talk with your healthcare provider about other ways to prevent your headaches.  Follow-up care  Follow up with your healthcare provider, or as advised. Talk with your provider if you have frequent headaches. He or she can figure out a treatment plan. Ask if you can have medicine to take at home the next time you get a bad headache. This may keep you from having to visit the emergency department in the future. You may need to see a headache specialist (neurologist) if you continue to have headaches.  When to seek medical advice  Call your healthcare provider right away if any of these occur:  · Your head pain gets worse, or doesnt get better within 24 hours  · You cant keep liquids down (repeated vomiting)  · Pain in your sinuses, ears, or  throat  · Fever of 100.4º F (38º C) or higher, or as directed by your healthcare provider  · Stiff neck  · Extreme drowsiness, confusion, or fainting  · Dizziness, or dizziness with spinning sensation (vertigo)  · Weakness in an arm or leg, or on one side of your face  · Difficulty talking or seeing  Date Last Reviewed: 8/1/2016 © 2000-2017 [employer]. [address] [place] [address]. All rights reserved. This information is not intended as a substitute for professional medical care. Always follow your healthcare professional's instructions.    Follow up with your doctor in a few days as needed.  Return to the urgent care or go to the ER if symptoms get worse.    Sunil Frausto MD

## 2019-01-29 NOTE — PROGRESS NOTES
"Subjective:       Patient ID: Marilee Simons is a 48 y.o. female.    Vitals:  height is 4' 11" (1.499 m) and weight is 73.5 kg (162 lb). Her oral temperature is 98.4 °F (36.9 °C). Her blood pressure is 120/80 and her pulse is 92. Her respiration is 19 and oxygen saturation is 95%.     Chief Complaint: Headache    101 fever on sunday      Headache    This is a new problem. The current episode started in the past 7 days. The problem occurs constantly. The problem has been gradually worsening. The pain is located in the frontal region. The pain does not radiate. The pain quality is not similar to prior headaches. The quality of the pain is described as throbbing. The pain is at a severity of 5/10. The pain is moderate. Associated symptoms include ear pain. Pertinent negatives include no blurred vision, dizziness, eye pain, fever, loss of balance, nausea, neck pain, photophobia, tinnitus, vomiting or weakness. Associated symptoms comments: Sore throat. The symptoms are aggravated by noise and bright light. Treatments tried: Advil. The treatment provided mild relief. There is no history of migraine headaches.       Constitution: Negative for chills, sweating and fever.   HENT: Positive for ear pain. Negative for tinnitus, facial swelling, congestion and sinus pain.    Neck: Negative for neck pain and neck stiffness.   Eyes: Negative for eye pain, photophobia, vision loss, double vision and blurred vision.   Gastrointestinal: Negative for nausea and vomiting.   Genitourinary: Negative for missed menses.   Musculoskeletal: Negative for trauma and muscle ache.   Skin: Negative for rash, wound, lesion and erythema.   Neurological: Positive for headaches. Negative for dizziness, history of vertigo, light-headedness, facial drooping, speech difficulty, coordination disturbances, loss of balance, history of migraines, disorientation and loss of consciousness.   Psychiatric/Behavioral: Negative for disorientation, confusion, " nervous/anxious, sleep disturbance and depression. The patient is not nervous/anxious.        Objective:      Physical Exam   Constitutional: She is oriented to person, place, and time. She appears well-developed and well-nourished.   HENT:   Head: Normocephalic and atraumatic.   Right Ear: External ear normal.   Left Ear: External ear normal.   Mouth/Throat: Oropharynx is clear and moist.   Eyes: EOM are normal. Pupils are equal, round, and reactive to light.   Neck: Normal range of motion. Neck supple. No JVD present. No tracheal deviation present. No thyromegaly present.   Cardiovascular: Normal rate, regular rhythm and normal heart sounds. Exam reveals no gallop and no friction rub.   No murmur heard.  Pulmonary/Chest: Breath sounds normal. No respiratory distress. She has no wheezes. She has no rales. She exhibits no tenderness.   Abdominal: Soft. Bowel sounds are normal. She exhibits no distension and no mass. There is no tenderness. There is no rebound and no guarding. No hernia.   Musculoskeletal: Normal range of motion. She exhibits no edema, tenderness or deformity.   Lymphadenopathy:     She has no cervical adenopathy.   Neurological: She is alert and oriented to person, place, and time. She displays normal reflexes. No cranial nerve deficit. She exhibits normal muscle tone. Coordination normal.   Skin: Skin is warm. Capillary refill takes less than 2 seconds. No rash noted. No erythema. No pallor.   Psychiatric: She has a normal mood and affect. Her behavior is normal. Judgment and thought content normal.   Vitals reviewed.      Assessment:       1. Migraine without status migrainosus, not intractable, unspecified migraine type    2. Sinus congestion        Plan:         Migraine without status migrainosus, not intractable, unspecified migraine type  -     butalbital-acetaminophen-caffeine -40 mg (FIORICET, ESGIC) -40 mg per tablet; Take 1 tablet by mouth every 6 (six) hours as needed for  Pain (headache).  Dispense: 30 tablet; Refill: 0    Sinus congestion  -     dexamethasone injection 8 mg  -     fluticasone (FLONASE) 50 mcg/actuation nasal spray; 2 sprays (100 mcg total) by Each Nare route once daily.  Dispense: 1 Bottle; Refill: 0          Patient Instructions     Migraine Headache  This often severe type of headache is different from other types of headaches in that symptoms other than pain occur with the headache. Nausea and vomiting, lightheadedness, sensitivity to light (photophobia), and other visual disturbances are common migraine symptoms. The pain may last from a few hours to several days. It is not clear why migraines occur but certain factors called triggers can raise the risk of having a migraine attack. A migraine may be triggered by emotional stress or depression, or by hormone changes during the menstrual cycle. Other triggers include birth control pills, overuse of migraine medicines, alcohol or caffeine, foods with tyramine (such as aged cheese and wine), eyestrain, weather changes, missed meals, or too little or too much sleep.  Home care  Follow these tips when taking care of yourself at home:  · Dont drive yourself home if you were given pain medicine for your headache or are having visual symptoms. Instead, have someone else drive you home. Try to sleep when you get home. You should feel much better when you wake up.  · Cold can help ease migraine symptoms. Put an ice pack on your forehead or at the base of your skull. Put heat on the back of your neck to help ease any neck spasm.  · Drink only clear liquids or eat a light diet until your symptoms get better. This will help you avoid nausea and vomiting.  How to prevent migraines  Pay attention to what seems to trigger your headache. Try to avoid the triggers when you can. If you have frequent headaches, consider keeping a headache diary. In it, write down what you were doing, feeling, or eating in the hours before each  headache. Show this to your healthcare provider to help find the cause of your headaches.  If stress seems to be a trigger for your headaches, figure out what is causing stress in your life. Learn new ways to handle your stress. Ideas include regular exercise, biofeedback, self-hypnosis, yoga, and meditation. Talk with your healthcare provider to find out more information about managing stress. Many books and digital media are also available on this subject.  Tyramine is a substance found in many foods. It can trigger a migraine in some people. These foods contain tyramine:  · Chocolate  · Yogurt  · All cheeses, but especially aged cheeses  · Smoked or pickled fish and meat, including herring, caviar, bologna, pepperoni, and salami  · Liver  · Avocados  · Bananas  · Figs  · Raisins  · Red wine  Try staying away from these foods for 1 to 2 months to see if you have fewer headaches.  How to treat future headaches  · Take time out at the first sign of a headache, if possible. Find a quiet, dark, comfortable place to sit or lie down. Let yourself relax or sleep.  · Put an ice pack on your forehead or on the area of greatest pain. A heating pad and massage may help if you are having a muscle spasm and tightness in your neck.  · If you have been prescribed a medicine to stop a migraine headache, use this at the first warning sign of the headache for best results. First signs may be an aura or pain.  · If you need to take medicine often for your migraine, talk with your healthcare provider about other ways to prevent your headaches.  Follow-up care  Follow up with your healthcare provider, or as advised. Talk with your provider if you have frequent headaches. He or she can figure out a treatment plan. Ask if you can have medicine to take at home the next time you get a bad headache. This may keep you from having to visit the emergency department in the future. You may need to see a headache specialist (neurologist) if you  continue to have headaches.  When to seek medical advice  Call your healthcare provider right away if any of these occur:  · Your head pain gets worse, or doesnt get better within 24 hours  · You cant keep liquids down (repeated vomiting)  · Pain in your sinuses, ears, or throat  · Fever of 100.4º F (38º C) or higher, or as directed by your healthcare provider  · Stiff neck  · Extreme drowsiness, confusion, or fainting  · Dizziness, or dizziness with spinning sensation (vertigo)  · Weakness in an arm or leg, or on one side of your face  · Difficulty talking or seeing  Date Last Reviewed: 8/1/2016 © 2000-2017 [employer]. [address] [place] [address], [place] [address]. All rights reserved. This information is not intended as a substitute for professional medical care. Always follow your healthcare professional's instructions.    Follow up with your doctor in a few days as needed.  Return to the urgent care or go to the ER if symptoms get worse.    Sunil Frausto MD

## 2019-02-05 ENCOUNTER — OFFICE VISIT (OUTPATIENT)
Dept: RHEUMATOLOGY | Facility: CLINIC | Age: 49
End: 2019-02-05
Payer: COMMERCIAL

## 2019-02-05 ENCOUNTER — LAB VISIT (OUTPATIENT)
Dept: LAB | Facility: HOSPITAL | Age: 49
End: 2019-02-05
Attending: INTERNAL MEDICINE
Payer: COMMERCIAL

## 2019-02-05 VITALS
DIASTOLIC BLOOD PRESSURE: 94 MMHG | SYSTOLIC BLOOD PRESSURE: 145 MMHG | WEIGHT: 166.25 LBS | BODY MASS INDEX: 33.52 KG/M2 | HEART RATE: 105 BPM | HEIGHT: 59 IN

## 2019-02-05 DIAGNOSIS — M25.50 PAIN IN JOINT INVOLVING MULTIPLE SITES: ICD-10-CM

## 2019-02-05 DIAGNOSIS — R30.0 DYSURIA: ICD-10-CM

## 2019-02-05 DIAGNOSIS — E55.9 VITAMIN D INSUFFICIENCY: ICD-10-CM

## 2019-02-05 DIAGNOSIS — L40.50 PSA (PSORIATIC ARTHRITIS): ICD-10-CM

## 2019-02-05 DIAGNOSIS — M54.41 CHRONIC BILATERAL LOW BACK PAIN WITH BILATERAL SCIATICA: ICD-10-CM

## 2019-02-05 DIAGNOSIS — R53.82 CHRONIC FATIGUE: ICD-10-CM

## 2019-02-05 DIAGNOSIS — G89.29 CHRONIC BILATERAL LOW BACK PAIN WITH BILATERAL SCIATICA: ICD-10-CM

## 2019-02-05 DIAGNOSIS — M79.7 FIBROMYALGIA: Primary | ICD-10-CM

## 2019-02-05 DIAGNOSIS — M79.7 FIBROMYALGIA: ICD-10-CM

## 2019-02-05 DIAGNOSIS — M54.42 CHRONIC BILATERAL LOW BACK PAIN WITH BILATERAL SCIATICA: ICD-10-CM

## 2019-02-05 LAB
BILIRUB UR QL STRIP: NEGATIVE
CLARITY UR: CLEAR
COLOR UR: ABNORMAL
GLUCOSE UR QL STRIP: NEGATIVE
HGB UR QL STRIP: ABNORMAL
KETONES UR QL STRIP: NEGATIVE
LEUKOCYTE ESTERASE UR QL STRIP: NEGATIVE
NITRITE UR QL STRIP: NEGATIVE
PH UR STRIP: 7 [PH] (ref 5–8)
PROT UR QL STRIP: ABNORMAL
SP GR UR STRIP: 1.02 (ref 1–1.03)
URN SPEC COLLECT METH UR: ABNORMAL

## 2019-02-05 PROCEDURE — 99999 PR PBB SHADOW E&M-EST. PATIENT-LVL V: CPT | Mod: PBBFAC,,, | Performed by: INTERNAL MEDICINE

## 2019-02-05 PROCEDURE — 99214 PR OFFICE/OUTPT VISIT, EST, LEVL IV, 30-39 MIN: ICD-10-PCS | Mod: 25,S$GLB,, | Performed by: INTERNAL MEDICINE

## 2019-02-05 PROCEDURE — 96372 THER/PROPH/DIAG INJ SC/IM: CPT | Mod: S$GLB,,, | Performed by: INTERNAL MEDICINE

## 2019-02-05 PROCEDURE — 3008F PR BODY MASS INDEX (BMI) DOCUMENTED: ICD-10-PCS | Mod: CPTII,S$GLB,, | Performed by: INTERNAL MEDICINE

## 2019-02-05 PROCEDURE — 3008F BODY MASS INDEX DOCD: CPT | Mod: CPTII,S$GLB,, | Performed by: INTERNAL MEDICINE

## 2019-02-05 PROCEDURE — 87086 URINE CULTURE/COLONY COUNT: CPT

## 2019-02-05 PROCEDURE — 99999 PR PBB SHADOW E&M-EST. PATIENT-LVL V: ICD-10-PCS | Mod: PBBFAC,,, | Performed by: INTERNAL MEDICINE

## 2019-02-05 PROCEDURE — 99214 OFFICE O/P EST MOD 30 MIN: CPT | Mod: 25,S$GLB,, | Performed by: INTERNAL MEDICINE

## 2019-02-05 PROCEDURE — 96372 PR INJECTION,THERAP/PROPH/DIAG2ST, IM OR SUBCUT: ICD-10-PCS | Mod: S$GLB,,, | Performed by: INTERNAL MEDICINE

## 2019-02-05 PROCEDURE — 81003 URINALYSIS AUTO W/O SCOPE: CPT | Mod: PO

## 2019-02-05 RX ORDER — KETOROLAC TROMETHAMINE 30 MG/ML
60 INJECTION, SOLUTION INTRAMUSCULAR; INTRAVENOUS
Status: COMPLETED | OUTPATIENT
Start: 2019-02-05 | End: 2019-02-05

## 2019-02-05 RX ORDER — METHYLPREDNISOLONE ACETATE 80 MG/ML
160 INJECTION, SUSPENSION INTRA-ARTICULAR; INTRALESIONAL; INTRAMUSCULAR; SOFT TISSUE
Status: COMPLETED | OUTPATIENT
Start: 2019-02-05 | End: 2019-02-05

## 2019-02-05 RX ORDER — CYANOCOBALAMIN 1000 UG/ML
1000 INJECTION, SOLUTION INTRAMUSCULAR; SUBCUTANEOUS
Status: COMPLETED | OUTPATIENT
Start: 2019-02-05 | End: 2019-02-05

## 2019-02-05 RX ADMIN — CYANOCOBALAMIN 1000 MCG: 1000 INJECTION, SOLUTION INTRAMUSCULAR; SUBCUTANEOUS at 06:02

## 2019-02-05 RX ADMIN — METHYLPREDNISOLONE ACETATE 160 MG: 80 INJECTION, SUSPENSION INTRA-ARTICULAR; INTRALESIONAL; INTRAMUSCULAR; SOFT TISSUE at 06:02

## 2019-02-05 RX ADMIN — KETOROLAC TROMETHAMINE 60 MG: 30 INJECTION, SOLUTION INTRAMUSCULAR; INTRAVENOUS at 06:02

## 2019-02-05 NOTE — PROGRESS NOTES
Subjective:       Patient ID: Marilee Siomns is a 48 y.o. female.    Chief Complaint: Follow-up    Follow up   PSA tried Humira but has had infections,  fibromyalgia,  flaring , interstitial cystitis   mcp, pip, wrist, knees and ankles feet and L spine pain on plaquenil.  Pain is located in multiple joints, both shoulder(s), both elbow(s), both wrist(s), both MCP(s): 1st, 2nd, 3rd, 4th and 5th, both PIP(s): 1st, 2nd, 3rd, 4th and 5th, both DIP(s): 1st and 2nd, both hip(s), both knee(s) and both MTP(s): 1st, 2nd, 3rd, 4th and 5th, is described as aching, pulsating, shooting and throbbing, and is constant, moderate .  Associated symptoms include: crepitation, decreased range of motion, edema, effusion, tenderness and warmth.   She had soft tissue swelling, mcp, pip, twrist.          Review of Systems   Constitutional: Positive for activity change and chills. Negative for appetite change, diaphoresis and unexpected weight change.   HENT: Negative for congestion, dental problem, ear discharge, ear pain, facial swelling, mouth sores, nosebleeds, postnasal drip, rhinorrhea, sinus pressure, sneezing, sore throat, tinnitus and voice change.    Eyes: Negative for photophobia, pain, discharge, redness and itching.   Respiratory: Negative for apnea, cough, chest tightness, shortness of breath and wheezing.    Cardiovascular: Positive for leg swelling. Negative for chest pain and palpitations.   Gastrointestinal: Positive for abdominal pain. Negative for abdominal distention, constipation, diarrhea, nausea and vomiting.   Endocrine: Negative for cold intolerance, heat intolerance, polydipsia and polyuria.   Genitourinary: Negative for decreased urine volume, difficulty urinating, flank pain, frequency, hematuria and urgency.   Musculoskeletal: Positive for arthralgias, back pain, joint swelling, myalgias, neck pain and neck stiffness. Negative for gait problem.   Skin: Negative for pallor, rash and wound.  "  Allergic/Immunologic: Negative for immunocompromised state.   Neurological: Positive for weakness. Negative for dizziness, tremors and numbness.   Hematological: Negative for adenopathy. Does not bruise/bleed easily.   Psychiatric/Behavioral: Negative for sleep disturbance. The patient is not nervous/anxious.          Objective:     BP (!) 145/94 (BP Location: Left arm, Patient Position: Sitting, BP Method: Large (Automatic))   Pulse 105   Ht 4' 11" (1.499 m)   Wt 75.4 kg (166 lb 3.6 oz)   LMP  (LMP Unknown)   BMI 33.57 kg/m²      Physical Exam   Nursing note and vitals reviewed.  Constitutional: She is oriented to person, place, and time. No distress.   HENT:   Head: Normocephalic and atraumatic.   Mouth/Throat: Oropharynx is clear and moist.   Eyes: EOM are normal. Pupils are equal, round, and reactive to light.   Neck: Neck supple. No thyromegaly present.   Cardiovascular: Normal rate, regular rhythm and normal heart sounds.  Exam reveals no gallop and no friction rub.    No murmur heard.  Pulmonary/Chest: Breath sounds normal. She has no wheezes. She has no rales. She exhibits no tenderness.   Abdominal: There is no tenderness. There is no rebound and no guarding.       Right Side Rheumatological Exam     The patient is tender to palpation of the shoulder, elbow, wrist, knee, 1st PIP, 1st MCP, 2nd PIP, 2nd MCP, 3rd PIP, 3rd MCP, 4th PIP, 4th MCP, 5th PIP and 5th MCP    She has swelling of the wrist, 1st PIP, 1st MCP, 2nd PIP, 2nd MCP, 3rd PIP, 3rd MCP, 4th PIP, 4th MCP, 5th PIP and 5th MCP    The patient has an enlarged wrist, knee, 1st PIP, 1st MCP, 2nd PIP, 2nd MCP, 3rd PIP, 3rd MCP, 4th PIP, 4th MCP, 5th PIP and 5th MCP    Shoulder Exam   Tenderness Location: biceps tendon and clavicle    Range of Motion   Active abduction: abnormal   Adduction: abnormal  Sensation: normal    Knee Exam   Patellofemoral Crepitus: positive  Effusion: positive  Sensation: normal    Hip Exam   Tenderness Location: " posterior, greater trochanter and lateral  Sensation: normal    Elbow/Wrist Exam   Tenderness Location: lateral epicondyle and medial epicondyle  Sensation: normal    Foot Exam   Right foot exam exhibits signs of inflamed dorsum  Right foot exam exhibits signs of no podagra, no tophus and no plantar fasciitis    Muscle Strength (0-5 scale):  : 4/5     Left Side Rheumatological Exam     The patient is tender to palpation of the shoulder, elbow, wrist, knee, 1st PIP, 1st MCP, 2nd PIP, 2nd MCP, 3rd PIP, 3rd MCP, 4th PIP, 4th MCP, 5th PIP and 5th MCP.    She has swelling of the wrist, 1st PIP, 1st MCP, 2nd PIP, 2nd MCP, 3rd PIP, 3rd MCP, 4th PIP, 4th MCP, 5th PIP and 5th MCP    The patient has an enlarged wrist and knee.    Shoulder Exam   Tenderness Location: biceps tendon and clavicle    Range of Motion   Active abduction: abnormal   Sensation: normal    Knee Exam     Patellofemoral Crepitus: positive  Effusion: negative  Sensation: normal    Hip Exam   Tenderness Location: posterior, greater trochanter and lateral  Sensation: normal    Elbow/Wrist Exam   Tenderness Location: lateral epicondyle and medial epicondyle  Sensation: normal    Foot Exam   Left foot exam exhibits signs of inflamed dorsum  Left foot exam exhibits signs of no podagra and no plantar fasciitis    Muscle Strength (0-5 scale):  :  4/5       Back/Neck Exam   General Inspection   Gait: normal       Tenderness Right paramedian tenderness of the Occ, Upper C-Spine, Lower L-Spine and SI Joint.Left paramedian tenderness of the Occ, Upper C-Spine, Lower L-Spine and SI Joint.      Comments:  15 out of 18 tender points    Lymphadenopathy:     She has no cervical adenopathy.   Neurological: She is alert and oriented to person, place, and time.   Skin: No rash noted. No erythema. No pallor.     Psychiatric: Affect normal. Her mood appears anxious. She exhibits a depressed mood. She expresses no suicidal plans and no homicidal plans.    Musculoskeletal: She exhibits tenderness and deformity.   Sausage digits, Synovitis pip 2,3 B and B wrist with warmth to the touch         Results for orders placed or performed in visit on 02/05/19   Urine culture   Result Value Ref Range    Urine Culture, Routine No significant growth    Urinalysis   Result Value Ref Range    Specimen UA Urine, Clean Catch     Color, UA Blue (A) Yellow, Straw, [place]    Appearance, UA Clear Clear    pH, UA 7.0 5.0 - 8.0    Specific Gravity, UA 1.025 1.005 - 1.030    Protein, UA Trace (A) Negative    Glucose, UA Negative Negative    Ketones, UA Negative Negative    Bilirubin (UA) Negative Negative    Occult Blood UA Trace (A) Negative    Nitrite, UA Negative Negative    Leukocytes, UA Negative Negative       Assessment:       Encounter Diagnoses   Name Primary?    Fibromyalgia Yes    Chronic fatigue     Vitamin D insufficiency     Pain in joint involving multiple sites     Dysuria     PSA (psoriatic arthritis)     Chronic bilateral low back pain with bilateral sciatica          Plan:       Fibromyalgia  -     ketorolac injection 60 mg  -     methylPREDNISolone acetate injection 160 mg  -     cyanocobalamin injection 1,000 mcg  -     Urinalysis; Future; Expected date: 02/05/2019  -     Urine culture; Future; Expected date: 02/05/2019  -     adalimumab (HUMIRA,CF, PEN) 40 mg/0.4 mL PnKt; Inject 0.4 mLs (40 mg total) into the skin every 14 (fourteen) days.  Dispense: 2 pen; Refill: 12  -     X-Ray Cervical Spine AP And Lateral; Future; Expected date: 02/05/2019  -     X-Ray Thoracic Spine AP Lateral; Future; Expected date: 02/05/2019  -     X-Ray Lumbar Complete With Flex And Ext; Future; Expected date: 02/05/2019  -     X-Ray Sacroiliac Joints 3 Views; Future; Expected date: 02/05/2019    Chronic fatigue  -     ketorolac injection 60 mg  -     methylPREDNISolone acetate injection 160 mg  -     cyanocobalamin injection 1,000 mcg  -     Urinalysis; Future; Expected date:  02/05/2019  -     Urine culture; Future; Expected date: 02/05/2019  -     adalimumab (HUMIRA,CF, PEN) 40 mg/0.4 mL PnKt; Inject 0.4 mLs (40 mg total) into the skin every 14 (fourteen) days.  Dispense: 2 pen; Refill: 12  -     X-Ray Cervical Spine AP And Lateral; Future; Expected date: 02/05/2019  -     X-Ray Thoracic Spine AP Lateral; Future; Expected date: 02/05/2019  -     X-Ray Lumbar Complete With Flex And Ext; Future; Expected date: 02/05/2019  -     X-Ray Sacroiliac Joints 3 Views; Future; Expected date: 02/05/2019    Vitamin D insufficiency  -     ketorolac injection 60 mg  -     methylPREDNISolone acetate injection 160 mg  -     cyanocobalamin injection 1,000 mcg  -     Urinalysis; Future; Expected date: 02/05/2019  -     Urine culture; Future; Expected date: 02/05/2019  -     adalimumab (HUMIRA,CF, PEN) 40 mg/0.4 mL PnKt; Inject 0.4 mLs (40 mg total) into the skin every 14 (fourteen) days.  Dispense: 2 pen; Refill: 12  -     X-Ray Cervical Spine AP And Lateral; Future; Expected date: 02/05/2019  -     X-Ray Thoracic Spine AP Lateral; Future; Expected date: 02/05/2019  -     X-Ray Lumbar Complete With Flex And Ext; Future; Expected date: 02/05/2019  -     X-Ray Sacroiliac Joints 3 Views; Future; Expected date: 02/05/2019    Pain in joint involving multiple sites  -     ketorolac injection 60 mg  -     methylPREDNISolone acetate injection 160 mg  -     cyanocobalamin injection 1,000 mcg  -     Urinalysis; Future; Expected date: 02/05/2019  -     Urine culture; Future; Expected date: 02/05/2019  -     adalimumab (HUMIRA,CF, PEN) 40 mg/0.4 mL PnKt; Inject 0.4 mLs (40 mg total) into the skin every 14 (fourteen) days.  Dispense: 2 pen; Refill: 12  -     X-Ray Cervical Spine AP And Lateral; Future; Expected date: 02/05/2019  -     X-Ray Thoracic Spine AP Lateral; Future; Expected date: 02/05/2019  -     X-Ray Lumbar Complete With Flex And Ext; Future; Expected date: 02/05/2019  -     X-Ray Sacroiliac Joints 3  Views; Future; Expected date: 02/05/2019    Dysuria  -     ketorolac injection 60 mg  -     methylPREDNISolone acetate injection 160 mg  -     cyanocobalamin injection 1,000 mcg  -     Urinalysis; Future; Expected date: 02/05/2019  -     Urine culture; Future; Expected date: 02/05/2019  -     adalimumab (HUMIRA,CF, PEN) 40 mg/0.4 mL PnKt; Inject 0.4 mLs (40 mg total) into the skin every 14 (fourteen) days.  Dispense: 2 pen; Refill: 12  -     X-Ray Cervical Spine AP And Lateral; Future; Expected date: 02/05/2019  -     X-Ray Thoracic Spine AP Lateral; Future; Expected date: 02/05/2019  -     X-Ray Lumbar Complete With Flex And Ext; Future; Expected date: 02/05/2019  -     X-Ray Sacroiliac Joints 3 Views; Future; Expected date: 02/05/2019    PSA (psoriatic arthritis)  -     X-Ray Cervical Spine AP And Lateral; Future; Expected date: 02/05/2019  -     X-Ray Thoracic Spine AP Lateral; Future; Expected date: 02/05/2019  -     X-Ray Lumbar Complete With Flex And Ext; Future; Expected date: 02/05/2019  -     X-Ray Sacroiliac Joints 3 Views; Future; Expected date: 02/05/2019    Chronic bilateral low back pain with bilateral sciatica  -     X-Ray Cervical Spine AP And Lateral; Future; Expected date: 02/05/2019  -     X-Ray Thoracic Spine AP Lateral; Future; Expected date: 02/05/2019  -     X-Ray Lumbar Complete With Flex And Ext; Future; Expected date: 02/05/2019  -     X-Ray Sacroiliac Joints 3 Views; Future; Expected date: 02/05/2019     dehydrate  Will start Humira once cleared.

## 2019-02-06 ENCOUNTER — PATIENT MESSAGE (OUTPATIENT)
Dept: RHEUMATOLOGY | Facility: CLINIC | Age: 49
End: 2019-02-06

## 2019-02-06 RX ORDER — TRAZODONE HYDROCHLORIDE 50 MG/1
TABLET ORAL
Qty: 30 TABLET | Refills: 2 | Status: SHIPPED | OUTPATIENT
Start: 2019-02-06 | End: 2019-05-02 | Stop reason: SDUPTHER

## 2019-02-06 NOTE — PROGRESS NOTES
Administered 1 cc ( 1000 mcg/ml ) of b12 to the right upper outer gluteal. Informed of s/s to report verbalized understanding. No adverse reactions noted.    Lot # [id number]  Expiration may 20    Administered 2 cc ( 80 mg/ml ) of depomedrol to the right upper outer gluteal. Informed of s/s to report verbalized understanding. No adverse reactions noted.    Lot # [id number]  Expiration 02/20    Administered 2 cc ( 30 mg/ml ) of toradol to the left upper outer gluteal. Informed of s/s to report verbalized understanding. No adverse reactions noted.    Lot # [id number]  Expiration 1 mar 2020

## 2019-02-07 ENCOUNTER — TELEPHONE (OUTPATIENT)
Dept: PHARMACY | Facility: CLINIC | Age: 49
End: 2019-02-07

## 2019-02-07 LAB — BACTERIA UR CULT: NORMAL

## 2019-02-07 RX ORDER — TRAMADOL HYDROCHLORIDE 50 MG/1
50 TABLET ORAL EVERY 6 HOURS
Qty: 90 TABLET | Refills: 3 | Status: SHIPPED | OUTPATIENT
Start: 2019-02-07 | End: 2019-06-19 | Stop reason: SDUPTHER

## 2019-02-07 RX ORDER — BUTALBITAL AND ACETAMINOPHEN 300; 50 MG/1; MG/1
1 TABLET ORAL EVERY 4 HOURS
Qty: 60 TABLET | Refills: 3 | Status: SHIPPED | OUTPATIENT
Start: 2019-02-07 | End: 2020-06-26

## 2019-02-14 RX ORDER — CLONAZEPAM 1 MG/1
TABLET ORAL
Qty: 90 TABLET | Refills: 0 | Status: SHIPPED | OUTPATIENT
Start: 2019-02-14 | End: 2019-03-14 | Stop reason: SDUPTHER

## 2019-02-21 ENCOUNTER — PATIENT MESSAGE (OUTPATIENT)
Dept: PSYCHIATRY | Facility: CLINIC | Age: 49
End: 2019-02-21

## 2019-02-21 ENCOUNTER — DOCUMENTATION ONLY (OUTPATIENT)
Dept: PSYCHIATRY | Facility: CLINIC | Age: 49
End: 2019-02-21

## 2019-02-21 NOTE — PROGRESS NOTES
Pt did not show for appointment.  She was last seen on 1/16 and has cancelled 3 appointments since then.  She was notified today by my [facility] mail that if she does not cancel future appointments with at least 24 hours notice, she will terminated from my care.

## 2019-02-27 DIAGNOSIS — E03.9 HYPOTHYROIDISM, UNSPECIFIED TYPE: ICD-10-CM

## 2019-02-28 RX ORDER — ALOSETRON HYDROCHLORIDE 0.5 MG/1
TABLET ORAL
Qty: 60 TABLET | Refills: 0 | Status: SHIPPED | OUTPATIENT
Start: 2019-02-28 | End: 2019-04-17 | Stop reason: SDUPTHER

## 2019-02-28 RX ORDER — LEVOTHYROXINE SODIUM 50 UG/1
TABLET ORAL
Qty: 30 TABLET | Refills: 3 | Status: SHIPPED | OUTPATIENT
Start: 2019-02-28 | End: 2019-07-26 | Stop reason: SDUPTHER

## 2019-03-01 RX ORDER — TIZANIDINE 4 MG/1
TABLET ORAL
Qty: 90 TABLET | Refills: 0 | Status: SHIPPED | OUTPATIENT
Start: 2019-03-01 | End: 2019-03-14 | Stop reason: SDUPTHER

## 2019-03-11 DIAGNOSIS — R53.82 CHRONIC FATIGUE: ICD-10-CM

## 2019-03-11 DIAGNOSIS — F33.1 MAJOR DEPRESSIVE DISORDER, RECURRENT EPISODE, MODERATE: ICD-10-CM

## 2019-03-11 DIAGNOSIS — M06.00 SERONEGATIVE RHEUMATOID ARTHRITIS: ICD-10-CM

## 2019-03-11 DIAGNOSIS — F43.10 PTSD (POST-TRAUMATIC STRESS DISORDER): ICD-10-CM

## 2019-03-11 DIAGNOSIS — F41.1 GENERALIZED ANXIETY DISORDER: ICD-10-CM

## 2019-03-11 DIAGNOSIS — M79.7 FIBROMYALGIA: ICD-10-CM

## 2019-03-11 RX ORDER — GABAPENTIN 800 MG/1
800 TABLET ORAL 3 TIMES DAILY
Qty: 90 TABLET | Refills: 2 | Status: SHIPPED | OUTPATIENT
Start: 2019-03-11 | End: 2019-11-07

## 2019-03-14 ENCOUNTER — PATIENT MESSAGE (OUTPATIENT)
Dept: PSYCHIATRY | Facility: CLINIC | Age: 49
End: 2019-03-14

## 2019-03-14 DIAGNOSIS — L40.50 PSORIATIC ARTHRITIS: Primary | ICD-10-CM

## 2019-03-14 DIAGNOSIS — M79.7 FIBROMYALGIA: ICD-10-CM

## 2019-03-15 RX ORDER — TIZANIDINE 4 MG/1
4 TABLET ORAL EVERY 8 HOURS PRN
Qty: 90 TABLET | Refills: 1 | Status: SHIPPED | OUTPATIENT
Start: 2019-03-15 | End: 2019-11-07 | Stop reason: SDUPTHER

## 2019-03-15 RX ORDER — CLONAZEPAM 1 MG/1
TABLET ORAL
Qty: 90 TABLET | Refills: 2 | Status: SHIPPED | OUTPATIENT
Start: 2019-03-15 | End: 2019-05-02 | Stop reason: SDUPTHER

## 2019-03-15 RX ORDER — HYDROXYCHLOROQUINE SULFATE 200 MG/1
TABLET, FILM COATED ORAL
Qty: 60 TABLET | Refills: 5 | Status: SHIPPED | OUTPATIENT
Start: 2019-03-15 | End: 2019-09-26 | Stop reason: SDUPTHER

## 2019-03-22 ENCOUNTER — TELEPHONE (OUTPATIENT)
Dept: RHEUMATOLOGY | Facility: CLINIC | Age: 49
End: 2019-03-22

## 2019-03-22 NOTE — TELEPHONE ENCOUNTER
----- Message from Tosha Brito sent at 3/22/2019  2:59 PM CDT -----    Type:  Pharmacy Calling to Clarify an RX    Name of Caller:  raquel  [facility]   [phone]  Prescription Name:  humuria 40  mg  What do they need to clarify?: refills  Best Call Back Number: [phone]  Op 4     Or  Fax to : [phone]

## 2019-03-22 NOTE — TELEPHONE ENCOUNTER
Spoke with [facility], they need scripts for Humira refills for pt. Advised we would have Dr. Samaniego send script on for patient.

## 2019-04-18 RX ORDER — ALOSETRON HYDROCHLORIDE 0.5 MG/1
TABLET ORAL
Qty: 60 TABLET | Refills: 0 | Status: SHIPPED | OUTPATIENT
Start: 2019-04-18 | End: 2019-05-24 | Stop reason: SDUPTHER

## 2019-04-18 RX ORDER — PROMETHAZINE HYDROCHLORIDE 25 MG/1
TABLET ORAL
Qty: 60 TABLET | Refills: 0 | Status: SHIPPED | OUTPATIENT
Start: 2019-04-18 | End: 2019-05-24 | Stop reason: SDUPTHER

## 2019-04-24 DIAGNOSIS — M79.7 FIBROMYALGIA: Primary | ICD-10-CM

## 2019-04-26 RX ORDER — CYCLOBENZAPRINE HCL 10 MG
TABLET ORAL
Qty: 90 TABLET | Refills: 2 | Status: SHIPPED | OUTPATIENT
Start: 2019-04-26 | End: 2019-10-16 | Stop reason: SDUPTHER

## 2019-04-29 ENCOUNTER — TELEPHONE (OUTPATIENT)
Dept: RHEUMATOLOGY | Facility: CLINIC | Age: 49
End: 2019-04-29

## 2019-04-29 NOTE — TELEPHONE ENCOUNTER
----- Message from Alexia Rome sent at 4/29/2019  1:23 PM CDT -----  FYI: Financial Assistance for Humira approved from 4/29/19 to 8/21/19 through [employer] .  Patient has been notified of the approval, and will call to schedule a delivery of the medication.     Thank you,  Alexia Rome  Patient Care Advocate  [facility]  Ph: [phone]

## 2019-04-29 NOTE — TELEPHONE ENCOUNTER
Patient notified of Humira assistance approval. She will call to schedule delivery and injection training if desired, and notify MD of start date so follow up labs may be scheduled. Made patient aware to call [facility] or MD if renewal assistance is needed. Patient verbalized understanding.

## 2019-04-29 NOTE — TELEPHONE ENCOUNTER
FOR DOCUMENTATION ONLY:  Financial Assistance for Humira approved from 4/29/19 to 8/21/19  Source: [employer]  Phone: [phone]  Fax: [phone]

## 2019-05-02 ENCOUNTER — OFFICE VISIT (OUTPATIENT)
Dept: DERMATOLOGY | Facility: CLINIC | Age: 49
End: 2019-05-02
Payer: COMMERCIAL

## 2019-05-02 ENCOUNTER — OFFICE VISIT (OUTPATIENT)
Dept: PSYCHIATRY | Facility: CLINIC | Age: 49
End: 2019-05-02
Payer: COMMERCIAL

## 2019-05-02 VITALS
BODY MASS INDEX: 32.33 KG/M2 | DIASTOLIC BLOOD PRESSURE: 71 MMHG | SYSTOLIC BLOOD PRESSURE: 140 MMHG | WEIGHT: 160.38 LBS | HEART RATE: 116 BPM | HEIGHT: 59 IN

## 2019-05-02 DIAGNOSIS — F33.0 MAJOR DEPRESSIVE DISORDER, RECURRENT EPISODE, MILD: Primary | ICD-10-CM

## 2019-05-02 DIAGNOSIS — D23.72 DERMATOFIBROMA OF LEFT LOWER LEG: ICD-10-CM

## 2019-05-02 DIAGNOSIS — L85.3 XEROSIS CUTIS: Primary | ICD-10-CM

## 2019-05-02 DIAGNOSIS — L82.1 SEBORRHEIC KERATOSES: ICD-10-CM

## 2019-05-02 DIAGNOSIS — L29.9 PRURITUS: ICD-10-CM

## 2019-05-02 DIAGNOSIS — D22.5 MELANOCYTIC NEVI OF TRUNK: ICD-10-CM

## 2019-05-02 PROCEDURE — 99214 PR OFFICE/OUTPT VISIT, EST, LEVL IV, 30-39 MIN: ICD-10-PCS | Mod: S$GLB,,, | Performed by: DERMATOLOGY

## 2019-05-02 PROCEDURE — 99214 OFFICE O/P EST MOD 30 MIN: CPT | Mod: S$GLB,,, | Performed by: PSYCHIATRY & NEUROLOGY

## 2019-05-02 PROCEDURE — 99214 OFFICE O/P EST MOD 30 MIN: CPT | Mod: S$GLB,,, | Performed by: DERMATOLOGY

## 2019-05-02 PROCEDURE — 99999 PR PBB SHADOW E&M-EST. PATIENT-LVL II: ICD-10-PCS | Mod: PBBFAC,,, | Performed by: PSYCHIATRY & NEUROLOGY

## 2019-05-02 PROCEDURE — 3008F PR BODY MASS INDEX (BMI) DOCUMENTED: ICD-10-PCS | Mod: CPTII,S$GLB,, | Performed by: PSYCHIATRY & NEUROLOGY

## 2019-05-02 PROCEDURE — 3008F BODY MASS INDEX DOCD: CPT | Mod: CPTII,S$GLB,, | Performed by: PSYCHIATRY & NEUROLOGY

## 2019-05-02 PROCEDURE — 99999 PR PBB SHADOW E&M-EST. PATIENT-LVL II: CPT | Mod: PBBFAC,,, | Performed by: PSYCHIATRY & NEUROLOGY

## 2019-05-02 PROCEDURE — 99214 PR OFFICE/OUTPT VISIT, EST, LEVL IV, 30-39 MIN: ICD-10-PCS | Mod: S$GLB,,, | Performed by: PSYCHIATRY & NEUROLOGY

## 2019-05-02 RX ORDER — ARIPIPRAZOLE 5 MG/1
5 TABLET ORAL DAILY
Qty: 30 TABLET | Refills: 11 | Status: SHIPPED | OUTPATIENT
Start: 2019-05-02 | End: 2019-09-04

## 2019-05-02 RX ORDER — TRAZODONE HYDROCHLORIDE 50 MG/1
50 TABLET ORAL NIGHTLY PRN
Qty: 30 TABLET | Refills: 2 | Status: SHIPPED | OUTPATIENT
Start: 2019-05-02 | End: 2019-09-04 | Stop reason: SDUPTHER

## 2019-05-02 RX ORDER — CLONAZEPAM 1 MG/1
1 TABLET ORAL 3 TIMES DAILY
Qty: 90 TABLET | Refills: 2 | Status: SHIPPED | OUTPATIENT
Start: 2019-05-02 | End: 2019-05-24 | Stop reason: SDUPTHER

## 2019-05-02 NOTE — PROGRESS NOTES
Subjective:       Patient ID:  Marilee Simons is a 49 y.o. female who presents for   Chief Complaint   Patient presents with    Rash     all over     Rash  - Initial  Affected locations: diffuse  Duration: 1 year  Signs / symptoms: itching, dryness, redness and irritated  Severity: mild to moderate  Timing: intermittent  Aggravated by: scratching (dry skin, showers, animals and pollen)  Relieving factors/Treatments tried: antihistamines and OTC moisturizer (washes with free & clear, Cerave cream)  Improvement on treatment: mild      Review of Systems   Musculoskeletal: Negative for joint swelling and arthralgias.   Skin: Positive for itching, rash (only after scratching) and dry skin.   Hematologic/Lymphatic: Bruises/bleeds easily (due to Ibuprofen).        Objective:    Physical Exam   Constitutional: She appears well-developed and well-nourished. No distress.   HENT:   Mouth/Throat: Lips normal.    Eyes: Lids are normal.  No conjunctival no injection.   Neurological: She is alert and oriented to person, place, and time. She is not disoriented.   Psychiatric: She has a normal mood and affect.   Skin:   Areas Examined (abnormalities noted in diagram):   Head / Face Inspection Performed  Neck Inspection Performed  Chest / Axilla Inspection Performed  Abdomen Inspection Performed  Back Inspection Performed  RUE Inspected  LUE Inspection Performed  RLE Inspected  LLE Inspection Performed  Nails and Digits Inspection Performed                       Diagram Legend     Erythematous scaling macule/papule c/w actinic keratosis       Vascular papule c/w angioma      Pigmented verrucoid papule/plaque c/w seborrheic keratosis      Yellow umbilicated papule c/w sebaceous hyperplasia      Irregularly shaped tan macule c/w lentigo     1-2 mm smooth white papules consistent with Milia      Movable subcutaneous cyst with punctum c/w epidermal inclusion cyst      Subcutaneous movable cyst c/w pilar cyst      Firm pink to brown  papule c/w dermatofibroma      Pedunculated fleshy papule(s) c/w skin tag(s)      Evenly pigmented macule c/w junctional nevus     Mildly variegated pigmented, slightly irregular-bordered macule c/w mildly atypical nevus      Flesh colored to evenly pigmented papule c/w intradermal nevus       Pink pearly papule/plaque c/w basal cell carcinoma      Erythematous hyperkeratotic cursted plaque c/w SCC      Surgical scar with no sign of skin cancer recurrence      Open and closed comedones      Inflammatory papules and pustules      Verrucoid papule consistent consistent with wart     Erythematous eczematous patches and plaques     Dystrophic onycholytic nail with subungual debris c/w onychomycosis     Umbilicated papule    Erythematous-base heme-crusted tan verrucoid plaque consistent with inflamed seborrheic keratosis     Erythematous Silvery Scaling Plaque c/w Psoriasis     See annotation      Assessment / Plan:        Xerosis cutis  - Good skin care regimen was discussed, including limiting to one bath or shower per day, using lukewarm water with mild soap, and applying a moisturizing cream to skin 1-2 times per day.   - Recommended CeraVe hydrating cleanser, CeraVe healing ointment, and All Free and Clear detergent.  - Handout was reviewed with and provided to the patient.    Pruritus  Recommended CeraVe Itch Relief cream/lotion or Sarna sensitive lotion. Can store these in the refrigerator for an added cooling effect.    Seborrheic keratoses  These are benign, inherited growths without a malignant potential. Reassurance given to patient. No treatment is necessary. Handout was provided.  Discussed with the patient that treatment of these benign lesions is not covered by insurance as it is considered cosmetic. Warned about risk of scarring and hypo- or hyperpigmentation with treatment, as well as risk of recurrence and/or development of more lesions.    Melanocytic nevi of trunk  Multiple benign-appearing nevi  present on exam today. Reassurance provided. Counseled patient to periodically examine moles and return to clinic if any changes in size, shape, or color are noted or if it becomes symptomatic (bleeding, itching, pain, etc).    Dermatofibroma of left lower leg  Reassurance given to patient. No treatment is necessary.  This is a benign growth that sometimes occurs as a result of trauma or insect bites.      Follow up if symptoms worsen or fail to improve.

## 2019-05-02 NOTE — PROGRESS NOTES
"Outpatient Psychiatry Follow-Up Visit (MD/NP)    5/2/2019    Clinical Status of Patient:  Outpatient (Ambulatory)    Chief Complaint:  Marilee Simons is a 49 y.o. female who presents today for follow-up of depression.  Met with patient.      Interval History and Content of Current Session:  Interim Events/Subjective Report/Content of Current Session: She continues to deal with multiple medical problems.  Unhappy with her marriage, but reluctant to leave him.  Daughter is finishing up high school.  Encouraged pt to resume therapy.    Psychotherapy:  · Target symptoms: depression  · Why chosen therapy is appropriate versus another modality: relevant to diagnosis  · Outcome monitoring methods: self-report, observation  · Therapeutic intervention type: supportive psychotherapy  · Topics discussed/themes: relationships difficulties, difficulty managing affect in interpersonal relationships, building skills sets for symptom management, symptom recognition  · The patient's response to the intervention is accepting. The patient's progress toward treatment goals is fair.   · Duration of intervention: 10 minutes.    Review of Systems   · PSYCHIATRIC: Pertinant items are noted in the narrative.    Past Medical, Family and Social History: The patient's past medical, family and social history have been reviewed and updated as appropriate within the electronic medical record - see encounter notes.    Compliance: yes    Side effects: None    Risk Parameters:  Patient reports no suicidal ideation  Patient reports no homicidal ideation  Patient reports no self-injurious behavior  Patient reports no violent behavior    Exam (detailed: at least 9 elements; comprehensive: all 15 elements)   Constitutional  Vitals:  Most recent vital signs, dated less than 90 days prior to this appointment, were reviewed.   Vitals:    05/02/19 1055   BP: (!) 140/71   Pulse: (!) 116   Weight: 72.7 kg (160 lb 6.2 oz)   Height: 4' 11" (1.499 m)      "   General:  unremarkable, age appropriate     Musculoskeletal  Muscle Strength/Tone:  no tremor   Gait & Station:  non-ataxic     Psychiatric  Speech:  no latency; no press   Mood & Affect:  anxious  congruent and appropriate   Thought Process:  normal and logical   Associations:  intact   Thought Content:  normal, no suicidality, no homicidality, delusions, or paranoia   Insight:  intact   Judgement: behavior is adequate to circumstances   Orientation:  grossly intact   Memory: intact for content of interview   Language: grossly intact   Attention Span & Concentration:  able to focus   Fund of Knowledge:  intact and appropriate to age and level of education     Assessment and Diagnosis   Status/Progress: Based on the examination today, the patient's problem(s) is/are adequately but not ideally controlled.  New problems have not been presented today.   Co-morbidities are not complicating management of the primary condition.  There are no active rule-out diagnoses for this patient at this time.     General Impression: Mildly depressed      ICD-10-CM ICD-9-CM   1. Major depressive disorder, recurrent episode, mild F33.0 296.31       Intervention/Counseling/Treatment Plan   · Medication Management: Continue current medications.      Return to Clinic: 3 months

## 2019-05-23 ENCOUNTER — PATIENT MESSAGE (OUTPATIENT)
Dept: RHEUMATOLOGY | Facility: CLINIC | Age: 49
End: 2019-05-23

## 2019-05-24 ENCOUNTER — LAB VISIT (OUTPATIENT)
Dept: LAB | Facility: HOSPITAL | Age: 49
End: 2019-05-24
Attending: OBSTETRICS & GYNECOLOGY
Payer: COMMERCIAL

## 2019-05-24 ENCOUNTER — OFFICE VISIT (OUTPATIENT)
Dept: OBSTETRICS AND GYNECOLOGY | Facility: CLINIC | Age: 49
End: 2019-05-24
Payer: COMMERCIAL

## 2019-05-24 VITALS
WEIGHT: 161.94 LBS | DIASTOLIC BLOOD PRESSURE: 82 MMHG | BODY MASS INDEX: 32.64 KG/M2 | SYSTOLIC BLOOD PRESSURE: 136 MMHG | HEIGHT: 59 IN

## 2019-05-24 DIAGNOSIS — Z01.89 ROUTINE LAB DRAW: ICD-10-CM

## 2019-05-24 DIAGNOSIS — Z01.89 ROUTINE LAB DRAW: Primary | ICD-10-CM

## 2019-05-24 DIAGNOSIS — Z12.4 ROUTINE PAPANICOLAOU SMEAR: ICD-10-CM

## 2019-05-24 DIAGNOSIS — Z01.419 WELL WOMAN EXAM WITH ROUTINE GYNECOLOGICAL EXAM: ICD-10-CM

## 2019-05-24 LAB
BASOPHILS # BLD AUTO: 0.05 K/UL (ref 0–0.2)
BASOPHILS NFR BLD: 0.6 % (ref 0–1.9)
DIFFERENTIAL METHOD: ABNORMAL
EOSINOPHIL # BLD AUTO: 0.1 K/UL (ref 0–0.5)
EOSINOPHIL NFR BLD: 1.3 % (ref 0–8)
ERYTHROCYTE [DISTWIDTH] IN BLOOD BY AUTOMATED COUNT: 14.3 % (ref 11.5–14.5)
FSH SERPL-ACNC: 15.1 MIU/ML
HCT VFR BLD AUTO: 37.9 % (ref 37–48.5)
HGB BLD-MCNC: 12.5 G/DL (ref 12–16)
LYMPHOCYTES # BLD AUTO: 3.4 K/UL (ref 1–4.8)
LYMPHOCYTES NFR BLD: 37.8 % (ref 18–48)
MCH RBC QN AUTO: 29.9 PG (ref 27–31)
MCHC RBC AUTO-ENTMCNC: 33 G/DL (ref 32–36)
MCV RBC AUTO: 91 FL (ref 82–98)
MONOCYTES # BLD AUTO: 1.1 K/UL (ref 0.3–1)
MONOCYTES NFR BLD: 11.9 % (ref 4–15)
NEUTROPHILS # BLD AUTO: 4.3 K/UL (ref 1.8–7.7)
NEUTROPHILS NFR BLD: 48.4 % (ref 38–73)
PLATELET # BLD AUTO: 306 K/UL (ref 150–350)
PMV BLD AUTO: 9.1 FL (ref 9.2–12.9)
RBC # BLD AUTO: 4.18 M/UL (ref 4–5.4)
TSH SERPL DL<=0.005 MIU/L-ACNC: 1.45 UIU/ML (ref 0.4–4)
WBC # BLD AUTO: 8.97 K/UL (ref 3.9–12.7)

## 2019-05-24 PROCEDURE — 84443 ASSAY THYROID STIM HORMONE: CPT

## 2019-05-24 PROCEDURE — 88175 CYTOPATH C/V AUTO FLUID REDO: CPT

## 2019-05-24 PROCEDURE — 99999 PR PBB SHADOW E&M-EST. PATIENT-LVL IV: CPT | Mod: PBBFAC,,, | Performed by: OBSTETRICS & GYNECOLOGY

## 2019-05-24 PROCEDURE — 83001 ASSAY OF GONADOTROPIN (FSH): CPT

## 2019-05-24 PROCEDURE — 99386 PR PREVENTIVE VISIT,NEW,40-64: ICD-10-PCS | Mod: S$GLB,,, | Performed by: OBSTETRICS & GYNECOLOGY

## 2019-05-24 PROCEDURE — 85025 COMPLETE CBC W/AUTO DIFF WBC: CPT

## 2019-05-24 PROCEDURE — 99999 PR PBB SHADOW E&M-EST. PATIENT-LVL IV: ICD-10-PCS | Mod: PBBFAC,,, | Performed by: OBSTETRICS & GYNECOLOGY

## 2019-05-24 PROCEDURE — 99386 PREV VISIT NEW AGE 40-64: CPT | Mod: S$GLB,,, | Performed by: OBSTETRICS & GYNECOLOGY

## 2019-05-24 PROCEDURE — 36415 COLL VENOUS BLD VENIPUNCTURE: CPT

## 2019-05-24 RX ORDER — HYDROCODONE BITARTRATE AND ACETAMINOPHEN 7.5; 325 MG/1; MG/1
TABLET ORAL
COMMUNITY
Start: 2019-05-21 | End: 2019-06-19

## 2019-05-24 RX ORDER — METHENAMINE, SODIUM PHOSPHATE, MONOBASIC, MONOHYDRATE, PHENYL SALICYLATE, METHYLENE BLUE, AND HYOSCYAMINE SULFATE 120; 40.8; 36; 10; .12 MG/1; MG/1; MG/1; MG/1; MG/1
CAPSULE ORAL
Qty: 45 EACH | Refills: 6 | Status: SHIPPED | OUTPATIENT
Start: 2019-05-24 | End: 2019-06-19 | Stop reason: SDUPTHER

## 2019-05-24 NOTE — PROGRESS NOTES
"  HPI:  49 y.o.   OB History        2    Para   2    Term                AB        Living           SAB        TAB        Ectopic        Multiple        Live Births                  No LMP recorded (lmp unknown). Patient has had a hysterectomy.   Patient here for her annual gynecologic exam.  She has no complaints at this time.    ROS:  GENERAL: No fever, chills, fatigability or weight loss.  SKIN: No rashes, itching or changes in color or texture of skin.  HEAD: No headaches or recent head trauma.  EYES: Visual acuity fine. No photophobia, ocular pain or diplopia.  EARS: Denies ear pain, discharge or vertigo.  NOSE: No loss of smell, no epistaxis or postnasal drip.  MOUTH & THROAT: No hoarseness or change in voice. No excessive gum bleeding.  NODES: Denies swollen glands.  CHEST: Denies BRICE, cyanosis, wheezing, cough and sputum production.  CARDIOVASCULAR: Denies chest pain, PND, orthopnea or reduced exercise tolerance.  ABDOMEN: Appetite fine. No weight loss. Denies diarrhea, abdominal pain, hematemesis or blood in stool.  URINARY: No flank pain, dysuria or hematuria.  PERIPHERAL VASCULAR: No claudication or cyanosis.  MUSCULOSKELETAL: No joint stiffness or swelling. Denies back pain.  NEUROLOGIC: No history of seizures, paralysis, alteration of gait or coordination.    PE:   /82   Ht 4' 11" (1.499 m)   Wt 73.5 kg (161 lb 14.9 oz)   LMP  (LMP Unknown)   BMI 32.71 kg/m²   APPEARANCE: Well nourished, well developed, in no acute distress.  NECK: Neck symmetric without masses or thyromegaly.  NODES: No inguinal lymph node enlargement.  ABDOMEN: Soft. No tenderness or masses. No hepatosplenomegaly. No hernias.  BREASTS: Symmetrical, no skin changes or visible lesions. No palpable masses, nipple discharge or adenopathy bilaterally.  PELVIC: Normal external female genitalia without lesions. Normal hair distribution. Adequate perineal body, normal urethral meatus. Vagina moist and well rugated " without lesions or discharge. No significant cystocele or rectocele. Uterus and cervix surgically absent. Bimanual exam revealed no masses, tenderness or abnormality.    Procedure:  Pap Smear    Assessment:  Normal Gynecologic Exam    Plan:  Mammogram and Colonoscopy as per current recommendations.   Return to clinic in one year or for any problems or complaints.  Ov in  Co bad hot flashes and emotional for about one year  hyst for bleeding problems  Numerous medical issues    Plan, labs, then prob trial low dose hrt,   Exam done then had to run out for delivery :)

## 2019-05-27 ENCOUNTER — TELEPHONE (OUTPATIENT)
Dept: OBSTETRICS AND GYNECOLOGY | Facility: CLINIC | Age: 49
End: 2019-05-27

## 2019-05-27 DIAGNOSIS — Z12.31 SCREENING MAMMOGRAM, ENCOUNTER FOR: Primary | ICD-10-CM

## 2019-05-27 RX ORDER — ESTRADIOL 0.5 MG/1
0.5 TABLET ORAL DAILY
Qty: 30 TABLET | Refills: 0 | Status: SHIPPED | OUTPATIENT
Start: 2019-05-27 | End: 2019-06-25 | Stop reason: SDUPTHER

## 2019-05-27 NOTE — TELEPHONE ENCOUNTER
----- Message from Carissa Ryan sent at 5/27/2019 11:36 AM CDT -----  Contact: [phone]/ self   Type:  Patient Returning Call    Who Called: Marilee Simons  Who Left Message for Patient: Melany  Does the patient know what this is regarding?: Lab results  Would the patient rather a call back or a response via [facility]? Call Back  Best Call Back Number: [phone]  Additional Information: n/a

## 2019-05-27 NOTE — TELEPHONE ENCOUNTER
Pt notified labs were all good and in perimenopause. Pt also notified a low dose estrogen was sent to her pharmacy to try out for a month and to update us.

## 2019-05-27 NOTE — TELEPHONE ENCOUNTER
Tell her labs were all good, in marlin menopause  i'm sending low dose estrogen to take for a month to see if   Helps with bad hot flashes, callus to update

## 2019-05-28 RX ORDER — ALOSETRON HYDROCHLORIDE 0.5 MG/1
TABLET ORAL
Qty: 60 TABLET | Refills: 0 | Status: SHIPPED | OUTPATIENT
Start: 2019-05-28 | End: 2019-09-01 | Stop reason: SDUPTHER

## 2019-06-07 ENCOUNTER — TELEPHONE (OUTPATIENT)
Dept: DERMATOLOGY | Facility: CLINIC | Age: 49
End: 2019-06-07

## 2019-06-07 RX ORDER — LIDOCAINE 40 MG/G
CREAM TOPICAL
COMMUNITY
End: 2019-06-19 | Stop reason: SDUPTHER

## 2019-06-10 RX ORDER — LIDOCAINE AND PRILOCAINE 25; 25 MG/G; MG/G
CREAM TOPICAL
Qty: 30 G | Refills: 0 | Status: SHIPPED | OUTPATIENT
Start: 2019-06-10 | End: 2020-10-01

## 2019-06-11 ENCOUNTER — TELEPHONE (OUTPATIENT)
Dept: UROLOGY | Facility: CLINIC | Age: 49
End: 2019-06-11

## 2019-06-11 DIAGNOSIS — N32.89 BLADDER SPASM: ICD-10-CM

## 2019-06-11 DIAGNOSIS — R39.82 CHRONIC BLADDER PAIN: Primary | ICD-10-CM

## 2019-06-11 RX ORDER — OXYBUTYNIN CHLORIDE 5 MG/1
5 TABLET ORAL 3 TIMES DAILY
Qty: 90 TABLET | Refills: 12 | Status: SHIPPED | OUTPATIENT
Start: 2019-06-11 | End: 2020-11-12 | Stop reason: SDUPTHER

## 2019-06-11 RX ORDER — PHENAZOPYRIDINE HYDROCHLORIDE 200 MG/1
200 TABLET, FILM COATED ORAL 3 TIMES DAILY PRN
Qty: 60 TABLET | Refills: 5 | Status: SHIPPED | OUTPATIENT
Start: 2019-06-11 | End: 2020-10-01

## 2019-06-11 NOTE — TELEPHONE ENCOUNTER
----- Message from Abby Stinson LPN sent at 6/11/2019 10:36 AM CDT -----  Contact: [phone]   Can you substitute?   ----- Message -----  From: Lakeisha Sage MA  Sent: 6/11/2019  10:08 AM  To: Jose SPAIN Staff    Needs Advice    Reason for call: pt uses USTELL 120-0.12 mg Cap and [facility]'s told her that this medication is back-ordered and they have no idea when it will be back in stock. She said all the [facility] pharmacy are out. She states that without this medication she get doubled over with pain         Communication Preference:[phone]    Additional Information: please advise

## 2019-06-11 NOTE — TELEPHONE ENCOUNTER
Chronic bladder pain  -     phenazopyridine (PYRIDIUM) 200 MG tablet; Take 1 tablet (200 mg total) by mouth 3 (three) times daily as needed for Pain.  Dispense: 60 tablet; Refill: 5    Bladder spasm  -     phenazopyridine (PYRIDIUM) 200 MG tablet; Take 1 tablet (200 mg total) by mouth 3 (three) times daily as needed for Pain.  Dispense: 60 tablet; Refill: 5  -     oxybutynin (DITROPAN) 5 MG Tab; Take 1 tablet (5 mg total) by mouth 3 (three) times daily.  Dispense: 90 tablet; Refill: 12

## 2019-06-14 ENCOUNTER — HOSPITAL ENCOUNTER (OUTPATIENT)
Dept: RADIOLOGY | Facility: HOSPITAL | Age: 49
Discharge: HOME | End: 2019-06-14
Attending: INTERNAL MEDICINE
Payer: COMMERCIAL

## 2019-06-14 DIAGNOSIS — M54.42 CHRONIC BILATERAL LOW BACK PAIN WITH BILATERAL SCIATICA: ICD-10-CM

## 2019-06-14 DIAGNOSIS — M54.41 CHRONIC BILATERAL LOW BACK PAIN WITH BILATERAL SCIATICA: ICD-10-CM

## 2019-06-14 DIAGNOSIS — M25.50 PAIN IN JOINT INVOLVING MULTIPLE SITES: ICD-10-CM

## 2019-06-14 DIAGNOSIS — M79.7 FIBROMYALGIA: ICD-10-CM

## 2019-06-14 DIAGNOSIS — L40.50 PSA (PSORIATIC ARTHRITIS): ICD-10-CM

## 2019-06-14 DIAGNOSIS — G89.29 CHRONIC BILATERAL LOW BACK PAIN WITH BILATERAL SCIATICA: ICD-10-CM

## 2019-06-14 DIAGNOSIS — R30.0 DYSURIA: ICD-10-CM

## 2019-06-14 DIAGNOSIS — E55.9 VITAMIN D INSUFFICIENCY: ICD-10-CM

## 2019-06-14 DIAGNOSIS — R53.82 CHRONIC FATIGUE: ICD-10-CM

## 2019-06-14 PROCEDURE — 72070 X-RAY EXAM THORAC SPINE 2VWS: CPT | Mod: 26,,, | Performed by: SPECIALIST

## 2019-06-14 PROCEDURE — 72110 X-RAY EXAM L-2 SPINE 4/>VWS: CPT | Mod: 26,,, | Performed by: SPECIALIST

## 2019-06-14 PROCEDURE — 72200 X-RAY EXAM SI JOINTS: CPT | Mod: 26,,, | Performed by: RADIOLOGY

## 2019-06-14 PROCEDURE — 72040 XR CERVICAL SPINE AP LATERAL: ICD-10-PCS | Mod: 26,,, | Performed by: SPECIALIST

## 2019-06-14 PROCEDURE — 72200 X-RAY EXAM SI JOINTS: CPT | Mod: TC,FY

## 2019-06-14 PROCEDURE — 72040 X-RAY EXAM NECK SPINE 2-3 VW: CPT | Mod: TC,FY

## 2019-06-14 PROCEDURE — 72070 XR THORACIC SPINE AP LATERAL: ICD-10-PCS | Mod: 26,,, | Performed by: SPECIALIST

## 2019-06-14 PROCEDURE — 72110 XR LUMBAR SPINE 5 VIEW WITH FLEX AND EXT: ICD-10-PCS | Mod: 26,,, | Performed by: SPECIALIST

## 2019-06-14 PROCEDURE — 72110 X-RAY EXAM L-2 SPINE 4/>VWS: CPT | Mod: TC,FY

## 2019-06-14 PROCEDURE — 72200 XR SACROILIAC JOINTS 3 VIEWS: ICD-10-PCS | Mod: 26,,, | Performed by: RADIOLOGY

## 2019-06-14 PROCEDURE — 72070 X-RAY EXAM THORAC SPINE 2VWS: CPT | Mod: TC,FY

## 2019-06-14 PROCEDURE — 72040 X-RAY EXAM NECK SPINE 2-3 VW: CPT | Mod: 26,,, | Performed by: SPECIALIST

## 2019-06-19 ENCOUNTER — LAB VISIT (OUTPATIENT)
Dept: LAB | Facility: HOSPITAL | Age: 49
End: 2019-06-19
Attending: INTERNAL MEDICINE
Payer: COMMERCIAL

## 2019-06-19 ENCOUNTER — OFFICE VISIT (OUTPATIENT)
Dept: RHEUMATOLOGY | Facility: CLINIC | Age: 49
End: 2019-06-19
Payer: COMMERCIAL

## 2019-06-19 VITALS
SYSTOLIC BLOOD PRESSURE: 137 MMHG | HEART RATE: 99 BPM | BODY MASS INDEX: 32.25 KG/M2 | WEIGHT: 160 LBS | HEIGHT: 59 IN | DIASTOLIC BLOOD PRESSURE: 89 MMHG

## 2019-06-19 DIAGNOSIS — M54.42 CHRONIC BILATERAL LOW BACK PAIN WITH BILATERAL SCIATICA: ICD-10-CM

## 2019-06-19 DIAGNOSIS — M06.00 SERONEGATIVE RHEUMATOID ARTHRITIS: ICD-10-CM

## 2019-06-19 DIAGNOSIS — G89.29 CHRONIC BILATERAL LOW BACK PAIN WITH BILATERAL SCIATICA: ICD-10-CM

## 2019-06-19 DIAGNOSIS — M54.41 CHRONIC BILATERAL LOW BACK PAIN WITH BILATERAL SCIATICA: ICD-10-CM

## 2019-06-19 DIAGNOSIS — R53.82 CHRONIC FATIGUE: ICD-10-CM

## 2019-06-19 DIAGNOSIS — M79.7 FIBROMYALGIA: ICD-10-CM

## 2019-06-19 DIAGNOSIS — M81.0 OSTEOPOROSIS, UNSPECIFIED OSTEOPOROSIS TYPE, UNSPECIFIED PATHOLOGICAL FRACTURE PRESENCE: ICD-10-CM

## 2019-06-19 DIAGNOSIS — M81.0 OSTEOPOROSIS, UNSPECIFIED OSTEOPOROSIS TYPE, UNSPECIFIED PATHOLOGICAL FRACTURE PRESENCE: Primary | ICD-10-CM

## 2019-06-19 LAB
25(OH)D3+25(OH)D2 SERPL-MCNC: 30 NG/ML (ref 30–96)
BASOPHILS # BLD AUTO: 0.06 K/UL (ref 0–0.2)
BASOPHILS NFR BLD: 0.8 % (ref 0–1.9)
DIFFERENTIAL METHOD: ABNORMAL
EOSINOPHIL # BLD AUTO: 0.1 K/UL (ref 0–0.5)
EOSINOPHIL NFR BLD: 1.2 % (ref 0–8)
ERYTHROCYTE [DISTWIDTH] IN BLOOD BY AUTOMATED COUNT: 12.5 % (ref 11.5–14.5)
ERYTHROCYTE [SEDIMENTATION RATE] IN BLOOD BY WESTERGREN METHOD: 5 MM/HR (ref 0–20)
HCT VFR BLD AUTO: 37.2 % (ref 37–48.5)
HGB BLD-MCNC: 11.5 G/DL (ref 12–16)
IMM GRANULOCYTES # BLD AUTO: 0.02 K/UL (ref 0–0.04)
IMM GRANULOCYTES NFR BLD AUTO: 0.3 % (ref 0–0.5)
LYMPHOCYTES # BLD AUTO: 2.7 K/UL (ref 1–4.8)
LYMPHOCYTES NFR BLD: 37.3 % (ref 18–48)
MCH RBC QN AUTO: 29.4 PG (ref 27–31)
MCHC RBC AUTO-ENTMCNC: 30.9 G/DL (ref 32–36)
MCV RBC AUTO: 95 FL (ref 82–98)
MONOCYTES # BLD AUTO: 0.6 K/UL (ref 0.3–1)
MONOCYTES NFR BLD: 7.5 % (ref 4–15)
NEUTROPHILS # BLD AUTO: 3.9 K/UL (ref 1.8–7.7)
NEUTROPHILS NFR BLD: 52.9 % (ref 38–73)
NRBC BLD-RTO: 0 /100 WBC
PLATELET # BLD AUTO: 333 K/UL (ref 150–350)
PMV BLD AUTO: 9.3 FL (ref 9.2–12.9)
RBC # BLD AUTO: 3.91 M/UL (ref 4–5.4)
T3FREE SERPL-MCNC: 2.8 PG/ML (ref 2.3–4.2)
T4 FREE SERPL-MCNC: 1.05 NG/DL (ref 0.71–1.51)
TSH SERPL DL<=0.005 MIU/L-ACNC: 1.42 UIU/ML (ref 0.4–4)
WBC # BLD AUTO: 7.29 K/UL (ref 3.9–12.7)

## 2019-06-19 PROCEDURE — 86038 ANTINUCLEAR ANTIBODIES: CPT

## 2019-06-19 PROCEDURE — 86225 DNA ANTIBODY NATIVE: CPT

## 2019-06-19 PROCEDURE — 82306 VITAMIN D 25 HYDROXY: CPT

## 2019-06-19 PROCEDURE — 85651 RBC SED RATE NONAUTOMATED: CPT | Mod: PO

## 2019-06-19 PROCEDURE — 84439 ASSAY OF FREE THYROXINE: CPT

## 2019-06-19 PROCEDURE — 99215 PR OFFICE/OUTPT VISIT, EST, LEVL V, 40-54 MIN: ICD-10-PCS | Mod: 25,S$GLB,, | Performed by: INTERNAL MEDICINE

## 2019-06-19 PROCEDURE — 86140 C-REACTIVE PROTEIN: CPT

## 2019-06-19 PROCEDURE — 3008F PR BODY MASS INDEX (BMI) DOCUMENTED: ICD-10-PCS | Mod: CPTII,S$GLB,, | Performed by: INTERNAL MEDICINE

## 2019-06-19 PROCEDURE — 96372 THER/PROPH/DIAG INJ SC/IM: CPT | Mod: S$GLB,,, | Performed by: INTERNAL MEDICINE

## 2019-06-19 PROCEDURE — 86235 NUCLEAR ANTIGEN ANTIBODY: CPT | Mod: 59

## 2019-06-19 PROCEDURE — 84481 FREE ASSAY (FT-3): CPT

## 2019-06-19 PROCEDURE — 84443 ASSAY THYROID STIM HORMONE: CPT

## 2019-06-19 PROCEDURE — 36415 COLL VENOUS BLD VENIPUNCTURE: CPT | Mod: PO

## 2019-06-19 PROCEDURE — 99215 OFFICE O/P EST HI 40 MIN: CPT | Mod: 25,S$GLB,, | Performed by: INTERNAL MEDICINE

## 2019-06-19 PROCEDURE — 86235 NUCLEAR ANTIGEN ANTIBODY: CPT

## 2019-06-19 PROCEDURE — 85025 COMPLETE CBC W/AUTO DIFF WBC: CPT

## 2019-06-19 PROCEDURE — 96372 PR INJECTION,THERAP/PROPH/DIAG2ST, IM OR SUBCUT: ICD-10-PCS | Mod: S$GLB,,, | Performed by: INTERNAL MEDICINE

## 2019-06-19 PROCEDURE — 83516 IMMUNOASSAY NONANTIBODY: CPT

## 2019-06-19 PROCEDURE — 86800 THYROGLOBULIN ANTIBODY: CPT

## 2019-06-19 PROCEDURE — 99999 PR PBB SHADOW E&M-EST. PATIENT-LVL III: CPT | Mod: PBBFAC,,, | Performed by: INTERNAL MEDICINE

## 2019-06-19 PROCEDURE — 80053 COMPREHEN METABOLIC PANEL: CPT

## 2019-06-19 PROCEDURE — 86376 MICROSOMAL ANTIBODY EACH: CPT

## 2019-06-19 PROCEDURE — 3008F BODY MASS INDEX DOCD: CPT | Mod: CPTII,S$GLB,, | Performed by: INTERNAL MEDICINE

## 2019-06-19 PROCEDURE — 99999 PR PBB SHADOW E&M-EST. PATIENT-LVL III: ICD-10-PCS | Mod: PBBFAC,,, | Performed by: INTERNAL MEDICINE

## 2019-06-19 RX ORDER — TRAMADOL HYDROCHLORIDE 50 MG/1
50 TABLET ORAL EVERY 6 HOURS
Qty: 90 TABLET | Refills: 3 | Status: SHIPPED | OUTPATIENT
Start: 2019-06-19 | End: 2019-11-07 | Stop reason: SDUPTHER

## 2019-06-19 RX ORDER — KETOROLAC TROMETHAMINE 30 MG/ML
60 INJECTION, SOLUTION INTRAMUSCULAR; INTRAVENOUS
Status: COMPLETED | OUTPATIENT
Start: 2019-06-19 | End: 2019-06-19

## 2019-06-19 RX ORDER — CYANOCOBALAMIN 1000 UG/ML
1000 INJECTION, SOLUTION INTRAMUSCULAR; SUBCUTANEOUS
Status: COMPLETED | OUTPATIENT
Start: 2019-06-19 | End: 2019-06-19

## 2019-06-19 RX ORDER — METHYLPREDNISOLONE ACETATE 80 MG/ML
160 INJECTION, SUSPENSION INTRA-ARTICULAR; INTRALESIONAL; INTRAMUSCULAR; SOFT TISSUE
Status: COMPLETED | OUTPATIENT
Start: 2019-06-19 | End: 2019-06-19

## 2019-06-19 RX ORDER — GABAPENTIN 800 MG/1
TABLET ORAL
Qty: 120 TABLET | Refills: 11 | Status: SHIPPED | OUTPATIENT
Start: 2019-06-19 | End: 2019-11-07

## 2019-06-19 RX ORDER — TRIAMCINOLONE ACETONIDE 0.25 MG/G
OINTMENT TOPICAL 2 TIMES DAILY
Qty: 80 G | Refills: 1
Start: 2019-06-19 | End: 2020-10-01

## 2019-06-19 RX ADMIN — METHYLPREDNISOLONE ACETATE 160 MG: 80 INJECTION, SUSPENSION INTRA-ARTICULAR; INTRALESIONAL; INTRAMUSCULAR; SOFT TISSUE at 04:06

## 2019-06-19 RX ADMIN — CYANOCOBALAMIN 1000 MCG: 1000 INJECTION, SOLUTION INTRAMUSCULAR; SUBCUTANEOUS at 04:06

## 2019-06-19 RX ADMIN — KETOROLAC TROMETHAMINE 60 MG: 30 INJECTION, SOLUTION INTRAMUSCULAR; INTRAVENOUS at 04:06

## 2019-06-19 NOTE — PROGRESS NOTES
Administered 1 cc ( 1000 mcg/ml ) of b12 to the right upper outer gluteal. Informed of s/s to report verbalized understanding. No adverse reactions noted.    Lot # [id number]  Expiration oct 20    Administered 2 cc ( 80 mg/ml ) of depomedrol to the right upper outer gluteal. Informed of s/s to report verbalized understanding. No adverse reactions noted.    Lot # [id number]  Expiration 10/20    Administered 2 cc ( 30 mg/ml ) of toradol to the left upper outer gluteal. Informed of s/s to report verbalized understanding. No adverse reactions noted.    Lot # [id number]  Expiration 1 may 2020

## 2019-06-19 NOTE — PROGRESS NOTES
Subjective:       Patient ID: Marilee Simons is a 49 y.o. female.    Chief Complaint: Fibromyalgia and Rheumatoid Arthritis (Seronegative Rheumatoid arthritis)    Follow up   PSA  Humira,  fibromyalgia,  Doing  Fair, very depreseed, flaring , interstitial cystitis   mcp, pip, wrist, knees and ankles feet and L spine pain on plaquenil.  Pain is located in multiple joints, both shoulder(s), both elbow(s), both wrist(s), both MCP(s): 1st, 2nd, 3rd, 4th and 5th, both PIP(s): 1st, 2nd, 3rd, 4th and 5th, both DIP(s): 1st and 2nd, both hip(s), both knee(s) and both MTP(s): 1st, 2nd, 3rd, 4th and 5th, is described as aching, pulsating, shooting and throbbing, and is constant, moderate .  Associated symptoms include: crepitation, decreased range of motion, edema, effusion, tenderness and warmth.   She had soft tissue swelling, mcp, pip, twrist.        Review of Systems   Constitutional: Positive for activity change. Negative for appetite change and unexpected weight change.   HENT: Negative for dental problem, ear discharge, ear pain, facial swelling, mouth sores, nosebleeds, postnasal drip, rhinorrhea, sinus pressure, sneezing, tinnitus and voice change.    Eyes: Negative for photophobia, pain, discharge, redness and itching.   Respiratory: Negative for apnea, chest tightness, shortness of breath and wheezing.    Cardiovascular: Positive for leg swelling. Negative for palpitations.   Gastrointestinal: Negative for abdominal distention, constipation and diarrhea.   Endocrine: Negative for cold intolerance, heat intolerance, polydipsia and polyuria.   Genitourinary: Negative for decreased urine volume, difficulty urinating, flank pain, frequency, hematuria and urgency.   Musculoskeletal: Positive for back pain and neck stiffness. Negative for gait problem.   Skin: Negative for pallor and wound.   Allergic/Immunologic: Negative for immunocompromised state.   Neurological: Negative for dizziness and tremors.   Hematological:  "Negative for adenopathy. Does not bruise/bleed easily.   Psychiatric/Behavioral: Negative for sleep disturbance. The patient is not nervous/anxious.          Objective:     /89 (BP Location: Left arm, Patient Position: Sitting, BP Method: Medium (Automatic))   Pulse 99   Ht 4' 11" (1.499 m)   Wt 72.6 kg (160 lb)   LMP  (LMP Unknown)   BMI 32.32 kg/m²      Physical Exam   Nursing note and vitals reviewed.  Constitutional: She is oriented to person, place, and time. No distress.   HENT:   Head: Normocephalic and atraumatic.   Mouth/Throat: Oropharynx is clear and moist.   Eyes: EOM are normal. Pupils are equal, round, and reactive to light.   Neck: Neck supple. No thyromegaly present.   Cardiovascular: Normal rate, regular rhythm and normal heart sounds.  Exam reveals no gallop and no friction rub.    No murmur heard.  Pulmonary/Chest: Breath sounds normal. She has no wheezes. She has no rales. She exhibits no tenderness.   Abdominal: There is no tenderness. There is no rebound and no guarding.       Right Side Rheumatological Exam     The patient is tender to palpation of the shoulder, elbow, wrist, knee, 1st PIP, 1st MCP, 2nd PIP, 2nd MCP, 3rd PIP, 3rd MCP, 4th PIP, 4th MCP, 5th PIP and 5th MCP    She has swelling of the wrist, 1st PIP, 1st MCP, 2nd PIP, 2nd MCP, 3rd PIP, 3rd MCP, 4th PIP, 4th MCP, 5th PIP and 5th MCP    The patient has an enlarged wrist, knee, 1st PIP, 1st MCP, 2nd PIP, 2nd MCP, 3rd PIP, 3rd MCP, 4th PIP, 4th MCP, 5th PIP and 5th MCP    Shoulder Exam   Tenderness Location: biceps tendon and clavicle    Range of Motion   Active abduction: abnormal   Adduction: abnormal  Sensation: normal    Knee Exam   Patellofemoral Crepitus: positive  Effusion: positive  Sensation: normal    Hip Exam   Tenderness Location: posterior, greater trochanter and lateral  Sensation: normal    Elbow/Wrist Exam   Tenderness Location: lateral epicondyle and medial epicondyle  Sensation: normal    Foot Exam "   Right foot exam exhibits signs of inflamed dorsum  Right foot exam exhibits signs of no podagra, no tophus and no plantar fasciitis    Muscle Strength (0-5 scale):  : 4/5     Left Side Rheumatological Exam     The patient is tender to palpation of the shoulder, elbow, wrist, knee, 1st PIP, 1st MCP, 2nd PIP, 2nd MCP, 3rd PIP, 3rd MCP, 4th PIP, 4th MCP, 5th PIP and 5th MCP.    She has swelling of the wrist, 1st PIP, 1st MCP, 2nd PIP, 2nd MCP, 3rd PIP, 3rd MCP, 4th PIP, 4th MCP, 5th PIP and 5th MCP    The patient has an enlarged wrist and knee.    Shoulder Exam   Tenderness Location: biceps tendon and clavicle    Range of Motion   Active abduction: abnormal   Sensation: normal    Knee Exam     Patellofemoral Crepitus: positive  Effusion: negative  Sensation: normal    Hip Exam   Tenderness Location: posterior, greater trochanter and lateral  Sensation: normal    Elbow/Wrist Exam   Tenderness Location: lateral epicondyle and medial epicondyle  Sensation: normal    Foot Exam   Left foot exam exhibits signs of inflamed dorsum  Left foot exam exhibits signs of no podagra and no plantar fasciitis    Muscle Strength (0-5 scale):  :  4/5       Back/Neck Exam   General Inspection   Gait: normal       Tenderness Right paramedian tenderness of the Occ, Upper C-Spine, Lower L-Spine and SI Joint.Left paramedian tenderness of the Occ, Upper C-Spine, Lower L-Spine and SI Joint.      Comments:  15 out of 18 tender points    Lymphadenopathy:     She has no cervical adenopathy.   Neurological: She is alert and oriented to person, place, and time.   Skin: No rash noted. No erythema. No pallor.     Psychiatric: Affect normal. Her mood appears anxious. She exhibits a depressed mood. She expresses no suicidal plans and no homicidal plans.   Musculoskeletal: She exhibits tenderness and deformity.   Sausage digits, Synovitis pip 2,3 B and B wrist with warmth to the touch         Results for orders placed or performed in visit on  05/24/19   Follicle stimulating hormone   Result Value Ref Range    Follicle Stimulating Hormone 15.10 See Text mIU/mL   TSH   Result Value Ref Range    TSH 1.447 0.400 - 4.000 uIU/mL   CBC auto differential   Result Value Ref Range    WBC 8.97 3.90 - 12.70 K/uL    RBC 4.18 4.00 - 5.40 M/uL    Hemoglobin 12.5 12.0 - 16.0 g/dL    Hematocrit 37.9 37.0 - 48.5 %    Mean Corpuscular Volume 91 82 - 98 fL    Mean Corpuscular Hemoglobin 29.9 27.0 - 31.0 pg    Mean Corpuscular Hemoglobin Conc 33.0 32.0 - 36.0 g/dL    RDW 14.3 11.5 - 14.5 %    Platelets 306 150 - 350 K/uL    MPV 9.1 (L) 9.2 - 12.9 fL    Gran # (ANC) 4.3 1.8 - 7.7 K/uL    Lymph # 3.4 1.0 - 4.8 K/uL    Mono # 1.1 (H) 0.3 - 1.0 K/uL    Eos # 0.1 0.0 - 0.5 K/uL    Baso # 0.05 0.00 - 0.20 K/uL    Gran% 48.4 38.0 - 73.0 %    Lymph% 37.8 18.0 - 48.0 %    Mono% 11.9 4.0 - 15.0 %    Eosinophil% 1.3 0.0 - 8.0 %    Basophil% 0.6 0.0 - 1.9 %    Differential Method Automated        Assessment:       Encounter Diagnoses   Name Primary?    Osteoporosis, unspecified osteoporosis type, unspecified pathological fracture presence Yes    Chronic fatigue     Seronegative rheumatoid arthritis     Fibromyalgia     Chronic bilateral low back pain with bilateral sciatica          Plan:       Osteoporosis, unspecified osteoporosis type, unspecified pathological fracture presence  -     DXA Bone Density Spine And Hip; Future; Expected date: 06/19/2019  -     Ambulatory consult to Physical Therapy  -     methylPREDNISolone acetate injection 160 mg  -     ketorolac injection 60 mg  -     cyanocobalamin injection 1,000 mcg  -     triamcinolone acetonide 0.025% (KENALOG) 0.025 % Oint; Apply topically 2 (two) times daily.  Dispense: 80 g; Refill: 1  -     PATRICIA Screen w/Reflex; Future; Expected date: 06/19/2019  -     Anti Sm/RNP Antibody; Future; Expected date: 06/19/2019  -     Anti-DNA antibody, double-stranded; Future; Expected date: 06/19/2019  -     Anti-Histone Antibody; Future;  Expected date: 06/19/2019  -     Anti-scleroderma antibody; Future; Expected date: 06/19/2019  -     Anti-Smith antibody; Future; Expected date: 06/19/2019  -     Sjogrens syndrome-A extractable nuclear antibody; Future; Expected date: 06/19/2019  -     Sjogrens syndrome-B extractable nuclear antibody; Future; Expected date: 06/19/2019  -     Sedimentation rate; Future; Expected date: 06/19/2019  -     TSH; Future; Expected date: 06/19/2019  -     Thyroid peroxidase antibody; Future; Expected date: 06/19/2019  -     T4, free; Future; Expected date: 06/19/2019  -     T3, free; Future; Expected date: 06/19/2019  -     Anti-thyroglobulin antibody; Future; Expected date: 06/19/2019  -     Comprehensive metabolic panel; Future; Expected date: 06/19/2019  -     CBC auto differential; Future; Expected date: 06/19/2019  -     C-reactive protein; Future; Expected date: 06/19/2019  -     Vitamin D; Future; Expected date: 06/19/2019    Chronic fatigue  -     Ambulatory consult to Physical Therapy  -     methylPREDNISolone acetate injection 160 mg  -     ketorolac injection 60 mg  -     cyanocobalamin injection 1,000 mcg  -     triamcinolone acetonide 0.025% (KENALOG) 0.025 % Oint; Apply topically 2 (two) times daily.  Dispense: 80 g; Refill: 1  -     PATRICIA Screen w/Reflex; Future; Expected date: 06/19/2019  -     Anti Sm/RNP Antibody; Future; Expected date: 06/19/2019  -     Anti-DNA antibody, double-stranded; Future; Expected date: 06/19/2019  -     Anti-Histone Antibody; Future; Expected date: 06/19/2019  -     Anti-scleroderma antibody; Future; Expected date: 06/19/2019  -     Anti-Smith antibody; Future; Expected date: 06/19/2019  -     Sjogrens syndrome-A extractable nuclear antibody; Future; Expected date: 06/19/2019  -     Sjogrens syndrome-B extractable nuclear antibody; Future; Expected date: 06/19/2019  -     Sedimentation rate; Future; Expected date: 06/19/2019  -     TSH; Future; Expected date: 06/19/2019  -      Thyroid peroxidase antibody; Future; Expected date: 06/19/2019  -     T4, free; Future; Expected date: 06/19/2019  -     T3, free; Future; Expected date: 06/19/2019  -     Anti-thyroglobulin antibody; Future; Expected date: 06/19/2019  -     Comprehensive metabolic panel; Future; Expected date: 06/19/2019  -     CBC auto differential; Future; Expected date: 06/19/2019  -     C-reactive protein; Future; Expected date: 06/19/2019  -     Vitamin D; Future; Expected date: 06/19/2019    Seronegative rheumatoid arthritis  -     Ambulatory consult to Physical Therapy  -     methylPREDNISolone acetate injection 160 mg  -     ketorolac injection 60 mg  -     cyanocobalamin injection 1,000 mcg  -     triamcinolone acetonide 0.025% (KENALOG) 0.025 % Oint; Apply topically 2 (two) times daily.  Dispense: 80 g; Refill: 1  -     PATRICIA Screen w/Reflex; Future; Expected date: 06/19/2019  -     Anti Sm/RNP Antibody; Future; Expected date: 06/19/2019  -     Anti-DNA antibody, double-stranded; Future; Expected date: 06/19/2019  -     Anti-Histone Antibody; Future; Expected date: 06/19/2019  -     Anti-scleroderma antibody; Future; Expected date: 06/19/2019  -     Anti-Smith antibody; Future; Expected date: 06/19/2019  -     Sjogrens syndrome-A extractable nuclear antibody; Future; Expected date: 06/19/2019  -     Sjogrens syndrome-B extractable nuclear antibody; Future; Expected date: 06/19/2019  -     Sedimentation rate; Future; Expected date: 06/19/2019  -     TSH; Future; Expected date: 06/19/2019  -     Thyroid peroxidase antibody; Future; Expected date: 06/19/2019  -     T4, free; Future; Expected date: 06/19/2019  -     T3, free; Future; Expected date: 06/19/2019  -     Anti-thyroglobulin antibody; Future; Expected date: 06/19/2019  -     Comprehensive metabolic panel; Future; Expected date: 06/19/2019  -     CBC auto differential; Future; Expected date: 06/19/2019  -     C-reactive protein; Future; Expected date: 06/19/2019  -      Vitamin D; Future; Expected date: 06/19/2019    Fibromyalgia  -     Ambulatory consult to Physical Therapy  -     methylPREDNISolone acetate injection 160 mg  -     ketorolac injection 60 mg  -     cyanocobalamin injection 1,000 mcg  -     triamcinolone acetonide 0.025% (KENALOG) 0.025 % Oint; Apply topically 2 (two) times daily.  Dispense: 80 g; Refill: 1  -     PATRICIA Screen w/Reflex; Future; Expected date: 06/19/2019  -     Anti Sm/RNP Antibody; Future; Expected date: 06/19/2019  -     Anti-DNA antibody, double-stranded; Future; Expected date: 06/19/2019  -     Anti-Histone Antibody; Future; Expected date: 06/19/2019  -     Anti-scleroderma antibody; Future; Expected date: 06/19/2019  -     Anti-Smith antibody; Future; Expected date: 06/19/2019  -     Sjogrens syndrome-A extractable nuclear antibody; Future; Expected date: 06/19/2019  -     Sjogrens syndrome-B extractable nuclear antibody; Future; Expected date: 06/19/2019  -     Sedimentation rate; Future; Expected date: 06/19/2019  -     TSH; Future; Expected date: 06/19/2019  -     Thyroid peroxidase antibody; Future; Expected date: 06/19/2019  -     T4, free; Future; Expected date: 06/19/2019  -     T3, free; Future; Expected date: 06/19/2019  -     Anti-thyroglobulin antibody; Future; Expected date: 06/19/2019  -     Comprehensive metabolic panel; Future; Expected date: 06/19/2019  -     CBC auto differential; Future; Expected date: 06/19/2019  -     C-reactive protein; Future; Expected date: 06/19/2019  -     Vitamin D; Future; Expected date: 06/19/2019    Chronic bilateral low back pain with bilateral sciatica  -     Ambulatory consult to Physical Therapy  -     methylPREDNISolone acetate injection 160 mg  -     ketorolac injection 60 mg  -     cyanocobalamin injection 1,000 mcg  -     triamcinolone acetonide 0.025% (KENALOG) 0.025 % Oint; Apply topically 2 (two) times daily.  Dispense: 80 g; Refill: 1  -     PATRICIA Screen w/Reflex; Future; Expected date:  06/19/2019  -     Anti Sm/RNP Antibody; Future; Expected date: 06/19/2019  -     Anti-DNA antibody, double-stranded; Future; Expected date: 06/19/2019  -     Anti-Histone Antibody; Future; Expected date: 06/19/2019  -     Anti-scleroderma antibody; Future; Expected date: 06/19/2019  -     Anti-Smith antibody; Future; Expected date: 06/19/2019  -     Sjogrens syndrome-A extractable nuclear antibody; Future; Expected date: 06/19/2019  -     Sjogrens syndrome-B extractable nuclear antibody; Future; Expected date: 06/19/2019  -     Sedimentation rate; Future; Expected date: 06/19/2019  -     TSH; Future; Expected date: 06/19/2019  -     Thyroid peroxidase antibody; Future; Expected date: 06/19/2019  -     T4, free; Future; Expected date: 06/19/2019  -     T3, free; Future; Expected date: 06/19/2019  -     Anti-thyroglobulin antibody; Future; Expected date: 06/19/2019  -     Comprehensive metabolic panel; Future; Expected date: 06/19/2019  -     CBC auto differential; Future; Expected date: 06/19/2019  -     C-reactive protein; Future; Expected date: 06/19/2019  -     Vitamin D; Future; Expected date: 06/19/2019    Other orders  -     traMADol (ULTRAM) 50 mg tablet; Take 1 tablet (50 mg total) by mouth every 6 (six) hours.  Dispense: 90 tablet; Refill: 3  -     gabapentin (NEURONTIN) 800 MG tablet; 1 tab po qam 1 tab po midday 2 tab po qhs  Dispense: 120 tablet; Refill: 11       Will start Humira once cleared.

## 2019-06-20 ENCOUNTER — DOCUMENTATION ONLY (OUTPATIENT)
Dept: RHEUMATOLOGY | Facility: CLINIC | Age: 49
End: 2019-06-20

## 2019-06-20 ENCOUNTER — TELEPHONE (OUTPATIENT)
Dept: RHEUMATOLOGY | Facility: CLINIC | Age: 49
End: 2019-06-20

## 2019-06-20 ENCOUNTER — TELEPHONE (OUTPATIENT)
Dept: GASTROENTEROLOGY | Facility: CLINIC | Age: 49
End: 2019-06-20

## 2019-06-20 DIAGNOSIS — M41.9 SCOLIOSIS, UNSPECIFIED SCOLIOSIS TYPE, UNSPECIFIED SPINAL REGION: Primary | ICD-10-CM

## 2019-06-20 LAB
ALBUMIN SERPL BCP-MCNC: 4.1 G/DL (ref 3.5–5.2)
ALP SERPL-CCNC: 75 U/L (ref 55–135)
ALT SERPL W/O P-5'-P-CCNC: 21 U/L (ref 10–44)
ANA SER QL IF: NORMAL
ANION GAP SERPL CALC-SCNC: 13 MMOL/L (ref 8–16)
ANTI SM ANTIBODY: 0.8 EU (ref 0–19.99)
ANTI SM/RNP ANTIBODY: 2.39 EU (ref 0–19.99)
ANTI-SM INTERPRETATION: NEGATIVE
ANTI-SM/RNP INTERPRETATION: NEGATIVE
ANTI-SSA ANTIBODY: 1.15 EU (ref 0–19.99)
ANTI-SSA INTERPRETATION: NEGATIVE
ANTI-SSB ANTIBODY: 0.29 EU (ref 0–19.99)
ANTI-SSB INTERPRETATION: NEGATIVE
AST SERPL-CCNC: 17 U/L (ref 10–40)
BILIRUB SERPL-MCNC: 0.3 MG/DL (ref 0.1–1)
BUN SERPL-MCNC: 21 MG/DL (ref 6–20)
CALCIUM SERPL-MCNC: 9.8 MG/DL (ref 8.7–10.5)
CHLORIDE SERPL-SCNC: 104 MMOL/L (ref 95–110)
CO2 SERPL-SCNC: 23 MMOL/L (ref 23–29)
CREAT SERPL-MCNC: 0.8 MG/DL (ref 0.5–1.4)
CRP SERPL-MCNC: 6.9 MG/L (ref 0–8.2)
DSDNA AB SER-ACNC: NORMAL [IU]/ML
EST. GFR  (AFRICAN AMERICAN): >60 ML/MIN/1.73 M^2
EST. GFR  (NON AFRICAN AMERICAN): >60 ML/MIN/1.73 M^2
GLUCOSE SERPL-MCNC: 89 MG/DL (ref 70–110)
POTASSIUM SERPL-SCNC: 3.7 MMOL/L (ref 3.5–5.1)
PROT SERPL-MCNC: 7.1 G/DL (ref 6–8.4)
SODIUM SERPL-SCNC: 140 MMOL/L (ref 136–145)

## 2019-06-20 NOTE — TELEPHONE ENCOUNTER
----- Message from Liset Marquez sent at 6/20/2019  2:11 PM CDT -----  Contact: pt#[phone]  Needs Advice    Reason for call:Pt states that she took a xray on 06/14 and she has possible fecal impact. Pt wants to know what you recommend         Communication Preference:call     Additional Information:

## 2019-06-20 NOTE — TELEPHONE ENCOUNTER
Dr. He,  Patient states she has fecal impaction that was seen on xray  Pt states she was having abd pain and bloating   Pt wants to know if she should take a dulcolax , go to the er or scheduled a follow up with you or Shayne   Pt states she has not eat anything today and wants should she stay NPO   Pt states her pain is a 6  She also states she stop taking the alosetron  Please advise

## 2019-06-20 NOTE — PROGRESS NOTES
Script for gabapentin was printed at visit by mistake. Script faxed to pharmacy on file at [phone]

## 2019-06-21 ENCOUNTER — HOSPITAL ENCOUNTER (EMERGENCY)
Facility: HOSPITAL | Age: 49
Discharge: HOME | End: 2019-06-21
Attending: EMERGENCY MEDICINE
Payer: COMMERCIAL

## 2019-06-21 ENCOUNTER — TELEPHONE (OUTPATIENT)
Dept: GASTROENTEROLOGY | Facility: CLINIC | Age: 49
End: 2019-06-21

## 2019-06-21 VITALS
RESPIRATION RATE: 16 BRPM | SYSTOLIC BLOOD PRESSURE: 126 MMHG | DIASTOLIC BLOOD PRESSURE: 73 MMHG | HEIGHT: 59 IN | BODY MASS INDEX: 32.25 KG/M2 | OXYGEN SATURATION: 96 % | WEIGHT: 160 LBS | TEMPERATURE: 98 F | HEART RATE: 89 BPM

## 2019-06-21 DIAGNOSIS — R10.10 UPPER ABDOMINAL PAIN: Primary | ICD-10-CM

## 2019-06-21 LAB
ALBUMIN SERPL BCP-MCNC: 4.4 G/DL (ref 3.5–5.2)
ALP SERPL-CCNC: 81 U/L (ref 55–135)
ALT SERPL W/O P-5'-P-CCNC: 19 U/L (ref 10–44)
ANION GAP SERPL CALC-SCNC: 10 MMOL/L (ref 8–16)
AST SERPL-CCNC: 15 U/L (ref 10–40)
BACTERIA #/AREA URNS AUTO: ABNORMAL /HPF
BASOPHILS # BLD AUTO: 0.06 K/UL (ref 0–0.2)
BASOPHILS NFR BLD: 0.7 % (ref 0–1.9)
BILIRUB SERPL-MCNC: 0.4 MG/DL (ref 0.1–1)
BILIRUB UR QL STRIP: NEGATIVE
BUN SERPL-MCNC: 18 MG/DL (ref 6–20)
CALCIUM SERPL-MCNC: 9.7 MG/DL (ref 8.7–10.5)
CHLORIDE SERPL-SCNC: 104 MMOL/L (ref 95–110)
CLARITY UR REFRACT.AUTO: CLEAR
CO2 SERPL-SCNC: 25 MMOL/L (ref 23–29)
COLOR UR AUTO: ABNORMAL
CREAT SERPL-MCNC: 0.8 MG/DL (ref 0.5–1.4)
DIFFERENTIAL METHOD: ABNORMAL
EOSINOPHIL # BLD AUTO: 0.1 K/UL (ref 0–0.5)
EOSINOPHIL NFR BLD: 0.7 % (ref 0–8)
ERYTHROCYTE [DISTWIDTH] IN BLOOD BY AUTOMATED COUNT: 12.3 % (ref 11.5–14.5)
EST. GFR  (AFRICAN AMERICAN): >60 ML/MIN/1.73 M^2
EST. GFR  (NON AFRICAN AMERICAN): >60 ML/MIN/1.73 M^2
GLUCOSE SERPL-MCNC: 95 MG/DL (ref 70–110)
GLUCOSE UR QL STRIP: NEGATIVE
HCT VFR BLD AUTO: 38.4 % (ref 37–48.5)
HGB BLD-MCNC: 12.3 G/DL (ref 12–16)
HGB UR QL STRIP: NEGATIVE
IMM GRANULOCYTES # BLD AUTO: 0.03 K/UL (ref 0–0.04)
IMM GRANULOCYTES NFR BLD AUTO: 0.4 % (ref 0–0.5)
KETONES UR QL STRIP: NEGATIVE
LEUKOCYTE ESTERASE UR QL STRIP: NEGATIVE
LIPASE SERPL-CCNC: 17 U/L (ref 4–60)
LYMPHOCYTES # BLD AUTO: 2.5 K/UL (ref 1–4.8)
LYMPHOCYTES NFR BLD: 30.7 % (ref 18–48)
MCH RBC QN AUTO: 29.1 PG (ref 27–31)
MCHC RBC AUTO-ENTMCNC: 32 G/DL (ref 32–36)
MCV RBC AUTO: 91 FL (ref 82–98)
MICROSCOPIC COMMENT: ABNORMAL
MONOCYTES # BLD AUTO: 0.6 K/UL (ref 0.3–1)
MONOCYTES NFR BLD: 7.4 % (ref 4–15)
NEUTROPHILS # BLD AUTO: 5 K/UL (ref 1.8–7.7)
NEUTROPHILS NFR BLD: 60.1 % (ref 38–73)
NITRITE UR QL STRIP: ABNORMAL
NRBC BLD-RTO: 0 /100 WBC
PH UR STRIP: 5 [PH] (ref 5–8)
PLATELET # BLD AUTO: 326 K/UL (ref 150–350)
PMV BLD AUTO: 8.7 FL (ref 9.2–12.9)
POTASSIUM SERPL-SCNC: 4 MMOL/L (ref 3.5–5.1)
PROT SERPL-MCNC: 7.3 G/DL (ref 6–8.4)
PROT UR QL STRIP: NEGATIVE
RBC # BLD AUTO: 4.22 M/UL (ref 4–5.4)
RBC #/AREA URNS AUTO: 1 /HPF (ref 0–4)
SODIUM SERPL-SCNC: 139 MMOL/L (ref 136–145)
SP GR UR STRIP: 1.02 (ref 1–1.03)
SQUAMOUS #/AREA URNS AUTO: 2 /HPF
THYROGLOB AB SERPL IA-ACNC: 21.8 IU/ML (ref 0–3.9)
THYROPEROXIDASE IGG SERPL-ACNC: <6 IU/ML
URN SPEC COLLECT METH UR: ABNORMAL
WBC # BLD AUTO: 8.28 K/UL (ref 3.9–12.7)
WBC #/AREA URNS AUTO: 2 /HPF (ref 0–5)

## 2019-06-21 PROCEDURE — 99284 EMERGENCY DEPT VISIT MOD MDM: CPT | Mod: ,,, | Performed by: PHYSICIAN ASSISTANT

## 2019-06-21 PROCEDURE — 96372 THER/PROPH/DIAG INJ SC/IM: CPT | Mod: 59

## 2019-06-21 PROCEDURE — 99285 EMERGENCY DEPT VISIT HI MDM: CPT | Mod: 25

## 2019-06-21 PROCEDURE — 81001 URINALYSIS AUTO W/SCOPE: CPT

## 2019-06-21 PROCEDURE — 83690 ASSAY OF LIPASE: CPT

## 2019-06-21 PROCEDURE — 85025 COMPLETE CBC W/AUTO DIFF WBC: CPT

## 2019-06-21 PROCEDURE — 25000003 PHARM REV CODE 250: Performed by: PHYSICIAN ASSISTANT

## 2019-06-21 PROCEDURE — 80053 COMPREHEN METABOLIC PANEL: CPT

## 2019-06-21 PROCEDURE — 99284 PR EMERGENCY DEPT VISIT,LEVEL IV: ICD-10-PCS | Mod: ,,, | Performed by: PHYSICIAN ASSISTANT

## 2019-06-21 PROCEDURE — 96361 HYDRATE IV INFUSION ADD-ON: CPT

## 2019-06-21 PROCEDURE — 96374 THER/PROPH/DIAG INJ IV PUSH: CPT

## 2019-06-21 PROCEDURE — 63600175 PHARM REV CODE 636 W HCPCS: Performed by: PHYSICIAN ASSISTANT

## 2019-06-21 PROCEDURE — 25500020 PHARM REV CODE 255: Performed by: PHYSICIAN ASSISTANT

## 2019-06-21 RX ORDER — DICYCLOMINE HYDROCHLORIDE 10 MG/ML
20 INJECTION INTRAMUSCULAR
Status: COMPLETED | OUTPATIENT
Start: 2019-06-21 | End: 2019-06-21

## 2019-06-21 RX ORDER — DICYCLOMINE HYDROCHLORIDE 20 MG/1
20 TABLET ORAL 2 TIMES DAILY
Qty: 10 TABLET | Refills: 0 | Status: SHIPPED | OUTPATIENT
Start: 2019-06-21 | End: 2019-07-21

## 2019-06-21 RX ORDER — METOCLOPRAMIDE HYDROCHLORIDE 5 MG/ML
10 INJECTION INTRAMUSCULAR; INTRAVENOUS
Status: COMPLETED | OUTPATIENT
Start: 2019-06-21 | End: 2019-06-21

## 2019-06-21 RX ADMIN — SODIUM CHLORIDE 1000 ML: 0.9 INJECTION, SOLUTION INTRAVENOUS at 04:06

## 2019-06-21 RX ADMIN — METOCLOPRAMIDE 10 MG: 5 INJECTION, SOLUTION INTRAMUSCULAR; INTRAVENOUS at 04:06

## 2019-06-21 RX ADMIN — IOHEXOL 100 ML: 350 INJECTION, SOLUTION INTRAVENOUS at 05:06

## 2019-06-21 RX ADMIN — DICYCLOMINE HYDROCHLORIDE 20 MG: 20 INJECTION, SOLUTION INTRAMUSCULAR at 04:06

## 2019-06-21 NOTE — TELEPHONE ENCOUNTER
Dr. He,  Patient states she was told at her last visit that you wanted an egd and coloscopy  Pt wants to know if you can place order or does she needs a follow up appt    If she needs an appt Can she see jarrett ?  Please advise       Pt notified of MD recommendations also

## 2019-06-21 NOTE — TELEPHONE ENCOUNTER
----- Message from Sherrie Ramirez sent at 6/21/2019 11:13 AM CDT -----  Contact: Self- [phone]  Neri- pt returning missed call to discuss advice from Dr. He- please contact pt at [phone]

## 2019-06-21 NOTE — ED PROVIDER NOTES
Encounter Date: 2019       History     Chief Complaint   Patient presents with    Abdominal Pain     ?crohn's,  blood in stool     49-year-old white female with history of interstitial cystitis, lupus, rheumatoid arthritis, IBS presents to the ED complaining of upper abdominal pain that started yesterday.  She describes her pain as a constant 8/10 pain to the upper abdomen with associated abdominal distension.  She started having diarrhea around 1:00 a.m. and reports her abdominal distention has improved since her diarrhea started.  She has not been taking any NSAIDs.  She does report some bright red blood mixed in with her stool.  She endorses nausea, lightheadedness, dry mucous.  She has been unable to tolerate anything by mouth (tried to eat salad last night and drink office morning).  Past surgical history significant for C-sections x2 and hysterectomy.  She denies fever, chills, chest pain, shortness of breath, dysuria.  She denies tobacco use.    The history is provided by the patient.     Review of patient's allergies indicates:   Allergen Reactions    Cephalexin Itching    Zofran [ondansetron hcl (pf)] Hives    Penicillins Rash     Past Medical History:   Diagnosis Date    Acid reflux     Allergy     Alopecia     Anxiety     Arthralgia     Back pain     Depression     Dry eyes     from meds    Fever blister     Fibromyalgia     Interstitial cystitis     Irritable bowel syndrome     Kidney stone     Major depressive disorder, recurrent episode, in partial or unspecified remission 2013    OAB (overactive bladder) 2015    PTSD (post-traumatic stress disorder)     Rheumatoid arthritis     Systemic lupus erythematosus     Thyroid disease     Urinary tract infection     Vaginal infection      Past Surgical History:   Procedure Laterality Date    BLADDER SURGERY       SECTION, LOW TRANSVERSE      x 2    COLONOSCOPY      COLONOSCOPY N/A 10/5/2017    Performed by  [facility]kat [facility] at [facility] (4TH FLR)    CYSTOSCOPY N/A 8/19/2015    Performed by Bandar Garcia MD at [facility] OR 1ST FLR    CYSTOSCOPY,WITH BLADDER HYDRODISTENSION N/A 9/12/2018    Performed by Bandar Garcia MD at [facility] OR 1ST FLR    ESOPHAGOGASTRODUODENOSCOPY      ESOPHAGOGASTRODUODENOSCOPY (EGD) N/A 10/5/2017    Performed by Venkat He MD at [facility] ENDO (4TH FLR)    ESOPHAGOGASTRODUODENOSCOPY (EGD) N/A 1/21/2015    Performed by Venkat He MD at [facility] ENDO (4TH FLR)    EYE SURGERY      Lasik-bilateral    HYSTERECTOMY      IMPLANT-INTERSTIM-PERCUTANEOUS-I AND II N/A 8/3/2016    Performed by Bandar Garcia MD at [facility] OR 2ND FLR    INJECTION, BOTULINUM TOXIN, TYPE A  200 UNITS N/A 9/12/2018    Performed by Bandar Garcia MD at [facility] OR 1ST FLR    INJECTION-BOTOX N/A 8/19/2015    Performed by Bandar Garcia [facility] at [facility] OR 1ST FLR    INSTILLATION, URINARY BLADDER N/A 9/12/2018    Performed by Bandar Garcia [facility] at [facility] OR 1ST FLR    PARTIAL HYSTERECTOMY      SIGMOIDOSCOPY-FLEXIBLE N/A 6/12/2017    Performed by Venkat He MD at [facility] (2ND FLR)     Family History   Problem Relation Age of Onset    Irritable bowel syndrome Mother     Irritable bowel syndrome Sister     Lupus Sister     Rheum arthritis Sister     Fibromyalgia Sister     Irritable bowel syndrome Sister     Celiac disease Neg Hx     Cirrhosis Neg Hx     Colon cancer Neg Hx     Colon polyps Neg Hx     Crohn's disease Neg Hx     Cystic fibrosis Neg Hx     Esophageal cancer Neg Hx     Hemochromatosis Neg Hx     Inflammatory bowel disease Neg Hx     Liver cancer Neg Hx     Liver disease Neg Hx     Rectal cancer Neg Hx     Stomach cancer Neg Hx     Ulcerative colitis Neg Hx     Boris's disease Neg Hx     Melanoma Neg Hx     Amblyopia Neg Hx     Blindness Neg Hx     Cataracts Neg Hx     Glaucoma Neg Hx     Macular degeneration Neg Hx     Retinal detachment Neg Hx     Strabismus Neg Hx      Social  History     Tobacco Use    Smoking status: Never Smoker    Smokeless tobacco: Never Used   Substance Use Topics    Alcohol use: No    Drug use: No     Review of Systems   Constitutional: Negative for chills and fever.   HENT: Negative for congestion, rhinorrhea and sore throat.    Eyes: Negative for photophobia and visual disturbance.   Respiratory: Negative for shortness of breath.    Cardiovascular: Negative for chest pain.   Gastrointestinal: Positive for abdominal distention, abdominal pain, blood in stool, diarrhea and nausea. Negative for constipation and vomiting.   Genitourinary: Negative for dysuria and hematuria.   Musculoskeletal: Positive for back pain. Negative for neck pain and neck stiffness.   Skin: Negative for rash and wound.   Neurological: Positive for light-headedness. Negative for numbness and headaches.   Psychiatric/Behavioral: Negative for confusion.       Physical Exam     Initial Vitals [06/21/19 1454]   BP Pulse Resp Temp SpO2   (!) 140/87 100 18 98.3 °F (36.8 °C) 96 %      MAP       --         Physical Exam    Nursing note and vitals reviewed.  Constitutional: She appears well-developed and well-nourished. She is not diaphoretic. No distress.   HENT:   Head: Normocephalic and atraumatic.   Neck: Normal range of motion. Neck supple.   Cardiovascular: Regular rhythm and normal heart sounds. Tachycardia present.  Exam reveals no gallop and no friction rub.    No murmur heard.  Pulmonary/Chest: Breath sounds normal. She has no wheezes. She has no rhonchi. She has no rales.   Abdominal: Soft. Bowel sounds are normal. She exhibits distension. There is tenderness (upper abdomen, worst in the epigastric region). There is no rigidity, no rebound, no guarding, no CVA tenderness, no tenderness at McBurney's point and negative Andrews's sign.   Genitourinary: Rectal exam shows no external hemorrhoid and guaiac negative stool. Guaiac negative stool.   Genitourinary Comments: Light brown heme  negative stool noted on rectal exam   Musculoskeletal: Normal range of motion.   Neurological: She is alert and oriented to person, place, and time.   Skin: Skin is warm and dry. No rash noted. No erythema.   Psychiatric: She has a normal mood and affect.         ED Course   Procedures  Labs Reviewed   CBC W/ AUTO DIFFERENTIAL - Abnormal; Notable for the following components:       Result Value    MPV 8.7 (*)     All other components within normal limits   URINALYSIS, REFLEX TO URINE CULTURE - Abnormal; Notable for the following components:    Color, UA Orange (*)     All other components within normal limits    Narrative:     Preferred Collection Type->Urine, Clean Catch   URINALYSIS MICROSCOPIC - Abnormal; Notable for the following components:    Bacteria Moderate (*)     All other components within normal limits    Narrative:     Preferred Collection Type->Urine, Clean Catch   COMPREHENSIVE METABOLIC PANEL   LIPASE          Imaging Results          CT Abdomen Pelvis With Contrast (Final result)  Result time 06/21/19 18:44:32    Final result by Deven Brannon MD (06/21/19 18:44:32)                 Impression:      No acute findings identified on today's exam.    Micronodule in the lingula, unchanged when compared to study from 06/09/2017.    Status post hysterectomy.    Grossly stable few additional findings as above.    Electronically signed by resident: Kaylen Estes  Date:    06/21/2019  Time:    18:02    Electronically signed by: Deven Brannon MD  Date:    06/21/2019  Time:    18:44             Narrative:    EXAMINATION:  CT ABDOMEN PELVIS WITH CONTRAST    CLINICAL HISTORY:  Abdominal distension;    TECHNIQUE:  Low dose axial images, sagittal and coronal reformations were obtained from the lung bases to the pubic symphysis following the IV administration of 100 mL of Omnipaque 350 .  Oral contrast was not given.    COMPARISON:  CT abdomen pelvis 06/09/2017.    FINDINGS:  Lung bases:Micronodule in the lingula  (series 2, image 8), unchanged from prior.  No pleural effusion.    Heart/pericardium: Heart is normal in size.  No pericardial effusion.    Abdominal wall: Tiny fat containing umbilical hernia.  Neurostimulator device in the subcutaneous tissue overlying the left gluteal muscles with lead extending through the right S3 neural foraminal into the right perirectal fat.    Liver: Normal in size and contour.  Subcentimeter coarse calcification within the posterior aspect of the medial left hepatic lobe near the dome, nonspecific.  Otherwise, no focal lesion.    Gallbladder/bile ducts: No radiopaque gallstone.  No intra or extrahepatic biliary ductal dilatation.    Spleen, pancreas and adrenal glands are unremarkable.    Kidneys/ureters: Normal in size and location.  No hydronephrosis or ureteral dilatation.  Normal contrast excretion.    Urinary bladder: Distended with smooth contour.    Reproductive: Status post hysterectomy.    Bowel/mesentery: No evidence of bowel obstruction or inflammation.    Peritoneum/retroperitoneum: No free intraperitoneal air or fluid.    Vasculature: Minimal aortoiliac atherosclerotic calcification.  Aorta is normal course and caliber without aneurysmal dilatation.  Portal, splenic and superior mesenteric veins are patent.    Bones: Mild degenerative changes.  No suspicious lytic or blastic lesion.                                 Medical Decision Making:   History:   Old Medical Records: I decided to obtain old medical records.  Clinical Tests:   Lab Tests: Ordered and Reviewed  Radiological Study: Reviewed and Ordered       APC / Resident Notes:   49-year-old white female with history of interstitial cystitis, lupus, rheumatoid arthritis, IBS presents to the ED complaining of upper abdominal pain that started yesterday.  Tachycardic.  Abdomen is distended but soft and tender in the upper abdomen, worse in the epigastric region.  No CVA tenderness. Rectal exam: light brown heme-negative  stool.  Differential diagnosis includes but is not limited to SBO, pancreatitis, UTI, gastroenteritis, colitis.  Will obtain labs and CT abdomen pelvis for further evaluation.    UA with no infection.  Lipase normal. CBC and CMP unremarkable.    CT abdomen pelvis with no acute abnormalities.    Patient reports improvement of her symptoms with Bentyl and Reglan. I do not feel that she needs any further labs or imaging at this time. Stable for discharge.     She was discharged with a prescription for Bentyl.  She will follow up with her PCP and GI.  All of the patient's questions were answered.  I reviewed the patient's chart, labs, and imaging and discussed the case with my supervising physician.                    Clinical Impression:       ICD-10-CM ICD-9-CM   1. Upper abdominal pain R10.10 789.09         Disposition:   Disposition: Discharged  Condition: Stable                        Heather Rose PA-C  06/21/19 8425

## 2019-06-21 NOTE — ED TRIAGE NOTES
Pt presents to the ED c/o diarrhea x 1 day. Pt reports 7 episodes last night. Pt reports bright red blood mixed in with the stool. Hx IBS-diarrhea. Pt reports generalized abdominal pain, worse in epigastric region that radiates to lower back. Denies urinary symptoms. Pt also reports nausea without emesis. Pt has decreased appetite, hasn't eaten in 24 hours.    Patient identifiers verified and correct for Marilee Simons.    LOC: The patient is awake, alert and aware of environment with an appropriate affect, the patient is oriented x 3 and speaking appropriately.  APPEARANCE: Patient resting comfortably and in no acute distress, patient is clean and well groomed, patient's clothing is properly fastened.  SKIN: The skin is warm and dry, color consistent with ethnicity, patient has normal skin turgor and moist mucus membranes, skin intact, no breakdown or bruising noted.  MUSCULOSKELETAL: Patient moving all extremities spontaneously, no obvious swelling or deformities noted.  RESPIRATORY: Airway is open and patent, respirations are spontaneous, patient has a normal effort and rate, no accessory muscle use noted.  CARDIAC: Patient has a normal rate and regular rhythm, no peripheral edema noted, capillary refill < 3 seconds.  ABDOMEN: Soft, generalized tenderness noted upon palpation to all quadrants, no distention noted.  NEUROLOGIC: Eyes open spontaneously, behavior appropriate to situation, follows commands, facial expression symmetrical, bilateral hand grasp equal and even, purposeful motor response noted, normal sensation in all extremities when touched with a finger.

## 2019-06-21 NOTE — TELEPHONE ENCOUNTER
Ma spoke with pt , advice was given pt states she's now in the er because of blood in her stool  Pt states she will inform the office once she's discharged regarding her status

## 2019-06-22 LAB — HISTONE IGG SER IA-ACNC: 1.5 UNITS (ref 0–0.9)

## 2019-06-24 LAB — ENA SCL70 AB SER-ACNC: 9 UNITS

## 2019-06-25 RX ORDER — ESTRADIOL 0.5 MG/1
TABLET ORAL
Qty: 30 TABLET | Refills: 0 | Status: SHIPPED | OUTPATIENT
Start: 2019-06-25 | End: 2019-07-26 | Stop reason: SDUPTHER

## 2019-06-25 RX ORDER — CLONAZEPAM 1 MG/1
TABLET ORAL
Qty: 90 TABLET | Refills: 0 | Status: SHIPPED | OUTPATIENT
Start: 2019-06-25 | End: 2019-09-04 | Stop reason: SDUPTHER

## 2019-06-27 ENCOUNTER — TELEPHONE (OUTPATIENT)
Dept: UROLOGY | Facility: CLINIC | Age: 49
End: 2019-06-27

## 2019-06-27 RX ORDER — RABEPRAZOLE SODIUM 20 MG/1
TABLET, DELAYED RELEASE ORAL
Qty: 30 TABLET | Refills: 3 | Status: SHIPPED | OUTPATIENT
Start: 2019-06-27 | End: 2019-11-06 | Stop reason: SDUPTHER

## 2019-06-27 NOTE — TELEPHONE ENCOUNTER
Recall Information   ID: [id number] Status: New [10]   Patient: Marilee Simons Visit Type: ESTABLISHED PATIENT [[id number]]   Notification Date: 1/10/2019 Recall Date: 3/8/2019   Expiration Date: 7/9/2019 Letter: [facility] RECALL LETTER [[id number]]   Audit Trail: User: Time: Status:    Abby Stinson LPN [[id number]] 12/11/2018 2:37 PM New [10]   Appointment Notes   rtc 2 m ic

## 2019-06-27 NOTE — TELEPHONE ENCOUNTER
Pt was notified in January 2019 to follow up with dr. Garcia in 2 months. She has not followed up . Valium rx denied at this time

## 2019-07-03 NOTE — PROGRESS NOTES
Xray appear normal and stable.There are no acute changes. There are no treatment changes recommended at this time

## 2019-07-23 ENCOUNTER — TELEPHONE (OUTPATIENT)
Dept: GASTROENTEROLOGY | Facility: CLINIC | Age: 49
End: 2019-07-23

## 2019-07-26 DIAGNOSIS — E03.9 HYPOTHYROIDISM, UNSPECIFIED TYPE: ICD-10-CM

## 2019-07-29 RX ORDER — LEVOTHYROXINE SODIUM 50 UG/1
TABLET ORAL
Qty: 30 TABLET | Refills: 3 | Status: SHIPPED | OUTPATIENT
Start: 2019-07-29 | End: 2019-12-05 | Stop reason: SDUPTHER

## 2019-07-31 RX ORDER — ESTRADIOL 0.5 MG/1
TABLET ORAL
Qty: 30 TABLET | Refills: 0 | Status: SHIPPED | OUTPATIENT
Start: 2019-07-31 | End: 2019-09-01 | Stop reason: SDUPTHER

## 2019-08-01 ENCOUNTER — TELEPHONE (OUTPATIENT)
Dept: RADIOLOGY | Facility: HOSPITAL | Age: 49
End: 2019-08-01

## 2019-08-08 ENCOUNTER — OFFICE VISIT (OUTPATIENT)
Dept: PSYCHIATRY | Facility: CLINIC | Age: 49
End: 2019-08-08
Payer: COMMERCIAL

## 2019-08-08 DIAGNOSIS — F33.0 MAJOR DEPRESSIVE DISORDER, RECURRENT EPISODE, MILD: Primary | ICD-10-CM

## 2019-08-08 DIAGNOSIS — F41.9 ANXIETY: ICD-10-CM

## 2019-08-08 PROCEDURE — 90834 PSYTX W PT 45 MINUTES: CPT | Mod: S$GLB,,, | Performed by: SOCIAL WORKER

## 2019-08-08 PROCEDURE — 99999 PR PBB SHADOW E&M-EST. PATIENT-LVL I: ICD-10-PCS | Mod: PBBFAC,,, | Performed by: SOCIAL WORKER

## 2019-08-08 PROCEDURE — 90834 PR PSYCHOTHERAPY W/PATIENT, 45 MIN: ICD-10-PCS | Mod: S$GLB,,, | Performed by: SOCIAL WORKER

## 2019-08-08 PROCEDURE — 99999 PR PBB SHADOW E&M-EST. PATIENT-LVL I: CPT | Mod: PBBFAC,,, | Performed by: SOCIAL WORKER

## 2019-08-09 DIAGNOSIS — M79.7 FIBROMYALGIA: ICD-10-CM

## 2019-08-09 RX ORDER — TIZANIDINE 4 MG/1
4 TABLET ORAL EVERY 8 HOURS PRN
Qty: 90 TABLET | Refills: 1 | OUTPATIENT
Start: 2019-08-09

## 2019-08-09 NOTE — PROGRESS NOTES
Individual Psychotherapy (PhD/LCSW)    8/8/2019    Site:  [facility]         Therapeutic Intervention: Met with patient.  Outpatient - Insight oriented psychotherapy 45 min - CPT code 00295    Chief complaint/reason for encounter: depression, anxiety and ptsd     Interval history and content of current session: Pt returns for follow-up.  She has not been seen in 8 months.  She starts crying early into the session, saying her life is a mess.  She is upset with her  for buying her daughter a nice car when she is driving a car that is falling apart.  She feels jealous of the relationship her daughter has with her  but does not seem to care much for her .  She reports she has had multiple affairs and is currently in a new affair.  She feels very guilty about it and wants to know what to do.  When told to stop doing it if she feels bad she did not think she could.  She wants to get disability so she can move in with her mother and have some of her own money.  She continues to wonder if she is bipolar.  She feels suicidal frequently but seems to have no direct action plans in that direction.  She has bad dreams frequently about her sister's suicide.    Treatment plan:  · Target symptoms: depression, anxiety , PTSD  · Why chosen therapy is appropriate versus another modality: relevant to diagnosis  · Outcome monitoring methods: self-report, observation  · Therapeutic intervention type: insight oriented psychotherapy    Risk parameters:  Patient reports no suicidal ideation  Patient reports no homicidal ideation  Patient reports no self-injurious behavior  Patient reports no violent behavior    Verbal deficits: None    Patient's response to intervention:  The patient's response to intervention is motivated.    Progress toward goals and other mental status changes:  The patient's progress toward goals is fair .    Diagnosis:     ICD-10-CM ICD-9-CM   1. Major depressive disorder, recurrent episode,  mild F33.0 296.31   2. Anxiety F41.9 300.00       Plan:  individual psychotherapy and medication management by physician    Return to clinic: as scheduled    Length of Service (minutes): 45

## 2019-08-14 RX ORDER — ARIPIPRAZOLE 5 MG/1
TABLET ORAL
Qty: 30 TABLET | Refills: 0 | Status: SHIPPED | OUTPATIENT
Start: 2019-08-14 | End: 2019-11-07

## 2019-08-16 DIAGNOSIS — N30.10 IC (INTERSTITIAL CYSTITIS): ICD-10-CM

## 2019-08-16 NOTE — TELEPHONE ENCOUNTER
----- Message from Kavita Baez sent at 8/16/2019  2:44 PM CDT -----  Contact: pt: [phone]  Needs Advice    Reason for call: pt would like to speak with someone re [id number] ([facility])         Communication Preference: pt: [phone]

## 2019-08-19 ENCOUNTER — DOCUMENTATION ONLY (OUTPATIENT)
Dept: PSYCHIATRY | Facility: CLINIC | Age: 49
End: 2019-08-19

## 2019-09-03 DIAGNOSIS — R21 RASH AND NONSPECIFIC SKIN ERUPTION: ICD-10-CM

## 2019-09-03 RX ORDER — ESTRADIOL 0.5 MG/1
TABLET ORAL
Qty: 30 TABLET | Refills: 3 | Status: SHIPPED | OUTPATIENT
Start: 2019-09-03 | End: 2020-01-07

## 2019-09-03 RX ORDER — ALOSETRON HYDROCHLORIDE 0.5 MG/1
TABLET ORAL
Qty: 60 TABLET | Refills: 0 | Status: SHIPPED | OUTPATIENT
Start: 2019-09-03 | End: 2019-12-30 | Stop reason: SDUPTHER

## 2019-09-03 NOTE — TELEPHONE ENCOUNTER
----- Message from Birdie Jacobo sent at 8/30/2019  2:06 PM CDT -----  Contact: Pharmacy  Pharmacy requesting a new prescription for patient.     hydrOXYzine HCl (ATARAX) 25 MG tablet  Take 1 tablet (25 mg total) by mouth 3 (three) times daily as needed for Itching. - [id number] - [address] AT [facility] [address] [id number]  Phone: [phone] Fax: [phone]

## 2019-09-04 ENCOUNTER — OFFICE VISIT (OUTPATIENT)
Dept: PSYCHIATRY | Facility: CLINIC | Age: 49
End: 2019-09-04
Payer: COMMERCIAL

## 2019-09-04 VITALS
HEIGHT: 59 IN | HEART RATE: 91 BPM | DIASTOLIC BLOOD PRESSURE: 76 MMHG | BODY MASS INDEX: 34 KG/M2 | SYSTOLIC BLOOD PRESSURE: 113 MMHG | WEIGHT: 168.63 LBS

## 2019-09-04 DIAGNOSIS — F60.89 CLUSTER B PERSONALITY DISORDER: ICD-10-CM

## 2019-09-04 DIAGNOSIS — N30.10 IC (INTERSTITIAL CYSTITIS): ICD-10-CM

## 2019-09-04 DIAGNOSIS — F33.0 MAJOR DEPRESSIVE DISORDER, RECURRENT EPISODE, MILD: Primary | ICD-10-CM

## 2019-09-04 PROCEDURE — 3008F BODY MASS INDEX DOCD: CPT | Mod: CPTII,S$GLB,, | Performed by: PSYCHIATRY & NEUROLOGY

## 2019-09-04 PROCEDURE — 99999 PR PBB SHADOW E&M-EST. PATIENT-LVL III: ICD-10-PCS | Mod: PBBFAC,,, | Performed by: PSYCHIATRY & NEUROLOGY

## 2019-09-04 PROCEDURE — 99214 PR OFFICE/OUTPT VISIT, EST, LEVL IV, 30-39 MIN: ICD-10-PCS | Mod: S$GLB,,, | Performed by: PSYCHIATRY & NEUROLOGY

## 2019-09-04 PROCEDURE — 3008F PR BODY MASS INDEX (BMI) DOCUMENTED: ICD-10-PCS | Mod: CPTII,S$GLB,, | Performed by: PSYCHIATRY & NEUROLOGY

## 2019-09-04 PROCEDURE — 99214 OFFICE O/P EST MOD 30 MIN: CPT | Mod: S$GLB,,, | Performed by: PSYCHIATRY & NEUROLOGY

## 2019-09-04 PROCEDURE — 99999 PR PBB SHADOW E&M-EST. PATIENT-LVL III: CPT | Mod: PBBFAC,,, | Performed by: PSYCHIATRY & NEUROLOGY

## 2019-09-04 RX ORDER — AMITRIPTYLINE HYDROCHLORIDE 10 MG/1
TABLET, FILM COATED ORAL
Qty: 90 TABLET | Refills: 3 | Status: SHIPPED | OUTPATIENT
Start: 2019-09-04 | End: 2020-09-01 | Stop reason: SDDI

## 2019-09-04 RX ORDER — DULOXETIN HYDROCHLORIDE 60 MG/1
60 CAPSULE, DELAYED RELEASE ORAL DAILY
Qty: 30 CAPSULE | Refills: 11 | Status: SHIPPED | OUTPATIENT
Start: 2019-09-04 | End: 2020-02-12 | Stop reason: DRUGHIGH

## 2019-09-04 RX ORDER — HYDROXYZINE HYDROCHLORIDE 25 MG/1
25 TABLET, FILM COATED ORAL DAILY
Qty: 30 TABLET | Refills: 3 | Status: SHIPPED | OUTPATIENT
Start: 2019-09-04 | End: 2020-03-19

## 2019-09-04 RX ORDER — CLONAZEPAM 1 MG/1
1 TABLET ORAL 3 TIMES DAILY
Qty: 90 TABLET | Refills: 0 | Status: SHIPPED | OUTPATIENT
Start: 2019-09-04 | End: 2019-10-08 | Stop reason: SDUPTHER

## 2019-09-04 RX ORDER — TRAZODONE HYDROCHLORIDE 50 MG/1
50 TABLET ORAL NIGHTLY PRN
Qty: 30 TABLET | Refills: 2 | Status: SHIPPED | OUTPATIENT
Start: 2019-09-04 | End: 2020-02-12 | Stop reason: SDUPTHER

## 2019-09-04 NOTE — PROGRESS NOTES
Outpatient Psychiatry Follow-Up Visit (MD/NP)    9/4/2019    Clinical Status of Patient:  Outpatient (Ambulatory)    Chief Complaint:  Marilee Simons is a 49 y.o. female who presents today for follow-up of depression and anxiety.  Met with patient.      Interval History and Content of Current Session:  Interim Events/Subjective Report/Content of Current Session: Pt continues to have a very chaotic life.  Her familylife is very dysfunctional.  I continue to stress the importance of therapy.  She mentions bipolar, but this is most likely prsonality.  Will increase Abilify and see if this helps stabilize her mood.    Psychotherapy:  · Target symptoms: depression, anxiety   · Why chosen therapy is appropriate versus another modality: relevant to diagnosis  · Outcome monitoring methods: self-report, observation  · Therapeutic intervention type: supportive psychotherapy  · Topics discussed/themes: relationships difficulties, parenting issues, stress related to medical comorbidities, difficulty managing affect in interpersonal relationships, building skills sets for symptom management, symptom recognition  · The patient's response to the intervention is accepting. The patient's progress toward treatment goals is fair.   · Duration of intervention: 10 minutes.    Review of Systems   · PSYCHIATRIC: Pertinant items are noted in the narrative.    Past Medical, Family and Social History: The patient's past medical, family and social history have been reviewed and updated as appropriate within the electronic medical record - see encounter notes.    Compliance: yes    Side effects: None    Risk Parameters:  Patient reports no suicidal ideation  Patient reports no homicidal ideation  Patient reports no self-injurious behavior  Patient reports no violent behavior    Exam (detailed: at least 9 elements; comprehensive: all 15 elements)   Constitutional  Vitals:  Most recent vital signs, dated less than 90 days prior to this  "appointment, were reviewed.   Vitals:    09/04/19 1132   BP: 113/76   Pulse: 91   Weight: 76.5 kg (168 lb 10.4 oz)   Height: 4' 11" (1.499 m)        General:  unremarkable, age appropriate     Musculoskeletal  Muscle Strength/Tone:  no tremor   Gait & Station:  non-ataxic     Psychiatric  Speech:  no latency; no press   Mood & Affect:  anxious  congruent and appropriate   Thought Process:  normal and logical   Associations:  intact   Thought Content:  normal, no suicidality, no homicidality, delusions, or paranoia   Insight:  intact   Judgement: behavior is adequate to circumstances   Orientation:  grossly intact   Memory: intact for content of interview   Language: grossly intact   Attention Span & Concentration:  able to focus   Fund of Knowledge:  intact and appropriate to age and level of education     Assessment and Diagnosis   Status/Progress: Based on the examination today, the patient's problem(s) is/are inadequately controlled.  New problems have not been presented today.   Co-morbidities are complicating management of the primary condition.  There are no active rule-out diagnoses for this patient at this time.     General Impression: Personality disorder      ICD-10-CM ICD-9-CM   1. Major depressive disorder, recurrent episode, mild F33.0 296.31   2. IC (interstitial cystitis) N30.10 595.1   3. Cluster B personality disorder F60.9 301.9       Intervention/Counseling/Treatment Plan   · Medication Management: Continue current medications. The risks and benefits of medication were discussed with the patient. except increase Abilify to 10 mg daily      Return to Clinic: 2 months  "

## 2019-09-22 DIAGNOSIS — N30.10 IC (INTERSTITIAL CYSTITIS): ICD-10-CM

## 2019-09-23 ENCOUNTER — DOCUMENTATION ONLY (OUTPATIENT)
Dept: PSYCHIATRY | Facility: CLINIC | Age: 49
End: 2019-09-23

## 2019-09-23 ENCOUNTER — TELEPHONE (OUTPATIENT)
Dept: PSYCHIATRY | Facility: CLINIC | Age: 49
End: 2019-09-23

## 2019-09-23 RX ORDER — FAMOTIDINE 40 MG/1
TABLET, FILM COATED ORAL
Qty: 30 TABLET | Refills: 0 | Status: SHIPPED | OUTPATIENT
Start: 2019-09-23 | End: 2019-10-24 | Stop reason: SDUPTHER

## 2019-09-23 RX ORDER — PROMETHAZINE HYDROCHLORIDE 25 MG/1
TABLET ORAL
Qty: 30 TABLET | Refills: 0 | Status: SHIPPED | OUTPATIENT
Start: 2019-09-23 | End: 2019-10-30 | Stop reason: SDUPTHER

## 2019-09-23 NOTE — TELEPHONE ENCOUNTER
----- Message from Dayana Francisco MA sent at 9/23/2019  1:32 PM CDT -----  Contact: pt  Pt cx today's appt at 3 pm due to she is very ill.

## 2019-09-23 NOTE — PROGRESS NOTES
Pt has canceled her last 2 appointments at the last minute saying she was ill.  She did it again today.  All further appointments will be canceled.

## 2019-09-24 ENCOUNTER — PATIENT MESSAGE (OUTPATIENT)
Dept: PSYCHIATRY | Facility: CLINIC | Age: 49
End: 2019-09-24

## 2019-09-26 DIAGNOSIS — L40.50 PSORIATIC ARTHRITIS: ICD-10-CM

## 2019-09-26 RX ORDER — HYDROXYCHLOROQUINE SULFATE 200 MG/1
200 TABLET, FILM COATED ORAL 2 TIMES DAILY
Qty: 60 TABLET | Refills: 5 | Status: SHIPPED | OUTPATIENT
Start: 2019-09-26 | End: 2020-06-03 | Stop reason: SDUPTHER

## 2019-10-09 RX ORDER — CLONAZEPAM 1 MG/1
TABLET ORAL
Qty: 90 TABLET | Refills: 1 | Status: SHIPPED | OUTPATIENT
Start: 2019-10-09 | End: 2019-12-18 | Stop reason: SDUPTHER

## 2019-10-16 DIAGNOSIS — M79.7 FIBROMYALGIA: ICD-10-CM

## 2019-10-16 RX ORDER — CYCLOBENZAPRINE HCL 10 MG
TABLET ORAL
Qty: 90 TABLET | Refills: 2 | Status: SHIPPED | OUTPATIENT
Start: 2019-10-16 | End: 2020-07-17 | Stop reason: SDUPTHER

## 2019-10-22 ENCOUNTER — OFFICE VISIT (OUTPATIENT)
Dept: GASTROENTEROLOGY | Facility: CLINIC | Age: 49
End: 2019-10-22
Payer: COMMERCIAL

## 2019-10-22 VITALS
WEIGHT: 171.31 LBS | HEART RATE: 94 BPM | BODY MASS INDEX: 33.63 KG/M2 | HEIGHT: 60 IN | DIASTOLIC BLOOD PRESSURE: 93 MMHG | SYSTOLIC BLOOD PRESSURE: 126 MMHG

## 2019-10-22 DIAGNOSIS — K52.9 CHRONIC DIARRHEA: Primary | ICD-10-CM

## 2019-10-22 PROCEDURE — 99213 OFFICE O/P EST LOW 20 MIN: CPT | Mod: S$GLB,,, | Performed by: INTERNAL MEDICINE

## 2019-10-22 PROCEDURE — 99213 PR OFFICE/OUTPT VISIT, EST, LEVL III, 20-29 MIN: ICD-10-PCS | Mod: S$GLB,,, | Performed by: INTERNAL MEDICINE

## 2019-10-22 PROCEDURE — 99999 PR PBB SHADOW E&M-EST. PATIENT-LVL III: CPT | Mod: PBBFAC,,, | Performed by: INTERNAL MEDICINE

## 2019-10-22 PROCEDURE — 3008F BODY MASS INDEX DOCD: CPT | Mod: CPTII,S$GLB,, | Performed by: INTERNAL MEDICINE

## 2019-10-22 PROCEDURE — 3008F PR BODY MASS INDEX (BMI) DOCUMENTED: ICD-10-PCS | Mod: CPTII,S$GLB,, | Performed by: INTERNAL MEDICINE

## 2019-10-22 PROCEDURE — 99999 PR PBB SHADOW E&M-EST. PATIENT-LVL III: ICD-10-PCS | Mod: PBBFAC,,, | Performed by: INTERNAL MEDICINE

## 2019-10-22 NOTE — PROGRESS NOTES
REASON FOR VISIT:  Chronic diarrhea, on alosetron.     HISTORY OF PRESENT ILLNESS:  Ms. Simons is a 48-year-old with chronic diarrhea   secondary to IBS, managed with alosetron, which she only takes it on a p.r.n.   basis.  Never had any issues with ischemic colitis.  Doing well with no   abdominal pains.  She does have chronic heartburn, managed on Aciphex without   any issues.  She is overall doing well except for issues with depression and   domestic issues with prior issues with her family.  She is also having issues   with interstitial cystitis with bladder pains.    Since she has been taking Ultram for back pain her stools have been more firm.  Today I reiterated the importance of not taking Alosetron for diarrhea if it is is not chronic and uncontrolled.     PAST MEDICAL, SURGICAL, SOCIAL AND FAMILY HISTORY:  Reviewed.     MEDICATIONS AND ALLERGIES:  Reviewed.     REVIEW OF SYSTEMS:  CONSTITUTIONAL:  No fever, no chills, no weight loss.  Appetite is normal.  EYES:  No visual changes.  ENT:  No odynophagia or hoarseness of voice.  CARDIOVASCULAR:  No angina or palpitation.  RESPIRATORY:  No shortness of breath or wheezing.  GASTROINTESTINAL:  See HPI.  No blood in the stool.     PHYSICAL EXAMINATION:  VITAL SIGNS:  See EPIC.  GENERAL:  Awake, alert and oriented x3, in no acute distress.  No physical exam done today.  About 15 minutes spent with the patient with more   than 50% in counseling.     IMPRESSION:  1.  Chronic diarrhea -- controlled on Lotronex p.o. p.r.n.  2.  Gastroesophageal reflux disease -- continue Aciphex 20 mg daily.  Discussed importance of eating supper early and waiting for 3 hr before lying down to go to bed.     RECOMMENDATIONS:  1. Follow up in GI clinic in 6 months.

## 2019-10-24 DIAGNOSIS — N30.10 IC (INTERSTITIAL CYSTITIS): ICD-10-CM

## 2019-10-25 RX ORDER — FAMOTIDINE 40 MG/1
TABLET, FILM COATED ORAL
Qty: 30 TABLET | Refills: 0 | Status: SHIPPED | OUTPATIENT
Start: 2019-10-25 | End: 2019-12-05 | Stop reason: SDUPTHER

## 2019-10-25 RX ORDER — ALOSETRON HYDROCHLORIDE 0.5 MG/1
TABLET ORAL
Qty: 60 TABLET | Refills: 0 | OUTPATIENT
Start: 2019-10-25

## 2019-10-30 ENCOUNTER — TELEPHONE (OUTPATIENT)
Dept: BARIATRICS | Facility: CLINIC | Age: 49
End: 2019-10-30

## 2019-10-30 ENCOUNTER — TELEPHONE (OUTPATIENT)
Dept: GASTROENTEROLOGY | Facility: CLINIC | Age: 49
End: 2019-10-30

## 2019-10-30 RX ORDER — PROMETHAZINE HYDROCHLORIDE 25 MG/1
TABLET ORAL
Qty: 30 TABLET | Refills: 0 | Status: SHIPPED | OUTPATIENT
Start: 2019-10-30 | End: 2019-12-11 | Stop reason: SDUPTHER

## 2019-10-30 NOTE — TELEPHONE ENCOUNTER
Attempted to contact. Ms. Simons. In regards to , current med's, she is taking. . Does not have any medical weight loss  options for her. The only medication, that will be available, cost $1200/ a month. That her insurance will not cover. No answer. Left message. Requested a call back for better clarification.

## 2019-10-30 NOTE — TELEPHONE ENCOUNTER
----- Message from Ángela Lan sent at 10/30/2019  8:19 AM CDT -----  Contact: pt  Reason:Pt returning call but does not know who called her     Communication:[phone]

## 2019-10-30 NOTE — TELEPHONE ENCOUNTER
----- Message from Gena Alan MD sent at 10/30/2019 10:43 AM CDT -----  Please let pt know taht with her current meds, hx of anxiety and kidney stones, I will not be able to offer her any medical weight loss options. The only medication that she  Would be able to take is not covered by her insurance and cost $1200/month.     Thank you,  Dr. Alan

## 2019-10-30 NOTE — TELEPHONE ENCOUNTER
Spoke with. Ms. Simons, in regards to her current med's she's taking. Dr. Alan. Does not have any medical weight loss options for her. Patient asked does she qualify for a lap band. Once I calculated her weight and height. Patient BMI is only (35.1). She does not qualify. Patient stated. Okay well thank for calling me.

## 2019-11-06 RX ORDER — RABEPRAZOLE SODIUM 20 MG/1
TABLET, DELAYED RELEASE ORAL
Qty: 30 TABLET | Refills: 0 | Status: SHIPPED | OUTPATIENT
Start: 2019-11-06 | End: 2019-12-05 | Stop reason: SDUPTHER

## 2019-11-07 ENCOUNTER — OFFICE VISIT (OUTPATIENT)
Dept: RHEUMATOLOGY | Facility: CLINIC | Age: 49
End: 2019-11-07
Payer: COMMERCIAL

## 2019-11-07 ENCOUNTER — LAB VISIT (OUTPATIENT)
Dept: LAB | Facility: HOSPITAL | Age: 49
End: 2019-11-07
Attending: INTERNAL MEDICINE
Payer: COMMERCIAL

## 2019-11-07 VITALS
BODY MASS INDEX: 33.96 KG/M2 | HEART RATE: 104 BPM | HEIGHT: 60 IN | DIASTOLIC BLOOD PRESSURE: 77 MMHG | SYSTOLIC BLOOD PRESSURE: 127 MMHG | WEIGHT: 173 LBS

## 2019-11-07 DIAGNOSIS — M06.00 SERONEGATIVE RHEUMATOID ARTHRITIS: Primary | ICD-10-CM

## 2019-11-07 DIAGNOSIS — G89.4 CHRONIC PAIN SYNDROME: ICD-10-CM

## 2019-11-07 DIAGNOSIS — M06.00 SERONEGATIVE RHEUMATOID ARTHRITIS: ICD-10-CM

## 2019-11-07 DIAGNOSIS — M79.7 FIBROMYALGIA: ICD-10-CM

## 2019-11-07 DIAGNOSIS — M81.0 OSTEOPOROSIS, UNSPECIFIED OSTEOPOROSIS TYPE, UNSPECIFIED PATHOLOGICAL FRACTURE PRESENCE: ICD-10-CM

## 2019-11-07 DIAGNOSIS — L40.50 PSA (PSORIATIC ARTHRITIS): ICD-10-CM

## 2019-11-07 DIAGNOSIS — M47.817 LUMBAR AND SACRAL OSTEOARTHRITIS: ICD-10-CM

## 2019-11-07 LAB
ALBUMIN SERPL BCP-MCNC: 3.8 G/DL (ref 3.5–5.2)
ALP SERPL-CCNC: 66 U/L (ref 55–135)
ALT SERPL W/O P-5'-P-CCNC: 15 U/L (ref 10–44)
ANION GAP SERPL CALC-SCNC: 10 MMOL/L (ref 8–16)
AST SERPL-CCNC: 21 U/L (ref 10–40)
BASOPHILS # BLD AUTO: 0.07 K/UL (ref 0–0.2)
BASOPHILS NFR BLD: 0.7 % (ref 0–1.9)
BILIRUB SERPL-MCNC: 0.3 MG/DL (ref 0.1–1)
BUN SERPL-MCNC: 14 MG/DL (ref 6–20)
CALCIUM SERPL-MCNC: 9.4 MG/DL (ref 8.7–10.5)
CHLORIDE SERPL-SCNC: 106 MMOL/L (ref 95–110)
CO2 SERPL-SCNC: 23 MMOL/L (ref 23–29)
CREAT SERPL-MCNC: 0.9 MG/DL (ref 0.5–1.4)
CRP SERPL-MCNC: 6.1 MG/L (ref 0–8.2)
DIFFERENTIAL METHOD: ABNORMAL
EOSINOPHIL # BLD AUTO: 0.1 K/UL (ref 0–0.5)
EOSINOPHIL NFR BLD: 1.2 % (ref 0–8)
ERYTHROCYTE [DISTWIDTH] IN BLOOD BY AUTOMATED COUNT: 12.8 % (ref 11.5–14.5)
ERYTHROCYTE [SEDIMENTATION RATE] IN BLOOD BY WESTERGREN METHOD: 9 MM/HR (ref 0–20)
EST. GFR  (AFRICAN AMERICAN): >60 ML/MIN/1.73 M^2
EST. GFR  (NON AFRICAN AMERICAN): >60 ML/MIN/1.73 M^2
GLUCOSE SERPL-MCNC: 87 MG/DL (ref 70–110)
HCT VFR BLD AUTO: 36.8 % (ref 37–48.5)
HGB BLD-MCNC: 12 G/DL (ref 12–16)
IMM GRANULOCYTES # BLD AUTO: 0.03 K/UL (ref 0–0.04)
IMM GRANULOCYTES NFR BLD AUTO: 0.3 % (ref 0–0.5)
LYMPHOCYTES # BLD AUTO: 3.1 K/UL (ref 1–4.8)
LYMPHOCYTES NFR BLD: 31.5 % (ref 18–48)
MCH RBC QN AUTO: 29.2 PG (ref 27–31)
MCHC RBC AUTO-ENTMCNC: 32.6 G/DL (ref 32–36)
MCV RBC AUTO: 90 FL (ref 82–98)
MONOCYTES # BLD AUTO: 0.8 K/UL (ref 0.3–1)
MONOCYTES NFR BLD: 8 % (ref 4–15)
NEUTROPHILS # BLD AUTO: 5.8 K/UL (ref 1.8–7.7)
NEUTROPHILS NFR BLD: 58.3 % (ref 38–73)
NRBC BLD-RTO: 0 /100 WBC
PLATELET # BLD AUTO: 357 K/UL (ref 150–350)
PMV BLD AUTO: 10.4 FL (ref 9.2–12.9)
POTASSIUM SERPL-SCNC: 4.1 MMOL/L (ref 3.5–5.1)
PROT SERPL-MCNC: 7.2 G/DL (ref 6–8.4)
RBC # BLD AUTO: 4.11 M/UL (ref 4–5.4)
SODIUM SERPL-SCNC: 139 MMOL/L (ref 136–145)
T4 FREE SERPL-MCNC: 0.98 NG/DL (ref 0.71–1.51)
TSH SERPL DL<=0.005 MIU/L-ACNC: 1.17 UIU/ML (ref 0.4–4)
WBC # BLD AUTO: 9.89 K/UL (ref 3.9–12.7)

## 2019-11-07 PROCEDURE — 99999 PR PBB SHADOW E&M-EST. PATIENT-LVL III: ICD-10-PCS | Mod: PBBFAC,,, | Performed by: INTERNAL MEDICINE

## 2019-11-07 PROCEDURE — 3008F BODY MASS INDEX DOCD: CPT | Mod: CPTII,S$GLB,, | Performed by: INTERNAL MEDICINE

## 2019-11-07 PROCEDURE — 84443 ASSAY THYROID STIM HORMONE: CPT

## 2019-11-07 PROCEDURE — 36415 COLL VENOUS BLD VENIPUNCTURE: CPT | Mod: PO

## 2019-11-07 PROCEDURE — 84439 ASSAY OF FREE THYROXINE: CPT

## 2019-11-07 PROCEDURE — 86140 C-REACTIVE PROTEIN: CPT

## 2019-11-07 PROCEDURE — 96372 PR INJECTION,THERAP/PROPH/DIAG2ST, IM OR SUBCUT: ICD-10-PCS | Mod: S$GLB,,, | Performed by: INTERNAL MEDICINE

## 2019-11-07 PROCEDURE — 85651 RBC SED RATE NONAUTOMATED: CPT | Mod: PO

## 2019-11-07 PROCEDURE — 96372 THER/PROPH/DIAG INJ SC/IM: CPT | Mod: S$GLB,,, | Performed by: INTERNAL MEDICINE

## 2019-11-07 PROCEDURE — 84481 FREE ASSAY (FT-3): CPT

## 2019-11-07 PROCEDURE — 99999 PR PBB SHADOW E&M-EST. PATIENT-LVL III: CPT | Mod: PBBFAC,,, | Performed by: INTERNAL MEDICINE

## 2019-11-07 PROCEDURE — 80053 COMPREHEN METABOLIC PANEL: CPT

## 2019-11-07 PROCEDURE — 99215 PR OFFICE/OUTPT VISIT, EST, LEVL V, 40-54 MIN: ICD-10-PCS | Mod: 25,S$GLB,, | Performed by: INTERNAL MEDICINE

## 2019-11-07 PROCEDURE — 3008F PR BODY MASS INDEX (BMI) DOCUMENTED: ICD-10-PCS | Mod: CPTII,S$GLB,, | Performed by: INTERNAL MEDICINE

## 2019-11-07 PROCEDURE — 99215 OFFICE O/P EST HI 40 MIN: CPT | Mod: 25,S$GLB,, | Performed by: INTERNAL MEDICINE

## 2019-11-07 PROCEDURE — 85025 COMPLETE CBC W/AUTO DIFF WBC: CPT

## 2019-11-07 RX ORDER — GABAPENTIN 600 MG/1
TABLET ORAL
Qty: 60 TABLET | Refills: 12 | Status: SHIPPED | OUTPATIENT
Start: 2019-11-07 | End: 2020-07-17

## 2019-11-07 RX ORDER — DICYCLOMINE HYDROCHLORIDE 20 MG/1
20 TABLET ORAL EVERY 6 HOURS
Qty: 120 TABLET | Refills: 3 | Status: SHIPPED | OUTPATIENT
Start: 2019-11-07 | End: 2019-12-07

## 2019-11-07 RX ORDER — CYANOCOBALAMIN 1000 UG/ML
1000 INJECTION, SOLUTION INTRAMUSCULAR; SUBCUTANEOUS
Status: COMPLETED | OUTPATIENT
Start: 2019-11-07 | End: 2019-11-07

## 2019-11-07 RX ORDER — TIZANIDINE 4 MG/1
4 TABLET ORAL EVERY 8 HOURS PRN
Qty: 90 TABLET | Refills: 12 | Status: SHIPPED | OUTPATIENT
Start: 2019-11-07 | End: 2020-11-22

## 2019-11-07 RX ORDER — METHYLPREDNISOLONE ACETATE 80 MG/ML
160 INJECTION, SUSPENSION INTRA-ARTICULAR; INTRALESIONAL; INTRAMUSCULAR; SOFT TISSUE
Status: COMPLETED | OUTPATIENT
Start: 2019-11-07 | End: 2019-11-07

## 2019-11-07 RX ORDER — KETOROLAC TROMETHAMINE 30 MG/ML
60 INJECTION, SOLUTION INTRAMUSCULAR; INTRAVENOUS
Status: COMPLETED | OUTPATIENT
Start: 2019-11-07 | End: 2019-11-07

## 2019-11-07 RX ORDER — TRAMADOL HYDROCHLORIDE 50 MG/1
50 TABLET ORAL EVERY 6 HOURS
Qty: 90 TABLET | Refills: 3 | Status: SHIPPED | OUTPATIENT
Start: 2019-11-07 | End: 2020-07-17 | Stop reason: SDUPTHER

## 2019-11-07 RX ADMIN — KETOROLAC TROMETHAMINE 60 MG: 30 INJECTION, SOLUTION INTRAMUSCULAR; INTRAVENOUS at 03:11

## 2019-11-07 RX ADMIN — METHYLPREDNISOLONE ACETATE 160 MG: 80 INJECTION, SUSPENSION INTRA-ARTICULAR; INTRALESIONAL; INTRAMUSCULAR; SOFT TISSUE at 03:11

## 2019-11-07 RX ADMIN — CYANOCOBALAMIN 1000 MCG: 1000 INJECTION, SOLUTION INTRAMUSCULAR; SUBCUTANEOUS at 03:11

## 2019-11-07 ASSESSMENT — ROUTINE ASSESSMENT OF PATIENT INDEX DATA (RAPID3)
PATIENT GLOBAL ASSESSMENT SCORE: 7
MDHAQ FUNCTION SCORE: 1.2
FATIGUE SCORE: 2.2
PSYCHOLOGICAL DISTRESS SCORE: 6.6
PAIN SCORE: 7.5
TOTAL RAPID3 SCORE: 6.17

## 2019-11-07 NOTE — PROGRESS NOTES
Administered 1 cc ( 1000 mcg/ml ) of b12 to the right upper outer gluteal. Informed of s/s to report verbalized understanding. No adverse reactions noted.    Lot # [id number]  Expiration feb 21    Administered 2 cc ( 80 mg/ml ) of depomedrol to the right upper outer gluteal. Informed of s/s to report verbalized understanding. No adverse reactions noted.    Lot # [id number]  Expiration 01/21    Administered 2 cc ( 30 mg/ml ) of toradol to the right upper outer gluteal. Informed of s/s to report verbalized understanding. No adverse reactions noted.    Lot # [id number]  Expiration 1 aug 2020

## 2019-11-07 NOTE — PROGRESS NOTES
Subjective:       Patient ID: Marilee Simons is a 49 y.o. female.    Chief Complaint: Disease Management; Fibromyalgia; and Rheumatoid Arthritis (seronegative Rheumatoid arthritis )    Follow up   PSA  Humira,  fibromyalgia,  Doing  Fair, very depreseed, flaring , interstitial cystitis   mcp, pip, wrist, knees and ankles feet and L spine pain on plaquenil.  Pain is located in multiple joints, both shoulder(s), both elbow(s), both wrist(s), both MCP(s): 1st, 2nd, 3rd, 4th and 5th, both PIP(s): 1st, 2nd, 3rd, 4th and 5th, both DIP(s): 1st and 2nd, both hip(s), both knee(s) and both MTP(s): 1st, 2nd, 3rd, 4th and 5th, is described as aching, pulsating, shooting and throbbing, and is constant, moderate .  Associated symptoms include: crepitation, decreased range of motion, edema, effusion, tenderness and warmth.    Newly dx  Esophageal spasm, and IBS and depression.    Fibromyalgia   Associated symptoms include arthralgias, joint swelling, myalgias and neck pain.           She complains of joint swelling. Associated symptoms include myalgias.         Review of Systems   Constitutional: Positive for activity change. Negative for appetite change and unexpected weight change.   HENT: Negative for dental problem, ear discharge, ear pain, facial swelling, mouth sores, nosebleeds, postnasal drip, rhinorrhea, sinus pressure, sneezing, tinnitus and voice change.    Eyes: Negative for photophobia, pain, discharge, redness and itching.   Respiratory: Negative for apnea, chest tightness, shortness of breath and wheezing.    Cardiovascular: Positive for leg swelling. Negative for palpitations.   Gastrointestinal: Negative for abdominal distention, constipation and diarrhea.   Endocrine: Negative for cold intolerance, heat intolerance, polydipsia and polyuria.   Genitourinary: Negative for decreased urine volume, difficulty urinating, flank pain, frequency, hematuria and urgency.   Musculoskeletal: Positive for arthralgias, back  pain, joint swelling, myalgias, neck pain and neck stiffness. Negative for gait problem.   Skin: Negative for pallor and wound.   Allergic/Immunologic: Negative for immunocompromised state.   Neurological: Negative for dizziness and tremors.   Hematological: Negative for adenopathy. Does not bruise/bleed easily.   Psychiatric/Behavioral: Negative for sleep disturbance. The patient is not nervous/anxious.          Objective:     /77 (BP Location: Left arm, Patient Position: Sitting, BP Method: Medium (Automatic))   Pulse 104   Ht 5' (1.524 m)   Wt 78.5 kg (173 lb)   LMP  (LMP Unknown)   BMI 33.79 kg/m²      Physical Exam   Nursing note and vitals reviewed.  Constitutional: She is oriented to person, place, and time. No distress.   HENT:   Head: Normocephalic and atraumatic.   Mouth/Throat: Oropharynx is clear and moist.   Eyes: EOM are normal. Pupils are equal, round, and reactive to light.   Neck: Neck supple. No thyromegaly present.   Cardiovascular: Normal rate, regular rhythm and normal heart sounds.  Exam reveals no gallop and no friction rub.    No murmur heard.  Pulmonary/Chest: Breath sounds normal. She has no wheezes. She has no rales. She exhibits no tenderness.   Abdominal: There is no tenderness. There is no rebound and no guarding.       Right Side Rheumatological Exam     The patient is tender to palpation of the shoulder, elbow, wrist, knee, 1st PIP, 1st MCP, 2nd PIP, 2nd MCP, 3rd PIP, 3rd MCP, 4th PIP, 4th MCP, 5th PIP and 5th MCP    She has swelling of the wrist, 1st PIP, 1st MCP, 2nd PIP, 2nd MCP, 3rd PIP, 3rd MCP, 4th PIP, 4th MCP, 5th PIP and 5th MCP    The patient has an enlarged wrist, knee, 1st PIP, 1st MCP, 2nd PIP, 2nd MCP, 3rd PIP, 3rd MCP, 4th PIP, 4th MCP, 5th PIP and 5th MCP    Shoulder Exam   Tenderness Location: biceps tendon and clavicle    Range of Motion   Active abduction: abnormal   Adduction: abnormal  Sensation: normal    Knee Exam   Patellofemoral Crepitus:  positive  Effusion: positive  Sensation: normal    Hip Exam   Tenderness Location: posterior, greater trochanter and lateral  Sensation: normal    Elbow/Wrist Exam   Tenderness Location: lateral epicondyle and medial epicondyle  Sensation: normal    Foot Exam   Right foot exam exhibits signs of inflamed dorsum  Right foot exam exhibits signs of no podagra, no tophus and no plantar fasciitis    Muscle Strength (0-5 scale):  : 4/5     Left Side Rheumatological Exam     The patient is tender to palpation of the shoulder, elbow, wrist, knee, 1st PIP, 1st MCP, 2nd PIP, 2nd MCP, 3rd PIP, 3rd MCP, 4th PIP, 4th MCP, 5th PIP and 5th MCP.    She has swelling of the wrist, 1st PIP, 1st MCP, 2nd PIP, 2nd MCP, 3rd PIP, 3rd MCP, 4th PIP, 4th MCP, 5th PIP and 5th MCP    The patient has an enlarged wrist and knee.    Shoulder Exam   Tenderness Location: biceps tendon and clavicle    Range of Motion   Active abduction: abnormal   Sensation: normal    Knee Exam     Patellofemoral Crepitus: positive  Effusion: negative  Sensation: normal    Hip Exam   Tenderness Location: posterior, greater trochanter and lateral  Sensation: normal    Elbow/Wrist Exam   Tenderness Location: lateral epicondyle and medial epicondyle  Sensation: normal    Foot Exam   Left foot exam exhibits signs of inflamed dorsum  Left foot exam exhibits signs of no podagra and no plantar fasciitis    Muscle Strength (0-5 scale):  :  4/5       Back/Neck Exam   General Inspection   Gait: normal       Tenderness Right paramedian tenderness of the Occ, Upper C-Spine, Lower L-Spine and SI Joint.Left paramedian tenderness of the Occ, Upper C-Spine, Lower L-Spine and SI Joint.      Comments:  15 out of 18 tender points    Lymphadenopathy:     She has no cervical adenopathy.   Neurological: She is alert and oriented to person, place, and time.   Skin: No rash noted. No erythema. No pallor.     Psychiatric: Affect normal. Her mood appears anxious. She exhibits a  depressed mood. She expresses no suicidal plans and no homicidal plans.   Musculoskeletal: She exhibits tenderness and deformity.   Sausage digits, Synovitis pip 2,3 B and B wrist with warmth to the touch         Results for orders placed or performed during the hospital encounter of 06/21/19   CBC auto differential   Result Value Ref Range    WBC 8.28 3.90 - 12.70 K/uL    RBC 4.22 4.00 - 5.40 M/uL    Hemoglobin 12.3 12.0 - 16.0 g/dL    Hematocrit 38.4 37.0 - 48.5 %    Mean Corpuscular Volume 91 82 - 98 fL    Mean Corpuscular Hemoglobin 29.1 27.0 - 31.0 pg    Mean Corpuscular Hemoglobin Conc 32.0 32.0 - 36.0 g/dL    RDW 12.3 11.5 - 14.5 %    Platelets 326 150 - 350 K/uL    MPV 8.7 (L) 9.2 - 12.9 fL    Immature Granulocytes 0.4 0.0 - 0.5 %    Gran # (ANC) 5.0 1.8 - 7.7 K/uL    Immature Grans (Abs) 0.03 0.00 - 0.04 K/uL    Lymph # 2.5 1.0 - 4.8 K/uL    Mono # 0.6 0.3 - 1.0 K/uL    Eos # 0.1 0.0 - 0.5 K/uL    Baso # 0.06 0.00 - 0.20 K/uL    nRBC 0 0 /100 WBC    Gran% 60.1 38.0 - 73.0 %    Lymph% 30.7 18.0 - 48.0 %    Mono% 7.4 4.0 - 15.0 %    Eosinophil% 0.7 0.0 - 8.0 %    Basophil% 0.7 0.0 - 1.9 %    Differential Method Automated    Comprehensive metabolic panel   Result Value Ref Range    Sodium 139 136 - 145 mmol/L    Potassium 4.0 3.5 - 5.1 mmol/L    Chloride 104 95 - 110 mmol/L    CO2 25 23 - 29 mmol/L    Glucose 95 70 - 110 mg/dL    BUN, Bld 18 6 - 20 mg/dL    Creatinine 0.8 0.5 - 1.4 mg/dL    Calcium 9.7 8.7 - 10.5 mg/dL    Total Protein 7.3 6.0 - 8.4 g/dL    Albumin 4.4 3.5 - 5.2 g/dL    Total Bilirubin 0.4 0.1 - 1.0 mg/dL    Alkaline Phosphatase 81 55 - 135 U/L    AST 15 10 - 40 U/L    ALT 19 10 - 44 U/L    Anion Gap 10 8 - 16 mmol/L    eGFR if African American >60.0 >60 mL/min/1.73 m^2    eGFR if non African American >60.0 >60 mL/min/1.73 m^2   Lipase   Result Value Ref Range    Lipase 17 4 - 60 U/L   Urinalysis, Reflex to Urine Culture Urine, Clean Catch   Result Value Ref Range    Specimen UA Urine, Clean  Catch     Color, UA Orange (A) Yellow, Straw, Almaz    Appearance, UA Clear Clear    pH, UA 5.0 5.0 - 8.0    Specific Gravity, UA 1.020 1.005 - 1.030    Protein, UA Negative Negative    Glucose, UA Negative Negative    Ketones, UA Negative Negative    Bilirubin (UA) Negative Negative    Occult Blood UA Negative Negative    Nitrite, UA SEE COMMENT Negative    Leukocytes, UA Negative Negative   Urinalysis Microscopic   Result Value Ref Range    RBC, UA 1 0 - 4 /hpf    WBC, UA 2 0 - 5 /hpf    Bacteria Moderate (A) None-Occ /hpf    Squam Epithel, UA 2 /hpf    Microscopic Comment SEE COMMENT        Assessment:       Encounter Diagnoses   Name Primary?    Seronegative rheumatoid arthritis Yes    Osteoporosis, unspecified osteoporosis type, unspecified pathological fracture presence     PSA (psoriatic arthritis)     Fibromyalgia     Lumbar and sacral osteoarthritis     Chronic pain syndrome          Plan:       Seronegative rheumatoid arthritis  -     gabapentin (GRALISE) 600 mg Tb24; 2 tabs po q day  Dispense: 60 tablet; Refill: 12  -     methylPREDNISolone acetate injection 160 mg  -     ketorolac injection 60 mg  -     cyanocobalamin injection 1,000 mcg  -     CBC auto differential; Future; Expected date: 11/07/2019  -     Comprehensive metabolic panel; Future; Expected date: 11/07/2019  -     C-reactive protein; Future; Expected date: 11/07/2019  -     Sedimentation rate; Future; Expected date: 11/07/2019  -     Urinalysis; Future; Expected date: 11/07/2019  -     TSH; Future; Expected date: 11/07/2019  -     T4, free; Future; Expected date: 11/07/2019  -     T3, free; Future; Expected date: 11/07/2019  -     dicyclomine (BENTYL) 20 mg tablet; Take 1 tablet (20 mg total) by mouth every 6 (six) hours.  Dispense: 120 tablet; Refill: 3    Osteoporosis, unspecified osteoporosis type, unspecified pathological fracture presence  -     gabapentin (GRALISE) 600 mg Tb24; 2 tabs po q day  Dispense: 60 tablet; Refill: 12  -      methylPREDNISolone acetate injection 160 mg  -     ketorolac injection 60 mg  -     cyanocobalamin injection 1,000 mcg  -     CBC auto differential; Future; Expected date: 11/07/2019  -     Comprehensive metabolic panel; Future; Expected date: 11/07/2019  -     C-reactive protein; Future; Expected date: 11/07/2019  -     Sedimentation rate; Future; Expected date: 11/07/2019  -     Urinalysis; Future; Expected date: 11/07/2019  -     TSH; Future; Expected date: 11/07/2019  -     T4, free; Future; Expected date: 11/07/2019  -     T3, free; Future; Expected date: 11/07/2019  -     dicyclomine (BENTYL) 20 mg tablet; Take 1 tablet (20 mg total) by mouth every 6 (six) hours.  Dispense: 120 tablet; Refill: 3    PSA (psoriatic arthritis)  -     gabapentin (GRALISE) 600 mg Tb24; 2 tabs po q day  Dispense: 60 tablet; Refill: 12  -     methylPREDNISolone acetate injection 160 mg  -     ketorolac injection 60 mg  -     cyanocobalamin injection 1,000 mcg  -     CBC auto differential; Future; Expected date: 11/07/2019  -     Comprehensive metabolic panel; Future; Expected date: 11/07/2019  -     C-reactive protein; Future; Expected date: 11/07/2019  -     Sedimentation rate; Future; Expected date: 11/07/2019  -     Urinalysis; Future; Expected date: 11/07/2019  -     TSH; Future; Expected date: 11/07/2019  -     T4, free; Future; Expected date: 11/07/2019  -     T3, free; Future; Expected date: 11/07/2019  -     dicyclomine (BENTYL) 20 mg tablet; Take 1 tablet (20 mg total) by mouth every 6 (six) hours.  Dispense: 120 tablet; Refill: 3    Fibromyalgia  -     tiZANidine (ZANAFLEX) 4 MG tablet; Take 1 tablet (4 mg total) by mouth every 8 (eight) hours as needed.  Dispense: 90 tablet; Refill: 12  -     gabapentin (GRALISE) 600 mg Tb24; 2 tabs po q day  Dispense: 60 tablet; Refill: 12  -     methylPREDNISolone acetate injection 160 mg  -     ketorolac injection 60 mg  -     cyanocobalamin injection 1,000 mcg  -     CBC auto  differential; Future; Expected date: 11/07/2019  -     Comprehensive metabolic panel; Future; Expected date: 11/07/2019  -     C-reactive protein; Future; Expected date: 11/07/2019  -     Sedimentation rate; Future; Expected date: 11/07/2019  -     Urinalysis; Future; Expected date: 11/07/2019  -     TSH; Future; Expected date: 11/07/2019  -     T4, free; Future; Expected date: 11/07/2019  -     T3, free; Future; Expected date: 11/07/2019  -     dicyclomine (BENTYL) 20 mg tablet; Take 1 tablet (20 mg total) by mouth every 6 (six) hours.  Dispense: 120 tablet; Refill: 3    Lumbar and sacral osteoarthritis  -     gabapentin (GRALISE) 600 mg Tb24; 2 tabs po q day  Dispense: 60 tablet; Refill: 12  -     methylPREDNISolone acetate injection 160 mg  -     ketorolac injection 60 mg  -     cyanocobalamin injection 1,000 mcg  -     CBC auto differential; Future; Expected date: 11/07/2019  -     Comprehensive metabolic panel; Future; Expected date: 11/07/2019  -     C-reactive protein; Future; Expected date: 11/07/2019  -     Sedimentation rate; Future; Expected date: 11/07/2019  -     Urinalysis; Future; Expected date: 11/07/2019  -     TSH; Future; Expected date: 11/07/2019  -     T4, free; Future; Expected date: 11/07/2019  -     T3, free; Future; Expected date: 11/07/2019  -     dicyclomine (BENTYL) 20 mg tablet; Take 1 tablet (20 mg total) by mouth every 6 (six) hours.  Dispense: 120 tablet; Refill: 3    Chronic pain syndrome  -     traMADol (ULTRAM) 50 mg tablet; Take 1 tablet (50 mg total) by mouth every 6 (six) hours.  Dispense: 90 tablet; Refill: 3  -     methylPREDNISolone acetate injection 160 mg  -     ketorolac injection 60 mg  -     cyanocobalamin injection 1,000 mcg  -     CBC auto differential; Future; Expected date: 11/07/2019  -     Comprehensive metabolic panel; Future; Expected date: 11/07/2019  -     C-reactive protein; Future; Expected date: 11/07/2019  -     Sedimentation rate; Future; Expected date:  11/07/2019  -     Urinalysis; Future; Expected date: 11/07/2019  -     TSH; Future; Expected date: 11/07/2019  -     T4, free; Future; Expected date: 11/07/2019  -     T3, free; Future; Expected date: 11/07/2019  -     dicyclomine (BENTYL) 20 mg tablet; Take 1 tablet (20 mg total) by mouth every 6 (six) hours.  Dispense: 120 tablet; Refill: 3       Will start Humira

## 2019-11-08 LAB — T3FREE SERPL-MCNC: 2.6 PG/ML (ref 2.3–4.2)

## 2019-12-02 ENCOUNTER — TELEPHONE (OUTPATIENT)
Dept: OPHTHALMOLOGY | Facility: CLINIC | Age: 49
End: 2019-12-02

## 2019-12-03 ENCOUNTER — TELEPHONE (OUTPATIENT)
Dept: OPHTHALMOLOGY | Facility: CLINIC | Age: 49
End: 2019-12-03

## 2019-12-05 DIAGNOSIS — E03.9 HYPOTHYROIDISM, UNSPECIFIED TYPE: ICD-10-CM

## 2019-12-05 DIAGNOSIS — N30.10 IC (INTERSTITIAL CYSTITIS): ICD-10-CM

## 2019-12-05 RX ORDER — RABEPRAZOLE SODIUM 20 MG/1
TABLET, DELAYED RELEASE ORAL
Qty: 30 TABLET | Refills: 0 | Status: SHIPPED | OUTPATIENT
Start: 2019-12-05 | End: 2020-01-07

## 2019-12-05 RX ORDER — FAMOTIDINE 40 MG/1
TABLET, FILM COATED ORAL
Qty: 30 TABLET | Refills: 0 | Status: SHIPPED | OUTPATIENT
Start: 2019-12-05 | End: 2020-01-07

## 2019-12-09 RX ORDER — LEVOTHYROXINE SODIUM 50 UG/1
TABLET ORAL
Qty: 30 TABLET | Refills: 3 | Status: SHIPPED | OUTPATIENT
Start: 2019-12-09 | End: 2020-05-06

## 2019-12-11 ENCOUNTER — PATIENT MESSAGE (OUTPATIENT)
Dept: RHEUMATOLOGY | Facility: CLINIC | Age: 49
End: 2019-12-11

## 2019-12-11 RX ORDER — PROMETHAZINE HYDROCHLORIDE 25 MG/1
TABLET ORAL
Qty: 15 TABLET | Refills: 0 | Status: SHIPPED | OUTPATIENT
Start: 2019-12-11 | End: 2020-02-10

## 2019-12-18 RX ORDER — CLONAZEPAM 1 MG/1
TABLET ORAL
Qty: 90 TABLET | Refills: 2 | Status: SHIPPED | OUTPATIENT
Start: 2019-12-18 | End: 2020-09-13

## 2019-12-27 ENCOUNTER — TELEPHONE (OUTPATIENT)
Dept: GASTROENTEROLOGY | Facility: CLINIC | Age: 49
End: 2019-12-27

## 2019-12-27 NOTE — TELEPHONE ENCOUNTER
MA spoke to pt, informed pt Dr. He and staff are out of the office today. They will return on 12/30/2019    Pt stated she is not completley out of this medication lotronex 0.5 mg tablets and can wait until Dr. He return on 12/30/2019 to send refill request     Message sent to Dr. He staff     ----- Message from Merly Mendez sent at 12/27/2019 11:05 AM CST -----  returning call to Margo Ryan of Gastro re: refill of alosetron (LOTRONEX) 0.5 MG tablet @ [id number] - [place] - [address] [facility]    [facility] contact pharmacy/pt doesn't know what is needed from 's ofc to have rx refilled    Pt contact [phone]/juana curtis

## 2019-12-27 NOTE — TELEPHONE ENCOUNTER
MA contact pt regarding message below. Pt didn't answer left detail message to to return call     ----- Message from Liste Ryan sent at 12/27/2019 10:56 AM CST -----  Contact: patient  [phone]-please call above patient at number in message need to speak with the nurse about refill said the pharmacy has been trying to get in touch with the office waiting on a call from the nurse thanks.

## 2019-12-31 ENCOUNTER — PATIENT MESSAGE (OUTPATIENT)
Dept: PSYCHIATRY | Facility: CLINIC | Age: 49
End: 2019-12-31

## 2020-01-02 ENCOUNTER — PATIENT MESSAGE (OUTPATIENT)
Dept: GASTROENTEROLOGY | Facility: CLINIC | Age: 50
End: 2020-01-02

## 2020-01-02 ENCOUNTER — TELEPHONE (OUTPATIENT)
Dept: GASTROENTEROLOGY | Facility: CLINIC | Age: 50
End: 2020-01-02

## 2020-01-02 NOTE — TELEPHONE ENCOUNTER
Ma returned pt calls no answer message left on pt vm   Informing her dr leon will be back on 1/3  meds will be refilled upon his return

## 2020-01-02 NOTE — TELEPHONE ENCOUNTER
----- Message from Stella Frye sent at 1/2/2020 11:44 AM CST -----  Contact: pt  Out of med         PLEASE SEND REFILLS           alosetron (LOTRONEX) 0.5 MG tablet    [id number] - [place] - [address] AT [facility]    Thanks     Had to wait  Till doctor back from vacation    Pt was told    Pt returning call    Ph  [phone]    Thanks

## 2020-01-03 RX ORDER — ALOSETRON HYDROCHLORIDE 0.5 MG/1
TABLET ORAL
Qty: 60 TABLET | Refills: 0 | Status: SHIPPED | OUTPATIENT
Start: 2020-01-03 | End: 2020-02-10

## 2020-01-06 ENCOUNTER — PATIENT MESSAGE (OUTPATIENT)
Dept: PSYCHIATRY | Facility: CLINIC | Age: 50
End: 2020-01-06

## 2020-01-06 DIAGNOSIS — N30.10 IC (INTERSTITIAL CYSTITIS): ICD-10-CM

## 2020-01-07 ENCOUNTER — TELEPHONE (OUTPATIENT)
Dept: PSYCHIATRY | Facility: CLINIC | Age: 50
End: 2020-01-07

## 2020-01-07 RX ORDER — FAMOTIDINE 40 MG/1
TABLET, FILM COATED ORAL
Qty: 30 TABLET | Refills: 0 | Status: SHIPPED | OUTPATIENT
Start: 2020-01-07 | End: 2020-02-18

## 2020-01-07 RX ORDER — ESTRADIOL 0.5 MG/1
TABLET ORAL
Qty: 30 TABLET | Refills: 4 | Status: SHIPPED | OUTPATIENT
Start: 2020-01-07 | End: 2020-06-24

## 2020-01-07 RX ORDER — RABEPRAZOLE SODIUM 20 MG/1
TABLET, DELAYED RELEASE ORAL
Qty: 30 TABLET | Refills: 0 | Status: SHIPPED | OUTPATIENT
Start: 2020-01-07 | End: 2020-02-10

## 2020-01-16 RX ORDER — TRAZODONE HYDROCHLORIDE 50 MG/1
TABLET ORAL
Qty: 30 TABLET | Refills: 2 | OUTPATIENT
Start: 2020-01-16

## 2020-01-22 ENCOUNTER — PATIENT MESSAGE (OUTPATIENT)
Dept: PSYCHIATRY | Facility: CLINIC | Age: 50
End: 2020-01-22

## 2020-01-30 DIAGNOSIS — N30.10 IC (INTERSTITIAL CYSTITIS): ICD-10-CM

## 2020-01-30 NOTE — TELEPHONE ENCOUNTER
----- Message from Floridalma Colunga LPN sent at 1/29/2020  2:40 PM CST -----  Contact: [phone] ok to call thursday       ----- Message -----  From: Lakeisha Sage MA  Sent: 1/29/2020   2:35 PM CST  To: Jose SPAIN Staff    Pt states that the original Rx for SHAILA was written for a 120 cap's but at some point the pharmacy only had a partial Rx in stock and since then they have only been filling it partially. They told her that she needs a New Rx sent to update it to 120 cap's     [id number] - [place], [place] - [address] AT [facility] [address] [phone] (Phone)  [phone] (Fax)

## 2020-01-30 NOTE — TELEPHONE ENCOUNTER
Last seen 12/11/2018. She has been asked twice to schedule an davon't to follow up with you. She was due march 2019. She made an davon't in April 2020. Can you refill for now?

## 2020-01-31 ENCOUNTER — OFFICE VISIT (OUTPATIENT)
Dept: OPTOMETRY | Facility: CLINIC | Age: 50
End: 2020-01-31

## 2020-01-31 ENCOUNTER — OFFICE VISIT (OUTPATIENT)
Dept: OPTOMETRY | Facility: CLINIC | Age: 50
End: 2020-01-31
Payer: COMMERCIAL

## 2020-01-31 DIAGNOSIS — H52.4 PRESBYOPIA: ICD-10-CM

## 2020-01-31 DIAGNOSIS — M79.7 FIBROMYALGIA: ICD-10-CM

## 2020-01-31 DIAGNOSIS — H04.123 DRY EYE SYNDROME, BILATERAL: Primary | ICD-10-CM

## 2020-01-31 DIAGNOSIS — Z98.890 S/P LASIK (LASER ASSISTED IN SITU KERATOMILEUSIS) OF BOTH EYES: ICD-10-CM

## 2020-01-31 DIAGNOSIS — Z46.0 FITTING AND ADJUSTMENT OF SPECTACLES AND CONTACT LENSES: Primary | ICD-10-CM

## 2020-01-31 DIAGNOSIS — H52.03 HYPEROPIA, BILATERAL: ICD-10-CM

## 2020-01-31 DIAGNOSIS — Z46.0 FITTING AND ADJUSTMENT OF SPECTACLES AND CONTACT LENSES: ICD-10-CM

## 2020-01-31 DIAGNOSIS — Z79.899 LONG-TERM USE OF PLAQUENIL: ICD-10-CM

## 2020-01-31 PROCEDURE — 99999 PR PBB SHADOW E&M-EST. PATIENT-LVL I: CPT | Mod: PBBFAC,,, | Performed by: OPTOMETRIST

## 2020-01-31 PROCEDURE — 92014 COMPRE OPH EXAM EST PT 1/>: CPT | Mod: S$GLB,,, | Performed by: OPTOMETRIST

## 2020-01-31 PROCEDURE — 92310 CONTACT LENS FITTING OU: CPT | Mod: CSM,,, | Performed by: OPTOMETRIST

## 2020-01-31 PROCEDURE — 92015 DETERMINE REFRACTIVE STATE: CPT | Mod: S$GLB,,, | Performed by: OPTOMETRIST

## 2020-01-31 PROCEDURE — 92015 PR REFRACTION: ICD-10-PCS | Mod: S$GLB,,, | Performed by: OPTOMETRIST

## 2020-01-31 PROCEDURE — 99999 PR PBB SHADOW E&M-EST. PATIENT-LVL I: ICD-10-PCS | Mod: PBBFAC,,, | Performed by: OPTOMETRIST

## 2020-01-31 PROCEDURE — 92310 PR CONTACT LENS FITTING (NO CHANGE): ICD-10-PCS | Mod: CSM,,, | Performed by: OPTOMETRIST

## 2020-01-31 PROCEDURE — 92014 PR EYE EXAM, EST PATIENT,COMPREHESV: ICD-10-PCS | Mod: S$GLB,,, | Performed by: OPTOMETRIST

## 2020-01-31 NOTE — PROGRESS NOTES
HPI     Pt here for annual and cl fit  Pt takes plaquenil for rheumatoid arthritis   Pt having problems with night driving  Feels like va has changed distance and near  Using +2.00 readers   Interested in trying cls again   Pt does not use any eye drops.  Has headaches due to other medical conditions  Patient denies diplopia, headaches, flashes/floaters, pain, and   itching/burning/tearing.          Last edited by Vannessa Bailey on 1/31/2020  2:53 PM. (History)            Assessment /Plan     For exam results, see Encounter Report.      S/P LASIK (laser assisted in situ keratomileusis) of both eyes  Dry eye syndrome, bilateral  -     lifitegrast (XIIDRA) 5 % Dpet; Apply 1 drop to eye 2 (two) times daily.  Dispense: 60 each; Refill: 12              Or try Systane complete BID OU     Fibromyalgia  Pain in joints  Long-term use of Plaquenil              2018 OCT WNL , HVF 10-2 WNL              RTC Monday for OCT/HVF               Monitor yearly while on plaquenil     Presbyopia  Hyperopia, bilateral     Fitting and adjustment of spectacles and contact lenses              Dispensed trials of precision one day                         monovision OD near, OS distance no lens        RTC 1 year

## 2020-01-31 NOTE — PROGRESS NOTES
Assessment /Plan     For exam results, see Encounter Report.        Dry eye syndrome, bilateral  -     lifitegrast (XIIDRA) 5 % Dpet; Apply 1 drop to eye 2 (two) times daily.  Dispense: 60 each; Refill: 12              Or try Systane complete BID OU     Fibromyalgia  Pain in joints  Long-term use of Plaquenil              2018 OCT WNL , HVF 10-2 WNL   RTC Monday for OCT/HVF               Monitor yearly while on plaquenil     Presbyopia  Hyperopia, bilateral    Fitting and adjustment of spectacles and contact lenses              Dispensed trials of precision one day                         monovision OD near, OS distance no lens       RTC 1 year             Contact lens exam done, see exam of same date for documentation.

## 2020-02-03 ENCOUNTER — OFFICE VISIT (OUTPATIENT)
Dept: PSYCHIATRY | Facility: CLINIC | Age: 50
End: 2020-02-03
Payer: COMMERCIAL

## 2020-02-03 DIAGNOSIS — Z79.899 LONG-TERM USE OF PLAQUENIL: Primary | ICD-10-CM

## 2020-02-03 DIAGNOSIS — F41.9 ANXIETY: ICD-10-CM

## 2020-02-03 DIAGNOSIS — F33.0 MAJOR DEPRESSIVE DISORDER, RECURRENT EPISODE, MILD: Primary | ICD-10-CM

## 2020-02-03 PROCEDURE — 99999 PR PBB SHADOW E&M-EST. PATIENT-LVL I: CPT | Mod: PBBFAC,,, | Performed by: SOCIAL WORKER

## 2020-02-03 PROCEDURE — 90834 PSYTX W PT 45 MINUTES: CPT | Mod: S$GLB,,, | Performed by: SOCIAL WORKER

## 2020-02-03 PROCEDURE — 99999 PR PBB SHADOW E&M-EST. PATIENT-LVL I: ICD-10-PCS | Mod: PBBFAC,,, | Performed by: SOCIAL WORKER

## 2020-02-03 PROCEDURE — 90834 PR PSYCHOTHERAPY W/PATIENT, 45 MIN: ICD-10-PCS | Mod: S$GLB,,, | Performed by: SOCIAL WORKER
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
## 2020-02-05 ENCOUNTER — TELEPHONE (OUTPATIENT)
Dept: GASTROENTEROLOGY | Facility: CLINIC | Age: 50
End: 2020-02-05

## 2020-02-05 NOTE — TELEPHONE ENCOUNTER
----- Message from Adeline Castellon sent at 2/5/2020  9:43 AM CST -----  Please give pt a call about her future daughter in law.  Call back # [phone]

## 2020-02-05 NOTE — TELEPHONE ENCOUNTER
Ma spoke with pt mother in law and told her that patient will have to call unable to discuss any information with her   Hippa violation

## 2020-02-10 RX ORDER — RABEPRAZOLE SODIUM 20 MG/1
TABLET, DELAYED RELEASE ORAL
Qty: 30 TABLET | Refills: 0 | Status: SHIPPED | OUTPATIENT
Start: 2020-02-10 | End: 2020-03-14

## 2020-02-10 RX ORDER — ALOSETRON HYDROCHLORIDE 0.5 MG/1
0.5 TABLET ORAL DAILY PRN
Qty: 30 TABLET | Refills: 0 | Status: SHIPPED | OUTPATIENT
Start: 2020-02-10 | End: 2020-03-03

## 2020-02-12 ENCOUNTER — OFFICE VISIT (OUTPATIENT)
Dept: PSYCHIATRY | Facility: CLINIC | Age: 50
End: 2020-02-12
Payer: COMMERCIAL

## 2020-02-12 VITALS
HEART RATE: 98 BPM | DIASTOLIC BLOOD PRESSURE: 80 MMHG | BODY MASS INDEX: 32.94 KG/M2 | SYSTOLIC BLOOD PRESSURE: 136 MMHG | WEIGHT: 168.63 LBS

## 2020-02-12 DIAGNOSIS — F41.1 GENERALIZED ANXIETY DISORDER: ICD-10-CM

## 2020-02-12 DIAGNOSIS — F33.1 MAJOR DEPRESSIVE DISORDER, RECURRENT EPISODE, MODERATE: Primary | ICD-10-CM

## 2020-02-12 PROCEDURE — 99999 PR PBB SHADOW E&M-EST. PATIENT-LVL II: ICD-10-PCS | Mod: PBBFAC,,, | Performed by: PSYCHIATRY & NEUROLOGY

## 2020-02-12 PROCEDURE — 99999 PR PBB SHADOW E&M-EST. PATIENT-LVL II: CPT | Mod: PBBFAC,,, | Performed by: PSYCHIATRY & NEUROLOGY

## 2020-02-12 PROCEDURE — 90791 PSYCH DIAGNOSTIC EVALUATION: CPT | Mod: S$GLB,,, | Performed by: PSYCHIATRY & NEUROLOGY

## 2020-02-12 PROCEDURE — 90791 PR PSYCHIATRIC DIAGNOSTIC EVALUATION: ICD-10-PCS | Mod: S$GLB,,, | Performed by: PSYCHIATRY & NEUROLOGY

## 2020-02-12 RX ORDER — LAMOTRIGINE 25 MG/1
25 TABLET ORAL DAILY
Qty: 30 TABLET | Refills: 3 | Status: SHIPPED | OUTPATIENT
Start: 2020-02-12 | End: 2020-03-12 | Stop reason: SINTOL

## 2020-02-12 RX ORDER — DULOXETIN HYDROCHLORIDE 60 MG/1
60 CAPSULE, DELAYED RELEASE ORAL DAILY
Qty: 30 CAPSULE | Refills: 3 | Status: SHIPPED | OUTPATIENT
Start: 2020-02-12 | End: 2020-03-12 | Stop reason: DRUGHIGH

## 2020-02-12 RX ORDER — DULOXETIN HYDROCHLORIDE 30 MG/1
30 CAPSULE, DELAYED RELEASE ORAL DAILY
Qty: 30 CAPSULE | Refills: 3 | Status: SHIPPED | OUTPATIENT
Start: 2020-02-12 | End: 2020-03-12 | Stop reason: DRUGHIGH

## 2020-02-12 RX ORDER — TRAZODONE HYDROCHLORIDE 50 MG/1
50 TABLET ORAL NIGHTLY PRN
Qty: 30 TABLET | Refills: 2 | Status: SHIPPED | OUTPATIENT
Start: 2020-02-12 | End: 2020-06-01

## 2020-02-12 NOTE — PROGRESS NOTES
Outpatient Psychiatry Initial Visit (MD/NP)    2/12/2020    Marilee Simons, a 50 y.o. female, for initial evaluation visit.  Patient is referred by Nadeem Zheng MD       Reason for Encounter: Referral for treatment    Complaints:  She has been experiencing depression and impulsive behavior and easy anger and agitation. Ms Simons was seen today for an initial evaluation for her mood disorder.  She indicated she has been depressed most of her life, and is also concerned re her impulsive and at times over reactivity to stresses and some behaviors that lead to relationship issues. She was very cooperative today, tearful at times, and gave a history that may be consistent with a form of bipolar disorder. She denies any active thoughts or plans to harm herself or others at this time,and has no current signs of josi or psychosis. She has been hospitalized twice in the past for overdoses and spent two experiences on our BMU in the past as well.  She denies the use of street drugs, and drinks rarely on occasion. She is not smoking now. Her marriage is problematic. 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  She has two family members that committed suicide and her father was an alcoholic. She is motivated to stabilize her mood and to continue counseling with Dr Evans. There at times when she is depressed she does not want to go outside and just stays in bed at home.    Current Medications:  Medication List with Changes/Refills   New Medications    DULOXETINE (CYMBALTA) 30 MG CAPSULE    Take 1 capsule (30 mg total) by mouth once daily. Take 1 cap in am with 1 60 mg cap, for total of 90 mg in am    DULOXETINE (CYMBALTA) 60 MG CAPSULE    Take 1 capsule (60 mg total) by mouth once daily. Take 1 60 mg cap in am with 1 30 mg cap for total of 90 mg in AM    LAMOTRIGINE (LAMICTAL) 25 MG TABLET    Take 1 tablet (25 mg total) by mouth once daily.   Current Medications    ADALIMUMAB (HUMIRA,CF, PEN) 40 MG/0.4 ML  PNKT    Inject 0.4 mLs (40 mg total) into the skin every 14 (fourteen) days.    ALOSETRON (LOTRONEX) 0.5 MG TABLET    Take 1 tablet (0.5 mg total) by mouth daily as needed. TAKE 1 TABLET(0.5 MG) BY MOUTH TWICE DAILY    AMITRIPTYLINE (ELAVIL) 10 MG TABLET    TAKE 1 TABLET(10 MG) BY MOUTH EVERY NIGHT AS NEEDED    BUTALBITAL-ACETAMINOPHEN (BUPAP)  MG TAB    Take 1 tablet by mouth every 4 (four) hours.    CALCIUM GLYCEROPHOSPHATE (PRELIEF) 65 MG TAB    Take 2 tablets by mouth before meals and at bedtime as needed.    CLONAZEPAM (KLONOPIN) 1 MG TABLET    Take 1 tablet (1 mg total) by mouth 3 (three) times daily.    CLONAZEPAM (KLONOPIN) 1 MG TABLET    TAKE 1 TABLET(1 MG) BY MOUTH THREE TIMES DAILY    CYCLOBENZAPRINE (FLEXERIL) 10 MG TABLET    TAKE 1 TABLET(10 MG) BY MOUTH TWICE DAILY PRN    DIAZEPAM (VALIUM) 5 MG TABLET    Take 1 tablet (5 mg total) by mouth as needed for Anxiety.    ESTRADIOL (ESTRACE) 0.5 MG TABLET    TAKE 1 TABLET BY MOUTH DAILY    FAMOTIDINE (PEPCID) 40 MG TABLET    TAKE 1 TABLET(40 MG) BY MOUTH EVERY NIGHT AS NEEDED FOR HEARTBURN    GABAPENTIN (GRALISE) 600 MG TB24    2 tabs po q day    HYDROXYCHLOROQUINE (PLAQUENIL) 200 MG TABLET    Take 1 tablet (200 mg total) by mouth 2 (two) times daily.    HYDROXYZINE HCL (ATARAX) 25 MG TABLET    Take 1 tablet (25 mg total) by mouth once daily.    LEVOTHYROXINE (SYNTHROID) 50 MCG TABLET    TAKE 1 TABLET(50 MCG) BY MOUTH EVERY DAY    LIDOCAINE-PRILOCAINE (EMLA) CREAM    Apply to affected area 1 hour prior to procedure.    LIFITEGRAST (XIIDRA) 5 % DPET    Apply 1 drop to eye 2 (two) times daily.    XIYSEF-GKNFV-O.BLUE-SAL-NAPHOS (USTELL) 120-0.12 MG CAP    Take 1 capsule by mouth 4 (four) times daily.    MULTIVITAMIN WITH MINERALS TABLET    Take 1 tablet by mouth once daily.    OXYBUTYNIN (DITROPAN) 5 MG TAB    Take 1 tablet (5 mg total) by mouth 3 (three) times daily.    PHENAZOPYRIDINE (PYRIDIUM) 200 MG TABLET    Take 1 tablet (200 mg total) by mouth 3  (three) times daily as needed for Pain.    POTASSIUM CITRATE (UROCIT-K 10) 10 MEQ (1,080 MG) TBSR    Take 1 tablet (10 mEq total) by mouth 3 (three) times daily with meals.    PROMETHAZINE (PHENERGAN) 25 MG TABLET    TAKE 1 TABLET(25 MG) BY MOUTH EVERY 6 HOURS AS NEEDED    PROMETHAZINE (PHENERGAN) 25 MG TABLET    TAKE 1 TABLET(25 MG) BY MOUTH EVERY 6 HOURS AS NEEDED    RABEPRAZOLE (ACIPHEX) 20 MG TABLET    TAKE 1 TABLET(20 MG) BY MOUTH EVERY DAY    TIZANIDINE (ZANAFLEX) 4 MG TABLET    Take 1 tablet (4 mg total) by mouth every 8 (eight) hours as needed.    TRAMADOL (ULTRAM) 50 MG TABLET    Take 1 tablet (50 mg total) by mouth every 6 (six) hours.    TRIAMCINOLONE ACETONIDE 0.025% (KENALOG) 0.025 % OINT    Apply topically 2 (two) times daily.   Changed and/or Refilled Medications    Modified Medication Previous Medication    TRAZODONE (DESYREL) 50 MG TABLET traZODone (DESYREL) 50 MG tablet       Take 1 tablet (50 mg total) by mouth nightly as needed.    Take 1 tablet (50 mg total) by mouth nightly as needed.   Discontinued Medications    DULOXETINE (CYMBALTA) 60 MG CAPSULE    Take 1 capsule (60 mg total) by mouth once daily.        Stressors:  Poor home relationship and impulsive behaviors    History:     Past Psychiatric History:   Previous therapy: yes  Previous psychiatric treatment and medication trials: yes  Previous psychiatric hospitalizations: yes  Previous diagnoses: yes  Previous suicide attempts: yes  History of violence: yes  Currently in treatment with myself and Dr Evans.  Education: technical college  Other pertinent history: Violence  Depression screening was performed with standardized tool: Not Screened - Not Eligible/Appropriate    Substance Abuse History:  Recreational drugs: no recreational drug use  Use of alcohol: occasional, social use  Use of caffeine: coffee one cup daily /day  Tobacco use: no  Legal consequences of chemical use: no  Patient feels she ought to cut down on drinking and/or  drug use: no  Patient has been annoyed by others criticizing her drinking or drug use: no  Patient has felt bad or guilty about drinking or drug use:no  Patient has had a drink or used drugs as an eye opener first thing in the morning to steady nerves, get rid of a hangover or get the day started: no  Use of OTC medications: vitamins and benadryl prn    The following portions of the patient's history were reviewed and updated as appropriate: allergies, current medications, past family history, past medical history, past social history, past surgical history and problem list.    Record Review: moderate      Review Of Systems:     Medical Review Of Systems:  ROS     Psychiatric Review Of Systems:  Sleep: yes  Appetite changes: no  Weight changes: no  Energy: yes  Interest/pleasure/anhedonia: yes  Somatic symptoms: yes  Libido: Not questioned this visit  Anxiety/panic: yes  Guilty/hopeless: no  Self-injurious behavior/risky behavior: no  Any drugs: no  Alcohol: no     Current Evaluation:       Mental Status Evaluation:  Appearance:  unremarkable, age appropriate, casually dressed, neatly groomed   Behavior:  normal, cooperative   Speech:  no latency; no press, spontaneous   Mood:  anxious, depressed   Affect:  congruent and appropriate   Thought Process:  normal and logical   Thought Content:  normal, no suicidality, no homicidality, delusions, or paranoia   Sensorium:  grossly intact   Cognition:  grossly intact   Insight:  has awareness of illness   Judgment:  behavior is adequate to circumstances     SIGECAPS  Sleep:   Interest:   Guilt:   Energy:   Concentration:   Appetite:   Psychomotor agitation:   Suicidal intentions: no  Suicidal plan: no    Physical/Somatic Complaints  The patient lists: pain    Functioning in Relationships:  Spouse/partner:   Peers:   Employers:       Assessment - Diagnosis - Goals:       ICD-10-CM ICD-9-CM   1. Major depressive disorder, recurrent episode, moderate F33.1 296.32   2.  Generalized anxiety disorder F41.1 300.02       Treatment Goals:  Specify outcomes written in observable, behavioral terms:   Stabize mood with meds and supportive counseling    Treatment Plan/Recommendations: Patient agrees to next appointment in one month and to continue Klonopin 1 mg tid, trazadone 50 mg q hs and increase Cymbalta to 90 mg q am, and start lamictal 25 mg q am. She is also to taper off the Abilify(given schedule).  Follow-up plan for depression was discussed with patient.    Review with patient: Treatment plan reviewed with the patient.  Medication risks/benefit reviewed with the patient    Rudi Soto Jr

## 2020-02-14 NOTE — PROGRESS NOTES
Patient called to say she developed two mouth sores after starting Lamictal. I advised to stop Lamictal, and continue rest of plan as outlined on 02/12/2020. She was in good self control with no thoughts of harming self. She agreed to call back in 7 to 10 days and report how she was doing then.

## 2020-02-18 DIAGNOSIS — N30.10 IC (INTERSTITIAL CYSTITIS): ICD-10-CM

## 2020-02-18 RX ORDER — FAMOTIDINE 40 MG/1
TABLET, FILM COATED ORAL
Qty: 30 TABLET | Refills: 0 | Status: SHIPPED | OUTPATIENT
Start: 2020-02-18 | End: 2020-10-15

## 2020-02-20 ENCOUNTER — TELEPHONE (OUTPATIENT)
Dept: UROLOGY | Facility: CLINIC | Age: 50
End: 2020-02-20

## 2020-02-20 DIAGNOSIS — N30.10 IC (INTERSTITIAL CYSTITIS): Primary | ICD-10-CM

## 2020-02-20 RX ORDER — FAMOTIDINE 20 MG/1
20 TABLET, FILM COATED ORAL 2 TIMES DAILY
Qty: 60 TABLET | Refills: 11 | Status: SHIPPED | OUTPATIENT
Start: 2020-02-20 | End: 2020-11-05 | Stop reason: SDUPTHER

## 2020-02-20 NOTE — TELEPHONE ENCOUNTER
IC (interstitial cystitis)  -     famotidine (PEPCID) 20 MG tablet; Take 1 tablet (20 mg total) by mouth 2 (two) times daily.  Dispense: 60 tablet; Refill: 11    I am not giving this for stomach ulcer.  I am giving it for her bladder problem.  It is over-the-count medication.  She can try the low dose which may be covered by her insurance.  Otherwise, she can skip it.

## 2020-02-28 NOTE — PROGRESS NOTES
Ms Simons called this am to state that the mouth sores are better or gone since she stopped the Lamictal. She is still depressed, though, and I recommended that she increase Cymbalta to 60 mg bid, from previous dose of 90 mg q am. Her only side effects was mild shakiness which is now improving. She has no active intent to harm herself at this time and plans to keep next appointment in the near future.

## 2020-03-03 RX ORDER — ALOSETRON HYDROCHLORIDE 0.5 MG/1
TABLET ORAL
Qty: 30 TABLET | Refills: 0 | Status: SHIPPED | OUTPATIENT
Start: 2020-03-03 | End: 2020-04-27

## 2020-03-12 RX ORDER — DULOXETIN HYDROCHLORIDE 60 MG/1
120 CAPSULE, DELAYED RELEASE ORAL DAILY
Qty: 60 CAPSULE | Refills: 3 | Status: SHIPPED | OUTPATIENT
Start: 2020-03-12 | End: 2020-07-01 | Stop reason: SDUPTHER

## 2020-03-12 NOTE — PROGRESS NOTES
Spoke with patient this date, and have increased the Cymbalta to 120 mg once daily. She had taken this dose on her own, and has no side effects, and indicated it is helping more with he depression. We also discussed possibility of using depakote for mood stabilization and we agreed to discuss that when she returns early next month for a visit. She had cancelled today due to illness. She has no current thoughts of harming herself.

## 2020-03-13 ENCOUNTER — PATIENT MESSAGE (OUTPATIENT)
Dept: RHEUMATOLOGY | Facility: CLINIC | Age: 50
End: 2020-03-13

## 2020-03-13 ENCOUNTER — TELEPHONE (OUTPATIENT)
Dept: RHEUMATOLOGY | Facility: CLINIC | Age: 50
End: 2020-03-13

## 2020-03-13 ENCOUNTER — PATIENT MESSAGE (OUTPATIENT)
Dept: PSYCHIATRY | Facility: CLINIC | Age: 50
End: 2020-03-13

## 2020-03-13 ENCOUNTER — TELEPHONE (OUTPATIENT)
Dept: UROLOGY | Facility: CLINIC | Age: 50
End: 2020-03-13

## 2020-03-13 NOTE — TELEPHONE ENCOUNTER
----- Message from Lakeisha Sage MA sent at 3/13/2020 10:47 AM CDT -----  Contact: [phone]  Pt would like to know if Dr Garcia can write a note excusing her from jury duty due to having to use the rest-room frequently.    [phone]

## 2020-03-13 NOTE — TELEPHONE ENCOUNTER
Returned patient call regarding jury duty. Patient stated had another doctor write a jury duty letter.

## 2020-03-14 RX ORDER — RABEPRAZOLE SODIUM 20 MG/1
20 TABLET, DELAYED RELEASE ORAL DAILY
Qty: 30 TABLET | Refills: 2 | Status: SHIPPED | OUTPATIENT
Start: 2020-03-14 | End: 2020-07-06

## 2020-03-18 DIAGNOSIS — R21 RASH AND NONSPECIFIC SKIN ERUPTION: ICD-10-CM

## 2020-03-18 RX ORDER — HYDROXYZINE HYDROCHLORIDE 25 MG/1
TABLET, FILM COATED ORAL
Qty: 30 TABLET | Refills: 3 | OUTPATIENT
Start: 2020-03-18

## 2020-03-19 ENCOUNTER — PATIENT MESSAGE (OUTPATIENT)
Dept: PSYCHIATRY | Facility: CLINIC | Age: 50
End: 2020-03-19

## 2020-03-19 ENCOUNTER — TELEPHONE (OUTPATIENT)
Dept: UROLOGY | Facility: CLINIC | Age: 50
End: 2020-03-19

## 2020-03-19 DIAGNOSIS — N30.10 IC (INTERSTITIAL CYSTITIS): Primary | ICD-10-CM

## 2020-03-19 DIAGNOSIS — R21 RASH AND NONSPECIFIC SKIN ERUPTION: ICD-10-CM

## 2020-03-19 RX ORDER — HYDROXYZINE HYDROCHLORIDE 25 MG/1
25 TABLET, FILM COATED ORAL DAILY
Qty: 90 TABLET | Refills: 3 | Status: SHIPPED | OUTPATIENT
Start: 2020-03-19 | End: 2020-10-15 | Stop reason: DRUGHIGH

## 2020-03-19 NOTE — TELEPHONE ENCOUNTER
IC (interstitial cystitis)    Rash and nonspecific skin eruption  -     hydroxyzine HCL (ATARAX) 25 MG tablet; Take 1 tablet (25 mg total) by mouth once daily.  Dispense: 90 tablet; Refill: 3

## 2020-03-19 NOTE — TELEPHONE ENCOUNTER
----- Message from Celsa Ge MD sent at 3/18/2020  3:11 PM CDT -----  Bandar,    I think I may have refilled this patient's hydroxyzine once while you are out but she is requesting a refill.    FRANCESCAT

## 2020-04-14 RX ORDER — CLONAZEPAM 1 MG/1
TABLET ORAL
Qty: 90 TABLET | OUTPATIENT
Start: 2020-04-14

## 2020-04-15 RX ORDER — PROMETHAZINE HYDROCHLORIDE 25 MG/1
25 TABLET ORAL EVERY 6 HOURS PRN
Qty: 60 TABLET | Refills: 0 | Status: SHIPPED | OUTPATIENT
Start: 2020-04-15 | End: 2020-10-01

## 2020-04-16 ENCOUNTER — PATIENT MESSAGE (OUTPATIENT)
Dept: PSYCHIATRY | Facility: CLINIC | Age: 50
End: 2020-04-16

## 2020-04-16 RX ORDER — CLONAZEPAM 1 MG/1
1 TABLET ORAL 3 TIMES DAILY
Qty: 90 TABLET | Refills: 3 | Status: SHIPPED | OUTPATIENT
Start: 2020-04-16 | End: 2020-07-01 | Stop reason: DRUGHIGH

## 2020-04-27 RX ORDER — ALOSETRON HYDROCHLORIDE 0.5 MG/1
TABLET ORAL
Qty: 30 TABLET | Refills: 0 | Status: SHIPPED | OUTPATIENT
Start: 2020-04-27 | End: 2020-06-09

## 2020-05-05 DIAGNOSIS — E03.9 HYPOTHYROIDISM, UNSPECIFIED TYPE: ICD-10-CM

## 2020-05-06 RX ORDER — LEVOTHYROXINE SODIUM 50 UG/1
TABLET ORAL
Qty: 30 TABLET | Refills: 3 | Status: SHIPPED | OUTPATIENT
Start: 2020-05-06 | End: 2020-09-16

## 2020-05-08 DIAGNOSIS — N30.10 IC (INTERSTITIAL CYSTITIS): ICD-10-CM

## 2020-05-08 NOTE — TELEPHONE ENCOUNTER
----- Message from Mikhail Hamlin sent at 5/8/2020  2:47 PM CDT -----  Contact: Pt. [phone]  zevblue-sal-naphos (USTELL) 120-0.12 mg Cap refill request     [id number] - [place] - [address] AT [facility] [address]  [address] [place] [address]  Phone: [phone] Fax: [phone]

## 2020-05-11 ENCOUNTER — OFFICE VISIT (OUTPATIENT)
Dept: UROLOGY | Facility: CLINIC | Age: 50
End: 2020-05-11
Payer: COMMERCIAL

## 2020-05-11 DIAGNOSIS — R39.16 STRAINING TO VOID: ICD-10-CM

## 2020-05-11 DIAGNOSIS — N39.41 URGENCY INCONTINENCE: ICD-10-CM

## 2020-05-11 DIAGNOSIS — N32.81 OAB (OVERACTIVE BLADDER): ICD-10-CM

## 2020-05-11 DIAGNOSIS — N30.10 IC (INTERSTITIAL CYSTITIS): Primary | ICD-10-CM

## 2020-05-11 PROCEDURE — 99214 OFFICE O/P EST MOD 30 MIN: CPT | Mod: 95,,, | Performed by: UROLOGY

## 2020-05-11 PROCEDURE — 99214 PR OFFICE/OUTPT VISIT, EST, LEVL IV, 30-39 MIN: ICD-10-PCS | Mod: 95,,, | Performed by: UROLOGY

## 2020-05-11 RX ORDER — POTASSIUM CITRATE 10 MEQ/1
10 TABLET, EXTENDED RELEASE ORAL
Qty: 90 TABLET | Refills: 11 | Status: SHIPPED | OUTPATIENT
Start: 2020-05-11 | End: 2021-06-17

## 2020-05-11 NOTE — PROGRESS NOTES
Established Patient - Audio Only Telehealth Visit     The patient location is: home  The chief complaint leading to consultation is: IC, worsening JAYNE  Visit type: Virtual visit with audio only (telephone)  Total time spent with patient: 13 mins       The reason for the audio only service rather than synchronous audio and video virtual visit was related to technical difficulties or patient preference/necessity.     Each patient to whom I provide medical services by telemedicine is:  (1) informed of the relationship between the physician and patient and the respective role of any other health care provider with respect to management of the patient; and (2) notified that they may decline to receive medical services by telemedicine and may withdraw from such care at any time. Patient verbally consented to receive this service via voice-only telephone call.       HPI:   CC: JAYNE, IC meds refill    Marilee Simons is a 50 y.o. woman with s/p InterStim Therapy and botox injection in the past.  Has done well.  However, she has experienced worsening JAYNE.  Typically she loses urine with laughing hard.    S/p  x 2.  No vaginal delivery.    Procedure(s) Performed: 18  1.  Cystoscopy with hydrodistension  2.  Botox injections into detrusor muscle  Findings:   - Initial bladder capacity 900 mL with terminal hematuria.   - Glomerulations were diffusely seen, Hunner's ulcers seen overlying bilateral UOs, diffuse mild degree of hyperemia  - Subsequent bladder capacity was 950 mL.    Since the procedure, she has done very well.  She needs refills on meds.    s/p InterStim Therapy for refractory frequency and urgency on 8/3/16.  Lead placed on right side  Generator placed on left side  Good sensory and motor function c/w S3 stimulation seen  She is doing great.  Is very pleased with the bladder control outcome.      She used to use valium crushed in the vagina to help her with her pelvic pain.    SUDS cysto on  1/20/15  --- Bladder ---   CYSTOMETROGRAM ( Filling Phase ):   Cystometric Numeric Data:   - First Desire (Sensation): 68 mL at 1cm of water.   - Normal Desire: 75mL at 1 cm of water.   - Strong Desire: 96 mL at 2 cm of water.   - Urgency (Imminent Void) : 108 mL at 1cm of water.   - Maximum Cystometric Capacity: 109 mL.   Compliance:   - low.   Leak Point Pressure:   - Valsalva ( Abdominal ) Leak Point Pressure: none.   UROFLOW:   - Voided Volume: 134 mL.   - Residual Urine: 5 mL.   - Maximum Flow Rate: 8 mL/sec.   - Flow Pattern: low amplitude  VOIDING PRESSURE STUDY ( Voiding Phase ):   Detrusor Pressure:   - Maximum Detrusor Pressure: 32 cm of water.   - Detrusor Pressure at Maximum Flow: 2 cm of water.   - Detrusor Contraction Characteristics: Sustained contraction(s).     ELECTROMYOGRAM:   - incomplete relaxation.     ---Diagnostic Cystourethroscopy ---   Normal urethra.    minimal hyperemic area of the bladder  Width of Bladder Neck Opening: Approximately 18 Fr.   Normal bladder.   Normal ureteral orifices bilaterally.     CONCLUSIONS:   1.  Decreased bladder capacity with sensory urgency  2.  IC  3.  OAB    Past Medical History:   Diagnosis Date    Acid reflux     Allergy     Alopecia     Anxiety     Arthralgia     Back pain     Depression     Dry eyes     from meds    Fever blister     Fibromyalgia     Interstitial cystitis     Irritable bowel syndrome     Kidney stone     Major depressive disorder, recurrent episode, in partial or unspecified remission 2013    OAB (overactive bladder) 2015    PTSD (post-traumatic stress disorder)     Rheumatoid arthritis     Systemic lupus erythematosus     Thyroid disease     Urinary tract infection     Vaginal infection      Past Surgical History:   Procedure Laterality Date    BLADDER SURGERY       SECTION, LOW TRANSVERSE      x 2    COLONOSCOPY      COLONOSCOPY N/A 10/5/2017    Procedure: COLONOSCOPY;  Surgeon: Venkat GEE  MD Neri;  Location[facility] (4TH FLR);  Service: Endoscopy;  Laterality: N/A;    ESOPHAGOGASTRODUODENOSCOPY      EYE SURGERY      Lasik-bilateral    HYSTERECTOMY      INJECTION OF BOTULINUM TOXIN TYPE A N/A 9/12/2018    Procedure: INJECTION, BOTULINUM TOXIN, TYPE A  200 UNITS;  Surgeon: Bandar Garcia MD;  [facility];  Service: Urology;  Laterality: N/A;    INSTILLATION OF URINARY BLADDER N/A 9/12/2018    Procedure: INSTILLATION, URINARY BLADDER;  Surgeon: Bandar Garcia MD;  Location: [facility] OR [address] [place] [address];  Service: Urology;  Laterality: N/A;    PARTIAL HYSTERECTOMY       Social History     Tobacco Use    Smoking status: Never Smoker    Smokeless tobacco: Never Used   Substance Use Topics    Alcohol use: No    Drug use: No     Family History   Problem Relation Age of Onset    Irritable bowel syndrome Mother     Irritable bowel syndrome Sister     Lupus Sister     Rheum arthritis Sister     Fibromyalgia Sister     Irritable bowel syndrome Sister     Celiac disease Neg Hx     Cirrhosis Neg Hx     Colon cancer Neg Hx     Colon polyps Neg Hx     Crohn's disease Neg Hx     Cystic fibrosis Neg Hx     Esophageal cancer Neg Hx     Hemochromatosis Neg Hx     Inflammatory bowel disease Neg Hx     Liver cancer Neg Hx     Liver disease Neg Hx     Rectal cancer Neg Hx     Stomach cancer Neg Hx     Ulcerative colitis Neg Hx     Boris's disease Neg Hx     Melanoma Neg Hx     Amblyopia Neg Hx     Blindness Neg Hx     Cataracts Neg Hx     Glaucoma Neg Hx     Macular degeneration Neg Hx     Retinal detachment Neg Hx     Strabismus Neg Hx      Allergy:  Review of patient's allergies indicates:   Allergen Reactions    Cephalexin Itching    Zofran [ondansetron hcl (pf)] Hives    Penicillins Rash     Outpatient Encounter Medications as of 5/11/2020   Medication Sig Dispense Refill    adalimumab (HUMIRA,CF, PEN) 40 mg/0.4 mL PnKt Inject 0.4 mLs (40 mg total) into the skin every  14 (fourteen) days. 2 pen 12    alosetron (LOTRONEX) 0.5 MG tablet TAKE 1 TABLET BY MOUTH DAILY AS NEEDED 30 tablet 0    amitriptyline (ELAVIL) 10 MG tablet TAKE 1 TABLET(10 MG) BY MOUTH EVERY NIGHT AS NEEDED 90 tablet 3    butalbital-acetaminophen (BUPAP)  mg Tab Take 1 tablet by mouth every 4 (four) hours. 60 tablet 3    calcium glycerophosphate (PRELIEF) 65 mg Tab Take 2 tablets by mouth before meals and at bedtime as needed. 100 each 3    clonazePAM (KLONOPIN) 1 MG tablet TAKE 1 TABLET(1 MG) BY MOUTH THREE TIMES DAILY 90 tablet 2    clonazePAM (KLONOPIN) 1 MG tablet Take 1 tablet (1 mg total) by mouth 3 (three) times daily. 90 tablet 3    cyclobenzaprine (FLEXERIL) 10 MG tablet TAKE 1 TABLET(10 MG) BY MOUTH TWICE DAILY PRN 90 tablet 2    diazePAM (VALIUM) 5 MG tablet Take 1 tablet (5 mg total) by mouth as needed for Anxiety. 30 tablet 1    DULoxetine (CYMBALTA) 60 MG capsule Take 2 capsules (120 mg total) by mouth once daily. 60 capsule 3    estradiol (ESTRACE) 0.5 MG tablet TAKE 1 TABLET BY MOUTH DAILY 30 tablet 4    famotidine (PEPCID) 20 MG tablet Take 1 tablet (20 mg total) by mouth 2 (two) times daily. 60 tablet 11    famotidine (PEPCID) 40 MG tablet TAKE 1 TABLET BY MOUTH EVERY NIGHT AS NEEDED FOR HEARTBURN 30 tablet 0    gabapentin (GRALISE) 600 mg Tb24 2 tabs po q day 60 tablet 12    hydroxychloroquine (PLAQUENIL) 200 mg tablet Take 1 tablet (200 mg total) by mouth 2 (two) times daily. 60 tablet 5    hydroxyzine HCL (ATARAX) 25 MG tablet Take 1 tablet (25 mg total) by mouth once daily. 90 tablet 3    levothyroxine (SYNTHROID) 50 MCG tablet TAKE 1 TABLET BY MOUTH EVERY DAY 30 tablet 3    lidocaine-prilocaine (EMLA) cream Apply to affected area 1 hour prior to procedure. 30 g 0    lifitegrast (XIIDRA) 5 % Dpet Apply 1 drop to eye 2 (two) times daily. 60 each 12    salolv-qapbr-q.blue-sal-naphos (USTELL) 120-0.12 mg Cap Take 1 capsule by mouth 4 (four) times daily. 120 each 2     multivitamin with minerals tablet Take 1 tablet by mouth once daily.      oxybutynin (DITROPAN) 5 MG Tab Take 1 tablet (5 mg total) by mouth 3 (three) times daily. 90 tablet 12    phenazopyridine (PYRIDIUM) 200 MG tablet Take 1 tablet (200 mg total) by mouth 3 (three) times daily as needed for Pain. 60 tablet 5    potassium citrate (UROCIT-K 10) 10 mEq (1,080 mg) TbSR Take 1 tablet (10 mEq total) by mouth 3 (three) times daily with meals. 90 tablet 11    promethazine (PHENERGAN) 25 MG tablet TAKE 1 TABLET(25 MG) BY MOUTH EVERY 6 HOURS AS NEEDED 15 tablet 0    promethazine (PHENERGAN) 25 MG tablet Take 1 tablet (25 mg total) by mouth every 6 (six) hours as needed for Nausea. 60 tablet 0    RABEprazole (ACIPHEX) 20 mg tablet Take 1 tablet (20 mg total) by mouth once daily. 30 tablet 2    tiZANidine (ZANAFLEX) 4 MG tablet Take 1 tablet (4 mg total) by mouth every 8 (eight) hours as needed. 90 tablet 12    traMADol (ULTRAM) 50 mg tablet Take 1 tablet (50 mg total) by mouth every 6 (six) hours. 90 tablet 3    traZODone (DESYREL) 50 MG tablet Take 1 tablet (50 mg total) by mouth nightly as needed. 30 tablet 2    triamcinolone acetonide 0.025% (KENALOG) 0.025 % Oint Apply topically 2 (two) times daily. 80 g 1    [DISCONTINUED] tmzgst-mveqr-s.blue-sal-naphos (USTELL) 120-0.12 mg Cap Take 1 capsule by mouth 4 (four) times daily. 120 each 1    [DISCONTINUED] potassium citrate (UROCIT-K 10) 10 mEq (1,080 mg) TbSR Take 1 tablet (10 mEq total) by mouth 3 (three) times daily with meals. 90 tablet 11     No facility-administered encounter medications on file as of 5/11/2020.      Review of Systems   General ROS: GENERAL:  No weight gain or loss  SKIN:  No rashes or lacerations  HEAD:  No headaches  EYES:  No exophthalmos, jaundice or blindness  EARS:  No dizziness, tinnitus or hearing loss  NOSE:  No changes in smell  MOUTH & THROAT:  No dyskinetic movements or obvious goiter  CHEST:  No shortness of breath,  hyperventilation or cough  CARDIOVASCULAR:  No tachycardia or chest pain  ABDOMEN:  No nausea, vomiting, pain, constipation or diarrhea  URINARY:  No frequency, dysuria or sexual dysfunction  ENDOCRINE:  No polydipsia, polyuria  MUSCULOSKELETAL:  No pain or stiffness of the joints  NEUROLOGIC:  No weakness, sensory changes, seizures, confusion, memory loss, tremor or other abnormal movements  Physical Exam     There were no vitals filed for this visit.  Physical Examination:   n/a  LABS:  Lab Results   Component Value Date    CREATININE 0.9 11/07/2019    CREATININE 0.8 06/21/2019    CREATININE 0.8 06/19/2019     Urine Culture, Routine   Date Value Ref Range Status   02/05/2019 No significant growth  Final       Assessment and Plan:  Diagnoses and all orders for this visit:    IC (interstitial cystitis)  -     qzoqmh-pwxli-f.blue-sal-naphos (USTELL) 120-0.12 mg Cap; Take 1 capsule by mouth 4 (four) times daily.  -     potassium citrate (UROCIT-K 10) 10 mEq (1,080 mg) TbSR; Take 1 tablet (10 mEq total) by mouth 3 (three) times daily with meals.    OAB (overactive bladder)    Urgency incontinence    Straining to void    recommend to continue IC smart diet.  Use the above IC meds.  Urocit K has been used to alkalinize her urine.  OK to use valium inside of her vagina, especially prior to her intercourse or after.    For bladder pain, she can use Ustell as needed.    For her JAYNE, recommend Kegel exercise.\  If not improving, need a repeat SUDS cysto to determine the nature of her incontinence ( jayne resulted from bladder instability?).    S/p InterStim Therapy 4 years out.  Will check her battery life on her next visit in 6 months.  OK to cancel her appt on 6/16/20 because she is concerned about COVID-19 exposure.    I spent 25 minutes with the patient of which more than half was spent in direct consultation with the patient in regards to our treatment and plan.    Follow-up:  Follow up in about 6 months (around  11/11/2020).     This service was not originating from a related E/M service provided within the previous 7 days nor will  to an E/M service or procedure within the next 24 hours or my soonest available appointment.  Prevailing standard of care was able to be met in this audio-only visit.

## 2020-07-01 ENCOUNTER — OFFICE VISIT (OUTPATIENT)
Dept: PSYCHIATRY | Facility: CLINIC | Age: 50
End: 2020-07-01
Payer: COMMERCIAL

## 2020-07-01 DIAGNOSIS — F41.1 GENERALIZED ANXIETY DISORDER: ICD-10-CM

## 2020-07-01 DIAGNOSIS — F33.1 MAJOR DEPRESSIVE DISORDER, RECURRENT EPISODE, MODERATE: Primary | ICD-10-CM

## 2020-07-01 PROCEDURE — 99214 PR OFFICE/OUTPT VISIT, EST, LEVL IV, 30-39 MIN: ICD-10-PCS | Mod: S$GLB,,, | Performed by: PSYCHIATRY & NEUROLOGY

## 2020-07-01 PROCEDURE — 99214 OFFICE O/P EST MOD 30 MIN: CPT | Mod: S$GLB,,, | Performed by: PSYCHIATRY & NEUROLOGY

## 2020-07-01 RX ORDER — CLONAZEPAM 1 MG/1
1 TABLET ORAL 4 TIMES DAILY
Qty: 120 TABLET | Refills: 1 | Status: SHIPPED | OUTPATIENT
Start: 2020-07-01 | End: 2020-07-31

## 2020-07-01 RX ORDER — TRAZODONE HYDROCHLORIDE 50 MG/1
50 TABLET ORAL NIGHTLY PRN
Qty: 30 TABLET | Refills: 2 | Status: SHIPPED | OUTPATIENT
Start: 2020-07-01 | End: 2020-12-21

## 2020-07-01 RX ORDER — DULOXETIN HYDROCHLORIDE 60 MG/1
120 CAPSULE, DELAYED RELEASE ORAL DAILY
Qty: 60 CAPSULE | Refills: 3 | Status: SHIPPED | OUTPATIENT
Start: 2020-07-01 | End: 2020-10-07 | Stop reason: SDUPTHER

## 2020-07-01 NOTE — PROGRESS NOTES
"Outpatient Psychiatry Follow-Up Visit (MD/NP)    07/01/2020 Phone visit16"    Clinical Status of Patient:  Outpatient (Ambulatory)    Chief Complaint:  Depression and anxiety.    Interval History and Content of Current Session:Patient is now more anxious due to the corona virus. She feels more stressed and is having trouble coping with the stress of isolation. Patient does not have active thoughts of harming herself at this time and has no signs of josi or psychosis. Patient felt some improvement with her depression with the increase in Cymbalta. She remains motivated to continue to improve her stability.    Compliance:  Good.    Side effects:  None  Musculoskeletal: patient had no complaints of abnormal motor movements of any kind.    Risk Parameters: bot active danger to self or others at this time.    Patient's Response to Intervention:accepting    Progress Toward Goals and Other Mental Status Changes:fair     Vital signs this date were not reviewed.     Mental Status Evaluation     Appearance:  not assessed   Behavior:  cooperative                             Speech normal   Mood:  anxious, depressed   Affect:  dysphoric   Thought Process:  linear, logical   Thought Content:  organizednormal   Sensorium:  alert and oriented to person, place, time and situation   Attention Span & Concentration able to focus   Cognition:  Grossly intactgrossly intact   Insight:  has awareness of illness   Judgment:  behavior is adequate to circumstances       Diagnosis:    Major depressive disorder, recurrent episode, moderate [F33.1]   CHANTE  History of PTSD     I reviewed the side effects of the patient's medicines and advised a call if the patient had problems with the medicine or clinical condition.    Plan:(Medication and Therapy Recommendation)  Additional Notes: Patient agrees to increase Klonopin to 1 mg qid, and continue Cymbalta 120 mg daily, and trazdone 50 mg q hs.  Return to Clinic:2 months    "

## 2020-07-17 ENCOUNTER — OFFICE VISIT (OUTPATIENT)
Dept: RHEUMATOLOGY | Facility: CLINIC | Age: 50
End: 2020-07-17
Payer: COMMERCIAL

## 2020-07-17 VITALS — HEIGHT: 60 IN | BODY MASS INDEX: 32.98 KG/M2 | WEIGHT: 168 LBS

## 2020-07-17 DIAGNOSIS — M81.0 OSTEOPOROSIS, UNSPECIFIED OSTEOPOROSIS TYPE, UNSPECIFIED PATHOLOGICAL FRACTURE PRESENCE: ICD-10-CM

## 2020-07-17 DIAGNOSIS — M79.7 FIBROMYALGIA: ICD-10-CM

## 2020-07-17 DIAGNOSIS — R53.82 CHRONIC FATIGUE: ICD-10-CM

## 2020-07-17 DIAGNOSIS — M25.50 PAIN IN JOINT INVOLVING MULTIPLE SITES: ICD-10-CM

## 2020-07-17 DIAGNOSIS — R30.0 DYSURIA: ICD-10-CM

## 2020-07-17 DIAGNOSIS — M06.00 SERONEGATIVE RHEUMATOID ARTHRITIS: ICD-10-CM

## 2020-07-17 DIAGNOSIS — L40.50 PSORIATIC ARTHRITIS: Primary | ICD-10-CM

## 2020-07-17 DIAGNOSIS — M47.817 LUMBAR AND SACRAL OSTEOARTHRITIS: ICD-10-CM

## 2020-07-17 DIAGNOSIS — E55.9 VITAMIN D INSUFFICIENCY: ICD-10-CM

## 2020-07-17 DIAGNOSIS — G89.4 CHRONIC PAIN SYNDROME: ICD-10-CM

## 2020-07-17 PROCEDURE — 99215 OFFICE O/P EST HI 40 MIN: CPT | Mod: 95,,, | Performed by: INTERNAL MEDICINE

## 2020-07-17 PROCEDURE — 99215 PR OFFICE/OUTPT VISIT, EST, LEVL V, 40-54 MIN: ICD-10-PCS | Mod: 95,,, | Performed by: INTERNAL MEDICINE

## 2020-07-17 RX ORDER — CYCLOBENZAPRINE HCL 10 MG
TABLET ORAL
Qty: 90 TABLET | Refills: 6 | Status: SHIPPED | OUTPATIENT
Start: 2020-07-17 | End: 2021-04-23 | Stop reason: SDUPTHER

## 2020-07-17 RX ORDER — BUTALBITAL AND ACETAMINOPHEN 300; 50 MG/1; MG/1
1 TABLET ORAL EVERY 4 HOURS
Qty: 60 TABLET | Refills: 5 | Status: SHIPPED | OUTPATIENT
Start: 2020-07-17 | End: 2021-11-28

## 2020-07-17 RX ORDER — ADALIMUMAB 40MG/0.4ML
40 KIT SUBCUTANEOUS
Qty: 2 PEN | Refills: 12 | Status: SHIPPED | OUTPATIENT
Start: 2020-07-17 | End: 2022-01-04

## 2020-07-17 RX ORDER — TRAMADOL HYDROCHLORIDE 50 MG/1
50 TABLET ORAL EVERY 6 HOURS
Qty: 90 TABLET | Refills: 3 | Status: SHIPPED | OUTPATIENT
Start: 2020-07-17 | End: 2020-11-23 | Stop reason: SDUPTHER

## 2020-07-17 RX ORDER — GABAPENTIN 600 MG/1
600 TABLET ORAL 3 TIMES DAILY
Qty: 90 TABLET | Refills: 11 | Status: SHIPPED | OUTPATIENT
Start: 2020-07-17 | End: 2020-07-29

## 2020-07-17 NOTE — PROGRESS NOTES
Subjective:       Patient ID: Marilee Simons is a 50 y.o. female.    Chief Complaint: Disease Management, Osteoporosis, and Fibromyalgia    Follow up   PSA  Humira,  fibromyalgia,  Doing  Fair, very depreseed, flaring , interstitial cystitis   mcp, pip, wrist, knees and ankles feet and L spine pain on plaquenil. Held her  Humira now flare, Pain is located in multiple joints, both shoulder(s), both elbow(s), both wrist(s), both MCP(s): 1st, 2nd, 3rd, 4th and 5th, both PIP(s): 1st, 2nd, 3rd, 4th and 5th, both DIP(s): 1st and 2nd, both hip(s), both knee(s) and both MTP(s): 1st, 2nd, 3rd, 4th and 5th, is described as aching, pulsating, shooting and throbbing, and is constant, moderate .  Associated symptoms include: crepitation, decreased range of motion, edema, effusion, tenderness and warmth. ibs flare,  psa flare,            She complains of joint swelling. Associated symptoms include fatigue and myalgias.       Fibromyalgia  Associated symptoms include arthralgias, fatigue, joint swelling, myalgias and neck pain.     Review of Systems   Constitutional: Positive for activity change and fatigue. Negative for appetite change and unexpected weight change.   HENT: Negative for dental problem, ear discharge, ear pain, facial swelling, mouth sores, nosebleeds, postnasal drip, rhinorrhea, sinus pressure, sneezing, tinnitus and voice change.    Eyes: Negative for photophobia, pain, discharge, redness and itching.   Respiratory: Negative for apnea, chest tightness, shortness of breath and wheezing.    Cardiovascular: Positive for leg swelling. Negative for palpitations.   Gastrointestinal: Negative for abdominal distention, constipation and diarrhea.   Endocrine: Negative for cold intolerance, heat intolerance, polydipsia and polyuria.   Genitourinary: Negative for decreased urine volume, difficulty urinating, flank pain, frequency, hematuria and urgency.   Musculoskeletal: Positive for arthralgias, back pain, joint  swelling, myalgias, neck pain and neck stiffness. Negative for gait problem.   Skin: Negative for pallor and wound.   Allergic/Immunologic: Negative for immunocompromised state.   Neurological: Negative for dizziness and tremors.   Hematological: Negative for adenopathy. Does not bruise/bleed easily.   Psychiatric/Behavioral: Negative for sleep disturbance. The patient is not nervous/anxious.          Objective:     Ht 5' (1.524 m)   Wt 76.2 kg (168 lb)   LMP  (LMP Unknown)   BMI 32.81 kg/m²      Physical Exam   Constitutional: She is oriented to person, place, and time and well-developed, well-nourished, and in no distress.   Neurological: She is alert and oriented to person, place, and time.   Psychiatric: Mood, affect and judgment normal.       Results for orders placed or performed in visit on 11/07/19   Urinalysis   Result Value Ref Range    Specimen UA Urine, Clean Catch     Color, UA Blue (A) Yellow, Straw, Almaz    Appearance, UA Hazy (A) Clear    pH, UA SEE COMMENT 5.0 - 8.0    Specific Gravity, UA SEE COMMENT 1.005 - 1.030    Protein, UA SEE COMMENT Negative    Glucose, UA Negative Negative    Ketones, UA SEE COMMENT Negative    Bilirubin (UA) SEE COMMENT Negative    Occult Blood UA SEE COMMENT Negative    Nitrite, UA SEE COMMENT Negative    Leukocytes, UA SEE COMMENT Negative   Urinalysis Microscopic   Result Value Ref Range    RBC, UA 2 0 - 4 /hpf    WBC, UA 2 0 - 5 /hpf    Bacteria Many (A) None-Occ /hpf    Squam Epithel, UA 6 /hpf    Hyaline Casts, UA 0 0-1/lpf /lpf    Microscopic Comment SEE COMMENT        Assessment:       Encounter Diagnoses   Name Primary?    Psoriatic arthritis Yes    Seronegative rheumatoid arthritis     Osteoporosis, unspecified osteoporosis type, unspecified pathological fracture presence     Lumbar and sacral osteoarthritis          Plan:       Psoriatic arthritis  -     CBC auto differential; Future; Expected date: 07/17/2020  -     Comprehensive metabolic panel;  Future; Expected date: 07/17/2020  -     Anti-Histone Antibody; Future; Expected date: 07/17/2020  -     Sedimentation rate; Future; Expected date: 07/17/2020  -     C-Reactive Protein; Future; Expected date: 07/17/2020  -     Hepatitis B core antibody, IgM; Future; Expected date: 07/17/2020  -     Hepatitis B Surface Ab, Qualitative; Future; Expected date: 07/17/2020  -     Hepatitis B Surface Antigen; Future; Expected date: 07/17/2020  -     Hepatitis C Antibody; Future; Expected date: 07/17/2020  -     Quantiferon Gold TB; Future; Expected date: 07/17/2020    Seronegative rheumatoid arthritis  -     CBC auto differential; Future; Expected date: 07/17/2020  -     Comprehensive metabolic panel; Future; Expected date: 07/17/2020  -     Anti-Histone Antibody; Future; Expected date: 07/17/2020  -     Sedimentation rate; Future; Expected date: 07/17/2020  -     C-Reactive Protein; Future; Expected date: 07/17/2020  -     Hepatitis B core antibody, IgM; Future; Expected date: 07/17/2020  -     Hepatitis B Surface Ab, Qualitative; Future; Expected date: 07/17/2020  -     Hepatitis B Surface Antigen; Future; Expected date: 07/17/2020  -     Hepatitis C Antibody; Future; Expected date: 07/17/2020  -     Quantiferon Gold TB; Future; Expected date: 07/17/2020    Osteoporosis, unspecified osteoporosis type, unspecified pathological fracture presence  -     CBC auto differential; Future; Expected date: 07/17/2020  -     Comprehensive metabolic panel; Future; Expected date: 07/17/2020  -     Anti-Histone Antibody; Future; Expected date: 07/17/2020  -     Sedimentation rate; Future; Expected date: 07/17/2020  -     C-Reactive Protein; Future; Expected date: 07/17/2020  -     Hepatitis B core antibody, IgM; Future; Expected date: 07/17/2020  -     Hepatitis B Surface Ab, Qualitative; Future; Expected date: 07/17/2020  -     Hepatitis B Surface Antigen; Future; Expected date: 07/17/2020  -     Hepatitis C Antibody; Future;  Expected date: 07/17/2020  -     Quantiferon Gold TB; Future; Expected date: 07/17/2020    Lumbar and sacral osteoarthritis  -     CBC auto differential; Future; Expected date: 07/17/2020  -     Comprehensive metabolic panel; Future; Expected date: 07/17/2020  -     Anti-Histone Antibody; Future; Expected date: 07/17/2020  -     Sedimentation rate; Future; Expected date: 07/17/2020  -     C-Reactive Protein; Future; Expected date: 07/17/2020  -     Hepatitis B core antibody, IgM; Future; Expected date: 07/17/2020  -     Hepatitis B Surface Ab, Qualitative; Future; Expected date: 07/17/2020  -     Hepatitis B Surface Antigen; Future; Expected date: 07/17/2020  -     Hepatitis C Antibody; Future; Expected date: 07/17/2020  -     Quantiferon Gold TB; Future; Expected date: 07/17/2020       Will start Humira   The patient location is: home  The chief complaint leading to consultation is: psa, fibromyalgia    Visit type: audiovisual    Face to Face time with patient: 41 minutes of total time spent on the encounter, which includes face to face time and non-face to face time preparing to see the patient (eg, review of tests), Obtaining and/or reviewing separately obtained history, Documenting clinical information in the electronic or other health record, Independently interpreting results (not separately reported) and communicating results to the patient/family/caregiver, or Care coordination (not separately reported).         Each patient to whom he or she provides medical services by telemedicine is:  (1) informed of the relationship between the physician and patient and the respective role of any other health care provider with respect to management of the patient; and (2) notified that he or she may decline to receive medical services by telemedicine and may withdraw from such care at any time.    Notes:

## 2020-07-20 ENCOUNTER — TELEPHONE (OUTPATIENT)
Dept: PHARMACY | Facility: CLINIC | Age: 50
End: 2020-07-20

## 2020-07-20 ENCOUNTER — TELEPHONE (OUTPATIENT)
Dept: RHEUMATOLOGY | Facility: CLINIC | Age: 50
End: 2020-07-20

## 2020-07-20 RX ORDER — PROMETHAZINE HYDROCHLORIDE 25 MG/1
TABLET ORAL
Qty: 15 TABLET | Refills: 0 | Status: SHIPPED | OUTPATIENT
Start: 2020-07-20 | End: 2020-10-01

## 2020-07-20 NOTE — TELEPHONE ENCOUNTER
----- Message from Ben Samaniego MD sent at 7/17/2020  6:07 PM CDT -----  Schedule  labs in [place]

## 2020-07-20 NOTE — TELEPHONE ENCOUNTER
Attempted to contact patient ans scheduled follow ups and lab appointment. Left a message to contact office.

## 2020-07-21 ENCOUNTER — PATIENT MESSAGE (OUTPATIENT)
Dept: RHEUMATOLOGY | Facility: CLINIC | Age: 50
End: 2020-07-21

## 2020-07-21 NOTE — PROGRESS NOTES
O7/21/2020: Called re concerns for her 17yo daughter. A friend of her daughter was exposed to covid. More anxious due to this situation. Patient does not have active thoughts of harming herself at this time. Patient has no signs of josi or psychosis. Support offered. Advised patient to continue work with her counselor re developing coping skills re the unexpected. She agreed to do so.

## 2020-07-25 ENCOUNTER — TELEPHONE (OUTPATIENT)
Dept: RHEUMATOLOGY | Facility: CLINIC | Age: 50
End: 2020-07-25

## 2020-07-25 DIAGNOSIS — J00 ACUTE NASOPHARYNGITIS: Primary | ICD-10-CM

## 2020-07-25 RX ORDER — DEXAMETHASONE 0.75 MG/1
0.75 TABLET ORAL DAILY
Qty: 10 TABLET | Refills: 0 | Status: SHIPPED | OUTPATIENT
Start: 2020-07-25 | End: 2020-08-04

## 2020-07-25 RX ORDER — AZITHROMYCIN 250 MG/1
TABLET, FILM COATED ORAL
Qty: 6 TABLET | Refills: 0 | Status: SHIPPED | OUTPATIENT
Start: 2020-07-25 | End: 2020-10-01

## 2020-07-26 ENCOUNTER — HOSPITAL ENCOUNTER (EMERGENCY)
Facility: HOSPITAL | Age: 50
Discharge: HOME | End: 2020-07-26
Attending: EMERGENCY MEDICINE
Payer: COMMERCIAL

## 2020-07-26 VITALS
WEIGHT: 160 LBS | HEIGHT: 59 IN | OXYGEN SATURATION: 96 % | DIASTOLIC BLOOD PRESSURE: 72 MMHG | SYSTOLIC BLOOD PRESSURE: 119 MMHG | BODY MASS INDEX: 32.25 KG/M2 | HEART RATE: 82 BPM | RESPIRATION RATE: 16 BRPM | TEMPERATURE: 98 F

## 2020-07-26 DIAGNOSIS — I95.1 ORTHOSTASIS: ICD-10-CM

## 2020-07-26 DIAGNOSIS — Z20.822 SUSPECTED COVID-19 VIRUS INFECTION: Primary | ICD-10-CM

## 2020-07-26 DIAGNOSIS — R07.9 CHEST PAIN: ICD-10-CM

## 2020-07-26 DIAGNOSIS — R51.9 NONINTRACTABLE HEADACHE, UNSPECIFIED CHRONICITY PATTERN, UNSPECIFIED HEADACHE TYPE: ICD-10-CM

## 2020-07-26 LAB
ALBUMIN SERPL BCP-MCNC: 3.8 G/DL (ref 3.5–5.2)
ALP SERPL-CCNC: 79 U/L (ref 55–135)
ALT SERPL W/O P-5'-P-CCNC: 24 U/L (ref 10–44)
ANION GAP SERPL CALC-SCNC: 9 MMOL/L (ref 8–16)
AST SERPL-CCNC: 25 U/L (ref 10–40)
BASOPHILS # BLD AUTO: 0.04 K/UL (ref 0–0.2)
BASOPHILS NFR BLD: 0.6 % (ref 0–1.9)
BILIRUB SERPL-MCNC: 0.2 MG/DL (ref 0.1–1)
BUN SERPL-MCNC: 10 MG/DL (ref 6–20)
CALCIUM SERPL-MCNC: 9 MG/DL (ref 8.7–10.5)
CHLORIDE SERPL-SCNC: 106 MMOL/L (ref 95–110)
CO2 SERPL-SCNC: 24 MMOL/L (ref 23–29)
CREAT SERPL-MCNC: 0.8 MG/DL (ref 0.5–1.4)
DIFFERENTIAL METHOD: NORMAL
EOSINOPHIL # BLD AUTO: 0.2 K/UL (ref 0–0.5)
EOSINOPHIL NFR BLD: 3.6 % (ref 0–8)
ERYTHROCYTE [DISTWIDTH] IN BLOOD BY AUTOMATED COUNT: 11.9 % (ref 11.5–14.5)
EST. GFR  (AFRICAN AMERICAN): >60 ML/MIN/1.73 M^2
EST. GFR  (NON AFRICAN AMERICAN): >60 ML/MIN/1.73 M^2
GLUCOSE SERPL-MCNC: 101 MG/DL (ref 70–110)
HCT VFR BLD AUTO: 37.1 % (ref 37–48.5)
HGB BLD-MCNC: 12.2 G/DL (ref 12–16)
IMM GRANULOCYTES # BLD AUTO: 0.01 K/UL (ref 0–0.04)
IMM GRANULOCYTES NFR BLD AUTO: 0.2 % (ref 0–0.5)
LYMPHOCYTES # BLD AUTO: 2.8 K/UL (ref 1–4.8)
LYMPHOCYTES NFR BLD: 43.6 % (ref 18–48)
MCH RBC QN AUTO: 29.3 PG (ref 27–31)
MCHC RBC AUTO-ENTMCNC: 32.9 G/DL (ref 32–36)
MCV RBC AUTO: 89 FL (ref 82–98)
MONOCYTES # BLD AUTO: 0.6 K/UL (ref 0.3–1)
MONOCYTES NFR BLD: 9.1 % (ref 4–15)
NEUTROPHILS # BLD AUTO: 2.7 K/UL (ref 1.8–7.7)
NEUTROPHILS NFR BLD: 42.9 % (ref 38–73)
NRBC BLD-RTO: 0 /100 WBC
PLATELET # BLD AUTO: 259 K/UL (ref 150–350)
PMV BLD AUTO: 9.4 FL (ref 9.2–12.9)
POTASSIUM SERPL-SCNC: 3.9 MMOL/L (ref 3.5–5.1)
PROT SERPL-MCNC: 6.8 G/DL (ref 6–8.4)
RBC # BLD AUTO: 4.16 M/UL (ref 4–5.4)
SODIUM SERPL-SCNC: 139 MMOL/L (ref 136–145)
TROPONIN I SERPL DL<=0.01 NG/ML-MCNC: <0.006 NG/ML (ref 0–0.03)
WBC # BLD AUTO: 6.36 K/UL (ref 3.9–12.7)

## 2020-07-26 PROCEDURE — 96374 THER/PROPH/DIAG INJ IV PUSH: CPT

## 2020-07-26 PROCEDURE — U0003 INFECTIOUS AGENT DETECTION BY NUCLEIC ACID (DNA OR RNA); SEVERE ACUTE RESPIRATORY SYNDROME CORONAVIRUS 2 (SARS-COV-2) (CORONAVIRUS DISEASE [COVID-19]), AMPLIFIED PROBE TECHNIQUE, MAKING USE OF HIGH THROUGHPUT TECHNOLOGIES AS DESCRIBED BY CMS-2020-01-R: HCPCS

## 2020-07-26 PROCEDURE — 96375 TX/PRO/DX INJ NEW DRUG ADDON: CPT

## 2020-07-26 PROCEDURE — 80053 COMPREHEN METABOLIC PANEL: CPT

## 2020-07-26 PROCEDURE — 85025 COMPLETE CBC W/AUTO DIFF WBC: CPT

## 2020-07-26 PROCEDURE — 93005 ELECTROCARDIOGRAM TRACING: CPT

## 2020-07-26 PROCEDURE — 25000003 PHARM REV CODE 250: Performed by: NURSE PRACTITIONER

## 2020-07-26 PROCEDURE — 84484 ASSAY OF TROPONIN QUANT: CPT

## 2020-07-26 PROCEDURE — 99285 EMERGENCY DEPT VISIT HI MDM: CPT | Mod: 25

## 2020-07-26 PROCEDURE — 63600175 PHARM REV CODE 636 W HCPCS: Performed by: NURSE PRACTITIONER

## 2020-07-26 PROCEDURE — 96361 HYDRATE IV INFUSION ADD-ON: CPT

## 2020-07-26 RX ORDER — DIPHENHYDRAMINE HYDROCHLORIDE 50 MG/ML
25 INJECTION INTRAMUSCULAR; INTRAVENOUS
Status: COMPLETED | OUTPATIENT
Start: 2020-07-26 | End: 2020-07-26

## 2020-07-26 RX ORDER — PROCHLORPERAZINE MALEATE 10 MG
10 TABLET ORAL EVERY 6 HOURS PRN
Qty: 15 TABLET | Refills: 0 | Status: SHIPPED | OUTPATIENT
Start: 2020-07-26 | End: 2020-10-01

## 2020-07-26 RX ORDER — GUAIFENESIN 100 MG/5ML
100-200 SOLUTION ORAL EVERY 4 HOURS PRN
Qty: 60 ML | Refills: 0 | Status: SHIPPED | OUTPATIENT
Start: 2020-07-26 | End: 2020-08-05

## 2020-07-26 RX ORDER — PROCHLORPERAZINE EDISYLATE 5 MG/ML
10 INJECTION INTRAMUSCULAR; INTRAVENOUS
Status: COMPLETED | OUTPATIENT
Start: 2020-07-26 | End: 2020-07-26

## 2020-07-26 RX ORDER — BENZONATATE 100 MG/1
100 CAPSULE ORAL 3 TIMES DAILY PRN
Qty: 30 CAPSULE | Refills: 0 | Status: SHIPPED | OUTPATIENT
Start: 2020-07-26 | End: 2020-08-05

## 2020-07-26 RX ORDER — ALBUTEROL SULFATE 90 UG/1
1-2 AEROSOL, METERED RESPIRATORY (INHALATION) EVERY 6 HOURS PRN
Qty: 6.7 G | Refills: 0 | Status: SHIPPED | OUTPATIENT
Start: 2020-07-26 | End: 2020-10-01

## 2020-07-26 RX ORDER — ACETAMINOPHEN 500 MG/1
1000 CAPSULE, LIQUID FILLED ORAL EVERY 8 HOURS PRN
Qty: 30 CAPSULE | Refills: 0 | Status: SHIPPED | OUTPATIENT
Start: 2020-07-26 | End: 2020-11-05

## 2020-07-26 RX ADMIN — DIPHENHYDRAMINE HYDROCHLORIDE 25 MG: 50 INJECTION, SOLUTION INTRAMUSCULAR; INTRAVENOUS at 07:07

## 2020-07-26 RX ADMIN — PROCHLORPERAZINE EDISYLATE 10 MG: 5 INJECTION INTRAMUSCULAR; INTRAVENOUS at 07:07

## 2020-07-26 RX ADMIN — SODIUM CHLORIDE 500 ML: 0.9 INJECTION, SOLUTION INTRAVENOUS at 07:07

## 2020-07-26 NOTE — ED PROVIDER NOTES
Encounter Date: 7/26/2020       History     Chief Complaint   Patient presents with    Headache     patient stated that she has been around her daughter who tested positive for covid. patient started with symptoms 3 days ago. patient rated headache 8 out of 10. Patient stated her HA is in front, described as pressure and throbbing    Generalized Body Aches     cc fever, chills, sore throat, x 3 days.      The patient is a 50-year-old female with a past medical history of thyroid disease, systemic lupus erythematosus, rheumatoid arthritis, PTSD, overactive bladder, kidney stones, irritable bowel syndrome, fibromyalgia, depression, anxiety, and acid reflux who presents to the ED for further evaluation of headache and generalized body aches.  Patient also reports decreased appetite, nausea, chest pain,and dizziness with standing. Her daughter recently tested positive for COVID-19.  She attributes her symptoms to a likely COVID-19 infection contracted from her daughter.  Her rheumatologist placed outpatient orders for her including a COVID test, however also recommended that she come to the ED for further evaluation as she may need IV fluids.  She has been treating with Motrin and Fioricet with some relief of symptoms.  No other treatment attempted prior to arrival.    The history is provided by the patient.     Review of patient's allergies indicates:   Allergen Reactions    Cephalexin Itching    Zofran [ondansetron hcl (pf)] Hives    Penicillins Rash     Past Medical History:   Diagnosis Date    Acid reflux     Allergy     Alopecia     Anxiety     Arthralgia     Back pain     Depression     Dry eyes     from meds    Fever blister     Fibromyalgia     Interstitial cystitis     Irritable bowel syndrome     Kidney stone     Major depressive disorder, recurrent episode, in partial or unspecified remission 6/25/2013    OAB (overactive bladder) 7/21/2015    PTSD (post-traumatic stress disorder)      Rheumatoid arthritis     Systemic lupus erythematosus     Thyroid disease     Urinary tract infection     Vaginal infection      Past Surgical History:   Procedure Laterality Date    BLADDER SURGERY       SECTION, LOW TRANSVERSE      x 2    COLONOSCOPY      COLONOSCOPY N/A 10/5/2017    Procedure: COLONOSCOPY;  Surgeon: Venkat He MD;  [facility];  Service: Endoscopy;  Laterality: N/A;    ESOPHAGOGASTRODUODENOSCOPY      EYE SURGERY      Lasik-bilateral    HYSTERECTOMY      INJECTION OF BOTULINUM TOXIN TYPE A N/A 2018    Procedure: INJECTION, BOTULINUM TOXIN, TYPE A  200 UNITS;  Surgeon: Bandar Garcia MD;  Location: [facility] OR [address] [place] [address];  Service: Urology;  Laterality: N/A;    INSTILLATION OF URINARY BLADDER N/A 2018    Procedure: INSTILLATION, URINARY BLADDER;  Surgeon: Bandar Garcia MD;  Location: [facility] OR [address] [place] [address];  Service: Urology;  Laterality: N/A;    PARTIAL HYSTERECTOMY       Family History   Problem Relation Age of Onset    Irritable bowel syndrome Mother     Irritable bowel syndrome Sister     Lupus Sister     Rheum arthritis Sister     Fibromyalgia Sister     Irritable bowel syndrome Sister     Celiac disease Neg Hx     Cirrhosis Neg Hx     Colon cancer Neg Hx     Colon polyps Neg Hx     Crohn's disease Neg Hx     Cystic fibrosis Neg Hx     Esophageal cancer Neg Hx     Hemochromatosis Neg Hx     Inflammatory bowel disease Neg Hx     Liver cancer Neg Hx     Liver disease Neg Hx     Rectal cancer Neg Hx     Stomach cancer Neg Hx     Ulcerative colitis Neg Hx     Boris's disease Neg Hx     Melanoma Neg Hx     Amblyopia Neg Hx     Blindness Neg Hx     Cataracts Neg Hx     Glaucoma Neg Hx     Macular degeneration Neg Hx     Retinal detachment Neg Hx     Strabismus Neg Hx      Social History     Tobacco Use    Smoking status: Never Smoker    Smokeless tobacco: Never Used   Substance Use Topics    Alcohol use: No    Drug use: No      Review of Systems   Constitutional: Positive for appetite change, chills and fever.   HENT: Positive for sore throat.    Eyes: Negative for visual disturbance.   Respiratory: Negative for shortness of breath.    Cardiovascular: Positive for chest pain.   Gastrointestinal: Positive for nausea. Negative for abdominal pain and vomiting.   Genitourinary: Negative for dysuria.   Musculoskeletal: Negative for back pain.   Skin: Negative for rash.   Neurological: Positive for dizziness and headaches. Negative for weakness.   Hematological: Does not bruise/bleed easily.       Physical Exam     Initial Vitals [07/26/20 1733]   BP Pulse Resp Temp SpO2   108/81 100 20 98.5 °F (36.9 °C) 96 %      MAP       --         Physical Exam    Nursing note and vitals reviewed.  Constitutional: She appears well-developed and well-nourished.  Non-toxic appearance. No distress.   Patient is alert, oriented, and nontoxic appearing.  No apparent distress.  She appears uncomfortable.   HENT:   Head: Normocephalic and atraumatic.   Right Ear: Hearing and external ear normal.   Left Ear: Hearing and external ear normal.   Nose: Nose abnormal.   Mouth/Throat: Uvula is midline and oropharynx is clear and moist. Mucous membranes are dry.   No facial asymmetry   Eyes: Conjunctivae and EOM are normal. Pupils are equal, round, and reactive to light. Right eye exhibits normal extraocular motion. Left eye exhibits normal extraocular motion. Right pupil is round and reactive. Left pupil is round and reactive. Pupils are equal.   Pupils are equal and reactive   Neck: Full passive range of motion without pain. Neck supple. No neck rigidity.   No meningismus   Cardiovascular: Normal rate, regular rhythm, normal heart sounds and normal pulses. Exam reveals no gallop and no friction rub.    No murmur heard.  Pulses:       Radial pulses are 2+ on the right side and 2+ on the left side.        Dorsalis pedis pulses are 2+ on the right side and 2+ on the  left side.   Pulmonary/Chest: Effort normal and breath sounds normal. No respiratory distress. She has no decreased breath sounds. She has no wheezes. She has no rhonchi. She has no rales.   Abdominal: Soft. Bowel sounds are normal. She exhibits no distension. There is no abdominal tenderness.   Musculoskeletal: Normal range of motion.   Neurological: She is alert and oriented to person, place, and time. She has normal strength. No cranial nerve deficit or sensory deficit. She exhibits normal muscle tone. She displays no seizure activity. Gait normal. GCS eye subscore is 4. GCS verbal subscore is 5. GCS motor subscore is 6.    No sensory deficits.  Muscular strength is normal and equal bilaterally.  No pronator drift.  Finger to nose, heel to shin, and alternating hands are all within normal limits.   Skin: Skin is warm, dry and intact. Capillary refill takes less than 2 seconds. No rash noted.   Psychiatric: She has a normal mood and affect. Her speech is normal and behavior is normal. Judgment and thought content normal. Cognition and memory are normal.         ED Course   Procedures  Labs Reviewed   CBC W/ AUTO DIFFERENTIAL   SARS-COV-2 (COVID-19) QUALITATIVE PCR   COMPREHENSIVE METABOLIC PANEL   TROPONIN I     EKG Readings: (Independently Interpreted)   Initial Reading: No STEMI. Rhythm: Normal Sinus Rhythm. Heart Rate: 86. Ectopy: No Ectopy. Conduction: Normal. ST Segments: Normal ST Segments.       Imaging Results          X-Ray Chest 1 View (In process)                  Medical Decision Making:   History:   Old Medical Records: I decided to obtain old medical records.  Clinical Tests:   Lab Tests: Ordered and Reviewed  Radiological Study: Ordered and Reviewed  Medical Tests: Ordered and Reviewed  ED Management:  This is an emergent evaluation of a 50-year-old female who presents to the ED with multiple complaints.  She attributes her symptoms to a probable COVID-19 infection as her daughter recently tested  positive.    Physical exam is largely benign.  Mucous membranes are dry. There are no focal neurologic deficits. Lungs are clear to auscultation.    Abdomen is soft and nontender.  No lower extremity edema.    ECG demonstrates normal sinus rhythm.  No evidence of acute ischemia.    Chest x-ray is clear.    There is evidence of orthostasis.    I independently reviewed and interpreted labs which are unrevealing.   Labs are all within normal limits.  Troponin is negative.  COVID test is pending.    Based on history and physical exam, as well as diagnostic results, I considered but do not suspect severe dehydration, meningitis, ACS/MI,  Bacterial pneumonia,  acute respiratory failure, or severe electrolyte derangement.  Clinical presentation suggests suspected COVID-19 infection.  Patient will be discharged with medications for symptomatic relief.  Also educated on infectious disease prevention strategies.  Home symptom monitoring program has been initiated.  All questions answered.  Return precautions given.                                 Clinical Impression:       ICD-10-CM ICD-9-CM   1. Suspected Covid-19 Virus Infection  R68.89    2. Chest pain  R07.9 786.50   3. Orthostasis  I95.1 458.0   4. Nonintractable headache, unspecified chronicity pattern, unspecified headache type  R51 784.0

## 2020-07-27 LAB — SARS-COV-2 RNA RESP QL NAA+PROBE: NOT DETECTED

## 2020-07-27 NOTE — DISCHARGE INSTRUCTIONS
Thank you for allowing me to care for you today.  I hope our treatment plan will make you feel better in the next few days.  In order for me to take better care of my future patients and improve our Emergency Department, I would appreciate if you can provide us with feedback.  In the next few days, you may receive a survey in the mail.  If you do, it would mean a great deal to me if you would please take the time to complete it.    Thank you and I hope you feel better.  Ruthy Harris NP

## 2020-07-29 ENCOUNTER — TELEPHONE (OUTPATIENT)
Dept: RHEUMATOLOGY | Facility: CLINIC | Age: 50
End: 2020-07-29

## 2020-07-29 DIAGNOSIS — M47.817 LUMBAR AND SACRAL OSTEOARTHRITIS: Primary | ICD-10-CM

## 2020-07-29 RX ORDER — GABAPENTIN 800 MG/1
800 TABLET ORAL 3 TIMES DAILY
Qty: 90 TABLET | Refills: 11 | Status: SHIPPED | OUTPATIENT
Start: 2020-07-29 | End: 2020-11-23 | Stop reason: SDUPTHER

## 2020-09-01 ENCOUNTER — HOSPITAL ENCOUNTER (EMERGENCY)
Facility: HOSPITAL | Age: 50
Discharge: PSYCHIATRIC OR SUBSTANCE USE FACILITY | End: 2020-09-02
Attending: EMERGENCY MEDICINE
Payer: COMMERCIAL

## 2020-09-01 ENCOUNTER — OFFICE VISIT (OUTPATIENT)
Dept: PSYCHIATRY | Facility: CLINIC | Age: 50
End: 2020-09-01
Payer: COMMERCIAL

## 2020-09-01 DIAGNOSIS — R45.851 SUICIDAL IDEATIONS: ICD-10-CM

## 2020-09-01 DIAGNOSIS — F33.1 MAJOR DEPRESSIVE DISORDER, RECURRENT EPISODE, MODERATE: Primary | ICD-10-CM

## 2020-09-01 DIAGNOSIS — R45.851 SUICIDAL THOUGHTS: ICD-10-CM

## 2020-09-01 DIAGNOSIS — F32.9 MAJOR DEPRESSIVE DISORDER, REMISSION STATUS UNSPECIFIED, UNSPECIFIED WHETHER RECURRENT: Primary | ICD-10-CM

## 2020-09-01 DIAGNOSIS — F43.10 PTSD (POST-TRAUMATIC STRESS DISORDER): ICD-10-CM

## 2020-09-01 DIAGNOSIS — Z00.8 MEDICAL CLEARANCE FOR PSYCHIATRIC ADMISSION: ICD-10-CM

## 2020-09-01 DIAGNOSIS — F41.1 GENERALIZED ANXIETY DISORDER: ICD-10-CM

## 2020-09-01 LAB
ALBUMIN SERPL BCP-MCNC: 4.2 G/DL (ref 3.5–5.2)
ALP SERPL-CCNC: 83 U/L (ref 55–135)
ALT SERPL W/O P-5'-P-CCNC: 16 U/L (ref 10–44)
AMPHET+METHAMPHET UR QL: NEGATIVE
ANION GAP SERPL CALC-SCNC: 10 MMOL/L (ref 8–16)
APAP SERPL-MCNC: <3 UG/ML (ref 10–20)
AST SERPL-CCNC: 15 U/L (ref 10–40)
BACTERIA #/AREA URNS HPF: NORMAL /HPF
BARBITURATES UR QL SCN>200 NG/ML: ABNORMAL
BASOPHILS # BLD AUTO: 0.06 K/UL (ref 0–0.2)
BASOPHILS NFR BLD: 0.8 % (ref 0–1.9)
BENZODIAZ UR QL SCN>200 NG/ML: ABNORMAL
BILIRUB SERPL-MCNC: 0.2 MG/DL (ref 0.1–1)
BILIRUB UR QL STRIP: ABNORMAL
BUN SERPL-MCNC: 12 MG/DL (ref 6–20)
BZE UR QL SCN: NEGATIVE
CALCIUM SERPL-MCNC: 9.1 MG/DL (ref 8.7–10.5)
CANNABINOIDS UR QL SCN: NEGATIVE
CAOX CRY URNS QL MICRO: NORMAL
CHLORIDE SERPL-SCNC: 105 MMOL/L (ref 95–110)
CLARITY UR: ABNORMAL
CO2 SERPL-SCNC: 22 MMOL/L (ref 23–29)
COLOR UR: ABNORMAL
CREAT SERPL-MCNC: 0.9 MG/DL (ref 0.5–1.4)
CREAT UR-MCNC: 339.8 MG/DL (ref 15–325)
DIFFERENTIAL METHOD: ABNORMAL
EOSINOPHIL # BLD AUTO: 0.1 K/UL (ref 0–0.5)
EOSINOPHIL NFR BLD: 1.6 % (ref 0–8)
ERYTHROCYTE [DISTWIDTH] IN BLOOD BY AUTOMATED COUNT: 11.6 % (ref 11.5–14.5)
EST. GFR  (AFRICAN AMERICAN): >60 ML/MIN/1.73 M^2
EST. GFR  (NON AFRICAN AMERICAN): >60 ML/MIN/1.73 M^2
ETHANOL SERPL-MCNC: <10 MG/DL
GLUCOSE SERPL-MCNC: 141 MG/DL (ref 70–110)
GLUCOSE UR QL STRIP: ABNORMAL
HCT VFR BLD AUTO: 36.6 % (ref 37–48.5)
HGB BLD-MCNC: 12.2 G/DL (ref 12–16)
HGB UR QL STRIP: ABNORMAL
IMM GRANULOCYTES # BLD AUTO: 0.01 K/UL (ref 0–0.04)
IMM GRANULOCYTES NFR BLD AUTO: 0.1 % (ref 0–0.5)
KETONES UR QL STRIP: ABNORMAL
LEUKOCYTE ESTERASE UR QL STRIP: ABNORMAL
LYMPHOCYTES # BLD AUTO: 2.2 K/UL (ref 1–4.8)
LYMPHOCYTES NFR BLD: 29.2 % (ref 18–48)
MCH RBC QN AUTO: 29.6 PG (ref 27–31)
MCHC RBC AUTO-ENTMCNC: 33.3 G/DL (ref 32–36)
MCV RBC AUTO: 89 FL (ref 82–98)
METHADONE UR QL SCN>300 NG/ML: NEGATIVE
MICROSCOPIC COMMENT: NORMAL
MONOCYTES # BLD AUTO: 0.5 K/UL (ref 0.3–1)
MONOCYTES NFR BLD: 7.1 % (ref 4–15)
NEUTROPHILS # BLD AUTO: 4.5 K/UL (ref 1.8–7.7)
NEUTROPHILS NFR BLD: 61.2 % (ref 38–73)
NITRITE UR QL STRIP: ABNORMAL
NRBC BLD-RTO: 0 /100 WBC
OPIATES UR QL SCN: NEGATIVE
PCP UR QL SCN>25 NG/ML: NEGATIVE
PH UR STRIP: ABNORMAL [PH] (ref 5–8)
PLATELET # BLD AUTO: 261 K/UL (ref 150–350)
PMV BLD AUTO: 8.9 FL (ref 9.2–12.9)
POTASSIUM SERPL-SCNC: 3.5 MMOL/L (ref 3.5–5.1)
PROT SERPL-MCNC: 7 G/DL (ref 6–8.4)
PROT UR QL STRIP: ABNORMAL
RBC # BLD AUTO: 4.12 M/UL (ref 4–5.4)
RBC #/AREA URNS HPF: 2 /HPF (ref 0–4)
SARS-COV-2 RDRP RESP QL NAA+PROBE: NEGATIVE
SODIUM SERPL-SCNC: 137 MMOL/L (ref 136–145)
SP GR UR STRIP: ABNORMAL (ref 1–1.03)
T4 FREE SERPL-MCNC: 0.94 NG/DL (ref 0.71–1.51)
TOXICOLOGY INFORMATION: ABNORMAL
TSH SERPL DL<=0.005 MIU/L-ACNC: 0.07 UIU/ML (ref 0.4–4)
URN SPEC COLLECT METH UR: ABNORMAL
UROBILINOGEN UR STRIP-ACNC: ABNORMAL EU/DL
WBC # BLD AUTO: 7.37 K/UL (ref 3.9–12.7)
WBC #/AREA URNS HPF: 3 /HPF (ref 0–5)

## 2020-09-01 PROCEDURE — 99215 PR OFFICE/OUTPT VISIT, EST, LEVL V, 40-54 MIN: ICD-10-PCS | Mod: S$GLB,,, | Performed by: PSYCHIATRY & NEUROLOGY

## 2020-09-01 PROCEDURE — 80329 ANALGESICS NON-OPIOID 1 OR 2: CPT

## 2020-09-01 PROCEDURE — 80053 COMPREHEN METABOLIC PANEL: CPT

## 2020-09-01 PROCEDURE — 99215 OFFICE O/P EST HI 40 MIN: CPT | Mod: S$GLB,,, | Performed by: PSYCHIATRY & NEUROLOGY

## 2020-09-01 PROCEDURE — 99999 PR PBB SHADOW E&M-EST. PATIENT-LVL I: ICD-10-PCS | Mod: PBBFAC,,, | Performed by: PSYCHIATRY & NEUROLOGY

## 2020-09-01 PROCEDURE — 99285 EMERGENCY DEPT VISIT HI MDM: CPT

## 2020-09-01 PROCEDURE — U0002 COVID-19 LAB TEST NON-CDC: HCPCS

## 2020-09-01 PROCEDURE — 80320 DRUG SCREEN QUANTALCOHOLS: CPT

## 2020-09-01 PROCEDURE — 99999 PR PBB SHADOW E&M-EST. PATIENT-LVL I: CPT | Mod: PBBFAC,,, | Performed by: PSYCHIATRY & NEUROLOGY

## 2020-09-01 PROCEDURE — 80307 DRUG TEST PRSMV CHEM ANLYZR: CPT

## 2020-09-01 PROCEDURE — 84439 ASSAY OF FREE THYROXINE: CPT

## 2020-09-01 PROCEDURE — 84443 ASSAY THYROID STIM HORMONE: CPT

## 2020-09-01 PROCEDURE — 85025 COMPLETE CBC W/AUTO DIFF WBC: CPT

## 2020-09-01 PROCEDURE — 81000 URINALYSIS NONAUTO W/SCOPE: CPT | Mod: 59

## 2020-09-01 RX ORDER — BUPROPION HYDROCHLORIDE 150 MG/1
150 TABLET ORAL DAILY
Qty: 30 TABLET | Refills: 2 | Status: SHIPPED | OUTPATIENT
Start: 2020-09-01 | End: 2020-11-05

## 2020-09-01 NOTE — PROGRESS NOTES
"Outpatient Psychiatry Follow-Up Visit (MD/NP)    09/01/2020 Phone visit.(21")    Clinical Status of Patient:  Outpatient (Ambulatory)    Chief Complaint:  Depression and anxiety.    Interval History and Content of Current Session:Patient indicated that she is now more depressed, and recently picked up a gun she had found and thought of using it to harm herself. Her family stopped her from doing so. Today she was tearful throughout the interview and very sad and frustrated and mentioned more than once she wished she was dead. Patient had no signs of josi or psychosis. She has no energy or drive or enthusiasm. I expressed my concern re the level of her depression and recommended that she go to the ER for consideration of being admitted to an inpatient unit for safety as well as for treatment purposes. She stated she would do that and get her  to take her there. I tried to call her  but only got a recording that his mailbox was full and I could not leave a message. I called the patient back to let her know that and she again agreed to go to the ER for consideration for a hospital admission. In my judgement she is not stable enough at this time not to be in a safe environment where she can receive more intensive treatment. I had recommended that we add Wellbutrin to her med regimen,but I do not feel at this time that her current situation and condition can be managed on an outpatient basis. The patient understood and agreed to prepare to go to the ER. That was a commitment from her,  and I did not feel it was necessary to call 911 or an emergency service at this time due to her agreement which was firm to go to the ER.    Compliance: good overall     Side effects:  None  Musculoskeletal: patient had no complaints of abnormal motor movements of any kind.    Risk Parameters:needs hospital inpatient care due to current fragility as described above.    Patient's Response to Intervention:accepting    Progress " Toward Goals and Other Mental Status Changes: poor at this time     Vital signs this date were not reviewed.     Mental Status Evaluation     Appearance:  not assessed   Behavior:  cooperative                             Speech normal   Mood:  depressed, sad   Affect:  dysphoric   Thought Process:  linear, logical   Thought Content:  Organizedseverely depressed and at risk for self harm.   Sensorium:  alert and oriented to person, place, time and situation   Attention Span & Concentration able to focus   Cognition:  Grossly intactgrossly intact   Insight:  has awareness of illness   Judgment:  behavior is adequate to circumstances, needs secure environment at this time       Diagnosis:Major Depressive Disorder, Mild Recurrent  ADD    Major depressive disorder, recurrent episode, moderate [F33.1]      I reviewed the side effects of the patient's medicines and advised a call if the patient had problems with the medicine or clinical condition.    Plan:(Medication and Therapy Recommendation)  Additional Notes:   Return to Clinic:2 to 4 weeks if possible

## 2020-09-02 VITALS
WEIGHT: 155.44 LBS | BODY MASS INDEX: 31.34 KG/M2 | RESPIRATION RATE: 20 BRPM | SYSTOLIC BLOOD PRESSURE: 125 MMHG | HEART RATE: 82 BPM | HEIGHT: 59 IN | TEMPERATURE: 98 F | DIASTOLIC BLOOD PRESSURE: 66 MMHG | OXYGEN SATURATION: 99 %

## 2020-09-02 NOTE — ED NOTES
Pt transported via wheelchair by PCT, security, and 2 [facility] personnel to [facility] unit 47 w/ 3 bags belongings. Transfer form completed, signed, and placed in blue box.

## 2020-09-02 NOTE — ED NOTES
Security at bedside. Pt is calm and cooperative. Pt changed into paper scrubs. Pt clothes in labeled belongings bag. Pt has a black and white duffel bag labeled.

## 2020-09-02 NOTE — FIRST PROVIDER EVALUATION
" Emergency Department TeleTRIAGE Encounter Note      CHIEF COMPLAINT    Chief Complaint   Patient presents with    Depression     States she suffer from depression and PTSD. States she is not suicidal today but she thought about it yesterday. States she found a plan on tv.       VITAL SIGNS   Initial Vitals [09/01/20 2002]   BP Pulse Resp Temp SpO2   117/81 102 18 98.2 °F (36.8 °C) 98 %      MAP       --            ALLERGIES    Review of patient's allergies indicates:   Allergen Reactions    Cephalexin Itching    Zofran [ondansetron hcl (pf)] Hives    Penicillins Rash       PROVIDER TRIAGE NOTE  51 y/o female which presents with suicidal ideations. She was referred to the ED by her psychiatrist for admission.       ORDERS  Labs Reviewed - No data to display    ED Orders (720h ago, onward)    Start Ordered     Status Ordering Provider    09/01/20 2009 09/01/20 2009  PEC/Psych Hold - Physicians Emergency Certificate / 72 Hour Psych Hold  Once     Comments: To D/C Physician's Emergency Certificate / Psychiatric Hold, please enter and sign the "Patient is No Longer PEC/Psych Hold and/or CEC" order.    Ordered STORMY LLOYD    09/01/20 2009 09/01/20 2009  Diet Adult Regular (IDDSI Level 7)  Diet effective now     Comments: *No forks or knifes, only spoons    Ordered STORMY LLOYD    Unscheduled 09/01/20 2009  Vital signs  Every 12 hours      Ordered STORMY LLOYD    Unscheduled 09/01/20 2009  Undress patient and allow them to wear facility provided apparel.  Once      Ordered STORMY LLOYD    Unscheduled 09/01/20 2009  CBC auto differential  STAT      Ordered STORMY LLOYD    Unscheduled 09/01/20 2009  Comprehensive metabolic panel  STAT      Ordered STORMY LLOYD    Unscheduled 09/01/20 2009  TSH  STAT      Ordered STORMY LLOYD    Unscheduled 09/01/20 2009  Urinalysis, Reflex to Urine Culture Urine, Clean Catch  STAT      Ordered STORMY LLOYD    Unscheduled 09/01/20 2009  " Drug screen panel, emergency  STAT      Ordered PREMASTORMY JAQUAN    Unscheduled 09/01/20 2009  Ethanol  STAT      Ordered PREMASTORMY REYNOSO    Unscheduled 09/01/20 2009  Acetaminophen level  STAT      Ordered PREMASTORMY OBREGON    Unscheduled 09/01/20 2009  COVID-19 Rapid Screening  STAT      Ordered PREMA, STORMY JAQUAN    Unscheduled 09/01/20 2009  Direct Psych Observation  Continuous      Ordered STORMY LLOYD            Virtual Visit Note: The provider triage portion of this emergency department evaluation and documentation was performed via [employer], a HIPAA-compliant telemedicine application, in concert with a tele-presenter in the room. A face to face patient evaluation with one of my colleagues will occur once the patient is placed in an emergency department room.      DISCLAIMER: This note was prepared with [employer] voice recognition transcription software. Garbled syntax, mangled pronouns, and other bizarre constructions may be attributed to that software system.

## 2020-09-02 NOTE — ED NOTES
Attempted to call report. Was informed that nurse was unaware she was receiving a new pt. Left phone number for nurse to call back for report.
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
## 2020-09-10 ENCOUNTER — DOCUMENTATION ONLY (OUTPATIENT)
Dept: PSYCHIATRY | Facility: CLINIC | Age: 50
End: 2020-09-10

## 2020-09-13 ENCOUNTER — OFFICE VISIT (OUTPATIENT)
Dept: URGENT CARE | Facility: CLINIC | Age: 50
End: 2020-09-13
Payer: COMMERCIAL

## 2020-09-13 VITALS
OXYGEN SATURATION: 97 % | HEART RATE: 72 BPM | HEIGHT: 59 IN | TEMPERATURE: 98 F | SYSTOLIC BLOOD PRESSURE: 149 MMHG | WEIGHT: 155 LBS | RESPIRATION RATE: 14 BRPM | DIASTOLIC BLOOD PRESSURE: 96 MMHG | BODY MASS INDEX: 31.25 KG/M2

## 2020-09-13 DIAGNOSIS — Z20.822 COVID-19 VIRUS NOT DETECTED: ICD-10-CM

## 2020-09-13 DIAGNOSIS — R19.7 DIARRHEA, UNSPECIFIED TYPE: ICD-10-CM

## 2020-09-13 DIAGNOSIS — Z11.59 SCREENING FOR VIRAL DISEASE: Primary | ICD-10-CM

## 2020-09-13 LAB
CTP QC/QA: YES
SARS-COV-2 RDRP RESP QL NAA+PROBE: NEGATIVE

## 2020-09-13 PROCEDURE — 99214 OFFICE O/P EST MOD 30 MIN: CPT | Mod: S$GLB,,, | Performed by: NURSE PRACTITIONER

## 2020-09-13 PROCEDURE — 99214 PR OFFICE/OUTPT VISIT, EST, LEVL IV, 30-39 MIN: ICD-10-PCS | Mod: S$GLB,,, | Performed by: NURSE PRACTITIONER

## 2020-09-13 PROCEDURE — U0002: ICD-10-PCS | Mod: QW,S$GLB,, | Performed by: NURSE PRACTITIONER

## 2020-09-13 PROCEDURE — U0002 COVID-19 LAB TEST NON-CDC: HCPCS | Mod: QW,S$GLB,, | Performed by: NURSE PRACTITIONER

## 2020-09-13 RX ORDER — MIRTAZAPINE 15 MG/1
TABLET, FILM COATED ORAL
COMMUNITY
Start: 2020-09-12 | End: 2020-11-05

## 2020-09-13 NOTE — PROGRESS NOTES
"Subjective:       Patient ID: Marilee Simons is a 50 y.o. female.    Vitals:  height is 4' 11" (1.499 m) and weight is 70.3 kg (155 lb). Her temperature is 97.5 °F (36.4 °C). Her blood pressure is 149/96 (abnormal) and her pulse is 72. Her respiration is 14 and oxygen saturation is 97%.     Chief Complaint: Diarrhea    This is a 50 y.o. female with history of major depressive disorder, PTSD, generalized anxiety disorder, anxiety, who presents today with a chief complaint of chills, diarrhea since Thursday PM. Pt thinks she was exposed to Covid 19 from someone at a psychiatric hospital while she was inpatient the past week.  Patient is requesting COVID-19 testing, Patient denies cough or shortness of breath, denies loss of taste or smell, denies headache, denies nausea, vomiting or abdominal pain or constipation, denies dizziness or positional lightheadedness, patient reports she just feeling chills and diarrhea but thinks it might be related to her anxiety and getting better but she is concerned about COVID and want to get tested    Fever   This is a new problem. The current episode started in the past 7 days. Associated symptoms include diarrhea. Pertinent negatives include no chest pain, congestion, coughing, headaches, nausea, rash, sore throat, urinary pain or vomiting. She has tried acetaminophen and NSAIDs for the symptoms. The treatment provided moderate relief.       Constitution: Positive for chills. Negative for fatigue and fever.   HENT: Negative for congestion and sore throat.    Neck: Negative for painful lymph nodes.   Cardiovascular: Negative for chest pain and leg swelling.   Eyes: Negative for double vision and blurred vision.   Respiratory: Negative for cough and shortness of breath.    Gastrointestinal: Positive for diarrhea. Negative for nausea and vomiting.   Genitourinary: Negative for dysuria, frequency, urgency and history of kidney stones.   Musculoskeletal: Negative for joint pain, joint " swelling, muscle cramps and muscle ache.   Skin: Negative for color change, pale, rash and bruising.   Allergic/Immunologic: Negative for seasonal allergies.   Neurological: Negative for dizziness, history of vertigo, light-headedness, passing out and headaches.   Hematologic/Lymphatic: Negative for swollen lymph nodes.   Psychiatric/Behavioral: Negative for nervous/anxious, sleep disturbance and depression. The patient is not nervous/anxious.        Objective:      Physical Exam   Constitutional: She is oriented to person, place, and time. She appears well-developed.  Non-toxic appearance. She does not appear ill.   HENT:   Head: Normocephalic and atraumatic.   Ears:   Right Ear: External ear normal.   Left Ear: External ear normal.   Nose: Nose normal.   Mouth/Throat: Mucous membranes are normal.   Eyes: Conjunctivae and lids are normal.   Neck: Trachea normal and full passive range of motion without pain. Neck supple.   Cardiovascular: Normal rate, regular rhythm and normal heart sounds.   Pulmonary/Chest: Effort normal and breath sounds normal. No stridor. No respiratory distress. She has no decreased breath sounds. She has no wheezes. She has no rhonchi. She has no rales.   Abdominal: Soft. Normal appearance and bowel sounds are normal. She exhibits no distension, no abdominal bruit, no pulsatile midline mass and no mass. There is no abdominal tenderness. There is no rebound, no guarding, no tenderness at McBurney's point, negative Andrews's sign, no left CVA tenderness, negative Rovsing's sign, negative psoas sign, no right CVA tenderness and negative obturator sign.   Musculoskeletal: Normal range of motion.   Neurological: She is alert and oriented to person, place, and time. She has normal strength.   Skin: Skin is warm, dry, intact, not diaphoretic and not pale. Psychiatric: Her speech is normal and behavior is normal. Judgment and thought content normal.   Nursing note and vitals reviewed.        Results  for orders placed or performed in visit on 09/13/20   POCT COVID-19 Rapid Screening   Result Value Ref Range    POC Rapid COVID Negative Negative     Acceptable Yes          Patient in no acute distress or nontoxic-appearing.   vitals reassuring.  COVID-19 negative results discussed with patient in detail.  Discussed results/diagnosis/plan in depth with patient in clinic. Strict precautions given to patient to monitor for worsening signs and symptoms. Advised to follow up with primary.All questions answered. Strict ER precautions given. If your symptoms worsens of fail to improve you should go to the Emergency Room. Discharge and follow-up instructions given verbally/printed. Patient voiced understanding and in agreement with current treatment plan.        Assessment:       1. Screening for viral disease    2. Diarrhea, unspecified type    3. Covid-19 Virus not Detected        Plan:         Screening for viral disease  -     POCT COVID-19 Rapid Screening    Diarrhea, unspecified type    Covid-19 Virus not Detected      Patient Instructions   If your condition worsens or fails to improve we recommend that you receive another evaluation at the ER immediately or contact your PCP to discuss your concerns or return here. You must understand that you've received an urgent care treatment only and that you may be released before all your medical problems are known or treated. You the patient will arrange for followup care as instructed.    If you were prescribed antibiotics, please take them to completion.  If you were prescribed a narcotic medication, do not drive or operate heavy equipment or machinery while taking these medications.  Please follow up with your primary care doctor or specialist as needed.  If you  smoke, please stop smoking.  Guidelines for General Prevention of COVID-19    o Take steps to protect yourself from COVID-19. Perform hand hygiene frequently. Wash your hands often with soap and  water for at least 20 seconds of use and alcohol-based hand , covering all surfaces of your hands and rubbing them together until they feel dry.  o Avoid touching your eyes, nose, and mouth with unwashed hands.  o Avoid close contact with people and stay home if youre sick, except to get medical care.   o Cover coughs and sneezes with a tissue, or use the inside of your elbow. Immediately wash your hands or use hand .     For more information, see CDC link below:    https://www.cdc.gov/coronavirus/2019-ncov/hcp/guidance-prevent-spread.html#precautions

## 2020-09-24 ENCOUNTER — PATIENT MESSAGE (OUTPATIENT)
Dept: PSYCHIATRY | Facility: CLINIC | Age: 50
End: 2020-09-24

## 2020-09-25 ENCOUNTER — PATIENT MESSAGE (OUTPATIENT)
Dept: PSYCHIATRY | Facility: CLINIC | Age: 50
End: 2020-09-25

## 2020-10-01 ENCOUNTER — PATIENT MESSAGE (OUTPATIENT)
Dept: PSYCHIATRY | Facility: CLINIC | Age: 50
End: 2020-10-01

## 2020-10-01 ENCOUNTER — OFFICE VISIT (OUTPATIENT)
Dept: GASTROENTEROLOGY | Facility: CLINIC | Age: 50
End: 2020-10-01
Payer: COMMERCIAL

## 2020-10-01 DIAGNOSIS — K52.9 CHRONIC DIARRHEA: ICD-10-CM

## 2020-10-01 DIAGNOSIS — K21.9 GASTROESOPHAGEAL REFLUX DISEASE, UNSPECIFIED WHETHER ESOPHAGITIS PRESENT: Primary | ICD-10-CM

## 2020-10-01 PROCEDURE — 99213 PR OFFICE/OUTPT VISIT, EST, LEVL III, 20-29 MIN: ICD-10-PCS | Mod: 95,,, | Performed by: INTERNAL MEDICINE

## 2020-10-01 PROCEDURE — 99213 OFFICE O/P EST LOW 20 MIN: CPT | Mod: 95,,, | Performed by: INTERNAL MEDICINE

## 2020-10-01 RX ORDER — PROMETHAZINE HYDROCHLORIDE 25 MG/1
25 TABLET ORAL EVERY 6 HOURS PRN
Qty: 30 TABLET | Refills: 0 | Status: SHIPPED | OUTPATIENT
Start: 2020-10-01 | End: 2020-12-07

## 2020-10-01 NOTE — PROGRESS NOTES
The patient location is: home  The chief complaint leading to consultation is: chronic diarrhea on Alosetron    Visit type: audiovisual    Face to Face time with patient: 15 minutes of total time spent on the encounter, which includes face to face time and non-face to face time preparing to see the patient (eg, review of tests), Obtaining and/or reviewing separately obtained history, Documenting clinical information in the electronic or other health record, Independently interpreting results (not separately reported) and communicating results to the patient/family/caregiver, or Care coordination (not separately reported).         Each patient to whom he or she provides medical services by telemedicine is:  (1) informed of the relationship between the physician and patient and the respective role of any other health care provider with respect to management of the patient; and (2) notified that he or she may decline to receive medical services by telemedicine and may withdraw from such care at any time.            Ms. Simons is a 50 year-old with chronic diarrhea   secondary to IBS, managed with alosetron, which she only takes it on a p.r.n.   basis.  Never had any issues with ischemic colitis.  Doing well with no   abdominal pains.  She does have chronic heartburn, managed on Aciphex without   any issues.  She is overall doing well except for issues with depression and   domestic issues with prior issues with her family.  She is also having issues   with interstitial cystitis with bladder pains.Currently taking Famotidine for IC.She also takes Phenergan prn for nausea. Zofran causes rashes.     Since she has been taking Ultram for back pain her stools have been more firm.  Today I reiterated the importance of not taking Alosetron for diarrhea if it is is not chronic and uncontrolled.     PAST MEDICAL, SURGICAL, SOCIAL AND FAMILY HISTORY:  Reviewed.     MEDICATIONS AND ALLERGIES:  Reviewed.     REVIEW OF  SYSTEMS:  CONSTITUTIONAL:  No fever, no chills, no weight loss.  Appetite is normal.  EYES:  No visual changes.  ENT:  No odynophagia or hoarseness of voice.  CARDIOVASCULAR:  No angina or palpitation.  RESPIRATORY:  No shortness of breath or wheezing.  GASTROINTESTINAL:  See HPI.  No blood in the stool.     PHYSICAL EXAMINATION:  VITAL SIGNS:  See EPIC.  GENERAL:  Awake, alert and oriented x3, in no acute distress.  Virtual visit.     IMPRESSION:  1.  Chronic diarrhea -- controlled on Lotronex p.o. p.r.n.  2.  Gastroesophageal reflux disease -- discontinue Aciphex 20 mg daily and take Famotidine 20 mg po daily (previously prescribed by Urologist for IC).     RECOMMENDATIONS:  1. Follow up in GI clinic in 6 months.

## 2020-10-05 ENCOUNTER — PATIENT MESSAGE (OUTPATIENT)
Dept: ADMINISTRATIVE | Facility: HOSPITAL | Age: 50
End: 2020-10-05

## 2020-10-07 ENCOUNTER — OFFICE VISIT (OUTPATIENT)
Dept: PSYCHIATRY | Facility: CLINIC | Age: 50
End: 2020-10-07
Payer: COMMERCIAL

## 2020-10-07 DIAGNOSIS — F33.1 MAJOR DEPRESSIVE DISORDER, RECURRENT EPISODE, MODERATE: Primary | ICD-10-CM

## 2020-10-07 PROCEDURE — 99214 PR OFFICE/OUTPT VISIT, EST, LEVL IV, 30-39 MIN: ICD-10-PCS | Mod: 95,,, | Performed by: PSYCHIATRY & NEUROLOGY

## 2020-10-07 PROCEDURE — 99214 OFFICE O/P EST MOD 30 MIN: CPT | Mod: 95,,, | Performed by: PSYCHIATRY & NEUROLOGY

## 2020-10-07 RX ORDER — CLONAZEPAM 1 MG/1
TABLET ORAL
Qty: 30 TABLET | Refills: 0 | Status: SHIPPED | OUTPATIENT
Start: 2020-10-07 | End: 2020-11-10 | Stop reason: DRUGHIGH

## 2020-10-07 RX ORDER — DULOXETIN HYDROCHLORIDE 60 MG/1
120 CAPSULE, DELAYED RELEASE ORAL DAILY
Qty: 60 CAPSULE | Refills: 3 | Status: SHIPPED | OUTPATIENT
Start: 2020-10-07 | End: 2021-03-02 | Stop reason: SDUPTHER

## 2020-10-07 NOTE — PROGRESS NOTES
"Outpatient Psychiatry Follow-Up Visit (MD/NP)    10/07/2020 The patient location is: home  The chief complaint leading to consultation is: depression and anxiety    Visit type: audiovisual    Face to Face time with patient: 27"  35 minutes of total time spent on the encounter, which includes face to face time and non-face to face time preparing to see the patient (eg, review of tests), Obtaining and/or reviewing separately obtained history, Documenting clinical information in the electronic or other health record, Independently interpreting results (not separately reported) and communicating results to the patient/family/caregiver, or Care coordination (not separately reported).         Each patient to whom he or she provides medical services by telemedicine is:  (1) informed of the relationship between the physician and patient and the respective role of any other health care provider with respect to management of the patient; and (2) notified that he or she may decline to receive medical services by telemedicine and may withdraw from such care at any time.    Notes: See below  Clinical Status of Patient:  Outpatient (Ambulatory)    Chief Complaint:  Depression and anxiety.  Interval History and Content of Current Session: Patient's mood is more stable since he discharge from the hospital. Patient described an uncomfortable experience while in the hospital due to the milieu there. She is in good self control now and has no thoughts of harming herself or others. She has no signs of josi or psychosis and is thinking more hopefully and positively. Patient is doing better now on her medication regimen, is sleeping better and has a healthy appetite. We also discussed her isolation due to the virus pandemic and he fears of going outside. We discussed the benefit of some exercise for her if possible. Patient is seeing Dr Evans as well and I urged her to continue that work. Patient also discussed her concerns re her " daughter.     Compliance: good     Side effects: none  Musculoskeletal: patient had no abnormal musculoskeletal movements of any kind.We did discuss her chronic pain from the fibromyalgia.    Risk Parameters:not active danger to self or others at this time.    Patient's Response to Intervention:accepting    Progress Toward Goals and Other Mental Status Changes:good     Vital signs this date were not reviewed.     Mental Status Evaluation     Appearance:  Neatly dressed and groomed   Behavior:  cooperative                             Speech normal   Mood:  steady   Affect:  anxious   Thought Process:  linear, logical   Thought Content:  organizednormal   Sensorium:  alert and oriented to person, place, time and situation   Attention Span & Concentration able to focus   Cognition:  Grossly intactgrossly intact   Insight:  has awareness of illness   Judgment:  behavior is adequate to circumstances       Diagnosis:    Major depressive disorder, recurrent episode, moderate [F33.1]      I reviewed the side effects of the patient's medicines and advised a call if the patient had problems with the medicine or clinical condition.    Plan:(Medication and Therapy Recommendation)  Additional Notes: Patient agrees to continue Klonopin 1 mg bid about every three days for anxiety, and to continue Cymbalta 60 mg bid.  Return to Clinic:1 month.

## 2020-10-08 ENCOUNTER — PATIENT MESSAGE (OUTPATIENT)
Dept: PSYCHIATRY | Facility: CLINIC | Age: 50
End: 2020-10-08

## 2020-10-15 ENCOUNTER — OFFICE VISIT (OUTPATIENT)
Dept: ALLERGY | Facility: CLINIC | Age: 50
End: 2020-10-15
Payer: COMMERCIAL

## 2020-10-15 VITALS — RESPIRATION RATE: 16 BRPM | WEIGHT: 157.19 LBS | HEIGHT: 59 IN | BODY MASS INDEX: 31.69 KG/M2

## 2020-10-15 DIAGNOSIS — R21 RASH: Primary | ICD-10-CM

## 2020-10-15 DIAGNOSIS — J31.0 CHRONIC RHINITIS: ICD-10-CM

## 2020-10-15 DIAGNOSIS — Z00.00 HEALTH CARE MAINTENANCE: ICD-10-CM

## 2020-10-15 DIAGNOSIS — E03.9 HYPOTHYROIDISM, UNSPECIFIED TYPE: ICD-10-CM

## 2020-10-15 DIAGNOSIS — L50.8 CHRONIC URTICARIA: ICD-10-CM

## 2020-10-15 DIAGNOSIS — L29.9 ITCHING: ICD-10-CM

## 2020-10-15 PROCEDURE — 99204 OFFICE O/P NEW MOD 45 MIN: CPT | Mod: S$GLB,,, | Performed by: STUDENT IN AN ORGANIZED HEALTH CARE EDUCATION/TRAINING PROGRAM

## 2020-10-15 PROCEDURE — 99999 PR PBB SHADOW E&M-EST. PATIENT-LVL V: CPT | Mod: PBBFAC,,, | Performed by: STUDENT IN AN ORGANIZED HEALTH CARE EDUCATION/TRAINING PROGRAM

## 2020-10-15 PROCEDURE — 99999 PR PBB SHADOW E&M-EST. PATIENT-LVL V: ICD-10-PCS | Mod: PBBFAC,,, | Performed by: STUDENT IN AN ORGANIZED HEALTH CARE EDUCATION/TRAINING PROGRAM

## 2020-10-15 PROCEDURE — 99204 PR OFFICE/OUTPT VISIT, NEW, LEVL IV, 45-59 MIN: ICD-10-PCS | Mod: S$GLB,,, | Performed by: STUDENT IN AN ORGANIZED HEALTH CARE EDUCATION/TRAINING PROGRAM

## 2020-10-15 PROCEDURE — 3008F PR BODY MASS INDEX (BMI) DOCUMENTED: ICD-10-PCS | Mod: CPTII,S$GLB,, | Performed by: STUDENT IN AN ORGANIZED HEALTH CARE EDUCATION/TRAINING PROGRAM

## 2020-10-15 PROCEDURE — 3008F BODY MASS INDEX DOCD: CPT | Mod: CPTII,S$GLB,, | Performed by: STUDENT IN AN ORGANIZED HEALTH CARE EDUCATION/TRAINING PROGRAM

## 2020-10-15 RX ORDER — MINERAL OIL
180 ENEMA (ML) RECTAL DAILY
COMMUNITY
End: 2020-10-15

## 2020-10-15 RX ORDER — CETIRIZINE HYDROCHLORIDE 10 MG/1
20 TABLET ORAL 2 TIMES DAILY
COMMUNITY
End: 2020-10-15 | Stop reason: DRUGHIGH

## 2020-10-15 RX ORDER — HYDROCORTISONE 25 MG/G
CREAM TOPICAL
Qty: 453.6 G | Refills: 1 | Status: SHIPPED | OUTPATIENT
Start: 2020-10-15 | End: 2023-09-13 | Stop reason: ALTCHOICE

## 2020-10-15 RX ORDER — FLUTICASONE PROPIONATE 50 MCG
2 SPRAY, SUSPENSION (ML) NASAL 2 TIMES DAILY
Qty: 31.6 ML | Refills: 5 | Status: SHIPPED | OUTPATIENT
Start: 2020-10-15 | End: 2022-08-15 | Stop reason: SDUPTHER

## 2020-10-15 RX ORDER — CETIRIZINE HYDROCHLORIDE 10 MG/1
20 TABLET ORAL DAILY
Qty: 60 TABLET | Refills: 3 | Status: SHIPPED | OUTPATIENT
Start: 2020-10-15 | End: 2021-06-17

## 2020-10-15 RX ORDER — HYDROXYZINE HYDROCHLORIDE 25 MG/1
TABLET, FILM COATED ORAL
Qty: 240 TABLET | Refills: 3 | Status: SHIPPED | OUTPATIENT
Start: 2020-10-15 | End: 2020-10-18 | Stop reason: SDUPTHER

## 2020-10-15 NOTE — PATIENT INSTRUCTIONS
Testing  Blood work for allergy and autoimmune testing today       Check [facility] in one week for results or call [phone]       Contact me with questions or concerns       I will contact you if anything needs immediate attention.    Ask your rheumatologist about you thryoid tests results    Ask your psychiatrist about quick acting medicines as needed for panic attacks  VS. Slow acting medicines taken regularly    Mammogram at your convenience.    Treatment      Morning  Zyrtec 2 tablets  Pepcid 1 tablet  Hydrocortisone 2.5% cream all over before moisturizer  Flonase 2 squirts    Evening  Pecid 1 tablet  Bedtime:  Atarax = hydroxizine 1-8 tablets   Causes drowsiness              Start with 4 tablets tonight              Increase or decrease by one tablet nightly until you find the best dose for you.  Hydrocortisone 2.5% cream all over before moisturizer  Flonase 2 squirts    Return October 28

## 2020-10-15 NOTE — PROGRESS NOTES
Allergy Clinic Note  [facility]    Subjective:      Patient ID: Marilee Simons is a 50 y.o. female.    Chief Complaint: Urticaria      Referring Provider: Self, Aaareferral    History of Present Illness:  50-year-old female presents herself for evaluation and treatment of hives with severe itching since 2009.  She is here alone, and she is a good historian.    Related medications  Zyrtec BID  Allegra 1 qd  Hydroxyzine for bladder disease 25 at HS taking 50 q 6   Pepcid 20 BID  Klonapin prn    Patient reports a history of urticaria off and on since 2009.  She has photograph showing confluent welts.  She reports that symptoms have been daily despite high-dose antihistamines for the last 5-6 weeks.  Symptoms are severe and awaken her from sleep they also make her unable to garden.  Precipitants.  To be showering, rabbit exposure, plant contact.  She says that hives are usually present upon awakening.  She is taking medicines as above without significant relief.  Recently she started a sat of with coconut oil aloe oil and several other plant based oils.  She also is he started using his shampoo with essential oils.  In the past she has not been helped by Vanicream.  At present she has not helped by very high doses of H1 and H2 blockers as well as Klonopin as needed.  She is not using any steroid creams.  Patient was evaluated by Dr. Edwin welsh in 2005.  She brings with her today results of her skin testing which show a large number of strongly positive reactions to both foods and aeroallergens.  The other allergens include strong reactions to both her pet dogs and to dust mites.      Since onset of daily urticaria in September 2020, client...  Denies fever, shakes, or chills  Denies change in weight, appetite, or energy level  Admits diffuse hair loss, not sure if breaking or coming out by roots  Admits skin feels thicker  Denies change in the texture of her hair, or nails  Denies change in the  appearance of urine or stool  Denies vomiting or abdominal pain  Denies change in chronic diarrhea, constipation,   Denies abnormal bleeding  Denies any new aches or pains, specifically myalgias or arthralgia,   Denies any unusual moles    States Pap smear up-to-date  States colonoscopy up today  States needs prescription for mammogram    Patient also has a long history of allergic rhinitis.  Major symptoms include nasal congestion and runny nose.  Additional symptoms include postnasal drip, sneezing, sore throat, throat clearing.  She denies eye symptoms, chest symptoms or ear symptoms.  Precipitants include dust and probably grass.  There are no other clear precipitants.  She is currently taking antihistamines as above.      Additional History:   Past medical history is significant for rheumatoid arthritis and thyroid disease.  She has no history of hypertension or heart disease.  No Hx of ENT surgery.  Family history is significant for irritable bowel syndrome in mother and 2 sisters.  There is no family history of celiac disease or inflammatory bowel disease.  Client  reports that she has never smoked. She has never used smokeless tobacco.  Exposures are notable for for dogs and 1 rabbit.      Patient Active Problem List   Diagnosis    IBS (irritable bowel syndrome)    Atypical chest pain    Hypothyroidism    Arthralgia    Chronic fatigue    IC (interstitial cystitis)    Bladder pain    Hematuria, microscopic    Potassium (K) deficiency    Acute UTI    Headache    GERD (gastroesophageal reflux disease)    Major depressive disorder, recurrent episode, moderate    PTSD (post-traumatic stress disorder)    Generalized anxiety disorder    OAB (overactive bladder)    Urgency incontinence    Major depressive disorder, recurrent episode, mild    Anxiety    Straining to void    Cluster B personality disorder    Interstitial cystitis    Fibromyalgia    Pelvic pain in female    Blood poisoning     Nausea    Decreased range of motion of neck    Muscle weakness    Neck pain    Decreased bladder capacity     Current Outpatient Medications on File Prior to Visit   Medication Sig Dispense Refill    butalbitaL-acetaminophen  mg Tab Take 1 tablet by mouth every 4 (four) hours. 60 tablet 5    calcium glycerophosphate (PRELIEF) 65 mg Tab Take 2 tablets by mouth before meals and at bedtime as needed. 100 each 3    clonazePAM (KLONOPIN) 1 MG tablet Take 1 tab twice daily about every 3 days as needed for anxiety. 30 tablet 0    cyclobenzaprine (FLEXERIL) 10 MG tablet TAKE 1 TABLET(10 MG) BY MOUTH TWICE DAILY PRN 90 tablet 6    DULoxetine (CYMBALTA) 60 MG capsule Take 2 capsules (120 mg total) by mouth once daily. 60 capsule 3    estradioL (ESTRACE) 0.5 MG tablet TAKE 1 TABLET BY MOUTH DAILY 30 tablet 3    famotidine (PEPCID) 20 MG tablet Take 1 tablet (20 mg total) by mouth 2 (two) times daily. 60 tablet 11    gabapentin (NEURONTIN) 800 MG tablet Take 1 tablet (800 mg total) by mouth 3 (three) times daily. 90 tablet 11    hydroxychloroquine (PLAQUENIL) 200 mg tablet Take 1 tablet (200 mg total) by mouth 2 (two) times daily. 60 tablet 5    levothyroxine (SYNTHROID) 50 MCG tablet TAKE 1 TABLET BY MOUTH EVERY DAY 30 tablet 3    ileamv-zftkk-t.blue-sal-naphos (USTELL) 120-0.12 mg Cap Take 1 capsule by mouth 4 (four) times daily. 120 each 2    multivitamin with minerals tablet Take 1 tablet by mouth once daily.      potassium citrate (UROCIT-K 10) 10 mEq (1,080 mg) TbSR Take 1 tablet (10 mEq total) by mouth 3 (three) times daily with meals. 90 tablet 11    promethazine (PHENERGAN) 25 MG tablet Take 1 tablet (25 mg total) by mouth every 6 (six) hours as needed for Nausea. 30 tablet 0    tiZANidine (ZANAFLEX) 4 MG tablet Take 1 tablet (4 mg total) by mouth every 8 (eight) hours as needed. 90 tablet 12    traMADoL (ULTRAM) 50 mg tablet Take 1 tablet (50 mg total) by mouth every 6 (six) hours. 90 tablet  3    [DISCONTINUED] cetirizine (ZYRTEC) 10 MG tablet Take 20 mg by mouth 2 (two) times daily.      [DISCONTINUED] fexofenadine (ALLEGRA) 180 MG tablet Take 180 mg by mouth once daily.      [DISCONTINUED] hydroxyzine HCL (ATARAX) 25 MG tablet Take 1 tablet (25 mg total) by mouth once daily. 90 tablet 3    acetaminophen (TYLENOL) 500 mg Cap Take 2 capsules (1,000 mg total) by mouth every 8 (eight) hours as needed (pain/fever). 30 capsule 0    adalimumab (HUMIRA,CF, PEN) 40 mg/0.4 mL PnKt Inject 0.4 mLs (40 mg total) into the skin every 14 (fourteen) days. (Patient not taking: Reported on 10/15/2020) 2 pen 12    alosetron (LOTRONEX) 0.5 MG tablet TAKE 1 TABLET BY MOUTH DAILY AS NEEDED 30 tablet 0    buPROPion (WELLBUTRIN XL) 150 MG TB24 tablet Take 1 tablet (150 mg total) by mouth once daily. 30 tablet 2    lifitegrast (XIIDRA) 5 % Dpet Apply 1 drop to eye 2 (two) times daily. (Patient not taking: Reported on 10/15/2020) 60 each 12    mirtazapine (REMERON) 15 MG tablet       oxybutynin (DITROPAN) 5 MG Tab Take 1 tablet (5 mg total) by mouth 3 (three) times daily. (Patient not taking: Reported on 10/15/2020) 90 tablet 12    RABEprazole (ACIPHEX) 20 mg tablet TAKE 1 TABLET(20 MG) BY MOUTH EVERY DAY (Patient not taking: Reported on 10/15/2020) 30 tablet 2    traZODone (DESYREL) 50 MG tablet Take 1 tablet (50 mg total) by mouth nightly as needed for Insomnia. (Patient not taking: Reported on 10/15/2020) 30 tablet 2    [DISCONTINUED] famotidine (PEPCID) 40 MG tablet TAKE 1 TABLET BY MOUTH EVERY NIGHT AS NEEDED FOR HEARTBURN (Patient taking differently: Take 20 mg by mouth 2 (two) times daily. ) 30 tablet 0     No current facility-administered medications on file prior to visit.          Review of Systems   Constitutional: Negative for chills and fever.   HENT: Negative for ear discharge and nosebleeds.    Eyes: Negative for discharge and redness.   Respiratory: Negative for hemoptysis, sputum production,  "shortness of breath, wheezing and stridor.    Cardiovascular: Negative for chest pain and palpitations.   Gastrointestinal: Positive for abdominal pain, constipation and diarrhea. Negative for blood in stool, melena and vomiting.   Genitourinary: Negative for dysuria, flank pain and hematuria.   Musculoskeletal: Negative for joint pain and myalgias.   Skin: Positive for itching and rash.   Neurological: Negative for seizures and loss of consciousness.       Objective:   Resp 16   Ht 4' 11" (1.499 m)   Wt 71.3 kg (157 lb 3 oz)   LMP  (LMP Unknown)   BMI 31.75 kg/m²       Physical Exam   Constitutional: She is well-developed, well-nourished, and in no distress.   HENT:   Head: Normocephalic and atraumatic.   Nose: Nose normal.   Eyes: Conjunctivae are normal. Right eye exhibits no discharge. Left eye exhibits no discharge.   Neck: Neck supple.   Cardiovascular: Normal rate, regular rhythm and intact distal pulses.   Pulmonary/Chest: Effort normal. No stridor. No respiratory distress.   Abdominal: She exhibits no distension.   Musculoskeletal:         General: No deformity or edema.   Neurological: She is alert. GCS score is 15.   Skin: No rash noted. No erythema.   Psychiatric: Memory and affect normal.       Data:   Labs (09/01/2020)  CBC notable for hemoglobin 12.2 with hematocrit 36.6.   WBC and if unremarkable.  EO count 100  CMP notable for glucose 141.  LFTs are normal  Low TSH 0.073 with normal free T4 0.94   (Anti thyroglobulin antibody and anti thyroid peroxidase antibody    were ordered 07/17/2020 but never done      Assessment:     1. Rash    2. Itching    3. Hypothyroidism, unspecified type    4. Chronic urticaria    5. Chronic rhinitis    6. Health care maintenance        Plan:     Medical decision making:  Patient is presenting with chronic urticaria that has now daily despite high-dose H1 and H2 blockers.  Will 1st attempt to control itching.  Based on history and physical, clues as to etiology " include autoimmune disease (rheumatoid arthritis an unknown type of thyroid disease). She is also highly atopic.  She appears to be in good Xolair candidate should more conservative measures fail..            She also has allergic rhinitis despite multidrug regimen.  For today I am recommending adding Flonase 2 squirts each nostril to her regimen.  She may be a candidate for immunotherapy.  If she ups this route, it would be interesting to see how her urticaria response to immunotherapy.           I plan to see her back in 1 week which will include physical exam to complete her initial evaluation.    Marilee was seen today for urticaria.    Diagnoses and all orders for this visit:    Rash consistent with Chronic urticaria  -     Anti-IgE Receptor Antibody Level; Future  -     Vitamin D Level; Future  -     Serum Tryptase; Future  -     Serum Protein Electrophoresis; Future  -     PATRICIA Screen w/Reflex; Future  -     Mammo Digital Screening Bilat; Future  -     hydrocortisone 2.5 % cream; Apply to affected areas twice a day when eczema is    Itching, uncontrolled, related to above  -     cetirizine (ZYRTEC) 10 MG tablet; Take 2 tablets (20 mg total) by mouth once daily.  -     hydrOXYzine HCL (ATARAX) 25 MG tablet; 1 to 8 tablets at bedtime.  Start with 3 tablets.  Increase or decrease as needed until itching is controlled or a maximum of 8 tablets.    Hypothyroidism with current low TSH  Client instructed to discuss with her rheumatologist  Discussed may be related to hives    Chronic rhinitis  -     Allergen-Rabbit Epithelium; Future  -     IgE; Future  -     Dermatophagoides [place]; Future  -     Dermatophagoides Pteronyssinus; Future  -     [place]; Future  -     Otf; Future  -     [place]; Future  -     English Plantain; Future  -     Oak Pecan; Future  -     Ragweed; Future  -     Alternaria; Future  -     Aspergillus; Future  -     Cat; Future  -     Cockroach; Future  -     Dog; Future         -     fluticasone  propionate (FLONASE) 50 mcg/actuation nasal spray; 2 sprays (100 mcg total) by Each Nostril route 2 (two) times daily.    Health care maintenance  -     Mammo Digital Screening Bilat; Future                Patient Instructions   Testing  Blood work for allergy and autoimmune testing today       Check [facility] in one week for results or call [phone]       Contact me with questions or concerns       I will contact you if anything needs immediate attention.    Ask your rheumatologist about you thryoid tests results    Ask your psychiatrist about quick acting medicines as needed for panic attacks  VS. Slow acting medicines taken regularly    Mammogram at your convenience.    Treatment      Morning  Zyrtec 2 tablets  Pepcid 1 tablet  Hydrocortisone 2.5% cream all over before moisturizer  Flonase 2 squirts    Evening  Pecid 1 tablet  Bedtime:  Atarax = hydroxizine 1-8 tablets   Causes drowsiness              Start with 4 tablets tonight              Increase or decrease by one tablet nightly until you find the best dose for you.  Hydrocortisone 2.5% cream all over before moisturizer  Flonase 2 squirts    Return October 28          Follow up in about 2 weeks (around 10/29/2020).    Stella Arce MD

## 2020-10-16 ENCOUNTER — PATIENT MESSAGE (OUTPATIENT)
Dept: RHEUMATOLOGY | Facility: CLINIC | Age: 50
End: 2020-10-16

## 2020-10-16 DIAGNOSIS — N30.10 IC (INTERSTITIAL CYSTITIS): ICD-10-CM

## 2020-10-16 RX ORDER — METHENAMINE, SODIUM PHOSPHATE, MONOBASIC, MONOHYDRATE, PHENYL SALICYLATE, METHYLENE BLUE, AND HYOSCYAMINE SULFATE 120; 40.8; 36; 10; .12 MG/1; MG/1; MG/1; MG/1; MG/1
1 CAPSULE ORAL 4 TIMES DAILY
Qty: 120 EACH | Refills: 2 | Status: SHIPPED | OUTPATIENT
Start: 2020-10-16 | End: 2020-11-05 | Stop reason: SDUPTHER

## 2020-10-18 ENCOUNTER — PATIENT MESSAGE (OUTPATIENT)
Dept: ALLERGY | Facility: CLINIC | Age: 50
End: 2020-10-18

## 2020-10-19 ENCOUNTER — PATIENT MESSAGE (OUTPATIENT)
Dept: PSYCHIATRY | Facility: CLINIC | Age: 50
End: 2020-10-19

## 2020-10-19 ENCOUNTER — OFFICE VISIT (OUTPATIENT)
Dept: PSYCHIATRY | Facility: CLINIC | Age: 50
End: 2020-10-19
Payer: COMMERCIAL

## 2020-10-19 DIAGNOSIS — F33.1 MAJOR DEPRESSIVE DISORDER, RECURRENT EPISODE, MODERATE: ICD-10-CM

## 2020-10-19 DIAGNOSIS — F41.1 GENERALIZED ANXIETY DISORDER: Primary | ICD-10-CM

## 2020-10-19 PROCEDURE — 90834 PR PSYCHOTHERAPY W/PATIENT, 45 MIN: ICD-10-PCS | Mod: 95,,, | Performed by: SOCIAL WORKER

## 2020-10-19 PROCEDURE — 90834 PSYTX W PT 45 MINUTES: CPT | Mod: 95,,, | Performed by: SOCIAL WORKER

## 2020-10-19 NOTE — PROGRESS NOTES
Individual Psychotherapy (PhD/LCSW)    10/19/2020    Site:  [facility]         Therapeutic Intervention: Met with patient.  Outpatient - Insight oriented psychotherapy 45 min - CPT code 88718    Chief complaint/reason for encounter: depression, anxiety and ptsd     Interval history and content of current session: The patient location is: home in [place]  The chief complaint leading to consultation is: depression and BPD    Visit type: audiovisual    Face to Face time with patient: 45  45 minutes of total time spent on the encounter, which includes face to face time and non-face to face time preparing to see the patient (eg, review of tests), Obtaining and/or reviewing separately obtained history, Documenting clinical information in the electronic or other health record, Independently interpreting results (not separately reported) and communicating results to the patient/family/caregiver, or Care coordination (not separately reported).         Each patient to whom he or she provides medical services by telemedicine is:  (1) informed of the relationship between the physician and patient and the respective role of any other health care provider with respect to management of the patient; and (2) notified that he or she may decline to receive medical services by telemedicine and may withdraw from such care at any time.    Notes: Pt was hospialized about a month ago at [facility] for a week due to suicidal ideation.  She describes it today as a horrible experience where there were many unruly patients and the staff could not control them.  She felt afraid most of the time there.  However, she seems to be doing better since being there.  She has been busy helping her daughter who stepped on a nail and needed foot surgery to drain the abscess it caused.  Her daughter has been staying with her while recovering.  Discussed how pt continues to get her feelings hurt easily by her family members which then leads  to her disordered thinking.  We have worked on this a number of times, she feels she cannot control these feelings which I told her she can.    Treatment plan:  · Target symptoms: depression, anxiety , PTSD  · Why chosen therapy is appropriate versus another modality: relevant to diagnosis  · Outcome monitoring methods: self-report, observation  · Therapeutic intervention type: insight oriented psychotherapy    Risk parameters:  Patient reports no suicidal ideation  Patient reports no homicidal ideation  Patient reports no self-injurious behavior  Patient reports no violent behavior    Verbal deficits: None    Patient's response to intervention:  The patient's response to intervention is motivated.    Progress toward goals and other mental status changes:  The patient's progress toward goals is fair .    Diagnosis:     ICD-10-CM ICD-9-CM   1. Generalized anxiety disorder  F41.1 300.02   2. Major depressive disorder, recurrent episode, moderate  F33.1 296.32       Plan:  individual psychotherapy and medication management by physician    Return to clinic: as scheduled    Length of Service (minutes): 45

## 2020-10-20 ENCOUNTER — OFFICE VISIT (OUTPATIENT)
Dept: URGENT CARE | Facility: CLINIC | Age: 50
End: 2020-10-20
Payer: COMMERCIAL

## 2020-10-20 VITALS
HEIGHT: 59 IN | SYSTOLIC BLOOD PRESSURE: 112 MMHG | BODY MASS INDEX: 30.24 KG/M2 | HEART RATE: 104 BPM | RESPIRATION RATE: 17 BRPM | DIASTOLIC BLOOD PRESSURE: 75 MMHG | TEMPERATURE: 98 F | WEIGHT: 150 LBS | OXYGEN SATURATION: 95 %

## 2020-10-20 DIAGNOSIS — B37.9 ANTIBIOTIC-INDUCED YEAST INFECTION: ICD-10-CM

## 2020-10-20 DIAGNOSIS — N30.00 ACUTE CYSTITIS WITHOUT HEMATURIA: Primary | ICD-10-CM

## 2020-10-20 DIAGNOSIS — T36.95XA ANTIBIOTIC-INDUCED YEAST INFECTION: ICD-10-CM

## 2020-10-20 LAB
BILIRUB UR QL STRIP: POSITIVE
GLUCOSE UR QL STRIP: NEGATIVE
KETONES UR QL STRIP: NEGATIVE
LEUKOCYTE ESTERASE UR QL STRIP: NEGATIVE
PH, POC UA: 6 (ref 5–8)
POC BLOOD, URINE: NEGATIVE
POC NITRATES, URINE: POSITIVE
PROT UR QL STRIP: POSITIVE
SP GR UR STRIP: 1.02 (ref 1–1.03)
UROBILINOGEN UR STRIP-ACNC: 8 (ref 0.1–1.1)

## 2020-10-20 PROCEDURE — 87086 URINE CULTURE/COLONY COUNT: CPT

## 2020-10-20 PROCEDURE — 99214 PR OFFICE/OUTPT VISIT, EST, LEVL IV, 30-39 MIN: ICD-10-PCS | Mod: 25,S$GLB,, | Performed by: STUDENT IN AN ORGANIZED HEALTH CARE EDUCATION/TRAINING PROGRAM

## 2020-10-20 PROCEDURE — 81003 POCT URINALYSIS, DIPSTICK, AUTOMATED, W/O SCOPE: ICD-10-PCS | Mod: QW,S$GLB,, | Performed by: STUDENT IN AN ORGANIZED HEALTH CARE EDUCATION/TRAINING PROGRAM

## 2020-10-20 PROCEDURE — 99214 OFFICE O/P EST MOD 30 MIN: CPT | Mod: 25,S$GLB,, | Performed by: STUDENT IN AN ORGANIZED HEALTH CARE EDUCATION/TRAINING PROGRAM

## 2020-10-20 PROCEDURE — 81003 URINALYSIS AUTO W/O SCOPE: CPT | Mod: QW,S$GLB,, | Performed by: STUDENT IN AN ORGANIZED HEALTH CARE EDUCATION/TRAINING PROGRAM

## 2020-10-20 RX ORDER — FLUCONAZOLE 150 MG/1
TABLET ORAL
Qty: 2 TABLET | Refills: 0 | Status: SHIPPED | OUTPATIENT
Start: 2020-10-20 | End: 2020-12-21

## 2020-10-20 RX ORDER — NITROFURANTOIN 25; 75 MG/1; MG/1
100 CAPSULE ORAL 2 TIMES DAILY
Qty: 10 CAPSULE | Refills: 0 | Status: SHIPPED | OUTPATIENT
Start: 2020-10-20 | End: 2020-10-25

## 2020-10-21 ENCOUNTER — PATIENT MESSAGE (OUTPATIENT)
Dept: ALLERGY | Facility: CLINIC | Age: 50
End: 2020-10-21

## 2020-10-21 ENCOUNTER — PATIENT MESSAGE (OUTPATIENT)
Dept: PSYCHIATRY | Facility: CLINIC | Age: 50
End: 2020-10-21

## 2020-10-21 RX ORDER — OXCARBAZEPINE 150 MG/1
150 TABLET, FILM COATED ORAL NIGHTLY
Qty: 30 TABLET | Refills: 3 | Status: SHIPPED | OUTPATIENT
Start: 2020-10-21 | End: 2020-11-10 | Stop reason: DRUGHIGH

## 2020-10-21 NOTE — PROGRESS NOTES
"Subjective:       Patient ID: Marilee Simons is a 50 y.o. female.    Vitals:  height is 4' 11" (1.499 m) and weight is 68 kg (150 lb). Her temporal temperature is 97.9 °F (36.6 °C). Her blood pressure is 112/75 and her pulse is 104. Her respiration is 17 and oxygen saturation is 95%.     Chief Complaint: Urinary Tract Infection    Urinary Tract Infection   This is a new problem. The current episode started yesterday. The problem occurs every urination. The problem has been gradually worsening. The quality of the pain is described as aching and burning. The pain is at a severity of 8/10. The pain is severe. There has been no fever. She is sexually active. There is no history of pyelonephritis. Associated symptoms include frequency and urgency. Pertinent negatives include no chills, hematuria, nausea, vomiting or rash. She has tried NSAIDs (Pyridium ) for the symptoms. The treatment provided no relief.     Hx of interstitial cystitis but states this feels more like when she gets urinary tract infections.        Constitution: Negative for chills and fever.   Neck: Negative for painful lymph nodes.   Gastrointestinal: Negative for abdominal pain, nausea and vomiting.   Genitourinary: Positive for dysuria, frequency, urgency and pelvic pain. Negative for urine decreased, hematuria, history of kidney stones, painful menstruation, irregular menstruation, missed menses, heavy menstrual bleeding, ovarian cysts, genital trauma, vaginal pain, vaginal discharge, vaginal bleeding, vaginal odor, painful intercourse, genital sore and painful ejaculation.   Musculoskeletal: Negative for back pain (lower back pressure).   Skin: Negative for rash and lesion.   Hematologic/Lymphatic: Negative for swollen lymph nodes.       Objective:      Physical Exam   Constitutional: She is oriented to person, place, and time.  Non-toxic appearance. She does not appear ill. No distress. normal  HENT:   Head: Normocephalic.   Eyes: Conjunctivae are " normal.   Pulmonary/Chest: Effort normal. No respiratory distress.   Abdominal: Soft. Normal appearance. There is abdominal tenderness (suprapubic). There is no guarding, no left CVA tenderness and no right CVA tenderness. flat abdomen  Neurological: She is alert and oriented to person, place, and time. Coordination and gait normal.   Skin: Skin is warm, dry and not diaphoretic. Psychiatric: Her behavior is normal. Mood normal.   Nursing note and vitals reviewed.        Assessment:       1. Acute cystitis without hematuria    2. Antibiotic-induced yeast infection        Results for orders placed or performed in visit on 10/20/20   POCT Urinalysis, Dipstick, Automated, W/O Scope   Result Value Ref Range    POC Blood, Urine Negative Negative    POC Bilirubin, Urine Positive (A) Negative    POC Urobilinogen, Urine 8.0 (A) 0.1 - 1.1    POC Ketones, Urine Negative Negative    POC Protein, Urine Positive (A) Negative    POC Nitrates, Urine Positive (A) Negative    POC Glucose, Urine Negative Negative    pH, UA 6.0 5 - 8    POC Specific Gravity, Urine 1.020 1.003 - 1.029    POC Leukocytes, Urine Negative Negative       Plan:         Acute cystitis without hematuria  -     POCT Urinalysis, Dipstick, Automated, W/O Scope  -     Culture, Urine  -     nitrofurantoin, macrocrystal-monohydrate, (MACROBID) 100 MG capsule; Take 1 capsule (100 mg total) by mouth 2 (two) times daily. for 5 days  Dispense: 10 capsule; Refill: 0    Antibiotic-induced yeast infection  -     fluconazole (DIFLUCAN) 150 MG Tab; Take one tab at onset of symptoms then second tab 72h later.  Dispense: 2 tablet; Refill: 0           Treating for UTI given patient history, examination findings, and positive nitrates on urinalysis.  No leukocytes present.  Sent for urine culture.  Will adjust treatment if indicated based on culture results.  I informed the patient that we will call her any such changes if needed.  Advised follow-up with her urologist as needed.   ED precautions were given.  Patient verbalized understanding and was happy with the plan of care.  No questions or concerns prior to discharge.

## 2020-10-21 NOTE — PROGRESS NOTES
10/21/2020: Patient called to say she had extra stresses due to significant family matters. She is also seeing Dr Evans regularly and feels she is benefiting from those visits. I agreed to start Trileptal 150 mg q pm for further mood stabilization and reviewed the side effects of this medicine with her. Patient has no active thoughts of harming herself at this time. No other med changes were recommended at this time. Patient also informed me that she is taking extra Klonopin now of 1 mg tid rather than the prescribed 1 mg bid. I advised she return to the prescribed dose and emphasized the addictive nature of the medication. Patient agreed to contact me in a week for an update on her status.

## 2020-10-21 NOTE — PATIENT INSTRUCTIONS

## 2020-10-22 ENCOUNTER — PATIENT MESSAGE (OUTPATIENT)
Dept: RHEUMATOLOGY | Facility: CLINIC | Age: 50
End: 2020-10-22

## 2020-10-22 LAB — BACTERIA UR CULT: NO GROWTH

## 2020-10-26 ENCOUNTER — TELEPHONE (OUTPATIENT)
Dept: UROLOGY | Facility: CLINIC | Age: 50
End: 2020-10-26

## 2020-10-27 ENCOUNTER — PATIENT MESSAGE (OUTPATIENT)
Dept: PSYCHIATRY | Facility: CLINIC | Age: 50
End: 2020-10-27

## 2020-10-27 ENCOUNTER — PATIENT MESSAGE (OUTPATIENT)
Dept: RHEUMATOLOGY | Facility: CLINIC | Age: 50
End: 2020-10-27

## 2020-10-27 ENCOUNTER — OFFICE VISIT (OUTPATIENT)
Dept: PSYCHIATRY | Facility: CLINIC | Age: 50
End: 2020-10-27
Payer: COMMERCIAL

## 2020-10-27 DIAGNOSIS — F41.1 GENERALIZED ANXIETY DISORDER: Primary | ICD-10-CM

## 2020-10-27 DIAGNOSIS — F33.1 MAJOR DEPRESSIVE DISORDER, RECURRENT EPISODE, MODERATE: ICD-10-CM

## 2020-10-27 PROCEDURE — 90834 PR PSYCHOTHERAPY W/PATIENT, 45 MIN: ICD-10-PCS | Mod: 95,,, | Performed by: SOCIAL WORKER

## 2020-10-27 PROCEDURE — 90834 PSYTX W PT 45 MINUTES: CPT | Mod: 95,,, | Performed by: SOCIAL WORKER

## 2020-10-27 NOTE — PROGRESS NOTES
Individual Psychotherapy (PhD/LCSW)    10/27/2020    Site:  [facility]         Therapeutic Intervention: Met with patient.  Outpatient - Insight oriented psychotherapy 45 min - CPT code 59300    Chief complaint/reason for encounter: depression, anxiety and ptsd     Interval history and content of current session: The patient location is: home in [place]  The chief complaint leading to consultation is: depression and BPD    Visit type: audiovisual    Face to Face time with patient: 45  45 minutes of total time spent on the encounter, which includes face to face time and non-face to face time preparing to see the patient (eg, review of tests), Obtaining and/or reviewing separately obtained history, Documenting clinical information in the electronic or other health record, Independently interpreting results (not separately reported) and communicating results to the patient/family/caregiver, or Care coordination (not separately reported).         Each patient to whom he or she provides medical services by telemedicine is:  (1) informed of the relationship between the physician and patient and the respective role of any other health care provider with respect to management of the patient; and (2) notified that he or she may decline to receive medical services by telemedicine and may withdraw from such care at any time.    Notes: Pt states she is doing better overall.  No major complaints today.  Handling family discord well enough.  She does not allow herself to worry about her daughter and turns the worry over to her .  She continues to have major physical complaints with her bladder problems.    Treatment plan:  · Target symptoms: depression, anxiety , PTSD  · Why chosen therapy is appropriate versus another modality: relevant to diagnosis  · Outcome monitoring methods: self-report, observation  · Therapeutic intervention type: insight oriented psychotherapy    Risk parameters:  Patient reports no  suicidal ideation  Patient reports no homicidal ideation  Patient reports no self-injurious behavior  Patient reports no violent behavior    Verbal deficits: None    Patient's response to intervention:  The patient's response to intervention is motivated.    Progress toward goals and other mental status changes:  The patient's progress toward goals is fair .    Diagnosis:     ICD-10-CM ICD-9-CM   1. Generalized anxiety disorder  F41.1 300.02   2. Major depressive disorder, recurrent episode, moderate  F33.1 296.32       Plan:  individual psychotherapy and medication management by physician    Return to clinic: as scheduled    Length of Service (minutes): 45

## 2020-10-28 ENCOUNTER — PATIENT MESSAGE (OUTPATIENT)
Dept: ALLERGY | Facility: CLINIC | Age: 50
End: 2020-10-28

## 2020-10-28 NOTE — TELEPHONE ENCOUNTER
----- Message from Josselin Zamorano sent at 1/8/2018 10:42 AM CST -----  Pt advising that she cancelled appt today / she has diarrhea and will call back later  
Appointment cancelled   
none

## 2020-10-29 ENCOUNTER — TELEPHONE (OUTPATIENT)
Dept: URGENT CARE | Facility: CLINIC | Age: 50
End: 2020-10-29

## 2020-10-29 NOTE — TELEPHONE ENCOUNTER
Call back - urine culture - Left message to call back regarding lab results.    ----- Message from Gardenia Tena PA-C sent at 10/22/2020  3:45 PM CDT -----  Please notify patient of negative urine culture. Based on her positive UA in clinic, she may finish Macrobid to completion. Ensure that she is feeling better.

## 2020-11-05 ENCOUNTER — OFFICE VISIT (OUTPATIENT)
Dept: UROLOGY | Facility: CLINIC | Age: 50
End: 2020-11-05
Payer: COMMERCIAL

## 2020-11-05 VITALS
BODY MASS INDEX: 31.25 KG/M2 | HEIGHT: 59 IN | SYSTOLIC BLOOD PRESSURE: 127 MMHG | HEART RATE: 104 BPM | WEIGHT: 155 LBS | DIASTOLIC BLOOD PRESSURE: 84 MMHG

## 2020-11-05 DIAGNOSIS — N32.81 OAB (OVERACTIVE BLADDER): ICD-10-CM

## 2020-11-05 DIAGNOSIS — R10.2 PELVIC PAIN IN FEMALE: ICD-10-CM

## 2020-11-05 DIAGNOSIS — R39.89 BLADDER PAIN: Primary | ICD-10-CM

## 2020-11-05 DIAGNOSIS — R39.89 URETHRAL PAIN: ICD-10-CM

## 2020-11-05 DIAGNOSIS — N30.10 IC (INTERSTITIAL CYSTITIS): ICD-10-CM

## 2020-11-05 PROCEDURE — 3008F BODY MASS INDEX DOCD: CPT | Mod: CPTII,S$GLB,, | Performed by: UROLOGY

## 2020-11-05 PROCEDURE — 99999 PR PBB SHADOW E&M-EST. PATIENT-LVL V: CPT | Mod: PBBFAC,,, | Performed by: UROLOGY

## 2020-11-05 PROCEDURE — 51700 PR IRRIGATION, BLADDER: ICD-10-PCS | Mod: S$GLB,,, | Performed by: UROLOGY

## 2020-11-05 PROCEDURE — 95970 ALYS NPGT W/O PRGRMG: CPT | Mod: 59,S$GLB,, | Performed by: UROLOGY

## 2020-11-05 PROCEDURE — 99999 PR PBB SHADOW E&M-EST. PATIENT-LVL V: ICD-10-PCS | Mod: PBBFAC,,, | Performed by: UROLOGY

## 2020-11-05 PROCEDURE — 3008F PR BODY MASS INDEX (BMI) DOCUMENTED: ICD-10-PCS | Mod: CPTII,S$GLB,, | Performed by: UROLOGY

## 2020-11-05 PROCEDURE — 51700 IRRIGATION OF BLADDER: CPT | Mod: S$GLB,,, | Performed by: UROLOGY

## 2020-11-05 PROCEDURE — 99215 OFFICE O/P EST HI 40 MIN: CPT | Mod: 25,S$GLB,, | Performed by: UROLOGY

## 2020-11-05 PROCEDURE — 95970 PR ANALYZE NEUROSTIM,NO REPROG: ICD-10-PCS | Mod: 59,S$GLB,, | Performed by: UROLOGY

## 2020-11-05 PROCEDURE — 99215 PR OFFICE/OUTPT VISIT, EST, LEVL V, 40-54 MIN: ICD-10-PCS | Mod: 25,S$GLB,, | Performed by: UROLOGY

## 2020-11-05 RX ORDER — METHENAMINE, SODIUM PHOSPHATE, MONOBASIC, MONOHYDRATE, PHENYL SALICYLATE, METHYLENE BLUE, AND HYOSCYAMINE SULFATE 120; 40.8; 36; 10; .12 MG/1; MG/1; MG/1; MG/1; MG/1
1 CAPSULE ORAL 4 TIMES DAILY PRN
Qty: 120 EACH | Refills: 3 | Status: SHIPPED | OUTPATIENT
Start: 2020-11-05 | End: 2021-08-26

## 2020-11-05 RX ORDER — FAMOTIDINE 20 MG/1
20 TABLET, FILM COATED ORAL 2 TIMES DAILY
Qty: 60 TABLET | Refills: 11 | Status: SHIPPED | OUTPATIENT
Start: 2020-11-05 | End: 2021-07-26 | Stop reason: SDUPTHER

## 2020-11-05 RX ORDER — HYDROXYZINE HYDROCHLORIDE 25 MG/1
TABLET, FILM COATED ORAL
Qty: 90 TABLET | Refills: 3 | Status: SHIPPED | OUTPATIENT
Start: 2020-11-05 | End: 2021-07-02 | Stop reason: SDUPTHER

## 2020-11-05 RX ORDER — INDOMETHACIN 25 MG/1
50 CAPSULE ORAL
Status: COMPLETED | OUTPATIENT
Start: 2020-11-05 | End: 2020-11-05

## 2020-11-05 RX ORDER — HEPARIN SODIUM 10000 [USP'U]/ML
20000 INJECTION, SOLUTION INTRAVENOUS; SUBCUTANEOUS
Status: COMPLETED | OUTPATIENT
Start: 2020-11-05 | End: 2020-11-05

## 2020-11-05 RX ORDER — TAMSULOSIN HYDROCHLORIDE 0.4 MG/1
0.4 CAPSULE ORAL DAILY
Qty: 30 CAPSULE | Refills: 11 | Status: SHIPPED | OUTPATIENT
Start: 2020-11-05 | End: 2021-11-08

## 2020-11-05 RX ORDER — BUPIVACAINE HYDROCHLORIDE 5 MG/ML
50 INJECTION, SOLUTION PERINEURAL ONCE
Status: COMPLETED | OUTPATIENT
Start: 2020-11-05 | End: 2020-11-05

## 2020-11-05 RX ORDER — LIDOCAINE HYDROCHLORIDE 10 MG/ML
1 INJECTION INFILTRATION; PERINEURAL
Status: COMPLETED | OUTPATIENT
Start: 2020-11-05 | End: 2020-11-05

## 2020-11-05 RX ORDER — AMITRIPTYLINE HYDROCHLORIDE 10 MG/1
10 TABLET, FILM COATED ORAL NIGHTLY PRN
Qty: 90 TABLET | Refills: 3 | Status: SHIPPED | OUTPATIENT
Start: 2020-11-05 | End: 2021-11-08

## 2020-11-05 RX ADMIN — LIDOCAINE HYDROCHLORIDE 1 ML: 10 INJECTION INFILTRATION; PERINEURAL at 03:11

## 2020-11-05 RX ADMIN — INDOMETHACIN 50 MEQ: 25 CAPSULE ORAL at 03:11

## 2020-11-05 RX ADMIN — HEPARIN SODIUM 20000 UNITS: 10000 INJECTION, SOLUTION INTRAVENOUS; SUBCUTANEOUS at 03:11

## 2020-11-05 RX ADMIN — BUPIVACAINE HYDROCHLORIDE 250 MG: 5 INJECTION, SOLUTION PERINEURAL at 03:11

## 2020-11-05 NOTE — PROGRESS NOTES
HPI:   CC: urethral pain and spasm    Marilee Simons is a 50 y.o. woman with s/p InterStim Therapy and botox injection in the past.  Has done well.  However, she has experienced worsening urethral pain and spasm.  Typically she loses urine with laughing hard.    S/p  x 2.  No vaginal delivery.    Procedure(s) Performed: 18  1.  Cystoscopy with hydrodistension  2.  Botox injections into detrusor muscle  Findings:   - Initial bladder capacity 900 mL with terminal hematuria.   - Glomerulations were diffusely seen, Hunner's ulcers seen overlying bilateral UOs, diffuse mild degree of hyperemia  - Subsequent bladder capacity was 950 mL.    Since the procedure, she has done very well.  She needs refills on meds.    s/p InterStim Therapy for refractory frequency and urgency on 8/3/16.  Lead placed on right side  Generator placed on left side  Good sensory and motor function c/w S3 stimulation seen  She is doing great.  Is very pleased with the bladder control outcome.      She used to use valium crushed in the vagina to help her with her pelvic pain.    SUDS cysto on 1/20/15  --- Bladder ---   CYSTOMETROGRAM ( Filling Phase ):   Cystometric Numeric Data:   - First Desire (Sensation): 68 mL at 1cm of water.   - Normal Desire: 75mL at 1 cm of water.   - Strong Desire: 96 mL at 2 cm of water.   - Urgency (Imminent Void) : 108 mL at 1cm of water.   - Maximum Cystometric Capacity: 109 mL.   Compliance:   - low.   Leak Point Pressure:   - Valsalva ( Abdominal ) Leak Point Pressure: none.   UROFLOW:   - Voided Volume: 134 mL.   - Residual Urine: 5 mL.   - Maximum Flow Rate: 8 mL/sec.   - Flow Pattern: low amplitude  VOIDING PRESSURE STUDY ( Voiding Phase ):   Detrusor Pressure:   - Maximum Detrusor Pressure: 32 cm of water.   - Detrusor Pressure at Maximum Flow: 2 cm of water.   - Detrusor Contraction Characteristics: Sustained contraction(s).     ELECTROMYOGRAM:   - incomplete relaxation.     ---Diagnostic  Cystourethroscopy ---   Normal urethra.    minimal hyperemic area of the bladder  Width of Bladder Neck Opening: Approximately 18 Fr.   Normal bladder.   Normal ureteral orifices bilaterally.     CONCLUSIONS:   1.  Decreased bladder capacity with sensory urgency  2.  IC  3.  OAB    Past Medical History:   Diagnosis Date    Acid reflux     Allergy     Alopecia     Anxiety     Arthralgia     Back pain     Depression     Dry eyes     from meds    Fever blister     Fibromyalgia     Interstitial cystitis     Irritable bowel syndrome     Kidney stone     Major depressive disorder, recurrent episode, in partial or unspecified remission 2013    OAB (overactive bladder) 2015    PTSD (post-traumatic stress disorder)     Rheumatoid arthritis     Systemic lupus erythematosus     Thyroid disease     Urinary tract infection     Vaginal infection      Past Surgical History:   Procedure Laterality Date    BLADDER SURGERY       SECTION, LOW TRANSVERSE      x 2    COLONOSCOPY      COLONOSCOPY N/A 10/5/2017    Procedure: COLONOSCOPY;  Surgeon: Venkat He MD;  Location: [address]);  Service: Endoscopy;  Laterality: N/A;    ESOPHAGOGASTRODUODENOSCOPY      EYE SURGERY      Lasik-bilateral    HYSTERECTOMY      INJECTION OF BOTULINUM TOXIN TYPE A N/A 2018    Procedure: INJECTION, BOTULINUM TOXIN, TYPE A  200 UNITS;  Surgeon: Bandar Garcia MD;  Location: [facility] OR [address] [place] [address];  Service: Urology;  Laterality: N/A;    INSTILLATION OF URINARY BLADDER N/A 2018    Procedure: INSTILLATION, URINARY BLADDER;  Surgeon: Bandar Garcia MD;  Location: [facility] OR [address] [place] [address];  Service: Urology;  Laterality: N/A;    PARTIAL HYSTERECTOMY       Social History     Tobacco Use    Smoking status: Never Smoker    Smokeless tobacco: Never Used   Substance Use Topics    Alcohol use: No    Drug use: No     Family History   Problem Relation Age of Onset    Irritable bowel syndrome Mother      Irritable bowel syndrome Sister     Lupus Sister     Rheum arthritis Sister     Fibromyalgia Sister     Irritable bowel syndrome Sister     Celiac disease Neg Hx     Cirrhosis Neg Hx     Colon cancer Neg Hx     Colon polyps Neg Hx     Crohn's disease Neg Hx     Cystic fibrosis Neg Hx     Esophageal cancer Neg Hx     Hemochromatosis Neg Hx     Inflammatory bowel disease Neg Hx     Liver cancer Neg Hx     Liver disease Neg Hx     Rectal cancer Neg Hx     Stomach cancer Neg Hx     Ulcerative colitis Neg Hx     Boris's disease Neg Hx     Melanoma Neg Hx     Amblyopia Neg Hx     Blindness Neg Hx     Cataracts Neg Hx     Glaucoma Neg Hx     Macular degeneration Neg Hx     Retinal detachment Neg Hx     Strabismus Neg Hx      Allergy:  Review of patient's allergies indicates:   Allergen Reactions    Cephalexin Itching    Zofran [ondansetron hcl (pf)] Hives    Penicillins Rash     Outpatient Encounter Medications as of 11/5/2020   Medication Sig Dispense Refill    alosetron (LOTRONEX) 0.5 MG tablet TAKE 1 TABLET BY MOUTH DAILY AS NEEDED 30 tablet 0    amitriptyline (ELAVIL) 10 MG tablet Take 1 tablet (10 mg total) by mouth nightly as needed. 90 tablet 3    butalbitaL-acetaminophen  mg Tab Take 1 tablet by mouth every 4 (four) hours. 60 tablet 5    calcium glycerophosphate (PRELIEF) 65 mg Tab Take 2 tablets by mouth before meals and at bedtime as needed. 100 each 3    cetirizine (ZYRTEC) 10 MG tablet Take 2 tablets (20 mg total) by mouth once daily. 60 tablet 3    clonazePAM (KLONOPIN) 1 MG tablet Take 1 tab twice daily about every 3 days as needed for anxiety. 30 tablet 0    cyclobenzaprine (FLEXERIL) 10 MG tablet TAKE 1 TABLET(10 MG) BY MOUTH TWICE DAILY PRN 90 tablet 6    DULoxetine (CYMBALTA) 60 MG capsule Take 2 capsules (120 mg total) by mouth once daily. 60 capsule 3    estradioL (ESTRACE) 0.5 MG tablet TAKE 1 TABLET BY MOUTH DAILY 30 tablet 3    famotidine (PEPCID) 20 MG  tablet Take 1 tablet (20 mg total) by mouth 2 (two) times daily. 60 tablet 11    fluconazole (DIFLUCAN) 150 MG Tab Take one tab at onset of symptoms then second tab 72h later. 2 tablet 0    fluticasone propionate (FLONASE) 50 mcg/actuation nasal spray 2 sprays (100 mcg total) by Each Nostril route 2 (two) times daily. 31.6 mL 5    gabapentin (NEURONTIN) 800 MG tablet Take 1 tablet (800 mg total) by mouth 3 (three) times daily. 90 tablet 11    hydrocortisone 2.5 % cream Apply to affected areas twice a day when eczema is moderate. 453.6 g 1    hydroxychloroquine (PLAQUENIL) 200 mg tablet Take 1 tablet (200 mg total) by mouth 2 (two) times daily. 60 tablet 5    hydrOXYzine HCL (ATARAX) 25 MG tablet 1 tablet at bed time 90 tablet 3    levothyroxine (SYNTHROID) 50 MCG tablet TAKE 1 TABLET BY MOUTH EVERY DAY 30 tablet 3    lifitegrast (XIIDRA) 5 % Dpet Apply 1 drop to eye 2 (two) times daily. 60 each 12    rqrhcv-cvvns-h.blue-sal-naphos (USTELL) 120-0.12 mg Cap Take 1 capsule by mouth 4 (four) times daily as needed. 120 each 3    multivitamin with minerals tablet Take 1 tablet by mouth once daily.      OXcarbazepine (TRILEPTAL) 150 MG Tab Take 1 tablet (150 mg total) by mouth nightly. 30 tablet 3    potassium citrate (UROCIT-K 10) 10 mEq (1,080 mg) TbSR Take 1 tablet (10 mEq total) by mouth 3 (three) times daily with meals. 90 tablet 11    promethazine (PHENERGAN) 25 MG tablet Take 1 tablet (25 mg total) by mouth every 6 (six) hours as needed for Nausea. 30 tablet 0    RABEprazole (ACIPHEX) 20 mg tablet TAKE 1 TABLET(20 MG) BY MOUTH EVERY DAY 30 tablet 2    tiZANidine (ZANAFLEX) 4 MG tablet Take 1 tablet (4 mg total) by mouth every 8 (eight) hours as needed. 90 tablet 12    traMADoL (ULTRAM) 50 mg tablet Take 1 tablet (50 mg total) by mouth every 6 (six) hours. 90 tablet 3    traZODone (DESYREL) 50 MG tablet Take 1 tablet (50 mg total) by mouth nightly as needed for Insomnia. 30 tablet 2    USTELL  120-0.12 mg Cap TAKE ONE CAPSULE BY MOUTH FOUR TIMES DAILY 120 each 3    [DISCONTINUED] amitriptyline (ELAVIL) 10 MG tablet TAKE 1 TABLET(10 MG) BY MOUTH EVERY NIGHT AS NEEDED 90 tablet 3    [DISCONTINUED] famotidine (PEPCID) 20 MG tablet Take 1 tablet (20 mg total) by mouth 2 (two) times daily. 60 tablet 11    [DISCONTINUED] hydrOXYzine HCL (ATARAX) 25 MG tablet 1 to 8 tablets at bedtime.  Start with 3 tablets.  Increase or decrease as needed until itching is controlled or a maximum of 8 tablets. 240 tablet 3    [DISCONTINUED] aingzg-jbjds-d.blue-sal-naphos (USTELL) 120-0.12 mg Cap Take 1 capsule by mouth 4 (four) times daily. 120 each 2    adalimumab (HUMIRA,CF, PEN) 40 mg/0.4 mL PnKt Inject 0.4 mLs (40 mg total) into the skin every 14 (fourteen) days. (Patient not taking: Reported on 10/15/2020) 2 pen 12    LIDOCAINE 2 %, VALIUM 5 MG, BACLOFEN 4 % SUPPOSITORY Place 1 suppository vaginally 2 (two) times daily. 60 each 1    oxybutynin (DITROPAN) 5 MG Tab Take 1 tablet (5 mg total) by mouth 3 (three) times daily. (Patient not taking: Reported on 10/15/2020) 90 tablet 12    tamsulosin (FLOMAX) 0.4 mg Cap Take 1 capsule (0.4 mg total) by mouth once daily. 30 capsule 11    [DISCONTINUED] acetaminophen (TYLENOL) 500 mg Cap Take 2 capsules (1,000 mg total) by mouth every 8 (eight) hours as needed (pain/fever). 30 capsule 0    [DISCONTINUED] albuterol (PROVENTIL/VENTOLIN HFA) 90 mcg/actuation inhaler Inhale 1-2 puffs into the lungs every 6 (six) hours as needed for Wheezing or Shortness of Breath. Rescue 6.7 g 0    [DISCONTINUED] azithromycin (Z-JAVIER) 250 MG tablet Take 2 tablets by mouth on day 1; Take 1 tablet by mouth on days 2-5 6 tablet 0    [DISCONTINUED] buPROPion (WELLBUTRIN XL) 150 MG TB24 tablet Take 1 tablet (150 mg total) by mouth once daily. 30 tablet 2    [DISCONTINUED] clonazePAM (KLONOPIN) 1 MG tablet TAKE 1 TABLET BY MOUTH FOUR TIMES DAILY 30 tablet 0    [DISCONTINUED] DULoxetine (CYMBALTA)  60 MG capsule Take 2 capsules (120 mg total) by mouth once daily. 60 capsule 3    [DISCONTINUED] famotidine (PEPCID) 40 MG tablet TAKE 1 TABLET BY MOUTH EVERY NIGHT AS NEEDED FOR HEARTBURN (Patient taking differently: Take 20 mg by mouth 2 (two) times daily. ) 30 tablet 0    [DISCONTINUED] hydroxyzine HCL (ATARAX) 25 MG tablet Take 1 tablet (25 mg total) by mouth once daily. 90 tablet 3    [DISCONTINUED] lidocaine-prilocaine (EMLA) cream Apply to affected area 1 hour prior to procedure. 30 g 0    [DISCONTINUED] tjcgoj-gpukj-m.blue-sal-naphos (USTELL) 120-0.12 mg Cap Take 1 capsule by mouth 4 (four) times daily. 120 each 2    [DISCONTINUED] mirtazapine (REMERON) 15 MG tablet       [DISCONTINUED] phenazopyridine (PYRIDIUM) 200 MG tablet Take 1 tablet (200 mg total) by mouth 3 (three) times daily as needed for Pain. 60 tablet 5    [DISCONTINUED] prochlorperazine (COMPAZINE) 10 MG tablet Take 1 tablet (10 mg total) by mouth every 6 (six) hours as needed (nausea). 15 tablet 0    [DISCONTINUED] promethazine (PHENERGAN) 25 MG tablet Take 1 tablet (25 mg total) by mouth every 6 (six) hours as needed for Nausea. 60 tablet 0    [DISCONTINUED] promethazine (PHENERGAN) 25 MG tablet TAKE 1 TABLET(25 MG) BY MOUTH EVERY 6 HOURS AS NEEDED 15 tablet 0    [DISCONTINUED] triamcinolone acetonide 0.025% (KENALOG) 0.025 % Oint Apply topically 2 (two) times daily. 80 g 1     Facility-Administered Encounter Medications as of 11/5/2020   Medication Dose Route Frequency Provider Last Rate Last Dose    [COMPLETED] bupivacaine injection 250 mg  50 mL Intrapleural Once Bandar Garcia MD   250 mg at 11/05/20 1529    [COMPLETED] heparin (porcine) injection 20,000 Units  20,000 Units Subcutaneous 1 time in Clinic/HOD Bandar Garcia MD   20,000 Units at 11/05/20 1530    [COMPLETED] lidocaine HCL 10 mg/ml (1%) injection 1 mL  1 mL Other 1 time in Clinic/HOD Bandar Garcia MD   1 mL at 11/05/20 1530    [COMPLETED] sodium bicarbonate  solution 50 mEq  50 mEq Intravenous 1 time in Clinic/HOD Bandar Garcia MD   50 mEq at 11/05/20 1532     Review of Systems   General ROS: GENERAL:  No weight gain or loss  SKIN:  No rashes or lacerations  HEAD:  No headaches  EYES:  No exophthalmos, jaundice or blindness  EARS:  No dizziness, tinnitus or hearing loss  NOSE:  No changes in smell  MOUTH & THROAT:  No dyskinetic movements or obvious goiter  CHEST:  No shortness of breath, hyperventilation or cough  CARDIOVASCULAR:  No tachycardia or chest pain  ABDOMEN:  No nausea, vomiting, pain, constipation or diarrhea  URINARY:  No frequency, dysuria or sexual dysfunction  ENDOCRINE:  No polydipsia, polyuria  MUSCULOSKELETAL:  No pain or stiffness of the joints  NEUROLOGIC:  No weakness, sensory changes, seizures, confusion, memory loss, tremor or other abnormal movements  Physical Exam     Vitals:    11/05/20 1431   BP: 127/84   Pulse: 104     Physical Examination:   n/a  LABS:  Lab Results   Component Value Date    CREATININE 0.9 09/01/2020    CREATININE 0.8 07/26/2020    CREATININE 0.9 11/07/2019     Urine Culture, Routine   Date Value Ref Range Status   10/20/2020 No growth  Final     Procedure:  InterStim Analysis and Program  InterStim Reprogram:    First InterStim device impendence was checked: all good except 0 and 2 and 0 and 3 leads.  Current setting is lead 3+ and 1 neg ( avoiding the bad lead connection).  Battery life of its generator: greater than 25 months left based on current usage.  The setting was up.   A new setting is a program: at 2.2 voltage.     Bladder Instillation Procedure:  A 16 F Alcazar catheter was placed and the bladder was drained without problems.    Intravesical cocktail mixture treatment is given in a standard fashion.  The solution of 20,000 unit heparin, 50 ml 0.5 % Marcaine, 10 ml 1% lidocaine, and 10 ml 8.4% sodium bicarbonate was instilled in the bladder, and the catheter was removed. The patient was instructed to hold the  mixture solution in the bladder for 20 to 30 minutes and to void as instructed.    Patient tolerated the procedure well.      Assessment and Plan:  Marilee was seen today for bladder pain, urethral spasms and interstitial cystitis.    Diagnoses and all orders for this visit:    Bladder pain  -     LIDOCAINE 2 %, VALIUM 5 MG, BACLOFEN 4 % SUPPOSITORY; Place 1 suppository vaginally 2 (two) times daily.    Urethral pain  -     LIDOCAINE 2 %, VALIUM 5 MG, BACLOFEN 4 % SUPPOSITORY; Place 1 suppository vaginally 2 (two) times daily.  -     tamsulosin (FLOMAX) 0.4 mg Cap; Take 1 capsule (0.4 mg total) by mouth once daily.    IC (interstitial cystitis)  -     heparin (porcine) injection 20,000 Units  -     lidocaine HCL 10 mg/ml (1%) injection 1 mL  -     bupivacaine injection 250 mg  -     sodium bicarbonate solution 50 mEq  -     amitriptyline (ELAVIL) 10 MG tablet; Take 1 tablet (10 mg total) by mouth nightly as needed.  -     hydrOXYzine HCL (ATARAX) 25 MG tablet; 1 tablet at bed time  -     famotidine (PEPCID) 20 MG tablet; Take 1 tablet (20 mg total) by mouth 2 (two) times daily.  -     rxvszp-swyoi-o.blue-sal-naphos (USTELL) 120-0.12 mg Cap; Take 1 capsule by mouth 4 (four) times daily as needed.    OAB (overactive bladder)    Pelvic pain in female    recommend to continue IC smart diet.  Use the above IC meds.  Start flomax  Try vaginal suppository of lidocaine, valium and baclofen    Urocit K has been used to alkalinize her urine but she has not taken it regularly.  OK to stop it.  Drink alkaline water.    OK to use valium inside of her vagina, especially prior to her intercourse or after.    For bladder pain, she can use Ustell as needed.    For her JAYNE, recommend Kegel exercise.\  If not improving, need a repeat SUDS cysto to determine the nature of her incontinence ( jayne resulted from bladder instability?).    S/p InterStim Therapy 4 years out.  Will check her battery life on her next visit in 6 months.    I spent  40 minutes with the patient of which more than half was spent in direct consultation with the patient in regards to our treatment and plan.    Follow-up:  Follow up in about 6 months (around 5/5/2021).

## 2020-11-10 ENCOUNTER — OFFICE VISIT (OUTPATIENT)
Dept: PSYCHIATRY | Facility: CLINIC | Age: 50
End: 2020-11-10
Payer: COMMERCIAL

## 2020-11-10 DIAGNOSIS — F33.1 MAJOR DEPRESSIVE DISORDER, RECURRENT EPISODE, MODERATE: Primary | ICD-10-CM

## 2020-11-10 PROCEDURE — 90833 PSYTX W PT W E/M 30 MIN: CPT | Mod: 95,,, | Performed by: PSYCHIATRY & NEUROLOGY

## 2020-11-10 PROCEDURE — 90833 PR PSYCHOTHERAPY W/PATIENT W/E&M, 30 MIN (ADD ON): ICD-10-PCS | Mod: 95,,, | Performed by: PSYCHIATRY & NEUROLOGY

## 2020-11-10 PROCEDURE — 99214 PR OFFICE/OUTPT VISIT, EST, LEVL IV, 30-39 MIN: ICD-10-PCS | Mod: 95,,, | Performed by: PSYCHIATRY & NEUROLOGY

## 2020-11-10 PROCEDURE — 99214 OFFICE O/P EST MOD 30 MIN: CPT | Mod: 95,,, | Performed by: PSYCHIATRY & NEUROLOGY

## 2020-11-10 RX ORDER — OXCARBAZEPINE 150 MG/1
150 TABLET, FILM COATED ORAL 2 TIMES DAILY
Qty: 60 TABLET | Refills: 0 | Status: SHIPPED | OUTPATIENT
Start: 2020-11-10 | End: 2020-12-30 | Stop reason: SDUPTHER

## 2020-11-10 RX ORDER — CLONAZEPAM 1 MG/1
1 TABLET ORAL 2 TIMES DAILY
Qty: 60 TABLET | Refills: 0 | Status: SHIPPED | OUTPATIENT
Start: 2020-11-10 | End: 2020-12-16

## 2020-11-10 NOTE — PROGRESS NOTES
"Outpatient Psychiatry Follow-Up Visit (MD/NP)    11/10/2020 The patient location is: home  The chief complaint leading to consultation is: depression    Visit type: audiovisual    Face to Face time with patient: 23" 11" on meds and 12" supportive counseling.  30 minutes of total time spent on the encounter, which includes face to face time and non-face to face time preparing to see the patient (eg, review of tests), Obtaining and/or reviewing separately obtained history, Documenting clinical information in the electronic or other health record, Independently interpreting results (not separately reported) and communicating results to the patient/family/caregiver, or Care coordination (not separately reported).         Each patient to whom he or she provides medical services by telemedicine is:  (1) informed of the relationship between the physician and patient and the respective role of any other health care provider with respect to management of the patient; and (2) notified that he or she may decline to receive medical services by telemedicine and may withdraw from such care at any time.    Notes: see below    Clinical Status of Patient:  Outpatient (Ambulatory)    Chief Complaint:  Marilee Simons is a 50 y.o. female who presents today for follow-up of depression.  Met with patient and no relatives present.      Interval History and Content of Current Session:  Interim Events/Subjective Report/Content of Current Session: Patient feels that the addition of the Trileptal has helped and that she is in better control of her anger now. She is in good self control and has no active thoughts of harming herself or others. Patient has no signs of josi or psychosis. Patient feels her mood is more even keeled and is pleased with her current progress. She continues to have panic episodes despite her progress. Patient is concerned re her panic episodes. She focused today on her ongoing aniety.     Psychotherapy:  · Target " symptoms: depression, anxiety   · Why chosen therapy is appropriate versus another modality: relevant to diagnosis  · Outcome monitoring methods: self-report  · Therapeutic intervention type: supportive psychotherapy  · Topics discussed/themes: panic episode  · The patient's response to the intervention is accepting. The patient's progress toward treatment goals is fair. Patient is focused today on her anxiety.  · Duration of intervention: 23 minutes.    Review of Systems   · PSYCHIATRIC: Pertinant items are noted in the narrative.    Past Medical, Family and Social History: The patient's past medical, family and social history have been reviewed and updated as appropriate within the electronic medical record - see encounter notes.    Compliance: yes    Side effects: None    Risk Parameters:  Patient reports no suicidal ideation  Patient reports no homicidal ideation  Patient reports no self-injurious behavior  Patient reports no violent behavior    Exam (detailed: at least 9 elements; comprehensive: all 15 elements)   Constitutional  Vitals:  Most recent vital signs, dated less than 90 days prior to this appointment, were reviewed.   There were no vitals filed for this visit.Patient reports recent vital signs were normal     General:  unremarkable, age appropriate, casually dressed, neatly groomed     Musculoskeletal  Muscle Strength/Tone:  muscle strength is paul   Gait & Station:  non-ataxic     Psychiatric  Speech:  no latency; no press, spontaneous   Mood & Affect:  anxious  congruent and appropriate   Thought Process:  normal and logical   Associations:  intact   Thought Content:  normal, no suicidality, no homicidality, delusions, or paranoia   Insight:  has awareness of illness   Judgement: behavior is adequate to circumstances   Orientation:  grossly intact   Memory: intact for content of interview   Language: grossly intact   Attention Span & Concentration:  able to focus   Fund of Knowledge:  intact and  appropriate to age and level of education     Assessment and Diagnosis   Status/Progress: Based on the examination today, the patient's problem(s) is/are adequately but not ideally controlled.  New problems have not been presented today.   no new obstacles are not complicating management of the primary condition.  The working differential for this patient includes depression and anxiety.     General Impression: Patient continues to be concerned re her panic episodes. Her mood is reasonably stable at this time. Patient is in good self control and motivated to reduce her panic episodes and stabilize her mood.      ICD-10-CM ICD-9-CM   1. Major depressive disorder, recurrent episode, moderate  F33.1 296.32       Intervention/Counseling/Treatment Plan   · Medication Management: The risks and benefits of medication were discussed with the patient. Patient agrees to take Klonopin 1 mg bid, and increase Trileptal to 150 bid, and continue Cymbalta 60 mg bid.      Return to Clinic: 1 month

## 2020-11-11 ENCOUNTER — PATIENT MESSAGE (OUTPATIENT)
Dept: PSYCHIATRY | Facility: CLINIC | Age: 50
End: 2020-11-11

## 2020-11-12 ENCOUNTER — PATIENT MESSAGE (OUTPATIENT)
Dept: UROLOGY | Facility: CLINIC | Age: 50
End: 2020-11-12

## 2020-11-12 ENCOUNTER — PATIENT MESSAGE (OUTPATIENT)
Dept: RHEUMATOLOGY | Facility: CLINIC | Age: 50
End: 2020-11-12

## 2020-11-12 ENCOUNTER — OFFICE VISIT (OUTPATIENT)
Dept: ALLERGY | Facility: CLINIC | Age: 50
End: 2020-11-12
Payer: COMMERCIAL

## 2020-11-12 VITALS — WEIGHT: 159.38 LBS | RESPIRATION RATE: 16 BRPM | HEIGHT: 59 IN | BODY MASS INDEX: 32.13 KG/M2

## 2020-11-12 DIAGNOSIS — E55.9 VITAMIN D DEFICIENCY: ICD-10-CM

## 2020-11-12 DIAGNOSIS — R73.09 ELEVATED GLUCOSE LEVEL: ICD-10-CM

## 2020-11-12 DIAGNOSIS — L50.8 CHRONIC URTICARIA: Primary | ICD-10-CM

## 2020-11-12 DIAGNOSIS — E07.9 THYROID DISEASE: ICD-10-CM

## 2020-11-12 DIAGNOSIS — L40.50 PSORIATIC ARTHRITIS: ICD-10-CM

## 2020-11-12 PROCEDURE — 3008F PR BODY MASS INDEX (BMI) DOCUMENTED: ICD-10-PCS | Mod: CPTII,S$GLB,, | Performed by: STUDENT IN AN ORGANIZED HEALTH CARE EDUCATION/TRAINING PROGRAM

## 2020-11-12 PROCEDURE — 99999 PR PBB SHADOW E&M-EST. PATIENT-LVL V: ICD-10-PCS | Mod: PBBFAC,,, | Performed by: STUDENT IN AN ORGANIZED HEALTH CARE EDUCATION/TRAINING PROGRAM

## 2020-11-12 PROCEDURE — 99214 PR OFFICE/OUTPT VISIT, EST, LEVL IV, 30-39 MIN: ICD-10-PCS | Mod: S$GLB,,, | Performed by: STUDENT IN AN ORGANIZED HEALTH CARE EDUCATION/TRAINING PROGRAM

## 2020-11-12 PROCEDURE — 3008F BODY MASS INDEX DOCD: CPT | Mod: CPTII,S$GLB,, | Performed by: STUDENT IN AN ORGANIZED HEALTH CARE EDUCATION/TRAINING PROGRAM

## 2020-11-12 PROCEDURE — 1126F PR PAIN SEVERITY QUANTIFIED, NO PAIN PRESENT: ICD-10-PCS | Mod: S$GLB,,, | Performed by: STUDENT IN AN ORGANIZED HEALTH CARE EDUCATION/TRAINING PROGRAM

## 2020-11-12 PROCEDURE — 99214 OFFICE O/P EST MOD 30 MIN: CPT | Mod: S$GLB,,, | Performed by: STUDENT IN AN ORGANIZED HEALTH CARE EDUCATION/TRAINING PROGRAM

## 2020-11-12 PROCEDURE — 99999 PR PBB SHADOW E&M-EST. PATIENT-LVL V: CPT | Mod: PBBFAC,,, | Performed by: STUDENT IN AN ORGANIZED HEALTH CARE EDUCATION/TRAINING PROGRAM

## 2020-11-12 PROCEDURE — 1126F AMNT PAIN NOTED NONE PRSNT: CPT | Mod: S$GLB,,, | Performed by: STUDENT IN AN ORGANIZED HEALTH CARE EDUCATION/TRAINING PROGRAM

## 2020-11-12 RX ORDER — HYDROXYCHLOROQUINE SULFATE 200 MG/1
200 TABLET, FILM COATED ORAL 2 TIMES DAILY
Qty: 60 TABLET | Refills: 2 | Status: SHIPPED | OUTPATIENT
Start: 2020-11-12 | End: 2021-03-24 | Stop reason: SDUPTHER

## 2020-11-12 RX ORDER — OMALIZUMAB 202.5 MG/1.4ML
150 INJECTION, SOLUTION SUBCUTANEOUS
Qty: 6 VIAL | Refills: 1 | Status: SHIPPED | OUTPATIENT
Start: 2020-11-12 | End: 2020-12-01 | Stop reason: DRUGHIGH

## 2020-11-12 NOTE — PATIENT INSTRUCTIONS
No cause of hives found.    1.  Hives will continue to come and go.  It's what hives do.  2.  Testing shows that your hives are a nuisance problem and not a sign of anything more serious.  2.  The goal now is to make you itch-free (not necessiarily hive free).        Skin testing to food and things in the air next visit.    Need to stop all medicines containing antihistamines 5 days prior  Zyrtec  Hydroxyzine  Pepcid  Amitriptyline  Ditropan          See an endocrinologist that your thyroid test.

## 2020-11-12 NOTE — PROGRESS NOTES
Allergy Clinic Note  [facility]    Subjective:      Patient ID: Marilee Simons is a 50 y.o. female.    Chief Complaint: Itching and Urticaria      Referring Provider:      History of Present Illness:  50-year-old female presents herself for evaluation and treatment of hives with severe itching since 2009.  She is here alone, and she is a good historian.    Related medications  Zyrtec BID  Hydroxyzine 4-6 at hs +p.r.n.  Pepcid 20 BID  Klonapin prescribed b.i.d., taking p.r.n.    Patient continues to complain of hives and itching despite stated compliance with multidrug regimen above.  No side effects or problems with any of the medicines.    Since last visit she has seen a new psychiatrist who is recommending Klonopin b.i.d.  she is currently also taking Cymbalta and new Trileptal.      Patient reports a history of urticaria off and on since 2009.  She has photograph showing confluent welts.  She reports that symptoms have been daily despite high-dose antihistamines for the last 5-6 weeks.  Symptoms are severe and awaken her from sleep they also make her unable to garden.  Precipitants.  To be showering, rabbit exposure, plant contact.  She says that hives are usually present upon awakening.  She is taking medicines as above without significant relief.  Recently she started a sat of with coconut oil aloe oil and several other plant based oils.  She also is he started using his shampoo with essential oils.  In the past she has not been helped by Vanicream.  At present she has not helped by very high doses of H1 and H2 blockers as well as Klonopin as needed.  She is not using any steroid creams.  Patient was evaluated by Dr. Edwin welsh in 2005.  She brings with her today results of her skin testing which show a large number of strongly positive reactions to both foods and aeroallergens.  The other allergens include strong reactions to both her pet dogs and to dust mites.      Since onset of daily  urticaria in September 2020, client...  Denies fever, shakes, or chills  Denies change in weight, appetite, or energy level  Admits diffuse hair loss, not sure if breaking or coming out by roots  Admits skin feels thicker  Denies change in the texture of her hair, or nails  Denies change in the appearance of urine or stool  Denies vomiting or abdominal pain  Denies change in chronic diarrhea, constipation,   Denies abnormal bleeding  Denies any new aches or pains, specifically myalgias or arthralgia,   Denies any unusual moles    States Pap smear up-to-date  States colonoscopy up today  States needs prescription for mammogram    Patient also has a long history of allergic rhinitis.  Major symptoms include nasal congestion and runny nose.  Additional symptoms include postnasal drip, sneezing, sore throat, throat clearing.  She denies eye symptoms, chest symptoms or ear symptoms.  Precipitants include dust and probably grass.  There are no other clear precipitants.  She is currently taking antihistamines as above.      Additional History:   Past medical history is significant for rheumatoid arthritis and thyroid disease.  She has no history of hypertension or heart disease.  No Hx of ENT surgery.  Family history is significant for irritable bowel syndrome in mother and 2 sisters.  There is no family history of celiac disease or inflammatory bowel disease.  Client  reports that she has never smoked. She has never used smokeless tobacco.  Exposures are notable for for dogs and 1 rabbit.      Patient Active Problem List   Diagnosis    IBS (irritable bowel syndrome)    Atypical chest pain    Hypothyroidism    Arthralgia    Chronic fatigue    IC (interstitial cystitis)    Bladder pain    Hematuria, microscopic    Potassium (K) deficiency    Acute UTI    Headache    GERD (gastroesophageal reflux disease)    Major depressive disorder, recurrent episode, moderate    PTSD (post-traumatic stress disorder)     Generalized anxiety disorder    OAB (overactive bladder)    Urgency incontinence    Major depressive disorder, recurrent episode, mild    Anxiety    Straining to void    Cluster B personality disorder    Interstitial cystitis    Fibromyalgia    Pelvic pain in female    Blood poisoning    Nausea    Decreased range of motion of neck    Muscle weakness    Neck pain    Decreased bladder capacity    Urethral pain     Current Outpatient Medications on File Prior to Visit   Medication Sig Dispense Refill    alosetron (LOTRONEX) 0.5 MG tablet TAKE 1 TABLET BY MOUTH DAILY AS NEEDED 30 tablet 0    amitriptyline (ELAVIL) 10 MG tablet Take 1 tablet (10 mg total) by mouth nightly as needed. 90 tablet 3    butalbitaL-acetaminophen  mg Tab Take 1 tablet by mouth every 4 (four) hours. 60 tablet 5    cetirizine (ZYRTEC) 10 MG tablet Take 2 tablets (20 mg total) by mouth once daily. 60 tablet 3    clonazePAM (KLONOPIN) 1 MG tablet Take 1 tablet (1 mg total) by mouth 2 (two) times daily. 60 tablet 0    cyclobenzaprine (FLEXERIL) 10 MG tablet TAKE 1 TABLET(10 MG) BY MOUTH TWICE DAILY PRN 90 tablet 6    famotidine (PEPCID) 20 MG tablet Take 1 tablet (20 mg total) by mouth 2 (two) times daily. 60 tablet 11    fluticasone propionate (FLONASE) 50 mcg/actuation nasal spray 2 sprays (100 mcg total) by Each Nostril route 2 (two) times daily. 31.6 mL 5    gabapentin (NEURONTIN) 800 MG tablet Take 1 tablet (800 mg total) by mouth 3 (three) times daily. 90 tablet 11    hydrocortisone 2.5 % cream Apply to affected areas twice a day when eczema is moderate. 453.6 g 1    hydrOXYzine HCL (ATARAX) 25 MG tablet 1 tablet at bed time 90 tablet 3    levothyroxine (SYNTHROID) 50 MCG tablet TAKE 1 TABLET BY MOUTH EVERY DAY 30 tablet 3    LIDOCAINE 2 %, VALIUM 5 MG, BACLOFEN 4 % SUPPOSITORY Place 1 suppository vaginally 2 (two) times daily. 60 each 1    lifitegrast (XIIDRA) 5 % Dpet Apply 1 drop to eye 2 (two) times  daily. 60 each 12    hwrdte-iesfq-j.blue-sal-naphos (USTELL) 120-0.12 mg Cap Take 1 capsule by mouth 4 (four) times daily as needed. 120 each 3    multivitamin with minerals tablet Take 1 tablet by mouth once daily.      OXcarbazepine (TRILEPTAL) 150 MG Tab Take 1 tablet (150 mg total) by mouth 2 (two) times daily. 60 tablet 0    promethazine (PHENERGAN) 25 MG tablet Take 1 tablet (25 mg total) by mouth every 6 (six) hours as needed for Nausea. 30 tablet 0    RABEprazole (ACIPHEX) 20 mg tablet TAKE 1 TABLET(20 MG) BY MOUTH EVERY DAY 30 tablet 2    tamsulosin (FLOMAX) 0.4 mg Cap Take 1 capsule (0.4 mg total) by mouth once daily. 30 capsule 11    tiZANidine (ZANAFLEX) 4 MG tablet Take 1 tablet (4 mg total) by mouth every 8 (eight) hours as needed. 90 tablet 12    traMADoL (ULTRAM) 50 mg tablet Take 1 tablet (50 mg total) by mouth every 6 (six) hours. 90 tablet 3    [DISCONTINUED] hydroxychloroquine (PLAQUENIL) 200 mg tablet Take 1 tablet (200 mg total) by mouth 2 (two) times daily. 60 tablet 5    adalimumab (HUMIRA,CF, PEN) 40 mg/0.4 mL PnKt Inject 0.4 mLs (40 mg total) into the skin every 14 (fourteen) days. (Patient not taking: Reported on 10/15/2020) 2 pen 12    calcium glycerophosphate (PRELIEF) 65 mg Tab Take 2 tablets by mouth before meals and at bedtime as needed. (Patient not taking: Reported on 11/12/2020) 100 each 3    DULoxetine (CYMBALTA) 60 MG capsule Take 2 capsules (120 mg total) by mouth once daily. 60 capsule 3    estradioL (ESTRACE) 0.5 MG tablet TAKE 1 TABLET BY MOUTH DAILY (Patient not taking: Reported on 11/12/2020) 30 tablet 3    fluconazole (DIFLUCAN) 150 MG Tab Take one tab at onset of symptoms then second tab 72h later. 2 tablet 0    oxybutynin (DITROPAN) 5 MG Tab Take 1 tablet (5 mg total) by mouth 3 (three) times daily. (Patient not taking: Reported on 10/15/2020) 90 tablet 12    potassium citrate (UROCIT-K 10) 10 mEq (1,080 mg) TbSR Take 1 tablet (10 mEq total) by mouth 3  "(three) times daily with meals. (Patient not taking: Reported on 11/12/2020) 90 tablet 11    traZODone (DESYREL) 50 MG tablet Take 1 tablet (50 mg total) by mouth nightly as needed for Insomnia. (Patient not taking: Reported on 11/12/2020) 30 tablet 2    [DISCONTINUED] USTELL 120-0.12 mg Cap TAKE ONE CAPSULE BY MOUTH FOUR TIMES DAILY 120 each 3     No current facility-administered medications on file prior to visit.          Review of Systems   Constitutional: Negative for chills and fever.   HENT: Negative for ear discharge and nosebleeds.    Eyes: Negative for discharge and redness.   Respiratory: Negative for hemoptysis, sputum production, shortness of breath, wheezing and stridor.    Cardiovascular: Negative for chest pain and palpitations.   Gastrointestinal: Positive for abdominal pain, constipation and diarrhea. Negative for blood in stool, melena and vomiting.   Genitourinary: Negative for dysuria, flank pain and hematuria.   Musculoskeletal: Negative for joint pain and myalgias.   Skin: Positive for itching and rash.   Neurological: Negative for seizures and loss of consciousness.       Objective:   Resp 16   Ht 4' 11" (1.499 m)   Wt 72.3 kg (159 lb 6.3 oz)   LMP  (LMP Unknown)   BMI 32.19 kg/m²       Physical Exam   Constitutional: She is well-developed, well-nourished, and in no distress.   HENT:   Head: Normocephalic and atraumatic.   Nose: Nose normal.   Eyes: Conjunctivae are normal. Right eye exhibits no discharge. Left eye exhibits no discharge.   Neck: Neck supple.   Cardiovascular: Normal rate, regular rhythm and intact distal pulses.   Pulmonary/Chest: Effort normal. No stridor. No respiratory distress.   Abdominal: She exhibits no distension.   Musculoskeletal:         General: No deformity or edema.   Neurological: She is alert. GCS score is 15.   Skin: No rash noted. No erythema.   Psychiatric: Memory and affect normal.       Data:   Labs (09/01/2020)  CBC notable for hemoglobin 12.2 with " hematocrit 36.6.   WBC and if unremarkable.  EO count 100  CMP notable for glucose 141.  LFTs are normal  Low TSH 0.073 with normal free T4 0.94   (Anti thyroglobulin antibody and anti thyroid peroxidase antibody    were ordered 07/17/2020 but never done      Assessment:     1. Chronic urticaria    2. Thyroid disease    3. Vitamin D deficiency    4. Elevated glucose level        Plan:     Medical decision making:  Patient is presenting with chronic urticaria that has now daily despite high-dose H1 and H2 blockers.  Will 1st attempt to control itching.  Based on history and physical, clues as to etiology include autoimmune disease (rheumatoid arthritis an unknown type of thyroid disease). She is also highly atopic.  She appears to be in good Xolair candidate should more conservative measures fail..            She also has allergic rhinitis despite multidrug regimen.  For today I am recommending adding Flonase 2 squirts each nostril to her regimen.  She may be a candidate for immunotherapy.  If she ups this route, it would be interesting to see how her urticaria response to immunotherapy.           I plan to see her back in 1 week which will include physical exam to complete her initial evaluation.    Marilee was seen today for urticaria.    Diagnoses and all orders for this visit:    Rash consistent with Chronic urticaria  -     Anti-IgE Receptor Antibody Level; Future  -     Vitamin D Level; Future  -     Serum Tryptase; Future  -     Serum Protein Electrophoresis; Future  -     PATRICIA Screen w/Reflex; Future  -     Mammo Digital Screening Bilat; Future  -     hydrocortisone 2.5 % cream; Apply to affected areas twice a day when eczema is    Itching, uncontrolled, related to above  -     cetirizine (ZYRTEC) 10 MG tablet; Take 2 tablets (20 mg total) by mouth once daily.  -     hydrOXYzine HCL (ATARAX) 25 MG tablet; 1 to 8 tablets at bedtime.  Start with 3 tablets.  Increase or decrease as needed until itching is controlled  or a maximum of 8 tablets.    Hypothyroidism with current low TSH  Client instructed to discuss with her rheumatologist  Discussed may be related to hives    Chronic rhinitis  -     Allergen-Rabbit Epithelium; Future  -     IgE; Future  -     Dermatophagoides [place]; Future  -     Dermatophagoides Pteronyssinus; Future  -     [place]; Future  -     Otf; Future  -     [place]; Future  -     English Plantain; Future  -     [place]ecan; Future  -     Ragweed; Future  -     Alternaria; Future  -     Aspergillus; Future  -     Cat; Future  -     Cockroach; Future  -     Dog; Future         -     fluticasone propionate (FLONASE) 50 mcg/actuation nasal spray; 2 sprays (100 mcg total) by Each Nostril route 2 (two) times daily.    Health care maintenance  -     Mammo Digital Screening Bilat; Future                Patient Instructions   No cause of hives found.    1.  Hives will continue to come and go.  It's what hives do.  2.  Testing shows that your hives are a nuisance problem and not a sign of anything more serious.  2.  The goal now is to make you itch-free (not necessiarily hive free).        Skin testing to food and things in the air next visit.    Need to stop all medicines containing antihistamines 5 days prior  Zyrtec  Hydroxyzine  Pepcid  Amitriptyline  Ditropan          See an endocrinologist that your thyroid test.      Follow up in about 1 week (around 11/19/2020) for skin testing to foods and aeos at pt request for  urt.    Stella Arce MD

## 2020-11-16 ENCOUNTER — TELEPHONE (OUTPATIENT)
Dept: UROLOGY | Facility: CLINIC | Age: 50
End: 2020-11-16

## 2020-11-16 DIAGNOSIS — N32.81 OAB (OVERACTIVE BLADDER): Primary | ICD-10-CM

## 2020-11-16 RX ORDER — OXYBUTYNIN CHLORIDE 5 MG/1
5 TABLET ORAL 2 TIMES DAILY
Qty: 60 TABLET | Refills: 12 | Status: SHIPPED | OUTPATIENT
Start: 2020-11-16 | End: 2022-08-10

## 2020-11-16 NOTE — TELEPHONE ENCOUNTER
OAB (overactive bladder)  -     oxybutynin (DITROPAN) 5 MG Tab; Take 1 tablet (5 mg total) by mouth 2 (two) times daily.  Dispense: 60 tablet; Refill: 12

## 2020-11-23 ENCOUNTER — OFFICE VISIT (OUTPATIENT)
Dept: RHEUMATOLOGY | Facility: CLINIC | Age: 50
End: 2020-11-23
Payer: COMMERCIAL

## 2020-11-23 ENCOUNTER — LAB VISIT (OUTPATIENT)
Dept: LAB | Facility: HOSPITAL | Age: 50
End: 2020-11-23
Attending: INTERNAL MEDICINE
Payer: COMMERCIAL

## 2020-11-23 VITALS
HEIGHT: 59 IN | BODY MASS INDEX: 32.01 KG/M2 | SYSTOLIC BLOOD PRESSURE: 125 MMHG | WEIGHT: 158.81 LBS | HEART RATE: 92 BPM | DIASTOLIC BLOOD PRESSURE: 78 MMHG

## 2020-11-23 DIAGNOSIS — M41.9 SCOLIOSIS, UNSPECIFIED SCOLIOSIS TYPE, UNSPECIFIED SPINAL REGION: ICD-10-CM

## 2020-11-23 DIAGNOSIS — M79.7 FIBROMYALGIA: ICD-10-CM

## 2020-11-23 DIAGNOSIS — M06.00 SERONEGATIVE RHEUMATOID ARTHRITIS: ICD-10-CM

## 2020-11-23 DIAGNOSIS — G89.4 CHRONIC PAIN SYNDROME: ICD-10-CM

## 2020-11-23 DIAGNOSIS — M47.817 LUMBAR AND SACRAL OSTEOARTHRITIS: ICD-10-CM

## 2020-11-23 DIAGNOSIS — L40.50 PSORIATIC ARTHRITIS: ICD-10-CM

## 2020-11-23 DIAGNOSIS — M81.0 OSTEOPOROSIS, UNSPECIFIED OSTEOPOROSIS TYPE, UNSPECIFIED PATHOLOGICAL FRACTURE PRESENCE: ICD-10-CM

## 2020-11-23 DIAGNOSIS — N30.10 IC (INTERSTITIAL CYSTITIS): ICD-10-CM

## 2020-11-23 DIAGNOSIS — M47.817 LUMBAR AND SACRAL OSTEOARTHRITIS: Primary | ICD-10-CM

## 2020-11-23 PROCEDURE — 3008F BODY MASS INDEX DOCD: CPT | Mod: CPTII,S$GLB,, | Performed by: INTERNAL MEDICINE

## 2020-11-23 PROCEDURE — 1125F AMNT PAIN NOTED PAIN PRSNT: CPT | Mod: S$GLB,,, | Performed by: INTERNAL MEDICINE

## 2020-11-23 PROCEDURE — 3008F PR BODY MASS INDEX (BMI) DOCUMENTED: ICD-10-PCS | Mod: CPTII,S$GLB,, | Performed by: INTERNAL MEDICINE

## 2020-11-23 PROCEDURE — 96372 THER/PROPH/DIAG INJ SC/IM: CPT | Mod: S$GLB,,, | Performed by: INTERNAL MEDICINE

## 2020-11-23 PROCEDURE — 96372 PR INJECTION,THERAP/PROPH/DIAG2ST, IM OR SUBCUT: ICD-10-PCS | Mod: S$GLB,,, | Performed by: INTERNAL MEDICINE

## 2020-11-23 PROCEDURE — 99999 PR PBB SHADOW E&M-EST. PATIENT-LVL V: ICD-10-PCS | Mod: PBBFAC,,, | Performed by: INTERNAL MEDICINE

## 2020-11-23 PROCEDURE — 86480 TB TEST CELL IMMUN MEASURE: CPT

## 2020-11-23 PROCEDURE — 99215 OFFICE O/P EST HI 40 MIN: CPT | Mod: 25,S$GLB,, | Performed by: INTERNAL MEDICINE

## 2020-11-23 PROCEDURE — 99215 PR OFFICE/OUTPT VISIT, EST, LEVL V, 40-54 MIN: ICD-10-PCS | Mod: 25,S$GLB,, | Performed by: INTERNAL MEDICINE

## 2020-11-23 PROCEDURE — 1125F PR PAIN SEVERITY QUANTIFIED, PAIN PRESENT: ICD-10-PCS | Mod: S$GLB,,, | Performed by: INTERNAL MEDICINE

## 2020-11-23 PROCEDURE — 99999 PR PBB SHADOW E&M-EST. PATIENT-LVL V: CPT | Mod: PBBFAC,,, | Performed by: INTERNAL MEDICINE

## 2020-11-23 RX ORDER — METHYLPREDNISOLONE ACETATE 80 MG/ML
80 INJECTION, SUSPENSION INTRA-ARTICULAR; INTRALESIONAL; INTRAMUSCULAR; SOFT TISSUE
Status: COMPLETED | OUTPATIENT
Start: 2020-11-23 | End: 2020-11-23

## 2020-11-23 RX ORDER — GABAPENTIN 800 MG/1
800 TABLET ORAL 3 TIMES DAILY
Qty: 90 TABLET | Refills: 11 | Status: SHIPPED | OUTPATIENT
Start: 2020-11-23 | End: 2021-12-27

## 2020-11-23 RX ORDER — DEXAMETHASONE SODIUM PHOSPHATE 4 MG/ML
4 INJECTION, SOLUTION INTRA-ARTICULAR; INTRALESIONAL; INTRAMUSCULAR; INTRAVENOUS; SOFT TISSUE
Status: COMPLETED | OUTPATIENT
Start: 2020-11-23 | End: 2020-11-23

## 2020-11-23 RX ORDER — TRAMADOL HYDROCHLORIDE 50 MG/1
50 TABLET ORAL EVERY 6 HOURS
Qty: 90 TABLET | Refills: 3 | Status: SHIPPED | OUTPATIENT
Start: 2020-11-23 | End: 2021-07-26 | Stop reason: SDUPTHER

## 2020-11-23 RX ORDER — KETOROLAC TROMETHAMINE 30 MG/ML
60 INJECTION, SOLUTION INTRAMUSCULAR; INTRAVENOUS
Status: COMPLETED | OUTPATIENT
Start: 2020-11-23 | End: 2020-11-23

## 2020-11-23 RX ADMIN — DEXAMETHASONE SODIUM PHOSPHATE 4 MG: 4 INJECTION, SOLUTION INTRA-ARTICULAR; INTRALESIONAL; INTRAMUSCULAR; INTRAVENOUS; SOFT TISSUE at 03:11

## 2020-11-23 RX ADMIN — METHYLPREDNISOLONE ACETATE 80 MG: 80 INJECTION, SUSPENSION INTRA-ARTICULAR; INTRALESIONAL; INTRAMUSCULAR; SOFT TISSUE at 03:11

## 2020-11-23 RX ADMIN — KETOROLAC TROMETHAMINE 60 MG: 30 INJECTION, SOLUTION INTRAMUSCULAR; INTRAVENOUS at 03:11

## 2020-11-23 ASSESSMENT — ROUTINE ASSESSMENT OF PATIENT INDEX DATA (RAPID3)
FATIGUE SCORE: 1.1
PAIN SCORE: 7
PATIENT GLOBAL ASSESSMENT SCORE: 6
MDHAQ FUNCTION SCORE: 1.4
PSYCHOLOGICAL DISTRESS SCORE: 4.4
TOTAL RAPID3 SCORE: 5.89

## 2020-11-23 NOTE — PROGRESS NOTES
Subjective:       Patient ID: Marilee Simons is a 50 y.o. female.    Chief Complaint: No chief complaint on file.    Follow up   PSA  Humira on,  fibromyalgia,  Doing fair to poor, very depressed, flaring , interstitial cystitis   mcp, pip, wrist, knees and ankles feet and L spine pain on plaquenil.she went [facility]- 10 days. Pain is located in multiple joints, both shoulder(s), both elbow(s), both wrist(s), both MCP(s): 1st, 2nd, 3rd, 4th and 5th, both PIP(s): 1st, 2nd, 3rd, 4th and 5th, both DIP(s): 1st and 2nd, both hip(s), both knee(s) and both MTP(s): 1st, 2nd, 3rd, 4th and 5th, is described as aching, pulsating, shooting and throbbing, and is constant, moderate .  Associated symptoms include: crepitation, decreased range of motion, edema, effusion, tenderness and warmth. ibs flare,  psa flare,    Review of Systems   Constitutional: Positive for activity change. Negative for appetite change and unexpected weight change.   HENT: Negative for dental problem, ear discharge, ear pain, facial swelling, mouth sores, nosebleeds, postnasal drip, rhinorrhea, sinus pressure, sneezing, tinnitus and voice change.    Eyes: Negative for photophobia, pain, discharge, redness and itching.   Respiratory: Negative for apnea, chest tightness, shortness of breath and wheezing.    Cardiovascular: Positive for leg swelling. Negative for palpitations.   Gastrointestinal: Negative for abdominal distention, constipation and diarrhea.   Endocrine: Negative for cold intolerance, heat intolerance, polydipsia and polyuria.   Genitourinary: Negative for decreased urine volume, difficulty urinating, flank pain, frequency, hematuria and urgency.   Musculoskeletal: Positive for arthralgias, back pain, joint swelling, myalgias, neck pain and neck stiffness. Negative for gait problem.   Skin: Negative for pallor and wound.   Allergic/Immunologic: Negative for immunocompromised state.   Neurological: Negative for dizziness and tremors.  "  Hematological: Negative for adenopathy. Does not bruise/bleed easily.   Psychiatric/Behavioral: Negative for sleep disturbance. The patient is not nervous/anxious.          Objective:     /78 (BP Location: Left arm, Patient Position: Sitting, BP Method: Medium (Automatic))   Pulse 92   Ht 4' 11" (1.499 m)   Wt 72 kg (158 lb 13.5 oz)   LMP  (LMP Unknown)   BMI 32.08 kg/m²      Physical Exam   Nursing note and vitals reviewed.  Constitutional: She is oriented to person, place, and time and well-developed, well-nourished, and in no distress. No distress.   HENT:   Head: Normocephalic and atraumatic.   Mouth/Throat: Oropharynx is clear and moist.   Eyes: EOM are normal. Pupils are equal, round, and reactive to light.   Neck: Neck supple. No thyromegaly present.   Cardiovascular: Normal rate, regular rhythm and normal heart sounds.  Exam reveals no gallop and no friction rub.    No murmur heard.  Pulmonary/Chest: Breath sounds normal. She has no wheezes. She has no rales. She exhibits no tenderness.   Abdominal: There is no abdominal tenderness. There is no rebound and no guarding.       Right Side Rheumatological Exam     The patient is tender to palpation of the shoulder, elbow, wrist, knee, 1st PIP, 1st MCP, 2nd PIP, 2nd MCP, 3rd PIP, 3rd MCP, 4th PIP, 4th MCP, 5th PIP and 5th MCP    She has swelling of the 1st PIP, 1st MCP, 2nd PIP, 2nd MCP, 3rd PIP, 3rd MCP, 4th PIP, 4th MCP, 5th PIP and 5th MCP    Shoulder Exam   Tenderness Location: biceps tendon and clavicle    Range of Motion   Active abduction: abnormal   Adduction: abnormal  Sensation: normal    Knee Exam   Patellofemoral Crepitus: positive  Effusion: negative  Sensation: normal    Hip Exam   Tenderness Location: posterior, greater trochanter and lateral  Sensation: normal    Elbow/Wrist Exam   Tenderness Location: lateral epicondyle and medial epicondyle  Sensation: normal    Foot Exam   Right foot exam exhibits signs of inflamed dorsum  Right " foot exam exhibits signs of no podagra, no tophus and no plantar fasciitis    Muscle Strength (0-5 scale):  : 4/5     Left Side Rheumatological Exam     The patient is tender to palpation of the shoulder, elbow, wrist, knee, 1st PIP, 1st MCP, 2nd PIP, 2nd MCP, 3rd PIP, 3rd MCP, 4th PIP, 4th MCP, 5th PIP and 5th MCP.    She has swelling of the 1st PIP, 1st MCP, 2nd PIP, 2nd MCP, 3rd PIP, 3rd MCP, 4th PIP, 4th MCP, 5th PIP and 5th MCP    Shoulder Exam   Tenderness Location: biceps tendon and clavicle    Range of Motion   Active abduction: abnormal   Sensation: normal    Knee Exam     Patellofemoral Crepitus: positive  Effusion: negative  Sensation: normal    Hip Exam   Tenderness Location: posterior, greater trochanter and lateral  Sensation: normal    Elbow/Wrist Exam   Tenderness Location: lateral epicondyle and medial epicondyle  Sensation: normal    Foot Exam   Left foot exam exhibits signs of inflamed dorsum  Left foot exam exhibits signs of no podagra and no plantar fasciitis    Muscle Strength (0-5 scale):  :  4/5       Back/Neck Exam   General Inspection   Gait: normal       Tenderness Right paramedian tenderness of the Occ, Upper C-Spine, Lower L-Spine and SI Joint.Left paramedian tenderness of the Occ, Upper C-Spine, Lower L-Spine and SI Joint.      Comments:  15 out of 18 tender points    Lymphadenopathy:     She has no cervical adenopathy.   Neurological: She is alert and oriented to person, place, and time.   Skin: No rash noted. No erythema. No pallor.     Psychiatric: Mood, affect and judgment normal.   Musculoskeletal: Tenderness and deformity present. No edema.         Results for orders placed or performed in visit on 10/20/20   Culture, Urine    Specimen: Urine, Clean Catch   Result Value Ref Range    Urine Culture, Routine No growth    POCT Urinalysis, Dipstick, Automated, W/O Scope   Result Value Ref Range    POC Blood, Urine Negative Negative    POC Bilirubin, Urine Positive (A) Negative     POC Urobilinogen, Urine 8.0 (A) 0.1 - 1.1    POC Ketones, Urine Negative Negative    POC Protein, Urine Positive (A) Negative    POC Nitrates, Urine Positive (A) Negative    POC Glucose, Urine Negative Negative    pH, UA 6.0 5 - 8    POC Specific Gravity, Urine 1.020 1.003 - 1.029    POC Leukocytes, Urine Negative Negative   reviewed labs with patient during this visit       Assessment:       Encounter Diagnoses   Name Primary?    Lumbar and sacral osteoarthritis Yes    Psoriatic arthritis     Fibromyalgia     Chronic pain syndrome     Scoliosis, unspecified scoliosis type, unspecified spinal region     Seronegative rheumatoid arthritis     Osteoporosis, unspecified osteoporosis type, unspecified pathological fracture presence     IC (interstitial cystitis)          Plan:       Lumbar and sacral osteoarthritis  -     Hepatitis B Core Antibody, IgM; Future; Expected date: 11/23/2020  -     Hepatitis B Surface Ab, Qualitative; Future; Expected date: 11/23/2020  -     Hepatitis B Surface Antigen; Future; Expected date: 11/23/2020  -     Hepatitis C Antibody; Future; Expected date: 11/23/2020  -     Quantiferon Gold TB; Future; Expected date: 11/23/2020  -     ketorolac injection 60 mg  -     traMADoL (ULTRAM) 50 mg tablet; Take 1 tablet (50 mg total) by mouth every 6 (six) hours.  Dispense: 90 tablet; Refill: 3  -     gabapentin (NEURONTIN) 800 MG tablet; Take 1 tablet (800 mg total) by mouth 3 (three) times daily.  Dispense: 90 tablet; Refill: 11  -     dexamethasone injection 4 mg  -     methylPREDNISolone acetate injection 80 mg    Psoriatic arthritis  -     Hepatitis B Core Antibody, IgM; Future; Expected date: 11/23/2020  -     Hepatitis B Surface Ab, Qualitative; Future; Expected date: 11/23/2020  -     Hepatitis B Surface Antigen; Future; Expected date: 11/23/2020  -     Hepatitis C Antibody; Future; Expected date: 11/23/2020  -     Quantiferon Gold TB; Future; Expected date: 11/23/2020  -      ketorolac injection 60 mg  -     traMADoL (ULTRAM) 50 mg tablet; Take 1 tablet (50 mg total) by mouth every 6 (six) hours.  Dispense: 90 tablet; Refill: 3  -     gabapentin (NEURONTIN) 800 MG tablet; Take 1 tablet (800 mg total) by mouth 3 (three) times daily.  Dispense: 90 tablet; Refill: 11  -     dexamethasone injection 4 mg  -     methylPREDNISolone acetate injection 80 mg    Fibromyalgia  -     Hepatitis B Core Antibody, IgM; Future; Expected date: 11/23/2020  -     Hepatitis B Surface Ab, Qualitative; Future; Expected date: 11/23/2020  -     Hepatitis B Surface Antigen; Future; Expected date: 11/23/2020  -     Hepatitis C Antibody; Future; Expected date: 11/23/2020  -     Quantiferon Gold TB; Future; Expected date: 11/23/2020  -     ketorolac injection 60 mg  -     traMADoL (ULTRAM) 50 mg tablet; Take 1 tablet (50 mg total) by mouth every 6 (six) hours.  Dispense: 90 tablet; Refill: 3  -     gabapentin (NEURONTIN) 800 MG tablet; Take 1 tablet (800 mg total) by mouth 3 (three) times daily.  Dispense: 90 tablet; Refill: 11  -     dexamethasone injection 4 mg  -     methylPREDNISolone acetate injection 80 mg    Chronic pain syndrome  -     Hepatitis B Core Antibody, IgM; Future; Expected date: 11/23/2020  -     Hepatitis B Surface Ab, Qualitative; Future; Expected date: 11/23/2020  -     Hepatitis B Surface Antigen; Future; Expected date: 11/23/2020  -     Hepatitis C Antibody; Future; Expected date: 11/23/2020  -     Quantiferon Gold TB; Future; Expected date: 11/23/2020  -     ketorolac injection 60 mg  -     traMADoL (ULTRAM) 50 mg tablet; Take 1 tablet (50 mg total) by mouth every 6 (six) hours.  Dispense: 90 tablet; Refill: 3  -     dexamethasone injection 4 mg  -     methylPREDNISolone acetate injection 80 mg    Scoliosis, unspecified scoliosis type, unspecified spinal region  -     Hepatitis B Core Antibody, IgM; Future; Expected date: 11/23/2020  -     Hepatitis B Surface Ab, Qualitative; Future; Expected  date: 11/23/2020  -     Hepatitis B Surface Antigen; Future; Expected date: 11/23/2020  -     Hepatitis C Antibody; Future; Expected date: 11/23/2020  -     Quantiferon Gold TB; Future; Expected date: 11/23/2020  -     ketorolac injection 60 mg  -     dexamethasone injection 4 mg  -     methylPREDNISolone acetate injection 80 mg    Seronegative rheumatoid arthritis  -     traMADoL (ULTRAM) 50 mg tablet; Take 1 tablet (50 mg total) by mouth every 6 (six) hours.  Dispense: 90 tablet; Refill: 3  -     gabapentin (NEURONTIN) 800 MG tablet; Take 1 tablet (800 mg total) by mouth 3 (three) times daily.  Dispense: 90 tablet; Refill: 11  -     dexamethasone injection 4 mg  -     methylPREDNISolone acetate injection 80 mg    Osteoporosis, unspecified osteoporosis type, unspecified pathological fracture presence  -     traMADoL (ULTRAM) 50 mg tablet; Take 1 tablet (50 mg total) by mouth every 6 (six) hours.  Dispense: 90 tablet; Refill: 3  -     dexamethasone injection 4 mg  -     methylPREDNISolone acetate injection 80 mg    IC (interstitial cystitis)  -     traMADoL (ULTRAM) 50 mg tablet; Take 1 tablet (50 mg total) by mouth every 6 (six) hours.  Dispense: 90 tablet; Refill: 3  -     gabapentin (NEURONTIN) 800 MG tablet; Take 1 tablet (800 mg total) by mouth 3 (three) times daily.  Dispense: 90 tablet; Refill: 11  -     dexamethasone injection 4 mg  -     methylPREDNISolone acetate injection 80 mg       Will restart Humira  She is on Trileptal  And improved.

## 2020-11-25 LAB
GAMMA INTERFERON BACKGROUND BLD IA-ACNC: 0.02 IU/ML
M TB IFN-G CD4+ BCKGRND COR BLD-ACNC: 0 IU/ML
MITOGEN IGNF BCKGRD COR BLD-ACNC: 2.99 IU/ML
TB GOLD PLUS: NEGATIVE
TB2 - NIL: 0 IU/ML

## 2020-11-29 ENCOUNTER — SPECIALTY PHARMACY (OUTPATIENT)
Dept: PHARMACY | Facility: CLINIC | Age: 50
End: 2020-11-29

## 2020-12-01 ENCOUNTER — TELEPHONE (OUTPATIENT)
Dept: ALLERGY | Facility: CLINIC | Age: 50
End: 2020-12-01

## 2020-12-01 ENCOUNTER — PATIENT MESSAGE (OUTPATIENT)
Dept: PSYCHIATRY | Facility: CLINIC | Age: 50
End: 2020-12-01

## 2020-12-01 DIAGNOSIS — L50.8 CHRONIC URTICARIA: Primary | ICD-10-CM

## 2020-12-01 RX ORDER — OMALIZUMAB 150 MG/ML
300 INJECTION, SOLUTION SUBCUTANEOUS
Qty: 26 SYRINGE | Refills: 1 | Status: SHIPPED | OUTPATIENT
Start: 2020-12-01 | End: 2020-12-04 | Stop reason: SDUPTHER

## 2020-12-01 NOTE — TELEPHONE ENCOUNTER
----- Message from Garo Dwyer PharmD sent at 12/1/2020  7:47 AM CST -----  Regarding: Xolair  Kristine Arce and Staff,  [facility] received prescription for Xolair. Upon calling the patients insurance company, we have been told that this medication is not covered under the patient's pharmacy benefits.  [facility] is unable to bill medical claims for medications. This medication may be available under the patient's medical benefit with a prior authorization. The medication itself, and the administration of the medication, will both have to be billed under the medical benefit. Please contact [facility] with any questions at [phone] Thank you, Garo Dwyer, Jossie  [facility]

## 2020-12-01 NOTE — TELEPHONE ENCOUNTER
Dr. Arce,     We received a message from [facility] that Xolair is not covered under pharmacy benefits.  While trying to enter a (VFS722 referral)-medical referral to see if Xolair is covered through medical benefits, I noticed that the Rx was Xolair 150 mg only.    Can you please confirm dose?  Once done I will be happy to enter that referral.

## 2020-12-01 NOTE — TELEPHONE ENCOUNTER
"Spoke to Meenu SHARMA at [employer] PA department and she states Xolair is plan exclusion and will not be covered by pharmacy benefits with a PA or appeal. Xolair must go buy and bill. Messaged MD to proceed with buy and bill. Closing patient referral at [facility].     The following message was sent to the MDO:   "Kristine Arce and Staff,    [facility] received prescription for Xolair. Upon calling the patients insurance company, we have been told that this medication is not covered under the patient's pharmacy benefits.  [facility] is unable to bill medical claims for medications.     This medication may be available under the patient's medical benefit with a prior authorization. The medication itself, and the administration of the medication, will both have to be billed under the medical benefit. Please contact [facility] with any questions at [phone]     Thank you,   Garo Dwyer, PharmD  [facility]"  "

## 2020-12-03 DIAGNOSIS — L50.1 CHRONIC IDIOPATHIC URTICARIA: Primary | ICD-10-CM

## 2020-12-04 DIAGNOSIS — L50.1 CHRONIC IDIOPATHIC URTICARIA: Primary | ICD-10-CM

## 2020-12-04 RX ORDER — OMALIZUMAB 150 MG/ML
300 INJECTION, SOLUTION SUBCUTANEOUS
Qty: 26 SYRINGE | Refills: 1
Start: 2020-12-04 | End: 2021-10-21

## 2020-12-04 NOTE — TELEPHONE ENCOUNTER
"Can you re-do the Xolair rx as a house but "no print" ? Once that is done I will submit to [facility].  "

## 2020-12-07 ENCOUNTER — PATIENT MESSAGE (OUTPATIENT)
Dept: ALLERGY | Facility: CLINIC | Age: 50
End: 2020-12-07

## 2020-12-08 RX ORDER — DICYCLOMINE HYDROCHLORIDE 20 MG/1
20 TABLET ORAL EVERY 6 HOURS
Qty: 120 TABLET | Refills: 0 | Status: SHIPPED | OUTPATIENT
Start: 2020-12-08 | End: 2021-01-08 | Stop reason: SDUPTHER

## 2020-12-15 ENCOUNTER — TELEPHONE (OUTPATIENT)
Dept: UROLOGY | Facility: CLINIC | Age: 50
End: 2020-12-15

## 2020-12-15 NOTE — TELEPHONE ENCOUNTER
Pt states she is have sciatic pain . She googled it and read that an interstim can cause the pain. She would like to know what you think

## 2020-12-15 NOTE — TELEPHONE ENCOUNTER
----- Message from Isabel Ogden sent at 12/15/2020 11:29 AM CST -----  Contact: pt    Patient would like to speak with a nurse    Marilee [phone]

## 2020-12-15 NOTE — TELEPHONE ENCOUNTER
Called and suggested what to do.  But I think that she has a problem with sciatica, not related to InterStim Therapy.

## 2020-12-21 ENCOUNTER — TELEPHONE (OUTPATIENT)
Dept: PRIMARY CARE CLINIC | Facility: CLINIC | Age: 50
End: 2020-12-21

## 2020-12-21 ENCOUNTER — PATIENT MESSAGE (OUTPATIENT)
Dept: PRIMARY CARE CLINIC | Facility: CLINIC | Age: 50
End: 2020-12-21

## 2020-12-21 ENCOUNTER — OFFICE VISIT (OUTPATIENT)
Dept: PRIMARY CARE CLINIC | Facility: CLINIC | Age: 50
End: 2020-12-21
Payer: COMMERCIAL

## 2020-12-21 DIAGNOSIS — L40.50 PSORIATIC ARTHRITIS: ICD-10-CM

## 2020-12-21 DIAGNOSIS — F41.9 ANXIETY: ICD-10-CM

## 2020-12-21 DIAGNOSIS — L30.9 ECZEMA, UNSPECIFIED TYPE: ICD-10-CM

## 2020-12-21 DIAGNOSIS — F32.A DEPRESSION, UNSPECIFIED DEPRESSION TYPE: ICD-10-CM

## 2020-12-21 DIAGNOSIS — M06.9 RHEUMATOID ARTHRITIS, INVOLVING UNSPECIFIED SITE, UNSPECIFIED WHETHER RHEUMATOID FACTOR PRESENT: ICD-10-CM

## 2020-12-21 DIAGNOSIS — Z87.442 HISTORY OF NEPHROLITHIASIS: ICD-10-CM

## 2020-12-21 DIAGNOSIS — M32.9 LUPUS: ICD-10-CM

## 2020-12-21 DIAGNOSIS — S39.012A LUMBOSACRAL STRAIN, INITIAL ENCOUNTER: Primary | ICD-10-CM

## 2020-12-21 DIAGNOSIS — K58.9 IRRITABLE BOWEL SYNDROME, UNSPECIFIED TYPE: ICD-10-CM

## 2020-12-21 DIAGNOSIS — Z87.2 HISTORY OF ALOPECIA: ICD-10-CM

## 2020-12-21 DIAGNOSIS — N30.10 INTERSTITIAL CYSTITIS: ICD-10-CM

## 2020-12-21 DIAGNOSIS — M79.7 FIBROMYALGIA: ICD-10-CM

## 2020-12-21 DIAGNOSIS — M54.30 SCIATICA, UNSPECIFIED LATERALITY: ICD-10-CM

## 2020-12-21 DIAGNOSIS — F43.10 POSTTRAUMATIC STRESS DISORDER: ICD-10-CM

## 2020-12-21 DIAGNOSIS — K21.9 GASTROESOPHAGEAL REFLUX DISEASE, UNSPECIFIED WHETHER ESOPHAGITIS PRESENT: ICD-10-CM

## 2020-12-21 PROCEDURE — 99214 OFFICE O/P EST MOD 30 MIN: CPT | Mod: 95,,, | Performed by: FAMILY MEDICINE

## 2020-12-21 PROCEDURE — 99214 PR OFFICE/OUTPT VISIT, EST, LEVL IV, 30-39 MIN: ICD-10-PCS | Mod: 95,,, | Performed by: FAMILY MEDICINE

## 2020-12-21 RX ORDER — METHYLPREDNISOLONE 4 MG/1
TABLET ORAL
Qty: 1 PACKAGE | Refills: 0 | Status: SHIPPED | OUTPATIENT
Start: 2020-12-21 | End: 2020-12-29

## 2020-12-21 RX ORDER — BETAMETHASONE SODIUM PHOSPHATE AND BETAMETHASONE ACETATE 3; 3 MG/ML; MG/ML
12 INJECTION, SUSPENSION INTRA-ARTICULAR; INTRALESIONAL; INTRAMUSCULAR; SOFT TISSUE
Status: DISCONTINUED | OUTPATIENT
Start: 2020-12-21 | End: 2022-06-21

## 2020-12-21 NOTE — TELEPHONE ENCOUNTER
Called for patient in regards to virtual appointment with Dr. Adkins for 5:00. Patient has never seen Dr. Adkins and is c/o sciatica. Called to review medication list with patient, no answer. BENI on VM.

## 2020-12-21 NOTE — PROGRESS NOTES
Subjective:       Patient ID: Marilee Simons is a 50 y.o. female.    Chief Complaint: Back Pain    HPI:The patient location is:  home  The chief complaint leading to consultation is:  Sciatica    Visit type: audiovisual    Face to Face time with patient:  35 min  See history of present illness minutes of total time spent on the encounter, which includes face to face time and non-face to face time preparing to see the patient (eg, review of tests), Obtaining and/or reviewing separately obtained history, Documenting clinical information in the electronic or other health record, Independently interpreting results (not separately reported) and communicating results to the patient/family/caregiver, or Care coordination (not separately reported).         Each patient to whom he or she provides medical services by telemedicine is:  (1) informed of the relationship between the physician and patient and the respective role of any other health care provider with respect to management of the patient; and (2) notified that he or she may decline to receive medical services by telemedicine and may withdraw from such care at any time.    Notes:  49 yo WF ---for past 8 days--started where left lower back to the buttock---shooting down to the calf---today was the worst that it has been---no specific trauma-no excessive activities-+ lupus and rheumatoid arthritis--no fracture--+fibromyalgia.  Patient has gabapentin----occasional Ultram-Plaquenil--supposed to be startingHumira--hopefully next week   Patient requesting steroid injection states has not had steroids for over 3 months    ROS:  Skin:  Psoriasis--eczema---atopic dermatitis-and has a history of alopecia--history psoriatic arthritis  HEENT: No headache, ocular pain, blurred vision, diplopia, epistaxis, hoarseness change in voice, thyroid trouble--thyroid level goes up and down  Lung: No pneumonia, asthma, Tb, wheezing, SOB, no smoking  Heart: No chest pain, ankle edema,  palpitations, MI, gage murmur, hypertension, hyperlipidemia--no stent bypass arrhythmia   Abdomen: + nausea due to pain--took an Advil in Flexeril, hx IBS +GERD--occasional diarrhea no vomiting, diarrhea, constipation, ulcers, hepatitis, gallbladder disease, melena, hematochezia, hematemesis  :  History of interstitial cystitis--history of nephrolithiasis years +overactive bladder history of hematuria in the past  MS: no fractures, O/A, lupus, rheumatoid, gout history of lupus rheumatoid arthritis fibromyalgia see history of present illness  Neuro: No dizziness, LOC, seizures   No diabetes, no anemia,+ anxiety, + depression +PTSD --sister committed suicide in front of patient 5 years ago still has flashbacks--when another person dyes patient immediately begins thinking of sister again   2 children 24 and 19-- 1 to apply for disablity but closed for panademic , lives with    Objective:    Physical Exam:  No physical exam was done this was a virtual visit the physical below is myself propagated H&P for new patients  General: Well nourished, well developed, no acute distress  Skin: No lesions  HEENT: Eyes PERRLA, EOM intact, nose patent, throat non-erythematous   NECK: Supple, no bruits, No JVD, no nodes  Lungs: Clear, no rales, rhonchi, wheezing  Heart: Regular rate and rhythm, no murmurs, gallops, or rubs  Abdomen: flat, bowel sounds positive, no tenderness, or organomegaly  MS: Range of motion and muscle strength intact  Neuro: Alert, CN intact, oriented X 3  Extremities: No cyanosis, clubbing, or edema         Assessment:       1. Lumbosacral strain, initial encounter    2. Sciatica, unspecified laterality    3. Eczema, unspecified type    4. History of alopecia    5. Lupus    6. Rheumatoid arthritis, involving unspecified site, unspecified whether rheumatoid factor present    7. Fibromyalgia    8. Psoriatic arthritis    9. Gastroesophageal reflux disease, unspecified whether esophagitis present     10. Irritable bowel syndrome, unspecified type    11. History of nephrolithiasis    12. Interstitial cystitis    13. Anxiety    14. Depression, unspecified depression type    15. Posttraumatic stress disorder        Plan:       Lumbosacral strain, initial encounter    Sciatica, unspecified laterality    Eczema, unspecified type    History of alopecia    Lupus    Rheumatoid arthritis, involving unspecified site, unspecified whether rheumatoid factor present    Fibromyalgia    Psoriatic arthritis    Gastroesophageal reflux disease, unspecified whether esophagitis present    Irritable bowel syndrome, unspecified type    History of nephrolithiasis    Interstitial cystitis    Anxiety    Depression, unspecified depression type    Posttraumatic stress disorder    Other orders  -     betamethasone acetate-betamethasone sodium phosphate injection 12 mg  -     methylPREDNISolone (MEDROL DOSEPACK) 4 mg tablet; use as directed  Dispense: 1 Package; Refill: 0         Sciatica--x8 days---starts in the lower back it to the buttock down the back of the leg to the calf--does not go to the great toe--will give Celestone 12 mg IM patient to start on Medrol Dosepak Wednesday---should hold NSAIDs while on steroids  Moist heat/theragesic/range of motion exercises---if patient desires could add lighted derm patch--patient has Ultram/gabapentin/NSAIDs/Flexeril---history of lupus rheumatoid arthritis fibromyalgia  Multiple medical issues eczema (psoriasis atopic dermatitis), history alopecia history dry eyes history of allergies history fever blisters history GERD history irritable bowel syndrome/lupus/rheumatoid arthritis/? Psoriatic arthritis//fibromyalgia/nephrolithiasis/interstitial cystitis/anxiety/depression/posttraumatic stress/history of thyroid disease up and down--patient with numerous medical issues told best to follow-up only with her normal PCP with this amount of medical issues  Lab should be done by her normal PCP  Health  maintenance issues addressed by normal PCP  Patient is welcome to get Estephania own injection tomorrow told to come in around 8:00 a.m. Cely Dosepak called in

## 2020-12-29 ENCOUNTER — OFFICE VISIT (OUTPATIENT)
Dept: SPINE | Facility: CLINIC | Age: 50
End: 2020-12-29
Attending: PHYSICAL MEDICINE & REHABILITATION
Payer: COMMERCIAL

## 2020-12-29 VITALS
WEIGHT: 158.75 LBS | DIASTOLIC BLOOD PRESSURE: 56 MMHG | HEART RATE: 81 BPM | HEIGHT: 59 IN | SYSTOLIC BLOOD PRESSURE: 106 MMHG | BODY MASS INDEX: 32 KG/M2

## 2020-12-29 DIAGNOSIS — M47.26 OSTEOARTHRITIS OF SPINE WITH RADICULOPATHY, LUMBAR REGION: ICD-10-CM

## 2020-12-29 DIAGNOSIS — M79.7 FIBROMYALGIA: ICD-10-CM

## 2020-12-29 DIAGNOSIS — M54.42 ACUTE LEFT-SIDED LOW BACK PAIN WITH LEFT-SIDED SCIATICA: Primary | ICD-10-CM

## 2020-12-29 PROCEDURE — 99214 PR OFFICE/OUTPT VISIT, EST, LEVL IV, 30-39 MIN: ICD-10-PCS | Mod: S$GLB,,, | Performed by: PHYSICAL MEDICINE & REHABILITATION

## 2020-12-29 PROCEDURE — 1125F PR PAIN SEVERITY QUANTIFIED, PAIN PRESENT: ICD-10-PCS | Mod: S$GLB,,, | Performed by: PHYSICAL MEDICINE & REHABILITATION

## 2020-12-29 PROCEDURE — 99999 PR PBB SHADOW E&M-EST. PATIENT-LVL V: ICD-10-PCS | Mod: PBBFAC,,, | Performed by: PHYSICAL MEDICINE & REHABILITATION

## 2020-12-29 PROCEDURE — 3008F BODY MASS INDEX DOCD: CPT | Mod: CPTII,S$GLB,, | Performed by: PHYSICAL MEDICINE & REHABILITATION

## 2020-12-29 PROCEDURE — 3008F PR BODY MASS INDEX (BMI) DOCUMENTED: ICD-10-PCS | Mod: CPTII,S$GLB,, | Performed by: PHYSICAL MEDICINE & REHABILITATION

## 2020-12-29 PROCEDURE — 99214 OFFICE O/P EST MOD 30 MIN: CPT | Mod: S$GLB,,, | Performed by: PHYSICAL MEDICINE & REHABILITATION

## 2020-12-29 PROCEDURE — 1125F AMNT PAIN NOTED PAIN PRSNT: CPT | Mod: S$GLB,,, | Performed by: PHYSICAL MEDICINE & REHABILITATION

## 2020-12-29 PROCEDURE — 99999 PR PBB SHADOW E&M-EST. PATIENT-LVL V: CPT | Mod: PBBFAC,,, | Performed by: PHYSICAL MEDICINE & REHABILITATION

## 2020-12-29 RX ORDER — IBUPROFEN 800 MG/1
800 TABLET ORAL EVERY 6 HOURS PRN
COMMUNITY
End: 2021-07-26

## 2020-12-29 NOTE — PROGRESS NOTES
Subjective:      Patient ID: Marilee Simons is a 50 y.o. female.    Chief Complaint: Low-back Pain and Leg Pain    Ms Simons is a 49 yo female here for evaluation of low back pain.  She was last seen by me on 3/14/2018 for neck pain. She has fibromyalgia, RA, psoriatic arthritis and lupus.  She has a bladder stim. She has had back pain for past 3 weeks.  She woke up with sciatica out of blue on 12/14.  The pain is the left buttock and down the back of the leg to the calf.  The back of the thigh is the worst.  The pain has been constant.  She felt like heat was helping but no longer.  She has more pain with sitting and bending and turning over in bed.  The pain in the left leg is all words.  The pain is 8/10 now, worst 10/10 activity, best 5/10 lying on heating pad.  She has been lying on heating pad and resting.  She has been taking ibuprofen 800 BID, gabapentin 3 times a day, she is taking tzanidine 2 times a day.  She also tramadol 2 times a day.She did take a medrol dose juan pablo, with no relief.  There is no numbness and no tingling.      X-ray lumbar 6/14/2019  Radiographic examination of the lumbar spine was performed.  7 radiographs are submitted.  There is an electrical stimulator device overlying the pelvis for which clinical and historical correlation is needed.  Calcifications of the pelvis are nonspecific and may represent phleboliths.  On the AP view there is curvature of the lumbar spine, convex to the left.  Chronic appearing endplate changes are noted, there is no evidence for high-grade or acute compression fracture deformity there is no evidence for high-grade spondylolisthesis, there is no evidence for translational instability on flexion and extension views.  Facet arthropathy is noted, there is no evidence for facet dislocation or facet fracture deformity.  The osseous structures appear intact without evidence for osseous destructive process or acute fracture deformity.     Impression:     Chronic  changes are noted there is no evidence for acute fracture deformity.    X-ray si 6/14/2019  Sacral stimulator noted.  SI joints are fairly well maintained allowing for positioning.  No bony destruction.  Pelvic calcifications presumably phleboliths.     Impression:     No convincing abnormality.  Sacral stimulator noted.    MRI lumbar 2016  Alignment:  Within normal limits. Normal lumbar lordosis is preserved.  Vertebrae:  Body heights are well maintained. No osseous fracture or compression deformity.  No marrow signal abnormality suspicious for an infiltrative process.    Discs:  Intervertebral disk space heights are maintained.  Cord:  Visualized lower thoracic spinal cord, conus medullaris and lumbar spinal nerve roots demonstrate normal signal.  The conus terminates at L1.  Soft tissue structures:  No significant abnormalities.     L1-2:  There is no focal disk abnormality.  No significant spinal canal stenosis.  No significant neural foraminal narrowing.     L2-3:  There is no focal disk abnormality.  No significant spinal canal stenosis.  No significant neural foraminal narrowing.     L3-4:  There is mild diffuse disc bulge.  No significant spinal canal stenosis.  No significant neural foraminal narrowing.     L4-5:  There is mild diffuse disc bulge.  No significant spinal canal stenosis.  No significant neural foraminal narrowing.     L5-S1:  There is mild diffuse disc bulge.  No significant spinal canal stenosis.  No significant neural foraminal narrowing.  IMPRESSION:         Unremarkable lumbar spine MRI.    Past Medical History:  No date: Acid reflux  No date: Allergy  No date: Alopecia  No date: Anxiety  No date: Arthralgia  No date: Back pain  No date: Depression  No date: Dry eyes      Comment:  from meds  No date: Fever blister  No date: Fibromyalgia  No date: Interstitial cystitis  No date: Irritable bowel syndrome  No date: Kidney stone  6/25/2013: Major depressive disorder, recurrent episode, in  partial   or unspecified remission  2015: OAB (overactive bladder)  No date: PTSD (post-traumatic stress disorder)  No date: Rheumatoid arthritis  No date: Systemic lupus erythematosus  No date: Thyroid disease  No date: Urinary tract infection  No date: Vaginal infection    Past Surgical History:  No date: BLADDER SURGERY  No date:  SECTION, LOW TRANSVERSE      Comment:  x 2  No date: COLONOSCOPY  10/5/2017: COLONOSCOPY; N/A      Comment:  Procedure: COLONOSCOPY;  Surgeon: Venkat He MD;                Location: [facility] (4TH FLR);  Service: Endoscopy;                 Laterality: N/A;  No date: ESOPHAGOGASTRODUODENOSCOPY  No date: EYE SURGERY      Comment:  Lasik-bilateral  No date: HYSTERECTOMY  2018: INJECTION OF BOTULINUM TOXIN TYPE A; N/A      Comment:  Procedure: INJECTION, BOTULINUM TOXIN, TYPE A  200                UNITS;  Surgeon: Bandar Garcia MD;  Location: [facility] OR                [address] [place] [address];  Service: Urology;  Laterality: N/A;  2018: INSTILLATION OF URINARY BLADDER; N/A      Comment:  Procedure: INSTILLATION, URINARY BLADDER;  Surgeon:                Bandar Garcia MD;  Location: [facility] OR [address] [place] [address];  Service:                Urology;  Laterality: N/A;  No date: PARTIAL HYSTERECTOMY    Review of patient's family history indicates:  Problem: Irritable bowel syndrome      Relation: Mother          Age of Onset: (Not Specified)  Problem: Irritable bowel syndrome      Relation: Sister          Age of Onset: (Not Specified)  Problem: Lupus      Relation: Sister          Age of Onset: (Not Specified)  Problem: Rheum arthritis      Relation: Sister          Age of Onset: (Not Specified)  Problem: Fibromyalgia      Relation: Sister          Age of Onset: (Not Specified)  Problem: Irritable bowel syndrome      Relation: Sister          Age of Onset: (Not Specified)  Problem: Celiac disease      Relation: Neg Hx          Age of Onset: (Not Specified)  Problem: Cirrhosis      Relation: Neg  Hx          Age of Onset: (Not Specified)  Problem: Colon cancer      Relation: Neg Hx          Age of Onset: (Not Specified)  Problem: Colon polyps      Relation: Neg Hx          Age of Onset: (Not Specified)  Problem: Crohn's disease      Relation: Neg Hx          Age of Onset: (Not Specified)  Problem: Cystic fibrosis      Relation: Neg Hx          Age of Onset: (Not Specified)  Problem: Esophageal cancer      Relation: Neg Hx          Age of Onset: (Not Specified)  Problem: Hemochromatosis      Relation: Neg Hx          Age of Onset: (Not Specified)  Problem: Inflammatory bowel disease      Relation: Neg Hx          Age of Onset: (Not Specified)  Problem: Liver cancer      Relation: Neg Hx          Age of Onset: (Not Specified)  Problem: Liver disease      Relation: Neg Hx          Age of Onset: (Not Specified)  Problem: Rectal cancer      Relation: Neg Hx          Age of Onset: (Not Specified)  Problem: Stomach cancer      Relation: Neg Hx          Age of Onset: (Not Specified)  Problem: Ulcerative colitis      Relation: Neg Hx          Age of Onset: (Not Specified)  Problem: Boris's disease      Relation: Neg Hx          Age of Onset: (Not Specified)  Problem: Melanoma      Relation: Neg Hx          Age of Onset: (Not Specified)  Problem: Amblyopia      Relation: Neg Hx          Age of Onset: (Not Specified)  Problem: Blindness      Relation: Neg Hx          Age of Onset: (Not Specified)  Problem: Cataracts      Relation: Neg Hx          Age of Onset: (Not Specified)  Problem: Glaucoma      Relation: Neg Hx          Age of Onset: (Not Specified)  Problem: Macular degeneration      Relation: Neg Hx          Age of Onset: (Not Specified)  Problem: Retinal detachment      Relation: Neg Hx          Age of Onset: (Not Specified)  Problem: Strabismus      Relation: Neg Hx          Age of Onset: (Not Specified)      Social History    Socioeconomic History      Marital status:       Spouse name: Not on file       Number of children: Not on file      Years of education: Not on file      Highest education level: Not on file    Occupational History        Employer: OTHER    Social Needs      Financial resource strain: Not on file      Food insecurity        Worry: Not on file        Inability: Not on file      Transportation needs        Medical: Not on file        Non-medical: Not on file    Tobacco Use      Smoking status: Never Smoker      Smokeless tobacco: Never Used    Substance and Sexual Activity      Alcohol use: No      Drug use: No      Sexual activity: Never    Lifestyle      Physical activity        Days per week: Not on file        Minutes per session: Not on file      Stress: Not on file    Relationships      Social connections        Talks on phone: Not on file        Gets together: Not on file        Attends Mandaen service: Not on file        Active member of club or organization: Not on file        Attends meetings of clubs or organizations: Not on file        Relationship status: Not on file    Other Topics      Concerns:        Are you pregnant or think you may be?: No        Breast-feeding: No    Social History Narrative      Not on file      Current Outpatient Medications:  adalimumab (HUMIRA,CF, PEN) 40 mg/0.4 mL PnKt, Inject 0.4 mLs (40 mg total) into the skin every 14 (fourteen) days. (Patient not taking: Reported on 10/15/2020), Disp: 2 pen, Rfl: 12  alosetron (LOTRONEX) 0.5 MG tablet, TAKE 1 TABLET BY MOUTH DAILY AS NEEDED, Disp: 30 tablet, Rfl: 0  amitriptyline (ELAVIL) 10 MG tablet, Take 1 tablet (10 mg total) by mouth nightly as needed., Disp: 90 tablet, Rfl: 3  butalbitaL-acetaminophen  mg Tab, Take 1 tablet by mouth every 4 (four) hours., Disp: 60 tablet, Rfl: 5  calcium glycerophosphate (PRELIEF) 65 mg Tab, Take 2 tablets by mouth before meals and at bedtime as needed., Disp: 100 each, Rfl: 3  cetirizine (ZYRTEC) 10 MG tablet, Take 2 tablets (20 mg total) by mouth once daily., Disp:  60 tablet, Rfl: 3  clonazePAM (KLONOPIN) 1 MG tablet, TAKE 1 TABLET(1 MG) BY MOUTH TWICE DAILY, Disp: 60 tablet, Rfl: 0  cyclobenzaprine (FLEXERIL) 10 MG tablet, TAKE 1 TABLET(10 MG) BY MOUTH TWICE DAILY PRN, Disp: 90 tablet, Rfl: 6  dicyclomine (BENTYL) 20 mg tablet, Take 1 tablet (20 mg total) by mouth every 6 (six) hours., Disp: 120 tablet, Rfl: 0  DULoxetine (CYMBALTA) 60 MG capsule, Take 2 capsules (120 mg total) by mouth once daily., Disp: 60 capsule, Rfl: 3  estradioL (ESTRACE) 0.5 MG tablet, TAKE 1 TABLET BY MOUTH DAILY, Disp: 30 tablet, Rfl: 3  famotidine (PEPCID) 20 MG tablet, Take 1 tablet (20 mg total) by mouth 2 (two) times daily., Disp: 60 tablet, Rfl: 11  fluticasone propionate (FLONASE) 50 mcg/actuation nasal spray, 2 sprays (100 mcg total) by Each Nostril route 2 (two) times daily., Disp: 31.6 mL, Rfl: 5  gabapentin (NEURONTIN) 800 MG tablet, Take 1 tablet (800 mg total) by mouth 3 (three) times daily., Disp: 90 tablet, Rfl: 11  hydrocortisone 2.5 % cream, Apply to affected areas twice a day when eczema is moderate., Disp: 453.6 g, Rfl: 1  hydrOXYchloroQUINE (PLAQUENIL) 200 mg tablet, Take 1 tablet (200 mg total) by mouth 2 (two) times daily., Disp: 60 tablet, Rfl: 2  hydrOXYzine HCL (ATARAX) 25 MG tablet, 1 tablet at bed time, Disp: 90 tablet, Rfl: 3  levothyroxine (SYNTHROID) 50 MCG tablet, TAKE 1 TABLET BY MOUTH EVERY DAY, Disp: 30 tablet, Rfl: 3  LIDOCAINE 2 %, VALIUM 5 MG, BACLOFEN 4 % SUPPOSITORY, Place 1 suppository vaginally 2 (two) times daily., Disp: 60 each, Rfl: 1  lifitegrast (XIIDRA) 5 % Dpet, Apply 1 drop to eye 2 (two) times daily., Disp: 60 each, Rfl: 12  xgrxky-eooxs-b.blue-sal-naphos (USTELL) 120-0.12 mg Cap, Take 1 capsule by mouth 4 (four) times daily as needed., Disp: 120 each, Rfl: 3  methylPREDNISolone (MEDROL DOSEPACK) 4 mg tablet, use as directed, Disp: 1 Package, Rfl: 0  multivitamin with minerals tablet, Take 1 tablet by mouth once daily., Disp: , Rfl:   omalizumab  (XOLAIR) 150 mg/mL injection, Inject 2 mLs (300 mg total) into the skin every 28 days. for 13 doses, Disp: 26 Syringe, Rfl: 1  OXcarbazepine (TRILEPTAL) 150 MG Tab, Take 1 tablet (150 mg total) by mouth 2 (two) times daily., Disp: 60 tablet, Rfl: 0  oxybutynin (DITROPAN) 5 MG Tab, Take 1 tablet (5 mg total) by mouth 2 (two) times daily., Disp: 60 tablet, Rfl: 12  potassium citrate (UROCIT-K 10) 10 mEq (1,080 mg) TbSR, Take 1 tablet (10 mEq total) by mouth 3 (three) times daily with meals., Disp: 90 tablet, Rfl: 11  promethazine (PHENERGAN) 25 MG tablet, TAKE 1 TABLET(25 MG) BY MOUTH EVERY 6 HOURS AS NEEDED FOR NAUSEA, Disp: 30 tablet, Rfl: 0  RABEprazole (ACIPHEX) 20 mg tablet, TAKE 1 TABLET(20 MG) BY MOUTH EVERY DAY, Disp: 30 tablet, Rfl: 2  tamsulosin (FLOMAX) 0.4 mg Cap, Take 1 capsule (0.4 mg total) by mouth once daily., Disp: 30 capsule, Rfl: 11  tiZANidine (ZANAFLEX) 4 MG tablet, TAKE 1 TABLET(4 MG) BY MOUTH EVERY 8 HOURS AS NEEDED, Disp: 90 tablet, Rfl: 12  traMADoL (ULTRAM) 50 mg tablet, Take 1 tablet (50 mg total) by mouth every 6 (six) hours., Disp: 90 tablet, Rfl: 3    Current Facility-Administered Medications:  betamethasone acetate-betamethasone sodium phosphate injection 12 mg, 12 mg, Intramuscular, 1 time in Clinic/HOD, Fei Adkins MD        Review of patient's allergies indicates:   -- Cephalexin -- Itching   -- Zofran (ondansetron hcl (pf)) -- Hives   -- Penicillins -- Rash        Review of Systems   Constitution: Negative for weight gain and weight loss.   Cardiovascular: Negative for chest pain.   Respiratory: Negative for shortness of breath.    Musculoskeletal: Positive for back pain (left leg). Negative for joint pain and joint swelling.   Gastrointestinal: Positive for nausea (with pain). Negative for abdominal pain, bowel incontinence and vomiting.   Genitourinary: Negative for bladder incontinence.   Neurological: Negative for numbness and paresthesias.         Objective:         General: Marilee is well-developed, well-nourished, appears stated age, in no acute distress, alert and oriented to time, place and person.     General    Vitals reviewed.  Constitutional: She is oriented to person, place, and time. She appears well-developed and well-nourished.   HENT:   Head: Normocephalic and atraumatic.   Pulmonary/Chest: Effort normal.   Neurological: She is alert and oriented to person, place, and time.   Psychiatric: She has a normal mood and affect. Her behavior is normal. Judgment and thought content normal.     General Musculoskeletal Exam   Gait: normal     Right Ankle/Foot Exam     Tests   Heel Walk: able to perform  Tiptoe Walk: able to perform    Left Ankle/Foot Exam     Tests   Heel Walk: able to perform  Tiptoe Walk: unable to perform  Back (L-Spine & T-Spine) / Neck (C-Spine) Exam     Tenderness Left paramedian tenderness of the Sacrum.     Back (L-Spine & T-Spine) Range of Motion   Extension: 20   Flexion: 90   Lateral bend right: 20   Lateral bend left: 20     Spinal Sensation   Right Side Sensation  C-Spine Level: normal   L-Spine Level: normal  S-Spine Level: normal  Left Side Sensation  C-Spine Level: normal  L-Spine Level: normal  S-Spine Level: normal    Back (L-Spine & T-Spine) Tests   Right Side Tests  Straight leg raise:      Sitting SLR: > 70 degrees      Left Side Tests  Straight leg raise:     Sitting SLR: > 70 degrees          Other She has no scoliosis .  Spinal Kyphosis:  Absent      Muscle Strength   Right Upper Extremity   Biceps: 5/5   Deltoid:  5/5  Triceps:  5/5  Wrist extension: 5/5   Finger Flexors:  5/5  Left Upper Extremity  Biceps: 5/5   Deltoid:  5/5  Triceps:  5/5  Wrist extension: 5/5   Finger Flexors:  5/5  Right Lower Extremity   Hip Flexion: 5/5   Quadriceps:  5/5   Anterior tibial:  5/5   EHL:  5/5  Left Lower Extremity   Hip Flexion: 5/5   Quadriceps:  5/5   Anterior tibial:  5/5   EHL:  5/5    Reflexes     Left Side  Biceps:  2+  Triceps:   2+  Brachioradialis:  2+  Achilles:  2+  Left Kent's Sign:  Absent  Babinski Sign:  absent  Quadriceps:  2+    Right Side   Biceps:  2+  Triceps:  2+  Brachioradialis:  2+  Achilles:  2+  Right Kent's Sign:  absent  Babinski Sign:  absent  Quadriceps:  2+    Vascular Exam     Right Pulses        Carotid:                  2+    Left Pulses        Carotid:                  2+              Assessment:       1. Acute left-sided low back pain with left-sided sciatica    2. Fibromyalgia    3. Osteoarthritis of spine with radiculopathy, lumbar region           Plan:       Orders Placed This Encounter    CT Lumbar Spine Without Contrast    Ambulatory referral/consult to Physical/Occupational Therapy     1.  We discussed that it will likely improve and that it is not one thing that causes the pain but an accumulation of multiple things that we do.  She had acute flare of pain, but having leg dominant pain with pos SLR, will get Ct scan  2. We discussed the importance of continuing to move and not staying in bed with heating pad.  We discussed just walking and moving  3. Medrol dose juan pablo was just finished  4. Increase IBuprofen to 3 times a day  5. PT for abck and core strengthening, extension exercises back and piriformis stretches nadege  6. Continue medications on regularly  7. Ct scan lumbar (bladder stim)  8. RTC 10 weeks        Follow-up: No follow-ups on file. If there are any questions prior to this, the patient was instructed to contact the office.

## 2020-12-30 ENCOUNTER — OFFICE VISIT (OUTPATIENT)
Dept: PSYCHIATRY | Facility: CLINIC | Age: 50
End: 2020-12-30
Payer: COMMERCIAL

## 2020-12-30 DIAGNOSIS — F33.1 MAJOR DEPRESSIVE DISORDER, RECURRENT EPISODE, MODERATE: Primary | ICD-10-CM

## 2020-12-30 PROCEDURE — 90833 PSYTX W PT W E/M 30 MIN: CPT | Mod: 95,,, | Performed by: PSYCHIATRY & NEUROLOGY

## 2020-12-30 PROCEDURE — 99214 OFFICE O/P EST MOD 30 MIN: CPT | Mod: 95,,, | Performed by: PSYCHIATRY & NEUROLOGY

## 2020-12-30 PROCEDURE — 90833 PR PSYCHOTHERAPY W/PATIENT W/E&M, 30 MIN (ADD ON): ICD-10-PCS | Mod: 95,,, | Performed by: PSYCHIATRY & NEUROLOGY

## 2020-12-30 PROCEDURE — 99214 PR OFFICE/OUTPT VISIT, EST, LEVL IV, 30-39 MIN: ICD-10-PCS | Mod: 95,,, | Performed by: PSYCHIATRY & NEUROLOGY

## 2020-12-30 RX ORDER — OXCARBAZEPINE 150 MG/1
150 TABLET, FILM COATED ORAL 2 TIMES DAILY
Qty: 60 TABLET | Refills: 3 | Status: SHIPPED | OUTPATIENT
Start: 2020-12-30 | End: 2021-03-02 | Stop reason: SDUPTHER

## 2020-12-30 NOTE — PROGRESS NOTES
"Outpatient Psychiatry Follow-Up Visit (MD/NP)    12/30/2020 The patient location is: home  The chief complaint leading to consultation is: depression    Visit type: audiovisual    Face to Face time with patient: 24", (7" on meds and 17" for supportive counseling)   32 minutes of total time spent on the encounter, which includes face to face time and non-face to face time preparing to see the patient (eg, review of tests), Obtaining and/or reviewing separately obtained history, Documenting clinical information in the electronic or other health record, Independently interpreting results (not separately reported) and communicating results to the patient/family/caregiver, or Care coordination (not separately reported).         Each patient to whom he or she provides medical services by telemedicine is:  (1) informed of the relationship between the physician and patient and the respective role of any other health care provider with respect to management of the patient; and (2) notified that he or she may decline to receive medical services by telemedicine and may withdraw from such care at any time.    Notes: See below    Clinical Status of Patient:  Outpatient (Ambulatory)    Chief Complaint:  Marilee Simons is a 50 y.o. female who presents today for follow-up of depression.  Met with patient and no relatives at interview.     Interval History and Content of Current Session:  Interim Events/Subjective Report/Content of Current Session:  Patient feels she now has more anxiety secondary to ongoing virus pandemic. Patient has no signs of josi or psychosis. Patient does not get out at all even to shop for groceries.  Patient has no active plans to harm herself at this time. She is also dealing with sciatica at this time. Patient does enjoy herself at times too. She can be upbeat and cheerful at times as well. Patient discussed some concerns that occurred with her family at [facility]. We discussed dealing with " her fears of the virus.    Psychotherapy:  · Target symptoms: depression  · Why chosen therapy is appropriate versus another modality: relevant to diagnosis, patient responds to this modality, evidence based practice  · Outcome monitoring methods: self-report  · Therapeutic intervention type: supportive psychotherapy  · Topics discussed/themes: isolation, parenting issues, stress related to medical comorbidities  · The patient's response to the intervention is accepting. The patient's progress toward treatment goals is limited.   · Duration of intervention: 24 minutes.    Review of Systems   · PSYCHIATRIC: Pertinant items are noted in the narrative.    Past Medical, Family and Social History: The patient's past medical, family and social history have been reviewed and updated as appropriate within the electronic medical record - see encounter notes.    Compliance: yes    Side effects: None    Risk Parameters:  Patient reports no suicidal ideation  Patient reports no homicidal ideation  Patient reports no self-injurious behavior  Patient reports no violent behavior    Exam (detailed: at least 9 elements; comprehensive: all 15 elements)   Constitutional  Vitals:  Most recent vital signs, dated less than 90 days prior to this appointment, were reviewed.   There were no vitals filed for this visit. Patient reports stable vital signs recently   General:  unremarkable, age appropriate, casually dressed, neatly groomed     Musculoskeletal  Muscle Strength/Tone:  no tremor, no tic, patient reports reduced muscle tone due to reduced activity   Gait & Station:  walking is limited due to back pain     Psychiatric  Speech:  no latency; no press, spontaneous   Mood & Affect:  depressed  congruent and appropriate   Thought Process:  normal and logical   Associations:  intact   Thought Content:  normal, no suicidality, no homicidality, delusions, or paranoia   Insight:  has awareness of illness   Judgement: behavior is adequate to  circumstances   Orientation:  grossly intact   Memory: intact for content of interview   Language: grossly intact   Attention Span & Concentration:  able to focus   Fund of Knowledge:  intact and appropriate to age and level of education     Assessment and Diagnosis   Status/Progress: Based on the examination today, the patient's problem(s) is/are inadequately controlled.  New problems have been presented today. Back pain  Co-morbidities are complicating management of the primary condition.Pain  The working differential for this patient includes Depressive Disorder.     General Impression: Patient is somewhat overwhelmed by the concerns for the virus pandemic and the resulting isolation plus her current low back and leg pain.      ICD-10-CM ICD-9-CM   1. Major depressive disorder, recurrent episode, moderate  F33.1 296.32       Intervention/Counseling/Treatment Plan   · Medication Management: The risks and benefits of medication were discussed with the patient. Patient agrees to continue Klonopin 1 mg bid, Trileptal 150mg bid, and Cymbalta 60 mg bid.      Return to Clinic: 2 months

## 2021-01-01 ENCOUNTER — PATIENT MESSAGE (OUTPATIENT)
Dept: PSYCHIATRY | Facility: CLINIC | Age: 51
End: 2021-01-01

## 2021-01-08 ENCOUNTER — HOSPITAL ENCOUNTER (OUTPATIENT)
Dept: RADIOLOGY | Facility: HOSPITAL | Age: 51
Discharge: HOME | End: 2021-01-08
Attending: PHYSICAL MEDICINE & REHABILITATION
Payer: COMMERCIAL

## 2021-01-08 DIAGNOSIS — M54.42 ACUTE LEFT-SIDED LOW BACK PAIN WITH LEFT-SIDED SCIATICA: ICD-10-CM

## 2021-01-08 PROCEDURE — 72131 CT LUMBAR SPINE W/O DYE: CPT | Mod: TC

## 2021-01-08 PROCEDURE — 72131 CT LUMBAR SPINE W/O DYE: CPT | Mod: 26,,, | Performed by: RADIOLOGY

## 2021-01-08 PROCEDURE — 72131 CT LUMBAR SPINE WITHOUT CONTRAST: ICD-10-PCS | Mod: 26,,, | Performed by: RADIOLOGY

## 2021-01-08 RX ORDER — DICYCLOMINE HYDROCHLORIDE 20 MG/1
20 TABLET ORAL EVERY 6 HOURS
Qty: 120 TABLET | Refills: 0 | Status: SHIPPED | OUTPATIENT
Start: 2021-01-08 | End: 2021-02-07

## 2021-01-13 ENCOUNTER — CLINICAL SUPPORT (OUTPATIENT)
Dept: REHABILITATION | Facility: HOSPITAL | Age: 51
End: 2021-01-13
Payer: COMMERCIAL

## 2021-01-13 DIAGNOSIS — R26.9 IMPAIRED GAIT: ICD-10-CM

## 2021-01-13 DIAGNOSIS — M54.42 ACUTE LEFT-SIDED LOW BACK PAIN WITH LEFT-SIDED SCIATICA: Primary | ICD-10-CM

## 2021-01-13 DIAGNOSIS — R53.1 DECREASED STRENGTH: ICD-10-CM

## 2021-01-13 PROBLEM — M62.81 MUSCLE WEAKNESS: Status: RESOLVED | Noted: 2018-04-10 | Resolved: 2021-01-13

## 2021-01-13 PROBLEM — R29.898 DECREASED RANGE OF MOTION OF NECK: Status: RESOLVED | Noted: 2018-04-10 | Resolved: 2021-01-13

## 2021-01-13 PROBLEM — M54.2 NECK PAIN: Status: RESOLVED | Noted: 2018-04-10 | Resolved: 2021-01-13

## 2021-01-13 PROCEDURE — 97140 MANUAL THERAPY 1/> REGIONS: CPT | Mod: PN

## 2021-01-13 PROCEDURE — 97110 THERAPEUTIC EXERCISES: CPT | Mod: PN

## 2021-01-13 PROCEDURE — 97162 PT EVAL MOD COMPLEX 30 MIN: CPT | Mod: PN

## 2021-01-18 ENCOUNTER — PATIENT MESSAGE (OUTPATIENT)
Dept: PSYCHIATRY | Facility: CLINIC | Age: 51
End: 2021-01-18

## 2021-01-19 DIAGNOSIS — E03.9 HYPOTHYROIDISM, UNSPECIFIED TYPE: ICD-10-CM

## 2021-01-20 ENCOUNTER — OFFICE VISIT (OUTPATIENT)
Dept: PSYCHIATRY | Facility: CLINIC | Age: 51
End: 2021-01-20
Payer: COMMERCIAL

## 2021-01-20 DIAGNOSIS — F33.1 MAJOR DEPRESSIVE DISORDER, RECURRENT EPISODE, MODERATE: Primary | ICD-10-CM

## 2021-01-20 DIAGNOSIS — F41.1 GENERALIZED ANXIETY DISORDER: ICD-10-CM

## 2021-01-20 PROCEDURE — 90834 PSYTX W PT 45 MINUTES: CPT | Mod: 95,,, | Performed by: SOCIAL WORKER

## 2021-01-20 PROCEDURE — 90834 PR PSYCHOTHERAPY W/PATIENT, 45 MIN: ICD-10-PCS | Mod: 95,,, | Performed by: SOCIAL WORKER

## 2021-01-24 ENCOUNTER — PATIENT MESSAGE (OUTPATIENT)
Dept: RHEUMATOLOGY | Facility: CLINIC | Age: 51
End: 2021-01-24

## 2021-01-24 RX ORDER — LEVOTHYROXINE SODIUM 50 UG/1
50 TABLET ORAL DAILY
Qty: 30 TABLET | Refills: 3 | Status: SHIPPED | OUTPATIENT
Start: 2021-01-24 | End: 2021-08-05

## 2021-01-25 ENCOUNTER — PATIENT MESSAGE (OUTPATIENT)
Dept: RHEUMATOLOGY | Facility: CLINIC | Age: 51
End: 2021-01-25

## 2021-01-26 ENCOUNTER — PATIENT MESSAGE (OUTPATIENT)
Dept: RHEUMATOLOGY | Facility: CLINIC | Age: 51
End: 2021-01-26

## 2021-01-26 DIAGNOSIS — Z20.822 CLOSE EXPOSURE TO 2019 NOVEL CORONAVIRUS: Primary | ICD-10-CM

## 2021-01-26 DIAGNOSIS — D84.821 IMMUNOCOMPROMISED STATE DUE TO DRUG THERAPY: ICD-10-CM

## 2021-01-26 DIAGNOSIS — Z79.899 IMMUNOCOMPROMISED STATE DUE TO DRUG THERAPY: ICD-10-CM

## 2021-01-27 ENCOUNTER — PATIENT MESSAGE (OUTPATIENT)
Dept: RHEUMATOLOGY | Facility: CLINIC | Age: 51
End: 2021-01-27

## 2021-01-27 RX ORDER — AZITHROMYCIN 250 MG/1
250 TABLET, FILM COATED ORAL DAILY
Qty: 10 TABLET | Refills: 0 | Status: SHIPPED | OUTPATIENT
Start: 2021-01-27 | End: 2021-06-17

## 2021-01-27 RX ORDER — DEXAMETHASONE 1 MG/1
1 TABLET ORAL DAILY
Qty: 10 TABLET | Refills: 0 | Status: SHIPPED | OUTPATIENT
Start: 2021-01-27 | End: 2021-02-06

## 2021-02-02 ENCOUNTER — OFFICE VISIT (OUTPATIENT)
Dept: URGENT CARE | Facility: CLINIC | Age: 51
End: 2021-02-02
Payer: COMMERCIAL

## 2021-02-02 VITALS
RESPIRATION RATE: 16 BRPM | HEIGHT: 59 IN | TEMPERATURE: 100 F | DIASTOLIC BLOOD PRESSURE: 75 MMHG | HEART RATE: 92 BPM | OXYGEN SATURATION: 98 % | BODY MASS INDEX: 31.85 KG/M2 | WEIGHT: 158 LBS | SYSTOLIC BLOOD PRESSURE: 112 MMHG

## 2021-02-02 DIAGNOSIS — Z20.822 CLOSE EXPOSURE TO COVID-19 VIRUS: ICD-10-CM

## 2021-02-02 DIAGNOSIS — B34.9 ACUTE VIRAL SYNDROME: Primary | ICD-10-CM

## 2021-02-02 LAB
CTP QC/QA: YES
SARS-COV-2 RDRP RESP QL NAA+PROBE: NEGATIVE

## 2021-02-02 PROCEDURE — U0002 COVID-19 LAB TEST NON-CDC: HCPCS | Mod: QW,S$GLB,, | Performed by: STUDENT IN AN ORGANIZED HEALTH CARE EDUCATION/TRAINING PROGRAM

## 2021-02-02 PROCEDURE — 99213 OFFICE O/P EST LOW 20 MIN: CPT | Mod: S$GLB,,, | Performed by: STUDENT IN AN ORGANIZED HEALTH CARE EDUCATION/TRAINING PROGRAM

## 2021-02-02 PROCEDURE — U0002: ICD-10-PCS | Mod: QW,S$GLB,, | Performed by: STUDENT IN AN ORGANIZED HEALTH CARE EDUCATION/TRAINING PROGRAM

## 2021-02-02 PROCEDURE — U0003 INFECTIOUS AGENT DETECTION BY NUCLEIC ACID (DNA OR RNA); SEVERE ACUTE RESPIRATORY SYNDROME CORONAVIRUS 2 (SARS-COV-2) (CORONAVIRUS DISEASE [COVID-19]), AMPLIFIED PROBE TECHNIQUE, MAKING USE OF HIGH THROUGHPUT TECHNOLOGIES AS DESCRIBED BY CMS-2020-01-R: HCPCS

## 2021-02-02 PROCEDURE — 99213 PR OFFICE/OUTPT VISIT, EST, LEVL III, 20-29 MIN: ICD-10-PCS | Mod: S$GLB,,, | Performed by: STUDENT IN AN ORGANIZED HEALTH CARE EDUCATION/TRAINING PROGRAM

## 2021-02-02 PROCEDURE — 3008F BODY MASS INDEX DOCD: CPT | Mod: CPTII,S$GLB,, | Performed by: STUDENT IN AN ORGANIZED HEALTH CARE EDUCATION/TRAINING PROGRAM

## 2021-02-02 PROCEDURE — 3008F PR BODY MASS INDEX (BMI) DOCUMENTED: ICD-10-PCS | Mod: CPTII,S$GLB,, | Performed by: STUDENT IN AN ORGANIZED HEALTH CARE EDUCATION/TRAINING PROGRAM

## 2021-02-03 ENCOUNTER — PATIENT MESSAGE (OUTPATIENT)
Dept: RHEUMATOLOGY | Facility: CLINIC | Age: 51
End: 2021-02-03

## 2021-02-04 ENCOUNTER — TELEPHONE (OUTPATIENT)
Dept: URGENT CARE | Facility: CLINIC | Age: 51
End: 2021-02-04

## 2021-02-04 LAB — SARS-COV-2 RNA RESP QL NAA+PROBE: NOT DETECTED

## 2021-02-08 ENCOUNTER — PATIENT MESSAGE (OUTPATIENT)
Dept: SPINE | Facility: CLINIC | Age: 51
End: 2021-02-08

## 2021-02-08 DIAGNOSIS — M47.26 OSTEOARTHRITIS OF SPINE WITH RADICULOPATHY, LUMBAR REGION: ICD-10-CM

## 2021-02-08 DIAGNOSIS — M54.42 ACUTE LEFT-SIDED LOW BACK PAIN WITH LEFT-SIDED SCIATICA: Primary | ICD-10-CM

## 2021-02-09 ENCOUNTER — PATIENT MESSAGE (OUTPATIENT)
Dept: RHEUMATOLOGY | Facility: CLINIC | Age: 51
End: 2021-02-09

## 2021-02-09 ENCOUNTER — PATIENT MESSAGE (OUTPATIENT)
Dept: SPINE | Facility: CLINIC | Age: 51
End: 2021-02-09

## 2021-02-09 ENCOUNTER — TELEPHONE (OUTPATIENT)
Dept: PAIN MEDICINE | Facility: OTHER | Age: 51
End: 2021-02-09

## 2021-02-10 ENCOUNTER — TELEPHONE (OUTPATIENT)
Dept: PAIN MEDICINE | Facility: OTHER | Age: 51
End: 2021-02-10

## 2021-02-10 ENCOUNTER — PATIENT MESSAGE (OUTPATIENT)
Dept: PAIN MEDICINE | Facility: OTHER | Age: 51
End: 2021-02-10

## 2021-02-10 DIAGNOSIS — M54.42 ACUTE LEFT-SIDED LOW BACK PAIN WITH LEFT-SIDED SCIATICA: Primary | ICD-10-CM

## 2021-02-15 ENCOUNTER — HOSPITAL ENCOUNTER (OUTPATIENT)
Facility: OTHER | Age: 51
Discharge: HOME | End: 2021-02-15
Attending: ANESTHESIOLOGY | Admitting: ANESTHESIOLOGY
Payer: COMMERCIAL

## 2021-02-15 ENCOUNTER — DOCUMENTATION ONLY (OUTPATIENT)
Dept: REHABILITATION | Facility: HOSPITAL | Age: 51
End: 2021-02-15

## 2021-02-15 VITALS
TEMPERATURE: 99 F | DIASTOLIC BLOOD PRESSURE: 80 MMHG | SYSTOLIC BLOOD PRESSURE: 146 MMHG | HEIGHT: 59 IN | BODY MASS INDEX: 32.25 KG/M2 | OXYGEN SATURATION: 98 % | HEART RATE: 90 BPM | WEIGHT: 160 LBS | RESPIRATION RATE: 18 BRPM

## 2021-02-15 DIAGNOSIS — M54.16 LUMBAR RADICULOPATHY: Primary | ICD-10-CM

## 2021-02-15 DIAGNOSIS — G89.29 CHRONIC PAIN: ICD-10-CM

## 2021-02-15 PROCEDURE — 64483 NJX AA&/STRD TFRM EPI L/S 1: CPT | Mod: LT,,, | Performed by: ANESTHESIOLOGY

## 2021-02-15 PROCEDURE — 64483 PR EPIDURAL INJ, ANES/STEROID, TRANSFORAMINAL, LUMB/SACR, SNGL LEVL: ICD-10-PCS | Mod: LT,,, | Performed by: ANESTHESIOLOGY

## 2021-02-15 PROCEDURE — 63600175 PHARM REV CODE 636 W HCPCS: Performed by: ANESTHESIOLOGY

## 2021-02-15 PROCEDURE — 25500020 PHARM REV CODE 255: Performed by: ANESTHESIOLOGY

## 2021-02-15 PROCEDURE — 25000003 PHARM REV CODE 250: Performed by: ANESTHESIOLOGY

## 2021-02-15 PROCEDURE — 64483 NJX AA&/STRD TFRM EPI L/S 1: CPT | Mod: LT | Performed by: ANESTHESIOLOGY

## 2021-02-15 RX ORDER — DEXAMETHASONE SODIUM PHOSPHATE 10 MG/ML
INJECTION INTRAMUSCULAR; INTRAVENOUS
Status: DISCONTINUED | OUTPATIENT
Start: 2021-02-15 | End: 2021-02-15 | Stop reason: HOSPADM

## 2021-02-15 RX ORDER — LIDOCAINE HYDROCHLORIDE 5 MG/ML
INJECTION, SOLUTION INFILTRATION; INTRAVENOUS
Status: DISCONTINUED | OUTPATIENT
Start: 2021-02-15 | End: 2021-02-15 | Stop reason: HOSPADM

## 2021-02-15 RX ORDER — FENTANYL CITRATE 50 UG/ML
INJECTION, SOLUTION INTRAMUSCULAR; INTRAVENOUS
Status: DISCONTINUED | OUTPATIENT
Start: 2021-02-15 | End: 2021-02-15 | Stop reason: HOSPADM

## 2021-02-15 RX ORDER — MIDAZOLAM HYDROCHLORIDE 1 MG/ML
INJECTION INTRAMUSCULAR; INTRAVENOUS
Status: DISCONTINUED | OUTPATIENT
Start: 2021-02-15 | End: 2021-02-15 | Stop reason: HOSPADM

## 2021-02-15 RX ORDER — LIDOCAINE HYDROCHLORIDE 10 MG/ML
INJECTION INFILTRATION; PERINEURAL
Status: DISCONTINUED | OUTPATIENT
Start: 2021-02-15 | End: 2021-02-15 | Stop reason: HOSPADM

## 2021-02-15 RX ORDER — SODIUM CHLORIDE 9 MG/ML
INJECTION, SOLUTION INTRAVENOUS CONTINUOUS
Status: DISCONTINUED | OUTPATIENT
Start: 2021-02-15 | End: 2021-02-15 | Stop reason: HOSPADM

## 2021-02-17 DIAGNOSIS — R10.9 ABDOMINAL PAIN, UNSPECIFIED ABDOMINAL LOCATION: Primary | ICD-10-CM

## 2021-02-18 RX ORDER — DICYCLOMINE HYDROCHLORIDE 20 MG/1
20 TABLET ORAL EVERY 6 HOURS
Qty: 120 TABLET | Refills: 3 | Status: SHIPPED | OUTPATIENT
Start: 2021-02-18 | End: 2021-03-20

## 2021-02-25 ENCOUNTER — DOCUMENTATION ONLY (OUTPATIENT)
Dept: REHABILITATION | Facility: HOSPITAL | Age: 51
End: 2021-02-25

## 2021-02-25 ENCOUNTER — TELEPHONE (OUTPATIENT)
Dept: REHABILITATION | Facility: HOSPITAL | Age: 51
End: 2021-02-25

## 2021-03-02 ENCOUNTER — OFFICE VISIT (OUTPATIENT)
Dept: PSYCHIATRY | Facility: CLINIC | Age: 51
End: 2021-03-02
Payer: COMMERCIAL

## 2021-03-02 DIAGNOSIS — F33.1 MAJOR DEPRESSIVE DISORDER, RECURRENT EPISODE, MODERATE: Primary | ICD-10-CM

## 2021-03-02 PROCEDURE — 99214 OFFICE O/P EST MOD 30 MIN: CPT | Mod: 95,,, | Performed by: PSYCHIATRY & NEUROLOGY

## 2021-03-02 PROCEDURE — 90833 PSYTX W PT W E/M 30 MIN: CPT | Mod: 95,,, | Performed by: PSYCHIATRY & NEUROLOGY

## 2021-03-02 PROCEDURE — 90833 PR PSYCHOTHERAPY W/PATIENT W/E&M, 30 MIN (ADD ON): ICD-10-PCS | Mod: 95,,, | Performed by: PSYCHIATRY & NEUROLOGY

## 2021-03-02 PROCEDURE — 99214 PR OFFICE/OUTPT VISIT, EST, LEVL IV, 30-39 MIN: ICD-10-PCS | Mod: 95,,, | Performed by: PSYCHIATRY & NEUROLOGY

## 2021-03-02 RX ORDER — DULOXETIN HYDROCHLORIDE 60 MG/1
120 CAPSULE, DELAYED RELEASE ORAL DAILY
Qty: 60 CAPSULE | Refills: 3 | Status: SHIPPED | OUTPATIENT
Start: 2021-03-02 | End: 2021-05-11 | Stop reason: SDUPTHER

## 2021-03-02 RX ORDER — CLONAZEPAM 1 MG/1
1 TABLET ORAL 2 TIMES DAILY
Qty: 60 TABLET | Refills: 0 | Status: SHIPPED | OUTPATIENT
Start: 2021-03-02 | End: 2021-04-16 | Stop reason: SDUPTHER

## 2021-03-02 RX ORDER — OXCARBAZEPINE 150 MG/1
150 TABLET, FILM COATED ORAL 2 TIMES DAILY
Qty: 60 TABLET | Refills: 3 | Status: SHIPPED | OUTPATIENT
Start: 2021-03-02 | End: 2021-05-11 | Stop reason: SDUPTHER

## 2021-03-11 ENCOUNTER — PATIENT MESSAGE (OUTPATIENT)
Dept: SPINE | Facility: CLINIC | Age: 51
End: 2021-03-11

## 2021-03-11 DIAGNOSIS — M54.42 ACUTE LEFT-SIDED LOW BACK PAIN WITH LEFT-SIDED SCIATICA: ICD-10-CM

## 2021-03-11 DIAGNOSIS — M47.26 OSTEOARTHRITIS OF SPINE WITH RADICULOPATHY, LUMBAR REGION: Primary | ICD-10-CM

## 2021-03-11 DIAGNOSIS — M79.7 FIBROMYALGIA: ICD-10-CM

## 2021-03-20 ENCOUNTER — PATIENT MESSAGE (OUTPATIENT)
Dept: ALLERGY | Facility: CLINIC | Age: 51
End: 2021-03-20

## 2021-03-24 ENCOUNTER — PATIENT MESSAGE (OUTPATIENT)
Dept: ALLERGY | Facility: CLINIC | Age: 51
End: 2021-03-24

## 2021-03-24 DIAGNOSIS — L40.50 PSORIATIC ARTHRITIS: ICD-10-CM

## 2021-03-25 DIAGNOSIS — L50.1 CHRONIC IDIOPATHIC URTICARIA: Primary | ICD-10-CM

## 2021-03-25 RX ORDER — HYDROXYCHLOROQUINE SULFATE 200 MG/1
200 TABLET, FILM COATED ORAL 2 TIMES DAILY
Qty: 60 TABLET | Refills: 6 | Status: SHIPPED | OUTPATIENT
Start: 2021-03-25 | End: 2022-01-04 | Stop reason: SDUPTHER

## 2021-03-26 ENCOUNTER — CLINICAL SUPPORT (OUTPATIENT)
Dept: REHABILITATION | Facility: HOSPITAL | Age: 51
End: 2021-03-26
Payer: COMMERCIAL

## 2021-03-26 DIAGNOSIS — M54.42 ACUTE LEFT-SIDED LOW BACK PAIN WITH LEFT-SIDED SCIATICA: ICD-10-CM

## 2021-03-26 DIAGNOSIS — R26.9 IMPAIRED GAIT: ICD-10-CM

## 2021-03-26 DIAGNOSIS — R53.1 DECREASED STRENGTH: ICD-10-CM

## 2021-03-26 PROCEDURE — 97162 PT EVAL MOD COMPLEX 30 MIN: CPT | Mod: PN

## 2021-03-26 PROCEDURE — 97110 THERAPEUTIC EXERCISES: CPT | Mod: PN

## 2021-03-29 ENCOUNTER — PATIENT MESSAGE (OUTPATIENT)
Dept: RHEUMATOLOGY | Facility: CLINIC | Age: 51
End: 2021-03-29

## 2021-03-30 ENCOUNTER — DOCUMENTATION ONLY (OUTPATIENT)
Dept: REHABILITATION | Facility: HOSPITAL | Age: 51
End: 2021-03-30

## 2021-04-01 ENCOUNTER — OFFICE VISIT (OUTPATIENT)
Dept: PSYCHIATRY | Facility: CLINIC | Age: 51
End: 2021-04-01
Payer: COMMERCIAL

## 2021-04-01 DIAGNOSIS — F33.1 MAJOR DEPRESSIVE DISORDER, RECURRENT EPISODE, MODERATE: Primary | ICD-10-CM

## 2021-04-01 DIAGNOSIS — F41.1 GENERALIZED ANXIETY DISORDER: ICD-10-CM

## 2021-04-01 PROCEDURE — 90834 PSYTX W PT 45 MINUTES: CPT | Mod: 95,,, | Performed by: SOCIAL WORKER

## 2021-04-01 PROCEDURE — 90834 PR PSYCHOTHERAPY W/PATIENT, 45 MIN: ICD-10-PCS | Mod: 95,,, | Performed by: SOCIAL WORKER

## 2021-04-06 ENCOUNTER — CLINICAL SUPPORT (OUTPATIENT)
Dept: REHABILITATION | Facility: HOSPITAL | Age: 51
End: 2021-04-06
Payer: COMMERCIAL

## 2021-04-06 DIAGNOSIS — R53.1 DECREASED STRENGTH: ICD-10-CM

## 2021-04-06 DIAGNOSIS — M54.42 ACUTE LEFT-SIDED LOW BACK PAIN WITH LEFT-SIDED SCIATICA: ICD-10-CM

## 2021-04-06 DIAGNOSIS — R26.9 IMPAIRED GAIT: ICD-10-CM

## 2021-04-06 PROCEDURE — 97110 THERAPEUTIC EXERCISES: CPT | Mod: PN

## 2021-04-06 PROCEDURE — 97530 THERAPEUTIC ACTIVITIES: CPT | Mod: PN

## 2021-04-06 PROCEDURE — 97140 MANUAL THERAPY 1/> REGIONS: CPT | Mod: PN

## 2021-04-15 ENCOUNTER — CLINICAL SUPPORT (OUTPATIENT)
Dept: REHABILITATION | Facility: HOSPITAL | Age: 51
End: 2021-04-15
Payer: COMMERCIAL

## 2021-04-15 DIAGNOSIS — R53.1 DECREASED STRENGTH: ICD-10-CM

## 2021-04-15 DIAGNOSIS — M54.42 ACUTE LEFT-SIDED LOW BACK PAIN WITH LEFT-SIDED SCIATICA: ICD-10-CM

## 2021-04-15 DIAGNOSIS — R26.9 IMPAIRED GAIT: ICD-10-CM

## 2021-04-15 PROCEDURE — 97110 THERAPEUTIC EXERCISES: CPT | Mod: PN

## 2021-04-15 PROCEDURE — 97140 MANUAL THERAPY 1/> REGIONS: CPT | Mod: PN

## 2021-04-16 ENCOUNTER — PATIENT MESSAGE (OUTPATIENT)
Dept: PSYCHIATRY | Facility: CLINIC | Age: 51
End: 2021-04-16

## 2021-04-16 RX ORDER — CLONAZEPAM 1 MG/1
1 TABLET ORAL 2 TIMES DAILY
Qty: 60 TABLET | Refills: 0 | Status: SHIPPED | OUTPATIENT
Start: 2021-04-16 | End: 2021-05-11 | Stop reason: SDUPTHER

## 2021-04-20 ENCOUNTER — CLINICAL SUPPORT (OUTPATIENT)
Dept: REHABILITATION | Facility: HOSPITAL | Age: 51
End: 2021-04-20
Payer: COMMERCIAL

## 2021-04-20 DIAGNOSIS — R26.9 IMPAIRED GAIT: ICD-10-CM

## 2021-04-20 DIAGNOSIS — R53.1 DECREASED STRENGTH: ICD-10-CM

## 2021-04-20 DIAGNOSIS — M54.42 ACUTE LEFT-SIDED LOW BACK PAIN WITH LEFT-SIDED SCIATICA: ICD-10-CM

## 2021-04-20 PROCEDURE — 97110 THERAPEUTIC EXERCISES: CPT | Mod: PN

## 2021-04-21 ENCOUNTER — PATIENT MESSAGE (OUTPATIENT)
Dept: RHEUMATOLOGY | Facility: CLINIC | Age: 51
End: 2021-04-21

## 2021-04-21 DIAGNOSIS — L65.9 HAIR LOSS: Primary | ICD-10-CM

## 2021-04-22 ENCOUNTER — PATIENT MESSAGE (OUTPATIENT)
Dept: RHEUMATOLOGY | Facility: CLINIC | Age: 51
End: 2021-04-22

## 2021-04-23 ENCOUNTER — PATIENT MESSAGE (OUTPATIENT)
Dept: PSYCHIATRY | Facility: CLINIC | Age: 51
End: 2021-04-23

## 2021-04-23 DIAGNOSIS — G89.4 CHRONIC PAIN SYNDROME: ICD-10-CM

## 2021-04-23 DIAGNOSIS — M81.0 OSTEOPOROSIS, UNSPECIFIED OSTEOPOROSIS TYPE, UNSPECIFIED PATHOLOGICAL FRACTURE PRESENCE: ICD-10-CM

## 2021-04-23 DIAGNOSIS — M06.00 SERONEGATIVE RHEUMATOID ARTHRITIS: ICD-10-CM

## 2021-04-23 DIAGNOSIS — M47.817 LUMBAR AND SACRAL OSTEOARTHRITIS: ICD-10-CM

## 2021-04-23 DIAGNOSIS — L40.50 PSORIATIC ARTHRITIS: ICD-10-CM

## 2021-04-23 DIAGNOSIS — M79.7 FIBROMYALGIA: ICD-10-CM

## 2021-04-26 ENCOUNTER — PATIENT MESSAGE (OUTPATIENT)
Dept: RHEUMATOLOGY | Facility: CLINIC | Age: 51
End: 2021-04-26

## 2021-04-26 RX ORDER — CYCLOBENZAPRINE HCL 10 MG
TABLET ORAL
Qty: 90 TABLET | Refills: 6 | Status: SHIPPED | OUTPATIENT
Start: 2021-04-26 | End: 2022-01-04

## 2021-04-27 ENCOUNTER — CLINICAL SUPPORT (OUTPATIENT)
Dept: REHABILITATION | Facility: HOSPITAL | Age: 51
End: 2021-04-27
Payer: COMMERCIAL

## 2021-04-27 DIAGNOSIS — R53.1 DECREASED STRENGTH: ICD-10-CM

## 2021-04-27 DIAGNOSIS — R26.9 IMPAIRED GAIT: ICD-10-CM

## 2021-04-27 DIAGNOSIS — M54.42 ACUTE LEFT-SIDED LOW BACK PAIN WITH LEFT-SIDED SCIATICA: ICD-10-CM

## 2021-04-27 PROCEDURE — 97530 THERAPEUTIC ACTIVITIES: CPT | Mod: PN

## 2021-04-27 PROCEDURE — 97110 THERAPEUTIC EXERCISES: CPT | Mod: PN

## 2021-04-27 PROCEDURE — 97140 MANUAL THERAPY 1/> REGIONS: CPT | Mod: PN

## 2021-04-28 ENCOUNTER — PATIENT MESSAGE (OUTPATIENT)
Dept: PSYCHIATRY | Facility: CLINIC | Age: 51
End: 2021-04-28

## 2021-04-29 ENCOUNTER — TELEPHONE (OUTPATIENT)
Dept: NEUROLOGY | Facility: CLINIC | Age: 51
End: 2021-04-29

## 2021-05-07 ENCOUNTER — CLINICAL SUPPORT (OUTPATIENT)
Dept: REHABILITATION | Facility: HOSPITAL | Age: 51
End: 2021-05-07
Payer: COMMERCIAL

## 2021-05-07 DIAGNOSIS — R26.9 IMPAIRED GAIT: ICD-10-CM

## 2021-05-07 DIAGNOSIS — R53.1 DECREASED STRENGTH: ICD-10-CM

## 2021-05-07 DIAGNOSIS — M54.42 ACUTE LEFT-SIDED LOW BACK PAIN WITH LEFT-SIDED SCIATICA: ICD-10-CM

## 2021-05-07 PROCEDURE — 97530 THERAPEUTIC ACTIVITIES: CPT | Mod: PN

## 2021-05-07 PROCEDURE — 97110 THERAPEUTIC EXERCISES: CPT | Mod: PN

## 2021-05-11 ENCOUNTER — OFFICE VISIT (OUTPATIENT)
Dept: PSYCHIATRY | Facility: CLINIC | Age: 51
End: 2021-05-11
Payer: COMMERCIAL

## 2021-05-11 DIAGNOSIS — F33.1 MAJOR DEPRESSIVE DISORDER, RECURRENT EPISODE, MODERATE: Primary | ICD-10-CM

## 2021-05-11 PROCEDURE — 90833 PR PSYCHOTHERAPY W/PATIENT W/E&M, 30 MIN (ADD ON): ICD-10-PCS | Mod: 95,,, | Performed by: PSYCHIATRY & NEUROLOGY

## 2021-05-11 PROCEDURE — 90833 PSYTX W PT W E/M 30 MIN: CPT | Mod: 95,,, | Performed by: PSYCHIATRY & NEUROLOGY

## 2021-05-11 PROCEDURE — 99213 OFFICE O/P EST LOW 20 MIN: CPT | Mod: 95,,, | Performed by: PSYCHIATRY & NEUROLOGY

## 2021-05-11 PROCEDURE — 99213 PR OFFICE/OUTPT VISIT, EST, LEVL III, 20-29 MIN: ICD-10-PCS | Mod: 95,,, | Performed by: PSYCHIATRY & NEUROLOGY

## 2021-05-11 RX ORDER — DULOXETIN HYDROCHLORIDE 60 MG/1
120 CAPSULE, DELAYED RELEASE ORAL DAILY
Qty: 60 CAPSULE | Refills: 3 | Status: SHIPPED | OUTPATIENT
Start: 2021-05-11 | End: 2021-07-22 | Stop reason: SDUPTHER

## 2021-05-11 RX ORDER — OXCARBAZEPINE 150 MG/1
150 TABLET, FILM COATED ORAL 2 TIMES DAILY
Qty: 60 TABLET | Refills: 3 | Status: SHIPPED | OUTPATIENT
Start: 2021-05-11 | End: 2021-07-22 | Stop reason: SDUPTHER

## 2021-05-11 RX ORDER — CLONAZEPAM 1 MG/1
1 TABLET ORAL 2 TIMES DAILY PRN
Qty: 30 TABLET | Refills: 0 | Status: SHIPPED | OUTPATIENT
Start: 2021-05-11 | End: 2021-05-26 | Stop reason: SDUPTHER

## 2021-05-12 ENCOUNTER — PATIENT MESSAGE (OUTPATIENT)
Dept: SPINE | Facility: CLINIC | Age: 51
End: 2021-05-12

## 2021-05-22 ENCOUNTER — HOSPITAL ENCOUNTER (EMERGENCY)
Facility: OTHER | Age: 51
Discharge: HOME | End: 2021-05-22
Attending: EMERGENCY MEDICINE
Payer: COMMERCIAL

## 2021-05-22 VITALS
BODY MASS INDEX: 32.25 KG/M2 | HEIGHT: 59 IN | HEART RATE: 70 BPM | DIASTOLIC BLOOD PRESSURE: 65 MMHG | SYSTOLIC BLOOD PRESSURE: 139 MMHG | WEIGHT: 160 LBS | RESPIRATION RATE: 11 BRPM | TEMPERATURE: 99 F | OXYGEN SATURATION: 100 %

## 2021-05-22 DIAGNOSIS — R53.1 WEAKNESS: ICD-10-CM

## 2021-05-22 DIAGNOSIS — R19.7 NAUSEA VOMITING AND DIARRHEA: Primary | ICD-10-CM

## 2021-05-22 DIAGNOSIS — R11.2 NAUSEA VOMITING AND DIARRHEA: Primary | ICD-10-CM

## 2021-05-22 LAB
ALBUMIN SERPL BCP-MCNC: 4.3 G/DL (ref 3.5–5.2)
ALP SERPL-CCNC: 92 U/L (ref 55–135)
ALT SERPL W/O P-5'-P-CCNC: 19 U/L (ref 10–44)
ANION GAP SERPL CALC-SCNC: 12 MMOL/L (ref 8–16)
AST SERPL-CCNC: 17 U/L (ref 10–40)
BASOPHILS # BLD AUTO: 0.1 K/UL (ref 0–0.2)
BASOPHILS NFR BLD: 1.5 % (ref 0–1.9)
BILIRUB SERPL-MCNC: 0.4 MG/DL (ref 0.1–1)
BILIRUB UR QL STRIP: NEGATIVE
BUN SERPL-MCNC: 14 MG/DL (ref 6–20)
CALCIUM SERPL-MCNC: 9.2 MG/DL (ref 8.7–10.5)
CHLORIDE SERPL-SCNC: 107 MMOL/L (ref 95–110)
CLARITY UR: CLEAR
CO2 SERPL-SCNC: 22 MMOL/L (ref 23–29)
COLOR UR: YELLOW
CREAT SERPL-MCNC: 1 MG/DL (ref 0.5–1.4)
CTP QC/QA: YES
DIFFERENTIAL METHOD: ABNORMAL
EOSINOPHIL # BLD AUTO: 0.3 K/UL (ref 0–0.5)
EOSINOPHIL NFR BLD: 3.9 % (ref 0–8)
ERYTHROCYTE [DISTWIDTH] IN BLOOD BY AUTOMATED COUNT: 13.2 % (ref 11.5–14.5)
EST. GFR  (AFRICAN AMERICAN): >60 ML/MIN/1.73 M^2
EST. GFR  (NON AFRICAN AMERICAN): >60 ML/MIN/1.73 M^2
GLUCOSE SERPL-MCNC: 95 MG/DL (ref 70–110)
GLUCOSE UR QL STRIP: NEGATIVE
HCT VFR BLD AUTO: 40.3 % (ref 37–48.5)
HGB BLD-MCNC: 13.1 G/DL (ref 12–16)
HGB UR QL STRIP: NEGATIVE
IMM GRANULOCYTES # BLD AUTO: 0.02 K/UL (ref 0–0.04)
IMM GRANULOCYTES NFR BLD AUTO: 0.3 % (ref 0–0.5)
KETONES UR QL STRIP: NEGATIVE
LEUKOCYTE ESTERASE UR QL STRIP: NEGATIVE
LIPASE SERPL-CCNC: 26 U/L (ref 4–60)
LYMPHOCYTES # BLD AUTO: 2.6 K/UL (ref 1–4.8)
LYMPHOCYTES NFR BLD: 39.6 % (ref 18–48)
MAGNESIUM SERPL-MCNC: 1.7 MG/DL (ref 1.6–2.6)
MCH RBC QN AUTO: 25.6 PG (ref 27–31)
MCHC RBC AUTO-ENTMCNC: 32.5 G/DL (ref 32–36)
MCV RBC AUTO: 79 FL (ref 82–98)
MONOCYTES # BLD AUTO: 0.6 K/UL (ref 0.3–1)
MONOCYTES NFR BLD: 9.5 % (ref 4–15)
NEUTROPHILS # BLD AUTO: 3 K/UL (ref 1.8–7.7)
NEUTROPHILS NFR BLD: 45.2 % (ref 38–73)
NITRITE UR QL STRIP: NEGATIVE
NRBC BLD-RTO: 0 /100 WBC
PH UR STRIP: 6 [PH] (ref 5–8)
PLATELET # BLD AUTO: 332 K/UL (ref 150–450)
PMV BLD AUTO: 8.7 FL (ref 9.2–12.9)
POTASSIUM SERPL-SCNC: 3.7 MMOL/L (ref 3.5–5.1)
PROT SERPL-MCNC: 7.2 G/DL (ref 6–8.4)
PROT UR QL STRIP: NEGATIVE
RBC # BLD AUTO: 5.12 M/UL (ref 4–5.4)
SARS-COV-2 RDRP RESP QL NAA+PROBE: NEGATIVE
SODIUM SERPL-SCNC: 141 MMOL/L (ref 136–145)
SP GR UR STRIP: >=1.03 (ref 1–1.03)
URN SPEC COLLECT METH UR: ABNORMAL
UROBILINOGEN UR STRIP-ACNC: NEGATIVE EU/DL
WBC # BLD AUTO: 6.64 K/UL (ref 3.9–12.7)

## 2021-05-22 PROCEDURE — 93010 EKG 12-LEAD: ICD-10-PCS | Mod: ,,, | Performed by: INTERNAL MEDICINE

## 2021-05-22 PROCEDURE — 63600175 PHARM REV CODE 636 W HCPCS: Performed by: EMERGENCY MEDICINE

## 2021-05-22 PROCEDURE — U0002 COVID-19 LAB TEST NON-CDC: HCPCS | Performed by: EMERGENCY MEDICINE

## 2021-05-22 PROCEDURE — 83735 ASSAY OF MAGNESIUM: CPT | Performed by: EMERGENCY MEDICINE

## 2021-05-22 PROCEDURE — 25000003 PHARM REV CODE 250: Performed by: EMERGENCY MEDICINE

## 2021-05-22 PROCEDURE — 93010 ELECTROCARDIOGRAM REPORT: CPT | Mod: ,,, | Performed by: INTERNAL MEDICINE

## 2021-05-22 PROCEDURE — 93005 ELECTROCARDIOGRAM TRACING: CPT

## 2021-05-22 PROCEDURE — 85025 COMPLETE CBC W/AUTO DIFF WBC: CPT | Performed by: EMERGENCY MEDICINE

## 2021-05-22 PROCEDURE — 83690 ASSAY OF LIPASE: CPT | Performed by: EMERGENCY MEDICINE

## 2021-05-22 PROCEDURE — 81003 URINALYSIS AUTO W/O SCOPE: CPT | Performed by: EMERGENCY MEDICINE

## 2021-05-22 PROCEDURE — 96365 THER/PROPH/DIAG IV INF INIT: CPT

## 2021-05-22 PROCEDURE — 96375 TX/PRO/DX INJ NEW DRUG ADDON: CPT

## 2021-05-22 PROCEDURE — 99284 EMERGENCY DEPT VISIT MOD MDM: CPT | Mod: 25

## 2021-05-22 PROCEDURE — 80053 COMPREHEN METABOLIC PANEL: CPT | Performed by: EMERGENCY MEDICINE

## 2021-05-22 RX ORDER — SUCRALFATE 1 G/10ML
1 SUSPENSION ORAL
Qty: 280 ML | Refills: 0 | Status: SHIPPED | OUTPATIENT
Start: 2021-05-22 | End: 2021-05-29

## 2021-05-22 RX ORDER — FAMOTIDINE 10 MG/ML
20 INJECTION INTRAVENOUS
Status: COMPLETED | OUTPATIENT
Start: 2021-05-22 | End: 2021-05-22

## 2021-05-22 RX ORDER — PROMETHAZINE HYDROCHLORIDE 25 MG/1
25 TABLET ORAL EVERY 6 HOURS PRN
Qty: 15 TABLET | Refills: 0 | Status: SHIPPED | OUTPATIENT
Start: 2021-05-22 | End: 2021-06-17

## 2021-05-22 RX ORDER — METOCLOPRAMIDE HYDROCHLORIDE 5 MG/ML
10 INJECTION INTRAMUSCULAR; INTRAVENOUS
Status: COMPLETED | OUTPATIENT
Start: 2021-05-22 | End: 2021-05-22

## 2021-05-22 RX ORDER — DIPHENHYDRAMINE HYDROCHLORIDE 50 MG/ML
12.5 INJECTION INTRAMUSCULAR; INTRAVENOUS
Status: COMPLETED | OUTPATIENT
Start: 2021-05-22 | End: 2021-05-22

## 2021-05-22 RX ORDER — SUCRALFATE 1 G/10ML
1 SUSPENSION ORAL
Status: COMPLETED | OUTPATIENT
Start: 2021-05-22 | End: 2021-05-22

## 2021-05-22 RX ADMIN — METOCLOPRAMIDE 10 MG: 5 INJECTION, SOLUTION INTRAMUSCULAR; INTRAVENOUS at 09:05

## 2021-05-22 RX ADMIN — SUCRALFATE 1 G: 1 SUSPENSION ORAL at 10:05

## 2021-05-22 RX ADMIN — PROMETHAZINE HYDROCHLORIDE 12.5 MG: 25 INJECTION INTRAMUSCULAR; INTRAVENOUS at 07:05

## 2021-05-22 RX ADMIN — FAMOTIDINE 20 MG: 10 INJECTION INTRAVENOUS at 07:05

## 2021-05-22 RX ADMIN — DIPHENHYDRAMINE HYDROCHLORIDE 12.5 MG: 50 INJECTION INTRAMUSCULAR; INTRAVENOUS at 09:05

## 2021-05-24 ENCOUNTER — PATIENT MESSAGE (OUTPATIENT)
Dept: RHEUMATOLOGY | Facility: CLINIC | Age: 51
End: 2021-05-24

## 2021-05-25 ENCOUNTER — PATIENT MESSAGE (OUTPATIENT)
Dept: PSYCHIATRY | Facility: CLINIC | Age: 51
End: 2021-05-25

## 2021-05-26 ENCOUNTER — PATIENT MESSAGE (OUTPATIENT)
Dept: PSYCHIATRY | Facility: CLINIC | Age: 51
End: 2021-05-26

## 2021-05-26 RX ORDER — CLONAZEPAM 1 MG/1
1 TABLET ORAL 2 TIMES DAILY PRN
Qty: 30 TABLET | Refills: 0 | Status: SHIPPED | OUTPATIENT
Start: 2021-05-26 | End: 2021-07-22 | Stop reason: SDUPTHER

## 2021-05-28 ENCOUNTER — PATIENT MESSAGE (OUTPATIENT)
Dept: RHEUMATOLOGY | Facility: CLINIC | Age: 51
End: 2021-05-28

## 2021-06-16 ENCOUNTER — PATIENT MESSAGE (OUTPATIENT)
Dept: GASTROENTEROLOGY | Facility: CLINIC | Age: 51
End: 2021-06-16

## 2021-06-17 ENCOUNTER — OFFICE VISIT (OUTPATIENT)
Dept: GASTROENTEROLOGY | Facility: CLINIC | Age: 51
End: 2021-06-17
Payer: COMMERCIAL

## 2021-06-17 ENCOUNTER — PATIENT MESSAGE (OUTPATIENT)
Dept: GASTROENTEROLOGY | Facility: CLINIC | Age: 51
End: 2021-06-17

## 2021-06-17 DIAGNOSIS — K21.9 GASTROESOPHAGEAL REFLUX DISEASE, UNSPECIFIED WHETHER ESOPHAGITIS PRESENT: Primary | ICD-10-CM

## 2021-06-17 PROCEDURE — 99213 PR OFFICE/OUTPT VISIT, EST, LEVL III, 20-29 MIN: ICD-10-PCS | Mod: 95,,, | Performed by: INTERNAL MEDICINE

## 2021-06-17 PROCEDURE — 99213 OFFICE O/P EST LOW 20 MIN: CPT | Mod: 95,,, | Performed by: INTERNAL MEDICINE

## 2021-06-17 RX ORDER — RABEPRAZOLE SODIUM 20 MG/1
20 TABLET, DELAYED RELEASE ORAL DAILY
Qty: 90 TABLET | Refills: 0 | Status: SHIPPED | OUTPATIENT
Start: 2021-06-17 | End: 2021-09-13

## 2021-06-21 ENCOUNTER — DOCUMENTATION ONLY (OUTPATIENT)
Dept: REHABILITATION | Facility: HOSPITAL | Age: 51
End: 2021-06-21

## 2021-06-23 ENCOUNTER — DOCUMENTATION ONLY (OUTPATIENT)
Dept: PSYCHIATRY | Facility: CLINIC | Age: 51
End: 2021-06-23

## 2021-06-24 ENCOUNTER — DOCUMENTATION ONLY (OUTPATIENT)
Dept: PSYCHIATRY | Facility: CLINIC | Age: 51
End: 2021-06-24

## 2021-06-28 ENCOUNTER — TELEPHONE (OUTPATIENT)
Dept: RHEUMATOLOGY | Facility: CLINIC | Age: 51
End: 2021-06-28

## 2021-06-28 DIAGNOSIS — M79.7 FIBROMYALGIA: ICD-10-CM

## 2021-06-28 DIAGNOSIS — L65.9 HAIR LOSS: ICD-10-CM

## 2021-06-28 DIAGNOSIS — M47.817 LUMBAR AND SACRAL OSTEOARTHRITIS: ICD-10-CM

## 2021-06-28 DIAGNOSIS — L40.50 PSORIATIC ARTHRITIS: Primary | ICD-10-CM

## 2021-06-28 DIAGNOSIS — G89.4 CHRONIC PAIN SYNDROME: ICD-10-CM

## 2021-06-29 ENCOUNTER — PATIENT MESSAGE (OUTPATIENT)
Dept: RHEUMATOLOGY | Facility: CLINIC | Age: 51
End: 2021-06-29

## 2021-07-01 ENCOUNTER — OFFICE VISIT (OUTPATIENT)
Dept: PSYCHIATRY | Facility: CLINIC | Age: 51
End: 2021-07-01
Payer: COMMERCIAL

## 2021-07-01 ENCOUNTER — PATIENT MESSAGE (OUTPATIENT)
Dept: PSYCHIATRY | Facility: CLINIC | Age: 51
End: 2021-07-01

## 2021-07-01 DIAGNOSIS — F33.1 MAJOR DEPRESSIVE DISORDER, RECURRENT EPISODE, MODERATE: Primary | ICD-10-CM

## 2021-07-01 DIAGNOSIS — F41.1 GENERALIZED ANXIETY DISORDER: ICD-10-CM

## 2021-07-01 PROCEDURE — 90834 PSYTX W PT 45 MINUTES: CPT | Mod: 95,,, | Performed by: SOCIAL WORKER

## 2021-07-01 PROCEDURE — 90834 PR PSYCHOTHERAPY W/PATIENT, 45 MIN: ICD-10-PCS | Mod: 95,,, | Performed by: SOCIAL WORKER

## 2021-07-02 ENCOUNTER — PATIENT MESSAGE (OUTPATIENT)
Dept: ALLERGY | Facility: CLINIC | Age: 51
End: 2021-07-02

## 2021-07-02 DIAGNOSIS — N30.10 IC (INTERSTITIAL CYSTITIS): ICD-10-CM

## 2021-07-02 RX ORDER — HYDROXYZINE HYDROCHLORIDE 25 MG/1
TABLET, FILM COATED ORAL
Qty: 90 TABLET | Refills: 3 | Status: SHIPPED | OUTPATIENT
Start: 2021-07-02 | End: 2022-01-05

## 2021-07-12 ENCOUNTER — PATIENT MESSAGE (OUTPATIENT)
Dept: GASTROENTEROLOGY | Facility: CLINIC | Age: 51
End: 2021-07-12

## 2021-07-12 RX ORDER — DICYCLOMINE HYDROCHLORIDE 10 MG/1
10 CAPSULE ORAL 2 TIMES DAILY PRN
Qty: 60 CAPSULE | Refills: 0 | Status: SHIPPED | OUTPATIENT
Start: 2021-07-12 | End: 2021-07-13

## 2021-07-13 RX ORDER — HYOSCYAMINE SULFATE 0.125 MG
125 TABLET ORAL EVERY 8 HOURS PRN
Qty: 60 TABLET | Refills: 0 | Status: SHIPPED | OUTPATIENT
Start: 2021-07-13 | End: 2022-06-22

## 2021-07-20 ENCOUNTER — TELEPHONE (OUTPATIENT)
Dept: ALLERGY | Facility: CLINIC | Age: 51
End: 2021-07-20

## 2021-07-20 ENCOUNTER — PATIENT MESSAGE (OUTPATIENT)
Dept: PSYCHIATRY | Facility: CLINIC | Age: 51
End: 2021-07-20

## 2021-07-20 DIAGNOSIS — N30.10 IC (INTERSTITIAL CYSTITIS): ICD-10-CM

## 2021-07-21 ENCOUNTER — OFFICE VISIT (OUTPATIENT)
Dept: PSYCHIATRY | Facility: CLINIC | Age: 51
End: 2021-07-21
Payer: COMMERCIAL

## 2021-07-21 DIAGNOSIS — F33.1 MAJOR DEPRESSIVE DISORDER, RECURRENT EPISODE, MODERATE: Primary | ICD-10-CM

## 2021-07-21 DIAGNOSIS — F41.1 GENERALIZED ANXIETY DISORDER: ICD-10-CM

## 2021-07-21 PROCEDURE — 90834 PR PSYCHOTHERAPY W/PATIENT, 45 MIN: ICD-10-PCS | Mod: 95,,, | Performed by: SOCIAL WORKER

## 2021-07-21 PROCEDURE — 90834 PSYTX W PT 45 MINUTES: CPT | Mod: 95,,, | Performed by: SOCIAL WORKER

## 2021-07-22 ENCOUNTER — OFFICE VISIT (OUTPATIENT)
Dept: PSYCHIATRY | Facility: CLINIC | Age: 51
End: 2021-07-22
Payer: COMMERCIAL

## 2021-07-22 ENCOUNTER — PATIENT MESSAGE (OUTPATIENT)
Dept: PSYCHIATRY | Facility: CLINIC | Age: 51
End: 2021-07-22

## 2021-07-22 DIAGNOSIS — F33.1 MAJOR DEPRESSIVE DISORDER, RECURRENT EPISODE, MODERATE: Primary | ICD-10-CM

## 2021-07-22 PROCEDURE — 99214 OFFICE O/P EST MOD 30 MIN: CPT | Mod: 95,,, | Performed by: PSYCHIATRY & NEUROLOGY

## 2021-07-22 PROCEDURE — 90833 PSYTX W PT W E/M 30 MIN: CPT | Mod: 95,,, | Performed by: PSYCHIATRY & NEUROLOGY

## 2021-07-22 PROCEDURE — 90833 PR PSYCHOTHERAPY W/PATIENT W/E&M, 30 MIN (ADD ON): ICD-10-PCS | Mod: 95,,, | Performed by: PSYCHIATRY & NEUROLOGY

## 2021-07-22 PROCEDURE — 99214 PR OFFICE/OUTPT VISIT, EST, LEVL IV, 30-39 MIN: ICD-10-PCS | Mod: 95,,, | Performed by: PSYCHIATRY & NEUROLOGY

## 2021-07-22 RX ORDER — DULOXETIN HYDROCHLORIDE 60 MG/1
120 CAPSULE, DELAYED RELEASE ORAL DAILY
Qty: 60 CAPSULE | Refills: 3 | Status: SHIPPED | OUTPATIENT
Start: 2021-07-22 | End: 2021-12-10 | Stop reason: SDUPTHER

## 2021-07-22 RX ORDER — CLONAZEPAM 1 MG/1
1 TABLET ORAL 3 TIMES DAILY
Qty: 90 TABLET | Refills: 0 | Status: SHIPPED | OUTPATIENT
Start: 2021-07-22 | End: 2021-09-03 | Stop reason: SDUPTHER

## 2021-07-22 RX ORDER — OXCARBAZEPINE 150 MG/1
150 TABLET, FILM COATED ORAL 2 TIMES DAILY
Qty: 60 TABLET | Refills: 3 | Status: SHIPPED | OUTPATIENT
Start: 2021-07-22 | End: 2021-10-21

## 2021-07-23 ENCOUNTER — HOSPITAL ENCOUNTER (OUTPATIENT)
Dept: RADIOLOGY | Facility: HOSPITAL | Age: 51
Discharge: HOME | End: 2021-07-23
Attending: PHYSICIAN ASSISTANT
Payer: COMMERCIAL

## 2021-07-23 DIAGNOSIS — M81.0 OSTEOPOROSIS, UNSPECIFIED OSTEOPOROSIS TYPE, UNSPECIFIED PATHOLOGICAL FRACTURE PRESENCE: ICD-10-CM

## 2021-07-23 PROCEDURE — 77080 DXA BONE DENSITY AXIAL: CPT | Mod: 26,,, | Performed by: RADIOLOGY

## 2021-07-23 PROCEDURE — 77080 DXA BONE DENSITY AXIAL: CPT | Mod: TC

## 2021-07-23 PROCEDURE — 77080 DEXA BONE DENSITY SPINE HIP: ICD-10-PCS | Mod: 26,,, | Performed by: RADIOLOGY

## 2021-07-26 ENCOUNTER — OFFICE VISIT (OUTPATIENT)
Dept: RHEUMATOLOGY | Facility: CLINIC | Age: 51
End: 2021-07-26
Payer: COMMERCIAL

## 2021-07-26 VITALS
SYSTOLIC BLOOD PRESSURE: 128 MMHG | BODY MASS INDEX: 31.1 KG/M2 | WEIGHT: 154 LBS | DIASTOLIC BLOOD PRESSURE: 85 MMHG | HEART RATE: 103 BPM

## 2021-07-26 DIAGNOSIS — M79.7 FIBROMYALGIA: ICD-10-CM

## 2021-07-26 DIAGNOSIS — Z79.899 IMMUNOCOMPROMISED STATE DUE TO DRUG THERAPY: ICD-10-CM

## 2021-07-26 DIAGNOSIS — G89.4 CHRONIC PAIN SYNDROME: ICD-10-CM

## 2021-07-26 DIAGNOSIS — D84.821 IMMUNOCOMPROMISED STATE DUE TO DRUG THERAPY: ICD-10-CM

## 2021-07-26 DIAGNOSIS — K52.9 COLITIS: ICD-10-CM

## 2021-07-26 DIAGNOSIS — N30.10 IC (INTERSTITIAL CYSTITIS): ICD-10-CM

## 2021-07-26 DIAGNOSIS — M47.817 LUMBAR AND SACRAL OSTEOARTHRITIS: ICD-10-CM

## 2021-07-26 DIAGNOSIS — F43.10 PTSD (POST-TRAUMATIC STRESS DISORDER): ICD-10-CM

## 2021-07-26 DIAGNOSIS — M06.00 SERONEGATIVE RHEUMATOID ARTHRITIS: ICD-10-CM

## 2021-07-26 DIAGNOSIS — L40.50 PSORIATIC ARTHRITIS: Primary | ICD-10-CM

## 2021-07-26 DIAGNOSIS — M81.0 OSTEOPOROSIS, UNSPECIFIED OSTEOPOROSIS TYPE, UNSPECIFIED PATHOLOGICAL FRACTURE PRESENCE: ICD-10-CM

## 2021-07-26 PROCEDURE — 99215 PR OFFICE/OUTPT VISIT, EST, LEVL V, 40-54 MIN: ICD-10-PCS | Mod: 25,S$GLB,, | Performed by: INTERNAL MEDICINE

## 2021-07-26 PROCEDURE — 3074F PR MOST RECENT SYSTOLIC BLOOD PRESSURE < 130 MM HG: ICD-10-PCS | Mod: CPTII,S$GLB,, | Performed by: INTERNAL MEDICINE

## 2021-07-26 PROCEDURE — 3074F SYST BP LT 130 MM HG: CPT | Mod: CPTII,S$GLB,, | Performed by: INTERNAL MEDICINE

## 2021-07-26 PROCEDURE — 99999 PR PBB SHADOW E&M-EST. PATIENT-LVL II: CPT | Mod: PBBFAC,,, | Performed by: INTERNAL MEDICINE

## 2021-07-26 PROCEDURE — 3079F PR MOST RECENT DIASTOLIC BLOOD PRESSURE 80-89 MM HG: ICD-10-PCS | Mod: CPTII,S$GLB,, | Performed by: INTERNAL MEDICINE

## 2021-07-26 PROCEDURE — 3008F BODY MASS INDEX DOCD: CPT | Mod: CPTII,S$GLB,, | Performed by: INTERNAL MEDICINE

## 2021-07-26 PROCEDURE — 96372 THER/PROPH/DIAG INJ SC/IM: CPT | Mod: S$GLB,,, | Performed by: INTERNAL MEDICINE

## 2021-07-26 PROCEDURE — 96372 PR INJECTION,THERAP/PROPH/DIAG2ST, IM OR SUBCUT: ICD-10-PCS | Mod: S$GLB,,, | Performed by: INTERNAL MEDICINE

## 2021-07-26 PROCEDURE — 99999 PR PBB SHADOW E&M-EST. PATIENT-LVL II: ICD-10-PCS | Mod: PBBFAC,,, | Performed by: INTERNAL MEDICINE

## 2021-07-26 PROCEDURE — 99215 OFFICE O/P EST HI 40 MIN: CPT | Mod: 25,S$GLB,, | Performed by: INTERNAL MEDICINE

## 2021-07-26 PROCEDURE — 3008F PR BODY MASS INDEX (BMI) DOCUMENTED: ICD-10-PCS | Mod: CPTII,S$GLB,, | Performed by: INTERNAL MEDICINE

## 2021-07-26 PROCEDURE — 1125F PR PAIN SEVERITY QUANTIFIED, PAIN PRESENT: ICD-10-PCS | Mod: CPTII,S$GLB,, | Performed by: INTERNAL MEDICINE

## 2021-07-26 PROCEDURE — 1125F AMNT PAIN NOTED PAIN PRSNT: CPT | Mod: CPTII,S$GLB,, | Performed by: INTERNAL MEDICINE

## 2021-07-26 PROCEDURE — 3079F DIAST BP 80-89 MM HG: CPT | Mod: CPTII,S$GLB,, | Performed by: INTERNAL MEDICINE

## 2021-07-26 RX ORDER — DICYCLOMINE HYDROCHLORIDE 20 MG/1
20 TABLET ORAL DAILY PRN
COMMUNITY
Start: 2021-03-24 | End: 2023-12-13

## 2021-07-26 RX ORDER — KETOROLAC TROMETHAMINE 30 MG/ML
60 INJECTION, SOLUTION INTRAMUSCULAR; INTRAVENOUS
Status: COMPLETED | OUTPATIENT
Start: 2021-07-26 | End: 2021-07-26

## 2021-07-26 RX ORDER — DEXAMETHASONE SODIUM PHOSPHATE 4 MG/ML
4 INJECTION, SOLUTION INTRA-ARTICULAR; INTRALESIONAL; INTRAMUSCULAR; INTRAVENOUS; SOFT TISSUE
Status: COMPLETED | OUTPATIENT
Start: 2021-07-26 | End: 2021-07-26

## 2021-07-26 RX ORDER — TRAMADOL HYDROCHLORIDE 50 MG/1
50 TABLET ORAL EVERY 6 HOURS
Qty: 90 TABLET | Refills: 3 | Status: SHIPPED | OUTPATIENT
Start: 2021-07-26 | End: 2022-03-28

## 2021-07-26 RX ORDER — CYANOCOBALAMIN 1000 UG/ML
1000 INJECTION, SOLUTION INTRAMUSCULAR; SUBCUTANEOUS
Status: COMPLETED | OUTPATIENT
Start: 2021-07-26 | End: 2021-07-26

## 2021-07-26 RX ORDER — METHYLPREDNISOLONE ACETATE 80 MG/ML
80 INJECTION, SUSPENSION INTRA-ARTICULAR; INTRALESIONAL; INTRAMUSCULAR; SOFT TISSUE
Status: COMPLETED | OUTPATIENT
Start: 2021-07-26 | End: 2021-07-26

## 2021-07-26 RX ORDER — BUDESONIDE 3 MG/1
9 CAPSULE, COATED PELLETS ORAL DAILY
Qty: 30 CAPSULE | Refills: 3 | Status: SHIPPED | OUTPATIENT
Start: 2021-07-26 | End: 2022-10-14

## 2021-07-26 RX ORDER — FAMOTIDINE 40 MG/1
40 TABLET, FILM COATED ORAL NIGHTLY PRN
Qty: 30 TABLET | Refills: 11 | Status: SHIPPED | OUTPATIENT
Start: 2021-07-26 | End: 2022-08-10

## 2021-07-26 RX ADMIN — CYANOCOBALAMIN 1000 MCG: 1000 INJECTION, SOLUTION INTRAMUSCULAR; SUBCUTANEOUS at 03:07

## 2021-07-26 RX ADMIN — DEXAMETHASONE SODIUM PHOSPHATE 4 MG: 4 INJECTION, SOLUTION INTRA-ARTICULAR; INTRALESIONAL; INTRAMUSCULAR; INTRAVENOUS; SOFT TISSUE at 03:07

## 2021-07-26 RX ADMIN — KETOROLAC TROMETHAMINE 60 MG: 30 INJECTION, SOLUTION INTRAMUSCULAR; INTRAVENOUS at 03:07

## 2021-07-26 RX ADMIN — METHYLPREDNISOLONE ACETATE 80 MG: 80 INJECTION, SUSPENSION INTRA-ARTICULAR; INTRALESIONAL; INTRAMUSCULAR; SOFT TISSUE at 03:07

## 2021-07-28 ENCOUNTER — PATIENT MESSAGE (OUTPATIENT)
Dept: RHEUMATOLOGY | Facility: CLINIC | Age: 51
End: 2021-07-28

## 2021-08-05 ENCOUNTER — PATIENT MESSAGE (OUTPATIENT)
Dept: RHEUMATOLOGY | Facility: CLINIC | Age: 51
End: 2021-08-05

## 2021-08-06 ENCOUNTER — PATIENT MESSAGE (OUTPATIENT)
Dept: PSYCHIATRY | Facility: CLINIC | Age: 51
End: 2021-08-06

## 2021-08-06 ENCOUNTER — TELEPHONE (OUTPATIENT)
Dept: PSYCHIATRY | Facility: CLINIC | Age: 51
End: 2021-08-06
Payer: COMMERCIAL

## 2021-08-19 ENCOUNTER — PATIENT MESSAGE (OUTPATIENT)
Dept: RHEUMATOLOGY | Facility: CLINIC | Age: 51
End: 2021-08-19

## 2021-08-25 ENCOUNTER — LAB VISIT (OUTPATIENT)
Dept: LAB | Facility: HOSPITAL | Age: 51
End: 2021-08-25
Attending: INTERNAL MEDICINE
Payer: COMMERCIAL

## 2021-08-25 DIAGNOSIS — D84.821 IMMUNOCOMPROMISED STATE DUE TO DRUG THERAPY: ICD-10-CM

## 2021-08-25 DIAGNOSIS — L40.50 PSORIATIC ARTHRITIS: ICD-10-CM

## 2021-08-25 DIAGNOSIS — F43.10 PTSD (POST-TRAUMATIC STRESS DISORDER): ICD-10-CM

## 2021-08-25 DIAGNOSIS — Z79.899 IMMUNOCOMPROMISED STATE DUE TO DRUG THERAPY: ICD-10-CM

## 2021-08-25 DIAGNOSIS — M06.00 SERONEGATIVE RHEUMATOID ARTHRITIS: ICD-10-CM

## 2021-08-25 DIAGNOSIS — M79.7 FIBROMYALGIA: ICD-10-CM

## 2021-08-25 DIAGNOSIS — M47.817 LUMBAR AND SACRAL OSTEOARTHRITIS: ICD-10-CM

## 2021-08-25 DIAGNOSIS — M81.0 OSTEOPOROSIS, UNSPECIFIED OSTEOPOROSIS TYPE, UNSPECIFIED PATHOLOGICAL FRACTURE PRESENCE: ICD-10-CM

## 2021-08-25 DIAGNOSIS — G89.4 CHRONIC PAIN SYNDROME: ICD-10-CM

## 2021-08-25 DIAGNOSIS — N30.10 IC (INTERSTITIAL CYSTITIS): ICD-10-CM

## 2021-08-25 LAB
ALBUMIN SERPL BCP-MCNC: 4 G/DL (ref 3.5–5.2)
ALP SERPL-CCNC: 112 U/L (ref 55–135)
ALT SERPL W/O P-5'-P-CCNC: 14 U/L (ref 10–44)
ANION GAP SERPL CALC-SCNC: 12 MMOL/L (ref 8–16)
AST SERPL-CCNC: 17 U/L (ref 10–40)
BASOPHILS # BLD AUTO: 0.07 K/UL (ref 0–0.2)
BASOPHILS NFR BLD: 0.8 % (ref 0–1.9)
BILIRUB SERPL-MCNC: 0.3 MG/DL (ref 0.1–1)
BUN SERPL-MCNC: 15 MG/DL (ref 6–20)
CALCIUM SERPL-MCNC: 9.9 MG/DL (ref 8.7–10.5)
CHLORIDE SERPL-SCNC: 103 MMOL/L (ref 95–110)
CO2 SERPL-SCNC: 27 MMOL/L (ref 23–29)
CREAT SERPL-MCNC: 1 MG/DL (ref 0.5–1.4)
DIFFERENTIAL METHOD: ABNORMAL
EOSINOPHIL # BLD AUTO: 0.1 K/UL (ref 0–0.5)
EOSINOPHIL NFR BLD: 1.7 % (ref 0–8)
ERYTHROCYTE [DISTWIDTH] IN BLOOD BY AUTOMATED COUNT: 15.3 % (ref 11.5–14.5)
EST. GFR  (AFRICAN AMERICAN): >60 ML/MIN/1.73 M^2
EST. GFR  (NON AFRICAN AMERICAN): >60 ML/MIN/1.73 M^2
GLUCOSE SERPL-MCNC: 103 MG/DL (ref 70–110)
HCT VFR BLD AUTO: 37.1 % (ref 37–48.5)
HGB BLD-MCNC: 11.8 G/DL (ref 12–16)
IMM GRANULOCYTES # BLD AUTO: 0.02 K/UL (ref 0–0.04)
IMM GRANULOCYTES NFR BLD AUTO: 0.2 % (ref 0–0.5)
LYMPHOCYTES # BLD AUTO: 2.7 K/UL (ref 1–4.8)
LYMPHOCYTES NFR BLD: 32.4 % (ref 18–48)
MCH RBC QN AUTO: 27.7 PG (ref 27–31)
MCHC RBC AUTO-ENTMCNC: 31.8 G/DL (ref 32–36)
MCV RBC AUTO: 87 FL (ref 82–98)
MONOCYTES # BLD AUTO: 0.6 K/UL (ref 0.3–1)
MONOCYTES NFR BLD: 7.4 % (ref 4–15)
NEUTROPHILS # BLD AUTO: 4.9 K/UL (ref 1.8–7.7)
NEUTROPHILS NFR BLD: 57.5 % (ref 38–73)
NRBC BLD-RTO: 0 /100 WBC
PLATELET # BLD AUTO: 323 K/UL (ref 150–450)
PMV BLD AUTO: 8.8 FL (ref 9.2–12.9)
POTASSIUM SERPL-SCNC: 3.8 MMOL/L (ref 3.5–5.1)
PROT SERPL-MCNC: 7.3 G/DL (ref 6–8.4)
RBC # BLD AUTO: 4.26 M/UL (ref 4–5.4)
SODIUM SERPL-SCNC: 142 MMOL/L (ref 136–145)
THYROGLOB AB SERPL IA-ACNC: 27.8 IU/ML (ref 0–3.9)
THYROPEROXIDASE IGG SERPL-ACNC: <6 IU/ML
WBC # BLD AUTO: 8.46 K/UL (ref 3.9–12.7)

## 2021-08-25 PROCEDURE — 86800 THYROGLOBULIN ANTIBODY: CPT | Performed by: INTERNAL MEDICINE

## 2021-08-25 PROCEDURE — 80053 COMPREHEN METABOLIC PANEL: CPT | Performed by: INTERNAL MEDICINE

## 2021-08-25 PROCEDURE — 81375 HLA II TYPING AG EQUIV LR: CPT

## 2021-08-25 PROCEDURE — 83516 IMMUNOASSAY NONANTIBODY: CPT | Performed by: INTERNAL MEDICINE

## 2021-08-25 PROCEDURE — 36415 COLL VENOUS BLD VENIPUNCTURE: CPT | Performed by: INTERNAL MEDICINE

## 2021-08-25 PROCEDURE — 86376 MICROSOMAL ANTIBODY EACH: CPT | Performed by: INTERNAL MEDICINE

## 2021-08-25 PROCEDURE — 85025 COMPLETE CBC W/AUTO DIFF WBC: CPT | Performed by: INTERNAL MEDICINE

## 2021-08-26 ENCOUNTER — TELEPHONE (OUTPATIENT)
Dept: ALLERGY | Facility: CLINIC | Age: 51
End: 2021-08-26

## 2021-08-29 LAB — HISTONE IGG SER IA-ACNC: 1.3 UNITS (ref 0–0.9)

## 2021-08-31 ENCOUNTER — PATIENT MESSAGE (OUTPATIENT)
Dept: PSYCHIATRY | Facility: CLINIC | Age: 51
End: 2021-08-31

## 2021-09-03 ENCOUNTER — PATIENT MESSAGE (OUTPATIENT)
Dept: RHEUMATOLOGY | Facility: CLINIC | Age: 51
End: 2021-09-03

## 2021-09-03 ENCOUNTER — PATIENT MESSAGE (OUTPATIENT)
Dept: PSYCHIATRY | Facility: CLINIC | Age: 51
End: 2021-09-03

## 2021-09-03 DIAGNOSIS — L40.50 PSORIATIC ARTHRITIS: Primary | ICD-10-CM

## 2021-09-03 RX ORDER — CLONAZEPAM 1 MG/1
1 TABLET ORAL 3 TIMES DAILY
Qty: 90 TABLET | Refills: 0 | Status: SHIPPED | OUTPATIENT
Start: 2021-09-03 | End: 2021-10-08 | Stop reason: SDUPTHER

## 2021-09-10 RX ORDER — METHYLPREDNISOLONE 4 MG/1
TABLET ORAL
Qty: 1 PACKAGE | Refills: 0 | Status: SHIPPED | OUTPATIENT
Start: 2021-09-10 | End: 2021-10-21

## 2021-09-11 ENCOUNTER — PATIENT MESSAGE (OUTPATIENT)
Dept: RHEUMATOLOGY | Facility: CLINIC | Age: 51
End: 2021-09-11

## 2021-09-15 ENCOUNTER — OFFICE VISIT (OUTPATIENT)
Dept: FAMILY MEDICINE | Facility: CLINIC | Age: 51
End: 2021-09-15
Payer: COMMERCIAL

## 2021-09-15 VITALS
HEIGHT: 59 IN | BODY MASS INDEX: 32.67 KG/M2 | HEART RATE: 118 BPM | DIASTOLIC BLOOD PRESSURE: 76 MMHG | OXYGEN SATURATION: 97 % | SYSTOLIC BLOOD PRESSURE: 112 MMHG | WEIGHT: 162.06 LBS

## 2021-09-15 DIAGNOSIS — F33.1 MAJOR DEPRESSIVE DISORDER, RECURRENT EPISODE, MODERATE: ICD-10-CM

## 2021-09-15 DIAGNOSIS — R23.2 HOT FLASHES: Primary | ICD-10-CM

## 2021-09-15 DIAGNOSIS — F43.10 PTSD (POST-TRAUMATIC STRESS DISORDER): ICD-10-CM

## 2021-09-15 DIAGNOSIS — E03.8 OTHER SPECIFIED HYPOTHYROIDISM: ICD-10-CM

## 2021-09-15 DIAGNOSIS — F41.9 ANXIETY: ICD-10-CM

## 2021-09-15 DIAGNOSIS — M06.9 RHEUMATOID ARTHRITIS OF OTHER SITE, UNSPECIFIED WHETHER RHEUMATOID FACTOR PRESENT: ICD-10-CM

## 2021-09-15 DIAGNOSIS — Z01.419 VISIT FOR PELVIC EXAM: ICD-10-CM

## 2021-09-15 DIAGNOSIS — N30.10 IC (INTERSTITIAL CYSTITIS): ICD-10-CM

## 2021-09-15 DIAGNOSIS — M79.7 FIBROMYALGIA: ICD-10-CM

## 2021-09-15 DIAGNOSIS — R13.19 ESOPHAGEAL DYSPHAGIA: ICD-10-CM

## 2021-09-15 PROCEDURE — 1160F PR REVIEW ALL MEDS BY PRESCRIBER/CLIN PHARMACIST DOCUMENTED: ICD-10-PCS | Mod: CPTII,S$GLB,, | Performed by: INTERNAL MEDICINE

## 2021-09-15 PROCEDURE — 1159F PR MEDICATION LIST DOCUMENTED IN MEDICAL RECORD: ICD-10-PCS | Mod: CPTII,S$GLB,, | Performed by: INTERNAL MEDICINE

## 2021-09-15 PROCEDURE — 3074F SYST BP LT 130 MM HG: CPT | Mod: CPTII,S$GLB,, | Performed by: INTERNAL MEDICINE

## 2021-09-15 PROCEDURE — 1159F MED LIST DOCD IN RCRD: CPT | Mod: CPTII,S$GLB,, | Performed by: INTERNAL MEDICINE

## 2021-09-15 PROCEDURE — 3008F BODY MASS INDEX DOCD: CPT | Mod: CPTII,S$GLB,, | Performed by: INTERNAL MEDICINE

## 2021-09-15 PROCEDURE — 99214 OFFICE O/P EST MOD 30 MIN: CPT | Mod: S$GLB,,, | Performed by: INTERNAL MEDICINE

## 2021-09-15 PROCEDURE — 99214 PR OFFICE/OUTPT VISIT, EST, LEVL IV, 30-39 MIN: ICD-10-PCS | Mod: S$GLB,,, | Performed by: INTERNAL MEDICINE

## 2021-09-15 PROCEDURE — 3078F PR MOST RECENT DIASTOLIC BLOOD PRESSURE < 80 MM HG: ICD-10-PCS | Mod: CPTII,S$GLB,, | Performed by: INTERNAL MEDICINE

## 2021-09-15 PROCEDURE — 3074F PR MOST RECENT SYSTOLIC BLOOD PRESSURE < 130 MM HG: ICD-10-PCS | Mod: CPTII,S$GLB,, | Performed by: INTERNAL MEDICINE

## 2021-09-15 PROCEDURE — 99999 PR PBB SHADOW E&M-EST. PATIENT-LVL III: CPT | Mod: PBBFAC,,, | Performed by: INTERNAL MEDICINE

## 2021-09-15 PROCEDURE — 3008F PR BODY MASS INDEX (BMI) DOCUMENTED: ICD-10-PCS | Mod: CPTII,S$GLB,, | Performed by: INTERNAL MEDICINE

## 2021-09-15 PROCEDURE — 3078F DIAST BP <80 MM HG: CPT | Mod: CPTII,S$GLB,, | Performed by: INTERNAL MEDICINE

## 2021-09-15 PROCEDURE — 99999 PR PBB SHADOW E&M-EST. PATIENT-LVL III: ICD-10-PCS | Mod: PBBFAC,,, | Performed by: INTERNAL MEDICINE

## 2021-09-15 PROCEDURE — 1160F RVW MEDS BY RX/DR IN RCRD: CPT | Mod: CPTII,S$GLB,, | Performed by: INTERNAL MEDICINE

## 2021-09-16 ENCOUNTER — LAB VISIT (OUTPATIENT)
Dept: LAB | Facility: HOSPITAL | Age: 51
End: 2021-09-16
Attending: INTERNAL MEDICINE
Payer: COMMERCIAL

## 2021-09-16 ENCOUNTER — OFFICE VISIT (OUTPATIENT)
Dept: ENDOCRINOLOGY | Facility: CLINIC | Age: 51
End: 2021-09-16
Payer: COMMERCIAL

## 2021-09-16 VITALS
WEIGHT: 161.19 LBS | DIASTOLIC BLOOD PRESSURE: 80 MMHG | BODY MASS INDEX: 32.49 KG/M2 | HEIGHT: 59 IN | SYSTOLIC BLOOD PRESSURE: 144 MMHG

## 2021-09-16 DIAGNOSIS — E06.3 HYPOTHYROIDISM DUE TO HASHIMOTO'S THYROIDITIS: Primary | ICD-10-CM

## 2021-09-16 DIAGNOSIS — R23.2 HOT FLASHES: ICD-10-CM

## 2021-09-16 DIAGNOSIS — E03.8 HYPOTHYROIDISM DUE TO HASHIMOTO'S THYROIDITIS: Primary | ICD-10-CM

## 2021-09-16 LAB
25(OH)D3+25(OH)D2 SERPL-MCNC: 53 NG/ML (ref 30–96)
ALBUMIN SERPL BCP-MCNC: 4 G/DL (ref 3.5–5.2)
ALP SERPL-CCNC: 109 U/L (ref 55–135)
ALT SERPL W/O P-5'-P-CCNC: 22 U/L (ref 10–44)
ANION GAP SERPL CALC-SCNC: 10 MMOL/L (ref 8–16)
AST SERPL-CCNC: 17 U/L (ref 10–40)
BASOPHILS # BLD AUTO: 0.06 K/UL (ref 0–0.2)
BASOPHILS NFR BLD: 0.8 % (ref 0–1.9)
BILIRUB SERPL-MCNC: 0.4 MG/DL (ref 0.1–1)
BUN SERPL-MCNC: 15 MG/DL (ref 6–20)
CALCIUM SERPL-MCNC: 9.2 MG/DL (ref 8.7–10.5)
CHLORIDE SERPL-SCNC: 101 MMOL/L (ref 95–110)
CHOLEST SERPL-MCNC: 232 MG/DL (ref 120–199)
CHOLEST/HDLC SERPL: 3.6 {RATIO} (ref 2–5)
CO2 SERPL-SCNC: 27 MMOL/L (ref 23–29)
CREAT SERPL-MCNC: 0.9 MG/DL (ref 0.5–1.4)
CRP SERPL-MCNC: 9.1 MG/L (ref 0–8.2)
DIFFERENTIAL METHOD: ABNORMAL
EOSINOPHIL # BLD AUTO: 0.2 K/UL (ref 0–0.5)
EOSINOPHIL NFR BLD: 2 % (ref 0–8)
ERYTHROCYTE [DISTWIDTH] IN BLOOD BY AUTOMATED COUNT: 14.4 % (ref 11.5–14.5)
EST. GFR  (AFRICAN AMERICAN): >60 ML/MIN/1.73 M^2
EST. GFR  (NON AFRICAN AMERICAN): >60 ML/MIN/1.73 M^2
GLUCOSE SERPL-MCNC: 102 MG/DL (ref 70–110)
HCT VFR BLD AUTO: 36.4 % (ref 37–48.5)
HDLC SERPL-MCNC: 64 MG/DL (ref 40–75)
HDLC SERPL: 27.6 % (ref 20–50)
HGB BLD-MCNC: 12 G/DL (ref 12–16)
IMM GRANULOCYTES # BLD AUTO: 0.02 K/UL (ref 0–0.04)
IMM GRANULOCYTES NFR BLD AUTO: 0.3 % (ref 0–0.5)
LDLC SERPL CALC-MCNC: 140.6 MG/DL (ref 63–159)
LYMPHOCYTES # BLD AUTO: 2.4 K/UL (ref 1–4.8)
LYMPHOCYTES NFR BLD: 30.4 % (ref 18–48)
MCH RBC QN AUTO: 28.2 PG (ref 27–31)
MCHC RBC AUTO-ENTMCNC: 33 G/DL (ref 32–36)
MCV RBC AUTO: 86 FL (ref 82–98)
MONOCYTES # BLD AUTO: 0.6 K/UL (ref 0.3–1)
MONOCYTES NFR BLD: 7.8 % (ref 4–15)
NEUTROPHILS # BLD AUTO: 4.6 K/UL (ref 1.8–7.7)
NEUTROPHILS NFR BLD: 58.7 % (ref 38–73)
NONHDLC SERPL-MCNC: 168 MG/DL
NRBC BLD-RTO: 0 /100 WBC
PLATELET # BLD AUTO: 294 K/UL (ref 150–450)
PMV BLD AUTO: 8.9 FL (ref 9.2–12.9)
POTASSIUM SERPL-SCNC: 4.6 MMOL/L (ref 3.5–5.1)
PROT SERPL-MCNC: 7.1 G/DL (ref 6–8.4)
RBC # BLD AUTO: 4.25 M/UL (ref 4–5.4)
SODIUM SERPL-SCNC: 138 MMOL/L (ref 136–145)
TRIGL SERPL-MCNC: 137 MG/DL (ref 30–150)
TSH SERPL DL<=0.005 MIU/L-ACNC: 1.44 UIU/ML (ref 0.4–4)
WBC # BLD AUTO: 7.84 K/UL (ref 3.9–12.7)

## 2021-09-16 PROCEDURE — 86140 C-REACTIVE PROTEIN: CPT | Performed by: INTERNAL MEDICINE

## 2021-09-16 PROCEDURE — 3077F SYST BP >= 140 MM HG: CPT | Mod: CPTII,S$GLB,, | Performed by: INTERNAL MEDICINE

## 2021-09-16 PROCEDURE — 82672 ASSAY OF ESTROGEN: CPT | Performed by: INTERNAL MEDICINE

## 2021-09-16 PROCEDURE — 1159F MED LIST DOCD IN RCRD: CPT | Mod: CPTII,S$GLB,, | Performed by: INTERNAL MEDICINE

## 2021-09-16 PROCEDURE — 3079F DIAST BP 80-89 MM HG: CPT | Mod: CPTII,S$GLB,, | Performed by: INTERNAL MEDICINE

## 2021-09-16 PROCEDURE — 1160F RVW MEDS BY RX/DR IN RCRD: CPT | Mod: CPTII,S$GLB,, | Performed by: INTERNAL MEDICINE

## 2021-09-16 PROCEDURE — 80053 COMPREHEN METABOLIC PANEL: CPT | Performed by: INTERNAL MEDICINE

## 2021-09-16 PROCEDURE — 3077F PR MOST RECENT SYSTOLIC BLOOD PRESSURE >= 140 MM HG: ICD-10-PCS | Mod: CPTII,S$GLB,, | Performed by: INTERNAL MEDICINE

## 2021-09-16 PROCEDURE — 82306 VITAMIN D 25 HYDROXY: CPT | Performed by: INTERNAL MEDICINE

## 2021-09-16 PROCEDURE — 1159F PR MEDICATION LIST DOCUMENTED IN MEDICAL RECORD: ICD-10-PCS | Mod: CPTII,S$GLB,, | Performed by: INTERNAL MEDICINE

## 2021-09-16 PROCEDURE — 99999 PR PBB SHADOW E&M-EST. PATIENT-LVL III: CPT | Mod: PBBFAC,,, | Performed by: INTERNAL MEDICINE

## 2021-09-16 PROCEDURE — 85025 COMPLETE CBC W/AUTO DIFF WBC: CPT | Performed by: INTERNAL MEDICINE

## 2021-09-16 PROCEDURE — 3008F BODY MASS INDEX DOCD: CPT | Mod: CPTII,S$GLB,, | Performed by: INTERNAL MEDICINE

## 2021-09-16 PROCEDURE — 36415 COLL VENOUS BLD VENIPUNCTURE: CPT | Performed by: INTERNAL MEDICINE

## 2021-09-16 PROCEDURE — 3079F PR MOST RECENT DIASTOLIC BLOOD PRESSURE 80-89 MM HG: ICD-10-PCS | Mod: CPTII,S$GLB,, | Performed by: INTERNAL MEDICINE

## 2021-09-16 PROCEDURE — 99204 OFFICE O/P NEW MOD 45 MIN: CPT | Mod: S$GLB,,, | Performed by: INTERNAL MEDICINE

## 2021-09-16 PROCEDURE — 80061 LIPID PANEL: CPT | Performed by: INTERNAL MEDICINE

## 2021-09-16 PROCEDURE — 99999 PR PBB SHADOW E&M-EST. PATIENT-LVL III: ICD-10-PCS | Mod: PBBFAC,,, | Performed by: INTERNAL MEDICINE

## 2021-09-16 PROCEDURE — 1160F PR REVIEW ALL MEDS BY PRESCRIBER/CLIN PHARMACIST DOCUMENTED: ICD-10-PCS | Mod: CPTII,S$GLB,, | Performed by: INTERNAL MEDICINE

## 2021-09-16 PROCEDURE — 3008F PR BODY MASS INDEX (BMI) DOCUMENTED: ICD-10-PCS | Mod: CPTII,S$GLB,, | Performed by: INTERNAL MEDICINE

## 2021-09-16 PROCEDURE — 84443 ASSAY THYROID STIM HORMONE: CPT | Performed by: INTERNAL MEDICINE

## 2021-09-16 PROCEDURE — 99204 PR OFFICE/OUTPT VISIT, NEW, LEVL IV, 45-59 MIN: ICD-10-PCS | Mod: S$GLB,,, | Performed by: INTERNAL MEDICINE

## 2021-09-17 ENCOUNTER — PATIENT MESSAGE (OUTPATIENT)
Dept: ENDOCRINOLOGY | Facility: CLINIC | Age: 51
End: 2021-09-17

## 2021-09-17 ENCOUNTER — TELEPHONE (OUTPATIENT)
Dept: RHEUMATOLOGY | Facility: CLINIC | Age: 51
End: 2021-09-17

## 2021-09-17 ENCOUNTER — PATIENT MESSAGE (OUTPATIENT)
Dept: FAMILY MEDICINE | Facility: CLINIC | Age: 51
End: 2021-09-17

## 2021-09-17 ENCOUNTER — PATIENT MESSAGE (OUTPATIENT)
Dept: GASTROENTEROLOGY | Facility: CLINIC | Age: 51
End: 2021-09-17

## 2021-09-17 ENCOUNTER — PATIENT MESSAGE (OUTPATIENT)
Dept: PSYCHIATRY | Facility: CLINIC | Age: 51
End: 2021-09-17

## 2021-09-17 DIAGNOSIS — E78.00 PURE HYPERCHOLESTEROLEMIA: Primary | ICD-10-CM

## 2021-09-17 DIAGNOSIS — E03.8 HYPOTHYROIDISM DUE TO HASHIMOTO'S THYROIDITIS: Primary | ICD-10-CM

## 2021-09-17 DIAGNOSIS — E06.3 HYPOTHYROIDISM DUE TO HASHIMOTO'S THYROIDITIS: Primary | ICD-10-CM

## 2021-09-17 DIAGNOSIS — E03.9 HYPOTHYROIDISM, UNSPECIFIED TYPE: ICD-10-CM

## 2021-09-17 DIAGNOSIS — K90.0 CELIAC DISEASE: ICD-10-CM

## 2021-09-17 RX ORDER — LEVOTHYROXINE SODIUM 50 UG/1
TABLET ORAL
Qty: 34 TABLET | Refills: 11 | Status: SHIPPED | OUTPATIENT
Start: 2021-09-17 | End: 2022-10-25 | Stop reason: SDUPTHER

## 2021-09-20 ENCOUNTER — PATIENT OUTREACH (OUTPATIENT)
Dept: ADMINISTRATIVE | Facility: OTHER | Age: 51
End: 2021-09-20

## 2021-09-20 ENCOUNTER — PATIENT MESSAGE (OUTPATIENT)
Dept: RHEUMATOLOGY | Facility: CLINIC | Age: 51
End: 2021-09-20

## 2021-09-20 ENCOUNTER — PATIENT MESSAGE (OUTPATIENT)
Dept: DERMATOLOGY | Facility: CLINIC | Age: 51
End: 2021-09-20

## 2021-09-20 DIAGNOSIS — R13.19 ESOPHAGEAL DYSPHAGIA: Primary | ICD-10-CM

## 2021-09-20 LAB — ESTROGEN SERPL-MCNC: 81 PG/ML

## 2021-09-21 ENCOUNTER — PATIENT MESSAGE (OUTPATIENT)
Dept: DERMATOLOGY | Facility: CLINIC | Age: 51
End: 2021-09-21

## 2021-09-21 LAB
CELIA INTERPRETATION: NORMAL
CELIA TESTING DATE: NORMAL
HLA DQA1 1: NORMAL
HLA DQA1 2: NORMAL
HLA DRB4 1: NORMAL
HLA-DP 1 SERO. EQUIV: NORMAL
HLA-DP 2 SERO. EQUIV: NORMAL
HLA-DPA1 1: NORMAL
HLA-DPA1 2: NORMAL
HLA-DPB1 1: NORMAL
HLA-DPB1 2: NORMAL
HLA-DQ 1 SERO. EQUIV: 2
HLA-DQ 2 SERO. EQUIV: NORMAL
HLA-DQB1 1: NORMAL
HLA-DQB1 2: NORMAL
HLA-DRB1 1 SERO. EQUIV: NORMAL
HLA-DRB1 1: NORMAL
HLA-DRB1 2 SERO. EQUIV: NORMAL
HLA-DRB1 2: NORMAL
HLA-DRB3 1: NORMAL
HLA-DRB3 2: NORMAL
HLA-DRB345 1 SERO. EQUIV: NORMAL
HLA-DRB345 2 SERO. EQUIV: NORMAL
HLA-DRB4 2: NORMAL
HLA-DRB5 1: NORMAL
HLA-DRB5 2: NORMAL

## 2021-09-22 ENCOUNTER — PATIENT MESSAGE (OUTPATIENT)
Dept: PSYCHIATRY | Facility: CLINIC | Age: 51
End: 2021-09-22

## 2021-09-22 ENCOUNTER — PATIENT MESSAGE (OUTPATIENT)
Dept: RHEUMATOLOGY | Facility: CLINIC | Age: 51
End: 2021-09-22

## 2021-09-22 ENCOUNTER — PATIENT MESSAGE (OUTPATIENT)
Dept: FAMILY MEDICINE | Facility: CLINIC | Age: 51
End: 2021-09-22

## 2021-09-24 ENCOUNTER — PATIENT MESSAGE (OUTPATIENT)
Dept: GASTROENTEROLOGY | Facility: CLINIC | Age: 51
End: 2021-09-24

## 2021-09-24 ENCOUNTER — PATIENT MESSAGE (OUTPATIENT)
Dept: OBSTETRICS AND GYNECOLOGY | Facility: CLINIC | Age: 51
End: 2021-09-24

## 2021-09-24 DIAGNOSIS — Z12.31 BREAST CANCER SCREENING BY MAMMOGRAM: Primary | ICD-10-CM

## 2021-09-28 ENCOUNTER — TELEPHONE (OUTPATIENT)
Dept: FAMILY MEDICINE | Facility: CLINIC | Age: 51
End: 2021-09-28

## 2021-09-30 ENCOUNTER — TELEPHONE (OUTPATIENT)
Dept: ENDOSCOPY | Facility: HOSPITAL | Age: 51
End: 2021-09-30

## 2021-10-01 ENCOUNTER — PATIENT MESSAGE (OUTPATIENT)
Dept: OBSTETRICS AND GYNECOLOGY | Facility: CLINIC | Age: 51
End: 2021-10-01

## 2021-10-04 ENCOUNTER — PATIENT MESSAGE (OUTPATIENT)
Dept: ADMINISTRATIVE | Facility: HOSPITAL | Age: 51
End: 2021-10-04

## 2021-10-05 ENCOUNTER — TELEPHONE (OUTPATIENT)
Dept: FAMILY MEDICINE | Facility: CLINIC | Age: 51
End: 2021-10-05

## 2021-10-05 DIAGNOSIS — Z01.812 PRE-PROCEDURE LAB EXAM: ICD-10-CM

## 2021-10-07 ENCOUNTER — TELEPHONE (OUTPATIENT)
Dept: ENDOSCOPY | Facility: HOSPITAL | Age: 51
End: 2021-10-07

## 2021-10-18 ENCOUNTER — OFFICE VISIT (OUTPATIENT)
Dept: PSYCHIATRY | Facility: CLINIC | Age: 51
End: 2021-10-18
Payer: COMMERCIAL

## 2021-10-18 DIAGNOSIS — F41.1 GENERALIZED ANXIETY DISORDER: ICD-10-CM

## 2021-10-18 DIAGNOSIS — F33.1 MAJOR DEPRESSIVE DISORDER, RECURRENT EPISODE, MODERATE: Primary | ICD-10-CM

## 2021-10-18 PROCEDURE — 90834 PSYTX W PT 45 MINUTES: CPT | Mod: 95,,, | Performed by: SOCIAL WORKER

## 2021-10-18 PROCEDURE — 90834 PR PSYCHOTHERAPY W/PATIENT, 45 MIN: ICD-10-PCS | Mod: 95,,, | Performed by: SOCIAL WORKER

## 2021-10-19 ENCOUNTER — PATIENT MESSAGE (OUTPATIENT)
Dept: DERMATOLOGY | Facility: CLINIC | Age: 51
End: 2021-10-19
Payer: COMMERCIAL

## 2021-10-21 ENCOUNTER — OFFICE VISIT (OUTPATIENT)
Dept: DERMATOLOGY | Facility: CLINIC | Age: 51
End: 2021-10-21
Payer: COMMERCIAL

## 2021-10-21 ENCOUNTER — TELEPHONE (OUTPATIENT)
Dept: DERMATOLOGY | Facility: CLINIC | Age: 51
End: 2021-10-21

## 2021-10-21 DIAGNOSIS — L65.0 TELOGEN EFFLUVIUM: ICD-10-CM

## 2021-10-21 DIAGNOSIS — E06.3 HYPOTHYROIDISM DUE TO HASHIMOTO'S THYROIDITIS: ICD-10-CM

## 2021-10-21 DIAGNOSIS — L65.8 FEMALE PATTERN BALDNESS: ICD-10-CM

## 2021-10-21 DIAGNOSIS — L21.9 SEBORRHEIC DERMATITIS: Primary | ICD-10-CM

## 2021-10-21 DIAGNOSIS — E03.8 HYPOTHYROIDISM DUE TO HASHIMOTO'S THYROIDITIS: ICD-10-CM

## 2021-10-21 PROCEDURE — 1160F RVW MEDS BY RX/DR IN RCRD: CPT | Mod: CPTII,S$GLB,, | Performed by: DERMATOLOGY

## 2021-10-21 PROCEDURE — 1159F PR MEDICATION LIST DOCUMENTED IN MEDICAL RECORD: ICD-10-PCS | Mod: CPTII,S$GLB,, | Performed by: DERMATOLOGY

## 2021-10-21 PROCEDURE — 1160F PR REVIEW ALL MEDS BY PRESCRIBER/CLIN PHARMACIST DOCUMENTED: ICD-10-PCS | Mod: CPTII,S$GLB,, | Performed by: DERMATOLOGY

## 2021-10-21 PROCEDURE — 99214 PR OFFICE/OUTPT VISIT, EST, LEVL IV, 30-39 MIN: ICD-10-PCS | Mod: S$GLB,,, | Performed by: DERMATOLOGY

## 2021-10-21 PROCEDURE — 99214 OFFICE O/P EST MOD 30 MIN: CPT | Mod: S$GLB,,, | Performed by: DERMATOLOGY

## 2021-10-21 PROCEDURE — 1159F MED LIST DOCD IN RCRD: CPT | Mod: CPTII,S$GLB,, | Performed by: DERMATOLOGY

## 2021-10-21 RX ORDER — KETOCONAZOLE 20 MG/ML
SHAMPOO, SUSPENSION TOPICAL
Qty: 240 ML | Refills: 5 | Status: SHIPPED | OUTPATIENT
Start: 2021-10-21 | End: 2022-06-22

## 2021-10-22 ENCOUNTER — LAB VISIT (OUTPATIENT)
Dept: SPORTS MEDICINE | Facility: CLINIC | Age: 51
End: 2021-10-22
Payer: COMMERCIAL

## 2021-10-22 DIAGNOSIS — Z01.812 PRE-PROCEDURE LAB EXAM: ICD-10-CM

## 2021-10-22 PROCEDURE — U0003 INFECTIOUS AGENT DETECTION BY NUCLEIC ACID (DNA OR RNA); SEVERE ACUTE RESPIRATORY SYNDROME CORONAVIRUS 2 (SARS-COV-2) (CORONAVIRUS DISEASE [COVID-19]), AMPLIFIED PROBE TECHNIQUE, MAKING USE OF HIGH THROUGHPUT TECHNOLOGIES AS DESCRIBED BY CMS-2020-01-R: HCPCS | Performed by: FAMILY MEDICINE

## 2021-10-22 PROCEDURE — U0005 INFEC AGEN DETEC AMPLI PROBE: HCPCS | Performed by: FAMILY MEDICINE

## 2021-10-23 ENCOUNTER — PATIENT MESSAGE (OUTPATIENT)
Dept: DERMATOLOGY | Facility: CLINIC | Age: 51
End: 2021-10-23
Payer: COMMERCIAL

## 2021-10-23 LAB
SARS-COV-2 RNA RESP QL NAA+PROBE: NOT DETECTED
SARS-COV-2- CYCLE NUMBER: NORMAL

## 2021-10-25 ENCOUNTER — ANESTHESIA EVENT (OUTPATIENT)
Dept: ENDOSCOPY | Facility: HOSPITAL | Age: 51
End: 2021-10-25
Payer: COMMERCIAL

## 2021-10-25 ENCOUNTER — ANESTHESIA (OUTPATIENT)
Dept: ENDOSCOPY | Facility: HOSPITAL | Age: 51
End: 2021-10-25
Payer: COMMERCIAL

## 2021-10-25 ENCOUNTER — HOSPITAL ENCOUNTER (OUTPATIENT)
Facility: HOSPITAL | Age: 51
Discharge: HOME | End: 2021-10-25
Attending: INTERNAL MEDICINE | Admitting: INTERNAL MEDICINE
Payer: COMMERCIAL

## 2021-10-25 VITALS
OXYGEN SATURATION: 95 % | HEART RATE: 104 BPM | RESPIRATION RATE: 14 BRPM | SYSTOLIC BLOOD PRESSURE: 120 MMHG | DIASTOLIC BLOOD PRESSURE: 73 MMHG | HEIGHT: 59 IN | TEMPERATURE: 98 F | WEIGHT: 160 LBS | BODY MASS INDEX: 32.25 KG/M2

## 2021-10-25 DIAGNOSIS — R13.10 DYSPHAGIA: Primary | ICD-10-CM

## 2021-10-25 DIAGNOSIS — F41.1 GENERALIZED ANXIETY DISORDER: ICD-10-CM

## 2021-10-25 DIAGNOSIS — R13.19 ESOPHAGEAL DYSPHAGIA: ICD-10-CM

## 2021-10-25 PROCEDURE — 88305 TISSUE EXAM BY PATHOLOGIST: CPT | Mod: 26,,, | Performed by: PATHOLOGY

## 2021-10-25 PROCEDURE — 88305 TISSUE EXAM BY PATHOLOGIST: ICD-10-PCS | Mod: 26,,, | Performed by: PATHOLOGY

## 2021-10-25 PROCEDURE — 37000008 HC ANESTHESIA 1ST 15 MINUTES: Performed by: INTERNAL MEDICINE

## 2021-10-25 PROCEDURE — 63600175 PHARM REV CODE 636 W HCPCS: Performed by: NURSE ANESTHETIST, CERTIFIED REGISTERED

## 2021-10-25 PROCEDURE — 43239 EGD BIOPSY SINGLE/MULTIPLE: CPT | Mod: ,,, | Performed by: INTERNAL MEDICINE

## 2021-10-25 PROCEDURE — 25000003 PHARM REV CODE 250: Performed by: NURSE ANESTHETIST, CERTIFIED REGISTERED

## 2021-10-25 PROCEDURE — 37000009 HC ANESTHESIA EA ADD 15 MINS: Performed by: INTERNAL MEDICINE

## 2021-10-25 PROCEDURE — 43239 PR EGD, FLEX, W/BIOPSY, SGL/MULTI: ICD-10-PCS | Mod: ,,, | Performed by: INTERNAL MEDICINE

## 2021-10-25 PROCEDURE — 27201012 HC FORCEPS, HOT/COLD, DISP: Performed by: INTERNAL MEDICINE

## 2021-10-25 PROCEDURE — E9220 PRA ENDO ANESTHESIA: HCPCS | Mod: ,,, | Performed by: NURSE ANESTHETIST, CERTIFIED REGISTERED

## 2021-10-25 PROCEDURE — 88305 TISSUE EXAM BY PATHOLOGIST: CPT | Performed by: PATHOLOGY

## 2021-10-25 PROCEDURE — E9220 PRA ENDO ANESTHESIA: ICD-10-PCS | Mod: ,,, | Performed by: NURSE ANESTHETIST, CERTIFIED REGISTERED

## 2021-10-25 PROCEDURE — 43239 EGD BIOPSY SINGLE/MULTIPLE: CPT | Performed by: INTERNAL MEDICINE

## 2021-10-25 RX ORDER — PROPOFOL 10 MG/ML
VIAL (ML) INTRAVENOUS
Status: DISCONTINUED | OUTPATIENT
Start: 2021-10-25 | End: 2021-10-25

## 2021-10-25 RX ORDER — LIDOCAINE HCL/PF 100 MG/5ML
SYRINGE (ML) INTRAVENOUS
Status: DISCONTINUED | OUTPATIENT
Start: 2021-10-25 | End: 2021-10-25

## 2021-10-25 RX ORDER — PROPOFOL 10 MG/ML
VIAL (ML) INTRAVENOUS CONTINUOUS PRN
Status: DISCONTINUED | OUTPATIENT
Start: 2021-10-25 | End: 2021-10-25

## 2021-10-25 RX ADMIN — PROPOFOL 100 MG: 10 INJECTION, EMULSION INTRAVENOUS at 03:10

## 2021-10-25 RX ADMIN — SODIUM CHLORIDE: 0.9 INJECTION, SOLUTION INTRAVENOUS at 03:10

## 2021-10-25 RX ADMIN — Medication 200 MCG/KG/MIN: at 03:10

## 2021-10-25 RX ADMIN — GLYCOPYRROLATE 0.1 MG: 0.2 INJECTION, SOLUTION INTRAMUSCULAR; INTRAVITREAL at 03:10

## 2021-10-25 RX ADMIN — Medication 100 MG: at 03:10

## 2021-10-31 ENCOUNTER — OFFICE VISIT (OUTPATIENT)
Dept: URGENT CARE | Facility: CLINIC | Age: 51
End: 2021-10-31
Payer: COMMERCIAL

## 2021-10-31 VITALS
TEMPERATURE: 98 F | WEIGHT: 160 LBS | SYSTOLIC BLOOD PRESSURE: 112 MMHG | DIASTOLIC BLOOD PRESSURE: 69 MMHG | HEART RATE: 88 BPM | BODY MASS INDEX: 32.25 KG/M2 | OXYGEN SATURATION: 96 % | RESPIRATION RATE: 19 BRPM | HEIGHT: 59 IN

## 2021-10-31 DIAGNOSIS — N39.0 URINARY TRACT INFECTION WITHOUT HEMATURIA, SITE UNSPECIFIED: Primary | ICD-10-CM

## 2021-10-31 DIAGNOSIS — N89.8 VAGINAL IRRITATION: ICD-10-CM

## 2021-10-31 LAB
BILIRUB UR QL STRIP: POSITIVE
GLUCOSE UR QL STRIP: POSITIVE
KETONES UR QL STRIP: NEGATIVE
LEUKOCYTE ESTERASE UR QL STRIP: NEGATIVE
PH, POC UA: 7
POC BLOOD, URINE: NEGATIVE
POC NITRATES, URINE: POSITIVE
PROT UR QL STRIP: NEGATIVE
SP GR UR STRIP: 1.02 (ref 1–1.03)
UROBILINOGEN UR STRIP-ACNC: POSITIVE (ref 0.1–1.1)

## 2021-10-31 PROCEDURE — 3008F PR BODY MASS INDEX (BMI) DOCUMENTED: ICD-10-PCS | Mod: CPTII,S$GLB,, | Performed by: NURSE PRACTITIONER

## 2021-10-31 PROCEDURE — 87086 URINE CULTURE/COLONY COUNT: CPT | Performed by: NURSE PRACTITIONER

## 2021-10-31 PROCEDURE — 3074F SYST BP LT 130 MM HG: CPT | Mod: CPTII,S$GLB,, | Performed by: NURSE PRACTITIONER

## 2021-10-31 PROCEDURE — 3078F DIAST BP <80 MM HG: CPT | Mod: CPTII,S$GLB,, | Performed by: NURSE PRACTITIONER

## 2021-10-31 PROCEDURE — 3074F PR MOST RECENT SYSTOLIC BLOOD PRESSURE < 130 MM HG: ICD-10-PCS | Mod: CPTII,S$GLB,, | Performed by: NURSE PRACTITIONER

## 2021-10-31 PROCEDURE — 81003 POCT URINALYSIS, DIPSTICK, AUTOMATED, W/O SCOPE: ICD-10-PCS | Mod: QW,S$GLB,, | Performed by: NURSE PRACTITIONER

## 2021-10-31 PROCEDURE — 1160F RVW MEDS BY RX/DR IN RCRD: CPT | Mod: CPTII,S$GLB,, | Performed by: NURSE PRACTITIONER

## 2021-10-31 PROCEDURE — 99213 PR OFFICE/OUTPT VISIT, EST, LEVL III, 20-29 MIN: ICD-10-PCS | Mod: 25,S$GLB,, | Performed by: NURSE PRACTITIONER

## 2021-10-31 PROCEDURE — 99213 OFFICE O/P EST LOW 20 MIN: CPT | Mod: 25,S$GLB,, | Performed by: NURSE PRACTITIONER

## 2021-10-31 PROCEDURE — 3008F BODY MASS INDEX DOCD: CPT | Mod: CPTII,S$GLB,, | Performed by: NURSE PRACTITIONER

## 2021-10-31 PROCEDURE — 81003 URINALYSIS AUTO W/O SCOPE: CPT | Mod: QW,S$GLB,, | Performed by: NURSE PRACTITIONER

## 2021-10-31 PROCEDURE — 1159F PR MEDICATION LIST DOCUMENTED IN MEDICAL RECORD: ICD-10-PCS | Mod: CPTII,S$GLB,, | Performed by: NURSE PRACTITIONER

## 2021-10-31 PROCEDURE — 1159F MED LIST DOCD IN RCRD: CPT | Mod: CPTII,S$GLB,, | Performed by: NURSE PRACTITIONER

## 2021-10-31 PROCEDURE — 3078F PR MOST RECENT DIASTOLIC BLOOD PRESSURE < 80 MM HG: ICD-10-PCS | Mod: CPTII,S$GLB,, | Performed by: NURSE PRACTITIONER

## 2021-10-31 PROCEDURE — 1160F PR REVIEW ALL MEDS BY PRESCRIBER/CLIN PHARMACIST DOCUMENTED: ICD-10-PCS | Mod: CPTII,S$GLB,, | Performed by: NURSE PRACTITIONER

## 2021-10-31 RX ORDER — NITROFURANTOIN 25; 75 MG/1; MG/1
100 CAPSULE ORAL 2 TIMES DAILY
Qty: 10 CAPSULE | Refills: 0 | Status: SHIPPED | OUTPATIENT
Start: 2021-10-31 | End: 2021-11-05

## 2021-10-31 RX ORDER — FLUCONAZOLE 150 MG/1
150 TABLET ORAL DAILY
Qty: 1 TABLET | Refills: 1 | Status: SHIPPED | OUTPATIENT
Start: 2021-10-31 | End: 2022-04-12

## 2021-11-02 LAB
BACTERIA UR CULT: NO GROWTH
FINAL PATHOLOGIC DIAGNOSIS: NORMAL
Lab: NORMAL

## 2021-11-03 ENCOUNTER — TELEPHONE (OUTPATIENT)
Dept: URGENT CARE | Facility: CLINIC | Age: 51
End: 2021-11-03
Payer: COMMERCIAL

## 2021-11-03 ENCOUNTER — DOCUMENTATION ONLY (OUTPATIENT)
Dept: PSYCHIATRY | Facility: CLINIC | Age: 51
End: 2021-11-03
Payer: COMMERCIAL

## 2021-11-03 ENCOUNTER — TELEPHONE (OUTPATIENT)
Dept: GASTROENTEROLOGY | Facility: CLINIC | Age: 51
End: 2021-11-03
Payer: COMMERCIAL

## 2021-11-04 ENCOUNTER — TELEPHONE (OUTPATIENT)
Dept: UROLOGY | Facility: CLINIC | Age: 51
End: 2021-11-04
Payer: COMMERCIAL

## 2021-11-04 ENCOUNTER — TELEPHONE (OUTPATIENT)
Dept: URGENT CARE | Facility: CLINIC | Age: 51
End: 2021-11-04
Payer: COMMERCIAL

## 2021-11-04 ENCOUNTER — PATIENT MESSAGE (OUTPATIENT)
Dept: FAMILY MEDICINE | Facility: CLINIC | Age: 51
End: 2021-11-04
Payer: COMMERCIAL

## 2021-11-04 DIAGNOSIS — R73.9 HYPERGLYCEMIA: Primary | ICD-10-CM

## 2021-11-05 ENCOUNTER — TELEPHONE (OUTPATIENT)
Dept: URGENT CARE | Facility: CLINIC | Age: 51
End: 2021-11-05
Payer: COMMERCIAL

## 2021-11-15 ENCOUNTER — TELEPHONE (OUTPATIENT)
Dept: FAMILY MEDICINE | Facility: CLINIC | Age: 51
End: 2021-11-15
Payer: COMMERCIAL

## 2021-11-15 ENCOUNTER — PATIENT MESSAGE (OUTPATIENT)
Dept: PSYCHIATRY | Facility: CLINIC | Age: 51
End: 2021-11-15
Payer: COMMERCIAL

## 2021-11-15 ENCOUNTER — TELEPHONE (OUTPATIENT)
Dept: UROLOGY | Facility: CLINIC | Age: 51
End: 2021-11-15
Payer: COMMERCIAL

## 2021-11-26 ENCOUNTER — TELEPHONE (OUTPATIENT)
Dept: ALLERGY | Facility: CLINIC | Age: 51
End: 2021-11-26
Payer: COMMERCIAL

## 2021-11-27 DIAGNOSIS — M81.0 OSTEOPOROSIS, UNSPECIFIED OSTEOPOROSIS TYPE, UNSPECIFIED PATHOLOGICAL FRACTURE PRESENCE: ICD-10-CM

## 2021-11-27 DIAGNOSIS — M79.7 FIBROMYALGIA: ICD-10-CM

## 2021-11-27 DIAGNOSIS — M47.817 LUMBAR AND SACRAL OSTEOARTHRITIS: ICD-10-CM

## 2021-11-27 DIAGNOSIS — M06.00 SERONEGATIVE RHEUMATOID ARTHRITIS: ICD-10-CM

## 2021-11-27 DIAGNOSIS — L40.50 PSORIATIC ARTHRITIS: ICD-10-CM

## 2021-11-27 DIAGNOSIS — G89.4 CHRONIC PAIN SYNDROME: ICD-10-CM

## 2021-11-28 RX ORDER — TIZANIDINE 4 MG/1
TABLET ORAL
Qty: 90 TABLET | Refills: 12 | Status: SHIPPED | OUTPATIENT
Start: 2021-11-28 | End: 2022-06-21

## 2021-11-28 RX ORDER — BUTALBITAL AND ACETAMINOPHEN 300; 50 MG/1; MG/1
TABLET ORAL
Qty: 60 TABLET | Refills: 3 | Status: SHIPPED | OUTPATIENT
Start: 2021-11-28 | End: 2023-01-08

## 2021-11-30 ENCOUNTER — PATIENT MESSAGE (OUTPATIENT)
Dept: PSYCHIATRY | Facility: CLINIC | Age: 51
End: 2021-11-30
Payer: COMMERCIAL

## 2021-12-01 ENCOUNTER — TELEPHONE (OUTPATIENT)
Dept: FAMILY MEDICINE | Facility: CLINIC | Age: 51
End: 2021-12-01
Payer: COMMERCIAL

## 2021-12-08 ENCOUNTER — PATIENT OUTREACH (OUTPATIENT)
Dept: ADMINISTRATIVE | Facility: OTHER | Age: 51
End: 2021-12-08
Payer: COMMERCIAL

## 2021-12-10 ENCOUNTER — PATIENT MESSAGE (OUTPATIENT)
Dept: ALLERGY | Facility: CLINIC | Age: 51
End: 2021-12-10
Payer: COMMERCIAL

## 2021-12-10 ENCOUNTER — OFFICE VISIT (OUTPATIENT)
Dept: PSYCHIATRY | Facility: CLINIC | Age: 51
End: 2021-12-10
Payer: COMMERCIAL

## 2021-12-10 DIAGNOSIS — F33.1 MAJOR DEPRESSIVE DISORDER, RECURRENT EPISODE, MODERATE: Primary | ICD-10-CM

## 2021-12-10 DIAGNOSIS — F41.1 GENERALIZED ANXIETY DISORDER: ICD-10-CM

## 2021-12-10 PROCEDURE — 90833 PSYTX W PT W E/M 30 MIN: CPT | Mod: 95,,, | Performed by: PSYCHIATRY & NEUROLOGY

## 2021-12-10 PROCEDURE — 99214 PR OFFICE/OUTPT VISIT, EST, LEVL IV, 30-39 MIN: ICD-10-PCS | Mod: 95,,, | Performed by: PSYCHIATRY & NEUROLOGY

## 2021-12-10 PROCEDURE — 99214 OFFICE O/P EST MOD 30 MIN: CPT | Mod: 95,,, | Performed by: PSYCHIATRY & NEUROLOGY

## 2021-12-10 PROCEDURE — 90833 PR PSYCHOTHERAPY W/PATIENT W/E&M, 30 MIN (ADD ON): ICD-10-PCS | Mod: 95,,, | Performed by: PSYCHIATRY & NEUROLOGY

## 2021-12-10 RX ORDER — OXCARBAZEPINE 150 MG/1
TABLET, FILM COATED ORAL
Qty: 90 TABLET | Refills: 5 | Status: SHIPPED | OUTPATIENT
Start: 2021-12-10 | End: 2022-02-22

## 2021-12-10 RX ORDER — DULOXETIN HYDROCHLORIDE 60 MG/1
120 CAPSULE, DELAYED RELEASE ORAL DAILY
Qty: 60 CAPSULE | Refills: 3 | Status: SHIPPED | OUTPATIENT
Start: 2021-12-10 | End: 2022-05-25

## 2021-12-16 ENCOUNTER — PATIENT MESSAGE (OUTPATIENT)
Dept: ADMINISTRATIVE | Facility: HOSPITAL | Age: 51
End: 2021-12-16
Payer: COMMERCIAL

## 2021-12-20 ENCOUNTER — TELEPHONE (OUTPATIENT)
Dept: UROLOGY | Facility: CLINIC | Age: 51
End: 2021-12-20
Payer: COMMERCIAL

## 2021-12-20 ENCOUNTER — PATIENT MESSAGE (OUTPATIENT)
Dept: UROLOGY | Facility: CLINIC | Age: 51
End: 2021-12-20
Payer: COMMERCIAL

## 2021-12-20 ENCOUNTER — PATIENT MESSAGE (OUTPATIENT)
Dept: PSYCHIATRY | Facility: CLINIC | Age: 51
End: 2021-12-20
Payer: COMMERCIAL

## 2021-12-21 ENCOUNTER — OFFICE VISIT (OUTPATIENT)
Dept: PSYCHIATRY | Facility: CLINIC | Age: 51
End: 2021-12-21
Payer: COMMERCIAL

## 2021-12-21 ENCOUNTER — TELEPHONE (OUTPATIENT)
Dept: UROLOGY | Facility: CLINIC | Age: 51
End: 2021-12-21
Payer: COMMERCIAL

## 2021-12-21 DIAGNOSIS — R30.0 DYSURIA: ICD-10-CM

## 2021-12-21 DIAGNOSIS — F33.1 MAJOR DEPRESSIVE DISORDER, RECURRENT EPISODE, MODERATE: Primary | ICD-10-CM

## 2021-12-21 DIAGNOSIS — F41.1 GENERALIZED ANXIETY DISORDER: ICD-10-CM

## 2021-12-21 DIAGNOSIS — N30.10 IC (INTERSTITIAL CYSTITIS): Primary | ICD-10-CM

## 2021-12-21 PROCEDURE — 90834 PSYTX W PT 45 MINUTES: CPT | Mod: 95,,, | Performed by: SOCIAL WORKER

## 2021-12-21 PROCEDURE — 90834 PR PSYCHOTHERAPY W/PATIENT, 45 MIN: ICD-10-PCS | Mod: 95,,, | Performed by: SOCIAL WORKER

## 2021-12-21 RX ORDER — METHENAMINE, SODIUM PHOSPHATE, MONOBASIC, MONOHYDRATE, PHENYL SALICYLATE, METHYLENE BLUE, AND HYOSCYAMINE SULFATE 120; 40.8; 36; 10; .12 MG/1; MG/1; MG/1; MG/1; MG/1
1 CAPSULE ORAL 3 TIMES DAILY PRN
Qty: 90 EACH | Refills: 2 | Status: SHIPPED | OUTPATIENT
Start: 2021-12-21 | End: 2022-01-09

## 2021-12-28 ENCOUNTER — PATIENT MESSAGE (OUTPATIENT)
Dept: RHEUMATOLOGY | Facility: CLINIC | Age: 51
End: 2021-12-28
Payer: COMMERCIAL

## 2021-12-29 ENCOUNTER — PATIENT MESSAGE (OUTPATIENT)
Dept: RHEUMATOLOGY | Facility: CLINIC | Age: 51
End: 2021-12-29
Payer: COMMERCIAL

## 2021-12-29 ENCOUNTER — TELEPHONE (OUTPATIENT)
Dept: FAMILY MEDICINE | Facility: CLINIC | Age: 51
End: 2021-12-29
Payer: COMMERCIAL

## 2021-12-29 DIAGNOSIS — R05.9 COUGH: ICD-10-CM

## 2021-12-30 ENCOUNTER — LAB VISIT (OUTPATIENT)
Dept: FAMILY MEDICINE | Facility: CLINIC | Age: 51
End: 2021-12-30
Payer: COMMERCIAL

## 2021-12-30 DIAGNOSIS — R05.9 COUGH: ICD-10-CM

## 2021-12-30 PROCEDURE — U0003 INFECTIOUS AGENT DETECTION BY NUCLEIC ACID (DNA OR RNA); SEVERE ACUTE RESPIRATORY SYNDROME CORONAVIRUS 2 (SARS-COV-2) (CORONAVIRUS DISEASE [COVID-19]), AMPLIFIED PROBE TECHNIQUE, MAKING USE OF HIGH THROUGHPUT TECHNOLOGIES AS DESCRIBED BY CMS-2020-01-R: HCPCS | Performed by: INTERNAL MEDICINE

## 2021-12-31 ENCOUNTER — PATIENT MESSAGE (OUTPATIENT)
Dept: FAMILY MEDICINE | Facility: CLINIC | Age: 51
End: 2021-12-31
Payer: COMMERCIAL

## 2022-01-03 ENCOUNTER — PATIENT MESSAGE (OUTPATIENT)
Dept: FAMILY MEDICINE | Facility: CLINIC | Age: 52
End: 2022-01-03
Payer: COMMERCIAL

## 2022-01-03 LAB
SARS-COV-2 RNA RESP QL NAA+PROBE: NOT DETECTED
SARS-COV-2- CYCLE NUMBER: NORMAL

## 2022-01-04 ENCOUNTER — PATIENT MESSAGE (OUTPATIENT)
Dept: DERMATOLOGY | Facility: CLINIC | Age: 52
End: 2022-01-04
Payer: COMMERCIAL

## 2022-01-04 ENCOUNTER — PATIENT MESSAGE (OUTPATIENT)
Dept: RHEUMATOLOGY | Facility: CLINIC | Age: 52
End: 2022-01-04
Payer: COMMERCIAL

## 2022-01-04 ENCOUNTER — OFFICE VISIT (OUTPATIENT)
Dept: RHEUMATOLOGY | Facility: CLINIC | Age: 52
End: 2022-01-04
Payer: COMMERCIAL

## 2022-01-04 DIAGNOSIS — L40.50 PSORIATIC ARTHRITIS: Primary | ICD-10-CM

## 2022-01-04 DIAGNOSIS — Z79.899 IMMUNOCOMPROMISED STATE DUE TO DRUG THERAPY: ICD-10-CM

## 2022-01-04 DIAGNOSIS — M79.7 FIBROMYALGIA: ICD-10-CM

## 2022-01-04 DIAGNOSIS — D84.821 IMMUNOCOMPROMISED STATE DUE TO DRUG THERAPY: ICD-10-CM

## 2022-01-04 DIAGNOSIS — M47.817 LUMBAR AND SACRAL OSTEOARTHRITIS: ICD-10-CM

## 2022-01-04 DIAGNOSIS — G89.4 CHRONIC PAIN SYNDROME: ICD-10-CM

## 2022-01-04 DIAGNOSIS — N30.10 IC (INTERSTITIAL CYSTITIS): ICD-10-CM

## 2022-01-04 PROCEDURE — 99215 OFFICE O/P EST HI 40 MIN: CPT | Mod: 95,,, | Performed by: INTERNAL MEDICINE

## 2022-01-04 PROCEDURE — 99215 PR OFFICE/OUTPT VISIT, EST, LEVL V, 40-54 MIN: ICD-10-PCS | Mod: 95,,, | Performed by: INTERNAL MEDICINE

## 2022-01-04 RX ORDER — HYDROXYCHLOROQUINE SULFATE 200 MG/1
200 TABLET, FILM COATED ORAL 2 TIMES DAILY
Qty: 60 TABLET | Refills: 6 | Status: SHIPPED | OUTPATIENT
Start: 2022-01-04 | End: 2022-10-31

## 2022-01-04 RX ORDER — AZITHROMYCIN 250 MG/1
TABLET, FILM COATED ORAL
Qty: 6 TABLET | Refills: 0 | Status: SHIPPED | OUTPATIENT
Start: 2022-01-04 | End: 2022-06-21

## 2022-01-04 RX ORDER — DEXAMETHASONE 1 MG/1
1 TABLET ORAL EVERY 12 HOURS
Qty: 20 TABLET | Refills: 0 | Status: SHIPPED | OUTPATIENT
Start: 2022-01-04 | End: 2022-01-14

## 2022-01-05 ENCOUNTER — PATIENT MESSAGE (OUTPATIENT)
Dept: RHEUMATOLOGY | Facility: CLINIC | Age: 52
End: 2022-01-05
Payer: COMMERCIAL

## 2022-01-05 ENCOUNTER — PATIENT MESSAGE (OUTPATIENT)
Dept: ALLERGY | Facility: CLINIC | Age: 52
End: 2022-01-05
Payer: COMMERCIAL

## 2022-01-05 RX ORDER — HYDROXYZINE HYDROCHLORIDE 25 MG/1
TABLET, FILM COATED ORAL
Qty: 90 TABLET | Refills: 0 | Status: SHIPPED | OUTPATIENT
Start: 2022-01-05 | End: 2022-06-07

## 2022-01-05 NOTE — PROGRESS NOTES
Subjective:       Patient ID: Marilee Simons is a 51 y.o. female.    Chief Complaint: No chief complaint on file.    Follow up   PSA  Humira on,  fibromyalgia, she recently was ill  and her family,  Doing fair to poor, very depressed, flaring , interstitial cystitis   mcp, pip, wrist, knees and ankles feet and L spine pain on plaquenil. Pain is located in multiple joints, both shoulder(s), both elbow(s), both wrist(s), both MCP(s): 1st, 2nd, 3rd, 4th and 5th, both PIP(s): 1st, 2nd, 3rd, 4th and 5th, both DIP(s): 1st and 2nd, both hip(s), both knee(s) and both MTP(s): 1st, 2nd, 3rd, 4th and 5th, is described as aching, pulsating, shooting and throbbing, and is constant, moderate .  Associated symptoms include: crepitation, decreased range of motion, edema, effusion, tenderness and warmth. ibs flare,  psa flare,    Review of Systems   Constitutional: Positive for activity change and fatigue. Negative for appetite change, fever and unexpected weight change.   HENT: Negative for dental problem, ear discharge, ear pain, facial swelling, mouth sores, nosebleeds, postnasal drip, rhinorrhea, sinus pressure, sneezing, tinnitus, trouble swallowing and voice change.    Eyes: Negative for photophobia, pain, discharge, redness and itching.   Respiratory: Negative for apnea, cough, chest tightness, shortness of breath and wheezing.    Cardiovascular: Positive for leg swelling. Negative for chest pain and palpitations.   Gastrointestinal: Positive for diarrhea and nausea. Negative for abdominal distention and constipation.   Endocrine: Negative for cold intolerance, heat intolerance, polydipsia and polyuria.   Genitourinary: Negative for decreased urine volume, difficulty urinating, dysuria, flank pain, frequency, genital sores, hematuria and urgency.   Musculoskeletal: Positive for arthralgias, back pain, joint swelling, myalgias, neck pain and neck stiffness. Negative for gait problem.   Skin: Negative for pallor, rash and  wound.   Allergic/Immunologic: Negative for immunocompromised state.   Neurological: Positive for headaches. Negative for dizziness and tremors.   Hematological: Negative for adenopathy. Does not bruise/bleed easily.   Psychiatric/Behavioral: Negative for sleep disturbance. The patient is not nervous/anxious.          Objective:     LMP  (LMP Unknown)      Physical Exam   Constitutional: She is oriented to person, place, and time and well-developed, well-nourished, and in no distress.   Neurological: She is alert and oriented to person, place, and time.   Psychiatric: Mood, affect and judgment normal.       Results for orders placed or performed in visit on 12/30/21   COVID-19 Routine Screening   Result Value Ref Range    SARS-CoV2 (COVID-19) Qualitative PCR Not Detected Not Detected   SARS-COV-2- Cycle Number   Result Value Ref Range    SARS-COV-2- Cycle Number N/A      *Note: Due to a large number of results and/or encounters for the requested time period, some results have not been displayed. A complete set of results can be found in Results Review.   reviewed labs with patient during this visit     Results for orders placed or performed in visit on 12/30/21   COVID-19 Routine Screening   Result Value Ref Range    SARS-CoV2 (COVID-19) Qualitative PCR Not Detected Not Detected   SARS-COV-2- Cycle Number   Result Value Ref Range    SARS-COV-2- Cycle Number N/A      *Note: Due to a large number of results and/or encounters for the requested time period, some results have not been displayed. A complete set of results can be found in Results Review.     Assessment:       Encounter Diagnoses   Name Primary?    Psoriatic arthritis Yes    Lumbar and sacral osteoarthritis     Fibromyalgia     Chronic pain syndrome     Immunocompromised state due to drug therapy          Plan:       Psoriatic arthritis  -     hydrOXYchloroQUINE (PLAQUENIL) 200 mg tablet; Take 1 tablet (200 mg total) by mouth 2 (two) times daily.   Dispense: 60 tablet; Refill: 6  -     CBC Auto Differential; Future; Expected date: 01/04/2022  -     Comprehensive Metabolic Panel; Future; Expected date: 01/04/2022  -     Sedimentation rate; Future; Expected date: 01/04/2022  -     C-Reactive Protein; Future; Expected date: 01/04/2022  -     azithromycin (Z-JAVIER) 250 MG tablet; Take 2 tablets by mouth on day 1; Take 1 tablet by mouth on days 2-5  Dispense: 6 tablet; Refill: 0  -     dexAMETHasone (DECADRON) 1 MG Tab; Take 1 tablet (1 mg total) by mouth every 12 (twelve) hours. for 10 days  Dispense: 20 tablet; Refill: 0    Lumbar and sacral osteoarthritis  -     CBC Auto Differential; Future; Expected date: 01/04/2022  -     Comprehensive Metabolic Panel; Future; Expected date: 01/04/2022  -     Sedimentation rate; Future; Expected date: 01/04/2022  -     C-Reactive Protein; Future; Expected date: 01/04/2022  -     azithromycin (Z-JAVIER) 250 MG tablet; Take 2 tablets by mouth on day 1; Take 1 tablet by mouth on days 2-5  Dispense: 6 tablet; Refill: 0  -     dexAMETHasone (DECADRON) 1 MG Tab; Take 1 tablet (1 mg total) by mouth every 12 (twelve) hours. for 10 days  Dispense: 20 tablet; Refill: 0    Fibromyalgia  -     CBC Auto Differential; Future; Expected date: 01/04/2022  -     Comprehensive Metabolic Panel; Future; Expected date: 01/04/2022  -     Sedimentation rate; Future; Expected date: 01/04/2022  -     C-Reactive Protein; Future; Expected date: 01/04/2022  -     azithromycin (Z-JAVIER) 250 MG tablet; Take 2 tablets by mouth on day 1; Take 1 tablet by mouth on days 2-5  Dispense: 6 tablet; Refill: 0  -     dexAMETHasone (DECADRON) 1 MG Tab; Take 1 tablet (1 mg total) by mouth every 12 (twelve) hours. for 10 days  Dispense: 20 tablet; Refill: 0    Chronic pain syndrome  -     CBC Auto Differential; Future; Expected date: 01/04/2022  -     Comprehensive Metabolic Panel; Future; Expected date: 01/04/2022  -     Sedimentation rate; Future; Expected date:  01/04/2022  -     C-Reactive Protein; Future; Expected date: 01/04/2022  -     azithromycin (Z-JAVIER) 250 MG tablet; Take 2 tablets by mouth on day 1; Take 1 tablet by mouth on days 2-5  Dispense: 6 tablet; Refill: 0  -     dexAMETHasone (DECADRON) 1 MG Tab; Take 1 tablet (1 mg total) by mouth every 12 (twelve) hours. for 10 days  Dispense: 20 tablet; Refill: 0    Immunocompromised state due to drug therapy  -     CBC Auto Differential; Future; Expected date: 01/04/2022  -     Comprehensive Metabolic Panel; Future; Expected date: 01/04/2022  -     Sedimentation rate; Future; Expected date: 01/04/2022  -     C-Reactive Protein; Future; Expected date: 01/04/2022  -     azithromycin (Z-JAVIER) 250 MG tablet; Take 2 tablets by mouth on day 1; Take 1 tablet by mouth on days 2-5  Dispense: 6 tablet; Refill: 0  -     dexAMETHasone (DECADRON) 1 MG Tab; Take 1 tablet (1 mg total) by mouth every 12 (twelve) hours. for 10 days  Dispense: 20 tablet; Refill: 0       1. Hold Humira  2. Continue plaquenil,   3. Check celiac genetics    The patient location is: home  The chief complaint leading to consultation is: psa    Visit type: audiovisual    Face to Face time with patient: 48   minutes of total time spent on the encounter, which includes face to face time and non-face to face time preparing to see the patient (eg, review of tests), Obtaining and/or reviewing separately obtained history, Documenting clinical information in the electronic or other health record, Independently interpreting results (not separately reported) and communicating results to the patient/family/caregiver, or Care coordination (not separately reported).         Each patient to whom he or she provides medical services by telemedicine is:  (1) informed of the relationship between the physician and patient and the respective role of any other health care provider with respect to management of the patient; and (2) notified that he or she may decline to receive  medical services by telemedicine and may withdraw from such care at any time.

## 2022-01-06 ENCOUNTER — PATIENT MESSAGE (OUTPATIENT)
Dept: RHEUMATOLOGY | Facility: CLINIC | Age: 52
End: 2022-01-06
Payer: COMMERCIAL

## 2022-01-07 ENCOUNTER — PATIENT MESSAGE (OUTPATIENT)
Dept: RHEUMATOLOGY | Facility: CLINIC | Age: 52
End: 2022-01-07
Payer: COMMERCIAL

## 2022-01-10 ENCOUNTER — PATIENT MESSAGE (OUTPATIENT)
Dept: ADMINISTRATIVE | Facility: HOSPITAL | Age: 52
End: 2022-01-10
Payer: COMMERCIAL

## 2022-01-13 ENCOUNTER — PATIENT OUTREACH (OUTPATIENT)
Dept: ADMINISTRATIVE | Facility: OTHER | Age: 52
End: 2022-01-13
Payer: COMMERCIAL

## 2022-01-13 ENCOUNTER — PATIENT MESSAGE (OUTPATIENT)
Dept: ALLERGY | Facility: CLINIC | Age: 52
End: 2022-01-13
Payer: COMMERCIAL

## 2022-01-13 NOTE — PROGRESS NOTES
Care Everywhere: updated  Immunization: updated  Health Maintenance: updated  Media Review:   Legacy Review:   DIS:  Order placed:   Upcoming appts:mammogram 1.20.2022  EFAX:  Task Tickets:  Referrals:

## 2022-01-27 ENCOUNTER — PATIENT MESSAGE (OUTPATIENT)
Dept: ALLERGY | Facility: CLINIC | Age: 52
End: 2022-01-27
Payer: COMMERCIAL

## 2022-02-09 ENCOUNTER — PATIENT MESSAGE (OUTPATIENT)
Dept: ALLERGY | Facility: CLINIC | Age: 52
End: 2022-02-09
Payer: COMMERCIAL

## 2022-02-11 ENCOUNTER — NURSE TRIAGE (OUTPATIENT)
Dept: ADMINISTRATIVE | Facility: CLINIC | Age: 52
End: 2022-02-11
Payer: COMMERCIAL

## 2022-02-11 ENCOUNTER — PATIENT MESSAGE (OUTPATIENT)
Dept: FAMILY MEDICINE | Facility: CLINIC | Age: 52
End: 2022-02-11
Payer: COMMERCIAL

## 2022-02-11 ENCOUNTER — HOSPITAL ENCOUNTER (EMERGENCY)
Facility: HOSPITAL | Age: 52
Discharge: HOME | End: 2022-02-11
Attending: EMERGENCY MEDICINE
Payer: COMMERCIAL

## 2022-02-11 VITALS
OXYGEN SATURATION: 100 % | DIASTOLIC BLOOD PRESSURE: 77 MMHG | RESPIRATION RATE: 20 BRPM | BODY MASS INDEX: 30.24 KG/M2 | HEART RATE: 75 BPM | SYSTOLIC BLOOD PRESSURE: 141 MMHG | TEMPERATURE: 99 F | HEIGHT: 59 IN | WEIGHT: 150 LBS

## 2022-02-11 DIAGNOSIS — R11.0 NAUSEA: Primary | ICD-10-CM

## 2022-02-11 DIAGNOSIS — R51.9 NONINTRACTABLE HEADACHE, UNSPECIFIED CHRONICITY PATTERN, UNSPECIFIED HEADACHE TYPE: ICD-10-CM

## 2022-02-11 LAB
ALBUMIN SERPL BCP-MCNC: 4.5 G/DL (ref 3.5–5.2)
ALP SERPL-CCNC: 104 U/L (ref 55–135)
ALT SERPL W/O P-5'-P-CCNC: 23 U/L (ref 10–44)
ANION GAP SERPL CALC-SCNC: 14 MMOL/L (ref 8–16)
AST SERPL-CCNC: 24 U/L (ref 10–40)
BASOPHILS # BLD AUTO: 0.08 K/UL (ref 0–0.2)
BASOPHILS NFR BLD: 0.8 % (ref 0–1.9)
BILIRUB SERPL-MCNC: 0.5 MG/DL (ref 0.1–1)
BUN SERPL-MCNC: 17 MG/DL (ref 6–20)
CALCIUM SERPL-MCNC: 10.1 MG/DL (ref 8.7–10.5)
CHLORIDE SERPL-SCNC: 103 MMOL/L (ref 95–110)
CO2 SERPL-SCNC: 21 MMOL/L (ref 23–29)
CREAT SERPL-MCNC: 1 MG/DL (ref 0.5–1.4)
CRP SERPL-MCNC: 3 MG/L (ref 0–8.2)
CTP QC/QA: YES
CTP QC/QA: YES
DIFFERENTIAL METHOD: ABNORMAL
EOSINOPHIL # BLD AUTO: 0.1 K/UL (ref 0–0.5)
EOSINOPHIL NFR BLD: 0.6 % (ref 0–8)
ERYTHROCYTE [DISTWIDTH] IN BLOOD BY AUTOMATED COUNT: 12.2 % (ref 11.5–14.5)
ERYTHROCYTE [SEDIMENTATION RATE] IN BLOOD BY WESTERGREN METHOD: 10 MM/HR (ref 0–20)
EST. GFR  (AFRICAN AMERICAN): >60 ML/MIN/1.73 M^2
EST. GFR  (NON AFRICAN AMERICAN): >60 ML/MIN/1.73 M^2
GLUCOSE SERPL-MCNC: 101 MG/DL (ref 70–110)
HCT VFR BLD AUTO: 39.2 % (ref 37–48.5)
HGB BLD-MCNC: 13.1 G/DL (ref 12–16)
IMM GRANULOCYTES # BLD AUTO: 0.03 K/UL (ref 0–0.04)
IMM GRANULOCYTES NFR BLD AUTO: 0.3 % (ref 0–0.5)
LYMPHOCYTES # BLD AUTO: 3 K/UL (ref 1–4.8)
LYMPHOCYTES NFR BLD: 32 % (ref 18–48)
MCH RBC QN AUTO: 28.1 PG (ref 27–31)
MCHC RBC AUTO-ENTMCNC: 33.4 G/DL (ref 32–36)
MCV RBC AUTO: 84 FL (ref 82–98)
MONOCYTES # BLD AUTO: 0.8 K/UL (ref 0.3–1)
MONOCYTES NFR BLD: 8.1 % (ref 4–15)
NEUTROPHILS # BLD AUTO: 5.5 K/UL (ref 1.8–7.7)
NEUTROPHILS NFR BLD: 58.2 % (ref 38–73)
NRBC BLD-RTO: 0 /100 WBC
PLATELET # BLD AUTO: 323 K/UL (ref 150–450)
PMV BLD AUTO: 8.9 FL (ref 9.2–12.9)
POC MOLECULAR INFLUENZA A AGN: NEGATIVE
POC MOLECULAR INFLUENZA B AGN: NEGATIVE
POTASSIUM SERPL-SCNC: 3.9 MMOL/L (ref 3.5–5.1)
PROT SERPL-MCNC: 7.9 G/DL (ref 6–8.4)
RBC # BLD AUTO: 4.66 M/UL (ref 4–5.4)
SARS-COV-2 RDRP RESP QL NAA+PROBE: NEGATIVE
SODIUM SERPL-SCNC: 138 MMOL/L (ref 136–145)
WBC # BLD AUTO: 9.48 K/UL (ref 3.9–12.7)

## 2022-02-11 PROCEDURE — 96374 THER/PROPH/DIAG INJ IV PUSH: CPT

## 2022-02-11 PROCEDURE — U0002 COVID-19 LAB TEST NON-CDC: HCPCS | Performed by: PHYSICIAN ASSISTANT

## 2022-02-11 PROCEDURE — 63600175 PHARM REV CODE 636 W HCPCS: Performed by: STUDENT IN AN ORGANIZED HEALTH CARE EDUCATION/TRAINING PROGRAM

## 2022-02-11 PROCEDURE — 80053 COMPREHEN METABOLIC PANEL: CPT | Performed by: PHYSICIAN ASSISTANT

## 2022-02-11 PROCEDURE — 85652 RBC SED RATE AUTOMATED: CPT | Performed by: PHYSICIAN ASSISTANT

## 2022-02-11 PROCEDURE — 85025 COMPLETE CBC W/AUTO DIFF WBC: CPT | Performed by: PHYSICIAN ASSISTANT

## 2022-02-11 PROCEDURE — 86140 C-REACTIVE PROTEIN: CPT | Performed by: PHYSICIAN ASSISTANT

## 2022-02-11 PROCEDURE — 99285 EMERGENCY DEPT VISIT HI MDM: CPT | Mod: 25

## 2022-02-11 PROCEDURE — 25000003 PHARM REV CODE 250: Performed by: PHYSICIAN ASSISTANT

## 2022-02-11 PROCEDURE — 96372 THER/PROPH/DIAG INJ SC/IM: CPT | Mod: 59

## 2022-02-11 PROCEDURE — 96361 HYDRATE IV INFUSION ADD-ON: CPT

## 2022-02-11 RX ORDER — PROMETHAZINE HYDROCHLORIDE 25 MG/1
25 TABLET ORAL EVERY 6 HOURS PRN
Qty: 20 TABLET | Refills: 0 | Status: SHIPPED | OUTPATIENT
Start: 2022-02-11 | End: 2022-02-16

## 2022-02-11 RX ORDER — ONDANSETRON 4 MG/1
4 TABLET, FILM COATED ORAL EVERY 6 HOURS
Qty: 12 TABLET | Refills: 0 | Status: SHIPPED | OUTPATIENT
Start: 2022-02-11 | End: 2022-02-11 | Stop reason: ALTCHOICE

## 2022-02-11 RX ORDER — PROCHLORPERAZINE EDISYLATE 5 MG/ML
10 INJECTION INTRAMUSCULAR; INTRAVENOUS
Status: COMPLETED | OUTPATIENT
Start: 2022-02-11 | End: 2022-02-11

## 2022-02-11 RX ORDER — DIPHENHYDRAMINE HYDROCHLORIDE 50 MG/ML
25 INJECTION INTRAMUSCULAR; INTRAVENOUS
Status: COMPLETED | OUTPATIENT
Start: 2022-02-11 | End: 2022-02-11

## 2022-02-11 RX ADMIN — DIPHENHYDRAMINE HYDROCHLORIDE 25 MG: 50 INJECTION INTRAMUSCULAR; INTRAVENOUS at 02:02

## 2022-02-11 RX ADMIN — PROCHLORPERAZINE EDISYLATE 10 MG: 5 INJECTION INTRAMUSCULAR; INTRAVENOUS at 02:02

## 2022-02-11 RX ADMIN — SODIUM CHLORIDE 1000 ML: 0.9 INJECTION, SOLUTION INTRAVENOUS at 02:02

## 2022-02-11 NOTE — ED PROVIDER NOTES
Encounter Date: 2/11/2022       History     Chief Complaint   Patient presents with    Headache     Pt has hx of migraines and has photosensitivity; wearing dark sunglasses now. Pain is 10/10 and localized to right side of head.     Generalized Body Aches     All over and pt currently tearful. No OTC meds. Hx of RA and osteopenia/osteoporosis, hashimoto's.    Nausea     X5 days. No emesis; decreased, PO intake x5 days.      Ms. Simons is a 51yr old female with past medical history of Depression,  Fibromyalgia, Interstitial cystitis, Irritable bowel syndrome,  Major depressive disorder, Rheumatoid arthritis, Systemic lupus erythematosus and hypothyroidism who presents to the emergency department due to multiple concerns. She states that 5 days ago she began having what she believed was a migraine. She states that she had an aching sensation all over her head as well as nausea. It felt similar to her previous migraines but was worse and was not getting any better. She also states that she was having body aches all over her body as well as fatigue and loss of appetite.  She was concerned that she might be having a flare-up of her arthritis or fibromyalgia. She also is concerned because she had gone to the dentist a week ago and she states that a lot of people there had tested positive for COVID.         Review of patient's allergies indicates:   Allergen Reactions    Cephalexin Itching    Cephalosporins     Zofran [ondansetron hcl (pf)] Hives    Penicillins Rash     Past Medical History:   Diagnosis Date    Acid reflux     Allergy     Alopecia     Anxiety     Arthralgia     Back pain     Depression     Dry eyes     from meds    Fever blister     Fibromyalgia     Interstitial cystitis     Irritable bowel syndrome     Kidney stone     Major depressive disorder, recurrent episode, in partial or unspecified remission 6/25/2013    OAB (overactive bladder) 7/21/2015    PTSD (post-traumatic stress  disorder)     Rheumatoid arthritis     Systemic lupus erythematosus     Thyroid disease     Urinary tract infection     Vaginal infection      Past Surgical History:   Procedure Laterality Date    BLADDER SURGERY       SECTION, LOW TRANSVERSE      x 2    COLONOSCOPY      COLONOSCOPY N/A 10/5/2017    Procedure: COLONOSCOPY;  Surgeon: Venkat He MD;  [facility]  Service: Endoscopy;  Laterality: N/A;    ESOPHAGOGASTRODUODENOSCOPY      ESOPHAGOGASTRODUODENOSCOPY N/A 10/25/2021    Procedure: EGD (ESOPHAGOGASTRODUODENOSCOPY);  Surgeon: Venkat He MD;  [facility]  Service: Endoscopy;  Laterality: N/A;  Covid test on 10/22 at [email or web address]    10/19 lvm to confirm appt-rb    EYE SURGERY      Lasik-bilateral    HYSTERECTOMY      INJECTION OF BOTULINUM TOXIN TYPE A N/A 2018    Procedure: INJECTION, BOTULINUM TOXIN, TYPE A  200 UNITS;  Surgeon: Bandar Garcia MD;  [place] [facility] [address] [place] [address];  Service: Urology;  Laterality: N/A;    INSTILLATION OF URINARY BLADDER N/A 2018    Procedure: INSTILLATION, URINARY BLADDER;  Surgeon: Bandar Garcia MD;  [place] [facility] [address] [place] [address];  Service: Urology;  Laterality: N/A;    PARTIAL HYSTERECTOMY      TRANSFORAMINAL EPIDURAL INJECTION OF STEROID Left 2/15/2021    Procedure: LUMBAR TRANSFORAMINAL LEFT L5 AND S1 DIRECT REFERRAL;  Surgeon: Marquez Nesbitt MD;  [facility]: [facility];  Service: Pain Management;  Laterality: Left;  NEEDS CONSENT, PT REQUESTING IV SEDATION     Family History   Problem Relation Age of Onset    Irritable bowel syndrome Mother     Irritable bowel syndrome Sister     Lupus Sister     Rheum arthritis Sister     Fibromyalgia Sister     Irritable bowel syndrome Sister     Celiac disease Neg Hx     Cirrhosis Neg Hx     Colon cancer Neg Hx     Colon polyps Neg Hx     Crohn's disease Neg Hx     Cystic fibrosis Neg Hx     Esophageal cancer Neg Hx     Hemochromatosis Neg Hx      Inflammatory bowel disease Neg Hx     Liver cancer Neg Hx     Liver disease Neg Hx     Rectal cancer Neg Hx     Stomach cancer Neg Hx     Ulcerative colitis Neg Hx     Boris's disease Neg Hx     Melanoma Neg Hx     Amblyopia Neg Hx     Blindness Neg Hx     Cataracts Neg Hx     Glaucoma Neg Hx     Macular degeneration Neg Hx     Retinal detachment Neg Hx     Strabismus Neg Hx      Social History     Tobacco Use    Smoking status: Never Smoker    Smokeless tobacco: Never Used   Substance Use Topics    Alcohol use: No    Drug use: No     Review of Systems   Constitutional: Positive for fatigue. Negative for activity change, appetite change, chills and fever.   HENT: Negative for congestion, sinus pressure, sinus pain and trouble swallowing.    Respiratory: Negative for apnea, cough, chest tightness, shortness of breath and wheezing.    Cardiovascular: Negative for chest pain, palpitations and leg swelling.   Gastrointestinal: Positive for nausea and vomiting. Negative for abdominal pain, constipation, diarrhea and rectal pain.   Genitourinary: Negative for difficulty urinating, dysuria, flank pain, pelvic pain and urgency.   Musculoskeletal: Positive for arthralgias. Negative for back pain, gait problem, joint swelling, myalgias and neck stiffness.   Skin: Negative for color change and wound.   Neurological: Positive for light-headedness and headaches. Negative for dizziness, seizures, speech difficulty, weakness and numbness.       Physical Exam     Initial Vitals [02/11/22 1311]   BP Pulse Resp Temp SpO2   132/76 87 20 98.7 °F (37.1 °C) 97 %      MAP       --         Physical Exam    Nursing note and vitals reviewed.  Constitutional: She appears well-developed and well-nourished. She is not diaphoretic. No distress.   HENT:   Head: Normocephalic and atraumatic.   Mouth/Throat: Oropharynx is clear and moist. No oropharyngeal exudate.   Eyes: Conjunctivae and EOM are normal. Pupils are equal, round,  and reactive to light. Right eye exhibits no discharge. Left eye exhibits no discharge. No scleral icterus.   Neck: No tracheal deviation present. No JVD present.   Normal range of motion.  Cardiovascular: Normal rate and normal heart sounds. Exam reveals no gallop and no friction rub.    No murmur heard.  Pulmonary/Chest: No respiratory distress. She has no wheezes. She has no rales. She exhibits no tenderness.   Abdominal: Abdomen is soft. Bowel sounds are normal. She exhibits no distension and no mass. There is no abdominal tenderness. There is no guarding.   Musculoskeletal:         General: No tenderness or edema. Normal range of motion.      Cervical back: Normal range of motion.     Neurological: She is alert and oriented to person, place, and time. She has normal strength. No cranial nerve deficit or sensory deficit.   Skin: Skin is warm. Capillary refill takes less than 2 seconds. No rash noted. No erythema.   Psychiatric: She has a normal mood and affect.         ED Course   Procedures  Labs Reviewed   CBC W/ AUTO DIFFERENTIAL - Abnormal; Notable for the following components:       Result Value    MPV 8.9 (*)     All other components within normal limits   COMPREHENSIVE METABOLIC PANEL - Abnormal; Notable for the following components:    CO2 21 (*)     All other components within normal limits   SEDIMENTATION RATE   C-REACTIVE PROTEIN   TSH   URINALYSIS, REFLEX TO URINE CULTURE   PROTEIN 14-3-3 CSF   SARS-COV-2 RDRP GENE    Narrative:     This test utilizes isothermal nucleic acid amplification   technology to detect the SARS-CoV-2 RdRp nucleic acid segment.   The analytical sensitivity (limit of detection) is 125 genome   equivalents/mL.   A POSITIVE result implies infection with the SARS-CoV-2 virus;   the patient is presumed to be contagious.     A NEGATIVE result means that SARS-CoV-2 nucleic acids are not   present above the limit of detection. A NEGATIVE result should be   treated as presumptive. It  does not rule out the possibility of   COVID-19 and should not be the sole basis for treatment decisions.   If COVID-19 is strongly suspected based on clinical and exposure   history, re-testing using an alternate molecular assay should be   considered.   This test is only for use under the Food and Drug   Administration s Emergency Use Authorization (EUA).   Commercial kits are provided by [employer].   Performance characteristics of the EUA have been independently   verified by [facility] Department of   Pathology and Laboratory Medicine.   _________________________________________________________________   The authorized Fact Sheet for Healthcare Providers and the authorized Fact   Sheet for Patients of the ID NOW COVID-19 are available on the FDA   website:     https://www.fda.gov/media/087177/download  https://www.fda.gov/media/066931/download       POCT INFLUENZA A/B MOLECULAR    Narrative:     This test utilizes isothermal nucleic acid amplification   technology to detect the SARS-CoV-2 RdRp nucleic acid segment.   The analytical sensitivity (limit of detection) is 125 genome   equivalents/mL.   A POSITIVE result implies infection with the SARS-CoV-2 virus;   the patient is presumed to be contagious.     A NEGATIVE result means that SARS-CoV-2 nucleic acids are not   present above the limit of detection. A NEGATIVE result should be   treated as presumptive. It does not rule out the possibility of   COVID-19 and should not be the sole basis for treatment decisions.   If COVID-19 is strongly suspected based on clinical and exposure   history, re-testing using an alternate molecular assay should be   considered.   This test is only for use under the Food and Drug   Administration s Emergency Use Authorization (EUA).   Commercial kits are provided by [employer].   Performance characteristics of the EUA have been independently   verified by [facility] Department of    Pathology and Laboratory Medicine.   _________________________________________________________________   The authorized Fact Sheet for Healthcare Providers and the authorized Fact   Sheet for Patients of the ID NOW COVID-19 are available on the FDA   website:     https://www.fda.gov/media/296629/download  https://www.fda.gov/media/645970/download              Imaging Results          CT Head Without Contrast (Final result)  Result time 02/11/22 15:20:22    Final result by Mina De La O MD (02/11/22 15:20:22)                 Impression:      No acute intracranial process.      Electronically signed by: Mina De La O  Date:    02/11/2022  Time:    15:20             Narrative:    EXAMINATION:  CT HEAD WITHOUT CONTRAST    CLINICAL HISTORY:  Headache, new or worsening (Age >= 50y);    TECHNIQUE:  Low dose axial CT images obtained throughout the head without intravenous contrast. Sagittal and coronal reconstructions were performed.    COMPARISON:  None.    FINDINGS:  Intracranial compartment:    Ventricles and sulci are normal in size for age without evidence of hydrocephalus. No extra-axial blood or fluid collections.    Mild involutional changes and chronic microvascular ischemic changes in the periventricular white matter.  No parenchymal mass, hemorrhage, edema or major vascular distribution infarct.    Skull/extracranial contents (limited evaluation): No fracture. Mastoid air cells and paranasal sinuses are essentially clear.                                 Medications   sodium chloride 0.9% bolus 1,000 mL (0 mLs Intravenous Stopped 2/11/22 1511)   diphenhydrAMINE injection 25 mg (25 mg Intravenous Given 2/11/22 1410)   prochlorperazine injection Soln 10 mg (10 mg Intramuscular Given 2/11/22 1410)     Medical Decision Making:   History:   Old Medical Records: I decided to obtain old medical records.  Old Records Summarized: records from previous admission(s).  Initial Assessment:   Ms. Simons is a 51yr old  female with past medical history of Depression,  Fibromyalgia, Interstitial cystitis, Irritable bowel syndrome,  Major depressive disorder, Rheumatoid arthritis, Systemic lupus erythematosus and hypothyroidism who presents to the emergency department due to multiple concerns.  Differential Diagnosis:   Migraine headache  Fibromyalgia flare-up  Thyroid disease  Sepsis  Clinical Tests:   Lab Tests: Ordered and Reviewed  Radiological Study: Ordered and Reviewed  ED Management:  Patient was examined, labs were ordered including a CBC which was non-significant, CMP was non-significant.  ESR and CRP were non-significant  Patient was given, Benadryl as well as Compazine.   Patient was re-evaluated and she reported symptomatic improvement.   CT of her head was obtained and did not show any significant findings.   Patient informed me that she has a neurology appointment set up for Monday and that she will be following up with a neurologist concerning her headache.   She was discharged in stable condition and return precautions were given                        Clinical Impression:   Final diagnoses:  [R11.0] Nausea (Primary)  [R51.9] Nonintractable headache, unspecified chronicity pattern, unspecified headache type          ED Disposition Condition    Discharge Stable        ED Prescriptions     Medication Sig Dispense Start Date End Date Auth. Provider    ondansetron (ZOFRAN) 4 MG tablet Take 1 tablet (4 mg total) by mouth every 6 (six) hours. 12 tablet 2/11/2022  Yaima Dalton MD        Follow-up Information     Follow up With Specialties Details Why Contact Info    Jossy Ortiz MD Family Medicine Schedule an appointment as soon as possible for a visit in 3 days  [address]  [place] [address]  [phone]             Yaima Dalton MD  Resident  02/11/22 0839

## 2022-02-11 NOTE — Clinical Note
"Marilee"Kristen Simons was seen and treated in our emergency department on 2/11/2022.  She may return to work on 02/15/2022.       If you have any questions or concerns, please don't hesitate to call.      Yaima Dalton MD"

## 2022-02-11 NOTE — FIRST PROVIDER EVALUATION
Emergency Department TeleTriage Encounter Note      CHIEF COMPLAINT    Chief Complaint   Patient presents with    Headache     Pt has hx of migraines and has photosensitivity; wearing dark sunglasses now. Pain is 10/10 and localized to right side of head.     Generalized Body Aches     All over and pt currently tearful. No OTC meds. Hx of RA and osteopenia/osteoporosis, hashimoto's.    Nausea     X5 days. No emesis; decreased, PO intake x5 days.        VITAL SIGNS   Initial Vitals [02/11/22 1311]   BP Pulse Resp Temp SpO2   132/76 87 20 98.7 °F (37.1 °C) 97 %      MAP       --            ALLERGIES    Review of patient's allergies indicates:   Allergen Reactions    Cephalexin Itching    Cephalosporins     Zofran [ondansetron hcl (pf)] Hives    Penicillins Rash       PROVIDER TRIAGE NOTE    Patient presents to the Er with complaint of severe migraine, generalized weakness for 4-5 days. She is having body/joint aching.  She took a negative home covid test last night.  She contacted her PCP and was advised by her PCP and rheumatologist  to come to the ER for evaluation.  She reports possible recent exposure to COVID. She has nausea.  She is out of her phenergan for 2 days.  She is allergic to zofran. She is having tingling in face intermittent for the last few days.         Will order labs, IV fluids pending ED provider evaluation.      Initial orders will be placed and care will be transferred to an alternate provider when patient is roomed for a full evaluation. Any additional orders and the final disposition will be determined by that provider.         ORDERS  Labs Reviewed - No data to display    ED Orders (720h ago, onward)    None            Virtual Visit Note: The provider triage portion of this emergency department evaluation and documentation was performed via [employer] Only, a HIPAA-compliant telemedicine application, in concert with a tele-presenter in the room. A face to face patient evaluation with  one of my colleagues will occur once the patient is placed in an emergency department room.      DISCLAIMER: This note was prepared with Tr[employer]o voice recognition transcription software. Garbled syntax, mangled pronouns, and other bizarre constructions may be attributed to that software system.

## 2022-02-11 NOTE — TELEPHONE ENCOUNTER
Pt took an at home covid test and it was negative. Past week severe migraines. She have to hold on to the walls because she felt like she would fall like she is drunk. Pt stated she has trouble thinking of words she need to say. Pt stated her muscles ache like she has the flu but she does not have any cold like symptoms. Pt stated this has been going on for 5 days straight. Headaches 7-8/10. Pt stated she took an Excedrin and drank a cup of coffee. Numbness and tingling in her face. Care advice recommend pt call 911. Pt refused. Pt stated her aunt is there and will bring her to the Er.     Reason for Disposition   Numbness of the face, arm or leg on one side of the body and new-onset    Additional Information   Negative: Difficult to awaken or acting confused (e.g., disoriented, slurred speech)   Negative: Weakness of the face, arm or leg on one side of the body and new-onset    Protocols used: HEADACHE-A-[place]

## 2022-02-11 NOTE — ED TRIAGE NOTES
Pt presents to ED today with covid symptoms. Pt was exposed to covid through her dentist office, and she has been having symptoms since.  Pt reports a severe migraine x4 days.  Pt also reports nausea and loss of appetite.  Hx of multiple autoimmune disorders.  At this time, pt is resting comfortably in bed. VSS.  Pt appears to be in no acute distress at this time.

## 2022-02-11 NOTE — DISCHARGE INSTRUCTIONS
Thank you for visiting us Today!    Please follow up with your primary care physician  and Neurology concerning your symptoms.

## 2022-02-15 ENCOUNTER — PATIENT OUTREACH (OUTPATIENT)
Dept: ADMINISTRATIVE | Facility: OTHER | Age: 52
End: 2022-02-15
Payer: COMMERCIAL

## 2022-02-15 ENCOUNTER — PATIENT MESSAGE (OUTPATIENT)
Dept: UROLOGY | Facility: CLINIC | Age: 52
End: 2022-02-15
Payer: COMMERCIAL

## 2022-02-15 NOTE — PROGRESS NOTES
Health Maintenance Due   Topic Date Due    Pneumococcal Vaccines (Age 0-64) (1 of 4 - PCV13) Never done    TETANUS VACCINE  08/08/2017    Mammogram  10/21/2017    Shingles Vaccine (1 of 2) Never done    Influenza Vaccine (1) Never done     Updates were requested from care everywhere.  Chart was reviewed for overdue Proactive [facility] Encounters (MARTIR) topics (CRS, Breast Cancer Screening, Eye exam)  Health Maintenance has been updated.  LINKS immunization registry triggered.  Immunizations were reconciled.

## 2022-02-16 ENCOUNTER — PATIENT MESSAGE (OUTPATIENT)
Dept: PSYCHIATRY | Facility: CLINIC | Age: 52
End: 2022-02-16
Payer: COMMERCIAL

## 2022-02-21 ENCOUNTER — PATIENT MESSAGE (OUTPATIENT)
Dept: PSYCHIATRY | Facility: CLINIC | Age: 52
End: 2022-02-21
Payer: COMMERCIAL

## 2022-02-21 NOTE — TELEPHONE ENCOUNTER
----- Message from Floridalma Henry sent at 11/15/2018  1:10 PM CST -----  Contact: pt at [phone] clive Tafoya pt-Pt  Is  calling about apologizing about her appt that she missed this morning.  Pt is crying and waited so long for the appt..  Please call.  Crying a lot   Skin normal color for race, warm, dry and intact. No evidence of rash.

## 2022-02-22 ENCOUNTER — OFFICE VISIT (OUTPATIENT)
Dept: PSYCHIATRY | Facility: CLINIC | Age: 52
End: 2022-02-22
Payer: COMMERCIAL

## 2022-02-22 DIAGNOSIS — F41.1 GENERALIZED ANXIETY DISORDER: ICD-10-CM

## 2022-02-22 DIAGNOSIS — F33.1 MAJOR DEPRESSIVE DISORDER, RECURRENT EPISODE, MODERATE: Primary | ICD-10-CM

## 2022-02-22 PROCEDURE — 1160F PR REVIEW ALL MEDS BY PRESCRIBER/CLIN PHARMACIST DOCUMENTED: ICD-10-PCS | Mod: CPTII,95,, | Performed by: PSYCHIATRY & NEUROLOGY

## 2022-02-22 PROCEDURE — 1159F PR MEDICATION LIST DOCUMENTED IN MEDICAL RECORD: ICD-10-PCS | Mod: CPTII,95,, | Performed by: PSYCHIATRY & NEUROLOGY

## 2022-02-22 PROCEDURE — 1159F MED LIST DOCD IN RCRD: CPT | Mod: CPTII,95,, | Performed by: PSYCHIATRY & NEUROLOGY

## 2022-02-22 PROCEDURE — 90833 PSYTX W PT W E/M 30 MIN: CPT | Mod: 95,,, | Performed by: PSYCHIATRY & NEUROLOGY

## 2022-02-22 PROCEDURE — 90833 PR PSYCHOTHERAPY W/PATIENT W/E&M, 30 MIN (ADD ON): ICD-10-PCS | Mod: 95,,, | Performed by: PSYCHIATRY & NEUROLOGY

## 2022-02-22 PROCEDURE — 99214 PR OFFICE/OUTPT VISIT, EST, LEVL IV, 30-39 MIN: ICD-10-PCS | Mod: 95,,, | Performed by: PSYCHIATRY & NEUROLOGY

## 2022-02-22 PROCEDURE — 1160F RVW MEDS BY RX/DR IN RCRD: CPT | Mod: CPTII,95,, | Performed by: PSYCHIATRY & NEUROLOGY

## 2022-02-22 PROCEDURE — 99214 OFFICE O/P EST MOD 30 MIN: CPT | Mod: 95,,, | Performed by: PSYCHIATRY & NEUROLOGY

## 2022-02-22 RX ORDER — LAMOTRIGINE 25 MG/1
25 TABLET ORAL DAILY
Qty: 30 TABLET | Refills: 5 | Status: SHIPPED | OUTPATIENT
Start: 2022-02-22 | End: 2022-03-25 | Stop reason: DRUGHIGH

## 2022-02-22 NOTE — PROGRESS NOTES
"Outpatient Psychiatry Follow-Up Visit (MD/NP)    2/22/2022 The patient location is: home.  The chief complaint leading to consultation is: depression and anxiety.    Visit type: audiovisual    Face to Face time with patient: 26" (5" on meds and 21" for supportive counseling and update of history and mental status.  30 minutes of total time spent on the encounter, which includes face to face time and non-face to face time preparing to see the patient (eg, review of tests), Obtaining and/or reviewing separately obtained history, Documenting clinical information in the electronic or other health record, Independently interpreting results (not separately reported) and communicating results to the patient/family/caregiver, or Care coordination (not separately reported).         Each patient to whom he or she provides medical services by telemedicine is:  (1) informed of the relationship between the physician and patient and the respective role of any other health care provider with respect to management of the patient; and (2) notified that he or she may decline to receive medical services by telemedicine and may withdraw from such care at any time.    Notes: See below.    Clinical Status of Patient:  Outpatient (Ambulatory)    Chief Complaint:  Marilee Smions is a 52 y.o. female who presents today for follow-up of depression and anxiety.  Met with patient and no relatives present for interview.      Interval History and Content of Current Session:  Interim Events/Subjective Report/Content of Current Session: Patient states she is still depressed. Patient is sleeping better, however. Patient still feels depressed and is too anxious. Patient denies thoughts of harming herself, and has no current signs of josi or psychosis. Patient is also dealing with ongoing and frequent headaches. Patient has made an appointment with a Neurologist now for the headaches and is hopeful re getting assistance.  Patient doesn't want to do " anything and doesn't want to get out of bed. Patient does not feel the Trileptal is helping based on the patient's reports. We discussed my skilled nursing. We discussed switching to Lamictal in place of the Trileptal for her ongoing and refractory depression. I reviewed the side effects of Lamictal plus the risk of severe rash and the need to call me or a doctor right away if she notices a rash or unusual itching. Despite her stresses patient does have some times when she is able to enjoy herself. Patient is also frightened re the pandemic and has a hard time with the resulting isolation. Patient was able to be lighthearted at times.    Psychotherapy:  · Target symptoms: depression, anxiety   · Why chosen therapy is appropriate versus another modality: relevant to diagnosis, patient responds to this modality, evidence based practice  · Outcome monitoring methods: self-report  · Therapeutic intervention type: supportive psychotherapy  · Topics discussed/themes: ongoing depression and anxiety, stress related to medical comorbidities  · The patient's response to the intervention is accepting. The patient's progress toward treatment goals is limited.   · Duration of intervention: 26 minutes.    Review of Systems   · PSYCHIATRIC: Pertinant items are noted in the narrative.    Past Medical, Family and Social History: The patient's past medical, family and social history have been reviewed and updated as appropriate within the electronic medical record - see encounter notes.    Compliance: yes    Side effects: None    Risk Parameters:  Patient reports no suicidal ideation  Patient reports no homicidal ideation  Patient reports no self-injurious behavior  Patient reports no violent behavior    Exam (detailed: at least 9 elements; comprehensive: all 15 elements)   Constitutional  Vitals:  Most recent vital signs, dated less than 90 days prior to this appointment, were reviewed.   There were no vitals filed for this visit.Patient  reports stable vital signs     General:  unremarkable, age appropriate, casually dressed, neatly groomed     Musculoskeletal  Muscle Strength/Tone:  patient reports some loss of muscle tone and strength   Gait & Station:  non-ataxic     Psychiatric  Speech:  no latency; no press, spontaneous   Mood & Affect:  anxious, depressed  congruent and appropriate   Thought Process:  normal and logical   Associations:  intact   Thought Content:  normal, no suicidality, no homicidality, delusions, or paranoia   Insight:  has awareness of illness   Judgement: behavior is adequate to circumstances   Orientation:  grossly intact   Memory: intact for content of interview   Language: grossly intact   Attention Span & Concentration:  able to focus   Fund of Knowledge:  intact and appropriate to age and level of education     Assessment and Diagnosis   Status/Progress: Based on the examination today, the patient's problem(s) is/are inadequately controlled.  New problems have not been presented today.   Co-morbidities and ongoing depression and anxiety are complicating management of the primary condition.  The working differential for this patient includes depression and anxiety.     General Impression: Patient is not doing well and remains depressed and isolated without motivation. Patient is in good self control at this time and not an active danger to self or others at this time and is willing to keep working on feeling more stable. Patient did have some lighthearted moments during the interview which is a positive sign as well.      ICD-10-CM ICD-9-CM   1. Major depressive disorder, recurrent episode, moderate  F33.1 296.32   2. Generalized anxiety disorder  F41.1 300.02       Intervention/Counseling/Treatment Plan   · Medication Management: The risks and benefits of medication were discussed with the patient.  Patient agrees to stop Trileptal, and start Lamictal 25 mg q pm, and continue Cymbalta 60 mg bid and Klonopin 1 mg tid.  Patient also agreed to call within 2 weeks re how she is doing with a beginning dose of the Lamictal and call anytime she suspects a rash- like difficulty.      Return to Clinic: 2 months

## 2022-02-23 ENCOUNTER — TELEPHONE (OUTPATIENT)
Dept: ALLERGY | Facility: CLINIC | Age: 52
End: 2022-02-23
Payer: COMMERCIAL

## 2022-02-23 DIAGNOSIS — D84.9 IMMUNOSUPPRESSED STATUS: ICD-10-CM
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
## 2022-03-10 ENCOUNTER — PATIENT MESSAGE (OUTPATIENT)
Dept: RHEUMATOLOGY | Facility: CLINIC | Age: 52
End: 2022-03-10
Payer: COMMERCIAL

## 2022-03-16 ENCOUNTER — PATIENT MESSAGE (OUTPATIENT)
Dept: PSYCHIATRY | Facility: CLINIC | Age: 52
End: 2022-03-16
Payer: COMMERCIAL

## 2022-03-22 ENCOUNTER — PATIENT OUTREACH (OUTPATIENT)
Dept: ADMINISTRATIVE | Facility: HOSPITAL | Age: 52
End: 2022-03-22
Payer: COMMERCIAL

## 2022-03-25 ENCOUNTER — PATIENT MESSAGE (OUTPATIENT)
Dept: PSYCHIATRY | Facility: CLINIC | Age: 52
End: 2022-03-25
Payer: COMMERCIAL

## 2022-03-25 RX ORDER — LAMOTRIGINE 25 MG/1
50 TABLET ORAL DAILY
Qty: 60 TABLET | Refills: 5 | Status: SHIPPED | OUTPATIENT
Start: 2022-03-25 | End: 2022-06-07 | Stop reason: DRUGHIGH

## 2022-03-25 NOTE — PROGRESS NOTES
03/25/2022: Patient left message stating she is not having trouble with Lamictal and has no side effects, but her depression continues. I advised the dose be increased to 50 mg q am and no other med changes at this time. Patient has no active thoughts of harming herself at this time.

## 2022-03-26 DIAGNOSIS — F43.10 PTSD (POST-TRAUMATIC STRESS DISORDER): ICD-10-CM

## 2022-03-26 DIAGNOSIS — L40.50 PSORIATIC ARTHRITIS: ICD-10-CM

## 2022-03-26 DIAGNOSIS — G89.4 CHRONIC PAIN SYNDROME: ICD-10-CM

## 2022-03-26 DIAGNOSIS — D84.821 IMMUNOCOMPROMISED STATE DUE TO DRUG THERAPY: ICD-10-CM

## 2022-03-26 DIAGNOSIS — M47.817 LUMBAR AND SACRAL OSTEOARTHRITIS: ICD-10-CM

## 2022-03-26 DIAGNOSIS — Z79.899 IMMUNOCOMPROMISED STATE DUE TO DRUG THERAPY: ICD-10-CM

## 2022-03-26 DIAGNOSIS — M81.0 OSTEOPOROSIS, UNSPECIFIED OSTEOPOROSIS TYPE, UNSPECIFIED PATHOLOGICAL FRACTURE PRESENCE: ICD-10-CM

## 2022-03-26 DIAGNOSIS — M06.00 SERONEGATIVE RHEUMATOID ARTHRITIS: ICD-10-CM

## 2022-03-26 DIAGNOSIS — M79.7 FIBROMYALGIA: ICD-10-CM

## 2022-03-26 DIAGNOSIS — N30.10 IC (INTERSTITIAL CYSTITIS): ICD-10-CM

## 2022-03-28 RX ORDER — TRAMADOL HYDROCHLORIDE 50 MG/1
TABLET ORAL
Qty: 90 TABLET | Refills: 3 | Status: SHIPPED | OUTPATIENT
Start: 2022-03-28 | End: 2022-09-25

## 2022-03-29 ENCOUNTER — PATIENT MESSAGE (OUTPATIENT)
Dept: GASTROENTEROLOGY | Facility: CLINIC | Age: 52
End: 2022-03-29
Payer: COMMERCIAL

## 2022-03-29 RX ORDER — DICYCLOMINE HYDROCHLORIDE 20 MG/1
20 TABLET ORAL 4 TIMES DAILY
OUTPATIENT
Start: 2022-03-29

## 2022-03-29 RX ORDER — ALOSETRON HYDROCHLORIDE 0.5 MG/1
0.5 TABLET ORAL DAILY PRN
Qty: 30 TABLET | Refills: 0 | Status: SHIPPED | OUTPATIENT
Start: 2022-03-29 | End: 2023-02-27

## 2022-04-03 ENCOUNTER — PATIENT OUTREACH (OUTPATIENT)
Dept: ADMINISTRATIVE | Facility: OTHER | Age: 52
End: 2022-04-03
Payer: COMMERCIAL

## 2022-04-03 NOTE — PROGRESS NOTES
Care Everywhere: updated  Immunization: updated  Health Maintenance: updated  Media Review: review for outside mammogram report   Legacy Review:   DIS:no updated mammogram report on file   Order placed:   Upcoming appts:  EFAX:  Task Tickets:Mammogram scheduling ticket sent to patient's portal on 1.12.2022  Referrals:  Mammogram outreach via phone on 3.22.2022

## 2022-04-04 ENCOUNTER — OFFICE VISIT (OUTPATIENT)
Dept: UROLOGY | Facility: CLINIC | Age: 52
End: 2022-04-04
Payer: COMMERCIAL

## 2022-04-04 VITALS — BODY MASS INDEX: 30.23 KG/M2 | HEIGHT: 59 IN | WEIGHT: 149.94 LBS

## 2022-04-04 DIAGNOSIS — N30.10 INTERSTITIAL CYSTITIS: Primary | ICD-10-CM

## 2022-04-04 DIAGNOSIS — N30.10 IC (INTERSTITIAL CYSTITIS): ICD-10-CM

## 2022-04-04 DIAGNOSIS — N32.81 OAB (OVERACTIVE BLADDER): ICD-10-CM

## 2022-04-04 DIAGNOSIS — N39.3 SUI (STRESS URINARY INCONTINENCE, FEMALE): ICD-10-CM

## 2022-04-04 DIAGNOSIS — Z53.09 MRI CONTRAINDICATED DUE TO METAL IMPLANT: ICD-10-CM

## 2022-04-04 DIAGNOSIS — Z96.82 NEUROSTIMULATOR DEVICE IN SITU: ICD-10-CM

## 2022-04-04 PROCEDURE — 95971 PR ANALYZE NEUROSTIM,SIMPLE/PROG: ICD-10-PCS | Mod: S$GLB,,, | Performed by: UROLOGY

## 2022-04-04 PROCEDURE — 99215 OFFICE O/P EST HI 40 MIN: CPT | Mod: S$GLB,,, | Performed by: UROLOGY

## 2022-04-04 PROCEDURE — 3008F BODY MASS INDEX DOCD: CPT | Mod: CPTII,S$GLB,, | Performed by: UROLOGY

## 2022-04-04 PROCEDURE — 95971 ALYS SMPL SP/PN NPGT W/PRGRM: CPT | Mod: S$GLB,,, | Performed by: UROLOGY

## 2022-04-04 PROCEDURE — 99999 PR PBB SHADOW E&M-EST. PATIENT-LVL III: CPT | Mod: PBBFAC,,, | Performed by: UROLOGY

## 2022-04-04 PROCEDURE — 99215 PR OFFICE/OUTPT VISIT, EST, LEVL V, 40-54 MIN: ICD-10-PCS | Mod: S$GLB,,, | Performed by: UROLOGY

## 2022-04-04 PROCEDURE — 87086 URINE CULTURE/COLONY COUNT: CPT | Performed by: UROLOGY

## 2022-04-04 PROCEDURE — 99999 PR PBB SHADOW E&M-EST. PATIENT-LVL III: ICD-10-PCS | Mod: PBBFAC,,, | Performed by: UROLOGY

## 2022-04-04 PROCEDURE — 3008F PR BODY MASS INDEX (BMI) DOCUMENTED: ICD-10-PCS | Mod: CPTII,S$GLB,, | Performed by: UROLOGY

## 2022-04-04 RX ORDER — METHENAMINE, SODIUM PHOSPHATE, MONOBASIC, MONOHYDRATE, PHENYL SALICYLATE, METHYLENE BLUE, AND HYOSCYAMINE SULFATE 120; 40.8; 36; 10; .12 MG/1; MG/1; MG/1; MG/1; MG/1
1 CAPSULE ORAL 3 TIMES DAILY
Qty: 90 EACH | Refills: 3 | Status: SHIPPED | OUTPATIENT
Start: 2022-04-04 | End: 2023-08-02

## 2022-04-04 RX ORDER — CALCIUM GLYCEROPHOSPHATE 65 MG
2 TABLET ORAL
Qty: 100 EACH | Status: SHIPPED | OUTPATIENT
Start: 2022-04-04

## 2022-04-04 NOTE — H&P (VIEW-ONLY)
HPI:   CC: urethral pain and spasm    Marilee Simons is a 52 y.o. woman with s/p InterStim Therapy and botox injection in the past.  Has done well.  However, she has experienced worsening urethral pain and spasm.  Typically she loses urine with laughing hard.    S/p  x 2.  No vaginal delivery.    Procedure(s) Performed: 18  1.  Cystoscopy with hydrodistension  2.  Botox injections into detrusor muscle  Findings:   - Initial bladder capacity 900 mL with terminal hematuria.   - Glomerulations were diffusely seen, Hunner's ulcers seen overlying bilateral UOs, diffuse mild degree of hyperemia  - Subsequent bladder capacity was 950 mL.    Since the procedure, she has done very well.  She needs refills on meds.    s/p InterStim Therapy for refractory frequency and urgency on 8/3/16.  Lead placed on right side  Generator placed on left side  Good sensory and motor function c/w S3 stimulation seen  She is doing great.  Is very pleased with the bladder control outcome.      She used to use valium crushed in the vagina to help her with her pelvic pain.    SUDS cysto on 1/20/15  --- Bladder ---   CYSTOMETROGRAM ( Filling Phase ):   Cystometric Numeric Data:   - First Desire (Sensation): 68 mL at 1cm of water.   - Normal Desire: 75mL at 1 cm of water.   - Strong Desire: 96 mL at 2 cm of water.   - Urgency (Imminent Void) : 108 mL at 1cm of water.   - Maximum Cystometric Capacity: 109 mL.   Compliance:   - low.   Leak Point Pressure:   - Valsalva ( Abdominal ) Leak Point Pressure: none.   UROFLOW:   - Voided Volume: 134 mL.   - Residual Urine: 5 mL.   - Maximum Flow Rate: 8 mL/sec.   - Flow Pattern: low amplitude  VOIDING PRESSURE STUDY ( Voiding Phase ):   Detrusor Pressure:   - Maximum Detrusor Pressure: 32 cm of water.   - Detrusor Pressure at Maximum Flow: 2 cm of water.   - Detrusor Contraction Characteristics: Sustained contraction(s).     ELECTROMYOGRAM:   - incomplete relaxation.     ---Diagnostic  Cystourethroscopy ---   Normal urethra.    minimal hyperemic area of the bladder  Width of Bladder Neck Opening: Approximately 18 Fr.   Normal bladder.   Normal ureteral orifices bilaterally.     CONCLUSIONS:   1.  Decreased bladder capacity with sensory urgency  2.  IC  3.  OAB    Past Medical History:   Diagnosis Date    Acid reflux     Allergy     Alopecia     Anxiety     Arthralgia     Back pain     Depression     Dry eyes     from meds    Fever blister     Fibromyalgia     Interstitial cystitis     Irritable bowel syndrome     Kidney stone     Major depressive disorder, recurrent episode, in partial or unspecified remission 2013    OAB (overactive bladder) 2015    PTSD (post-traumatic stress disorder)     Rheumatoid arthritis     Systemic lupus erythematosus     Thyroid disease     Urinary tract infection     Vaginal infection      Past Surgical History:   Procedure Laterality Date    BLADDER SURGERY       SECTION, LOW TRANSVERSE      x 2    COLONOSCOPY      COLONOSCOPY N/A 10/5/2017    Procedure: COLONOSCOPY;  Surgeon: Venkat He MD;  Location: [facility] ([address] [place] [address]);  Service: Endoscopy;  Laterality: N/A;    ESOPHAGOGASTRODUODENOSCOPY      ESOPHAGOGASTRODUODENOSCOPY N/A 10/25/2021    Procedure: EGD (ESOPHAGOGASTRODUODENOSCOPY);  Surgeon: Venkat He MD;  Location: [facility] ([address] [place] [address]);  Service: Endoscopy;  Laterality: N/A;  Covid test on 10/22 at [email or web address]    10/19 lvm to confirm appt-rb    EYE SURGERY      Lasik-bilateral    HYSTERECTOMY      INJECTION OF BOTULINUM TOXIN TYPE A N/A 2018    Procedure: INJECTION, BOTULINUM TOXIN, TYPE A  200 UNITS;  Surgeon: Bandar Garcia MD;  Location: [facility] OR [address] [place] [address];  Service: Urology;  Laterality: N/A;    INSTILLATION OF URINARY BLADDER N/A 2018    Procedure: INSTILLATION, URINARY BLADDER;  Surgeon: Bandar Garcia MD;  Location: [facility] OR [address] [place] [address];  Service: Urology;  Laterality: N/A;    PARTIAL  HYSTERECTOMY      TRANSFORAMINAL EPIDURAL INJECTION OF STEROID Left 2/15/2021    Procedure: LUMBAR TRANSFORAMINAL LEFT L5 AND S1 DIRECT REFERRAL;  Surgeon: Marquez Nesbitt MD;  Location: [facility]IN [facility];  Service: Pain Management;  Laterality: Left;  NEEDS CONSENT, PT REQUESTING IV SEDATION     Social History     Tobacco Use    Smoking status: Never Smoker    Smokeless tobacco: Never Used   Substance Use Topics    Alcohol use: No    Drug use: No     Family History   Problem Relation Age of Onset    Irritable bowel syndrome Mother     Irritable bowel syndrome Sister     Lupus Sister     Rheum arthritis Sister     Fibromyalgia Sister     Irritable bowel syndrome Sister     Celiac disease Neg Hx     Cirrhosis Neg Hx     Colon cancer Neg Hx     Colon polyps Neg Hx     Crohn's disease Neg Hx     Cystic fibrosis Neg Hx     Esophageal cancer Neg Hx     Hemochromatosis Neg Hx     Inflammatory bowel disease Neg Hx     Liver cancer Neg Hx     Liver disease Neg Hx     Rectal cancer Neg Hx     Stomach cancer Neg Hx     Ulcerative colitis Neg Hx     Boris's disease Neg Hx     Melanoma Neg Hx     Amblyopia Neg Hx     Blindness Neg Hx     Cataracts Neg Hx     Glaucoma Neg Hx     Macular degeneration Neg Hx     Retinal detachment Neg Hx     Strabismus Neg Hx      Allergy:  Review of patient's allergies indicates:   Allergen Reactions    Cephalexin Itching    Zofran [ondansetron hcl (pf)] Hives    Penicillins Rash     Outpatient Encounter Medications as of 4/4/2022   Medication Sig Dispense Refill    alosetron (LOTRONEX) 0.5 MG tablet Take 1 tablet (0.5 mg total) by mouth daily as needed. 30 tablet 0    amitriptyline (ELAVIL) 10 MG tablet TAKE 1 TABLET(10 MG) BY MOUTH EVERY NIGHT AS NEEDED 90 tablet 3    azithromycin (Z-JAVIER) 250 MG tablet Take 2 tablets by mouth on day 1; Take 1 tablet by mouth on days 2-5 6 tablet 0    benzocaine 20 % Oint Compound benzocaine 20% / lidocaine 6% / tetracaine  4% ointment. Apply to affected area 1 hour prior to procedure. 30 g 0    butalbitaL-acetaminophen  mg Tab TAKE 1 TABLET BY MOUTH EVERY 4 HOURS 60 tablet 3    cyclobenzaprine (FLEXERIL) 10 MG tablet TAKE 1 TABLET(10 MG) BY MOUTH TWICE DAILY AS NEEDED 90 tablet 6    dicyclomine (BENTYL) 20 mg tablet Take 20 mg by mouth 4 (four) times daily.      famotidine (PEPCID) 40 MG tablet Take 1 tablet (40 mg total) by mouth nightly as needed for Heartburn. 30 tablet 11    fluticasone propionate (FLONASE) 50 mcg/actuation nasal spray 2 sprays (100 mcg total) by Each Nostril route 2 (two) times daily. 31.6 mL 5    gabapentin (NEURONTIN) 800 MG tablet TAKE 1 TABLET(800 MG) BY MOUTH THREE TIMES DAILY 90 tablet 11    hydrocortisone 2.5 % cream Apply to affected areas twice a day when eczema is moderate. 453.6 g 1    hydrOXYchloroQUINE (PLAQUENIL) 200 mg tablet Take 1 tablet (200 mg total) by mouth 2 (two) times daily. 60 tablet 6    hydrOXYzine HCL (ATARAX) 25 MG tablet TAKE 1 TO 8 TABLETS BY MOUTH EVERY NIGHT AT BEDTIME, START WITH 3 TABLETS AT BEDTIME, INCREASE OR DECREASE TILL ITCHING IS CONTROLLED 90 tablet 0    ketoconazole (NIZORAL) 2 % shampoo Wash scalp with medicated shampoo at least 2x/week. Let sit on scalp at least 5 minutes prior to rinsing 240 mL 5    lamoTRIgine (LAMICTAL) 25 MG tablet Take 2 tablets (50 mg total) by mouth once daily. 60 tablet 5    levothyroxine (SYNTHROID) 50 MCG tablet Take 1 tablet (50 mcg) by mouth Mon-Sat and take 2 tablets (100 mcg) on Sun only 34 tablet 11    multivitamin with minerals tablet Take 1 tablet by mouth once daily.      promethazine (PHENERGAN) 25 MG tablet TAKE 1 TABLET(25 MG) BY MOUTH EVERY 6 HOURS AS NEEDED FOR NAUSEA 30 tablet 0    RABEprazole (ACIPHEX) 20 mg tablet TAKE 1 TABLET(20 MG) BY MOUTH EVERY DAY 90 tablet 0    tamsulosin (FLOMAX) 0.4 mg Cap TAKE 1 CAPSULE(0.4 MG) BY MOUTH EVERY DAY 30 capsule 11    tiZANidine (ZANAFLEX) 4 MG tablet TAKE 1  TABLET(4 MG) BY MOUTH EVERY 8 HOURS AS NEEDED 90 tablet 12    traMADoL (ULTRAM) 50 mg tablet TAKE 1 TABLET(50 MG) BY MOUTH EVERY 6 HOURS 90 tablet 3    [DISCONTINUED] USTELL 120-0.12 mg Cap TAKE 1 CAPSULE BY MOUTH FOUR TIMES DAILY AS NEEDED 40 each 3    calcium glycerophosphate (PRELIEF) 65 mg Tab Take 2 tablets by mouth before meals and at bedtime as needed. 100 each prn    clonazePAM (KLONOPIN) 1 MG tablet TAKE 1 TABLET(1 MG) BY MOUTH THREE TIMES DAILY 90 tablet 2    DULoxetine (CYMBALTA) 60 MG capsule Take 2 capsules (120 mg total) by mouth once daily. 60 capsule 3    fluconazole (DIFLUCAN) 150 MG Tab Take 1 tablet (150 mg total) by mouth once daily. 1 tablet 1    hyoscyamine (ANASPAZ,LEVSIN) 0.125 mg Tab Take 1 tablet (125 mcg total) by mouth every 8 (eight) hours as needed (abdominal cramps). 60 tablet 0    jzcgkv-qgaih-g.blue-sal-naphos (USTELL) 120-0.12 mg Cap Take 1 capsule by mouth 3 (three) times daily. 90 each 3    oxybutynin (DITROPAN) 5 MG Tab Take 1 tablet (5 mg total) by mouth 2 (two) times daily. 60 tablet 12     Facility-Administered Encounter Medications as of 4/4/2022   Medication Dose Route Frequency Provider Last Rate Last Admin    betamethasone acetate-betamethasone sodium phosphate injection 12 mg  12 mg Intramuscular 1 time in Clinic/HOD Fei Adkins MD         Review of Systems   General ROS: GENERAL:  No weight gain or loss  SKIN:  No rashes or lacerations  HEAD:  No headaches  EYES:  No exophthalmos, jaundice or blindness  EARS:  No dizziness, tinnitus or hearing loss  NOSE:  No changes in smell  MOUTH & THROAT:  No dyskinetic movements or obvious goiter  CHEST:  No shortness of breath, hyperventilation or cough  CARDIOVASCULAR:  No tachycardia or chest pain  ABDOMEN:  No nausea, vomiting, pain, constipation or diarrhea  URINARY:  No frequency, dysuria or sexual dysfunction  ENDOCRINE:  No polydipsia, polyuria  MUSCULOSKELETAL:  No pain or stiffness of the  joints  NEUROLOGIC:  No weakness, sensory changes, seizures, confusion, memory loss, tremor or other abnormal movements  Physical Exam     There were no vitals filed for this visit.  Physical Examination:   n/a  LABS:  Lab Results   Component Value Date    CREATININE 1.0 02/11/2022    CREATININE 0.9 09/16/2021    CREATININE 1.0 08/25/2021     Urine Culture, Routine   Date Value Ref Range Status   10/31/2021 No growth  Final     Procedure:  InterStim Analysis and Program  InterStim Reprogram:    First InterStim device impendence was checked: all good except 0 and 2 and 0 and 3 leads.  Current setting is lead 3+ and 1 neg ( avoiding the bad lead connection) at 3.7 volts  Battery life of its generator: greater than 12 months left based on current usage.  No impedence problem noted.     A new setting program 4 at 2.5 volts given. She feels the stimulation vaginally.        Assessment and Plan:  Marilee was seen today for follow-up and urinary incontinence.    Diagnoses and all orders for this visit:    Interstitial cystitis  -     Urine culture  -     calcium glycerophosphate (PRELIEF) 65 mg Tab; Take 2 tablets by mouth before meals and at bedtime as needed.    OAB (overactive bladder)    MRI contraindicated due to metal implant    JAYNE (stress urinary incontinence, female)  -     Ambulatory referral/consult to Physical/Occupational Therapy; Future    Neurostimulator device in situ    IC (interstitial cystitis)  -     vfaosb-dzjob-aWarrenblue-sal-naphos (USTELL) 120-0.12 mg Cap; Take 1 capsule by mouth 3 (three) times daily.    recommend to continue IC smart diet.  continue IC meds.  So far drinking coffee and tea worsen her bladder pain.  Sexual intercourse tends to worsen the bladder pain.  Responds well to Uribel.  Refills given.    OK to use valium inside of her vagina, especially prior to her intercourse or after.    For bladder pain, she can use Ustell as needed.    For her JAYNE, recommend Kegel exercise.  Refer her to  physical therapy.  She is already on cymbalta for chronic pain, which will also help her with JAYNE.    S/p InterStim Therapy 6 years out.  The battery life is still over 1 year left.  However, she will need MRI of spine because of her chronic back pain, sciatica as well as rheumatoid arthritis.  So will replace her old InterStim Therapy to MRI safe new InterSTim Therapy ( InterStim X and new electrode).    I spent 40 minutes with the patient of which more than half was spent in direct consultation with the patient in regards to our treatment and plan.    Follow-up:  Follow up in about 23 days (around 4/27/2022), or InterStim Therapy revision.

## 2022-04-04 NOTE — PROGRESS NOTES
HPI:   CC: urethral pain and spasm    Marilee Simons is a 52 y.o. woman with s/p InterStim Therapy and botox injection in the past.  Has done well.  However, she has experienced worsening urethral pain and spasm.  Typically she loses urine with laughing hard.    S/p  x 2.  No vaginal delivery.    Procedure(s) Performed: 18  1.  Cystoscopy with hydrodistension  2.  Botox injections into detrusor muscle  Findings:   - Initial bladder capacity 900 mL with terminal hematuria.   - Glomerulations were diffusely seen, Hunner's ulcers seen overlying bilateral UOs, diffuse mild degree of hyperemia  - Subsequent bladder capacity was 950 mL.    Since the procedure, she has done very well.  She needs refills on meds.    s/p InterStim Therapy for refractory frequency and urgency on 8/3/16.  Lead placed on right side  Generator placed on left side  Good sensory and motor function c/w S3 stimulation seen  She is doing great.  Is very pleased with the bladder control outcome.      She used to use valium crushed in the vagina to help her with her pelvic pain.    SUDS cysto on 1/20/15  --- Bladder ---   CYSTOMETROGRAM ( Filling Phase ):   Cystometric Numeric Data:   - First Desire (Sensation): 68 mL at 1cm of water.   - Normal Desire: 75mL at 1 cm of water.   - Strong Desire: 96 mL at 2 cm of water.   - Urgency (Imminent Void) : 108 mL at 1cm of water.   - Maximum Cystometric Capacity: 109 mL.   Compliance:   - low.   Leak Point Pressure:   - Valsalva ( Abdominal ) Leak Point Pressure: none.   UROFLOW:   - Voided Volume: 134 mL.   - Residual Urine: 5 mL.   - Maximum Flow Rate: 8 mL/sec.   - Flow Pattern: low amplitude  VOIDING PRESSURE STUDY ( Voiding Phase ):   Detrusor Pressure:   - Maximum Detrusor Pressure: 32 cm of water.   - Detrusor Pressure at Maximum Flow: 2 cm of water.   - Detrusor Contraction Characteristics: Sustained contraction(s).     ELECTROMYOGRAM:   - incomplete relaxation.     ---Diagnostic  Cystourethroscopy ---   Normal urethra.    minimal hyperemic area of the bladder  Width of Bladder Neck Opening: Approximately 18 Fr.   Normal bladder.   Normal ureteral orifices bilaterally.     CONCLUSIONS:   1.  Decreased bladder capacity with sensory urgency  2.  IC  3.  OAB    Past Medical History:   Diagnosis Date    Acid reflux     Allergy     Alopecia     Anxiety     Arthralgia     Back pain     Depression     Dry eyes     from meds    Fever blister     Fibromyalgia     Interstitial cystitis     Irritable bowel syndrome     Kidney stone     Major depressive disorder, recurrent episode, in partial or unspecified remission 2013    OAB (overactive bladder) 2015    PTSD (post-traumatic stress disorder)     Rheumatoid arthritis     Systemic lupus erythematosus     Thyroid disease     Urinary tract infection     Vaginal infection      Past Surgical History:   Procedure Laterality Date    BLADDER SURGERY       SECTION, LOW TRANSVERSE      x 2    COLONOSCOPY      COLONOSCOPY N/A 10/5/2017    Procedure: COLONOSCOPY;  Surgeon: Venkat He MD;  Location: [facility] ([address] [place] [address]);  Service: Endoscopy;  Laterality: N/A;    ESOPHAGOGASTRODUODENOSCOPY      ESOPHAGOGASTRODUODENOSCOPY N/A 10/25/2021    Procedure: EGD (ESOPHAGOGASTRODUODENOSCOPY);  Surgeon: Venkat He MD;  Location: [facility] ([address] [place] [address]);  Service: Endoscopy;  Laterality: N/A;  Covid test on 10/22 at [email or web address]    10/19 lvm to confirm appt-rb    EYE SURGERY      Lasik-bilateral    HYSTERECTOMY      INJECTION OF BOTULINUM TOXIN TYPE A N/A 2018    Procedure: INJECTION, BOTULINUM TOXIN, TYPE A  200 UNITS;  Surgeon: Bandar Garcia MD;  Location: [facility] OR [address] [place] [address];  Service: Urology;  Laterality: N/A;    INSTILLATION OF URINARY BLADDER N/A 2018    Procedure: INSTILLATION, URINARY BLADDER;  Surgeon: Bandar Garcia MD;  Location: [facility] OR [address] [place] [address];  Service: Urology;  Laterality: N/A;    PARTIAL  HYSTERECTOMY      TRANSFORAMINAL EPIDURAL INJECTION OF STEROID Left 2/15/2021    Procedure: LUMBAR TRANSFORAMINAL LEFT L5 AND S1 DIRECT REFERRAL;  Surgeon: Marquez Nesbitt MD;  Location: [facility] PAIN MGT;  Service: Pain Management;  Laterality: Left;  NEEDS CONSENT, PT REQUESTING IV SEDATION     Social History     Tobacco Use    Smoking status: Never Smoker    Smokeless tobacco: Never Used   Substance Use Topics    Alcohol use: No    Drug use: No     Family History   Problem Relation Age of Onset    Irritable bowel syndrome Mother     Irritable bowel syndrome Sister     Lupus Sister     Rheum arthritis Sister     Fibromyalgia Sister     Irritable bowel syndrome Sister     Celiac disease Neg Hx     Cirrhosis Neg Hx     Colon cancer Neg Hx     Colon polyps Neg Hx     Crohn's disease Neg Hx     Cystic fibrosis Neg Hx     Esophageal cancer Neg Hx     Hemochromatosis Neg Hx     Inflammatory bowel disease Neg Hx     Liver cancer Neg Hx     Liver disease Neg Hx     Rectal cancer Neg Hx     Stomach cancer Neg Hx     Ulcerative colitis Neg Hx     Boris's disease Neg Hx     Melanoma Neg Hx     Amblyopia Neg Hx     Blindness Neg Hx     Cataracts Neg Hx     Glaucoma Neg Hx     Macular degeneration Neg Hx     Retinal detachment Neg Hx     Strabismus Neg Hx      Allergy:  Review of patient's allergies indicates:   Allergen Reactions    Cephalexin Itching    Zofran [ondansetron hcl (pf)] Hives    Penicillins Rash     Outpatient Encounter Medications as of 4/4/2022   Medication Sig Dispense Refill    alosetron (LOTRONEX) 0.5 MG tablet Take 1 tablet (0.5 mg total) by mouth daily as needed. 30 tablet 0    amitriptyline (ELAVIL) 10 MG tablet TAKE 1 TABLET(10 MG) BY MOUTH EVERY NIGHT AS NEEDED 90 tablet 3    azithromycin (Z-JAVIER) 250 MG tablet Take 2 tablets by mouth on day 1; Take 1 tablet by mouth on days 2-5 6 tablet 0    benzocaine 20 % Oint Compound benzocaine 20% / lidocaine 6% / tetracaine  4% ointment. Apply to affected area 1 hour prior to procedure. 30 g 0    butalbitaL-acetaminophen  mg Tab TAKE 1 TABLET BY MOUTH EVERY 4 HOURS 60 tablet 3    cyclobenzaprine (FLEXERIL) 10 MG tablet TAKE 1 TABLET(10 MG) BY MOUTH TWICE DAILY AS NEEDED 90 tablet 6    dicyclomine (BENTYL) 20 mg tablet Take 20 mg by mouth 4 (four) times daily.      famotidine (PEPCID) 40 MG tablet Take 1 tablet (40 mg total) by mouth nightly as needed for Heartburn. 30 tablet 11    fluticasone propionate (FLONASE) 50 mcg/actuation nasal spray 2 sprays (100 mcg total) by Each Nostril route 2 (two) times daily. 31.6 mL 5    gabapentin (NEURONTIN) 800 MG tablet TAKE 1 TABLET(800 MG) BY MOUTH THREE TIMES DAILY 90 tablet 11    hydrocortisone 2.5 % cream Apply to affected areas twice a day when eczema is moderate. 453.6 g 1    hydrOXYchloroQUINE (PLAQUENIL) 200 mg tablet Take 1 tablet (200 mg total) by mouth 2 (two) times daily. 60 tablet 6    hydrOXYzine HCL (ATARAX) 25 MG tablet TAKE 1 TO 8 TABLETS BY MOUTH EVERY NIGHT AT BEDTIME, START WITH 3 TABLETS AT BEDTIME, INCREASE OR DECREASE TILL ITCHING IS CONTROLLED 90 tablet 0    ketoconazole (NIZORAL) 2 % shampoo Wash scalp with medicated shampoo at least 2x/week. Let sit on scalp at least 5 minutes prior to rinsing 240 mL 5    lamoTRIgine (LAMICTAL) 25 MG tablet Take 2 tablets (50 mg total) by mouth once daily. 60 tablet 5    levothyroxine (SYNTHROID) 50 MCG tablet Take 1 tablet (50 mcg) by mouth Mon-Sat and take 2 tablets (100 mcg) on Sun only 34 tablet 11    multivitamin with minerals tablet Take 1 tablet by mouth once daily.      promethazine (PHENERGAN) 25 MG tablet TAKE 1 TABLET(25 MG) BY MOUTH EVERY 6 HOURS AS NEEDED FOR NAUSEA 30 tablet 0    RABEprazole (ACIPHEX) 20 mg tablet TAKE 1 TABLET(20 MG) BY MOUTH EVERY DAY 90 tablet 0    tamsulosin (FLOMAX) 0.4 mg Cap TAKE 1 CAPSULE(0.4 MG) BY MOUTH EVERY DAY 30 capsule 11    tiZANidine (ZANAFLEX) 4 MG tablet TAKE 1  TABLET(4 MG) BY MOUTH EVERY 8 HOURS AS NEEDED 90 tablet 12    traMADoL (ULTRAM) 50 mg tablet TAKE 1 TABLET(50 MG) BY MOUTH EVERY 6 HOURS 90 tablet 3    [DISCONTINUED] USTELL 120-0.12 mg Cap TAKE 1 CAPSULE BY MOUTH FOUR TIMES DAILY AS NEEDED 40 each 3    calcium glycerophosphate (PRELIEF) 65 mg Tab Take 2 tablets by mouth before meals and at bedtime as needed. 100 each prn    clonazePAM (KLONOPIN) 1 MG tablet TAKE 1 TABLET(1 MG) BY MOUTH THREE TIMES DAILY 90 tablet 2    DULoxetine (CYMBALTA) 60 MG capsule Take 2 capsules (120 mg total) by mouth once daily. 60 capsule 3    fluconazole (DIFLUCAN) 150 MG Tab Take 1 tablet (150 mg total) by mouth once daily. 1 tablet 1    hyoscyamine (ANASPAZ,LEVSIN) 0.125 mg Tab Take 1 tablet (125 mcg total) by mouth every 8 (eight) hours as needed (abdominal cramps). 60 tablet 0    ddpsel-rzzvj-a.blue-sal-naphos (USTELL) 120-0.12 mg Cap Take 1 capsule by mouth 3 (three) times daily. 90 each 3    oxybutynin (DITROPAN) 5 MG Tab Take 1 tablet (5 mg total) by mouth 2 (two) times daily. 60 tablet 12     Facility-Administered Encounter Medications as of 4/4/2022   Medication Dose Route Frequency Provider Last Rate Last Admin    betamethasone acetate-betamethasone sodium phosphate injection 12 mg  12 mg Intramuscular 1 time in Clinic/HOD Fei Adkins MD         Review of Systems   General ROS: GENERAL:  No weight gain or loss  SKIN:  No rashes or lacerations  HEAD:  No headaches  EYES:  No exophthalmos, jaundice or blindness  EARS:  No dizziness, tinnitus or hearing loss  NOSE:  No changes in smell  MOUTH & THROAT:  No dyskinetic movements or obvious goiter  CHEST:  No shortness of breath, hyperventilation or cough  CARDIOVASCULAR:  No tachycardia or chest pain  ABDOMEN:  No nausea, vomiting, pain, constipation or diarrhea  URINARY:  No frequency, dysuria or sexual dysfunction  ENDOCRINE:  No polydipsia, polyuria  MUSCULOSKELETAL:  No pain or stiffness of the  joints  NEUROLOGIC:  No weakness, sensory changes, seizures, confusion, memory loss, tremor or other abnormal movements  Physical Exam     There were no vitals filed for this visit.  Physical Examination:   n/a  LABS:  Lab Results   Component Value Date    CREATININE 1.0 02/11/2022    CREATININE 0.9 09/16/2021    CREATININE 1.0 08/25/2021     Urine Culture, Routine   Date Value Ref Range Status   10/31/2021 No growth  Final     Procedure:  InterStim Analysis and Program  InterStim Reprogram:    First InterStim device impendence was checked: all good except 0 and 2 and 0 and 3 leads.  Current setting is lead 3+ and 1 neg ( avoiding the bad lead connection) at 3.7 volts  Battery life of its generator: greater than 12 months left based on current usage.  No impedence problem noted.     A new setting program 4 at 2.5 volts given. She feels the stimulation vaginally.        Assessment and Plan:  Marilee was seen today for follow-up and urinary incontinence.    Diagnoses and all orders for this visit:    Interstitial cystitis  -     Urine culture  -     calcium glycerophosphate (PRELIEF) 65 mg Tab; Take 2 tablets by mouth before meals and at bedtime as needed.    OAB (overactive bladder)    MRI contraindicated due to metal implant    JAYNE (stress urinary incontinence, female)  -     Ambulatory referral/consult to Physical/Occupational Therapy; Future    Neurostimulator device in situ    IC (interstitial cystitis)  -     qlzsrl-wcbao-qWarrenblue-sal-naphos (USTELL) 120-0.12 mg Cap; Take 1 capsule by mouth 3 (three) times daily.    recommend to continue IC smart diet.  continue IC meds.  So far drinking coffee and tea worsen her bladder pain.  Sexual intercourse tends to worsen the bladder pain.  Responds well to Uribel.  Refills given.    OK to use valium inside of her vagina, especially prior to her intercourse or after.    For bladder pain, she can use Ustell as needed.    For her JAYNE, recommend Kegel exercise.  Refer her to  physical therapy.  She is already on cymbalta for chronic pain, which will also help her with JAYNE.    S/p InterStim Therapy 6 years out.  The battery life is still over 1 year left.  However, she will need MRI of spine because of her chronic back pain, sciatica as well as rheumatoid arthritis.  So will replace her old InterStim Therapy to MRI safe new InterSTim Therapy ( InterStim X and new electrode).    I spent 40 minutes with the patient of which more than half was spent in direct consultation with the patient in regards to our treatment and plan.    Follow-up:  Follow up in about 23 days (around 4/27/2022), or InterStim Therapy revision.

## 2022-04-05 ENCOUNTER — TELEPHONE (OUTPATIENT)
Dept: UROLOGY | Facility: CLINIC | Age: 52
End: 2022-04-05
Payer: COMMERCIAL

## 2022-04-05 DIAGNOSIS — N32.81 OAB (OVERACTIVE BLADDER): Primary | ICD-10-CM

## 2022-04-05 LAB — BACTERIA UR CULT: NO GROWTH

## 2022-04-05 NOTE — TELEPHONE ENCOUNTER
OAB (overactive bladder)  -     Case Request Operating Room: INSERTION, NEUROSTIMULATOR, TEMPORARY, SACRAL  FLUORO < 1HR, INSERTION, NEUROSTIMULATOR, PERMANENT, SACRAL, REMOVAL, ELECTRODE LEAD, SACRAL NERVE STIMULATOR

## 2022-04-08 ENCOUNTER — PATIENT MESSAGE (OUTPATIENT)
Dept: ALLERGY | Facility: CLINIC | Age: 52
End: 2022-04-08
Payer: COMMERCIAL

## 2022-04-12 ENCOUNTER — OFFICE VISIT (OUTPATIENT)
Dept: RHEUMATOLOGY | Facility: CLINIC | Age: 52
End: 2022-04-12
Payer: COMMERCIAL

## 2022-04-12 ENCOUNTER — LAB VISIT (OUTPATIENT)
Dept: LAB | Facility: HOSPITAL | Age: 52
End: 2022-04-12
Payer: COMMERCIAL

## 2022-04-12 VITALS
WEIGHT: 167 LBS | HEART RATE: 109 BPM | HEIGHT: 59 IN | SYSTOLIC BLOOD PRESSURE: 118 MMHG | DIASTOLIC BLOOD PRESSURE: 75 MMHG | BODY MASS INDEX: 33.67 KG/M2

## 2022-04-12 DIAGNOSIS — L40.50 PSORIATIC ARTHRITIS: ICD-10-CM

## 2022-04-12 DIAGNOSIS — G89.4 CHRONIC PAIN SYNDROME: ICD-10-CM

## 2022-04-12 DIAGNOSIS — L40.50 PSORIATIC ARTHRITIS: Primary | ICD-10-CM

## 2022-04-12 DIAGNOSIS — Z79.899 HIGH RISK MEDICATION USE: ICD-10-CM

## 2022-04-12 DIAGNOSIS — M06.00 SERONEGATIVE RHEUMATOID ARTHRITIS: ICD-10-CM

## 2022-04-12 LAB
ALBUMIN SERPL BCP-MCNC: 3.7 G/DL (ref 3.5–5.2)
ALP SERPL-CCNC: 91 U/L (ref 55–135)
ALT SERPL W/O P-5'-P-CCNC: 24 U/L (ref 10–44)
ANION GAP SERPL CALC-SCNC: 11 MMOL/L (ref 8–16)
AST SERPL-CCNC: 24 U/L (ref 10–40)
BASOPHILS # BLD AUTO: 0.05 K/UL (ref 0–0.2)
BASOPHILS NFR BLD: 0.7 % (ref 0–1.9)
BILIRUB SERPL-MCNC: 0.3 MG/DL (ref 0.1–1)
BUN SERPL-MCNC: 15 MG/DL (ref 6–20)
CALCIUM SERPL-MCNC: 9.2 MG/DL (ref 8.7–10.5)
CHLORIDE SERPL-SCNC: 103 MMOL/L (ref 95–110)
CO2 SERPL-SCNC: 25 MMOL/L (ref 23–29)
CREAT SERPL-MCNC: 0.9 MG/DL (ref 0.5–1.4)
CRP SERPL-MCNC: 3.5 MG/L (ref 0–8.2)
DIFFERENTIAL METHOD: ABNORMAL
EOSINOPHIL # BLD AUTO: 0.2 K/UL (ref 0–0.5)
EOSINOPHIL NFR BLD: 2.5 % (ref 0–8)
ERYTHROCYTE [DISTWIDTH] IN BLOOD BY AUTOMATED COUNT: 12.5 % (ref 11.5–14.5)
ERYTHROCYTE [SEDIMENTATION RATE] IN BLOOD BY WESTERGREN METHOD: 6 MM/HR (ref 0–36)
EST. GFR  (AFRICAN AMERICAN): >60 ML/MIN/1.73 M^2
EST. GFR  (NON AFRICAN AMERICAN): >60 ML/MIN/1.73 M^2
GLUCOSE SERPL-MCNC: 87 MG/DL (ref 70–110)
HCT VFR BLD AUTO: 35.7 % (ref 37–48.5)
HGB BLD-MCNC: 11.5 G/DL (ref 12–16)
IMM GRANULOCYTES # BLD AUTO: 0.02 K/UL (ref 0–0.04)
IMM GRANULOCYTES NFR BLD AUTO: 0.3 % (ref 0–0.5)
LYMPHOCYTES # BLD AUTO: 2.6 K/UL (ref 1–4.8)
LYMPHOCYTES NFR BLD: 34.8 % (ref 18–48)
MCH RBC QN AUTO: 27.3 PG (ref 27–31)
MCHC RBC AUTO-ENTMCNC: 32.2 G/DL (ref 32–36)
MCV RBC AUTO: 85 FL (ref 82–98)
MONOCYTES # BLD AUTO: 0.6 K/UL (ref 0.3–1)
MONOCYTES NFR BLD: 8.1 % (ref 4–15)
NEUTROPHILS # BLD AUTO: 4 K/UL (ref 1.8–7.7)
NEUTROPHILS NFR BLD: 53.6 % (ref 38–73)
NRBC BLD-RTO: 0 /100 WBC
PLATELET # BLD AUTO: 281 K/UL (ref 150–450)
PMV BLD AUTO: 8.8 FL (ref 9.2–12.9)
POTASSIUM SERPL-SCNC: 4.1 MMOL/L (ref 3.5–5.1)
PROT SERPL-MCNC: 6.4 G/DL (ref 6–8.4)
RBC # BLD AUTO: 4.22 M/UL (ref 4–5.4)
SODIUM SERPL-SCNC: 139 MMOL/L (ref 136–145)
WBC # BLD AUTO: 7.5 K/UL (ref 3.9–12.7)

## 2022-04-12 PROCEDURE — 80053 COMPREHEN METABOLIC PANEL: CPT | Performed by: PHYSICIAN ASSISTANT

## 2022-04-12 PROCEDURE — 3074F SYST BP LT 130 MM HG: CPT | Mod: CPTII,S$GLB,, | Performed by: PHYSICIAN ASSISTANT

## 2022-04-12 PROCEDURE — 96372 THER/PROPH/DIAG INJ SC/IM: CPT | Mod: S$GLB,,, | Performed by: PHYSICIAN ASSISTANT

## 2022-04-12 PROCEDURE — 86480 TB TEST CELL IMMUN MEASURE: CPT | Performed by: PHYSICIAN ASSISTANT

## 2022-04-12 PROCEDURE — 86706 HEP B SURFACE ANTIBODY: CPT | Performed by: PHYSICIAN ASSISTANT

## 2022-04-12 PROCEDURE — 99214 OFFICE O/P EST MOD 30 MIN: CPT | Mod: 25,S$GLB,, | Performed by: PHYSICIAN ASSISTANT

## 2022-04-12 PROCEDURE — 86704 HEP B CORE ANTIBODY TOTAL: CPT | Performed by: PHYSICIAN ASSISTANT

## 2022-04-12 PROCEDURE — 96372 PR INJECTION,THERAP/PROPH/DIAG2ST, IM OR SUBCUT: ICD-10-PCS | Mod: S$GLB,,, | Performed by: PHYSICIAN ASSISTANT

## 2022-04-12 PROCEDURE — 87340 HEPATITIS B SURFACE AG IA: CPT | Performed by: PHYSICIAN ASSISTANT

## 2022-04-12 PROCEDURE — 1160F PR REVIEW ALL MEDS BY PRESCRIBER/CLIN PHARMACIST DOCUMENTED: ICD-10-PCS | Mod: CPTII,S$GLB,, | Performed by: PHYSICIAN ASSISTANT

## 2022-04-12 PROCEDURE — 3078F DIAST BP <80 MM HG: CPT | Mod: CPTII,S$GLB,, | Performed by: PHYSICIAN ASSISTANT

## 2022-04-12 PROCEDURE — 99214 PR OFFICE/OUTPT VISIT, EST, LEVL IV, 30-39 MIN: ICD-10-PCS | Mod: 25,S$GLB,, | Performed by: PHYSICIAN ASSISTANT

## 2022-04-12 PROCEDURE — 3008F BODY MASS INDEX DOCD: CPT | Mod: CPTII,S$GLB,, | Performed by: PHYSICIAN ASSISTANT

## 2022-04-12 PROCEDURE — 86803 HEPATITIS C AB TEST: CPT | Performed by: PHYSICIAN ASSISTANT

## 2022-04-12 PROCEDURE — 1159F PR MEDICATION LIST DOCUMENTED IN MEDICAL RECORD: ICD-10-PCS | Mod: CPTII,S$GLB,, | Performed by: PHYSICIAN ASSISTANT

## 2022-04-12 PROCEDURE — 1160F RVW MEDS BY RX/DR IN RCRD: CPT | Mod: CPTII,S$GLB,, | Performed by: PHYSICIAN ASSISTANT

## 2022-04-12 PROCEDURE — 3078F PR MOST RECENT DIASTOLIC BLOOD PRESSURE < 80 MM HG: ICD-10-PCS | Mod: CPTII,S$GLB,, | Performed by: PHYSICIAN ASSISTANT

## 2022-04-12 PROCEDURE — 99999 PR PBB SHADOW E&M-EST. PATIENT-LVL V: ICD-10-PCS | Mod: PBBFAC,,, | Performed by: PHYSICIAN ASSISTANT

## 2022-04-12 PROCEDURE — 3008F PR BODY MASS INDEX (BMI) DOCUMENTED: ICD-10-PCS | Mod: CPTII,S$GLB,, | Performed by: PHYSICIAN ASSISTANT

## 2022-04-12 PROCEDURE — 85652 RBC SED RATE AUTOMATED: CPT | Performed by: PHYSICIAN ASSISTANT

## 2022-04-12 PROCEDURE — 86140 C-REACTIVE PROTEIN: CPT | Performed by: PHYSICIAN ASSISTANT

## 2022-04-12 PROCEDURE — 99999 PR PBB SHADOW E&M-EST. PATIENT-LVL V: CPT | Mod: PBBFAC,,, | Performed by: PHYSICIAN ASSISTANT

## 2022-04-12 PROCEDURE — 36415 COLL VENOUS BLD VENIPUNCTURE: CPT | Mod: PO | Performed by: PHYSICIAN ASSISTANT

## 2022-04-12 PROCEDURE — 3074F PR MOST RECENT SYSTOLIC BLOOD PRESSURE < 130 MM HG: ICD-10-PCS | Mod: CPTII,S$GLB,, | Performed by: PHYSICIAN ASSISTANT

## 2022-04-12 PROCEDURE — 1159F MED LIST DOCD IN RCRD: CPT | Mod: CPTII,S$GLB,, | Performed by: PHYSICIAN ASSISTANT

## 2022-04-12 PROCEDURE — 85025 COMPLETE CBC W/AUTO DIFF WBC: CPT | Performed by: PHYSICIAN ASSISTANT

## 2022-04-12 RX ORDER — KETOROLAC TROMETHAMINE 30 MG/ML
60 INJECTION, SOLUTION INTRAMUSCULAR; INTRAVENOUS
Status: COMPLETED | OUTPATIENT
Start: 2022-04-12 | End: 2022-04-12

## 2022-04-12 RX ORDER — METHYLPREDNISOLONE 4 MG/1
TABLET ORAL
Qty: 1 EACH | Refills: 0 | Status: SHIPPED | OUTPATIENT
Start: 2022-04-12 | End: 2022-06-21

## 2022-04-12 RX ORDER — CYANOCOBALAMIN 1000 UG/ML
1000 INJECTION, SOLUTION INTRAMUSCULAR; SUBCUTANEOUS
Status: COMPLETED | OUTPATIENT
Start: 2022-04-12 | End: 2022-04-12

## 2022-04-12 RX ORDER — DEXAMETHASONE SODIUM PHOSPHATE 4 MG/ML
4 INJECTION, SOLUTION INTRA-ARTICULAR; INTRALESIONAL; INTRAMUSCULAR; INTRAVENOUS; SOFT TISSUE
Status: COMPLETED | OUTPATIENT
Start: 2022-04-12 | End: 2022-04-12

## 2022-04-12 RX ADMIN — KETOROLAC TROMETHAMINE 60 MG: 30 INJECTION, SOLUTION INTRAMUSCULAR; INTRAVENOUS at 02:04

## 2022-04-12 RX ADMIN — CYANOCOBALAMIN 1000 MCG: 1000 INJECTION, SOLUTION INTRAMUSCULAR; SUBCUTANEOUS at 02:04

## 2022-04-12 RX ADMIN — DEXAMETHASONE SODIUM PHOSPHATE 4 MG: 4 INJECTION, SOLUTION INTRA-ARTICULAR; INTRALESIONAL; INTRAMUSCULAR; INTRAVENOUS; SOFT TISSUE at 02:04

## 2022-04-12 NOTE — PROGRESS NOTES
Administered B12, 1000 mcg, 1cc and Decadron 4mg, 1cc in Right Upper Gluteal    Administered Ketorolac 60mg, 2cc in Left Upper Gluteal    2 Patient ID's verified and reviewed    Answers for HPI/ROS submitted by the patient on 4/7/2022  fever: No  eye redness: No  mouth sores: Yes  headaches: Yes  shortness of breath: No  chest pain: No  trouble swallowing: No  diarrhea: Yes  constipation: No  unexpected weight change: No  genital sore: No  dysuria: No  During the last 3 days, have you had a skin rash?: No  Bruises or bleeds easily: Yes  cough: No

## 2022-04-13 RX ORDER — TOFACITINIB 11 MG/1
11 TABLET, FILM COATED, EXTENDED RELEASE ORAL DAILY
Qty: 30 TABLET | Refills: 6 | Status: SHIPPED | OUTPATIENT
Start: 2022-04-13 | End: 2023-01-16 | Stop reason: SDUPTHER

## 2022-04-13 NOTE — PROGRESS NOTES
Subjective:       Patient ID: Marilee Simons is a 52 y.o. female.    Chief Complaint: Disease Management    Mrs. Simons is a 52 year old female who presents to clinic for follow up on psoriatic arthritis. She is a new patient to me. She is doing poorly on plaquenil. She has generalized pain throughout her body. She has joint pain in her hands, wrists, elbows, shoulders, knee, and low back. Pain is constant and aching. She has increased stiffness and weakness in her hands--she is dropping objects. She has hypersensitivity of her elbows unable to rest her arms on a surface. She has low back pain with radiating pain down the legs. She is trying to do home exercises. She was previously on Humira, but it was held due to covid pandemic. Currently on tramadol, gabapentin, and flexeril for pain.    Stress is high at home. Her daughter told her recently she started seeing a therapist for depression which was upsetting. She is followed by psychiatry and a therapist currently, but she may try to change her counselor. She does not feel she is being listened to. She states her relationship with her  also creates stress.     Needs interstim replaced soon with Urology.     Current tx:  1. Plaquenil    Prior tx:  1. Humira      Review of Systems   Constitutional: Negative for fever and unexpected weight change.   HENT: Positive for mouth sores. Negative for trouble swallowing.    Eyes: Negative for redness.   Respiratory: Negative for cough and shortness of breath.    Cardiovascular: Negative for chest pain.   Gastrointestinal: Positive for diarrhea. Negative for constipation.   Genitourinary: Negative for dysuria and genital sores.   Musculoskeletal: Positive for arthralgias, joint swelling and myalgias.   Skin: Negative for rash.   Neurological: Positive for headaches.   Hematological: Bruises/bleeds easily.         Objective:     Vitals:    04/12/22 1323   BP: 118/75   Pulse: 109       Past Medical History:   Diagnosis  Date    Acid reflux     Allergy     Alopecia     Anxiety     Arthralgia     Back pain     Depression     Dry eyes     from meds    Fever blister     Fibromyalgia     Interstitial cystitis     Irritable bowel syndrome     Kidney stone     Major depressive disorder, recurrent episode, in partial or unspecified remission 2013    OAB (overactive bladder) 2015    PTSD (post-traumatic stress disorder)     Rheumatoid arthritis     Systemic lupus erythematosus     Thyroid disease     Urinary tract infection     Vaginal infection      Past Surgical History:   Procedure Laterality Date    BLADDER SURGERY       SECTION, LOW TRANSVERSE      x 2    COLONOSCOPY      COLONOSCOPY N/A 10/5/2017    Procedure: COLONOSCOPY;  Surgeon: Venkat He MD;  Location: [facility] ([address] [place] [address]);  Service: Endoscopy;  Laterality: N/A;    ESOPHAGOGASTRODUODENOSCOPY      ESOPHAGOGASTRODUODENOSCOPY N/A 10/25/2021    Procedure: EGD (ESOPHAGOGASTRODUODENOSCOPY);  Surgeon: Venkat He MD;  Location: [address]);  Service: Endoscopy;  Laterality: N/A;  Covid test on 10/22 at [email or web address]    10/19 lvm to confirm appt-rb    EYE SURGERY      Lasik-bilateral    HYSTERECTOMY      INJECTION OF BOTULINUM TOXIN TYPE A N/A 2018    Procedure: INJECTION, BOTULINUM TOXIN, TYPE A  200 UNITS;  Surgeon: Bandar Garcia MD;  [facility] OR [address] [place] [address];  Service: Urology;  Laterality: N/A;    INSTILLATION OF URINARY BLADDER N/A 2018    Procedure: INSTILLATION, URINARY BLADDER;  Surgeon: Bandar Garcia MD;  Location[facility] OR [address] [place] [address];  Service: Urology;  Laterality: N/A;    PARTIAL HYSTERECTOMY      TRANSFORAMINAL EPIDURAL INJECTION OF STEROID Left 2/15/2021    Procedure: LUMBAR TRANSFORAMINAL LEFT L5 AND S1 DIRECT REFERRAL;  Surgeon: Marquez Nesbitt MD;  Location: [facility] PAIN [facility];  Service: Pain Management;  Laterality: Left;  NEEDS CONSENT, PT REQUESTING IV SEDATION          Physical Exam   Eyes:  Right conjunctiva is not injected. Left conjunctiva is not injected.   Neck: No JVD present. No thyromegaly present.   Cardiovascular: Normal rate and regular rhythm. Exam reveals no decreased pulses.   Pulmonary/Chest: Effort normal.   Musculoskeletal:      Right shoulder: Tenderness present.      Left shoulder: Tenderness present.      Right elbow: Tenderness present.      Left elbow: Tenderness present.      Right wrist: Tenderness present.      Left wrist: Tenderness present.      Right knee: Normal.      Left knee: Normal.   Lymphadenopathy:     She has no cervical adenopathy.   Neurological: Gait normal.   Skin: No rash noted.   Psychiatric: Affect normal. Her mood appears anxious. She exhibits a depressed mood.       Right Side Rheumatological Exam     Examination finds the knee, 1st MCP, 2nd MCP, 3rd MCP, 4th MCP and 5th MCP normal.    The patient is tender to palpation of the shoulder, elbow, wrist, 1st PIP, 2nd PIP, 3rd PIP, 4th PIP and 5th PIP    She has swelling of the 1st PIP, 2nd PIP, 3rd PIP, 4th PIP and 5th PIP    Left Side Rheumatological Exam     Examination finds the knee, 1st MCP, 2nd MCP, 3rd MCP, 4th MCP and 5th MCP normal.    The patient is tender to palpation of the shoulder, elbow, wrist, 1st PIP, 2nd PIP, 3rd PIP, 4th PIP and 5th PIP.    She has swelling of the 1st PIP, 2nd PIP, 3rd PIP, 4th PIP and 5th PIP        Labs reviewed:  Component      Latest Ref Rng & Units 2/11/2022   WBC      3.90 - 12.70 K/uL 9.48   RBC      4.00 - 5.40 M/uL 4.66   Hemoglobin      12.0 - 16.0 g/dL 13.1   Hematocrit      37.0 - 48.5 % 39.2   MCV      82 - 98 fL 84   MCH      27.0 - 31.0 pg 28.1   MCHC      32.0 - 36.0 g/dL 33.4   RDW      11.5 - 14.5 % 12.2   Platelets      150 - 450 K/uL 323   MPV      9.2 - 12.9 fL 8.9 (L)   Immature Granulocytes      0.0 - 0.5 % 0.3   Gran # (ANC)      1.8 - 7.7 K/uL 5.5   Immature Grans (Abs)      0.00 - 0.04 K/uL 0.03   Lymph #      1.0 - 4.8 K/uL 3.0   Mono #      0.3 -  1.0 K/uL 0.8   Eos #      0.0 - 0.5 K/uL 0.1   Baso #      0.00 - 0.20 K/uL 0.08   nRBC      0 /100 WBC 0   Gran %      38.0 - 73.0 % 58.2   Lymph %      18.0 - 48.0 % 32.0   Mono %      4.0 - 15.0 % 8.1   Eosinophil %      0.0 - 8.0 % 0.6   Basophil %      0.0 - 1.9 % 0.8   Differential Method       Automated   Sodium      136 - 145 mmol/L 138   Potassium      3.5 - 5.1 mmol/L 3.9   Chloride      95 - 110 mmol/L 103   CO2      23 - 29 mmol/L 21 (L)   Glucose      70 - 110 mg/dL 101   BUN      6 - 20 mg/dL 17   Creatinine      0.5 - 1.4 mg/dL 1.0   Calcium      8.7 - 10.5 mg/dL 10.1   PROTEIN TOTAL      6.0 - 8.4 g/dL 7.9   Albumin      3.5 - 5.2 g/dL 4.5   BILIRUBIN TOTAL      0.1 - 1.0 mg/dL 0.5   Alkaline Phosphatase      55 - 135 U/L 104   AST      10 - 40 U/L 24   ALT      10 - 44 U/L 23   Anion Gap      8 - 16 mmol/L 14   eGFR if African American      >60 mL/min/1.73 m:2 >60   eGFR if non African American      >60 mL/min/1.73 m:2 >60   Sed Rate      0 - 20 mm/Hr 10   CRP      0.0 - 8.2 mg/L 3.0        Assessment:       1. Psoriatic arthritis    2. Seronegative rheumatoid arthritis    3. Chronic pain syndrome    4. High risk medication use            Plan:       Psoriatic arthritis  -     ketorolac injection 60 mg  -     dexamethasone injection 4 mg  -     cyanocobalamin injection 1,000 mcg  -     methylPREDNISolone (MEDROL DOSEPACK) 4 mg tablet; use as directed  Dispense: 1 each; Refill: 0  -     CBC Auto Differential; Future; Expected date: 04/12/2022  -     Comprehensive Metabolic Panel; Future; Expected date: 04/12/2022  -     C-Reactive Protein; Future; Expected date: 04/12/2022  -     Sedimentation rate; Future; Expected date: 04/12/2022  -     Hepatitis C Antibody; Future; Expected date: 04/12/2022  -     Quantiferon Gold TB; Future; Expected date: 04/12/2022  -     HEPATITIS B SURFACE ANTIBODY; Future; Expected date: 04/12/2022  -     Hepatitis B Surface Antigen; Future; Expected date: 04/12/2022  -      Hepatitis B Core Antibody, Total; Future; Expected date: 04/12/2022  -     tofacitinib (XELJANZ XR) 11 mg Tb24; Take 11 mg by mouth once daily.  Dispense: 30 tablet; Refill: 6    Seronegative rheumatoid arthritis  -     tofacitinib (XELJANZ XR) 11 mg Tb24; Take 11 mg by mouth once daily.  Dispense: 30 tablet; Refill: 6    Chronic pain syndrome    High risk medication use        Assessment:  52 year old female with  Psoriatic arthritis, seronegative RA  --chronic pain syndrome on tramadol  --uncontrolled major depression, anxiety followed by Psychiatry  --osteopenia  --hashimoto's thyroiditis followed by Endocrinology    Plan:  1. toradol 60, dexa 4 mg, b12 given today for flare  2. Medrol dose pack start tomorrow if needed  3. Check labs  4. Discussed EVUSHELD injection fact sheet given  5. Consider xeljanz XR 11 mg (plan to start once recovered from urology procedure). Hold for any infections in the future. Will d/c plaquenil when starting xeljanz.  6. Psychiatry follow up as scheduled  7. Tramadol, gabapentin per MD.  I have checked [place] prescription monitoring program site and no unusual or abnormal behavior has occurred pt understand the risk and benefits of taking opioid medications and has decided to continue the medication.    Follow up:  4 mo Dr. Samaniego

## 2022-04-14 ENCOUNTER — TELEPHONE (OUTPATIENT)
Dept: FAMILY MEDICINE | Facility: CLINIC | Age: 52
End: 2022-04-14
Payer: COMMERCIAL

## 2022-04-14 ENCOUNTER — TELEPHONE (OUTPATIENT)
Dept: DERMATOLOGY | Facility: CLINIC | Age: 52
End: 2022-04-14
Payer: COMMERCIAL

## 2022-04-14 ENCOUNTER — PATIENT MESSAGE (OUTPATIENT)
Dept: FAMILY MEDICINE | Facility: CLINIC | Age: 52
End: 2022-04-14
Payer: COMMERCIAL

## 2022-04-14 DIAGNOSIS — Z12.31 ENCOUNTER FOR SCREENING MAMMOGRAM FOR MALIGNANT NEOPLASM OF BREAST: Primary | ICD-10-CM

## 2022-04-14 DIAGNOSIS — Z12.31 ENCOUNTER FOR SCREENING MAMMOGRAM FOR BREAST CANCER: ICD-10-CM

## 2022-04-14 NOTE — TELEPHONE ENCOUNTER
----- Message from Meenu Valenzuela sent at 4/14/2022  3:07 PM CDT -----  Regarding: pt  Pt is calling to speak with the nurse in ref to her up coming appt or if they need to make the time longer for her visit. Pt said she needs a refill on her cream that goes to the specialty pharmacy pt said she didn't know the name of the medication. Can you please call pt at [phone].    ROSELYN

## 2022-04-14 NOTE — TELEPHONE ENCOUNTER
----- Message from Meenu Valenzuela sent at 4/14/2022  3:07 PM CDT -----  Regarding: pt  Pt is calling to speak with the nurse in ref to her up coming appt or if they need to make the time longer for her visit. Pt said she needs a refill on her cream that goes to the specialty pharmacy pt said she didn't know the name of the medication. Can you please call pt at [phone].    ROSELYN

## 2022-04-15 LAB
HBV CORE AB SERPL QL IA: NEGATIVE
HBV SURFACE AB SER-ACNC: POSITIVE M[IU]/ML
HBV SURFACE AG SERPL QL IA: NEGATIVE
HCV AB SERPL QL IA: NEGATIVE
MAYO MISCELLANEOUS RESULT (REF): NORMAL

## 2022-04-24 ENCOUNTER — PATIENT MESSAGE (OUTPATIENT)
Dept: RHEUMATOLOGY | Facility: CLINIC | Age: 52
End: 2022-04-24
Payer: COMMERCIAL

## 2022-04-25 ENCOUNTER — PATIENT MESSAGE (OUTPATIENT)
Dept: RHEUMATOLOGY | Facility: CLINIC | Age: 52
End: 2022-04-25
Payer: COMMERCIAL

## 2022-04-25 NOTE — TELEPHONE ENCOUNTER
LELO Mclean--can you call [employer] to see if they can start the authorization. I don't see any documentation from [employer] regarding this medication authorization yet. Please let the patient know the pharmacy will reach out to her once it is approved by her insurance and will mail it to her. Give her phone number for [employer] so she can call on her own as well.

## 2022-04-26 ENCOUNTER — PATIENT MESSAGE (OUTPATIENT)
Dept: UROLOGY | Facility: CLINIC | Age: 52
End: 2022-04-26
Payer: COMMERCIAL

## 2022-04-26 ENCOUNTER — TELEPHONE (OUTPATIENT)
Dept: UROLOGY | Facility: CLINIC | Age: 52
End: 2022-04-26
Payer: COMMERCIAL

## 2022-04-26 ENCOUNTER — SPECIALTY PHARMACY (OUTPATIENT)
Dept: PHARMACY | Facility: CLINIC | Age: 52
End: 2022-04-26
Payer: COMMERCIAL

## 2022-04-26 NOTE — TELEPHONE ENCOUNTER
Called pt to confirm arrival time of 900am for procedure on 4-27. Gave pt NPO instructions and gave pt opportunity to ask questions. Pt verbalized understanding.     Pt was informed that only 1 person would be allowed to accompany them the morning of surgery.  Pt verbalized understanding.    Left v/m with arrival time and also msg that I would be sending her a sonny msg with arrival time as well.    Pt was left a v/m with new arrival time of 9am due to a cancellation and also given my contact number asking to return the call to confirm

## 2022-04-26 NOTE — PRE-PROCEDURE INSTRUCTIONS
PreOp Instructions given:   - Verbal medication information (what to hold and what to take)   - NPO guidelines   - Arrival place directions given; time to be given the day before procedure by the   Surgeon's Office 0900 DOSC  - Bathing with antibacterial soap   - Don't wear any jewelry or bring any valuables AM of surgery   - No makeup or moisturizer to face   - No perfume/cologne, powder, lotions or aftershave   Pt. verbalized understanding.   Pt denies any h/o Anesthesia/Sedation complications or side effects.

## 2022-04-27 ENCOUNTER — HOSPITAL ENCOUNTER (OUTPATIENT)
Facility: HOSPITAL | Age: 52
Discharge: HOME | End: 2022-04-27
Attending: UROLOGY | Admitting: UROLOGY
Payer: COMMERCIAL

## 2022-04-27 ENCOUNTER — TELEPHONE (OUTPATIENT)
Dept: RHEUMATOLOGY | Facility: CLINIC | Age: 52
End: 2022-04-27
Payer: COMMERCIAL

## 2022-04-27 ENCOUNTER — ANESTHESIA (OUTPATIENT)
Dept: SURGERY | Facility: HOSPITAL | Age: 52
End: 2022-04-27
Payer: COMMERCIAL

## 2022-04-27 ENCOUNTER — PATIENT MESSAGE (OUTPATIENT)
Dept: RHEUMATOLOGY | Facility: CLINIC | Age: 52
End: 2022-04-27
Payer: COMMERCIAL

## 2022-04-27 ENCOUNTER — ANESTHESIA EVENT (OUTPATIENT)
Dept: SURGERY | Facility: HOSPITAL | Age: 52
End: 2022-04-27
Payer: COMMERCIAL

## 2022-04-27 ENCOUNTER — PATIENT MESSAGE (OUTPATIENT)
Dept: PHARMACY | Facility: CLINIC | Age: 52
End: 2022-04-27
Payer: COMMERCIAL

## 2022-04-27 DIAGNOSIS — N32.81 OAB (OVERACTIVE BLADDER): Primary | ICD-10-CM

## 2022-04-27 PROCEDURE — 25000003 PHARM REV CODE 250: Performed by: NURSE ANESTHETIST, CERTIFIED REGISTERED

## 2022-04-27 PROCEDURE — C1787 PATIENT PROGR, NEUROSTIM: HCPCS | Performed by: UROLOGY

## 2022-04-27 PROCEDURE — 64590 PR IMPLANT PERIPH/GASTRIC NEUROSTIM/RECEIVER: ICD-10-PCS | Mod: 51,,, | Performed by: UROLOGY

## 2022-04-27 PROCEDURE — 76000 PR  FLUOROSCOPE EXAMINATION: ICD-10-PCS | Mod: 26,59,, | Performed by: UROLOGY

## 2022-04-27 PROCEDURE — 25000003 PHARM REV CODE 250: Performed by: STUDENT IN AN ORGANIZED HEALTH CARE EDUCATION/TRAINING PROGRAM

## 2022-04-27 PROCEDURE — 63600175 PHARM REV CODE 636 W HCPCS: Performed by: NURSE ANESTHETIST, CERTIFIED REGISTERED

## 2022-04-27 PROCEDURE — 71000015 HC POSTOP RECOV 1ST HR: Performed by: UROLOGY

## 2022-04-27 PROCEDURE — 63600175 PHARM REV CODE 636 W HCPCS: Performed by: STUDENT IN AN ORGANIZED HEALTH CARE EDUCATION/TRAINING PROGRAM

## 2022-04-27 PROCEDURE — 36000707: Performed by: UROLOGY

## 2022-04-27 PROCEDURE — 95971 ALYS SMPL SP/PN NPGT W/PRGRM: CPT | Mod: 59,,, | Performed by: UROLOGY

## 2022-04-27 PROCEDURE — 37000009 HC ANESTHESIA EA ADD 15 MINS: Performed by: UROLOGY

## 2022-04-27 PROCEDURE — 76000 FLUOROSCOPY <1 HR PHYS/QHP: CPT | Mod: 26,59,, | Performed by: UROLOGY

## 2022-04-27 PROCEDURE — C1778 LEAD, NEUROSTIMULATOR: HCPCS | Performed by: UROLOGY

## 2022-04-27 PROCEDURE — 95971 PR ANALYZE NEUROSTIM,SIMPLE/PROG: ICD-10-PCS | Mod: 59,,, | Performed by: UROLOGY

## 2022-04-27 PROCEDURE — 64590 INS/RPL PRPH SAC/GSTR NPG/R: CPT | Mod: 51,,, | Performed by: UROLOGY

## 2022-04-27 PROCEDURE — 71000044 HC DOSC ROUTINE RECOVERY FIRST HOUR: Performed by: UROLOGY

## 2022-04-27 PROCEDURE — C1767 GENERATOR, NEURO NON-RECHARG: HCPCS | Performed by: UROLOGY

## 2022-04-27 PROCEDURE — 37000008 HC ANESTHESIA 1ST 15 MINUTES: Performed by: UROLOGY

## 2022-04-27 PROCEDURE — 64581 OPN IMPLTJ NEA SACRAL NERVE: CPT | Mod: ,,, | Performed by: UROLOGY

## 2022-04-27 PROCEDURE — D9220A PRA ANESTHESIA: Mod: ANES,,, | Performed by: ANESTHESIOLOGY

## 2022-04-27 PROCEDURE — D9220A PRA ANESTHESIA: ICD-10-PCS | Mod: CRNA,,, | Performed by: NURSE ANESTHETIST, CERTIFIED REGISTERED

## 2022-04-27 PROCEDURE — 36000706: Performed by: UROLOGY

## 2022-04-27 PROCEDURE — 25000003 PHARM REV CODE 250: Performed by: UROLOGY

## 2022-04-27 PROCEDURE — D9220A PRA ANESTHESIA: ICD-10-PCS | Mod: ANES,,, | Performed by: ANESTHESIOLOGY

## 2022-04-27 PROCEDURE — D9220A PRA ANESTHESIA: Mod: CRNA,,, | Performed by: NURSE ANESTHETIST, CERTIFIED REGISTERED

## 2022-04-27 PROCEDURE — 64581 PR IMPLANTATION, NEUROSTIM ELECT ARRAY, OPEN, SACRAL NERVE: ICD-10-PCS | Mod: ,,, | Performed by: UROLOGY

## 2022-04-27 PROCEDURE — 71000016 HC POSTOP RECOV ADDL HR: Performed by: UROLOGY

## 2022-04-27 DEVICE — SYS INTERSTIM X RECHARGE FREE: Type: IMPLANTABLE DEVICE | Site: BACK | Status: FUNCTIONAL

## 2022-04-27 DEVICE — LEAD INTERSTIM 2 SURESCAN 28CM: Type: IMPLANTABLE DEVICE | Site: BACK | Status: FUNCTIONAL

## 2022-04-27 RX ORDER — KETAMINE HCL IN 0.9 % NACL 50 MG/5 ML
SYRINGE (ML) INTRAVENOUS
Status: DISCONTINUED | OUTPATIENT
Start: 2022-04-27 | End: 2022-04-27

## 2022-04-27 RX ORDER — SODIUM BICARBONATE 1 MEQ/ML
SYRINGE (ML) INTRAVENOUS
Status: DISCONTINUED | OUTPATIENT
Start: 2022-04-27 | End: 2022-04-27 | Stop reason: HOSPADM

## 2022-04-27 RX ORDER — LIDOCAINE HYDROCHLORIDE 10 MG/ML
1 INJECTION, SOLUTION EPIDURAL; INFILTRATION; INTRACAUDAL; PERINEURAL ONCE
Status: DISCONTINUED | OUTPATIENT
Start: 2022-04-27 | End: 2022-04-27 | Stop reason: HOSPADM

## 2022-04-27 RX ORDER — HALOPERIDOL 5 MG/ML
0.5 INJECTION INTRAMUSCULAR EVERY 10 MIN PRN
Status: DISCONTINUED | OUTPATIENT
Start: 2022-04-27 | End: 2022-04-27 | Stop reason: HOSPADM

## 2022-04-27 RX ORDER — SODIUM CHLORIDE 0.9 % (FLUSH) 0.9 %
3 SYRINGE (ML) INJECTION
Status: DISCONTINUED | OUTPATIENT
Start: 2022-04-27 | End: 2022-04-27 | Stop reason: HOSPADM

## 2022-04-27 RX ORDER — HYDROCODONE BITARTRATE AND ACETAMINOPHEN 5; 325 MG/1; MG/1
1 TABLET ORAL ONCE
Status: COMPLETED | OUTPATIENT
Start: 2022-04-27 | End: 2022-04-27

## 2022-04-27 RX ORDER — SULFAMETHOXAZOLE AND TRIMETHOPRIM 800; 160 MG/1; MG/1
1 TABLET ORAL DAILY
Qty: 5 TABLET | Refills: 0 | Status: SHIPPED | OUTPATIENT
Start: 2022-04-27 | End: 2022-05-02

## 2022-04-27 RX ORDER — FENTANYL CITRATE 50 UG/ML
25 INJECTION, SOLUTION INTRAMUSCULAR; INTRAVENOUS EVERY 5 MIN PRN
Status: DISCONTINUED | OUTPATIENT
Start: 2022-04-27 | End: 2022-04-27 | Stop reason: HOSPADM

## 2022-04-27 RX ORDER — LIDOCAINE HYDROCHLORIDE AND EPINEPHRINE 10; 10 MG/ML; UG/ML
INJECTION, SOLUTION INFILTRATION; PERINEURAL
Status: DISCONTINUED | OUTPATIENT
Start: 2022-04-27 | End: 2022-04-27 | Stop reason: HOSPADM

## 2022-04-27 RX ORDER — PROCHLORPERAZINE EDISYLATE 5 MG/ML
5 INJECTION INTRAMUSCULAR; INTRAVENOUS EVERY 30 MIN PRN
Status: DISCONTINUED | OUTPATIENT
Start: 2022-04-27 | End: 2022-04-27 | Stop reason: HOSPADM

## 2022-04-27 RX ORDER — DEXAMETHASONE SODIUM PHOSPHATE 4 MG/ML
INJECTION, SOLUTION INTRA-ARTICULAR; INTRALESIONAL; INTRAMUSCULAR; INTRAVENOUS; SOFT TISSUE
Status: DISCONTINUED | OUTPATIENT
Start: 2022-04-27 | End: 2022-04-27

## 2022-04-27 RX ORDER — FENTANYL CITRATE 50 UG/ML
INJECTION, SOLUTION INTRAMUSCULAR; INTRAVENOUS
Status: DISCONTINUED
Start: 2022-04-27 | End: 2022-04-27 | Stop reason: HOSPADM

## 2022-04-27 RX ORDER — MIDAZOLAM HYDROCHLORIDE 1 MG/ML
INJECTION, SOLUTION INTRAMUSCULAR; INTRAVENOUS
Status: DISCONTINUED | OUTPATIENT
Start: 2022-04-27 | End: 2022-04-27

## 2022-04-27 RX ORDER — FENTANYL CITRATE 50 UG/ML
INJECTION, SOLUTION INTRAMUSCULAR; INTRAVENOUS
Status: DISCONTINUED | OUTPATIENT
Start: 2022-04-27 | End: 2022-04-27

## 2022-04-27 RX ORDER — HYDROCODONE BITARTRATE AND ACETAMINOPHEN 5; 325 MG/1; MG/1
1 TABLET ORAL EVERY 6 HOURS PRN
Qty: 5 TABLET | Refills: 0 | Status: SHIPPED | OUTPATIENT
Start: 2022-04-27 | End: 2022-05-10

## 2022-04-27 RX ORDER — PROPOFOL 10 MG/ML
VIAL (ML) INTRAVENOUS
Status: DISCONTINUED | OUTPATIENT
Start: 2022-04-27 | End: 2022-04-27

## 2022-04-27 RX ORDER — DIPHENHYDRAMINE HYDROCHLORIDE 50 MG/ML
25 INJECTION INTRAMUSCULAR; INTRAVENOUS EVERY 6 HOURS PRN
Status: DISCONTINUED | OUTPATIENT
Start: 2022-04-27 | End: 2022-04-27 | Stop reason: HOSPADM

## 2022-04-27 RX ORDER — GENTAMICIN SULFATE 100 MG/100ML
240 INJECTION, SOLUTION INTRAVENOUS
Status: COMPLETED | OUTPATIENT
Start: 2022-04-27 | End: 2022-04-27

## 2022-04-27 RX ORDER — HYDROMORPHONE HYDROCHLORIDE 1 MG/ML
0.2 INJECTION, SOLUTION INTRAMUSCULAR; INTRAVENOUS; SUBCUTANEOUS EVERY 5 MIN PRN
Status: DISCONTINUED | OUTPATIENT
Start: 2022-04-27 | End: 2022-04-27 | Stop reason: HOSPADM

## 2022-04-27 RX ORDER — PROPOFOL 10 MG/ML
VIAL (ML) INTRAVENOUS CONTINUOUS PRN
Status: DISCONTINUED | OUTPATIENT
Start: 2022-04-27 | End: 2022-04-27

## 2022-04-27 RX ORDER — DEXMEDETOMIDINE HYDROCHLORIDE 100 UG/ML
INJECTION, SOLUTION INTRAVENOUS
Status: DISCONTINUED | OUTPATIENT
Start: 2022-04-27 | End: 2022-04-27

## 2022-04-27 RX ADMIN — HYDROCODONE BITARTRATE AND ACETAMINOPHEN 1 TABLET: 5; 325 TABLET ORAL at 12:04

## 2022-04-27 RX ADMIN — GLYCOPYRROLATE 0.2 MG: 0.2 INJECTION INTRAMUSCULAR; INTRAVENOUS at 10:04

## 2022-04-27 RX ADMIN — Medication 50 MCG/KG/MIN: at 10:04

## 2022-04-27 RX ADMIN — DEXAMETHASONE SODIUM PHOSPHATE 4 MG: 4 INJECTION INTRA-ARTICULAR; INTRALESIONAL; INTRAMUSCULAR; INTRAVENOUS; SOFT TISSUE at 10:04

## 2022-04-27 RX ADMIN — FENTANYL CITRATE 25 MCG: 50 INJECTION, SOLUTION INTRAMUSCULAR; INTRAVENOUS at 10:04

## 2022-04-27 RX ADMIN — VANCOMYCIN HYDROCHLORIDE 1500 MG: 1.5 INJECTION, POWDER, LYOPHILIZED, FOR SOLUTION INTRAVENOUS at 09:04

## 2022-04-27 RX ADMIN — DEXMEDETOMIDINE HYDROCHLORIDE 8 MCG: 100 INJECTION, SOLUTION INTRAVENOUS at 10:04

## 2022-04-27 RX ADMIN — DEXMEDETOMIDINE HYDROCHLORIDE 4 MCG: 100 INJECTION, SOLUTION INTRAVENOUS at 10:04

## 2022-04-27 RX ADMIN — GENTAMICIN SULFATE 240 MG: 40 INJECTION, SOLUTION INTRAMUSCULAR; INTRAVENOUS at 10:04

## 2022-04-27 RX ADMIN — MIDAZOLAM HYDROCHLORIDE 2 MG: 1 INJECTION, SOLUTION INTRAMUSCULAR; INTRAVENOUS at 10:04

## 2022-04-27 RX ADMIN — Medication 20 MG: at 10:04

## 2022-04-27 RX ADMIN — SODIUM CHLORIDE: 0.9 INJECTION, SOLUTION INTRAVENOUS at 10:04

## 2022-04-27 RX ADMIN — PROPOFOL 40 MG: 10 INJECTION, EMULSION INTRAVENOUS at 10:04

## 2022-04-27 NOTE — TELEPHONE ENCOUNTER
Please call her and let her know she should be receiving xeljanz soon. She can not start this until she is cleared by Urology and completely free of infection and off antibiotics since it suppresses her immune system.

## 2022-04-27 NOTE — PATIENT INSTRUCTIONS
Interstim Post-Operative Care    You play an important role in your own recovery. You will be given a daily diary to document the  effectiveness of the Interstim implant.     Caring for Your Wound   After surgery you will have a dressing over the surgical site.  Do not remove or change the dressing. If your dressing becomes saturated or undone, you  may reinforce it with more tape and/or gauze but you should call the office to be evaluated.    Post Surgical Pain Management   It is normal to experience some discomfort post operatively, however the stimulation should not  be painful.   Take Tylenol 650mg 1-2 tabs every 4-6 hours as needed if you feel tenderness from surgical  incision (Do not to exceed 4000mg daily) or Motrin 200mg 1-2 tabs every 4-6 hours.  Warning: Do not take additional Tylenol while taking Percocet or Vicodin. All of these  products contain Acetaminophen, which can be toxic if taken in excess.    Stimulation   Stimulation is the pulling or tingling sensation felt in the pelvic area (near the vagina, scrotum  or anal area.) It should not be painful. If it is painful or you feel the stimulation sensation move  down the legs decrease the stimulation and call the office and speak to a nurse.   During the trial period (stage 1) you may notice that the Interstim device has improved your  continence which means it is working and on the correct setting. If you are having episodes of  incontinence you will need to increase your device setting by moving the dial up slowly.    Activity   Avoid heavy lifting or strenuous activity for 14 days after your surgery.   Frontal Showers or Sponge bath only for two weeks after each surgery. Avoid immersion in any  water until after your post-op appointment.    Symptoms to Report   Severe or worsening pain, unrelieved by pain medications.   Fever, greater than 101.5ºF, chills or sweats   Painful or problems urinating   Any problems with the device    If you  experience any of the above symptoms, call the [facility] clinic at [phone] during business hours on weekdays. If after hours or on weekends, call [phone] and ask to speak with the urology resident on call.    You may also call the [facility] Patient Help Line for specific help troubleshooting your device.  For help with the temporary implant Call [phone].  For help with the permanent implant Call [phone].

## 2022-04-27 NOTE — PLAN OF CARE
Patient and patient's  received discharge instructions and prescriptions.  Patient and patient's  verbalized understanding of all instructions given and all questions were addressed prior to patient's discharge.  Patient's vital signs are stable and within patient's baseline.  Patient tolerated clear liquids PO. Patient states pain is tolerable.  Patient denies nausea and vomiting at this time.  Patient meets all criteria for discharge at this time.

## 2022-04-27 NOTE — ANESTHESIA PREPROCEDURE EVALUATION
04/27/2022  Pre-operative evaluation for Procedure(s) (LRB):  INSERTION, NEUROSTIMULATOR, TEMPORARY, SACRAL FLUORO < 1HR (N/A)  INSERTION, NEUROSTIMULATOR, PERMANENT, SACRAL (N/A)  REMOVAL, ELECTRODE LEAD, SACRAL NERVE STIMULATOR (N/A)    Marilee Simons is a 52 y.o. female here for above    Patient Active Problem List   Diagnosis    IBS (irritable bowel syndrome)    Atypical chest pain    Hypothyroidism    Arthralgia    Chronic fatigue    IC (interstitial cystitis)    Bladder pain    Hematuria, microscopic    Potassium (K) deficiency    Acute UTI    Headache    GERD (gastroesophageal reflux disease)    Major depressive disorder, recurrent episode, moderate    PTSD (post-traumatic stress disorder)    Generalized anxiety disorder    OAB (overactive bladder)    Urgency incontinence    Straining to void    Cluster B personality disorder    Interstitial cystitis    Fibromyalgia    Pelvic pain in female    Blood poisoning    Nausea    Decreased bladder capacity    Urethral pain    Acute left-sided low back pain with left-sided sciatica    Impaired gait    Decreased strength    Chronic pain    Hypothyroidism due to Hashimoto's thyroiditis    Dysphagia    MRI contraindicated due to metal implant    Neurostimulator device in situ       Review of patient's allergies indicates:   Allergen Reactions    Cephalexin Itching    Cephalosporins     Zofran [ondansetron hcl (pf)] Hives    Penicillins Rash       No current facility-administered medications on file prior to encounter.     Current Outpatient Medications on File Prior to Encounter   Medication Sig Dispense Refill    DULoxetine (CYMBALTA) 60 MG capsule Take 2 capsules (120 mg total) by mouth once daily. 60 capsule 3    famotidine (PEPCID) 40 MG tablet Take 1 tablet (40 mg total) by mouth nightly as needed for Heartburn. 30  tablet 11    lamoTRIgine (LAMICTAL) 25 MG tablet Take 2 tablets (50 mg total) by mouth once daily. (Patient taking differently: Take 50 mg by mouth nightly.) 60 tablet 5    levothyroxine (SYNTHROID) 50 MCG tablet Take 1 tablet (50 mcg) by mouth Mon-Sat and take 2 tablets (100 mcg) on Sun only 34 tablet 11    cungro-gjcyy-u.blue-sal-naphos (USTELL) 120-0.12 mg Cap Take 1 capsule by mouth 3 (three) times daily. 90 each 3    oxybutynin (DITROPAN) 5 MG Tab Take 1 tablet (5 mg total) by mouth 2 (two) times daily. 60 tablet 12    alosetron (LOTRONEX) 0.5 MG tablet Take 1 tablet (0.5 mg total) by mouth daily as needed. 30 tablet 0    amitriptyline (ELAVIL) 10 MG tablet TAKE 1 TABLET(10 MG) BY MOUTH EVERY NIGHT AS NEEDED 90 tablet 3    azithromycin (Z-JAVIER) 250 MG tablet Take 2 tablets by mouth on day 1; Take 1 tablet by mouth on days 2-5 (Patient not taking: Reported on 4/26/2022) 6 tablet 0    benzocaine 20 % Oint Compound benzocaine 20% / lidocaine 6% / tetracaine 4% ointment. Apply to affected area 1 hour prior to procedure. (Patient not taking: Reported on 4/26/2022) 30 g 0    butalbitaL-acetaminophen  mg Tab TAKE 1 TABLET BY MOUTH EVERY 4 HOURS 60 tablet 3    calcium glycerophosphate (PRELIEF) 65 mg Tab Take 2 tablets by mouth before meals and at bedtime as needed. 100 each prn    clonazePAM (KLONOPIN) 1 MG tablet TAKE 1 TABLET(1 MG) BY MOUTH THREE TIMES DAILY 90 tablet 2    cyclobenzaprine (FLEXERIL) 10 MG tablet TAKE 1 TABLET(10 MG) BY MOUTH TWICE DAILY AS NEEDED 90 tablet 6    dicyclomine (BENTYL) 20 mg tablet Take 20 mg by mouth 4 (four) times daily.      fluticasone propionate (FLONASE) 50 mcg/actuation nasal spray 2 sprays (100 mcg total) by Each Nostril route 2 (two) times daily. 31.6 mL 5    gabapentin (NEURONTIN) 800 MG tablet TAKE 1 TABLET(800 MG) BY MOUTH THREE TIMES DAILY 90 tablet 11    hydrocortisone 2.5 % cream Apply to affected areas twice a day when eczema is moderate. 453.6 g 1     hydrOXYchloroQUINE (PLAQUENIL) 200 mg tablet Take 1 tablet (200 mg total) by mouth 2 (two) times daily. 60 tablet 6    hydrOXYzine HCL (ATARAX) 25 MG tablet TAKE 1 TO 8 TABLETS BY MOUTH EVERY NIGHT AT BEDTIME, START WITH 3 TABLETS AT BEDTIME, INCREASE OR DECREASE TILL ITCHING IS CONTROLLED 90 tablet 0    hyoscyamine (ANASPAZ,LEVSIN) 0.125 mg Tab Take 1 tablet (125 mcg total) by mouth every 8 (eight) hours as needed (abdominal cramps). 60 tablet 0    ketoconazole (NIZORAL) 2 % shampoo Wash scalp with medicated shampoo at least 2x/week. Let sit on scalp at least 5 minutes prior to rinsing 240 mL 5    multivitamin with minerals tablet Take 1 tablet by mouth once daily.      promethazine (PHENERGAN) 25 MG tablet TAKE 1 TABLET(25 MG) BY MOUTH EVERY 6 HOURS AS NEEDED FOR NAUSEA 30 tablet 0    tamsulosin (FLOMAX) 0.4 mg Cap TAKE 1 CAPSULE(0.4 MG) BY MOUTH EVERY DAY 30 capsule 11    tiZANidine (ZANAFLEX) 4 MG tablet TAKE 1 TABLET(4 MG) BY MOUTH EVERY 8 HOURS AS NEEDED 90 tablet 12    traMADoL (ULTRAM) 50 mg tablet TAKE 1 TABLET(50 MG) BY MOUTH EVERY 6 HOURS 90 tablet 3       Past Surgical History:   Procedure Laterality Date    BLADDER SURGERY       SECTION, LOW TRANSVERSE      x 2    COLONOSCOPY      COLONOSCOPY N/A 10/5/2017    Procedure: COLONOSCOPY;  Surgeon: Venkat He MD;  Location: [address]);  Service: Endoscopy;  Laterality: N/A;    ESOPHAGOGASTRODUODENOSCOPY      ESOPHAGOGASTRODUODENOSCOPY N/A 10/25/2021    Procedure: EGD (ESOPHAGOGASTRODUODENOSCOPY);  Surgeon: Venkat He MD;  Location: [address]);  Service: Endoscopy;  Laterality: N/A;  Covid test on 10/22 at [email or web address]    10/19 lvm to confirm appt-rb    EYE SURGERY      Lasik-bilateral    HYSTERECTOMY      INJECTION OF BOTULINUM TOXIN TYPE A N/A 2018    Procedure: INJECTION, BOTULINUM TOXIN, TYPE A  200 UNITS;  Surgeon: Bandar Garcia MD;  Location: [address];  Service: Urology;  Laterality:  N/A;    INSTILLATION OF URINARY BLADDER N/A 9/12/2018    Procedure: INSTILLATION, URINARY BLADDER;  Surgeon: Bandar Garcia MD;  Location: [facility] OR [address] [place] [address];  Service: Urology;  Laterality: N/A;    PARTIAL HYSTERECTOMY      TRANSFORAMINAL EPIDURAL INJECTION OF STEROID Left 2/15/2021    Procedure: LUMBAR TRANSFORAMINAL LEFT L5 AND S1 DIRECT REFERRAL;  Surgeon: Marquez Nesbitt MD;  [facility];  Service: Pain Management;  Laterality: Left;  NEEDS CONSENT, PT REQUESTING IV SEDATION       Social History     Socioeconomic History    Marital status:    Occupational History     Employer: OTHER   Tobacco Use    Smoking status: Never Smoker    Smokeless tobacco: Never Used   Substance and Sexual Activity    Alcohol use: No    Drug use: No    Sexual activity: Never   Other Topics Concern    Are you pregnant or think you may be? No    Breast-feeding No             Pre-op Assessment    I have reviewed the Patient Summary Reports.     I have reviewed the Nursing Notes. I have reviewed the NPO Status.      Review of Systems  Anesthesia Hx:  No problems with previous Anesthesia    Renal/:   renal calculi    Hepatic/GI:   GERD    Neurological:   Neuromuscular Disease, Headaches    Endocrine:   Hypothyroidism        Physical Exam    Airway:  Mallampati: II   Mouth Opening: Normal  TM Distance: Normal  Tongue: Normal    Chest/Lungs:  Normal Respiratory Rate    Heart:  Rate: Normal        Anesthesia Plan  Type of Anesthesia, risks & benefits discussed:    Anesthesia Type: Gen Natural Airway, Gen ETT  Intra-op Monitoring Plan: Standard ASA Monitors  Post Op Pain Control Plan: multimodal analgesia  Informed Consent: Informed consent signed with the Patient and all parties understand the risks and agree with anesthesia plan.  All questions answered.   ASA Score: 3    Ready For Surgery From Anesthesia Perspective.     .

## 2022-04-27 NOTE — ANESTHESIA POSTPROCEDURE EVALUATION
Anesthesia Post Evaluation    Patient: Marilee Simons    Procedure(s) Performed: Procedure(s) (LRB):  INSERTION, NEUROSTIMULATOR, TEMPORARY, SACRAL FLUORO < 1HR (N/A)  INSERTION, NEUROSTIMULATOR, PERMANENT, SACRAL (N/A)  REMOVAL, ELECTRODE LEAD, SACRAL NERVE STIMULATOR (N/A)    Final Anesthesia Type: general      Patient location during evaluation: PACU  Patient participation: Yes- Able to Participate  Level of consciousness: awake and alert  Post-procedure vital signs: reviewed and stable  Pain management: adequate  Airway patency: patent    PONV status at discharge: No PONV  Anesthetic complications: no      Cardiovascular status: blood pressure returned to baseline  Respiratory status: unassisted  Follow-up not needed.          Vitals Value Taken Time   /73 04/27/22 1230   Temp 36.6 °C (97.9 °F) 04/27/22 1115   Pulse 84 04/27/22 1230   Resp 15 04/27/22 1223   SpO2 98 % 04/27/22 1230         No case tracking events are documented in the log.      Pain/Jewel Score: Pain Rating Prior to Med Admin: 5 (4/27/2022 12:23 PM)  Jewel Score: 10 (4/27/2022 11:30 AM)

## 2022-04-27 NOTE — TELEPHONE ENCOUNTER
PA submitted via CMM (Key: [id number]) marked urgent.       DOCUMENTATION ONLY:  Prior authorization for Xeljanz approved from 3/28/2022 to 10/24/2022    Case Id: [id number]    Patient locked into to [facility]. Patient currently still in hospital following surgery, will call tomorrow to inform her of approval. Also sending [employer] message. Per provider, patient is aware that she should wait until cleared by urology to start, but [facility] will have staff call her and let her know as well. Routing prescription and closing out of OSP.

## 2022-04-27 NOTE — DISCHARGE SUMMARY
[facility]  Discharge Note  Short Stay    Admit Date: 4/27/2022    Discharge Date and Time: 04/27/2022 11:28 AM      Attending Physician: Bandar Garcia MD     Discharge Provider: Fan Whalen MD    Diagnoses:  Active Hospital Problems    Diagnosis  POA    *OAB (overactive bladder) [N32.81]  Yes      Resolved Hospital Problems   No resolved problems to display.       Discharged Condition: stable    Hospital Course: Patient was admitted for explantation of interstim and full reimplantation of interstim X and tolerated the procedure well with no complications. She was discharged home in good condition on the same day.       Final Diagnoses: Same as principal problem.    Disposition: Home or Self Care    Follow up/Patient Instructions:    Medications:  Reconciled Home Medications:   Current Discharge Medication List      START taking these medications    Details   HYDROcodone-acetaminophen (NORCO) 5-325 mg per tablet Take 1 tablet by mouth every 6 (six) hours as needed for Pain.  Qty: 5 tablet, Refills: 0      sulfamethoxazole-trimethoprim 800-160mg (BACTRIM DS) 800-160 mg Tab Take 1 tablet by mouth once daily. for 5 days  Qty: 5 tablet, Refills: 0         CONTINUE these medications which have NOT CHANGED    Details   alosetron (LOTRONEX) 0.5 MG tablet Take 1 tablet (0.5 mg total) by mouth daily as needed.  Qty: 30 tablet, Refills: 0      amitriptyline (ELAVIL) 10 MG tablet TAKE 1 TABLET(10 MG) BY MOUTH EVERY NIGHT AS NEEDED  Qty: 90 tablet, Refills: 3    Associated Diagnoses: IC (interstitial cystitis)      butalbitaL-acetaminophen  mg Tab TAKE 1 TABLET BY MOUTH EVERY 4 HOURS  Qty: 60 tablet, Refills: 3    Comments: Chronic pain >7 days medically necessary override dx code G89.4  Associated Diagnoses: Psoriatic arthritis; Seronegative rheumatoid arthritis; Osteoporosis, unspecified osteoporosis type, unspecified pathological fracture presence; Lumbar and sacral osteoarthritis; Fibromyalgia;  Chronic pain syndrome      calcium glycerophosphate (PRELIEF) 65 mg Tab Take 2 tablets by mouth before meals and at bedtime as needed.  Qty: 100 each, Refills: prn    Associated Diagnoses: Interstitial cystitis      clonazePAM (KLONOPIN) 1 MG tablet TAKE 1 TABLET(1 MG) BY MOUTH THREE TIMES DAILY  Qty: 90 tablet, Refills: 2      cyclobenzaprine (FLEXERIL) 10 MG tablet TAKE 1 TABLET(10 MG) BY MOUTH TWICE DAILY AS NEEDED  Qty: 90 tablet, Refills: 6    Associated Diagnoses: Fibromyalgia; Psoriatic arthritis; Seronegative rheumatoid arthritis; Osteoporosis, unspecified osteoporosis type, unspecified pathological fracture presence; Lumbar and sacral osteoarthritis; Chronic pain syndrome      DULoxetine (CYMBALTA) 60 MG capsule Take 2 capsules (120 mg total) by mouth once daily.  Qty: 60 capsule, Refills: 3      famotidine (PEPCID) 40 MG tablet Take 1 tablet (40 mg total) by mouth nightly as needed for Heartburn.  Qty: 30 tablet, Refills: 11    Associated Diagnoses: Psoriatic arthritis; Lumbar and sacral osteoarthritis; Fibromyalgia; Chronic pain syndrome; Immunocompromised state due to drug therapy; IC (interstitial cystitis); Seronegative rheumatoid arthritis; Osteoporosis, unspecified osteoporosis type, unspecified pathological fracture presence; PTSD (post-traumatic stress disorder)      fluticasone propionate (FLONASE) 50 mcg/actuation nasal spray 2 sprays (100 mcg total) by Each Nostril route 2 (two) times daily.  Qty: 31.6 mL, Refills: 5      gabapentin (NEURONTIN) 800 MG tablet TAKE 1 TABLET(800 MG) BY MOUTH THREE TIMES DAILY  Qty: 90 tablet, Refills: 11    Associated Diagnoses: Lumbar and sacral osteoarthritis; Psoriatic arthritis; Fibromyalgia; Seronegative rheumatoid arthritis; IC (interstitial cystitis)      hydrOXYchloroQUINE (PLAQUENIL) 200 mg tablet Take 1 tablet (200 mg total) by mouth 2 (two) times daily.  Qty: 60 tablet, Refills: 6    Associated Diagnoses: Psoriatic arthritis      hydrOXYzine HCL (ATARAX)  25 MG tablet TAKE 1 TO 8 TABLETS BY MOUTH EVERY NIGHT AT BEDTIME, START WITH 3 TABLETS AT BEDTIME, INCREASE OR DECREASE TILL ITCHING IS CONTROLLED  Qty: 90 tablet, Refills: 0    Associated Diagnoses: IC (interstitial cystitis)      lamoTRIgine (LAMICTAL) 25 MG tablet Take 2 tablets (50 mg total) by mouth once daily.  Qty: 60 tablet, Refills: 5      levothyroxine (SYNTHROID) 50 MCG tablet Take 1 tablet (50 mcg) by mouth Mon-Sat and take 2 tablets (100 mcg) on Sun only  Qty: 34 tablet, Refills: 11    Associated Diagnoses: Hypothyroidism, unspecified type      dntbsf-aiqvn-w.blue-sal-naphos (USTELL) 120-0.12 mg Cap Take 1 capsule by mouth 3 (three) times daily.  Qty: 90 each, Refills: 3    Associated Diagnoses: IC (interstitial cystitis)      multivitamin with minerals tablet Take 1 tablet by mouth once daily.      promethazine (PHENERGAN) 25 MG tablet TAKE 1 TABLET(25 MG) BY MOUTH EVERY 6 HOURS AS NEEDED FOR NAUSEA  Qty: 30 tablet, Refills: 0      RABEprazole (ACIPHEX) 20 mg tablet TAKE 1 TABLET(20 MG) BY MOUTH EVERY DAY  Qty: 90 tablet, Refills: 0      tiZANidine (ZANAFLEX) 4 MG tablet TAKE 1 TABLET(4 MG) BY MOUTH EVERY 8 HOURS AS NEEDED  Qty: 90 tablet, Refills: 12    Associated Diagnoses: Fibromyalgia      traMADoL (ULTRAM) 50 mg tablet TAKE 1 TABLET(50 MG) BY MOUTH EVERY 6 HOURS  Qty: 90 tablet, Refills: 3    Comments: Chronic pain >7 days medically necessary override dx code G89.4  Associated Diagnoses: Psoriatic arthritis; Lumbar and sacral osteoarthritis; Fibromyalgia; Chronic pain syndrome; Immunocompromised state due to drug therapy; IC (interstitial cystitis); Seronegative rheumatoid arthritis; Osteoporosis, unspecified osteoporosis type, unspecified pathological fracture presence; PTSD (post-traumatic stress disorder)      azithromycin (Z-JAVIER) 250 MG tablet Take 2 tablets by mouth on day 1; Take 1 tablet by mouth on days 2-5  Qty: 6 tablet, Refills: 0    Associated Diagnoses: Psoriatic arthritis; Lumbar and  sacral osteoarthritis; Fibromyalgia; Chronic pain syndrome; Immunocompromised state due to drug therapy      benzocaine 20 % Oint Compound benzocaine 20% / lidocaine 6% / tetracaine 4% ointment. Apply to affected area 1 hour prior to procedure.  Qty: 30 g, Refills: 0      dicyclomine (BENTYL) 20 mg tablet Take 20 mg by mouth 4 (four) times daily.      hydrocortisone 2.5 % cream Apply to affected areas twice a day when eczema is moderate.  Qty: 453.6 g, Refills: 1      hyoscyamine (ANASPAZ,LEVSIN) 0.125 mg Tab Take 1 tablet (125 mcg total) by mouth every 8 (eight) hours as needed (abdominal cramps).  Qty: 60 tablet, Refills: 0      ketoconazole (NIZORAL) 2 % shampoo Wash scalp with medicated shampoo at least 2x/week. Let sit on scalp at least 5 minutes prior to rinsing  Qty: 240 mL, Refills: 5    Associated Diagnoses: Seborrheic dermatitis      methylPREDNISolone (MEDROL DOSEPACK) 4 mg tablet use as directed  Qty: 1 each, Refills: 0    Associated Diagnoses: Psoriatic arthritis      oxybutynin (DITROPAN) 5 MG Tab Take 1 tablet (5 mg total) by mouth 2 (two) times daily.  Qty: 60 tablet, Refills: 12    Associated Diagnoses: OAB (overactive bladder)      tamsulosin (FLOMAX) 0.4 mg Cap TAKE 1 CAPSULE(0.4 MG) BY MOUTH EVERY DAY  Qty: 30 capsule, Refills: 11    Associated Diagnoses: Urethral pain      tofacitinib (XELJANZ XR) 11 mg Tb24 Take 11 mg by mouth once daily.  Qty: 30 tablet, Refills: 6    Associated Diagnoses: Psoriatic arthritis; Seronegative rheumatoid arthritis           Discharge Procedure Orders   Diet Adult Regular     Activity as tolerated      Follow-up Information     Bandar Garcia MD Follow up in 1 month(s).    Specialty: Urology  Why: f/u interstim  Contact information:  [facility]  [facility] [address] [phone]

## 2022-04-27 NOTE — OP NOTE
[facility]  Operative Note    Date: 04/27/2022    Pre-Op Diagnosis: urinary urgency, frequency, urge urinary incontinence    Post-Op Diagnosis: same    Procedure(s) Performed:  1.  Explantation of InterStim generator  2.  Removal of InterStim tined lead  3.  Incision for implantation of neurostimulator electrodes, sacral nerve (transforamenal placement)  4.  Insertion of peripheral neurostimulator pulse generator  5.  Fluoro < 1 h  6.  Electronic analysis of implanted neurostimulator pulse generator system with intraoperative programming    Specimen(s): none    Staff Surgeon: Bandar Garcia MD    Assistant Surgeon: Fan Whalen MD    Anesthesia: Local anesthesia and Monitored Local Anesthesia with Sedation    Indications: Marilee Simons is a 52 y.o. female with urgency, frequency and urge urinary incontinence.  This has been refractory to medical management. She presents for explantation of her original interstim device with replacement with interstim X.     Findings:  Old lead removed from right side and generator removed from left side  New lead placed on left side  Generator placed on left side  Good sensory and motor function c/w S3 stimulation seen  The rolling pen technique was used to localize S3    Estimated Blood Loss: min    Drains: none    Procedure in Detail:  After informed consent was obtained, the patient was brought to the operating suite and placed in the prone position.  Pillows were placed under lower abdomen to flatten sacrum and under shins to allow the toes to dangle freely.  MAC anesthesia was administered. The patient's back, buttocks and anus were prepped and draped in the usual sterile fashion.  Timeout was performed and pre-operative antibiotics were confirmed.    The generator site was located on the patient's left buttock.  Local anesthetic was used to anesthetize the skin incision overlying the generator. An incision was then made sharply with a 15 blade.  Ninfa  electrocautery was then used to dissect down to the capsule. A 15 blade was used to open up the capsule which was closely adherent to the generator. The generator was delivered out of the incision.     The lead was identified and protected. Hemostasis was achieved using bovie electrocautery. The lead was then pulled gently and we identified the previous midline incision. This area was infiltrated with local anesthetic. A 15 blade was used to make a skin incision over the identified area. A right angle was then used to deliver the lead out of the midline incision. A hemostat was used to clamp the lead and the lead was brought from the generator site to the midline incision and out the skin. Gentle traction was then used by rotating the hemostat slowly to remove the remainder of the lead. The entire lead was removed and inspected and found to be completely intact. Both incisions were irrigated with sterile saline.    Attention was then turned to placement of the new device. The C-arm was draped and moved into AP position to provide fluoroscopic mapping of the sacral region.  A pen was allowed to roll in the midline of the sacrum until it balanced horizontally.  The fulcrum point was marked as the level of S3.  A point 2 cm lateral and 2 cm superior to the marked S3 level was marked as the entry point for the foramen needle.  1% lidocaine with epinephrine was injected into this area where the finder needles were to be placed.  A foramen needle was introduced until the S3 foramen was identified and penetrated.  The depth of the needle was confirmed and adjusted fluoroscopically.  Proper needle position was confirmed by patient identification of location of sensation, direct observation of the lifting of the perineum and observation of plantar flexion of the great toe utilizing the external test stimulator.  The foramen needle stylet was removed and a directional guide was placed and confirmed fluoroscopically.  The  needle was removed keeping the directional guide in place.  An incision was made peripherally to the directional guide through the fascial layer.  The lead introducer sheath with dilator was placed over the directional guide and directed into the foramen ensuring the radiopaque marker did not extend beyond the anterior edge of the sacrum.  The dilator was unlocked and removed along with the directional guide keeping the introducer sheath in place.     The lead was then placed through the introducer sheath to the first white line.  Position was checked fluoroscopically.  The lead was then further introduced until 3 electrodes were visible below the sacrum.  Each electrode was tested for location of patient sensation, visualization of kiki, and plantar flexion of the great toe.  After satisfactory positioning was confirmed, the introducer sheath was retracted under continuous fluoro, deploying lead tines into the perisacral tissue.      The tunneling tool with sheath was placed from the lead exit site subcutaneously to the incised pocket site. The tunneling tool was removed and the lead was fed through the sheath and pulled out at the pocket site. The lead was cleansed of bodily fluids and dried. The lead was inserted into the neurostimulator and the metal bands were aligned with the blue lead tip clearly visible in the distal portion of the header. The single setscrew was tightened with the hex wrench.  The neurostimulator was delivered into the subcutaneous pocket with the etched identification side placed upwards and the excessive lead wrapped around the neurostimulator. The programming head was placed over the implanted neurostimulator in a sterile cover to ensure adequate lead connection and that parameters were within normal limits. Impedances were confirmed to be within normal limits.      All incisions were then closed using deep dermal 3-0 vicryl simple interrupted sutures followed by a 4-0 monocryl  subcuticular suture. Dermabond was applied to the incisions.    The patient tolerated the procedure well and was transferred to the recovery room in stable condition.      Disposition:  The patient will meet with the [employer] rep in the recovery room for instructions on programming the stimulator.  The patient was given prescriptions for norco and an antibiotic and will follow up with Dr. Garcia in 4 weeks.    Fan Whalen

## 2022-04-27 NOTE — TRANSFER OF CARE
"Anesthesia Transfer of Care Note    Patient: Marilee Simons    Procedure(s) Performed: Procedure(s) (LRB):  INSERTION, NEUROSTIMULATOR, TEMPORARY, SACRAL FLUORO < 1HR (N/A)  INSERTION, NEUROSTIMULATOR, PERMANENT, SACRAL (N/A)  REMOVAL, ELECTRODE LEAD, SACRAL NERVE STIMULATOR (N/A)    Patient location: [facility]    Anesthesia Type: general    Transport from OR: Transported from OR on 6-10 L/min O2 by face mask with adequate spontaneous ventilation    Post pain: adequate analgesia    Post assessment: tolerated procedure well and no apparent anesthetic complications    Post vital signs: stable    Level of consciousness: awake and alert    Nausea/Vomiting: no nausea/vomiting    Complications: none    Transfer of care protocol was followed      Last vitals:   Visit Vitals  BP (!) 143/79   Pulse 99   Temp 37.4 °C (99.4 °F) (Oral)   Resp 18   Ht 4' 11" (1.499 m)   Wt 72.6 kg (160 lb)   LMP  (LMP Unknown)   SpO2 96%   Breastfeeding No   BMI 32.32 kg/m²     "

## 2022-04-28 VITALS
HEART RATE: 89 BPM | RESPIRATION RATE: 17 BRPM | SYSTOLIC BLOOD PRESSURE: 124 MMHG | DIASTOLIC BLOOD PRESSURE: 70 MMHG | OXYGEN SATURATION: 99 % | BODY MASS INDEX: 32.25 KG/M2 | HEIGHT: 59 IN | WEIGHT: 160 LBS | TEMPERATURE: 98 F

## 2022-04-29 ENCOUNTER — PATIENT MESSAGE (OUTPATIENT)
Dept: UROLOGY | Facility: CLINIC | Age: 52
End: 2022-04-29
Payer: COMMERCIAL

## 2022-04-29 ENCOUNTER — HOSPITAL ENCOUNTER (EMERGENCY)
Facility: HOSPITAL | Age: 52
Discharge: HOME | End: 2022-04-30
Attending: EMERGENCY MEDICINE
Payer: COMMERCIAL

## 2022-04-29 DIAGNOSIS — G89.18 POST-OP PAIN: Primary | ICD-10-CM

## 2022-04-29 LAB
ALBUMIN SERPL BCP-MCNC: 3.8 G/DL (ref 3.5–5.2)
ALP SERPL-CCNC: 113 U/L (ref 55–135)
ALT SERPL W/O P-5'-P-CCNC: 24 U/L (ref 10–44)
ANION GAP SERPL CALC-SCNC: 12 MMOL/L (ref 8–16)
ANISOCYTOSIS BLD QL SMEAR: SLIGHT
AST SERPL-CCNC: 16 U/L (ref 10–40)
BASOPHILS # BLD AUTO: 0.09 K/UL (ref 0–0.2)
BASOPHILS NFR BLD: 0.9 % (ref 0–1.9)
BILIRUB SERPL-MCNC: 0.2 MG/DL (ref 0.1–1)
BUN SERPL-MCNC: 15 MG/DL (ref 6–20)
CALCIUM SERPL-MCNC: 9 MG/DL (ref 8.7–10.5)
CHLORIDE SERPL-SCNC: 109 MMOL/L (ref 95–110)
CO2 SERPL-SCNC: 24 MMOL/L (ref 23–29)
CREAT SERPL-MCNC: 0.9 MG/DL (ref 0.5–1.4)
CRP SERPL-MCNC: 3.1 MG/L (ref 0–8.2)
CTP QC/QA: YES
DIFFERENTIAL METHOD: ABNORMAL
EOSINOPHIL # BLD AUTO: 0.2 K/UL (ref 0–0.5)
EOSINOPHIL NFR BLD: 2 % (ref 0–8)
ERYTHROCYTE [DISTWIDTH] IN BLOOD BY AUTOMATED COUNT: 13.1 % (ref 11.5–14.5)
EST. GFR  (AFRICAN AMERICAN): >60 ML/MIN/1.73 M^2
EST. GFR  (NON AFRICAN AMERICAN): >60 ML/MIN/1.73 M^2
GLUCOSE SERPL-MCNC: 96 MG/DL (ref 70–110)
HCT VFR BLD AUTO: 37.4 % (ref 37–48.5)
HGB BLD-MCNC: 11.9 G/DL (ref 12–16)
HYPOCHROMIA BLD QL SMEAR: ABNORMAL
IMM GRANULOCYTES # BLD AUTO: 0.03 K/UL (ref 0–0.04)
IMM GRANULOCYTES NFR BLD AUTO: 0.3 % (ref 0–0.5)
LACTATE SERPL-SCNC: 1.1 MMOL/L (ref 0.5–2.2)
LYMPHOCYTES # BLD AUTO: 4.2 K/UL (ref 1–4.8)
LYMPHOCYTES NFR BLD: 41.2 % (ref 18–48)
MCH RBC QN AUTO: 27.7 PG (ref 27–31)
MCHC RBC AUTO-ENTMCNC: 31.8 G/DL (ref 32–36)
MCV RBC AUTO: 87 FL (ref 82–98)
MONOCYTES # BLD AUTO: 0.9 K/UL (ref 0.3–1)
MONOCYTES NFR BLD: 8.8 % (ref 4–15)
NEUTROPHILS # BLD AUTO: 4.8 K/UL (ref 1.8–7.7)
NEUTROPHILS NFR BLD: 46.8 % (ref 38–73)
NRBC BLD-RTO: 0 /100 WBC
PLATELET # BLD AUTO: 334 K/UL (ref 150–450)
PLATELET BLD QL SMEAR: ABNORMAL
PMV BLD AUTO: 8.9 FL (ref 9.2–12.9)
POLYCHROMASIA BLD QL SMEAR: ABNORMAL
POTASSIUM SERPL-SCNC: 4.1 MMOL/L (ref 3.5–5.1)
PROCALCITONIN SERPL IA-MCNC: 0.02 NG/ML
PROT SERPL-MCNC: 6.8 G/DL (ref 6–8.4)
RBC # BLD AUTO: 4.29 M/UL (ref 4–5.4)
SARS-COV-2 RDRP RESP QL NAA+PROBE: NEGATIVE
SODIUM SERPL-SCNC: 145 MMOL/L (ref 136–145)
WBC # BLD AUTO: 10.3 K/UL (ref 3.9–12.7)

## 2022-04-29 PROCEDURE — 81003 URINALYSIS AUTO W/O SCOPE: CPT | Performed by: PHYSICIAN ASSISTANT

## 2022-04-29 PROCEDURE — 85025 COMPLETE CBC W/AUTO DIFF WBC: CPT | Performed by: PHYSICIAN ASSISTANT

## 2022-04-29 PROCEDURE — 86140 C-REACTIVE PROTEIN: CPT | Performed by: PHYSICIAN ASSISTANT

## 2022-04-29 PROCEDURE — 94761 N-INVAS EAR/PLS OXIMETRY MLT: CPT

## 2022-04-29 PROCEDURE — 83605 ASSAY OF LACTIC ACID: CPT | Performed by: PHYSICIAN ASSISTANT

## 2022-04-29 PROCEDURE — 25000003 PHARM REV CODE 250: Performed by: STUDENT IN AN ORGANIZED HEALTH CARE EDUCATION/TRAINING PROGRAM

## 2022-04-29 PROCEDURE — 87040 BLOOD CULTURE FOR BACTERIA: CPT | Mod: 59 | Performed by: PHYSICIAN ASSISTANT

## 2022-04-29 PROCEDURE — 63600175 PHARM REV CODE 636 W HCPCS: Performed by: STUDENT IN AN ORGANIZED HEALTH CARE EDUCATION/TRAINING PROGRAM

## 2022-04-29 PROCEDURE — 99285 EMERGENCY DEPT VISIT HI MDM: CPT | Mod: CS,,, | Performed by: EMERGENCY MEDICINE

## 2022-04-29 PROCEDURE — 84145 PROCALCITONIN (PCT): CPT | Performed by: PHYSICIAN ASSISTANT

## 2022-04-29 PROCEDURE — 80053 COMPREHEN METABOLIC PANEL: CPT | Performed by: PHYSICIAN ASSISTANT

## 2022-04-29 PROCEDURE — 96365 THER/PROPH/DIAG IV INF INIT: CPT

## 2022-04-29 PROCEDURE — U0002 COVID-19 LAB TEST NON-CDC: HCPCS | Performed by: PHYSICIAN ASSISTANT

## 2022-04-29 PROCEDURE — 96375 TX/PRO/DX INJ NEW DRUG ADDON: CPT

## 2022-04-29 PROCEDURE — 99285 EMERGENCY DEPT VISIT HI MDM: CPT | Mod: 25

## 2022-04-29 PROCEDURE — 85652 RBC SED RATE AUTOMATED: CPT | Performed by: PHYSICIAN ASSISTANT

## 2022-04-29 PROCEDURE — 99285 PR EMERGENCY DEPT VISIT,LEVEL V: ICD-10-PCS | Mod: CS,,, | Performed by: EMERGENCY MEDICINE

## 2022-04-29 RX ORDER — MORPHINE SULFATE 4 MG/ML
4 INJECTION, SOLUTION INTRAMUSCULAR; INTRAVENOUS
Status: COMPLETED | OUTPATIENT
Start: 2022-04-29 | End: 2022-04-29

## 2022-04-29 RX ADMIN — PROMETHAZINE HYDROCHLORIDE 12.5 MG: 25 INJECTION INTRAMUSCULAR; INTRAVENOUS at 11:04

## 2022-04-29 RX ADMIN — MORPHINE SULFATE 4 MG: 4 INJECTION INTRAVENOUS at 11:04

## 2022-04-30 VITALS
RESPIRATION RATE: 14 BRPM | DIASTOLIC BLOOD PRESSURE: 71 MMHG | TEMPERATURE: 100 F | SYSTOLIC BLOOD PRESSURE: 152 MMHG | WEIGHT: 160 LBS | OXYGEN SATURATION: 95 % | BODY MASS INDEX: 32.32 KG/M2 | HEART RATE: 87 BPM

## 2022-04-30 LAB
BILIRUB UR QL STRIP: NEGATIVE
CLARITY UR REFRACT.AUTO: CLEAR
COLOR UR AUTO: ABNORMAL
ERYTHROCYTE [SEDIMENTATION RATE] IN BLOOD BY WESTERGREN METHOD: 10 MM/HR (ref 0–36)
GLUCOSE UR QL STRIP: NEGATIVE
HGB UR QL STRIP: NEGATIVE
KETONES UR QL STRIP: NEGATIVE
LEUKOCYTE ESTERASE UR QL STRIP: NEGATIVE
NITRITE UR QL STRIP: NEGATIVE
PH UR STRIP: 5 [PH] (ref 5–8)
PROT UR QL STRIP: NEGATIVE
SP GR UR STRIP: 1.01 (ref 1–1.03)
URN SPEC COLLECT METH UR: ABNORMAL

## 2022-04-30 PROCEDURE — 25500020 PHARM REV CODE 255: Performed by: EMERGENCY MEDICINE

## 2022-04-30 PROCEDURE — 63600175 PHARM REV CODE 636 W HCPCS: Performed by: STUDENT IN AN ORGANIZED HEALTH CARE EDUCATION/TRAINING PROGRAM

## 2022-04-30 RX ORDER — HYDROMORPHONE HYDROCHLORIDE 1 MG/ML
1 INJECTION, SOLUTION INTRAMUSCULAR; INTRAVENOUS; SUBCUTANEOUS
Status: COMPLETED | OUTPATIENT
Start: 2022-04-30 | End: 2022-04-30

## 2022-04-30 RX ORDER — OXYCODONE HYDROCHLORIDE 5 MG/1
5 TABLET ORAL EVERY 6 HOURS PRN
Qty: 5 TABLET | Refills: 0 | Status: SHIPPED | OUTPATIENT
Start: 2022-04-30 | End: 2022-07-06

## 2022-04-30 RX ORDER — PROMETHAZINE HYDROCHLORIDE 25 MG/1
25 TABLET ORAL EVERY 6 HOURS PRN
Qty: 15 TABLET | Refills: 0 | Status: SHIPPED | OUTPATIENT
Start: 2022-04-30 | End: 2022-07-27

## 2022-04-30 RX ADMIN — HYDROMORPHONE HYDROCHLORIDE 1 MG: 1 INJECTION, SOLUTION INTRAMUSCULAR; INTRAVENOUS; SUBCUTANEOUS at 01:04

## 2022-04-30 RX ADMIN — IOHEXOL 75 ML: 350 INJECTION, SOLUTION INTRAVENOUS at 01:04

## 2022-04-30 NOTE — ED PROVIDER NOTES
Encounter Date: 2022       History     Chief Complaint   Patient presents with    Post-op Problem     Pt had a bladder stimulator repair on Wednesday. Pt c/o of fever chills, buttock/back pain, and generalized weakness. Resident on call referred her to the ED>      52 year old female with hx Interstitial cystitis s/p bladder stimulator, Irritable bowel syndrome,  Major depressive disorder, Rheumatoid arthritis, Systemic lupus erythematosus and hypothyroidism presenting to the ED with worsening pain at her postop sites as well as chills at home.  Patient had a new bladder stimulator placed two days ago.  She has been taking 800 mg ibuprofen t.i.d. and Norco as needed for pain.  However, today ibuprofen and Norco have not helped her pain.  She also noted some worsening redness around her incision sites.  Denies any discharge from the sites.  Denies any abdominal pain or dysuria.  Has not had a fever at home, but had chills.  Denies any nausea, vomiting, diarrhea.        Review of patient's allergies indicates:   Allergen Reactions    Cephalexin Itching    Cephalosporins     Zofran [ondansetron hcl (pf)] Hives    Penicillins Rash     Past Medical History:   Diagnosis Date    Acid reflux     Allergy     Alopecia     Anxiety     Arthralgia     Back pain     Depression     Dry eyes     from meds    Fever blister     Fibromyalgia     Interstitial cystitis     Irritable bowel syndrome     Kidney stone     Major depressive disorder, recurrent episode, in partial or unspecified remission 2013    OAB (overactive bladder) 2015    PTSD (post-traumatic stress disorder)     Rheumatoid arthritis     Systemic lupus erythematosus     Thyroid disease     Urinary tract infection     Vaginal infection      Past Surgical History:   Procedure Laterality Date    BLADDER SURGERY       SECTION, LOW TRANSVERSE      x 2    COLONOSCOPY      COLONOSCOPY N/A 10/5/2017    Procedure: COLONOSCOPY;   Surgeon: Venkat He MD;  Location: [facility] (4TH FLR);  Service: Endoscopy;  Laterality: N/A;    ESOPHAGOGASTRODUODENOSCOPY      ESOPHAGOGASTRODUODENOSCOPY N/A 10/25/2021    Procedure: EGD (ESOPHAGOGASTRODUODENOSCOPY);  Surgeon: Venkat He MD;  Location: [facility] END[facility] (4TH FLR);  Service: Endoscopy;  Laterality: N/A;  Covid test on 10/22 at [email or web address]    10/19 lvm to confirm appt-rb    EYE SURGERY      Lasik-bilateral    HYSTERECTOMY      IMPLANTATION OF PERMANENT SACRAL NERVE STIMULATOR N/A 4/27/2022    Procedure: INSERTION, NEUROSTIMULATOR, PERMANENT, SACRAL;  Surgeon: Bandar Garcia MD;  Location: [facility] OR 2ND FLR;  Service: Urology;  Laterality: N/A;  2hr    INJECTION OF BOTULINUM TOXIN TYPE A N/A 9/12/2018    Procedure: INJECTION, BOTULINUM TOXIN, TYPE A  200 UNITS;  Surgeon: Bandar Garcia MD;  Location: [facility] OR 1ST FLR;  Service: Urology;  Laterality: N/A;    INSTILLATION OF URINARY BLADDER N/A 9/12/2018    Procedure: INSTILLATION, URINARY BLADDER;  Surgeon: Bandar Garcia MD;  [facility];  Service: Urology;  Laterality: N/A;    PARTIAL HYSTERECTOMY      REMOVAL OF ELECTRODE LEAD OF SACRAL NERVE STIMULATOR N/A 4/27/2022    Procedure: REMOVAL, ELECTRODE LEAD, SACRAL NERVE STIMULATOR;  Surgeon: Bandar Garcia MD;  [facility];  Service: Urology;  Laterality: N/A;    TRANSFORAMINAL EPIDURAL INJECTION OF STEROID Left 2/15/2021    Procedure: LUMBAR TRANSFORAMINAL LEFT L5 AND S1 DIRECT REFERRAL;  Surgeon: Marquez Nesbitt MD;  Location: [facility] PAIN MGT;  Service: Pain Management;  Laterality: Left;  NEEDS CONSENT, PT REQUESTING IV SEDATION     Family History   Problem Relation Age of Onset    Irritable bowel syndrome Mother     Irritable bowel syndrome Sister     Lupus Sister     Rheum arthritis Sister     Fibromyalgia Sister     Irritable bowel syndrome Sister     Celiac disease Neg Hx     Cirrhosis Neg Hx     Colon cancer Neg Hx     Colon polyps Neg Hx      Crohn's disease Neg Hx     Cystic fibrosis Neg Hx     Esophageal cancer Neg Hx     Hemochromatosis Neg Hx     Inflammatory bowel disease Neg Hx     Liver cancer Neg Hx     Liver disease Neg Hx     Rectal cancer Neg Hx     Stomach cancer Neg Hx     Ulcerative colitis Neg Hx     Boris's disease Neg Hx     Melanoma Neg Hx     Amblyopia Neg Hx     Blindness Neg Hx     Cataracts Neg Hx     Glaucoma Neg Hx     Macular degeneration Neg Hx     Retinal detachment Neg Hx     Strabismus Neg Hx      Social History     Tobacco Use    Smoking status: Never Smoker    Smokeless tobacco: Never Used   Substance Use Topics    Alcohol use: No    Drug use: No     Review of Systems   Constitutional: Positive for chills. Negative for fever.   HENT: Negative for congestion and sore throat.    Eyes: Negative for visual disturbance.   Respiratory: Negative for shortness of breath.    Cardiovascular: Negative for chest pain.   Gastrointestinal: Negative for abdominal pain, diarrhea, nausea and vomiting.   Genitourinary: Negative for dysuria.   Musculoskeletal: Negative for gait problem.   Skin: Positive for color change (Worsening erythema around the surgical sites on back). Negative for rash.   Neurological: Negative for dizziness, light-headedness and headaches.   Hematological: Does not bruise/bleed easily.       Physical Exam     Initial Vitals [04/29/22 2040]   BP Pulse Resp Temp SpO2   (!) 174/78 (!) 115 20 100 °F (37.8 °C) 100 %      MAP       --         Physical Exam    Nursing note and vitals reviewed.  Constitutional: She appears well-developed and well-nourished. She is not diaphoretic. No distress.   Uncomfortable due to pain   HENT:   Head: Normocephalic and atraumatic.   Eyes: Conjunctivae and EOM are normal. Right eye exhibits no discharge. Left eye exhibits no discharge. No scleral icterus.   Neck:   Normal range of motion.  Cardiovascular: Normal rate and regular rhythm. Exam reveals no gallop and  no friction rub.    No murmur heard.  Pulmonary/Chest: No respiratory distress. She has no wheezes. She has no rhonchi. She has no rales. She exhibits no tenderness.   Abdominal: Abdomen is soft. She exhibits no distension and no mass. There is no abdominal tenderness. There is no rebound and no guarding.   Musculoskeletal:      Cervical back: Normal range of motion.     Neurological: She is alert.   Skin: Skin is warm and dry. No erythema. No pallor.   Two left-sided back surgical sites noted with Dermabond on top that is tender to palpation without any functions fell underneath.  Mild erythema noted around both sites.         ED Course   Procedures  Labs Reviewed   CBC W/ AUTO DIFFERENTIAL - Abnormal; Notable for the following components:       Result Value    Hemoglobin 11.9 (*)     MCHC 31.8 (*)     MPV 8.9 (*)     All other components within normal limits    Narrative:     add on crp per dr dereck segovia/order#[id number] @23:16 04/29/2022   URINALYSIS, REFLEX TO URINE CULTURE - Abnormal; Notable for the following components:    Color, UA Blue (*)     All other components within normal limits    Narrative:     Specimen Source->Urine   CULTURE, BLOOD    Narrative:     Aerobic and anaerobic   CULTURE, BLOOD    Narrative:     Aerobic and anaerobic   COMPREHENSIVE METABOLIC PANEL   LACTIC ACID, PLASMA    Narrative:     add on crp per dr dereck segovia/order#[id number] @23:16 04/29/2022   PROCALCITONIN    Narrative:     add on crp per dr dereck segovia/order#[id number] @23:16 04/29/2022   SEDIMENTATION RATE   C-REACTIVE PROTEIN   C-REACTIVE PROTEIN    Narrative:     add on crp per dr dereck segovia/order#[id number] @23:16 04/29/2022   SEDIMENTATION RATE    Narrative:     ADD ON SED RATE PER DERECK SEGOVIA/ORDER# [id number] @ 00:04 4/30/2022      add on crp per dr dereck segovia/order#[id number] @23:16 04/29/2022   SARS-COV-2 RDRP GENE    Narrative:     This test utilizes isothermal nucleic acid amplification   technology to  detect the SARS-CoV-2 RdRp nucleic acid segment.   The analytical sensitivity (limit of detection) is 125 genome   equivalents/mL.   A POSITIVE result implies infection with the SARS-CoV-2 virus;   the patient is presumed to be contagious.     A NEGATIVE result means that SARS-CoV-2 nucleic acids are not   present above the limit of detection. A NEGATIVE result should be   treated as presumptive. It does not rule out the possibility of   COVID-19 and should not be the sole basis for treatment decisions.   If COVID-19 is strongly suspected based on clinical and exposure   history, re-testing using an alternate molecular assay should be   considered.   This test is only for use under the Food and Drug   Administration s Emergency Use Authorization (EUA).   Commercial kits are provided by [employer].   Performance characteristics of the EUA have been independently   verified by [facility] Department of   Pathology and Laboratory Medicine.   _________________________________________________________________   The authorized Fact Sheet for Healthcare Providers and the authorized Fact   Sheet for Patients of the ID NOW COVID-19 are available on the FDA   website:     https://www.fda.gov/media/878514/download  https://www.fda.gov/media/712099/download            EKG Readings: (Independently Interpreted)   Sinus tachycardia, rate of 1.5. No ST elevations or depressions concerning for ischemia.  No STEMI per my independent interpretation       Imaging Results          CT Abdomen Pelvis With Contrast (Final result)  Result time 04/30/22 01:35:28    Final result by Vahid Hampton MD (04/30/22 01:35:28)                 Impression:      A bladder stimulator and lead in the left flank and pelvis unremarkable without inflammation mass or fluid collection adjacent to them.    Prominent waste material.  Correlate for constipation.    Hepatic steatosis without bile duct dilatation.      Electronically signed  "by: Vahid Hampton  Date:    04/30/2022  Time:    01:35             Narrative:    EXAMINATION:  CT ABDOMEN PELVIS WITH CONTRAST    CLINICAL HISTORY:  Abdominal pain, acute, nonlocalized;recent bladder stimulator surgery, concern for possible infection;    TECHNIQUE:  Low dose axial images, sagittal and coronal reformations were obtained from the lung bases to the pubic symphysis following the IV administration of 75 mL of Omnipaque 350 no    COMPARISON:  None.    FINDINGS:  Stimulator over the left piriformis muscle with generator in the left buttocks appears unremarkable with no adjacent fluid collection or inflammation of the generator or lead.    The lung bases are clear.  The heart and pericardium appear unremarkable to the extent visualized.    The liver demonstrates mild steatosis without focal mass or biliary ductal dilatation.  The gallbladder and bile ducts appear unremarkable.    The pancreas, spleen, adrenal glands, right and left kidneys aorta and vena cava appear normal.  There is no intra-abdominal mass, lymph node enlargement, free air or free fluid.    Prominent waste material throughout the colon is noted.  There is no zone of transition or inflammation.  Small intestines appear unremarkable.  The urinary bladder appears normal.  The uterus is absent.    Bony structures are intact with mild spondylosis.  The abdominal wall appears intact.                               X-Ray Chest AP Portable (Final result)  Result time 04/29/22 23:15:07    Final result by Len Huffman MD (04/29/22 23:15:07)                 Impression:      No acute cardiopulmonary finding identified on this single view.      Electronically signed by: Len Huffman MD  Date:    04/29/2022  Time:    23:15             Narrative:    EXAMINATION:  XR CHEST AP PORTABLE    CLINICAL HISTORY:  Provided history is "Sepsis;  ".    TECHNIQUE:  One view of the chest.    COMPARISON:  07/26/2020.    FINDINGS:  Cardiac wires overlie the " chest.  Cardiomediastinal silhouette is magnified by portable technique but not felt to be enlarged.  No focal consolidation.  No sizable pleural effusion.  No pneumothorax.                              X-Rays:   Independently Interpreted Readings:   Other Readings:  No pneumonia, pneumothorax, or pleural effusion noted on CXR      Medications   morphine injection 4 mg (4 mg Intravenous Given 4/29/22 9044)   promethazine (PHENERGAN) 12.5 mg in dextrose 5 % 50 mL IVPB (0 mg Intravenous Stopped 4/29/22 1470)   HYDROmorphone injection 1 mg (1 mg Intravenous Given 4/30/22 0133)   iohexoL (OMNIPAQUE 350) injection 75 mL (75 mLs Intravenous Given 4/30/22 0115)     Medical Decision Making:   History:   Old Medical Records: I decided to obtain old medical records.  Initial Assessment:   52-year-old female with recent bladder stimulator placed two days ago presenting to the ED with worsening pain at the two incision sites as well as chills.  No fevers.  No drainage from incision sites.  Mild worsening erythema noted.  Has significant tenderness to palpation about surgical sites.  No other signs of infection.    Differential includes is not limited to surgical site infection, pneumonia, UTI, COVID.    CBC showed mild leukocytosis, no anemia.  CMP showed no extremities concerning hospitalization.  ESR and CRP within normal limits.  Procalcitonin within normal limits.  Lactate negative.  CT abdomen pelvis with contrast showed no concern for cellulitis or abscess around surgical site.  Discussed case with Urology resident on-call over the phone who recommended patient follow-up in clinic.  Patient is pain controlled after morphine and Dilaudid.    Urology follow-up within 1 week was recommended. Given prescription for Oxycodone    After taking into careful account the patient's history, physical exam findings, as well as empirical and objective data obtained throughout ED workup, I feel no emergent medical condition has been  identified. No further evaluation or admission was felt to be required, and the patient is stable for discharge from the ED. The patient and any additional family present were updated with test results, overall clinical impression, and recommended further plan of care, including discharge instructions as provided and outpatient follow-up for continued evaluation and management as needed. All questions were answered. The patient expressed understanding and agreed with current plan for discharge and follow-up plan of care. Strict ED return precautions were provided, including return/worsening of current symptoms or any other concerns.    Independently Interpreted Test(s):   I have ordered and independently interpreted X-rays - see prior notes.  I have ordered and independently interpreted EKG Reading(s) - see prior notes  Clinical Tests:   Lab Tests: Ordered and Reviewed  Radiological Study: Ordered and Reviewed  Medical Tests: Ordered and Reviewed  Other:   I have discussed this case with another health care provider.             ED Course as of 04/30/22 0805   Sat Apr 30, 2022   0306 Discussed over the phone with Urology resident on-call who recommended no further treatment other than pain control.  Patient is already on Bactrim. [AU]      ED Course User Index  [AU] José Mendiola MD             Clinical Impression:   Final diagnoses:  [G89.18] Post-op pain (Primary)          ED Disposition Condition    Discharge Stable        ED Prescriptions     Medication Sig Dispense Start Date End Date Auth. Provider    oxyCODONE (ROXICODONE) 5 MG immediate release tablet Take 1 tablet (5 mg total) by mouth every 6 (six) hours as needed for Pain. 5 tablet 4/30/2022  José Mendiola MD    promethazine (PHENERGAN) 25 MG tablet Take 1 tablet (25 mg total) by mouth every 6 (six) hours as needed for Nausea. 15 tablet 4/30/2022  José Mendiola MD        Follow-up Information     Follow up With Specialties Details Why  Contact Info    Bandar Garcia MD Urology In 1 week follow up with a specialist, for re-evaluation of your symptoms [address]  [place] [address]  [phone]             José Mendiola MD  Resident  04/30/22 0805

## 2022-04-30 NOTE — FIRST PROVIDER EVALUATION
Emergency Department TeleTriage Encounter Note      CHIEF COMPLAINT    Chief Complaint   Patient presents with    Post-op Problem     Pt had a bladder stimulator repair on Wednesday. Pt c/o of fever chills, buttock/back pain, and generalized weakness. Resident on call referred her to the ED>        VITAL SIGNS   Initial Vitals [04/29/22 2040]   BP Pulse Resp Temp SpO2   (!) 174/78 (!) 115 20 100 °F (37.8 °C) 100 %      MAP       --            ALLERGIES    Review of patient's allergies indicates:   Allergen Reactions    Cephalexin Itching    Cephalosporins     Zofran [ondansetron hcl (pf)] Hives    Penicillins Rash       PROVIDER TRIAGE NOTE  HPI: Marilee Simons, a 52 y.o. female presents to the ED pain and concern for infection after having bladder stimulator placed.       Constitutional: Vital signs low grade fever, well nourished, well developed, appearing stated age, NAD   HEENT: NCAT, symmetrical lids, No obvious facial deformity.  Normal phonation. Normal Conjunctiva, Gross EOMs intact   Neck: NAROM   Respiratory: Normal effort.  No obvious use of accessory muscles   Musculoskeletal: Moved upper extremities well   Neuro: Alert, answers questions appropriately    Psych: appropriate mood and affect          ORDERS  Labs Reviewed - No data to display    ED Orders (720h ago, onward)    None            Virtual Visit Note: The provider triage portion of this emergency department evaluation and documentation was performed via [employer], a HIPAA-compliant telemedicine application, in concert with a tele-presenter in the room. A face to face patient evaluation with one of my colleagues will occur once the patient is placed in an emergency department room.      DISCLAIMER: This note was prepared with [employer] voice recognition transcription software. Garbled syntax, mangled pronouns, and other bizarre constructions may be attributed to that software system.

## 2022-04-30 NOTE — DISCHARGE INSTRUCTIONS
If you are having worsening pain, please alterate 800mg Ibuprofen and 650mg Tylenol every 4 hours each (so take Ibuprofen at 8am, then Tylenol at 11am, Ibuprofen at 2pm...)  If you are having worsening pain, take the Oxycodone. Follow-up with your urologist in 1 week or as soon as possible. Please return to the ED if you are having fever, difficulty breathing, vomiting, or any drainage from your incision sites. We thank you for allowing us to assist in your care.

## 2022-04-30 NOTE — ED TRIAGE NOTES
Marilee Simons, a 52 y.o. female presents to the ED w/ complaint of post-op complications. Had a bladder stimulator repair on Wednesday, since then has been experiencing fever and weakness. States the pain has been controlled with pain medication the last 2 days, pain has been unrelieved with pain medication today     Triage note:  Chief Complaint   Patient presents with    Post-op Problem     Pt had a bladder stimulator repair on Wednesday. Pt c/o of fever chills, buttock/back pain, and generalized weakness. Resident on call referred her to the ED>      Review of patient's allergies indicates:   Allergen Reactions    Cephalexin Itching    Cephalosporins     Zofran [ondansetron hcl (pf)] Hives    Penicillins Rash     Past Medical History:   Diagnosis Date    Acid reflux     Allergy     Alopecia     Anxiety     Arthralgia     Back pain     Depression     Dry eyes     from meds    Fever blister     Fibromyalgia     Interstitial cystitis     Irritable bowel syndrome     Kidney stone     Major depressive disorder, recurrent episode, in partial or unspecified remission 6/25/2013    OAB (overactive bladder) 7/21/2015    PTSD (post-traumatic stress disorder)     Rheumatoid arthritis     Systemic lupus erythematosus     Thyroid disease     Urinary tract infection     Vaginal infection

## 2022-04-30 NOTE — TELEPHONE ENCOUNTER
Patient called @ 7:00PM on 4/29/22 with concerns over worsening pain with chills and nausea from her recent procedure with Dr. Garcia. She states that her incisional pain is throbbing and is worsening over the past 12 hours. She is also experiencing chills and nausea, but denies fevers. She notes her wound has become extremely red around the edges of the incision. Patient sent an image of her wound. After review of her image and symptoms, I called back the patient to inform her to proceed to [facility] ED for further evaluation for possible surgical site infection. Patient verbally agreed to head the the ED ASAP.

## 2022-05-02 ENCOUNTER — TELEPHONE (OUTPATIENT)
Dept: UROLOGY | Facility: CLINIC | Age: 52
End: 2022-05-02
Payer: COMMERCIAL

## 2022-05-02 NOTE — TELEPHONE ENCOUNTER
----- Message from Floyd Patel MD sent at 4/30/2022  7:40 AM CDT -----  Regarding: ED visit  Hey.    Just wanted to let you know that Ms. Simons called with concerns over chills and worsening pain at her interstim incision site on 4/29 at 7PM. She went to the ED and her workup was negative. They sent her home with pain meds. She remains on Bactrim DS. They did not think she had a surgical site infection based on their exam. CT scan was negative for any suspicious areas of abscess.

## 2022-05-05 LAB
BACTERIA BLD CULT: NORMAL
BACTERIA BLD CULT: NORMAL

## 2022-05-09 ENCOUNTER — PATIENT MESSAGE (OUTPATIENT)
Dept: UROLOGY | Facility: CLINIC | Age: 52
End: 2022-05-09
Payer: COMMERCIAL

## 2022-05-09 NOTE — TELEPHONE ENCOUNTER
Pt did go to the ER on 4/29 -see note     Note  ----- Message from Floyd Patel MD sent at 4/30/2022  7:40 AM CDT -----  Regarding: ED visit  Hey.     Just wanted to let you know that Ms. Simons called with concerns over chills and worsening pain at her interstim incision site on 4/29 at 7PM. She went to the ED and her workup was negative. They sent her home with pain meds. She remains on Bactrim DS. They did not think she had a surgical site infection based on their exam. CT scan was negative for any suspicious areas of abscess.

## 2022-05-10 ENCOUNTER — OFFICE VISIT (OUTPATIENT)
Dept: UROLOGY | Facility: CLINIC | Age: 52
End: 2022-05-10
Payer: COMMERCIAL

## 2022-05-10 DIAGNOSIS — M79.7 FIBROMYALGIA: ICD-10-CM

## 2022-05-10 DIAGNOSIS — N32.81 OAB (OVERACTIVE BLADDER): ICD-10-CM

## 2022-05-10 DIAGNOSIS — Z96.82 NEUROSTIMULATOR DEVICE IN SITU: ICD-10-CM

## 2022-05-10 DIAGNOSIS — B37.31 YEAST VAGINITIS: ICD-10-CM

## 2022-05-10 DIAGNOSIS — N39.41 URGENCY INCONTINENCE: ICD-10-CM

## 2022-05-10 DIAGNOSIS — G89.18 POST-OP PAIN: Primary | ICD-10-CM

## 2022-05-10 PROBLEM — Z53.09 MRI CONTRAINDICATED DUE TO METAL IMPLANT: Status: RESOLVED | Noted: 2022-04-04 | Resolved: 2022-05-10

## 2022-05-10 PROCEDURE — 99499 NO LOS: ICD-10-PCS | Mod: S$GLB,,, | Performed by: UROLOGY

## 2022-05-10 PROCEDURE — 99499 UNLISTED E&M SERVICE: CPT | Mod: S$GLB,,, | Performed by: UROLOGY

## 2022-05-10 PROCEDURE — 99999 PR PBB SHADOW E&M-EST. PATIENT-LVL IV: ICD-10-PCS | Mod: PBBFAC,,, | Performed by: UROLOGY

## 2022-05-10 PROCEDURE — 1159F PR MEDICATION LIST DOCUMENTED IN MEDICAL RECORD: ICD-10-PCS | Mod: CPTII,S$GLB,, | Performed by: UROLOGY

## 2022-05-10 PROCEDURE — 95971 ALYS SMPL SP/PN NPGT W/PRGRM: CPT | Mod: S$GLB,,, | Performed by: UROLOGY

## 2022-05-10 PROCEDURE — 1159F MED LIST DOCD IN RCRD: CPT | Mod: CPTII,S$GLB,, | Performed by: UROLOGY

## 2022-05-10 PROCEDURE — 95971 PR ANALYZE NEUROSTIM,SIMPLE/PROG: ICD-10-PCS | Mod: S$GLB,,, | Performed by: UROLOGY

## 2022-05-10 PROCEDURE — 99999 PR PBB SHADOW E&M-EST. PATIENT-LVL IV: CPT | Mod: PBBFAC,,, | Performed by: UROLOGY

## 2022-05-10 RX ORDER — FLUCONAZOLE 100 MG/1
100 TABLET ORAL DAILY
Qty: 3 TABLET | Refills: 0 | Status: SHIPPED | OUTPATIENT
Start: 2022-05-10 | End: 2022-05-13

## 2022-05-10 RX ORDER — HYDROCODONE BITARTRATE AND ACETAMINOPHEN 5; 325 MG/1; MG/1
1 TABLET ORAL EVERY 8 HOURS PRN
Qty: 21 TABLET | Refills: 0 | Status: SHIPPED | OUTPATIENT
Start: 2022-05-10 | End: 2022-05-17

## 2022-05-10 NOTE — PROGRESS NOTES
HPI:   CC: post op pain following InterStim Therapy replacement    Marilee Simons is a 52 y.o. woman c/o post op pain.  C/o she had fever and severe pain overying the implanted site.  She was concerned for possible infection and went to the ER.  No treatment was given because it looked fine.  No problem found after CT scan.  Denies fever or chills.  Still has a problem with pain because she has a hx of fibromyagia and usually takes a long time for her to recover from a surgery.    Hx of urgency, frequency and urge urinary incontinence.  This has been refractory to medical management. She presents for explantation of her original interstim device with replacement with interstim X.   Date: 04/27/2022  Pre-Op Diagnosis: urinary urgency, frequency, urge urinary incontinence  Post-Op Diagnosis: same  Procedure(s) Performed:  1.  Explantation of InterStim generator  2.  Removal of InterStim tined lead  3.  Incision for implantation of neurostimulator electrodes, sacral nerve (transforamenal placement)  4.  Insertion of peripheral neurostimulator pulse generator  5.  Fluoro < 1 h  6.  Electronic analysis of implanted neurostimulator pulse generator system with intraoperative programming  Findings:  Old lead removed from right side and generator removed from left side  New lead placed on left side  Generator placed on left side  Good sensory and motor function c/w S3 stimulation seen  The rolling pen technique was used to localize S3      Procedure(s) Performed: 9/12/18  1.  Cystoscopy with hydrodistension  2.  Botox injections into detrusor muscle  Findings:   - Initial bladder capacity 900 mL with terminal hematuria.   - Glomerulations were diffusely seen, Hunner's ulcers seen overlying bilateral UOs, diffuse mild degree of hyperemia  - Subsequent bladder capacity was 950 mL.    Since the procedure, she has done very well.  She needs refills on meds.    s/p InterStim Therapy for refractory frequency and urgency on  8/3/16.  Lead placed on right side  Generator placed on left side  Good sensory and motor function c/w S3 stimulation seen  She is doing great.  Is very pleased with the bladder control outcome.      She used to use valium crushed in the vagina to help her with her pelvic pain.    SUDS cysto on 1/20/15  --- Bladder ---   CYSTOMETROGRAM ( Filling Phase ):   Cystometric Numeric Data:   - First Desire (Sensation): 68 mL at 1cm of water.   - Normal Desire: 75mL at 1 cm of water.   - Strong Desire: 96 mL at 2 cm of water.   - Urgency (Imminent Void) : 108 mL at 1cm of water.   - Maximum Cystometric Capacity: 109 mL.   Compliance:   - low.   Leak Point Pressure:   - Valsalva ( Abdominal ) Leak Point Pressure: none.   UROFLOW:   - Voided Volume: 134 mL.   - Residual Urine: 5 mL.   - Maximum Flow Rate: 8 mL/sec.   - Flow Pattern: low amplitude  VOIDING PRESSURE STUDY ( Voiding Phase ):   Detrusor Pressure:   - Maximum Detrusor Pressure: 32 cm of water.   - Detrusor Pressure at Maximum Flow: 2 cm of water.   - Detrusor Contraction Characteristics: Sustained contraction(s).     ELECTROMYOGRAM:   - incomplete relaxation.     ---Diagnostic Cystourethroscopy ---   Normal urethra.    minimal hyperemic area of the bladder  Width of Bladder Neck Opening: Approximately 18 Fr.   Normal bladder.   Normal ureteral orifices bilaterally.     CONCLUSIONS:   1.  Decreased bladder capacity with sensory urgency  2.  IC  3.  OAB    Past Medical History:   Diagnosis Date    Acid reflux     Allergy     Alopecia     Anxiety     Arthralgia     Back pain     Depression     Dry eyes     from meds    Fever blister     Fibromyalgia     Interstitial cystitis     Irritable bowel syndrome     Kidney stone     Major depressive disorder, recurrent episode, in partial or unspecified remission 6/25/2013    OAB (overactive bladder) 7/21/2015    PTSD (post-traumatic stress disorder)     Rheumatoid arthritis     Systemic lupus erythematosus      Thyroid disease     Urinary tract infection     Vaginal infection      Past Surgical History:   Procedure Laterality Date    BLADDER SURGERY       SECTION, LOW TRANSVERSE      x 2    COLONOSCOPY      COLONOSCOPY N/A 10/5/2017    Procedure: COLONOSCOPY;  Surgeon: Venkat He MD;  Location: [facility] (4TH FLR);  Service: Endoscopy;  Laterality: N/A;    ESOPHAGOGASTRODUODENOSCOPY      ESOPHAGOGASTRODUODENOSCOPY N/A 10/25/2021    Procedure: EGD (ESOPHAGOGASTRODUODENOSCOPY);  Surgeon: Venkat He MD;  Location: [facility] (4TH FLR);  Service: Endoscopy;  Laterality: N/A;  Covid test on 10/22 at [email or web address]    10/19 lvm to confirm appt-rb    EYE SURGERY      Lasik-bilateral    HYSTERECTOMY      IMPLANTATION OF PERMANENT SACRAL NERVE STIMULATOR N/A 2022    Procedure: INSERTION, NEUROSTIMULATOR, PERMANENT, SACRAL;  Surgeon: Bandar Garcia MD;  [facility];  Service: Urology;  Laterality: N/A;  2hr    INJECTION OF BOTULINUM TOXIN TYPE A N/A 2018    Procedure: INJECTION, BOTULINUM TOXIN, TYPE A  200 UNITS;  Surgeon: Bandar Garcia MD;  [facility];  Service: Urology;  Laterality: N/A;    INSTILLATION OF URINARY BLADDER N/A 2018    Procedure: INSTILLATION, URINARY BLADDER;  Surgeon: Bandar Garcia MD;  [facility];  Service: Urology;  Laterality: N/A;    PARTIAL HYSTERECTOMY      REMOVAL OF ELECTRODE LEAD OF SACRAL NERVE STIMULATOR N/A 2022    Procedure: REMOVAL, ELECTRODE LEAD, SACRAL NERVE STIMULATOR;  Surgeon: Bandar Garcia MD;  [facility];  Service: Urology;  Laterality: N/A;    TRANSFORAMINAL EPIDURAL INJECTION OF STEROID Left 2/15/2021    Procedure: LUMBAR TRANSFORAMINAL LEFT L5 AND S1 DIRECT REFERRAL;  Surgeon: Marquez Nesbitt MD;  Location: [facility];  Service: Pain Management;  Laterality: Left;  NEEDS CONSENT, PT REQUESTING IV SEDATION     Social History     Tobacco Use    Smoking status: Never  Smoker    Smokeless tobacco: Never Used   Substance Use Topics    Alcohol use: No    Drug use: No     Family History   Problem Relation Age of Onset    Irritable bowel syndrome Mother     Irritable bowel syndrome Sister     Lupus Sister     Rheum arthritis Sister     Fibromyalgia Sister     Irritable bowel syndrome Sister     Celiac disease Neg Hx     Cirrhosis Neg Hx     Colon cancer Neg Hx     Colon polyps Neg Hx     Crohn's disease Neg Hx     Cystic fibrosis Neg Hx     Esophageal cancer Neg Hx     Hemochromatosis Neg Hx     Inflammatory bowel disease Neg Hx     Liver cancer Neg Hx     Liver disease Neg Hx     Rectal cancer Neg Hx     Stomach cancer Neg Hx     Ulcerative colitis Neg Hx     Boris's disease Neg Hx     Melanoma Neg Hx     Amblyopia Neg Hx     Blindness Neg Hx     Cataracts Neg Hx     Glaucoma Neg Hx     Macular degeneration Neg Hx     Retinal detachment Neg Hx     Strabismus Neg Hx      Allergy:  Review of patient's allergies indicates:   Allergen Reactions    Cephalexin Itching    Zofran [ondansetron hcl (pf)] Hives    Penicillins Rash     Outpatient Encounter Medications as of 5/10/2022   Medication Sig Dispense Refill    alosetron (LOTRONEX) 0.5 MG tablet Take 1 tablet (0.5 mg total) by mouth daily as needed. 30 tablet 0    amitriptyline (ELAVIL) 10 MG tablet TAKE 1 TABLET(10 MG) BY MOUTH EVERY NIGHT AS NEEDED 90 tablet 3    azithromycin (Z-JAVIER) 250 MG tablet Take 2 tablets by mouth on day 1; Take 1 tablet by mouth on days 2-5 6 tablet 0    benzocaine 20 % Oint Compound benzocaine 20% / lidocaine 6% / tetracaine 4% ointment. Apply to affected area 1 hour prior to procedure. 30 g 0    butalbitaL-acetaminophen  mg Tab TAKE 1 TABLET BY MOUTH EVERY 4 HOURS 60 tablet 3    calcium glycerophosphate (PRELIEF) 65 mg Tab Take 2 tablets by mouth before meals and at bedtime as needed. 100 each prn    cyclobenzaprine (FLEXERIL) 10 MG tablet TAKE 1 TABLET(10 MG)  BY MOUTH TWICE DAILY AS NEEDED 90 tablet 6    dicyclomine (BENTYL) 20 mg tablet Take 20 mg by mouth 4 (four) times daily.      famotidine (PEPCID) 40 MG tablet Take 1 tablet (40 mg total) by mouth nightly as needed for Heartburn. 30 tablet 11    fluticasone propionate (FLONASE) 50 mcg/actuation nasal spray 2 sprays (100 mcg total) by Each Nostril route 2 (two) times daily. 31.6 mL 5    gabapentin (NEURONTIN) 800 MG tablet TAKE 1 TABLET(800 MG) BY MOUTH THREE TIMES DAILY 90 tablet 11    hydrocortisone 2.5 % cream Apply to affected areas twice a day when eczema is moderate. 453.6 g 1    hydrOXYchloroQUINE (PLAQUENIL) 200 mg tablet Take 1 tablet (200 mg total) by mouth 2 (two) times daily. 60 tablet 6    hydrOXYzine HCL (ATARAX) 25 MG tablet TAKE 1 TO 8 TABLETS BY MOUTH EVERY NIGHT AT BEDTIME, START WITH 3 TABLETS AT BEDTIME, INCREASE OR DECREASE TILL ITCHING IS CONTROLLED 90 tablet 0    ketoconazole (NIZORAL) 2 % shampoo Wash scalp with medicated shampoo at least 2x/week. Let sit on scalp at least 5 minutes prior to rinsing 240 mL 5    levothyroxine (SYNTHROID) 50 MCG tablet Take 1 tablet (50 mcg) by mouth Mon-Sat and take 2 tablets (100 mcg) on Sun only 34 tablet 11    xuegtf-nlsud-v.blue-sal-naphos (USTELL) 120-0.12 mg Cap Take 1 capsule by mouth 3 (three) times daily. 90 each 3    methylPREDNISolone (MEDROL DOSEPACK) 4 mg tablet use as directed 1 each 0    multivitamin with minerals tablet Take 1 tablet by mouth once daily.      oxyCODONE (ROXICODONE) 5 MG immediate release tablet Take 1 tablet (5 mg total) by mouth every 6 (six) hours as needed for Pain. 5 tablet 0    promethazine (PHENERGAN) 25 MG tablet TAKE 1 TABLET(25 MG) BY MOUTH EVERY 6 HOURS AS NEEDED FOR NAUSEA 30 tablet 0    promethazine (PHENERGAN) 25 MG tablet Take 1 tablet (25 mg total) by mouth every 6 (six) hours as needed for Nausea. 15 tablet 0    RABEprazole (ACIPHEX) 20 mg tablet TAKE 1 TABLET(20 MG) BY MOUTH EVERY DAY (Patient  taking differently: Take 20 mg by mouth nightly.) 90 tablet 0    tamsulosin (FLOMAX) 0.4 mg Cap TAKE 1 CAPSULE(0.4 MG) BY MOUTH EVERY DAY 30 capsule 11    tiZANidine (ZANAFLEX) 4 MG tablet TAKE 1 TABLET(4 MG) BY MOUTH EVERY 8 HOURS AS NEEDED 90 tablet 12    tofacitinib (XELJANZ XR) 11 mg Tb24 Take 11 mg by mouth once daily. 30 tablet 6    traMADoL (ULTRAM) 50 mg tablet TAKE 1 TABLET(50 MG) BY MOUTH EVERY 6 HOURS 90 tablet 3    [DISCONTINUED] HYDROcodone-acetaminophen (NORCO) 5-325 mg per tablet Take 1 tablet by mouth every 6 (six) hours as needed for Pain. 5 tablet 0    clonazePAM (KLONOPIN) 1 MG tablet TAKE 1 TABLET(1 MG) BY MOUTH THREE TIMES DAILY 90 tablet 2    DULoxetine (CYMBALTA) 60 MG capsule Take 2 capsules (120 mg total) by mouth once daily. 60 capsule 3    fluconazole (DIFLUCAN) 100 MG tablet Take 1 tablet (100 mg total) by mouth once daily. for 3 days 3 tablet 0    HYDROcodone-acetaminophen (NORCO) 5-325 mg per tablet Take 1 tablet by mouth every 8 (eight) hours as needed for Pain. 21 tablet 0    hyoscyamine (ANASPAZ,LEVSIN) 0.125 mg Tab Take 1 tablet (125 mcg total) by mouth every 8 (eight) hours as needed (abdominal cramps). 60 tablet 0    lamoTRIgine (LAMICTAL) 25 MG tablet Take 2 tablets (50 mg total) by mouth once daily. (Patient taking differently: Take 50 mg by mouth nightly.) 60 tablet 5    oxybutynin (DITROPAN) 5 MG Tab Take 1 tablet (5 mg total) by mouth 2 (two) times daily. 60 tablet 12     Facility-Administered Encounter Medications as of 5/10/2022   Medication Dose Route Frequency Provider Last Rate Last Admin    betamethasone acetate-betamethasone sodium phosphate injection 12 mg  12 mg Intramuscular 1 time in Clinic/HOD Fei Adkins MD         Review of Systems   General ROS: GENERAL:  No weight gain or loss  SKIN:  No rashes or lacerations  HEAD:  No headaches  EYES:  No exophthalmos, jaundice or blindness  EARS:  No dizziness, tinnitus or hearing loss  NOSE:  No changes  in smell  MOUTH & THROAT:  No dyskinetic movements or obvious goiter  CHEST:  No shortness of breath, hyperventilation or cough  CARDIOVASCULAR:  No tachycardia or chest pain  ABDOMEN:  No nausea, vomiting, pain, constipation or diarrhea  URINARY:  No frequency, dysuria or sexual dysfunction  ENDOCRINE:  No polydipsia, polyuria  MUSCULOSKELETAL:  No pain or stiffness of the joints  NEUROLOGIC:  No weakness, sensory changes, seizures, confusion, memory loss, tremor or other abnormal movements  Physical Exam   wound well healed.  No evidence of cellulites.  No tenderness noted.    There were no vitals filed for this visit.  Physical Examination:   n/a  LABS:  Lab Results   Component Value Date    CREATININE 0.9 04/29/2022    CREATININE 0.9 04/12/2022    CREATININE 1.0 02/11/2022     Urine Culture, Routine   Date Value Ref Range Status   04/04/2022 No growth  Final     Procedure:  InterStim Analysis and Program  InterStim Reprogram:  A new setting program 2 at 1.1 amp given. She feels the stimulation perineally.        Assessment and Plan:  Marilee was seen today for other.    Diagnoses and all orders for this visit:    Post-op pain  -     HYDROcodone-acetaminophen (NORCO) 5-325 mg per tablet; Take 1 tablet by mouth every 8 (eight) hours as needed for Pain.    Yeast vaginitis  -     fluconazole (DIFLUCAN) 100 MG tablet; Take 1 tablet (100 mg total) by mouth once daily. for 3 days    Urgency incontinence    OAB (overactive bladder)    Neurostimulator device in situ    Fibromyalgia    reassurance given.  Per pt's request, I gave her 21 tablets of Norco.  She developed yeast infection following abx treatment, I gave her diflucan.  Interstim reprogramming done.    I spent 25 minutes with the patient of which more than half was spent in direct consultation with the patient in regards to our treatment and plan.    Follow-up:  Follow up in about 6 months (around 11/10/2022).

## 2022-05-19 ENCOUNTER — TELEPHONE (OUTPATIENT)
Dept: UROLOGY | Facility: CLINIC | Age: 52
End: 2022-05-19
Payer: COMMERCIAL

## 2022-05-19 ENCOUNTER — PATIENT MESSAGE (OUTPATIENT)
Dept: UROLOGY | Facility: CLINIC | Age: 52
End: 2022-05-19
Payer: COMMERCIAL

## 2022-05-19 NOTE — TELEPHONE ENCOUNTER
Called the pt and left a message.  The photo of her incision is just fine.  The wound is healing.  There is a suture knot that is not dissolved at the end of the incision, and her skin is reacting to it.  No cellulitis noted.

## 2022-05-24 ENCOUNTER — PATIENT MESSAGE (OUTPATIENT)
Dept: FAMILY MEDICINE | Facility: CLINIC | Age: 52
End: 2022-05-24
Payer: COMMERCIAL

## 2022-05-25 ENCOUNTER — PATIENT MESSAGE (OUTPATIENT)
Dept: PSYCHIATRY | Facility: CLINIC | Age: 52
End: 2022-05-25
Payer: COMMERCIAL

## 2022-05-25 DIAGNOSIS — Z12.31 ENCOUNTER FOR SCREENING MAMMOGRAM FOR MALIGNANT NEOPLASM OF BREAST: Primary | ICD-10-CM

## 2022-05-27 ENCOUNTER — PATIENT MESSAGE (OUTPATIENT)
Dept: ALLERGY | Facility: CLINIC | Age: 52
End: 2022-05-27
Payer: COMMERCIAL

## 2022-05-27 ENCOUNTER — TELEPHONE (OUTPATIENT)
Dept: ALLERGY | Facility: CLINIC | Age: 52
End: 2022-05-27
Payer: COMMERCIAL

## 2022-05-27 NOTE — TELEPHONE ENCOUNTER
----- Message from Stella Arce MD sent at 5/25/2022  8:51 AM CDT -----  Regarding: No Rx / Needs apt  Hello,    Please tell client I am unable to fill request for Atarax/hydroxyzine because it has been over 12 months since last visit.    I will be happy to prescribe if/when client  is seen here.  In the meantime, client can ask her PCP.    Thanks.  Stella

## 2022-05-27 NOTE — TELEPHONE ENCOUNTER
Tried to call patient, no answer, message left for patient to return our call. I will also send portal message to patient.

## 2022-05-28 ENCOUNTER — PATIENT MESSAGE (OUTPATIENT)
Dept: ALLERGY | Facility: CLINIC | Age: 52
End: 2022-05-28
Payer: COMMERCIAL

## 2022-05-31 ENCOUNTER — PATIENT MESSAGE (OUTPATIENT)
Dept: ALLERGY | Facility: CLINIC | Age: 52
End: 2022-05-31
Payer: COMMERCIAL

## 2022-06-02 ENCOUNTER — PATIENT MESSAGE (OUTPATIENT)
Dept: UROLOGY | Facility: CLINIC | Age: 52
End: 2022-06-02
Payer: COMMERCIAL

## 2022-06-07 ENCOUNTER — OFFICE VISIT (OUTPATIENT)
Dept: PSYCHIATRY | Facility: CLINIC | Age: 52
End: 2022-06-07
Payer: COMMERCIAL

## 2022-06-07 ENCOUNTER — PATIENT MESSAGE (OUTPATIENT)
Dept: PSYCHIATRY | Facility: CLINIC | Age: 52
End: 2022-06-07
Payer: COMMERCIAL

## 2022-06-07 DIAGNOSIS — F33.1 MAJOR DEPRESSIVE DISORDER, RECURRENT EPISODE, MODERATE: Primary | ICD-10-CM

## 2022-06-07 DIAGNOSIS — F41.1 GENERALIZED ANXIETY DISORDER: ICD-10-CM

## 2022-06-07 PROCEDURE — 99214 OFFICE O/P EST MOD 30 MIN: CPT | Mod: 95,,, | Performed by: PSYCHIATRY & NEUROLOGY

## 2022-06-07 PROCEDURE — 90833 PSYTX W PT W E/M 30 MIN: CPT | Mod: 95,,, | Performed by: PSYCHIATRY & NEUROLOGY

## 2022-06-07 PROCEDURE — 1160F RVW MEDS BY RX/DR IN RCRD: CPT | Mod: CPTII,95,, | Performed by: PSYCHIATRY & NEUROLOGY

## 2022-06-07 PROCEDURE — 99214 PR OFFICE/OUTPT VISIT, EST, LEVL IV, 30-39 MIN: ICD-10-PCS | Mod: 95,,, | Performed by: PSYCHIATRY & NEUROLOGY

## 2022-06-07 PROCEDURE — 1159F PR MEDICATION LIST DOCUMENTED IN MEDICAL RECORD: ICD-10-PCS | Mod: CPTII,95,, | Performed by: PSYCHIATRY & NEUROLOGY

## 2022-06-07 PROCEDURE — 1160F PR REVIEW ALL MEDS BY PRESCRIBER/CLIN PHARMACIST DOCUMENTED: ICD-10-PCS | Mod: CPTII,95,, | Performed by: PSYCHIATRY & NEUROLOGY

## 2022-06-07 PROCEDURE — 1159F MED LIST DOCD IN RCRD: CPT | Mod: CPTII,95,, | Performed by: PSYCHIATRY & NEUROLOGY

## 2022-06-07 PROCEDURE — 90833 PR PSYCHOTHERAPY W/PATIENT W/E&M, 30 MIN (ADD ON): ICD-10-PCS | Mod: 95,,, | Performed by: PSYCHIATRY & NEUROLOGY

## 2022-06-07 RX ORDER — DULOXETIN HYDROCHLORIDE 60 MG/1
CAPSULE, DELAYED RELEASE ORAL
Qty: 60 CAPSULE | Refills: 3 | Status: SHIPPED | OUTPATIENT
Start: 2022-06-07 | End: 2022-07-20 | Stop reason: SDUPTHER

## 2022-06-07 RX ORDER — LAMOTRIGINE 25 MG/1
75 TABLET ORAL DAILY
Qty: 90 TABLET | Refills: 5 | Status: SHIPPED | OUTPATIENT
Start: 2022-06-07 | End: 2022-07-20 | Stop reason: DRUGHIGH

## 2022-06-07 NOTE — PROGRESS NOTES
"Outpatient Psychiatry Follow-Up Visit (MD/NP)    6/7/2022 The patient location is: home  The chief complaint leading to consultation is: depression and anxiety.    Visit type: audiovisual    Face to Face time with patient: 34" with 4" on meds and 30" for supportive counseling and update of history and mental status  37 minutes of total time spent on the encounter, which includes face to face time and non-face to face time preparing to see the patient (eg, review of tests), Obtaining and/or reviewing separately obtained history, Documenting clinical information in the electronic or other health record, Independently interpreting results (not separately reported) and communicating results to the patient/family/caregiver, or Care coordination (not separately reported).         Each patient to whom he or she provides medical services by telemedicine is:  (1) informed of the relationship between the physician and patient and the respective role of any other health care provider with respect to management of the patient; and (2) notified that he or she may decline to receive medical services by telemedicine and may withdraw from such care at any time.    Notes: See below.  Clinical Status of Patient:  Outpatient (Ambulatory)    Chief Complaint:  Marilee Simons is a 52 y.o. female who presents today for follow-up of depression and anxiety.  Met with patient and no relatives with patient.      Interval History and Content of Current Session:  Interim Events/Subjective Report/Content of Current Session: Patient discussed my senior living again. Patient has not thoughts of harming herself and no signs of josi or psychosis. Patient is now getting neuromodulation therapy in her spine for her discomfort. Patient is in good self control at this time, despite her physical discomforts. Patient is still suffering chronic pain however.  Patient does feel the Lamictal is helpful. Patent finds she is less irritable now and that the " Lamictal does help with that issue. Patient requests to increase dose of Lamictal again and has no side effects from this medication. Patient still wants to stay home due to her fears of covid. Patient described being lonely as a result of her circumstances.Patient also discussed her concerns re her relationship with her . We suggested that patient consider getting more regular therapy. I suggested considering CBT in the near future. I suggested patient also consider getting a cane or walker to prevent falls as the one she had recently. I also urged patient to consider some mild exercise to help her feel more fit and motivated.    Psychotherapy:  · Target symptoms: depression, anxiety   · Why chosen therapy is appropriate versus another modality: relevant to diagnosis, patient responds to this modality, evidence based practice  · Outcome monitoring methods: self-report  · Therapeutic intervention type: supportive psychotherapy  · Topics discussed/themes: mood and anxiety issues  · The patient's response to the intervention is accepting. The patient's progress toward treatment goals is limited.   · Duration of intervention: 34 minutes.    Review of Systems   · PSYCHIATRIC: Pertinant items are noted in the narrative.    Past Medical, Family and Social History: The patient's past medical, family and social history have been reviewed and updated as appropriate within the electronic medical record - see encounter notes.    Compliance: yes    Side effects: None    Risk Parameters:  Patient reports no suicidal ideation  Patient reports no homicidal ideation  Patient reports no self-injurious behavior  Patient reports no violent behavior    Exam (detailed: at least 9 elements; comprehensive: all 15 elements)   Constitutional  Vitals:  Most recent vital signs, dated less than 90 days prior to this appointment, were reviewed.   There were no vitals filed for this visit.Patient reports stable vital signs     General:   unremarkable, age appropriate, casually dressed, neatly groomed     Musculoskeletal  Muscle Strength/Tone:  patient reports some loss of muscle tone and strength   Gait & Station:  unsteady     Psychiatric  Speech:  no latency; no press, spontaneous   Mood & Affect:  anxious, dysthymic, irritable  congruent and appropriate, sad, anxious   Thought Process:  normal and logical   Associations:  intact   Thought Content:  normal, no suicidality, no homicidality, delusions, or paranoia   Insight:  has awareness of illness   Judgement: behavior is adequate to circumstances   Orientation:  grossly intact   Memory: intact for content of interview   Language: grossly intact   Attention Span & Concentration:  able to focus   Fund of Knowledge:  not tested     Assessment and Diagnosis   Status/Progress: Based on the examination today, the patient's problem(s) is/are inadequately controlled.  New problems have not been presented today.   Co-morbidities are complicating management of the primary condition.  The working differential for this patient includes depression and anxiety.     General Impression: Patient is still upset re her physical problems as described above, and discussed her ongoing irritability as well. Patient is in good self control and not a danger to self or others at this time. Patient is motivated to improve and is willing to seek therapy for assistance. Patient does feel some added stability since the addition of Lamictal to her regimen and reports no evidence of rash with the Lamictal.      ICD-10-CM ICD-9-CM   1. Major depressive disorder, recurrent episode, moderate  F33.1 296.32   2. Generalized anxiety disorder  F41.1 300.02       Intervention/Counseling/Treatment Plan   · Medication Management: The risks and benefits of medication were discussed with the patient. Patient agrees to continue Cymbalta 60 mg bid, Klonopin 1 mg tid, and to increase the Lamictal to 75 mg q pm.      Return to Clinic: patient  will see a new doctor due to my group home

## 2022-06-08 ENCOUNTER — OFFICE VISIT (OUTPATIENT)
Dept: ALLERGY | Facility: CLINIC | Age: 52
End: 2022-06-08
Payer: COMMERCIAL

## 2022-06-08 DIAGNOSIS — L29.9 ITCHING: Primary | ICD-10-CM

## 2022-06-08 DIAGNOSIS — L50.1 CHRONIC IDIOPATHIC URTICARIA: ICD-10-CM

## 2022-06-08 PROCEDURE — 1159F MED LIST DOCD IN RCRD: CPT | Mod: CPTII,S$GLB,, | Performed by: STUDENT IN AN ORGANIZED HEALTH CARE EDUCATION/TRAINING PROGRAM

## 2022-06-08 PROCEDURE — 99999 PR PBB SHADOW E&M-EST. PATIENT-LVL II: ICD-10-PCS | Mod: PBBFAC,,, | Performed by: STUDENT IN AN ORGANIZED HEALTH CARE EDUCATION/TRAINING PROGRAM

## 2022-06-08 PROCEDURE — 99214 OFFICE O/P EST MOD 30 MIN: CPT | Mod: S$GLB,,, | Performed by: STUDENT IN AN ORGANIZED HEALTH CARE EDUCATION/TRAINING PROGRAM

## 2022-06-08 PROCEDURE — 99999 PR PBB SHADOW E&M-EST. PATIENT-LVL II: CPT | Mod: PBBFAC,,, | Performed by: STUDENT IN AN ORGANIZED HEALTH CARE EDUCATION/TRAINING PROGRAM

## 2022-06-08 PROCEDURE — 1160F RVW MEDS BY RX/DR IN RCRD: CPT | Mod: CPTII,S$GLB,, | Performed by: STUDENT IN AN ORGANIZED HEALTH CARE EDUCATION/TRAINING PROGRAM

## 2022-06-08 PROCEDURE — 99214 PR OFFICE/OUTPT VISIT, EST, LEVL IV, 30-39 MIN: ICD-10-PCS | Mod: S$GLB,,, | Performed by: STUDENT IN AN ORGANIZED HEALTH CARE EDUCATION/TRAINING PROGRAM

## 2022-06-08 PROCEDURE — 1160F PR REVIEW ALL MEDS BY PRESCRIBER/CLIN PHARMACIST DOCUMENTED: ICD-10-PCS | Mod: CPTII,S$GLB,, | Performed by: STUDENT IN AN ORGANIZED HEALTH CARE EDUCATION/TRAINING PROGRAM

## 2022-06-08 PROCEDURE — 1159F PR MEDICATION LIST DOCUMENTED IN MEDICAL RECORD: ICD-10-PCS | Mod: CPTII,S$GLB,, | Performed by: STUDENT IN AN ORGANIZED HEALTH CARE EDUCATION/TRAINING PROGRAM

## 2022-06-08 RX ORDER — HYDROXYZINE HYDROCHLORIDE 25 MG/1
TABLET, FILM COATED ORAL
Qty: 240 TABLET | Refills: 3 | Status: ON HOLD | OUTPATIENT
Start: 2022-06-08 | End: 2022-06-22 | Stop reason: SDUPTHER

## 2022-06-08 RX ORDER — CETIRIZINE HYDROCHLORIDE 10 MG/1
10 TABLET ORAL DAILY
Qty: 120 TABLET | Refills: 2 | Status: ON HOLD | OUTPATIENT
Start: 2022-06-08 | End: 2022-06-22 | Stop reason: SDUPTHER

## 2022-06-08 RX ORDER — OMALIZUMAB 150 MG/ML
300 INJECTION, SOLUTION SUBCUTANEOUS
Qty: 13 EACH | Refills: 1 | Status: SHIPPED | OUTPATIENT
Start: 2022-06-08 | End: 2023-03-08

## 2022-06-08 NOTE — PATIENT INSTRUCTIONS
Resume anti-itch medicines    Daytime:  1 Zyrtec every day no matter what         Can take up to 3 more if need    Bedtime:  Atarax = hydroxizine 1-8 tablets   Causes drowsiness              Start with 3 tablets tonight              Increase or decrease by one tablet nightly until you find the best dose for you.      We have started the process for Xolair injections.  Expect [facility] to contact you.    Continue CeraVe or other moisturizer at least twice a day

## 2022-06-08 NOTE — PROGRESS NOTES
Allergy Clinic Note  [facility]    Subjective:      Patient ID: Marilee Simons is a 52 y.o. female.    Chief Complaint: No chief complaint on file.      Referring Provider:     Chronic urticaria   Chronic rhinitis with previous positive skin test  (Thyroid disease)  (Rheumatoid arthritis)      History of Present Illness:  50-year-old female chronic urticaria since 2009 currently treated with Xolair.  She was last seen by me 11/12/2020. She is here alone, and she is a good historian.    Related medications  Zyrtec  Prn up to 3-4 during the day -- out  Hydroxyzine prn 4-6 at hs +p.r.n.--0out  Pepcid 20 BID  Klonapin prescribed b.i.d., taking p.r.n.    6/8/2022:  Client continues to complain of hive outbreaks and uncontrolled itching.  She did not start Xolair in 2020 as planned.  She is currently out of both Zyrtec and hydroxyzine.  Instead she is taking very high doses of Benadryl.  She remains interested in Xolair therapy.    Client has a complicated history with multiple somatic complaints, multiple medications, and multiple specialty evaluations.  She recently received a diagnosis of borderline personality disorder.  Her chief complaint at the moment is pain related to a neuro modulator for interstitial cystitis.    11/12/2020: Patient continued to complain of hives and itching despite stated compliance with multidrug regimen  of Zyrtec, hydroxyzine, and Pepcid.  No side effects or problems with any of the medicines. Since prior visit  Her medicines had change to include Klonopin b.i.d.        10/15/2020: At initial visit client reported a history of urticaria off and on since 2009.  She had a photograph showing confluent welts.  She reports that symptoms have been daily despite high-dose antihistamines for the last 5-6 weeks.  Symptoms are severe and awaken her from sleep they also make her unable to garden.   Suspected precipitants are showering, rabbit exposure, plant contact.  She says that hives  are usually present upon awakening.  She is taking medicines as above without significant relief.  Recently she started a sat of with coconut oil, aloe oil, and several other plant based oils.  She also is he started using shampoo with essential oils.  In the past she has not been helped by Vanicream.  At present she has not helped by very high doses of H1 and H2 blockers as well as Klonopin as needed.  She is not using any steroid creams.  Patient was evaluated by Dr. Edwin Casarez (allergist in [place]) in 2005 and by my  allergy colleauge Dr. Marco Francisco in 2018 . She brings with her today results of her skin testing from 2005 which show a large number of strongly positive reactions to both foods and aeroallergens.  The other allergens include strong reactions to both her pet dogs and to dust mites.    Since onset of daily urticaria in September 2020, client...  Denies fever, shakes, or chills  Denies change in weight, appetite, or energy level  Admits diffuse hair loss, not sure if breaking or coming out by roots  Admits skin feels thicker  Denies change in the texture of her hair, or nails  Denies change in the appearance of urine or stool  Denies vomiting or abdominal pain  Denies change in chronic diarrhea, constipation,   Denies abnormal bleeding  Denies any new aches or pains, specifically myalgias or arthralgia,   Denies any unusual moles  States Pap smear up-to-date  States colonoscopy up today  States needs prescription for mammogram    Patient also has a long history of allergic rhinitis.  Major symptoms include nasal congestion and runny nose.  Additional symptoms include postnasal drip, sneezing, sore throat, throat clearing.  She denies eye symptoms, chest symptoms or ear symptoms.  Precipitants include dust and probably grass.  There are no other clear precipitants.  She is currently taking antihistamines as above.      Additional History:   Past medical history is significant for rheumatoid  arthritis and thyroid disease.  More recent history is also significant for anxiety and depression She has  She has no history of hypertension or heart disease.  No Hx of ENT surgery.  Family history is significant for irritable bowel syndrome in mother and 2 sisters.  There is no family history of celiac disease or inflammatory bowel disease.  Client  reports that she has never smoked. She has never used smokeless tobacco.  Exposures are notable for for dogs and 1 rabbit.      Patient Active Problem List   Diagnosis    IBS (irritable bowel syndrome)    Atypical chest pain    Hypothyroidism    Arthralgia    Chronic fatigue    IC (interstitial cystitis)    Bladder pain    Hematuria, microscopic    Potassium (K) deficiency    Acute UTI    Headache    GERD (gastroesophageal reflux disease)    Major depressive disorder, recurrent episode, moderate    PTSD (post-traumatic stress disorder)    Generalized anxiety disorder    OAB (overactive bladder)    Urgency incontinence    Straining to void    Cluster B personality disorder    Interstitial cystitis    Fibromyalgia    Pelvic pain in female    Blood poisoning    Nausea    Decreased bladder capacity    Urethral pain    Acute left-sided low back pain with left-sided sciatica    Impaired gait    Decreased strength    Chronic pain    Hypothyroidism due to Hashimoto's thyroiditis    Dysphagia    Neurostimulator device in situ     Current Outpatient Medications on File Prior to Visit   Medication Sig Dispense Refill    alosetron (LOTRONEX) 0.5 MG tablet Take 1 tablet (0.5 mg total) by mouth daily as needed. 30 tablet 0    amitriptyline (ELAVIL) 10 MG tablet TAKE 1 TABLET(10 MG) BY MOUTH EVERY NIGHT AS NEEDED 90 tablet 3    azithromycin (Z-JAVIER) 250 MG tablet Take 2 tablets by mouth on day 1; Take 1 tablet by mouth on days 2-5 6 tablet 0    benzocaine 20 % Oint Compound benzocaine 20% / lidocaine 6% / tetracaine 4% ointment. Apply to affected  area 1 hour prior to procedure. 30 g 0    butalbitaL-acetaminophen  mg Tab TAKE 1 TABLET BY MOUTH EVERY 4 HOURS 60 tablet 3    calcium glycerophosphate (PRELIEF) 65 mg Tab Take 2 tablets by mouth before meals and at bedtime as needed. 100 each prn    clonazePAM (KLONOPIN) 1 MG tablet TAKE 1 TABLET(1 MG) BY MOUTH THREE TIMES DAILY 90 tablet 2    cyclobenzaprine (FLEXERIL) 10 MG tablet TAKE 1 TABLET(10 MG) BY MOUTH TWICE DAILY AS NEEDED 90 tablet 6    dicyclomine (BENTYL) 20 mg tablet Take 20 mg by mouth 4 (four) times daily.      DULoxetine (CYMBALTA) 60 MG capsule TAKE 2 CAPSULES(120 MG) BY MOUTH EVERY DAY 60 capsule 3    famotidine (PEPCID) 40 MG tablet Take 1 tablet (40 mg total) by mouth nightly as needed for Heartburn. 30 tablet 11    fluticasone propionate (FLONASE) 50 mcg/actuation nasal spray 2 sprays (100 mcg total) by Each Nostril route 2 (two) times daily. 31.6 mL 5    gabapentin (NEURONTIN) 800 MG tablet TAKE 1 TABLET(800 MG) BY MOUTH THREE TIMES DAILY 90 tablet 11    hydrocortisone 2.5 % cream Apply to affected areas twice a day when eczema is moderate. 453.6 g 1    hydrOXYchloroQUINE (PLAQUENIL) 200 mg tablet Take 1 tablet (200 mg total) by mouth 2 (two) times daily. 60 tablet 6    hyoscyamine (ANASPAZ,LEVSIN) 0.125 mg Tab Take 1 tablet (125 mcg total) by mouth every 8 (eight) hours as needed (abdominal cramps). 60 tablet 0    ketoconazole (NIZORAL) 2 % shampoo Wash scalp with medicated shampoo at least 2x/week. Let sit on scalp at least 5 minutes prior to rinsing 240 mL 5    lamoTRIgine (LAMICTAL) 25 MG tablet Take 3 tablets (75 mg total) by mouth once daily. 90 tablet 5    levothyroxine (SYNTHROID) 50 MCG tablet Take 1 tablet (50 mcg) by mouth Mon-Sat and take 2 tablets (100 mcg) on Sun only 34 tablet 11    qxuyha-bjejl-i.blue-sal-naphos (USTELL) 120-0.12 mg Cap Take 1 capsule by mouth 3 (three) times daily. 90 each 3    methylPREDNISolone (MEDROL DOSEPACK) 4 mg tablet use as  directed 1 each 0    multivitamin with minerals tablet Take 1 tablet by mouth once daily.      oxybutynin (DITROPAN) 5 MG Tab Take 1 tablet (5 mg total) by mouth 2 (two) times daily. 60 tablet 12    oxyCODONE (ROXICODONE) 5 MG immediate release tablet Take 1 tablet (5 mg total) by mouth every 6 (six) hours as needed for Pain. 5 tablet 0    promethazine (PHENERGAN) 25 MG tablet Take 1 tablet (25 mg total) by mouth every 6 (six) hours as needed for Nausea. 15 tablet 0    promethazine (PHENERGAN) 25 MG tablet TAKE 1 TABLET(25 MG) BY MOUTH EVERY 6 HOURS AS NEEDED FOR NAUSEA 30 tablet 0    RABEprazole (ACIPHEX) 20 mg tablet TAKE 1 TABLET(20 MG) BY MOUTH EVERY DAY (Patient taking differently: Take 20 mg by mouth nightly.) 90 tablet 0    tamsulosin (FLOMAX) 0.4 mg Cap TAKE 1 CAPSULE(0.4 MG) BY MOUTH EVERY DAY 30 capsule 11    tiZANidine (ZANAFLEX) 4 MG tablet TAKE 1 TABLET(4 MG) BY MOUTH EVERY 8 HOURS AS NEEDED 90 tablet 12    tofacitinib (XELJANZ XR) 11 mg Tb24 Take 11 mg by mouth once daily. 30 tablet 6    traMADoL (ULTRAM) 50 mg tablet TAKE 1 TABLET(50 MG) BY MOUTH EVERY 6 HOURS 90 tablet 3    [DISCONTINUED] hydrOXYzine HCL (ATARAX) 25 MG tablet TAKE 1 TABLET BY MOUTH AT BEDTIME 90 tablet 0     Current Facility-Administered Medications on File Prior to Visit   Medication Dose Route Frequency Provider Last Rate Last Admin    betamethasone acetate-betamethasone sodium phosphate injection 12 mg  12 mg Intramuscular 1 time in Clinic/HOD Fei Adkins MD             Review of Systems   Constitutional: Negative for chills and fever.   HENT: Negative for ear discharge and nosebleeds.    Eyes: Negative for discharge and redness.   Respiratory: Negative for hemoptysis, sputum production, shortness of breath, wheezing and stridor.    Cardiovascular: Negative for chest pain and palpitations.   Gastrointestinal: Positive for abdominal pain, constipation and diarrhea. Negative for blood in stool, melena and  vomiting.   Genitourinary: Negative for dysuria, flank pain and hematuria.   Musculoskeletal: Negative for joint pain and myalgias.   Skin: Positive for itching and rash.   Neurological: Negative for seizures and loss of consciousness.       Objective:   LMP  (LMP Unknown)       Physical Exam   Constitutional: She is well-developed, well-nourished, and in no distress.   HENT:   Head: Normocephalic and atraumatic.   Nose: Nose normal.   Eyes: Conjunctivae are normal. Right eye exhibits no discharge. Left eye exhibits no discharge.   Neck: Neck supple.   Cardiovascular: Normal rate, regular rhythm and intact distal pulses.   Pulmonary/Chest: Effort normal. No stridor. No respiratory distress.   Abdominal: She exhibits no distension.   Musculoskeletal:         General: No deformity or edema.   Neurological: She is alert. GCS score is 15.   Skin: No rash noted. No erythema.   Psychiatric: Memory and affect normal.       Data:   Labs (09/01/2020)  CBC notable for hemoglobin 12.2 with hematocrit 36.6.   WBC and if unremarkable.  EO count 100  CMP notable for glucose 141.  LFTs are normal  Low TSH 0.073 with normal free T4 0.94   (Anti thyroglobulin antibody and anti thyroid peroxidase antibody    were ordered 07/17/2020 but never done    Immune testing (07/23/2021)   Normal IgG, IgA  Low IgM 29  Normal IgG subclasses    Allergy testing by the Immunocap method (10/15/2020) was negative to a standard panel of aeroallergens +rabbit.  Total serum IgE less than 35.     Assessment:     1. Itching    2. Chronic idiopathic urticaria        Plan:     Medical decision making:  Patient is presenting with chronic urticaria that has now daily despite high-dose H1 and H2 blockers.  Will 1st attempt to control itching.  Based on history and physical, clues as to etiology include autoimmune disease (rheumatoid arthritis an unknown type of thyroid disease). She is also highly atopic.  She appears to be in good Xolair candidate should more  conservative measures fail..            She also has allergic rhinitis despite multidrug regimen.  For today I am recommending adding Flonase 2 squirts each nostril to her regimen.  She may be a candidate for immunotherapy.  If she ups this route, it would be interesting to see how her urticaria response to immunotherapy.           I plan to see her back in 1 week which will include physical exam to complete her initial evaluation.    Marilee was seen today for urticaria.    Diagnoses and all orders for this visit:    Rash consistent with Chronic urticaria  Resume Zyrtec and hydroxyzine  Xolair prescription sent to [facility] for authorization    Itching, uncontrolled, related to above  Resume Zyrtec and hydroxyzine  Discontinue Benadryl    Chronic rhinitis with negative Immunocaps  I do not recommend allergy skin testing at this time      Patient Instructions     Resume anti-itch medicines    Daytime:  1 Zyrtec every day no matter what         Can take up to 3 more if need    Bedtime:  Atarax = hydroxizine 1-8 tablets   Causes drowsiness              Start with 3 tablets tonight              Increase or decrease by one tablet nightly until you find the best dose for you.      We have started the process for Xolair injections.  Expect [facility] to contact you.    Continue CeraVe or other moisturizer at least twice a day          Follow up in about 6 months (around 12/8/2022).    Stella Arce MD

## 2022-06-09 ENCOUNTER — PATIENT MESSAGE (OUTPATIENT)
Dept: UROLOGY | Facility: CLINIC | Age: 52
End: 2022-06-09
Payer: COMMERCIAL

## 2022-06-20 ENCOUNTER — PATIENT MESSAGE (OUTPATIENT)
Dept: RHEUMATOLOGY | Facility: CLINIC | Age: 52
End: 2022-06-20
Payer: COMMERCIAL

## 2022-06-21 ENCOUNTER — HOSPITAL ENCOUNTER (INPATIENT)
Facility: HOSPITAL | Age: 52
LOS: 1 days | Discharge: HOME | DRG: 872 | End: 2022-06-22
Attending: EMERGENCY MEDICINE | Admitting: HOSPITALIST
Payer: COMMERCIAL

## 2022-06-21 DIAGNOSIS — L29.9 ITCHING: ICD-10-CM

## 2022-06-21 DIAGNOSIS — R50.9 FEVER: Primary | ICD-10-CM

## 2022-06-21 DIAGNOSIS — A41.9 SEPSIS, DUE TO UNSPECIFIED ORGANISM, UNSPECIFIED WHETHER ACUTE ORGAN DYSFUNCTION PRESENT: ICD-10-CM

## 2022-06-21 DIAGNOSIS — D84.9 IMMUNOCOMPROMISED STATE: ICD-10-CM

## 2022-06-21 PROBLEM — R11.0 NAUSEA: Status: RESOLVED | Noted: 2017-10-05 | Resolved: 2022-06-21

## 2022-06-21 PROBLEM — R13.10 DYSPHAGIA: Status: RESOLVED | Noted: 2021-10-25 | Resolved: 2022-06-21

## 2022-06-21 PROBLEM — M32.9 LUPUS: Status: ACTIVE | Noted: 2022-06-21

## 2022-06-21 PROBLEM — E66.811 CLASS 1 OBESITY DUE TO EXCESS CALORIES IN ADULT: Status: ACTIVE | Noted: 2022-06-21

## 2022-06-21 PROBLEM — M06.9 RHEUMATOID ARTHRITIS: Status: ACTIVE | Noted: 2022-06-21

## 2022-06-21 PROBLEM — E66.09 CLASS 1 OBESITY DUE TO EXCESS CALORIES IN ADULT: Status: ACTIVE | Noted: 2022-06-21

## 2022-06-21 LAB
ALBUMIN SERPL BCP-MCNC: 3.9 G/DL (ref 3.5–5.2)
ALP SERPL-CCNC: 93 U/L (ref 55–135)
ALT SERPL W/O P-5'-P-CCNC: 21 U/L (ref 10–44)
ANION GAP SERPL CALC-SCNC: 9 MMOL/L (ref 8–16)
AST SERPL-CCNC: 23 U/L (ref 10–40)
BASOPHILS # BLD AUTO: 0.07 K/UL (ref 0–0.2)
BASOPHILS NFR BLD: 0.5 % (ref 0–1.9)
BILIRUB SERPL-MCNC: 0.3 MG/DL (ref 0.1–1)
BILIRUB UR QL STRIP: NEGATIVE
BNP SERPL-MCNC: <10 PG/ML (ref 0–99)
BUN SERPL-MCNC: 8 MG/DL (ref 6–20)
CALCIUM SERPL-MCNC: 9.8 MG/DL (ref 8.7–10.5)
CHLORIDE SERPL-SCNC: 105 MMOL/L (ref 95–110)
CK MB SERPL-MCNC: <1 NG/ML (ref 0.1–6.5)
CK MB SERPL-RTO: NORMAL % (ref 0–5)
CK SERPL-CCNC: 80 U/L (ref 20–180)
CLARITY UR REFRACT.AUTO: CLEAR
CO2 SERPL-SCNC: 26 MMOL/L (ref 23–29)
COLOR UR AUTO: COLORLESS
CREAT SERPL-MCNC: 0.9 MG/DL (ref 0.5–1.4)
CRP SERPL-MCNC: 17.8 MG/L (ref 0–8.2)
CTP QC/QA: YES
DIFFERENTIAL METHOD: ABNORMAL
EOSINOPHIL # BLD AUTO: 0.2 K/UL (ref 0–0.5)
EOSINOPHIL NFR BLD: 1.1 % (ref 0–8)
ERYTHROCYTE [DISTWIDTH] IN BLOOD BY AUTOMATED COUNT: 11.8 % (ref 11.5–14.5)
EST. GFR  (AFRICAN AMERICAN): >60 ML/MIN/1.73 M^2
EST. GFR  (NON AFRICAN AMERICAN): >60 ML/MIN/1.73 M^2
GLUCOSE SERPL-MCNC: 105 MG/DL (ref 70–110)
GLUCOSE UR QL STRIP: NEGATIVE
HCT VFR BLD AUTO: 38.9 % (ref 37–48.5)
HGB BLD-MCNC: 12.9 G/DL (ref 12–16)
HGB UR QL STRIP: NEGATIVE
HIV 1+2 AB+HIV1 P24 AG SERPL QL IA: NEGATIVE
IMM GRANULOCYTES # BLD AUTO: 0.06 K/UL (ref 0–0.04)
IMM GRANULOCYTES NFR BLD AUTO: 0.5 % (ref 0–0.5)
INFLUENZA A, MOLECULAR: NEGATIVE
INFLUENZA B, MOLECULAR: NEGATIVE
KETONES UR QL STRIP: NEGATIVE
LACTATE SERPL-SCNC: 1.1 MMOL/L (ref 0.5–2.2)
LACTATE SERPL-SCNC: 1.8 MMOL/L (ref 0.5–2.2)
LEUKOCYTE ESTERASE UR QL STRIP: NEGATIVE
LYMPHOCYTES # BLD AUTO: 1.9 K/UL (ref 1–4.8)
LYMPHOCYTES NFR BLD: 14.5 % (ref 18–48)
MCH RBC QN AUTO: 27.4 PG (ref 27–31)
MCHC RBC AUTO-ENTMCNC: 33.2 G/DL (ref 32–36)
MCV RBC AUTO: 83 FL (ref 82–98)
MONOCYTES # BLD AUTO: 0.9 K/UL (ref 0.3–1)
MONOCYTES NFR BLD: 7 % (ref 4–15)
NEUTROPHILS # BLD AUTO: 10.1 K/UL (ref 1.8–7.7)
NEUTROPHILS NFR BLD: 76.4 % (ref 38–73)
NITRITE UR QL STRIP: NEGATIVE
NRBC BLD-RTO: 0 /100 WBC
PH UR STRIP: 7 [PH] (ref 5–8)
PLATELET # BLD AUTO: 301 K/UL (ref 150–450)
PMV BLD AUTO: 8.7 FL (ref 9.2–12.9)
POTASSIUM SERPL-SCNC: 4.1 MMOL/L (ref 3.5–5.1)
PROCALCITONIN SERPL IA-MCNC: 0.07 NG/ML
PROT SERPL-MCNC: 7.2 G/DL (ref 6–8.4)
PROT UR QL STRIP: NEGATIVE
RBC # BLD AUTO: 4.71 M/UL (ref 4–5.4)
SARS-COV-2 RDRP RESP QL NAA+PROBE: NEGATIVE
SODIUM SERPL-SCNC: 140 MMOL/L (ref 136–145)
SP GR UR STRIP: 1 (ref 1–1.03)
SPECIMEN SOURCE: NORMAL
TROPONIN I SERPL DL<=0.01 NG/ML-MCNC: 0.01 NG/ML (ref 0–0.03)
URN SPEC COLLECT METH UR: ABNORMAL
WBC # BLD AUTO: 13.27 K/UL (ref 3.9–12.7)

## 2022-06-21 PROCEDURE — 96366 THER/PROPH/DIAG IV INF ADDON: CPT

## 2022-06-21 PROCEDURE — 81003 URINALYSIS AUTO W/O SCOPE: CPT | Performed by: PHYSICIAN ASSISTANT

## 2022-06-21 PROCEDURE — U0002 COVID-19 LAB TEST NON-CDC: HCPCS | Performed by: PHYSICIAN ASSISTANT

## 2022-06-21 PROCEDURE — 83605 ASSAY OF LACTIC ACID: CPT | Performed by: PHYSICIAN ASSISTANT

## 2022-06-21 PROCEDURE — 80053 COMPREHEN METABOLIC PANEL: CPT | Performed by: PHYSICIAN ASSISTANT

## 2022-06-21 PROCEDURE — 96365 THER/PROPH/DIAG IV INF INIT: CPT

## 2022-06-21 PROCEDURE — 84484 ASSAY OF TROPONIN QUANT: CPT | Performed by: PHYSICIAN ASSISTANT

## 2022-06-21 PROCEDURE — 25000003 PHARM REV CODE 250: Performed by: HOSPITALIST

## 2022-06-21 PROCEDURE — 87502 INFLUENZA DNA AMP PROBE: CPT | Performed by: HOSPITALIST

## 2022-06-21 PROCEDURE — 63600175 PHARM REV CODE 636 W HCPCS: Performed by: PHYSICIAN ASSISTANT

## 2022-06-21 PROCEDURE — 63600175 PHARM REV CODE 636 W HCPCS: Performed by: HOSPITALIST

## 2022-06-21 PROCEDURE — 83880 ASSAY OF NATRIURETIC PEPTIDE: CPT | Performed by: PHYSICIAN ASSISTANT

## 2022-06-21 PROCEDURE — 96375 TX/PRO/DX INJ NEW DRUG ADDON: CPT

## 2022-06-21 PROCEDURE — 96361 HYDRATE IV INFUSION ADD-ON: CPT

## 2022-06-21 PROCEDURE — 86140 C-REACTIVE PROTEIN: CPT | Performed by: PHYSICIAN ASSISTANT

## 2022-06-21 PROCEDURE — 99285 EMERGENCY DEPT VISIT HI MDM: CPT | Mod: 25

## 2022-06-21 PROCEDURE — 87040 BLOOD CULTURE FOR BACTERIA: CPT | Mod: 59 | Performed by: PHYSICIAN ASSISTANT

## 2022-06-21 PROCEDURE — 99223 PR INITIAL HOSPITAL CARE,LEVL III: ICD-10-PCS | Mod: ,,, | Performed by: HOSPITALIST

## 2022-06-21 PROCEDURE — 84145 PROCALCITONIN (PCT): CPT | Performed by: HOSPITALIST

## 2022-06-21 PROCEDURE — 93010 EKG 12-LEAD: ICD-10-PCS | Mod: ,,, | Performed by: INTERNAL MEDICINE

## 2022-06-21 PROCEDURE — 82553 CREATINE MB FRACTION: CPT | Performed by: PHYSICIAN ASSISTANT

## 2022-06-21 PROCEDURE — 99285 EMERGENCY DEPT VISIT HI MDM: CPT | Mod: CS,,, | Performed by: PHYSICIAN ASSISTANT

## 2022-06-21 PROCEDURE — 93010 ELECTROCARDIOGRAM REPORT: CPT | Mod: ,,, | Performed by: INTERNAL MEDICINE

## 2022-06-21 PROCEDURE — 99285 PR EMERGENCY DEPT VISIT,LEVEL V: ICD-10-PCS | Mod: CS,,, | Performed by: PHYSICIAN ASSISTANT

## 2022-06-21 PROCEDURE — 20600001 HC STEP DOWN PRIVATE ROOM

## 2022-06-21 PROCEDURE — 87389 HIV-1 AG W/HIV-1&-2 AB AG IA: CPT | Performed by: EMERGENCY MEDICINE

## 2022-06-21 PROCEDURE — 93005 ELECTROCARDIOGRAM TRACING: CPT

## 2022-06-21 PROCEDURE — 99223 1ST HOSP IP/OBS HIGH 75: CPT | Mod: ,,, | Performed by: HOSPITALIST

## 2022-06-21 PROCEDURE — 25000003 PHARM REV CODE 250: Performed by: PHYSICIAN ASSISTANT

## 2022-06-21 PROCEDURE — 85025 COMPLETE CBC W/AUTO DIFF WBC: CPT | Performed by: PHYSICIAN ASSISTANT

## 2022-06-21 RX ORDER — HYDROXYCHLOROQUINE SULFATE 200 MG/1
200 TABLET, FILM COATED ORAL 2 TIMES DAILY
Status: DISCONTINUED | OUTPATIENT
Start: 2022-06-21 | End: 2022-06-22 | Stop reason: HOSPADM

## 2022-06-21 RX ORDER — LEVOTHYROXINE SODIUM 50 UG/1
50 TABLET ORAL
Status: DISCONTINUED | OUTPATIENT
Start: 2022-06-22 | End: 2022-06-22 | Stop reason: HOSPADM

## 2022-06-21 RX ORDER — TAMSULOSIN HYDROCHLORIDE 0.4 MG/1
0.4 CAPSULE ORAL DAILY
Status: DISCONTINUED | OUTPATIENT
Start: 2022-06-22 | End: 2022-06-22 | Stop reason: HOSPADM

## 2022-06-21 RX ORDER — OXYCODONE HYDROCHLORIDE 5 MG/1
5 TABLET ORAL EVERY 4 HOURS PRN
Status: DISCONTINUED | OUTPATIENT
Start: 2022-06-21 | End: 2022-06-22 | Stop reason: HOSPADM

## 2022-06-21 RX ORDER — TRAMADOL HYDROCHLORIDE 50 MG/1
50 TABLET ORAL EVERY 6 HOURS PRN
Status: DISCONTINUED | OUTPATIENT
Start: 2022-06-21 | End: 2022-06-22 | Stop reason: HOSPADM

## 2022-06-21 RX ORDER — ACETAMINOPHEN 500 MG
1000 TABLET ORAL
Status: COMPLETED | OUTPATIENT
Start: 2022-06-21 | End: 2022-06-21

## 2022-06-21 RX ORDER — CLONAZEPAM 0.5 MG/1
1 TABLET ORAL 3 TIMES DAILY
Status: DISCONTINUED | OUTPATIENT
Start: 2022-06-21 | End: 2022-06-22 | Stop reason: HOSPADM

## 2022-06-21 RX ORDER — POLYETHYLENE GLYCOL 3350 17 G/17G
17 POWDER, FOR SOLUTION ORAL DAILY PRN
Status: DISCONTINUED | OUTPATIENT
Start: 2022-06-21 | End: 2022-06-22 | Stop reason: HOSPADM

## 2022-06-21 RX ORDER — CYCLOBENZAPRINE HCL 5 MG
10 TABLET ORAL 3 TIMES DAILY
Status: DISCONTINUED | OUTPATIENT
Start: 2022-06-21 | End: 2022-06-22 | Stop reason: HOSPADM

## 2022-06-21 RX ORDER — ACETAMINOPHEN 500 MG
1000 TABLET ORAL EVERY 8 HOURS PRN
Status: DISCONTINUED | OUTPATIENT
Start: 2022-06-21 | End: 2022-06-21

## 2022-06-21 RX ORDER — TRAMADOL HYDROCHLORIDE 50 MG/1
50 TABLET ORAL EVERY 6 HOURS PRN
Status: DISCONTINUED | OUTPATIENT
Start: 2022-06-21 | End: 2022-06-21

## 2022-06-21 RX ORDER — FAMOTIDINE 20 MG/1
20 TABLET, FILM COATED ORAL DAILY
Status: DISCONTINUED | OUTPATIENT
Start: 2022-06-22 | End: 2022-06-22 | Stop reason: HOSPADM

## 2022-06-21 RX ORDER — GABAPENTIN 400 MG/1
800 CAPSULE ORAL 3 TIMES DAILY
Refills: 11 | Status: DISCONTINUED | OUTPATIENT
Start: 2022-06-21 | End: 2022-06-22 | Stop reason: HOSPADM

## 2022-06-21 RX ORDER — CETIRIZINE HYDROCHLORIDE 10 MG/1
10 TABLET ORAL DAILY
Status: DISCONTINUED | OUTPATIENT
Start: 2022-06-22 | End: 2022-06-22 | Stop reason: HOSPADM

## 2022-06-21 RX ORDER — DULOXETIN HYDROCHLORIDE 60 MG/1
120 CAPSULE, DELAYED RELEASE ORAL DAILY
Status: DISCONTINUED | OUTPATIENT
Start: 2022-06-22 | End: 2022-06-22 | Stop reason: HOSPADM

## 2022-06-21 RX ORDER — AMITRIPTYLINE HYDROCHLORIDE 10 MG/1
10 TABLET, FILM COATED ORAL NIGHTLY PRN
Status: DISCONTINUED | OUTPATIENT
Start: 2022-06-21 | End: 2022-06-22 | Stop reason: HOSPADM

## 2022-06-21 RX ORDER — SODIUM CHLORIDE 0.9 % (FLUSH) 0.9 %
5 SYRINGE (ML) INJECTION
Status: DISCONTINUED | OUTPATIENT
Start: 2022-06-21 | End: 2022-06-22 | Stop reason: HOSPADM

## 2022-06-21 RX ORDER — KETOROLAC TROMETHAMINE 30 MG/ML
10 INJECTION, SOLUTION INTRAMUSCULAR; INTRAVENOUS
Status: COMPLETED | OUTPATIENT
Start: 2022-06-21 | End: 2022-06-21

## 2022-06-21 RX ORDER — CEFEPIME HYDROCHLORIDE 1 G/50ML
1 INJECTION, SOLUTION INTRAVENOUS
Status: DISCONTINUED | OUTPATIENT
Start: 2022-06-21 | End: 2022-06-21

## 2022-06-21 RX ORDER — TALC
6 POWDER (GRAM) TOPICAL NIGHTLY PRN
Status: DISCONTINUED | OUTPATIENT
Start: 2022-06-21 | End: 2022-06-22 | Stop reason: HOSPADM

## 2022-06-21 RX ORDER — OXYBUTYNIN CHLORIDE 5 MG/1
5 TABLET ORAL 2 TIMES DAILY
Status: DISCONTINUED | OUTPATIENT
Start: 2022-06-21 | End: 2022-06-22 | Stop reason: HOSPADM

## 2022-06-21 RX ORDER — ACETAMINOPHEN 325 MG/1
650 TABLET ORAL EVERY 4 HOURS PRN
Status: DISCONTINUED | OUTPATIENT
Start: 2022-06-21 | End: 2022-06-22 | Stop reason: HOSPADM

## 2022-06-21 RX ORDER — CEFEPIME HYDROCHLORIDE 1 G/50ML
1 INJECTION, SOLUTION INTRAVENOUS
Status: DISCONTINUED | OUTPATIENT
Start: 2022-06-21 | End: 2022-06-22 | Stop reason: HOSPADM

## 2022-06-21 RX ORDER — HYDROXYZINE HYDROCHLORIDE 25 MG/1
25 TABLET, FILM COATED ORAL 3 TIMES DAILY PRN
Status: DISCONTINUED | OUTPATIENT
Start: 2022-06-21 | End: 2022-06-22 | Stop reason: HOSPADM

## 2022-06-21 RX ORDER — LAMOTRIGINE 25 MG/1
75 TABLET ORAL DAILY
Status: DISCONTINUED | OUTPATIENT
Start: 2022-06-22 | End: 2022-06-22 | Stop reason: HOSPADM

## 2022-06-21 RX ORDER — VANCOMYCIN HCL IN 5 % DEXTROSE 1G/250ML
1000 PLASTIC BAG, INJECTION (ML) INTRAVENOUS
Status: DISCONTINUED | OUTPATIENT
Start: 2022-06-22 | End: 2022-06-22 | Stop reason: HOSPADM

## 2022-06-21 RX ADMIN — VANCOMYCIN HYDROCHLORIDE 1500 MG: 1.5 INJECTION, POWDER, LYOPHILIZED, FOR SOLUTION INTRAVENOUS at 01:06

## 2022-06-21 RX ADMIN — TRAMADOL HYDROCHLORIDE 50 MG: 50 TABLET, COATED ORAL at 06:06

## 2022-06-21 RX ADMIN — CYCLOBENZAPRINE HYDROCHLORIDE 10 MG: 5 TABLET, FILM COATED ORAL at 10:06

## 2022-06-21 RX ADMIN — CEFEPIME HYDROCHLORIDE 1 G: 1 INJECTION, SOLUTION INTRAVENOUS at 10:06

## 2022-06-21 RX ADMIN — CLONAZEPAM 1 MG: 0.5 TABLET ORAL at 10:06

## 2022-06-21 RX ADMIN — SODIUM CHLORIDE, SODIUM LACTATE, POTASSIUM CHLORIDE, AND CALCIUM CHLORIDE 1464 ML: .6; .31; .03; .02 INJECTION, SOLUTION INTRAVENOUS at 11:06

## 2022-06-21 RX ADMIN — GABAPENTIN 800 MG: 400 CAPSULE ORAL at 10:06

## 2022-06-21 RX ADMIN — PIPERACILLIN SODIUM AND TAZOBACTAM SODIUM 4.5 G: 4; .5 INJECTION, POWDER, LYOPHILIZED, FOR SOLUTION INTRAVENOUS at 04:06

## 2022-06-21 RX ADMIN — KETOROLAC TROMETHAMINE 10 MG: 30 INJECTION, SOLUTION INTRAMUSCULAR at 12:06

## 2022-06-21 RX ADMIN — ACETAMINOPHEN 1000 MG: 500 TABLET ORAL at 12:06

## 2022-06-21 RX ADMIN — HYDROXYCHLOROQUINE SULFATE 200 MG: 200 TABLET, FILM COATED ORAL at 10:06

## 2022-06-21 RX ADMIN — OXYBUTYNIN CHLORIDE 5 MG: 5 TABLET ORAL at 10:06

## 2022-06-21 RX ADMIN — OXYCODONE 5 MG: 5 TABLET ORAL at 10:06

## 2022-06-21 NOTE — PROGRESS NOTES
Pharmacist Renal Dose Adjustment Note    Marilee Simons is a 52 y.o. female being treated with cefepime for bacteremia.    Patient Data:    Vital Signs (Most Recent):  Temp: 98.5 °F (36.9 °C) (06/21/22 1420)  Pulse: 105 (06/21/22 1416)  Resp: 18 (06/21/22 1416)  BP: 134/73 (06/21/22 1416)  SpO2: 95 % (06/21/22 1416) Vital Signs (72h Range):  Temp:  [98.5 °F (36.9 °C)-102 °F (38.9 °C)]   Pulse:  [105-130]   Resp:  [18-20]   BP: (132-139)/(73-82)   SpO2:  [95 %-98 %]      Recent Labs   Lab 06/21/22  1201   CREATININE 0.9     Serum creatinine:  0.9 mg/dL 06/21/22 1201  Estimated creatinine clearance:  65.8 mL/min    Cefepime 1 gram IVPB every 12 hours will be changed to cefepime 1 gram IVPB every 8 hours.    Pharmacist's Name:  Luis Ham  Pharmacist's Extension:  [phone]

## 2022-06-21 NOTE — ED PROVIDER NOTES
Encounter Date: 2022       History     Chief Complaint   Patient presents with    Joint Pain     Fever 103.5, hx ra and lupus     52-year-old female with past medical history of GERD, alopecia, anxiety, depression, fibromyalgia, interstitial cystitis, depression, PTSD, rheumatoid arthritis, lupus who presents to the emergency department with chief complaint of fever (T-max 103.5°), diffuse arthralgias, myalgias, mild shortness of breath that began this morning.  She suffers with chronic joint pain but reports that the muscle pain that she is experiencing is new.  She denies headache, nasal congestion, sore throat, cough, chest pain, abdominal pain, nausea, vomiting, diarrhea, dysuria.  She denies sick contacts.  She is vaccinated for COVID.  She denies any other worsening or alleviating factors.        Review of patient's allergies indicates:   Allergen Reactions    Cephalexin Itching    Cephalosporins     Zofran [ondansetron hcl (pf)] Hives    Penicillins Rash     Past Medical History:   Diagnosis Date    Acid reflux     Allergy     Alopecia     Anxiety     Arthralgia     Back pain     Depression     Dry eyes     from meds    Fever blister     Fibromyalgia     Interstitial cystitis     Irritable bowel syndrome     Kidney stone     Major depressive disorder, recurrent episode, in partial or unspecified remission 2013    OAB (overactive bladder) 2015    PTSD (post-traumatic stress disorder)     Rheumatoid arthritis     Systemic lupus erythematosus     Thyroid disease     Urinary tract infection     Vaginal infection      Past Surgical History:   Procedure Laterality Date    BLADDER SURGERY       SECTION, LOW TRANSVERSE      x 2    COLONOSCOPY      COLONOSCOPY N/A 10/5/2017    Procedure: COLONOSCOPY;  Surgeon: Venkat He MD;  Location: [address]);  Service: Endoscopy;  Laterality: N/A;    ESOPHAGOGASTRODUODENOSCOPY      ESOPHAGOGASTRODUODENOSCOPY  N/A 10/25/2021    Procedure: EGD (ESOPHAGOGASTRODUODENOSCOPY);  Surgeon: Venkat He MD;  Location: [facility] (4TH FLR);  Service: Endoscopy;  Laterality: N/A;  Covid test on 10/22 at [email or web address]    10/19 lvm to confirm appt-rb    EYE SURGERY      Lasik-bilateral    HYSTERECTOMY      IMPLANTATION OF PERMANENT SACRAL NERVE STIMULATOR N/A 4/27/2022    Procedure: INSERTION, NEUROSTIMULATOR, PERMANENT, SACRAL;  Surgeon: Bandar Garcia MD;  Location: [facility] OR 2ND FLR;  Service: Urology;  Laterality: N/A;  2hr    INJECTION OF BOTULINUM TOXIN TYPE A N/A 9/12/2018    Procedure: INJECTION, BOTULINUM TOXIN, TYPE A  200 UNITS;  Surgeon: Bandar Garcia MD;  Location: [facility] OR 1ST FLR;  Service: Urology;  Laterality: N/A;    INSTILLATION OF URINARY BLADDER N/A 9/12/2018    Procedure: INSTILLATION, URINARY BLADDER;  Surgeon: Bandar Garcia MD;  [facility];  Service: Urology;  Laterality: N/A;    PARTIAL HYSTERECTOMY      REMOVAL OF ELECTRODE LEAD OF SACRAL NERVE STIMULATOR N/A 4/27/2022    Procedure: REMOVAL, ELECTRODE LEAD, SACRAL NERVE STIMULATOR;  Surgeon: Bandar Garcia MD;  [facility];  Service: Urology;  Laterality: N/A;    TRANSFORAMINAL EPIDURAL INJECTION OF STEROID Left 2/15/2021    Procedure: LUMBAR TRANSFORAMINAL LEFT L5 AND S1 DIRECT REFERRAL;  Surgeon: Marquez Nesbitt MD;  Location: [facility];  Service: Pain Management;  Laterality: Left;  NEEDS CONSENT, PT REQUESTING IV SEDATION     Family History   Problem Relation Age of Onset    Irritable bowel syndrome Mother     Irritable bowel syndrome Sister     Lupus Sister     Rheum arthritis Sister     Fibromyalgia Sister     Irritable bowel syndrome Sister     Celiac disease Neg Hx     Cirrhosis Neg Hx     Colon cancer Neg Hx     Colon polyps Neg Hx     Crohn's disease Neg Hx     Cystic fibrosis Neg Hx     Esophageal cancer Neg Hx     Hemochromatosis Neg Hx     Inflammatory bowel disease Neg Hx     Liver cancer  Neg Hx     Liver disease Neg Hx     Rectal cancer Neg Hx     Stomach cancer Neg Hx     Ulcerative colitis Neg Hx     Boris's disease Neg Hx     Melanoma Neg Hx     Amblyopia Neg Hx     Blindness Neg Hx     Cataracts Neg Hx     Glaucoma Neg Hx     Macular degeneration Neg Hx     Retinal detachment Neg Hx     Strabismus Neg Hx      Social History     Tobacco Use    Smoking status: Never Smoker    Smokeless tobacco: Never Used   Substance Use Topics    Alcohol use: No    Drug use: No     Review of Systems   Constitutional: Positive for chills, fatigue and fever.   HENT: Negative for sore throat.    Respiratory: Negative for shortness of breath.    Cardiovascular: Negative for chest pain.   Gastrointestinal: Negative for nausea.   Genitourinary: Negative for dysuria.   Musculoskeletal: Positive for arthralgias, back pain and myalgias.   Skin: Negative for rash.   Neurological: Negative for weakness.   Hematological: Does not bruise/bleed easily.       Physical Exam     Initial Vitals [06/21/22 0955]   BP Pulse Resp Temp SpO2   139/79 (!) 130 20 (!) 102 °F (38.9 °C) 98 %      MAP       --         Physical Exam    Nursing note and vitals reviewed.  Constitutional: She appears well-developed and well-nourished. She is not diaphoretic. No distress.   HENT:   Head: Normocephalic and atraumatic.   Mouth/Throat: Oropharynx is clear and moist.   Eyes: Conjunctivae and EOM are normal. Pupils are equal, round, and reactive to light.   Neck: Neck supple.   Normal range of motion.  Cardiovascular: Regular rhythm, normal heart sounds and intact distal pulses. Tachycardia present.  Exam reveals no gallop and no friction rub.    No murmur heard.  Pulmonary/Chest: Breath sounds normal. She has no wheezes. She has no rhonchi. She has no rales.   Abdominal: Abdomen is soft. Bowel sounds are normal. There is no abdominal tenderness. There is no rebound and no guarding.   Musculoskeletal:         General: Normal range of  motion.      Cervical back: Normal range of motion and neck supple.      Comments: Full range of motion throughout upper and lower extremities.  No obvious joint swelling or edema.  Strength 5/5 bilateral upper and lower extremities.     Neurological: She is alert and oriented to person, place, and time. She has normal strength. No cranial nerve deficit or sensory deficit. GCS score is 15. GCS eye subscore is 4. GCS verbal subscore is 5. GCS motor subscore is 6.   Skin: Skin is warm and dry. Capillary refill takes less than 2 seconds.   Psychiatric: She has a normal mood and affect. Her behavior is normal. Judgment and thought content normal.         ED Course   Procedures  Labs Reviewed   CBC W/ AUTO DIFFERENTIAL - Abnormal; Notable for the following components:       Result Value    WBC 13.27 (*)     MPV 8.7 (*)     Gran # (ANC) 10.1 (*)     Immature Grans (Abs) 0.06 (*)     Gran % 76.4 (*)     Lymph % 14.5 (*)     All other components within normal limits   URINALYSIS, REFLEX TO URINE CULTURE - Abnormal; Notable for the following components:    Color, UA Colorless (*)     Specific [place], UA 1.000 (*)     All other components within normal limits    Narrative:     Specimen Source->Urine   CULTURE, BLOOD   CULTURE, BLOOD   COMPREHENSIVE METABOLIC PANEL   LACTIC ACID, PLASMA   B-TYPE NATRIURETIC PEPTIDE   TROPONIN I   HIV 1 / 2 ANTIBODY   LACTIC ACID, PLASMA   SARS-COV-2 RDRP GENE     EKG Readings: (Independently Interpreted)   Initial Reading: No STEMI. Rhythm: Sinus Tachycardia. Heart Rate: 105.     ECG Results          EKG 12-lead (Final result)  Result time 06/21/22 13:20:14    Final result by Interface, Lab In [facility] [place] (06/21/22 13:20:14)                 Narrative:    Test Reason : R50.9,    Vent. Rate : 105 BPM     Atrial Rate : 105 BPM     P-R Int : 172 ms          QRS Dur : 080 ms      QT Int : 334 ms       P-R-T Axes : 032 003 053 degrees     QTc Int : 441 ms    Sinus tachycardia  Otherwise normal  ECG  When compared with ECG of 29-APR-2022 20:46,  No significant change was found  Confirmed by Brandon CRUZ, Trey OLIVO ([id number]) on 6/21/2022 1:19:59 PM    Referred By: [email or web address]   SELF           Confirmed By:Trey Taylor MD                            Imaging Results          X-Ray Chest AP Portable (Final result)  Result time 06/21/22 12:16:56    Final result by Stanton Hyman MD (06/21/22 12:16:56)                 Impression:      No significant detrimental interval change in the appearance of the chest since 04/29/2022 is appreciated.      Electronically signed by: Stanton Hyman MD  Date:    06/21/2022  Time:    12:16             Narrative:    EXAMINATION:  XR CHEST AP PORTABLE    CLINICAL HISTORY:  Sepsis;    COMPARISON:  Comparison is made to the most recent prior chest radiograph of 04/29/2022.  Clinical information obtained from the electronic medical record indicates fever, shortness of breath and arthralgias.    FINDINGS:  Allowing for magnification of the cardiomediastinal silhouette related to projection, the true cardiac size is at the upper limit of normal to minimally enlarged, and the appearance of the cardiomediastinal silhouette and pulmonary vascularity demonstrate no detrimental change since the examination referenced above.  No findings indicating current cardiac decompensation.  Lung zones continue clear, and are free of significant superimposed airspace consolidation or volume loss.  No pleural fluid.  No pneumothorax.                              X-Rays:   Independently Interpreted Readings:   Chest X-Ray: Normal heart size.  No infiltrates.  No acute abnormalities.     Medications   piperacillin-tazobactam 4.5 g in sodium chloride 0.9% 100 mL IVPB (ready to mix system) (has no administration in time range)   lactated ringers bolus 1,464 mL (0 mL/kg × 48.8 kg (Ideal) Intravenous Stopped 6/21/22 1341)   acetaminophen tablet 1,000 mg (1,000 mg Oral Given 6/21/22 1216)   ketorolac injection 9.999 mg  (9.999 mg Intravenous Given 6/21/22 1217)   vancomycin 1.5 g in dextrose 5 % 250 mL IVPB (ready to mix) (1,500 mg Intravenous New Bag 6/21/22 1328)     Medical Decision Making:   History:   Old Medical Records: I decided to obtain old medical records.  Initial Assessment:   Emergent evaluation of a 52-year-old female who presents to the emergency department with chief complaint of fever, diffuse arthralgias and myalgias, and shortness of breath began this morning.  Patient is febrile, tachycardic, nontoxic appearing.  Will initiate septic workup with labs, imaging, urinalysis, and broad-spectrum antibiotics.  Differential Diagnosis:   Differential diagnosis includes but is not limited to COVID-19, viral syndrome, pneumonia, urinary tract infection, sepsis, bacteremia.  Independently Interpreted Test(s):   I have ordered and independently interpreted X-rays - see prior notes.  I have ordered and independently interpreted EKG Reading(s) - see prior notes  Clinical Tests:   Lab Tests: Ordered and Reviewed  Radiological Study: Ordered and Reviewed  Medical Tests: Ordered and Reviewed  ED Management:  Leukocytosis of 13.27.  No significant metabolic derangements.  Initial lactate 1.8.  BNP and troponin within normal limits.  COVID swab negative.  UA without infection.  Chest x-ray without acute abnormality.  Patient continues to feel poorly, despite ED management.  I do have concern for sepsis without clear source identified thus far in our workup today.  Will admit to Hospital Medicine for further workup and treatment.  Discussed with patient who is agreeable.  All questions answered.  The patient's history, physical exam, and plan of care was discussed with and agreed upon with my supervising physician.                ED Course as of 06/21/22 1545   Tue Jun 21, 2022   1236 WBC(!): 13.27 [JM]   1236 Hemoglobin: 12.9 [JM]   1236 Hematocrit: 38.9 [JM]   1236 Platelets: 301 [JM]   1236 SARS-CoV-2 RNA, Amplification, Qual:  Negative [JM]   1252 BUN: 8 [JM]   1252 Creatinine: 0.9 [JM]   1252 Lactate, Fredo: 1.8 [JM]   1252 BNP: <10 [JM]   1252 Troponin I: 0.010 [JM]      ED Course User Index  [JM] Catie Tirado PA-C             Clinical Impression:   Final diagnoses:  [R50.9] Fever (Primary)  [A41.9] Sepsis, due to unspecified organism, unspecified whether acute organ dysfunction present  [D84.9] Immunocompromised state          ED Disposition Condition    Admit               Catie Tirado PA-C  06/21/22 [id number]

## 2022-06-21 NOTE — ED NOTES
"Pt is AAOx4. RR is even, unlabored, and spontaneous. Skin is warm, dry and intact. Pt reports to ED via personal vehicle w/ c/o generalized muscle aches, fatigue, and subjective fever at home of "103.5." Pt states "she feels horrible and hurts all over." Pt denies taking anything OTC for relief. Denies N/V, cough. Bed low and locked; side rails up x2; call light within reach. Will continue to monitor.    Patient identifiers for Marilee Simons 52 y.o. female checked and correct.  Chief Complaint   Patient presents with    Joint Pain     Fever 103.5, hx ra and lupus     Past Medical History:   Diagnosis Date    Acid reflux     Allergy     Alopecia     Anxiety     Arthralgia     Back pain     Depression     Dry eyes     from meds    Fever blister     Fibromyalgia     Interstitial cystitis     Irritable bowel syndrome     Kidney stone     Major depressive disorder, recurrent episode, in partial or unspecified remission 6/25/2013    OAB (overactive bladder) 7/21/2015    PTSD (post-traumatic stress disorder)     Rheumatoid arthritis     Systemic lupus erythematosus     Thyroid disease     Urinary tract infection     Vaginal infection      Allergies reported:   Review of patient's allergies indicates:   Allergen Reactions    Cephalexin Itching    Cephalosporins     Zofran [ondansetron hcl (pf)] Hives    Penicillins Rash       "

## 2022-06-21 NOTE — ASSESSMENT & PLAN NOTE
· Chronic issue but with worsening myalgias and arthralgias  · Continue Cymbalta 120mg PO daily  · Continue Flexeril 10mg PO TID  · Continue Gabapentin 800mg PO TID

## 2022-06-21 NOTE — HPI
Ms. Marilee Simons is a 52 y.o. female with Lupus and RA on Plaquneil, as well as chronic pain syndrome, who presents to the ER for evaluation of fevers and myalgias.  She reports that on the day of admission, she wokeup with a temperature 103.5F with chills.  Throughout the day, she developed worsening myalgias and arthralgias that are different than her normal RA symptoms.  She denies any cough, dysuria, shortness of breath.  She is on Plaquenil, but currently being evaluated by her Rheumatologist for Xeljanz.  She denies any sick contacts. Of note, she has recently been started on Zyrtec and Atarax (1-8 25mg tablets) and has been slurring her words at night per significant other.    Upon arrival to the ER, vitals were temp 102F, , /79.  Labs showed WCB 13, CRP 17.  CXR was clear.  She was given Vanc and Zosyn and was admitted to Hospital Medicine.

## 2022-06-21 NOTE — ASSESSMENT & PLAN NOTE
· Chronic issue but with worsening myalgias and arthralgias  · Continue Cymbalta 120mg PO daily  · Continue Flexeril 10mg PO TID  · Continue Gabapentin 800mg PO TID  · ESR pending, CRP 17  · Has been working with Rheumatology to get Xeljanz  · Continue Plaquenil 200mg PO BID

## 2022-06-21 NOTE — ASSESSMENT & PLAN NOTE
· Body mass index is 32.32 kg/m².   · Morbid obesity complicates all aspects of disease management from diagnostic modalities to treatment.   · Weight loss encouraged and health benefits explained to patient.

## 2022-06-21 NOTE — PROGRESS NOTES
Pharmacokinetic Initial Assessment: IV Vancomycin    Assessment/Plan:    Initiate intravenous vancomycin with loading dose of 1500 mg once, done in ED, followed by a maintenance dose of vancomycin 1000 mg IV every 12 hours.  Desired empiric serum trough concentration is 15 to 20 mcg/mL.  Draw vancomycin trough level 60 min prior to fourth dose on 06/23/2022 at 0030.  Pharmacy will continue to follow and monitor vancomycin.      Please contact pharmacy at extension [phone] with any questions regarding this assessment.     Thank you for the consult,   Luis Ham       Patient brief summary:  Marilee Simons is a 52 y.o. female initiated on antimicrobial therapy with IV Vancomycin for treatment of suspected bacteremia.    Drug Allergies:   Review of patient's allergies indicates:   Allergen Reactions    Cephalexin Itching    Cephalosporins     Zofran [ondansetron hcl (pf)] Hives    Penicillins Rash       Actual Body Weight:   72.6 kg    Renal Function:   Estimated Creatinine Clearance: 65.8 mL/min (based on SCr of 0.9 mg/dL).     CBC (last 72 hours):  Recent Labs   Lab Result Units 06/21/22  1201   WBC K/uL 13.27*   Hemoglobin g/dL 12.9   Hematocrit % 38.9   Platelets K/uL 301   Gran % % 76.4*   Lymph % % 14.5*   Mono % % 7.0   Eosinophil % % 1.1   Basophil % % 0.5   Differential Method  Automated       Metabolic Panel (last 72 hours):  Recent Labs   Lab Result Units 06/21/22  1201 06/21/22  1341   Sodium mmol/L 140  --    Potassium mmol/L 4.1  --    Chloride mmol/L 105  --    CO2 mmol/L 26  --    Glucose mg/dL 105  --    Glucose, UA   --  Negative   BUN mg/dL 8  --    Creatinine mg/dL 0.9  --    Albumin g/dL 3.9  --    Total Bilirubin mg/dL 0.3  --    Alkaline Phosphatase U/L 93  --    AST U/L 23  --    ALT U/L 21  --        Drug levels (last 3 results):  No results for input(s): VANCOMYCINRA, VANCORANDOM, VANCOMYCINPE, VANCOPEAK, VANCOMYCINTR, VANCOTROUGH in the last 72 hours.    Microbiologic  Results:  Microbiology Results (last 7 days)     Procedure Component Value Units Date/Time    Influenza A & B by Molecular [[id number]]     Order Status: No result Specimen: Nasopharyngeal Swab     Blood culture x two cultures. Draw prior to antibiotics. [[id number]] Collected: 06/21/22 1202    Order Status: Sent Specimen: Blood from Peripheral, Antecubital, Right Updated: 06/21/22 1219    Blood culture x two cultures. Draw prior to antibiotics. [[id number]] Collected: 06/21/22 1202    Order Status: Sent Specimen: Blood from Peripheral, Antecubital, Right Updated: 06/21/22 1219

## 2022-06-21 NOTE — ED NOTES
Pt awake and alert; resting quietly on stretcher.  Pt remains on continuous cardiac and pulse ox monitoring with non-invasive blood pressure to cycle every 30 minutes.  Pt denies pain at this time; no acute distress or discomfort reported or observed. Bed locked in lowest position; side rails up and locked x 2; call light, bedside table, and personal belongings within reach. Pt denies needs or complaints at this time; will continue to monitor.

## 2022-06-21 NOTE — ASSESSMENT & PLAN NOTE
· Febrile to 102F with  and WBC 13 in an immunosuppressed RA and Lupus patient on Plaquenil  · Source unclear - no shortness of breath, cough, dysuria - only myalgias, arthrlagias   · ESR normal, CRP 17  · Blood cx NGTD  · UA negative  · Check Flu and RVP  · Continue Vanc and Zosyn for now  · Can consider Rheum/ID consult

## 2022-06-21 NOTE — Clinical Note
Diagnosis: Fever [135905]   Admitting Provider:: GUS CRESPO [[id number]]   Future Attending Provider: GUS CRESPO [[id number]]   Reason for IP Medical Treatment  (Clinical interventions that can only be accomplished in the IP setting? ) :: Sepsis   Estimated Length of Stay:: 3-4 midnights   I certify that Inpatient services for greater than or equal to 2 midnights are medically necessary:: Yes   Plans for Post-Acute care--if anticipated (pick the single best option):: A. No post acute care anticipated at this time

## 2022-06-21 NOTE — SUBJECTIVE & OBJECTIVE
Past Medical History:   Diagnosis Date    Acid reflux     Allergy     Alopecia     Anxiety     Arthralgia     Back pain     Depression     Dry eyes     from meds    Fever blister     Fibromyalgia     Interstitial cystitis     Irritable bowel syndrome     Kidney stone     Major depressive disorder, recurrent episode, in partial or unspecified remission 2013    OAB (overactive bladder) 2015    PTSD (post-traumatic stress disorder)     Rheumatoid arthritis     Rheumatoid arthritis 2022    Systemic lupus erythematosus     Thyroid disease     Urinary tract infection     Vaginal infection        Past Surgical History:   Procedure Laterality Date    BLADDER SURGERY       SECTION, LOW TRANSVERSE      x 2    COLONOSCOPY      COLONOSCOPY N/A 10/5/2017    Procedure: COLONOSCOPY;  Surgeon: Venkat He MD;  Location: [facility] (4TH FLR);  Service: Endoscopy;  Laterality: N/A;    ESOPHAGOGASTRODUODENOSCOPY      ESOPHAGOGASTRODUODENOSCOPY N/A 10/25/2021    Procedure: EGD (ESOPHAGOGASTRODUODENOSCOPY);  Surgeon: Venkat He MD;  Location: [facility] ENDO (4TH FLR);  Service: Endoscopy;  Laterality: N/A;  Covid test on 10/22 at [email or web address]    10/19 lv to confirm appt-rb    EYE SURGERY      Lasik-bilateral    HYSTERECTOMY      IMPLANTATION OF PERMANENT SACRAL NERVE STIMULATOR N/A 2022    Procedure: INSERTION, NEUROSTIMULATOR, PERMANENT, SACRAL;  Surgeon: Bandar Garcia MD;  Location: [facility] OR 2ND FLR;  Service: Urology;  Laterality: N/A;  2hr    INJECTION OF BOTULINUM TOXIN TYPE A N/A 2018    Procedure: INJECTION, BOTULINUM TOXIN, TYPE A  200 UNITS;  Surgeon: Bandar Garcia MD;  Location: [facility] OR 1ST FLR;  Service: Urology;  Laterality: N/A;    INSTILLATION OF URINARY BLADDER N/A 2018    Procedure: INSTILLATION, URINARY BLADDER;  Surgeon: Bandar Garcia MD;  Location: [facility] OR 1ST FLR;  Service: Urology;  Laterality: N/A;    PARTIAL HYSTERECTOMY      REMOVAL  OF ELECTRODE LEAD OF SACRAL NERVE STIMULATOR N/A 4/27/2022    Procedure: REMOVAL, ELECTRODE LEAD, SACRAL NERVE STIMULATOR;  Surgeon: Bandar Garcia MD;  Location: [facility] OR [address] [place] [address];  Service: Urology;  Laterality: N/A;    TRANSFORAMINAL EPIDURAL INJECTION OF STEROID Left 2/15/2021    Procedure: LUMBAR TRANSFORAMINAL LEFT L5 AND S1 DIRECT REFERRAL;  Surgeon: Marquez Nesbitt MD;  [facility];  Service: Pain Management;  Laterality: Left;  NEEDS CONSENT, PT REQUESTING IV SEDATION       Review of patient's allergies indicates:   Allergen Reactions    Cephalexin Itching    Cephalosporins     Zofran [ondansetron hcl (pf)] Hives    Penicillins Rash       Current Facility-Administered Medications on File Prior to Encounter   Medication    [DISCONTINUED] betamethasone acetate-betamethasone sodium phosphate injection 12 mg     Current Outpatient Medications on File Prior to Encounter   Medication Sig    alosetron (LOTRONEX) 0.5 MG tablet Take 1 tablet (0.5 mg total) by mouth daily as needed.    amitriptyline (ELAVIL) 10 MG tablet TAKE 1 TABLET(10 MG) BY MOUTH EVERY NIGHT AS NEEDED    benzocaine 20 % Oint Compound benzocaine 20% / lidocaine 6% / tetracaine 4% ointment. Apply to affected area 1 hour prior to procedure.    butalbitaL-acetaminophen  mg Tab TAKE 1 TABLET BY MOUTH EVERY 4 HOURS    calcium glycerophosphate (PRELIEF) 65 mg Tab Take 2 tablets by mouth before meals and at bedtime as needed.    cetirizine (ZYRTEC) 10 MG tablet Take 1 tablet (10 mg total) by mouth once daily. May take up to 3 more if needed.    clonazePAM (KLONOPIN) 1 MG tablet TAKE 1 TABLET(1 MG) BY MOUTH THREE TIMES DAILY    cyclobenzaprine (FLEXERIL) 10 MG tablet TAKE 1 TABLET(10 MG) BY MOUTH TWICE DAILY AS NEEDED    dicyclomine (BENTYL) 20 mg tablet Take 20 mg by mouth 4 (four) times daily.    DULoxetine (CYMBALTA) 60 MG capsule TAKE 2 CAPSULES(120 MG) BY MOUTH EVERY DAY    famotidine (PEPCID) 40 MG tablet Take 1  tablet (40 mg total) by mouth nightly as needed for Heartburn.    fluticasone propionate (FLONASE) 50 mcg/actuation nasal spray 2 sprays (100 mcg total) by Each Nostril route 2 (two) times daily.    gabapentin (NEURONTIN) 800 MG tablet TAKE 1 TABLET(800 MG) BY MOUTH THREE TIMES DAILY    hydrocortisone 2.5 % cream Apply to affected areas twice a day when eczema is moderate.    hydrOXYchloroQUINE (PLAQUENIL) 200 mg tablet Take 1 tablet (200 mg total) by mouth 2 (two) times daily.    hydrOXYzine HCL (ATARAX) 25 MG tablet 1 to 8 tablets at bedtime.  Start with 3 tablets.  Increase or decrease as needed until itching is controlled or a maximum of 8 tablets.    hyoscyamine (ANASPAZ,LEVSIN) 0.125 mg Tab Take 1 tablet (125 mcg total) by mouth every 8 (eight) hours as needed (abdominal cramps).    ketoconazole (NIZORAL) 2 % shampoo Wash scalp with medicated shampoo at least 2x/week. Let sit on scalp at least 5 minutes prior to rinsing    lamoTRIgine (LAMICTAL) 25 MG tablet Take 3 tablets (75 mg total) by mouth once daily.    levothyroxine (SYNTHROID) 50 MCG tablet Take 1 tablet (50 mcg) by mouth Mon-Sat and take 2 tablets (100 mcg) on Sun only    bsniya-whoca-x.blue-sal-naphos (USTELL) 120-0.12 mg Cap Take 1 capsule by mouth 3 (three) times daily.    multivitamin with minerals tablet Take 1 tablet by mouth once daily.    omalizumab (XOLAIR) 150 mg/mL injection Inject 2 mLs (300 mg total) into the skin every 28 days. for 13 doses    oxybutynin (DITROPAN) 5 MG Tab Take 1 tablet (5 mg total) by mouth 2 (two) times daily.    oxyCODONE (ROXICODONE) 5 MG immediate release tablet Take 1 tablet (5 mg total) by mouth every 6 (six) hours as needed for Pain.    promethazine (PHENERGAN) 25 MG tablet Take 1 tablet (25 mg total) by mouth every 6 (six) hours as needed for Nausea.    promethazine (PHENERGAN) 25 MG tablet TAKE 1 TABLET(25 MG) BY MOUTH EVERY 6 HOURS AS NEEDED FOR NAUSEA    RABEprazole (ACIPHEX) 20 mg tablet  TAKE 1 TABLET(20 MG) BY MOUTH EVERY DAY (Patient taking differently: Take 20 mg by mouth nightly.)    tamsulosin (FLOMAX) 0.4 mg Cap TAKE 1 CAPSULE(0.4 MG) BY MOUTH EVERY DAY    tofacitinib (XELJANZ XR) 11 mg Tb24 Take 11 mg by mouth once daily.    traMADoL (ULTRAM) 50 mg tablet TAKE 1 TABLET(50 MG) BY MOUTH EVERY 6 HOURS    [DISCONTINUED] azithromycin (Z-JAVIER) 250 MG tablet Take 2 tablets by mouth on day 1; Take 1 tablet by mouth on days 2-5    [DISCONTINUED] methylPREDNISolone (MEDROL DOSEPACK) 4 mg tablet use as directed    [DISCONTINUED] tiZANidine (ZANAFLEX) 4 MG tablet TAKE 1 TABLET(4 MG) BY MOUTH EVERY 8 HOURS AS NEEDED     Family History       Problem Relation (Age of Onset)    Fibromyalgia Sister    Irritable bowel syndrome Mother, Sister, Sister    Lupus Sister    Rheum arthritis Sister          Tobacco Use    Smoking status: Never Smoker    Smokeless tobacco: Never Used   Substance and Sexual Activity    Alcohol use: No    Drug use: No    Sexual activity: Never     Review of Systems   Constitutional:  Positive for chills and fever. Negative for fatigue.   HENT:  Negative for sore throat and trouble swallowing.    Eyes:  Negative for photophobia and visual disturbance.   Respiratory:  Negative for cough and shortness of breath.    Cardiovascular:  Negative for chest pain, palpitations and leg swelling.   Gastrointestinal:  Negative for abdominal pain, constipation, diarrhea, nausea and vomiting.   Endocrine: Negative for cold intolerance and heat intolerance.   Genitourinary:  Negative for dysuria and frequency.   Musculoskeletal:  Positive for arthralgias, joint swelling and myalgias.   Skin:  Negative for rash and wound.   Neurological:  Negative for dizziness, syncope, weakness and light-headedness.   Psychiatric/Behavioral:  Negative for confusion and hallucinations.    All other systems reviewed and are negative.  Objective:     Vital Signs (Most Recent):  Temp: 98.5 °F (36.9 °C) (06/21/22  1420)  Pulse: 84 (06/21/22 1648)  Resp: 18 (06/21/22 1648)  BP: (!) 121/59 (06/21/22 1648)  SpO2: 96 % (06/21/22 1648)   Vital Signs (24h Range):  Temp:  [98.5 °F (36.9 °C)-102 °F (38.9 °C)] 98.5 °F (36.9 °C)  Pulse:  [] 84  Resp:  [18-20] 18  SpO2:  [95 %-98 %] 96 %  BP: (121-139)/(59-82) 121/59     Weight: 72.6 kg (160 lb)  Body mass index is 32.32 kg/m².    Physical Exam  Vitals reviewed.   Constitutional:       Appearance: Normal appearance. She is well-developed. She is obese.   HENT:      Head: Normocephalic and atraumatic.   Eyes:      General: No scleral icterus.     Pupils: Pupils are equal, round, and reactive to light.   Cardiovascular:      Rate and Rhythm: Normal rate and regular rhythm.      Heart sounds: No murmur heard.  Pulmonary:      Effort: Pulmonary effort is normal. No respiratory distress.      Breath sounds: Normal breath sounds. No wheezing.   Abdominal:      General: Bowel sounds are normal. There is no distension.      Palpations: Abdomen is soft.      Tenderness: There is no abdominal tenderness.   Musculoskeletal:         General: Normal range of motion.      Cervical back: Normal range of motion and neck supple.   Skin:     General: Skin is warm and dry.   Neurological:      General: No focal deficit present.      Mental Status: She is alert and oriented to person, place, and time. Mental status is at baseline.   Psychiatric:         Behavior: Behavior normal.         CRANIAL NERVES     CN III, IV, VI   Pupils are equal, round, and reactive to light.     Significant Labs: All pertinent labs within the past 24 hours have been reviewed.  CBC:   Recent Labs   Lab 06/21/22  1201   WBC 13.27*   HGB 12.9   HCT 38.9        CMP:   Recent Labs   Lab 06/21/22  1201      K 4.1      CO2 26      BUN 8   CREATININE 0.9   CALCIUM 9.8   PROT 7.2   ALBUMIN 3.9   BILITOT 0.3   ALKPHOS 93   AST 23   ALT 21   ANIONGAP 9   EGFRNONAA >60.0       Significant Imaging: I have  reviewed all pertinent imaging results/findings within the past 24 hours.

## 2022-06-21 NOTE — ED NOTES
Pt awake and alert; resting quietly on stretcher.  Pt remains on continuous cardiac and pulse ox monitoring with non-invasive blood pressure to cycle every 30 minutes. Pt denies pain at this time; no acute distress or discomfort reported or observed. Pt denies restroom needs at this time; is able to reposition self on stretcher. Bed locked in lowest position; side rails up and locked x 2; call light, bedside table, and personal belongings within reach. Plan of care discussed. Pt denies needs or complaints at this time; will continue to monitor.

## 2022-06-21 NOTE — NURSING
Pt arrived to unit via wheelchair, no distress noted, AOX4, able to voice needs, oriented to room and call bell, bed locked in lowest position, call bell in reach, instructed to call when assistance needed.

## 2022-06-21 NOTE — ED NOTES
Pt awake and alert; resting quietly on stretcher.  Pt remains on continuous cardiac and pulse ox monitoring with non-invasive blood pressure to cycle every 30 minutes.  Pt denies pain at this time; no acute distress or discomfort reported or observed. Bed locked in lowest position; side rails up and locked x 2; call light, bedside table, and personal belongings within reach.  Plan of care discussed.  Pt instructed to alert nurse for assistance and before attempting to get out of bed; verbalizes understanding. Pt denies needs or complaints at this time; will continue to monitor.

## 2022-06-21 NOTE — H&P
Donnie Blanton - Emergency Dept  [facility] Medicine  History & Physical    Patient Name: Marilee Simons  MRN: [id number]  Patient Class: IP- Inpatient  Admission Date: 6/21/2022  Attending Physician: Ashley Rapp MD   Primary Care Provider: Primary Doctor No         Patient information was obtained from patient, spouse/SO and ER records.     Subjective:     Principal Problem:Sepsis    Chief Complaint:   Chief Complaint   Patient presents with    Joint Pain     Fever 103.5, hx ra and lupus        HPI: Ms. Marilee Simons is a 52 y.o. female with Lupus and RA on Plaquneil, as well as chronic pain syndrome, who presents to the ER for evaluation of fevers and myalgias.  She reports that on the day of admission, she wokeup with a temperature 103.5F with chills.  Throughout the day, she developed worsening myalgias and arthralgias that are different than her normal RA symptoms.  She denies any cough, dysuria, shortness of breath.  She is on Plaquenil, but currently being evaluated by her Rheumatologist for Xeljanz.  She denies any sick contacts. Of note, she has recently been started on Zyrtec and Atarax (1-8 25mg tablets) and has been slurring her words at night per significant other.    Upon arrival to the ER, vitals were temp 102F, , /79.  Labs showed WCB 13, CRP 17.  CXR was clear.  She was given Vanc and Zosyn and was admitted to Hospital Medicine.      Past Medical History:   Diagnosis Date    Acid reflux     Allergy     Alopecia     Anxiety     Arthralgia     Back pain     Depression     Dry eyes     from meds    Fever blister     Fibromyalgia     Interstitial cystitis     Irritable bowel syndrome     Kidney stone     Major depressive disorder, recurrent episode, in partial or unspecified remission 6/25/2013    OAB (overactive bladder) 7/21/2015    PTSD (post-traumatic stress disorder)     Rheumatoid arthritis     Rheumatoid arthritis 6/21/2022    Systemic lupus erythematosus     Thyroid  disease     Urinary tract infection     Vaginal infection        Past Surgical History:   Procedure Laterality Date    BLADDER SURGERY       SECTION, LOW TRANSVERSE      x 2    COLONOSCOPY      COLONOSCOPY N/A 10/5/2017    Procedure: COLONOSCOPY;  Surgeon: Venkat He MD;  Location: [facility] ENDO (4TH FLR);  Service: Endoscopy;  Laterality: N/A;    ESOPHAGOGASTRODUODENOSCOPY      ESOPHAGOGASTRODUODENOSCOPY N/A 10/25/2021    Procedure: EGD (ESOPHAGOGASTRODUODENOSCOPY);  Surgeon: Venkat He MD;  Location: [facility] ENDO (4TH FLR);  Service: Endoscopy;  Laterality: N/A;  Covid test on 10/22 at [email or web address]    10/19 lvm to confirm appt-rb    EYE SURGERY      Lasik-bilateral    HYSTERECTOMY      IMPLANTATION OF PERMANENT SACRAL NERVE STIMULATOR N/A 2022    Procedure: INSERTION, NEUROSTIMULATOR, PERMANENT, SACRAL;  Surgeon: Bandar Garcia MD;  Location: [facility] OR [address] [place] [address];  Service: Urology;  Laterality: N/A;  2hr    INJECTION OF BOTULINUM TOXIN TYPE A N/A 2018    Procedure: INJECTION, BOTULINUM TOXIN, TYPE A  200 UNITS;  Surgeon: Bandar Garcia MD;  [facility];  Service: Urology;  Laterality: N/A;    INSTILLATION OF URINARY BLADDER N/A 2018    Procedure: INSTILLATION, URINARY BLADDER;  Surgeon: Bandar Garcia MD;  [facility];  Service: Urology;  Laterality: N/A;    PARTIAL HYSTERECTOMY      REMOVAL OF ELECTRODE LEAD OF SACRAL NERVE STIMULATOR N/A 2022    Procedure: REMOVAL, ELECTRODE LEAD, SACRAL NERVE STIMULATOR;  Surgeon: Bandar Garcia MD;  [facility];  Service: Urology;  Laterality: N/A;    TRANSFORAMINAL EPIDURAL INJECTION OF STEROID Left 2/15/2021    Procedure: LUMBAR TRANSFORAMINAL LEFT L5 AND S1 DIRECT REFERRAL;  Surgeon: Marquez Nesbitt MD;  Location: [facility] PAIN MGT;  Service: Pain Management;  Laterality: Left;  NEEDS CONSENT, PT REQUESTING IV SEDATION       Review of patient's allergies indicates:   Allergen Reactions     Cephalexin Itching    Cephalosporins     Zofran [ondansetron hcl (pf)] Hives    Penicillins Rash       Current Facility-Administered Medications on File Prior to Encounter   Medication    [DISCONTINUED] betamethasone acetate-betamethasone sodium phosphate injection 12 mg     Current Outpatient Medications on File Prior to Encounter   Medication Sig    alosetron (LOTRONEX) 0.5 MG tablet Take 1 tablet (0.5 mg total) by mouth daily as needed.    amitriptyline (ELAVIL) 10 MG tablet TAKE 1 TABLET(10 MG) BY MOUTH EVERY NIGHT AS NEEDED    benzocaine 20 % Oint Compound benzocaine 20% / lidocaine 6% / tetracaine 4% ointment. Apply to affected area 1 hour prior to procedure.    butalbitaL-acetaminophen  mg Tab TAKE 1 TABLET BY MOUTH EVERY 4 HOURS    calcium glycerophosphate (PRELIEF) 65 mg Tab Take 2 tablets by mouth before meals and at bedtime as needed.    cetirizine (ZYRTEC) 10 MG tablet Take 1 tablet (10 mg total) by mouth once daily. May take up to 3 more if needed.    clonazePAM (KLONOPIN) 1 MG tablet TAKE 1 TABLET(1 MG) BY MOUTH THREE TIMES DAILY    cyclobenzaprine (FLEXERIL) 10 MG tablet TAKE 1 TABLET(10 MG) BY MOUTH TWICE DAILY AS NEEDED    dicyclomine (BENTYL) 20 mg tablet Take 20 mg by mouth 4 (four) times daily.    DULoxetine (CYMBALTA) 60 MG capsule TAKE 2 CAPSULES(120 MG) BY MOUTH EVERY DAY    famotidine (PEPCID) 40 MG tablet Take 1 tablet (40 mg total) by mouth nightly as needed for Heartburn.    fluticasone propionate (FLONASE) 50 mcg/actuation nasal spray 2 sprays (100 mcg total) by Each Nostril route 2 (two) times daily.    gabapentin (NEURONTIN) 800 MG tablet TAKE 1 TABLET(800 MG) BY MOUTH THREE TIMES DAILY    hydrocortisone 2.5 % cream Apply to affected areas twice a day when eczema is moderate.    hydrOXYchloroQUINE (PLAQUENIL) 200 mg tablet Take 1 tablet (200 mg total) by mouth 2 (two) times daily.    hydrOXYzine HCL (ATARAX) 25 MG tablet 1 to 8 tablets at bedtime.  Start with 3  tablets.  Increase or decrease as needed until itching is controlled or a maximum of 8 tablets.    hyoscyamine (ANASPAZ,LEVSIN) 0.125 mg Tab Take 1 tablet (125 mcg total) by mouth every 8 (eight) hours as needed (abdominal cramps).    ketoconazole (NIZORAL) 2 % shampoo Wash scalp with medicated shampoo at least 2x/week. Let sit on scalp at least 5 minutes prior to rinsing    lamoTRIgine (LAMICTAL) 25 MG tablet Take 3 tablets (75 mg total) by mouth once daily.    levothyroxine (SYNTHROID) 50 MCG tablet Take 1 tablet (50 mcg) by mouth Mon-Sat and take 2 tablets (100 mcg) on Sun only    xeoojx-mwwwf-r.blue-sal-naphos (USTELL) 120-0.12 mg Cap Take 1 capsule by mouth 3 (three) times daily.    multivitamin with minerals tablet Take 1 tablet by mouth once daily.    omalizumab (XOLAIR) 150 mg/mL injection Inject 2 mLs (300 mg total) into the skin every 28 days. for 13 doses    oxybutynin (DITROPAN) 5 MG Tab Take 1 tablet (5 mg total) by mouth 2 (two) times daily.    oxyCODONE (ROXICODONE) 5 MG immediate release tablet Take 1 tablet (5 mg total) by mouth every 6 (six) hours as needed for Pain.    promethazine (PHENERGAN) 25 MG tablet Take 1 tablet (25 mg total) by mouth every 6 (six) hours as needed for Nausea.    promethazine (PHENERGAN) 25 MG tablet TAKE 1 TABLET(25 MG) BY MOUTH EVERY 6 HOURS AS NEEDED FOR NAUSEA    RABEprazole (ACIPHEX) 20 mg tablet TAKE 1 TABLET(20 MG) BY MOUTH EVERY DAY (Patient taking differently: Take 20 mg by mouth nightly.)    tamsulosin (FLOMAX) 0.4 mg Cap TAKE 1 CAPSULE(0.4 MG) BY MOUTH EVERY DAY    tofacitinib (XELJANZ XR) 11 mg Tb24 Take 11 mg by mouth once daily.    traMADoL (ULTRAM) 50 mg tablet TAKE 1 TABLET(50 MG) BY MOUTH EVERY 6 HOURS    [DISCONTINUED] azithromycin (Z-JAVIER) 250 MG tablet Take 2 tablets by mouth on day 1; Take 1 tablet by mouth on days 2-5    [DISCONTINUED] methylPREDNISolone (MEDROL DOSEPACK) 4 mg tablet use as directed    [DISCONTINUED] tiZANidine  (ZANAFLEX) 4 MG tablet TAKE 1 TABLET(4 MG) BY MOUTH EVERY 8 HOURS AS NEEDED     Family History       Problem Relation (Age of Onset)    Fibromyalgia Sister    Irritable bowel syndrome Mother, Sister, Sister    Lupus Sister    Rheum arthritis Sister          Tobacco Use    Smoking status: Never Smoker    Smokeless tobacco: Never Used   Substance and Sexual Activity    Alcohol use: No    Drug use: No    Sexual activity: Never     Review of Systems   Constitutional:  Positive for chills and fever. Negative for fatigue.   HENT:  Negative for sore throat and trouble swallowing.    Eyes:  Negative for photophobia and visual disturbance.   Respiratory:  Negative for cough and shortness of breath.    Cardiovascular:  Negative for chest pain, palpitations and leg swelling.   Gastrointestinal:  Negative for abdominal pain, constipation, diarrhea, nausea and vomiting.   Endocrine: Negative for cold intolerance and heat intolerance.   Genitourinary:  Negative for dysuria and frequency.   Musculoskeletal:  Positive for arthralgias, joint swelling and myalgias.   Skin:  Negative for rash and wound.   Neurological:  Negative for dizziness, syncope, weakness and light-headedness.   Psychiatric/Behavioral:  Negative for confusion and hallucinations.    All other systems reviewed and are negative.  Objective:     Vital Signs (Most Recent):  Temp: 98.5 °F (36.9 °C) (06/21/22 1420)  Pulse: 84 (06/21/22 1648)  Resp: 18 (06/21/22 1648)  BP: (!) 121/59 (06/21/22 1648)  SpO2: 96 % (06/21/22 1648)   Vital Signs (24h Range):  Temp:  [98.5 °F (36.9 °C)-102 °F (38.9 °C)] 98.5 °F (36.9 °C)  Pulse:  [] 84  Resp:  [18-20] 18  SpO2:  [95 %-98 %] 96 %  BP: (121-139)/(59-82) 121/59     Weight: 72.6 kg (160 lb)  Body mass index is 32.32 kg/m².    Physical Exam  Vitals reviewed.   Constitutional:       Appearance: Normal appearance. She is well-developed. She is obese.   HENT:      Head: Normocephalic and atraumatic.   Eyes:      General:  No scleral icterus.     Pupils: Pupils are equal, round, and reactive to light.   Cardiovascular:      Rate and Rhythm: Normal rate and regular rhythm.      Heart sounds: No murmur heard.  Pulmonary:      Effort: Pulmonary effort is normal. No respiratory distress.      Breath sounds: Normal breath sounds. No wheezing.   Abdominal:      General: Bowel sounds are normal. There is no distension.      Palpations: Abdomen is soft.      Tenderness: There is no abdominal tenderness.   Musculoskeletal:         General: Normal range of motion.      Cervical back: Normal range of motion and neck supple.   Skin:     General: Skin is warm and dry.   Neurological:      General: No focal deficit present.      Mental Status: She is alert and oriented to person, place, and time. Mental status is at baseline.   Psychiatric:         Behavior: Behavior normal.         CRANIAL NERVES     CN III, IV, VI   Pupils are equal, round, and reactive to light.     Significant Labs: All pertinent labs within the past 24 hours have been reviewed.  CBC:   Recent Labs   Lab 06/21/22  1201   WBC 13.27*   HGB 12.9   HCT 38.9        CMP:   Recent Labs   Lab 06/21/22  1201      K 4.1      CO2 26      BUN 8   CREATININE 0.9   CALCIUM 9.8   PROT 7.2   ALBUMIN 3.9   BILITOT 0.3   ALKPHOS 93   AST 23   ALT 21   ANIONGAP 9   EGFRNONAA >60.0       Significant Imaging: I have reviewed all pertinent imaging results/findings within the past 24 hours.    Assessment/Plan:     * Sepsis  · Febrile to 102F with  and WBC 13 in an immunosuppressed RA and Lupus patient on Plaquenil  · Source unclear - no shortness of breath, cough, dysuria - only myalgias, arthrlagias   · ESR normal, CRP 17  · Blood cx NGTD  · UA negative  · Check Flu and RVP  · Continue Vanc and Zosyn for now  · Can consider Rheum/ID consult    Lupus  · Chronic issue  · Follows with Rheumatology  · Continue Plaquenil 200mg PO BID      Class 1 obesity due to excess  calories in adult  · Body mass index is 32.32 kg/m².   · Morbid obesity complicates all aspects of disease management from diagnostic modalities to treatment.   · Weight loss encouraged and health benefits explained to patient.         Rheumatoid arthritis  · Chronic issue but with worsening myalgias and arthralgias  · Continue Cymbalta 120mg PO daily  · Continue Flexeril 10mg PO TID  · Continue Gabapentin 800mg PO TID  · ESR pending, CRP 17  · Has been working with Rheumatology to get Xeljanz  · Continue Plaquenil 200mg PO BID      Hypothyroidism due to Hashimoto's thyroiditis  · Chronic and stable  · Continue Synthroid 50mcg PO daily, 100mcg Sundays      Chronic pain  · Chronic issue but with worsening myalgias and arthralgias  · Continue Cymbalta 120mg PO daily  · Continue Flexeril 10mg PO TID  · Continue Gabapentin 800mg PO TID      Fibromyalgia  · Chronic issue but with worsening myalgias and arthralgias  · Continue Cymbalta 120mg PO daily  · Continue Flexeril 10mg PO TID  · Continue Gabapentin 800mg PO TID      Cluster B personality disorder  · Chronic and stable  · Continue Lamotrigine 75mg PO daily      Generalized anxiety disorder  · Chronic and stable  · Continue Klonopin 1mg PO TID  · Atarax prn      Major depressive disorder, recurrent episode, moderate  · Chronic and stable  · Continue Cymbalta 120mg PO daily  · Continue Lamotrigine 75mg PO daily      GERD (gastroesophageal reflux disease)  · Chronic and stable  · Continue PPI        VTE Risk Mitigation (From admission, onward)         Ordered     IP VTE LOW RISK PATIENT  Once         06/21/22 2642                   Ashley Rapp MD  Department of Hospital Medicine   [facility] - Emergency Dept

## 2022-06-22 ENCOUNTER — PATIENT MESSAGE (OUTPATIENT)
Dept: RHEUMATOLOGY | Facility: CLINIC | Age: 52
End: 2022-06-22
Payer: COMMERCIAL

## 2022-06-22 ENCOUNTER — SPECIALTY PHARMACY (OUTPATIENT)
Dept: PHARMACY | Facility: CLINIC | Age: 52
End: 2022-06-22
Payer: COMMERCIAL

## 2022-06-22 VITALS
BODY MASS INDEX: 32.25 KG/M2 | OXYGEN SATURATION: 98 % | SYSTOLIC BLOOD PRESSURE: 111 MMHG | RESPIRATION RATE: 16 BRPM | DIASTOLIC BLOOD PRESSURE: 54 MMHG | HEIGHT: 59 IN | TEMPERATURE: 98 F | HEART RATE: 82 BPM | WEIGHT: 160 LBS

## 2022-06-22 DIAGNOSIS — M06.00 SERONEGATIVE RHEUMATOID ARTHRITIS: Primary | ICD-10-CM

## 2022-06-22 PROCEDURE — 25000003 PHARM REV CODE 250: Performed by: HOSPITALIST

## 2022-06-22 PROCEDURE — 99239 PR HOSPITAL DISCHARGE DAY,>30 MIN: ICD-10-PCS | Mod: ,,, | Performed by: HOSPITALIST

## 2022-06-22 PROCEDURE — 63600175 PHARM REV CODE 636 W HCPCS: Performed by: HOSPITALIST

## 2022-06-22 PROCEDURE — 99239 HOSP IP/OBS DSCHRG MGMT >30: CPT | Mod: ,,, | Performed by: HOSPITALIST

## 2022-06-22 RX ORDER — LEVOFLOXACIN 500 MG/1
500 TABLET, FILM COATED ORAL DAILY
Qty: 4 TABLET | Refills: 0 | Status: SHIPPED | OUTPATIENT
Start: 2022-06-22 | End: 2022-06-26

## 2022-06-22 RX ORDER — HYDROXYZINE HYDROCHLORIDE 25 MG/1
TABLET, FILM COATED ORAL
Qty: 240 TABLET | Refills: 3 | Status: SHIPPED | OUTPATIENT
Start: 2022-06-22 | End: 2023-08-17 | Stop reason: ALTCHOICE

## 2022-06-22 RX ORDER — CETIRIZINE HYDROCHLORIDE 10 MG/1
10 TABLET ORAL DAILY
Qty: 120 TABLET | Refills: 2 | Status: SHIPPED | OUTPATIENT
Start: 2022-06-22 | End: 2024-04-03

## 2022-06-22 RX ADMIN — HYDROXYCHLOROQUINE SULFATE 200 MG: 200 TABLET, FILM COATED ORAL at 08:06

## 2022-06-22 RX ADMIN — TAMSULOSIN HYDROCHLORIDE 0.4 MG: 0.4 CAPSULE ORAL at 08:06

## 2022-06-22 RX ADMIN — LEVOTHYROXINE SODIUM 50 MCG: 50 TABLET ORAL at 06:06

## 2022-06-22 RX ADMIN — THERA TABS 1 TABLET: TAB at 08:06

## 2022-06-22 RX ADMIN — CYCLOBENZAPRINE HYDROCHLORIDE 10 MG: 5 TABLET, FILM COATED ORAL at 08:06

## 2022-06-22 RX ADMIN — CLONAZEPAM 1 MG: 0.5 TABLET ORAL at 08:06

## 2022-06-22 RX ADMIN — OXYBUTYNIN CHLORIDE 5 MG: 5 TABLET ORAL at 08:06

## 2022-06-22 RX ADMIN — CETIRIZINE HYDROCHLORIDE 10 MG: 10 TABLET, FILM COATED ORAL at 08:06

## 2022-06-22 RX ADMIN — DULOXETINE 120 MG: 60 CAPSULE, DELAYED RELEASE ORAL at 08:06

## 2022-06-22 RX ADMIN — VANCOMYCIN HYDROCHLORIDE 1000 MG: 1 INJECTION, POWDER, LYOPHILIZED, FOR SOLUTION INTRAVENOUS at 01:06

## 2022-06-22 RX ADMIN — LAMOTRIGINE 75 MG: 25 TABLET ORAL at 08:06

## 2022-06-22 RX ADMIN — GABAPENTIN 800 MG: 400 CAPSULE ORAL at 08:06

## 2022-06-22 RX ADMIN — FAMOTIDINE 20 MG: 20 TABLET, FILM COATED ORAL at 08:06

## 2022-06-22 NOTE — SUBJECTIVE & OBJECTIVE
Interval History: No acute events overnight.  No further fevers.  Feels well.  Feels comfortable going home.  Discussed likely a short lived viral syndrome.  Given Levaquin given vitals on admit.    Review of Systems   Constitutional:  Negative for chills, fatigue and fever.   Respiratory:  Negative for cough and shortness of breath.    Cardiovascular:  Negative for chest pain, palpitations and leg swelling.   Gastrointestinal:  Negative for abdominal pain, diarrhea, nausea and vomiting.   Genitourinary:  Negative for dysuria and urgency.   Musculoskeletal:  Negative for arthralgias and myalgias.   Neurological:  Negative for dizziness and headaches.   All other systems reviewed and are negative.  Objective:     Vital Signs (Most Recent):  Temp: 97.7 °F (36.5 °C) (06/22/22 0757)  Pulse: 82 (06/22/22 0836)  Resp: 16 (06/22/22 0757)  BP: (!) 111/54 (06/22/22 0757)  SpO2: 98 % (06/22/22 0757)   Vital Signs (24h Range):  Temp:  [97.6 °F (36.4 °C)-98.5 °F (36.9 °C)] 97.7 °F (36.5 °C)  Pulse:  [] 82  Resp:  [16-18] 16  SpO2:  [94 %-98 %] 98 %  BP: (111-138)/(54-73) 111/54     Weight: 72.6 kg (160 lb)  Body mass index is 32.32 kg/m².  No intake or output data in the 24 hours ending 06/22/22 1243   Physical Exam  Constitutional:       Appearance: Normal appearance. She is well-developed.   HENT:      Head: Normocephalic and atraumatic.   Cardiovascular:      Rate and Rhythm: Normal rate and regular rhythm.      Heart sounds: No murmur heard.  Pulmonary:      Effort: Pulmonary effort is normal. No respiratory distress.      Breath sounds: Normal breath sounds. No wheezing or rales.   Abdominal:      General: There is no distension.      Palpations: Abdomen is soft.      Tenderness: There is no abdominal tenderness.   Musculoskeletal:         General: No deformity.   Skin:     General: Skin is warm.   Neurological:      General: No focal deficit present.      Mental Status: Mental status is at baseline.        Significant Labs: All pertinent labs within the past 24 hours have been reviewed.    Significant Imaging: I have reviewed all pertinent imaging results/findings within the past 24 hours.

## 2022-06-22 NOTE — PLAN OF CARE
Problem: Adult Inpatient Plan of Care  Goal: Plan of Care Review  Outcome: Ongoing, Progressing  Goal: Patient-Specific Goal (Individualized)  Outcome: Ongoing, Progressing  Goal: Absence of Hospital-Acquired Illness or Injury  Outcome: Ongoing, Progressing  Goal: Optimal Comfort and Wellbeing  Outcome: Ongoing, Progressing  Goal: Readiness for Transition of Care  Outcome: Ongoing, Progressing     Problem: Adjustment to Illness (Sepsis/Septic Shock)  Goal: Optimal Coping  Outcome: Ongoing, Progressing

## 2022-06-22 NOTE — DISCHARGE SUMMARY
[facility] - Telemetry [facility] Medicine  Discharge Summary      Patient Name: Marilee Simons  MRN: [id number]  Patient Class: IP- Inpatient  Admission Date: 6/21/2022  Hospital Length of Stay: 1 days  Discharge Date and Time:  06/22/2022 12:46 PM  Attending Physician: No att. providers found   Discharging Provider: Ashley Rapp MD  Primary Care Provider: Vannessa Marie MD  Hospital Medicine Team: [facility] Ashley Rapp MD    HPI:   Ms. Marilee Simons is a 52 y.o. female with Lupus and RA on Plaquneil, as well as chronic pain syndrome, who presents to the ER for evaluation of fevers and myalgias.  She reports that on the day of admission, she wokeup with a temperature 103.5F with chills.  Throughout the day, she developed worsening myalgias and arthralgias that are different than her normal RA symptoms.  She denies any cough, dysuria, shortness of breath.  She is on Plaquenil, but currently being evaluated by her Rheumatologist for Xeljanz.  She denies any sick contacts. Of note, she has recently been started on Zyrtec and Atarax (1-8 25mg tablets) and has been slurring her words at night per significant other.    Upon arrival to the ER, vitals were temp 102F, , /79.  Labs showed WCB 13, CRP 17.  CXR was clear.  She was given Vanc and Zosyn and was admitted to Hospital Medicine.      * No surgery found *      Hospital Course:   Ms. Simons was admitted to Hospital Medicine for management of sepsis.  She was started on Vanc and Cefepime and her fevers and tachycardia resolved.  Her cultures remained negative.  RVP and Flu were negative as well.  As her myalgias also improved, she was discharged home.  She was discharged home with 5 days of Levaquin given her vitals on arrival, but was informed that she likely had a viral syndrome.       Goals of Care Treatment Preferences:  Code Status: Full Code      Consults:   Consults (From admission, onward)        Status Ordering Provider      "Hospital Medicine PharmD Consult  Once        Provider:  (Not yet assigned)    Acknowledged GUS CRESPO     Pharmacy to dose Vancomycin consult  Once        Provider:  (Not yet assigned)   "And" Linked Group Details    Acknowledged GUS CRESPO.          No new Assessment & Plan notes have been filed under this hospital service since the last note was generated.  Service: Hospital Medicine    Final Active Diagnoses:    Diagnosis Date Noted POA    PRINCIPAL PROBLEM:  Sepsis [A41.9] 06/21/2022 Yes    Rheumatoid arthritis [M06.9] 06/21/2022 Yes    Class 1 obesity due to excess calories in adult [E66.09] 06/21/2022 Yes    Lupus [M32.9] 06/21/2022 Yes    Hypothyroidism due to Hashimoto's thyroiditis [E03.8, E06.3] 10/21/2021 Yes    Chronic pain [G89.29] 02/15/2021 Yes    Fibromyalgia [M79.7] 08/11/2016 Yes    Cluster B personality disorder [F60.89] 06/12/2016 Yes    Generalized anxiety disorder [F41.1] 06/28/2015 Yes    Major depressive disorder, recurrent episode, moderate [F33.1] 02/25/2015 Yes    GERD (gastroesophageal reflux disease) [K21.9] 01/21/2015 Yes      Problems Resolved During this Admission:       Discharged Condition: good    Disposition: Home or Self Care    Medications:  Reconciled Home Medications:      Medication List      START taking these medications    levoFLOXacin 500 MG tablet  Commonly known as: LEVAQUIN  Take 1 tablet (500 mg total) by mouth once daily. for 4 days        CHANGE how you take these medications    alosetron 0.5 MG tablet  Commonly known as: LOTRONEX  Take 1 tablet (0.5 mg total) by mouth daily as needed.  What changed:   · how much to take  · how to take this  · when to take this  · additional instructions     amitriptyline 10 MG tablet  Commonly known as: ELAVIL  TAKE 1 TABLET(10 MG) BY MOUTH EVERY NIGHT AS NEEDED  What changed: when to take this     butalbitaL-acetaminophen  mg Tab  TAKE 1 TABLET BY MOUTH EVERY 4 HOURS  What changed:   · when to take " this  · reasons to take this     cetirizine 10 MG tablet  Commonly known as: ZYRTEC  Take 1 tablet (10 mg total) by mouth once daily.  What changed: additional instructions     clonazePAM 1 MG tablet  Commonly known as: KlonoPIN  TAKE 1 TABLET(1 MG) BY MOUTH THREE TIMES DAILY  What changed: See the new instructions.     cyclobenzaprine 10 MG tablet  Commonly known as: FLEXERIL  TAKE 1 TABLET(10 MG) BY MOUTH TWICE DAILY AS NEEDED  What changed:   · how much to take  · how to take this  · when to take this  · additional instructions     famotidine 40 MG tablet  Commonly known as: PEPCID  Take 1 tablet (40 mg total) by mouth nightly as needed for Heartburn.  What changed: when to take this     fluticasone propionate 50 mcg/actuation nasal spray  Commonly known as: FLONASE  2 sprays (100 mcg total) by Each Nostril route 2 (two) times daily.  What changed:   · when to take this  · reasons to take this     hydrocortisone 2.5 % cream  Apply to affected areas twice a day when eczema is moderate.  What changed: additional instructions     hydrOXYzine HCL 25 MG tablet  Commonly known as: ATARAX  1-4 tablets as needed for itching  What changed: additional instructions     PRELIEF 65 mg Tab  Generic drug: calcium glycerophosphate  Take 2 tablets by mouth before meals and at bedtime as needed.  What changed: when to take this     promethazine 25 MG tablet  Commonly known as: PHENERGAN  Take 1 tablet (25 mg total) by mouth every 6 (six) hours as needed for Nausea.  What changed: Another medication with the same name was removed. Continue taking this medication, and follow the directions you see here.     UstelL 120-0.12 mg Cap  Generic drug: nppvpe-oqwjd-h.blue-sal-naphos  Take 1 capsule by mouth 3 (three) times daily.  What changed: when to take this        CONTINUE taking these medications    dicyclomine 20 mg tablet  Commonly known as: BENTYL  Take 20 mg by mouth daily as needed.     DULoxetine 60 MG capsule  Commonly known  as: CYMBALTA  TAKE 2 CAPSULES(120 MG) BY MOUTH EVERY DAY     gabapentin 800 MG tablet  Commonly known as: NEURONTIN  TAKE 1 TABLET(800 MG) BY MOUTH THREE TIMES DAILY     hydrOXYchloroQUINE 200 mg tablet  Commonly known as: PLAQUENIL  Take 1 tablet (200 mg total) by mouth 2 (two) times daily.     lamoTRIgine 25 MG tablet  Commonly known as: LAMICTAL  Take 3 tablets (75 mg total) by mouth once daily.     levothyroxine 50 MCG tablet  Commonly known as: SYNTHROID  Take 1 tablet (50 mcg) by mouth Mon-Sat and take 2 tablets (100 mcg) on Sun only     oxybutynin 5 MG Tab  Commonly known as: DITROPAN  Take 1 tablet (5 mg total) by mouth 2 (two) times daily.     oxyCODONE 5 MG immediate release tablet  Commonly known as: ROXICODONE  Take 1 tablet (5 mg total) by mouth every 6 (six) hours as needed for Pain.     traMADoL 50 mg tablet  Commonly known as: ULTRAM  TAKE 1 TABLET(50 MG) BY MOUTH EVERY 6 HOURS     XELJANZ XR 11 mg Tb24  Generic drug: tofacitinib  Take 11 mg by mouth once daily.     XOLAIR 150 mg/mL injection  Generic drug: omalizumab  Inject 2 mLs (300 mg total) into the skin every 28 days. for 13 doses        STOP taking these medications    azithromycin 250 MG tablet  Commonly known as: Z-JAVIER     benzocaine 20 % Oint     hyoscyamine 0.125 mg Tab  Commonly known as: ANASPAZ,LEVSIN     ketoconazole 2 % shampoo  Commonly known as: NIZORAL     methylPREDNISolone 4 mg tablet  Commonly known as: MEDROL DOSEPACK     RABEprazole 20 mg tablet  Commonly known as: ACIPHEX     tiZANidine 4 MG tablet  Commonly known as: ZANAFLEX            Indwelling Lines/Drains at time of discharge:   Lines/Drains/Airways     None                 Time spent on the discharge of patient: 35 minutes         Ashley Rapp MD  Department of Hospital Medicine  [facility] - Telemetry Stepdown

## 2022-06-22 NOTE — PROGRESS NOTES
[facility] - Telemetry [facility] Medicine  Progress Note    Patient Name: Marilee Simons  MRN: [id number]  Patient Class: IP- Inpatient   Admission Date: 6/21/2022  Length of Stay: 1 days  Attending Physician: No att. providers found  Primary Care Provider: Vannessa Marie MD        Subjective:     Principal Problem:Sepsis        HPI:  Ms. Marilee Simons is a 52 y.o. female with Lupus and RA on Plaquneil, as well as chronic pain syndrome, who presents to the ER for evaluation of fevers and myalgias.  She reports that on the day of admission, she wokeup with a temperature 103.5F with chills.  Throughout the day, she developed worsening myalgias and arthralgias that are different than her normal RA symptoms.  She denies any cough, dysuria, shortness of breath.  She is on Plaquenil, but currently being evaluated by her Rheumatologist for Xeljanz.  She denies any sick contacts. Of note, she has recently been started on Zyrtec and Atarax (1-8 25mg tablets) and has been slurring her words at night per significant other.    Upon arrival to the ER, vitals were temp 102F, , /79.  Labs showed WCB 13, CRP 17.  CXR was clear.  She was given Vanc and Zosyn and was admitted to Hospital Medicine.      Overview/Hospital Course:  Ms. Simons was admitted to Hospital Medicine for management of sepsis.  She was started on Vanc and Cefepime and her fevers and tachycardia resolved.  Her cultures remained negative.  RVP and Flu were negative as well.  As her myalgias also improved, she was discharged home.  She was discharged home with 5 days of Levaquin given her vitals on arrival, but was informed that she likely had a viral syndrome.      Interval History: No acute events overnight.  No further fevers.  Feels well.  Feels comfortable going home.  Discussed likely a short lived viral syndrome.  Given Levaquin given vitals on admit.    Review of Systems   Constitutional:  Negative for chills, fatigue and fever.    Respiratory:  Negative for cough and shortness of breath.    Cardiovascular:  Negative for chest pain, palpitations and leg swelling.   Gastrointestinal:  Negative for abdominal pain, diarrhea, nausea and vomiting.   Genitourinary:  Negative for dysuria and urgency.   Musculoskeletal:  Negative for arthralgias and myalgias.   Neurological:  Negative for dizziness and headaches.   All other systems reviewed and are negative.  Objective:     Vital Signs (Most Recent):  Temp: 97.7 °F (36.5 °C) (06/22/22 0757)  Pulse: 82 (06/22/22 0836)  Resp: 16 (06/22/22 0757)  BP: (!) 111/54 (06/22/22 0757)  SpO2: 98 % (06/22/22 0757)   Vital Signs (24h Range):  Temp:  [97.6 °F (36.4 °C)-98.5 °F (36.9 °C)] 97.7 °F (36.5 °C)  Pulse:  [] 82  Resp:  [16-18] 16  SpO2:  [94 %-98 %] 98 %  BP: (111-138)/(54-73) 111/54     Weight: 72.6 kg (160 lb)  Body mass index is 32.32 kg/m².  No intake or output data in the 24 hours ending 06/22/22 1243   Physical Exam  Constitutional:       Appearance: Normal appearance. She is well-developed.   HENT:      Head: Normocephalic and atraumatic.   Cardiovascular:      Rate and Rhythm: Normal rate and regular rhythm.      Heart sounds: No murmur heard.  Pulmonary:      Effort: Pulmonary effort is normal. No respiratory distress.      Breath sounds: Normal breath sounds. No wheezing or rales.   Abdominal:      General: There is no distension.      Palpations: Abdomen is soft.      Tenderness: There is no abdominal tenderness.   Musculoskeletal:         General: No deformity.   Skin:     General: Skin is warm.   Neurological:      General: No focal deficit present.      Mental Status: Mental status is at baseline.       Significant Labs: All pertinent labs within the past 24 hours have been reviewed.    Significant Imaging: I have reviewed all pertinent imaging results/findings within the past 24 hours.      Assessment/Plan:      * Sepsis  · Febrile to 102F with  and WBC 13 in an  immunosuppressed RA and Lupus patient on Plaquenil  · Source unclear - no shortness of breath, cough, dysuria - only myalgias, arthrlagias   · ESR normal, CRP 17  · Blood cx NGTD  · UA negative  · Flu and RVP negative  · DC on Levaquin    Lupus  · Chronic issue  · Follows with Rheumatology  · Continue Plaquenil 200mg PO BID      Class 1 obesity due to excess calories in adult  · Body mass index is 32.32 kg/m².   · Morbid obesity complicates all aspects of disease management from diagnostic modalities to treatment.   · Weight loss encouraged and health benefits explained to patient.         Rheumatoid arthritis  · Chronic issue but with worsening myalgias and arthralgias  · Continue Cymbalta 120mg PO daily  · Continue Flexeril 10mg PO TID  · Continue Gabapentin 800mg PO TID  · ESR pending, CRP 17  · Has been working with Rheumatology to get Xeljanz  · Continue Plaquenil 200mg PO BID      Hypothyroidism due to Hashimoto's thyroiditis  · Chronic and stable  · Continue Synthroid 50mcg PO daily, 100mcg Sundays      Chronic pain  · Chronic issue but with worsening myalgias and arthralgias  · Continue Cymbalta 120mg PO daily  · Continue Flexeril 10mg PO TID  · Continue Gabapentin 800mg PO TID      Fibromyalgia  · Chronic issue but with worsening myalgias and arthralgias  · Continue Cymbalta 120mg PO daily  · Continue Flexeril 10mg PO TID  · Continue Gabapentin 800mg PO TID      Cluster B personality disorder  · Chronic and stable  · Continue Lamotrigine 75mg PO daily      Generalized anxiety disorder  · Chronic and stable  · Continue Klonopin 1mg PO TID  · Atarax prn      Major depressive disorder, recurrent episode, moderate  · Chronic and stable  · Continue Cymbalta 120mg PO daily  · Continue Lamotrigine 75mg PO daily      GERD (gastroesophageal reflux disease)  · Chronic and stable  · Continue PPI        VTE Risk Mitigation (From admission, onward)         Ordered     IP VTE LOW RISK PATIENT  Once         06/21/22 5378                 Discharge Planning   JEFFY: 6/22/2022     Code Status: Full Code   Is the patient medically ready for discharge?:     Reason for patient still in hospital (select all that apply): Pending disposition  Discharge Plan A: Home with family   Discharge Delays: None known at this time              Ashley Rapp MD  Department of Hospital Medicine   [facility] - Telemetry Stepdown

## 2022-06-22 NOTE — PLAN OF CARE
[facility] - Telemetry Stepdown  Discharge Final Note    Primary Care Provider: Vannessa Marie MD    Expected Discharge Date: 6/22/2022      Future Appointments   Date Time Provider Department Center   6/29/2022 10:00 AM Rosalba Mendoza PA-C [facility] IM [address] [facility]   7/13/2022  3:00 PM KN MAMMO2 [facility] MAMMO [place] [facility]   7/19/2022  3:00 PM Ben Samaniego MD [facility] RHEUM [place]   7/20/2022  4:00 PM Rudi Soto Jr., MD [facility] PSYCH [address]   8/18/2022  2:20 PM Nanci Smith OD [facility] OPTOMTY [place]   10/6/2022  3:00 PM Fan Saini DO [facility] NEURO [place] [facility]         Final Discharge Note (most recent)     Final Note - 06/22/22 1145        Final Note    Assessment Type Final Discharge Note     Anticipated Discharge Disposition Home or Self Care     What phone number can be called within the next 1-3 days to see how you are doing after discharge? [phone]     Hospital Resources/Appts/Education Provided Appointments scheduled and added to AVS        Post-Acute Status    Post-Acute Authorization Other     Other Status No Post-Acute Service Needs     Discharge Delays None known at this time                 Important Message from Medicare      Sophie Burleson RN  Ext [phone]

## 2022-06-22 NOTE — HOSPITAL COURSE
Ms. Simons was admitted to Hospital Medicine for management of sepsis.  She was started on Vanc and Cefepime and her fevers and tachycardia resolved.  Her cultures remained negative.  RVP and Flu were negative as well.  As her myalgias also improved, she was discharged home.  She was discharged home with 5 days of Levaquin given her vitals on arrival, but was informed that she likely had a viral syndrome.

## 2022-06-22 NOTE — PLAN OF CARE
[facility] - Telemetry Stepdown  Initial Discharge Assessment       Primary Care Provider: Vannessa Marie MD    Admission Diagnosis: Fever [R50.9]  Immunocompromised state [D84.9]  Sepsis, due to unspecified organism, unspecified whether acute organ dysfunction present [A41.9]    Admission Date: 6/21/2022  Expected Discharge Date: 6/22/2022    Discharge Barriers Identified: None    Payor: BLUE CROSS BLUE SHIELD / Plan: BCBS ALL OUT OF STATE / Product Type: PPO /     Extended Emergency Contact Information  Primary Emergency Contact: Charles Simons  Address: [address] [place] [address] [facility]  Home Phone: [phone]  Mobile Phone: [phone]  Relation: Spouse  Preferred language: English    Discharge Plan A: Home with family  Discharge Plan B: Home with family      [id number] [place] - [address] [facility]  [address]  [place] [address]  Phone: [phone] Fax: [phone]      Initial Assessment (most recent)     Adult Discharge Assessment - 06/22/22 1141        Discharge Assessment    Assessment Type Discharge Planning Assessment     Confirmed/corrected address, phone number and insurance Yes     Confirmed Demographics Correct on Facesheet     Source of Information patient     Communicated JEFFY with patient/caregiver Yes     Reason For Admission Fever     Lives With spouse     Do you expect to return to your current living situation? Yes     Do you have help at home or someone to help you manage your care at home? Yes     Who are your caregiver(s) and their phone number(s)? Christianojavi Simons (spouse) [phone]     Prior to hospitilization cognitive status: Alert/Oriented     Current cognitive status: Alert/Oriented     Walking or Climbing Stairs Difficulty none     Dressing/Bathing Difficulty none     Equipment Currently Used at Home none     Readmission within 30 days? No     Patient currently being  followed by outpatient case management? No     Do you currently have service(s) that help you manage your care at home? No     Do you take prescription medications? Yes     Do you have prescription coverage? Yes     Do you have any problems affording any of your prescribed medications? No     Is the patient taking medications as prescribed? yes     Who is going to help you get home at discharge? Patient's spouse will provide transportation home.     How do you get to doctors appointments? car, drives self;family or friend will provide     Are you on dialysis? No     Do you take coumadin? No     Discharge Plan A Home with family     Discharge Plan B Home with family     DME Needed Upon Discharge  none     Discharge Plan discussed with: Patient     Discharge Barriers Identified None                 Sophie Burleson RN  Ext [phone]

## 2022-06-22 NOTE — PROGRESS NOTES
Pt AOX4, able to voice needs, telemetry box removed, IV removed, educated on AVS, verbalized understanding, transferred to wheelchair, transported off unit via wheelchair accompanied by transport staff.

## 2022-06-22 NOTE — ASSESSMENT & PLAN NOTE
· Febrile to 102F with  and WBC 13 in an immunosuppressed RA and Lupus patient on Plaquenil  · Source unclear - no shortness of breath, cough, dysuria - only myalgias, arthrlagias   · ESR normal, CRP 17  · Blood cx NGTD  · UA negative  · Flu and RVP negative  · DC on Levaquin

## 2022-06-22 NOTE — TELEPHONE ENCOUNTER
Per test claim, Xolair is a plan benefit exclusion on the pharmacy benefit. Must be billed under medical. InBasket sent to have provider place Buy & Bill orders. Closing referral at [facility].

## 2022-06-23 ENCOUNTER — PATIENT MESSAGE (OUTPATIENT)
Dept: RHEUMATOLOGY | Facility: CLINIC | Age: 52
End: 2022-06-23
Payer: COMMERCIAL

## 2022-06-23 RX ORDER — PREDNISONE 2.5 MG/1
TABLET ORAL
Qty: 60 TABLET | Refills: 0 | Status: SHIPPED | OUTPATIENT
Start: 2022-06-23 | End: 2023-06-15 | Stop reason: SDUPTHER

## 2022-06-23 NOTE — TELEPHONE ENCOUNTER
Last message from pharmacy from April her xeljanz was approved and forwarded to [facility]. Please call [facility] and find out what they need from our office or the patient. Regardless, given her other messages about infection she can't start until she is well.

## 2022-06-23 NOTE — TELEPHONE ENCOUNTER
Nurse--please call the patient: if she is feeling worse and still has fever then needs to contact her PCP or go back to ER for evaluation. She should still be on antibiotics prescribed by from hospitalist.     She can not take xeljanz until she is clear of infection.    High CRP is common in inflammatory states, but also when patients have infection, which seems to be the case now.   I will send her low dose prednisone for her flare.   She needs to be evaluated more urgently for current infection as I stated above.

## 2022-06-24 ENCOUNTER — TELEPHONE (OUTPATIENT)
Dept: RHEUMATOLOGY | Facility: CLINIC | Age: 52
End: 2022-06-24

## 2022-06-24 ENCOUNTER — TELEPHONE (OUTPATIENT)
Dept: ALLERGY | Facility: CLINIC | Age: 52
End: 2022-06-24
Payer: COMMERCIAL

## 2022-06-24 NOTE — TELEPHONE ENCOUNTER
----- Message from Stella Arce MD sent at 6/24/2022  3:39 AM CDT -----  Regarding: RE: Xolair Buy & Bill  Please check buy and bill status  ----- Message -----  From: Gregory Hudson PharmD  Sent: 6/22/2022   3:31 PM CDT  To: Stella Arce MD, Yazmin Douglas Staff  Subject: Xolair Buy & Bill                                Good afternoon,    [facility] is unable to fill Xolair under this patient's pharmacy benefit.    It is possible that this medication may be covered under the patient's Medical Benefit. Please re-enter the order in [employer] as a clinic administered order. For any further questions, please contact [facility] at [phone].    Thank you,    Gregory Hudson, Jossie  Clinical Pharmacist   [facility]   P: [phone]

## 2022-06-26 LAB
BACTERIA BLD CULT: NORMAL
BACTERIA BLD CULT: NORMAL

## 2022-06-28 ENCOUNTER — TELEPHONE (OUTPATIENT)
Dept: RHEUMATOLOGY | Facility: CLINIC | Age: 52
End: 2022-06-28
Payer: COMMERCIAL

## 2022-06-28 NOTE — TELEPHONE ENCOUNTER
Spoke to patient and is finished with the antibiotics yesterday. Patient said it was only 4 pills. Pt is still taking the prednisone. Patient is hard to understand. Not sure if its because is no feeling well. Pain level 9,10/10. Patient stated she never got the Xeljanx sent to her house so she hasn't started taking in it yet.  Patient stated she has no fever. Patient was crying in pain and feels so alone. Stated to patient to go back to the ER if sx of pain do not leave.

## 2022-06-28 NOTE — TELEPHONE ENCOUNTER
Pt recently hospitalized for sepsis. Hospital f/u scheduled tomorrow.  Advised pt ok to take prednisone 5 mg daily for now for arthritis flare  If clear of infection then plan to start xeljanz ASAP.    STAFF: please call [facility] and make sure there is no hold up on her being mailed xeljanz. Update the patient after you speak with [facility]

## 2022-07-06 ENCOUNTER — OFFICE VISIT (OUTPATIENT)
Dept: CARDIOLOGY | Facility: CLINIC | Age: 52
End: 2022-07-06
Payer: COMMERCIAL

## 2022-07-06 VITALS
HEART RATE: 87 BPM | BODY MASS INDEX: 33.87 KG/M2 | HEIGHT: 59 IN | SYSTOLIC BLOOD PRESSURE: 129 MMHG | WEIGHT: 168 LBS | DIASTOLIC BLOOD PRESSURE: 81 MMHG

## 2022-07-06 DIAGNOSIS — I10 PRIMARY HYPERTENSION: ICD-10-CM

## 2022-07-06 DIAGNOSIS — I20.89 ANGINAL EQUIVALENT: ICD-10-CM

## 2022-07-06 DIAGNOSIS — E78.00 PURE HYPERCHOLESTEROLEMIA: ICD-10-CM

## 2022-07-06 PROCEDURE — 3074F SYST BP LT 130 MM HG: CPT | Mod: CPTII,S$GLB,, | Performed by: INTERNAL MEDICINE

## 2022-07-06 PROCEDURE — 99203 OFFICE O/P NEW LOW 30 MIN: CPT | Mod: S$GLB,,, | Performed by: INTERNAL MEDICINE

## 2022-07-06 PROCEDURE — 1160F RVW MEDS BY RX/DR IN RCRD: CPT | Mod: CPTII,S$GLB,, | Performed by: INTERNAL MEDICINE

## 2022-07-06 PROCEDURE — 3074F PR MOST RECENT SYSTOLIC BLOOD PRESSURE < 130 MM HG: ICD-10-PCS | Mod: CPTII,S$GLB,, | Performed by: INTERNAL MEDICINE

## 2022-07-06 PROCEDURE — 1111F DSCHRG MED/CURRENT MED MERGE: CPT | Mod: CPTII,S$GLB,, | Performed by: INTERNAL MEDICINE

## 2022-07-06 PROCEDURE — 1160F PR REVIEW ALL MEDS BY PRESCRIBER/CLIN PHARMACIST DOCUMENTED: ICD-10-PCS | Mod: CPTII,S$GLB,, | Performed by: INTERNAL MEDICINE

## 2022-07-06 PROCEDURE — 99203 PR OFFICE/OUTPT VISIT, NEW, LEVL III, 30-44 MIN: ICD-10-PCS | Mod: S$GLB,,, | Performed by: INTERNAL MEDICINE

## 2022-07-06 PROCEDURE — 1159F MED LIST DOCD IN RCRD: CPT | Mod: CPTII,S$GLB,, | Performed by: INTERNAL MEDICINE

## 2022-07-06 PROCEDURE — 99999 PR PBB SHADOW E&M-EST. PATIENT-LVL V: CPT | Mod: PBBFAC,,, | Performed by: INTERNAL MEDICINE

## 2022-07-06 PROCEDURE — 3079F DIAST BP 80-89 MM HG: CPT | Mod: CPTII,S$GLB,, | Performed by: INTERNAL MEDICINE

## 2022-07-06 PROCEDURE — 1159F PR MEDICATION LIST DOCUMENTED IN MEDICAL RECORD: ICD-10-PCS | Mod: CPTII,S$GLB,, | Performed by: INTERNAL MEDICINE

## 2022-07-06 PROCEDURE — 3079F PR MOST RECENT DIASTOLIC BLOOD PRESSURE 80-89 MM HG: ICD-10-PCS | Mod: CPTII,S$GLB,, | Performed by: INTERNAL MEDICINE

## 2022-07-06 PROCEDURE — 3008F PR BODY MASS INDEX (BMI) DOCUMENTED: ICD-10-PCS | Mod: CPTII,S$GLB,, | Performed by: INTERNAL MEDICINE

## 2022-07-06 PROCEDURE — 3008F BODY MASS INDEX DOCD: CPT | Mod: CPTII,S$GLB,, | Performed by: INTERNAL MEDICINE

## 2022-07-06 PROCEDURE — 99999 PR PBB SHADOW E&M-EST. PATIENT-LVL V: ICD-10-PCS | Mod: PBBFAC,,, | Performed by: INTERNAL MEDICINE

## 2022-07-06 PROCEDURE — 1111F PR DISCHARGE MEDS RECONCILED W/ CURRENT OUTPATIENT MED LIST: ICD-10-PCS | Mod: CPTII,S$GLB,, | Performed by: INTERNAL MEDICINE

## 2022-07-06 NOTE — PROGRESS NOTES
Subjective:   07/06/2022     Patient ID:  Marilee Simons is a 52 y.o. female who presents for evaulation of Shortness of Breath, Hypertension, Hyperlipidemia, and Hospital Follow Up      Her f 1 pack per day prior to that time.  herbert history is negative for premature coronary atherosclerosis.  He has not had chest pains tightness, comes in noting concerns about high blood pressures, elevated heart rate increasing shortness of breath.  This has been occurring for the last several months.  She was recently hospitalized with sepsis.  Blood cultures were negative.  She does have psoriatic arthritis and rheumatoid arthritis.  She  quit smoking 26 years ago.        The 10-year ASCVD risk score (Florentinodev CARLSON Jr., et al., 2013) is: 1.4%    Values used to calculate the score:      Age: 52 years      Sex: Female      Is Non- : No      Diabetic: No      Tobacco smoker: No      Systolic Blood Pressure: 111 mmHg      Is BP treated: No      HDL Cholesterol: 64 mg/dL      Total Cholesterol: 232 mg/dL        Past Medical History:   Diagnosis Date    Acid reflux     Allergy     Alopecia     Anxiety     Arthralgia     Back pain     Depression     Dry eyes     from meds    Fever blister     Fibromyalgia     Interstitial cystitis     Irritable bowel syndrome     Kidney stone     Major depressive disorder, recurrent episode, in partial or unspecified remission 6/25/2013    OAB (overactive bladder) 7/21/2015    PTSD (post-traumatic stress disorder)     Pure hypercholesterolemia 7/6/2022    Rheumatoid arthritis     Rheumatoid arthritis 6/21/2022    Systemic lupus erythematosus     Thyroid disease     Urinary tract infection     Vaginal infection        Review of patient's allergies indicates:   Allergen Reactions    Cephalexin Itching    Cephalosporins     Zofran [ondansetron hcl (pf)] Hives    Penicillins Rash         Current Outpatient Medications:     alosetron (LOTRONEX) 0.5 MG tablet,  Take 1 tablet (0.5 mg total) by mouth daily as needed. (Patient taking differently: Take 0.5 mg by mouth once daily.), Disp: 30 tablet, Rfl: 0    amitriptyline (ELAVIL) 10 MG tablet, TAKE 1 TABLET(10 MG) BY MOUTH EVERY NIGHT AS NEEDED (Patient taking differently: Take 10 mg by mouth every evening.), Disp: 90 tablet, Rfl: 3    butalbitaL-acetaminophen  mg Tab, TAKE 1 TABLET BY MOUTH EVERY 4 HOURS (Patient taking differently: Take 1 tablet by mouth every 4 (four) hours as needed.), Disp: 60 tablet, Rfl: 3    calcium glycerophosphate (PRELIEF) 65 mg Tab, Take 2 tablets by mouth before meals and at bedtime as needed. (Patient taking differently: Take 2 tablets by mouth 3 (three) times daily with meals.), Disp: 100 each, Rfl: prn    cetirizine (ZYRTEC) 10 MG tablet, Take 1 tablet (10 mg total) by mouth once daily., Disp: 120 tablet, Rfl: 2    clonazePAM (KLONOPIN) 1 MG tablet, TAKE 1 TABLET(1 MG) BY MOUTH THREE TIMES DAILY (Patient taking differently: Take 1 mg by mouth 3 (three) times daily.), Disp: 90 tablet, Rfl: 2    cyclobenzaprine (FLEXERIL) 10 MG tablet, TAKE 1 TABLET(10 MG) BY MOUTH TWICE DAILY AS NEEDED (Patient taking differently: Take 10 mg by mouth 2 (two) times a day.), Disp: 90 tablet, Rfl: 6    dicyclomine (BENTYL) 20 mg tablet, Take 20 mg by mouth daily as needed., Disp: , Rfl:     DULoxetine (CYMBALTA) 60 MG capsule, TAKE 2 CAPSULES(120 MG) BY MOUTH EVERY DAY, Disp: 60 capsule, Rfl: 3    famotidine (PEPCID) 40 MG tablet, Take 1 tablet (40 mg total) by mouth nightly as needed for Heartburn. (Patient taking differently: Take 40 mg by mouth every evening.), Disp: 30 tablet, Rfl: 11    fluticasone propionate (FLONASE) 50 mcg/actuation nasal spray, 2 sprays (100 mcg total) by Each Nostril route 2 (two) times daily. (Patient taking differently: 2 sprays by Each Nostril route 2 (two) times daily as needed for Allergies.), Disp: 31.6 mL, Rfl: 5    gabapentin (NEURONTIN) 800 MG tablet, TAKE 1  TABLET(800 MG) BY MOUTH THREE TIMES DAILY (Patient taking differently: Take 800 mg by mouth 3 (three) times daily.), Disp: 90 tablet, Rfl: 11    hydrocortisone 2.5 % cream, Apply to affected areas twice a day when eczema is moderate. (Patient taking differently: Apply to affected areas twice a day as needed when eczema is moderate.), Disp: 453.6 g, Rfl: 1    hydrOXYchloroQUINE (PLAQUENIL) 200 mg tablet, Take 1 tablet (200 mg total) by mouth 2 (two) times daily., Disp: 60 tablet, Rfl: 6    hydrOXYzine HCL (ATARAX) 25 MG tablet, 1-4 tablets as needed for itching, Disp: 240 tablet, Rfl: 3    lamoTRIgine (LAMICTAL) 25 MG tablet, Take 3 tablets (75 mg total) by mouth once daily., Disp: 90 tablet, Rfl: 5    levothyroxine (SYNTHROID) 50 MCG tablet, Take 1 tablet (50 mcg) by mouth Mon-Sat and take 2 tablets (100 mcg) on Sun only, Disp: 34 tablet, Rfl: 11    gicbor-prijy-p.blue-sal-naphos (USTELL) 120-0.12 mg Cap, Take 1 capsule by mouth 3 (three) times daily. (Patient taking differently: Take 1 capsule by mouth 2 (two) times a day.), Disp: 90 each, Rfl: 3    predniSONE (DELTASONE) 2.5 MG tablet, 1 to 2 tabs PO daily prn for arthritis flare, Disp: 60 tablet, Rfl: 0    promethazine (PHENERGAN) 25 MG tablet, Take 1 tablet (25 mg total) by mouth every 6 (six) hours as needed for Nausea., Disp: 15 tablet, Rfl: 0    traMADoL (ULTRAM) 50 mg tablet, TAKE 1 TABLET(50 MG) BY MOUTH EVERY 6 HOURS, Disp: 90 tablet, Rfl: 3    omalizumab (XOLAIR) 150 mg/mL injection, Inject 2 mLs (300 mg total) into the skin every 28 days. for 13 doses (Patient not taking: Reported on 7/6/2022), Disp: 13 each, Rfl: 1    oxybutynin (DITROPAN) 5 MG Tab, Take 1 tablet (5 mg total) by mouth 2 (two) times daily., Disp: 60 tablet, Rfl: 12    oxyCODONE (ROXICODONE) 5 MG immediate release tablet, Take 1 tablet (5 mg total) by mouth every 6 (six) hours as needed for Pain., Disp: 5 tablet, Rfl: 0    tofacitinib (XELJANZ XR) 11 mg Tb24, Take 11 mg by  mouth once daily. (Patient not taking: Reported on 7/6/2022), Disp: 30 tablet, Rfl: 6     Objective:   Review of Systems   Cardiovascular: Positive for dyspnea on exertion and irregular heartbeat.         Vitals:    07/06/22 1344   BP: 129/81   Pulse:      Wt Readings from Last 3 Encounters:   07/06/22 76.2 kg (167 lb 15.9 oz)   06/21/22 72.6 kg (160 lb)   04/29/22 72.6 kg (160 lb)     Temp Readings from Last 3 Encounters:   06/22/22 97.7 °F (36.5 °C) (Oral)   04/29/22 100 °F (37.8 °C) (Oral)   04/27/22 97.9 °F (36.6 °C) (Skin)     BP Readings from Last 3 Encounters:   07/06/22 129/81   06/22/22 (!) 111/54   04/30/22 (!) 152/71     Pulse Readings from Last 3 Encounters:   07/06/22 87   06/22/22 82   04/30/22 87             Physical Exam  Vitals reviewed.   Constitutional:       General: She is not in acute distress.     Appearance: She is well-developed.   HENT:      Head: Normocephalic and atraumatic.      Nose: Nose normal.   Eyes:      Conjunctiva/sclera: Conjunctivae normal.      Pupils: Pupils are equal, round, and reactive to light.   Neck:      Vascular: No JVD.   Cardiovascular:      Rate and Rhythm: Normal rate and regular rhythm.      Pulses: Intact distal pulses.      Heart sounds: Normal heart sounds. No murmur heard.    No friction rub. No gallop.   Pulmonary:      Effort: Pulmonary effort is normal. No respiratory distress.      Breath sounds: Normal breath sounds. No wheezing or rales.   Chest:      Chest wall: No tenderness.   Abdominal:      General: Bowel sounds are normal. There is no distension.      Palpations: Abdomen is soft.      Tenderness: There is no abdominal tenderness.   Musculoskeletal:         General: No tenderness or deformity. Normal range of motion.      Cervical back: Normal range of motion and neck supple.   Skin:     General: Skin is warm and dry.      Findings: No erythema or rash.   Neurological:      Mental Status: She is alert and oriented to person, place, and time.       Cranial Nerves: No cranial nerve deficit.      Motor: No abnormal muscle tone.      Coordination: Coordination normal.   Psychiatric:         Behavior: Behavior normal.         Thought Content: Thought content normal.         Judgment: Judgment normal.           Lab Results   Component Value Date    CHOL 232 (H) 09/16/2021    CHOL 221 (H) 01/07/2009     Lab Results   Component Value Date    HDL 64 09/16/2021    HDL 51 01/07/2009     Lab Results   Component Value Date    LDLCALC 140.6 09/16/2021    LDLCALC 126.6 01/07/2009     Lab Results   Component Value Date    ALT 21 06/21/2022    AST 23 06/21/2022    AST 16 04/29/2022    AST 24 04/12/2022     Lab Results   Component Value Date    CREATININE 0.9 06/21/2022    BUN 8 06/21/2022     06/21/2022    K 4.1 06/21/2022    CO2 26 06/21/2022    CO2 24 04/29/2022    CO2 25 04/12/2022     Lab Results   Component Value Date    HGB 12.9 06/21/2022    HCT 38.9 06/21/2022    HCT 37.4 04/29/2022    HCT 35.7 (L) 04/12/2022           Seen EKG independent review shows ECHO cardia with no ST abnormalities              Assessment and Plan:     Anginal equivalent  -     Stress Echo Which stress agent will be used? Pharmacological (Dobutamine); Color Flow Doppler? Yes; Future; Expected date: 07/07/2022    Pure hypercholesterolemia    Primary hypertension     Will obtain stress ECHO to assess for myocardial ischemia in the presence of dyspnea on exertion we could be related to deconditioning.    In order to further define cardiac risk, a coronary calcium score will be obtained.    No follow-ups on file.

## 2022-07-14 ENCOUNTER — TELEPHONE (OUTPATIENT)
Dept: CARDIOLOGY | Facility: CLINIC | Age: 52
End: 2022-07-14
Payer: COMMERCIAL

## 2022-07-14 NOTE — TELEPHONE ENCOUNTER
----- Message from Ana Eid MA sent at 7/14/2022  4:27 PM CDT -----  Contact: self  Pt called to cancel her dobt stress test for tomorrow Fri.7/15 @ 8:am.Has another appt With   @ 10;am.She didn't want to miss that appt. .Please call Cannot reschedule nothing is available. Please call [phone].

## 2022-07-18 ENCOUNTER — PATIENT MESSAGE (OUTPATIENT)
Dept: PSYCHIATRY | Facility: CLINIC | Age: 52
End: 2022-07-18
Payer: COMMERCIAL

## 2022-07-18 ENCOUNTER — PATIENT MESSAGE (OUTPATIENT)
Dept: RHEUMATOLOGY | Facility: CLINIC | Age: 52
End: 2022-07-18
Payer: COMMERCIAL

## 2022-07-18 ENCOUNTER — PATIENT MESSAGE (OUTPATIENT)
Dept: ADMINISTRATIVE | Facility: OTHER | Age: 52
End: 2022-07-18
Payer: COMMERCIAL

## 2022-07-18 ENCOUNTER — NURSE TRIAGE (OUTPATIENT)
Dept: ADMINISTRATIVE | Facility: CLINIC | Age: 52
End: 2022-07-18
Payer: COMMERCIAL

## 2022-07-18 PROCEDURE — 99285 EMERGENCY DEPT VISIT HI MDM: CPT | Mod: 25

## 2022-07-18 PROCEDURE — 96361 HYDRATE IV INFUSION ADD-ON: CPT

## 2022-07-18 PROCEDURE — 99285 PR EMERGENCY DEPT VISIT,LEVEL V: ICD-10-PCS | Mod: CR,CS,, | Performed by: EMERGENCY MEDICINE

## 2022-07-18 PROCEDURE — 99285 EMERGENCY DEPT VISIT HI MDM: CPT | Mod: CR,CS,, | Performed by: EMERGENCY MEDICINE

## 2022-07-18 PROCEDURE — 96360 HYDRATION IV INFUSION INIT: CPT

## 2022-07-18 PROCEDURE — 87502 INFLUENZA DNA AMP PROBE: CPT

## 2022-07-19 ENCOUNTER — OFFICE VISIT (OUTPATIENT)
Dept: RHEUMATOLOGY | Facility: CLINIC | Age: 52
End: 2022-07-19
Payer: COMMERCIAL

## 2022-07-19 ENCOUNTER — TELEPHONE (OUTPATIENT)
Dept: CARDIOLOGY | Facility: CLINIC | Age: 52
End: 2022-07-19
Payer: COMMERCIAL

## 2022-07-19 ENCOUNTER — HOSPITAL ENCOUNTER (EMERGENCY)
Facility: HOSPITAL | Age: 52
Discharge: HOME | End: 2022-07-19
Attending: EMERGENCY MEDICINE
Payer: COMMERCIAL

## 2022-07-19 ENCOUNTER — PATIENT MESSAGE (OUTPATIENT)
Dept: RHEUMATOLOGY | Facility: CLINIC | Age: 52
End: 2022-07-19

## 2022-07-19 ENCOUNTER — PATIENT MESSAGE (OUTPATIENT)
Dept: RHEUMATOLOGY | Facility: CLINIC | Age: 52
End: 2022-07-19
Payer: COMMERCIAL

## 2022-07-19 VITALS
BODY MASS INDEX: 32.25 KG/M2 | RESPIRATION RATE: 16 BRPM | HEIGHT: 59 IN | TEMPERATURE: 99 F | HEART RATE: 101 BPM | OXYGEN SATURATION: 98 % | WEIGHT: 160 LBS | DIASTOLIC BLOOD PRESSURE: 56 MMHG | SYSTOLIC BLOOD PRESSURE: 110 MMHG

## 2022-07-19 DIAGNOSIS — U07.1 COVID-19: Primary | ICD-10-CM

## 2022-07-19 DIAGNOSIS — M79.7 FIBROMYALGIA: ICD-10-CM

## 2022-07-19 DIAGNOSIS — L40.50 PSORIATIC ARTHRITIS: ICD-10-CM

## 2022-07-19 DIAGNOSIS — U07.1 PNEUMONIA DUE TO COVID-19 VIRUS: Primary | ICD-10-CM

## 2022-07-19 DIAGNOSIS — G89.4 CHRONIC PAIN SYNDROME: ICD-10-CM

## 2022-07-19 DIAGNOSIS — U07.1 COVID: ICD-10-CM

## 2022-07-19 DIAGNOSIS — J12.82 PNEUMONIA DUE TO COVID-19 VIRUS: Primary | ICD-10-CM

## 2022-07-19 DIAGNOSIS — M47.817 LUMBAR AND SACRAL OSTEOARTHRITIS: ICD-10-CM

## 2022-07-19 DIAGNOSIS — I20.89 ANGINAL EQUIVALENT: Primary | ICD-10-CM

## 2022-07-19 DIAGNOSIS — U07.1 COVID-19 VIRUS INFECTION: Primary | ICD-10-CM

## 2022-07-19 DIAGNOSIS — R00.0 TACHYCARDIA: ICD-10-CM

## 2022-07-19 DIAGNOSIS — M06.00 SERONEGATIVE RHEUMATOID ARTHRITIS: ICD-10-CM

## 2022-07-19 LAB
ALBUMIN SERPL BCP-MCNC: 3.8 G/DL (ref 3.5–5.2)
ALLENS TEST: ABNORMAL
ALP SERPL-CCNC: 103 U/L (ref 55–135)
ALT SERPL W/O P-5'-P-CCNC: 31 U/L (ref 10–44)
ANION GAP SERPL CALC-SCNC: 10 MMOL/L (ref 8–16)
AST SERPL-CCNC: 33 U/L (ref 10–40)
BACTERIA #/AREA URNS AUTO: ABNORMAL /HPF
BASOPHILS # BLD AUTO: 0.04 K/UL (ref 0–0.2)
BASOPHILS NFR BLD: 0.5 % (ref 0–1.9)
BILIRUB SERPL-MCNC: 0.3 MG/DL (ref 0.1–1)
BILIRUB UR QL STRIP: NEGATIVE
BNP SERPL-MCNC: <10 PG/ML (ref 0–99)
BUN SERPL-MCNC: 15 MG/DL (ref 6–20)
CALCIUM SERPL-MCNC: 9.3 MG/DL (ref 8.7–10.5)
CHLORIDE SERPL-SCNC: 107 MMOL/L (ref 95–110)
CLARITY UR REFRACT.AUTO: ABNORMAL
CO2 SERPL-SCNC: 22 MMOL/L (ref 23–29)
COLOR UR AUTO: ABNORMAL
CREAT SERPL-MCNC: 1 MG/DL (ref 0.5–1.4)
CTP QC/QA: YES
DELSYS: ABNORMAL
DIFFERENTIAL METHOD: ABNORMAL
EOSINOPHIL # BLD AUTO: 0.2 K/UL (ref 0–0.5)
EOSINOPHIL NFR BLD: 1.9 % (ref 0–8)
ERYTHROCYTE [DISTWIDTH] IN BLOOD BY AUTOMATED COUNT: 13 % (ref 11.5–14.5)
EST. GFR  (AFRICAN AMERICAN): >60 ML/MIN/1.73 M^2
EST. GFR  (NON AFRICAN AMERICAN): >60 ML/MIN/1.73 M^2
GLUCOSE SERPL-MCNC: 148 MG/DL (ref 70–110)
GLUCOSE UR QL STRIP: NEGATIVE
HCO3 UR-SCNC: 25.3 MMOL/L (ref 24–28)
HCT VFR BLD AUTO: 35.5 % (ref 37–48.5)
HGB BLD-MCNC: 11.3 G/DL (ref 12–16)
HGB UR QL STRIP: ABNORMAL
IMM GRANULOCYTES # BLD AUTO: 0.03 K/UL (ref 0–0.04)
IMM GRANULOCYTES NFR BLD AUTO: 0.4 % (ref 0–0.5)
KETONES UR QL STRIP: NEGATIVE
LACTATE SERPL-SCNC: 1.7 MMOL/L (ref 0.5–2.2)
LACTATE SERPL-SCNC: 2.7 MMOL/L (ref 0.5–2.2)
LEUKOCYTE ESTERASE UR QL STRIP: ABNORMAL
LYMPHOCYTES # BLD AUTO: 0.6 K/UL (ref 1–4.8)
LYMPHOCYTES NFR BLD: 7.1 % (ref 18–48)
MCH RBC QN AUTO: 28.3 PG (ref 27–31)
MCHC RBC AUTO-ENTMCNC: 31.8 G/DL (ref 32–36)
MCV RBC AUTO: 89 FL (ref 82–98)
MICROSCOPIC COMMENT: ABNORMAL
MODE: ABNORMAL
MONOCYTES # BLD AUTO: 0.6 K/UL (ref 0.3–1)
MONOCYTES NFR BLD: 8 % (ref 4–15)
NEUTROPHILS # BLD AUTO: 6.4 K/UL (ref 1.8–7.7)
NEUTROPHILS NFR BLD: 82.1 % (ref 38–73)
NITRITE UR QL STRIP: NEGATIVE
NRBC BLD-RTO: 0 /100 WBC
PCO2 BLDA: 42.4 MMHG (ref 35–45)
PH SMN: 7.38 [PH] (ref 7.35–7.45)
PH UR STRIP: 6 [PH] (ref 5–8)
PLATELET # BLD AUTO: 261 K/UL (ref 150–450)
PMV BLD AUTO: 9.8 FL (ref 9.2–12.9)
PO2 BLDA: 46 MMHG (ref 40–60)
POC BE: 0 MMOL/L
POC MOLECULAR INFLUENZA A AGN: NEGATIVE
POC MOLECULAR INFLUENZA B AGN: NEGATIVE
POC SATURATED O2: 80 % (ref 95–100)
POC TCO2: 27 MMOL/L (ref 24–29)
POTASSIUM SERPL-SCNC: 4.4 MMOL/L (ref 3.5–5.1)
PROT SERPL-MCNC: 6.8 G/DL (ref 6–8.4)
PROT UR QL STRIP: NEGATIVE
RBC # BLD AUTO: 3.99 M/UL (ref 4–5.4)
RBC #/AREA URNS AUTO: 15 /HPF (ref 0–4)
SAMPLE: ABNORMAL
SARS-COV-2 RDRP RESP QL NAA+PROBE: POSITIVE
SITE: ABNORMAL
SODIUM SERPL-SCNC: 139 MMOL/L (ref 136–145)
SP GR UR STRIP: 1.02 (ref 1–1.03)
SQUAMOUS #/AREA URNS AUTO: 2 /HPF
TROPONIN I SERPL DL<=0.01 NG/ML-MCNC: <0.006 NG/ML (ref 0–0.03)
URN SPEC COLLECT METH UR: ABNORMAL
WBC # BLD AUTO: 7.74 K/UL (ref 3.9–12.7)
WBC #/AREA URNS AUTO: 1 /HPF (ref 0–5)

## 2022-07-19 PROCEDURE — 63600175 PHARM REV CODE 636 W HCPCS: Performed by: EMERGENCY MEDICINE

## 2022-07-19 PROCEDURE — 87040 BLOOD CULTURE FOR BACTERIA: CPT | Mod: 59 | Performed by: PHYSICIAN ASSISTANT

## 2022-07-19 PROCEDURE — 82803 BLOOD GASES ANY COMBINATION: CPT

## 2022-07-19 PROCEDURE — 93010 ELECTROCARDIOGRAM REPORT: CPT | Mod: ,,, | Performed by: INTERNAL MEDICINE

## 2022-07-19 PROCEDURE — 93005 ELECTROCARDIOGRAM TRACING: CPT

## 2022-07-19 PROCEDURE — 85025 COMPLETE CBC W/AUTO DIFF WBC: CPT | Performed by: PHYSICIAN ASSISTANT

## 2022-07-19 PROCEDURE — 80053 COMPREHEN METABOLIC PANEL: CPT | Performed by: PHYSICIAN ASSISTANT

## 2022-07-19 PROCEDURE — 83605 ASSAY OF LACTIC ACID: CPT | Performed by: PHYSICIAN ASSISTANT

## 2022-07-19 PROCEDURE — 84484 ASSAY OF TROPONIN QUANT: CPT | Performed by: PHYSICIAN ASSISTANT

## 2022-07-19 PROCEDURE — 81001 URINALYSIS AUTO W/SCOPE: CPT | Performed by: PHYSICIAN ASSISTANT

## 2022-07-19 PROCEDURE — 83880 ASSAY OF NATRIURETIC PEPTIDE: CPT | Performed by: PHYSICIAN ASSISTANT

## 2022-07-19 PROCEDURE — U0002 COVID-19 LAB TEST NON-CDC: HCPCS | Performed by: EMERGENCY MEDICINE

## 2022-07-19 PROCEDURE — 99900035 HC TECH TIME PER 15 MIN (STAT)

## 2022-07-19 PROCEDURE — 99214 PR OFFICE/OUTPT VISIT, EST, LEVL IV, 30-39 MIN: ICD-10-PCS | Mod: 95,,, | Performed by: INTERNAL MEDICINE

## 2022-07-19 PROCEDURE — 99214 OFFICE O/P EST MOD 30 MIN: CPT | Mod: 95,,, | Performed by: INTERNAL MEDICINE

## 2022-07-19 PROCEDURE — 25000003 PHARM REV CODE 250: Performed by: STUDENT IN AN ORGANIZED HEALTH CARE EDUCATION/TRAINING PROGRAM

## 2022-07-19 PROCEDURE — 25000003 PHARM REV CODE 250: Performed by: PHYSICIAN ASSISTANT

## 2022-07-19 PROCEDURE — 93010 EKG 12-LEAD: ICD-10-PCS | Mod: ,,, | Performed by: INTERNAL MEDICINE

## 2022-07-19 RX ORDER — CLARITHROMYCIN 500 MG/1
500 TABLET, FILM COATED ORAL EVERY 12 HOURS
Qty: 20 TABLET | Refills: 0 | Status: SHIPPED | OUTPATIENT
Start: 2022-07-19 | End: 2022-07-29

## 2022-07-19 RX ORDER — PROMETHAZINE HYDROCHLORIDE AND CODEINE PHOSPHATE 6.25; 1 MG/5ML; MG/5ML
5 SOLUTION ORAL EVERY 4 HOURS PRN
Qty: 240 ML | Refills: 0 | Status: SHIPPED | OUTPATIENT
Start: 2022-07-19 | End: 2022-07-29

## 2022-07-19 RX ORDER — IBUPROFEN 600 MG/1
600 TABLET ORAL
Status: COMPLETED | OUTPATIENT
Start: 2022-07-19 | End: 2022-07-19

## 2022-07-19 RX ORDER — CYCLOBENZAPRINE HCL 5 MG
10 TABLET ORAL
Status: COMPLETED | OUTPATIENT
Start: 2022-07-19 | End: 2022-07-19

## 2022-07-19 RX ORDER — TRAMADOL HYDROCHLORIDE 50 MG/1
50 TABLET ORAL
Status: COMPLETED | OUTPATIENT
Start: 2022-07-19 | End: 2022-07-19

## 2022-07-19 RX ORDER — ACETAMINOPHEN 500 MG
1000 TABLET ORAL
Status: COMPLETED | OUTPATIENT
Start: 2022-07-19 | End: 2022-07-19

## 2022-07-19 RX ORDER — DEXAMETHASONE 1 MG/1
1 TABLET ORAL EVERY 12 HOURS
Qty: 20 TABLET | Refills: 0 | Status: SHIPPED | OUTPATIENT
Start: 2022-07-19 | End: 2022-07-29

## 2022-07-19 RX ADMIN — SODIUM CHLORIDE, SODIUM LACTATE, POTASSIUM CHLORIDE, AND CALCIUM CHLORIDE 1000 ML: .6; .31; .03; .02 INJECTION, SOLUTION INTRAVENOUS at 02:07

## 2022-07-19 RX ADMIN — IBUPROFEN 600 MG: 600 TABLET ORAL at 01:07

## 2022-07-19 RX ADMIN — ACETAMINOPHEN 1000 MG: 500 TABLET ORAL at 01:07

## 2022-07-19 RX ADMIN — CYCLOBENZAPRINE HYDROCHLORIDE 10 MG: 5 TABLET, FILM COATED ORAL at 04:07

## 2022-07-19 RX ADMIN — TRAMADOL HYDROCHLORIDE 50 MG: 50 TABLET, COATED ORAL at 04:07

## 2022-07-19 NOTE — Clinical Note
"Marilee"Kristen Simons was seen and treated in our emergency department on 7/18/2022.     COVID-19 is present in our communities across the state. There is limited testing for COVID at this time, so not all patients can be tested. In this situation, your employee meets the following criteria:    Marilee Simons has met the criteria for COVID-19 testing and has a POSITIVE result. She can return to work once they are asymptomatic for 24 hours without the use of fever reducing medications AND at least five days from the first positive result. A mask is recommended for 5 days post quarantine.     If you have any questions or concerns, or if I can be of further assistance, please do not hesitate to contact me.    Sincerely,             Yaima Dalton MD"

## 2022-07-19 NOTE — ED TRIAGE NOTES
Pt. c fevers, nausea, chills, body aches, and diarrhea for 1 day.  No other s/s or complaints.  No PRNs pta    APPEARANCE: shaking/uncomfortable  NEURO: Awake, alert, appropriate for age  HEENT: Head symmetrical. No obvious deformity  RESPIRATORY: Airway is open and patent. Respirations are spontaneous on room air.   NEUROVASCULAR: All extremities are warm and pink with capillary refill less than 3 seconds.   MUSCULOSKELETAL: Moves all extremities, wiggling toes and moving hands.   SKIN: Warm and dry, adequate turgor, mucus membranes moist and pink  SOCIAL: Patient is accompanied by family.   Will continue to monitor.

## 2022-07-19 NOTE — TELEPHONE ENCOUNTER
[place]    PCP:  Dr. Vannessa Marie    She reports that her  tested + for Covid today.  Symptoms began yesterday.  C/O PND, cough, having to clear her throat, chills, fever, muscle/joint pain, severe HA, weakness, loss of taste, sore throat, bruising/bleeding, fatigue, hoarseness, and runny nose.  H/O RA, Lupus, Hypothyroidism (Hashimoto's), obesity, and FM.  She tested - for Covid today.  Pt sounds very sick/weak.  Per protocol, care advised is go to the ED now.  Instructed to call for questions/concerns.  VU.    Reason for Disposition   SEVERE or constant chest pain or pressure  (Exception: Mild central chest pain, present only when coughing.)    Additional Information   Negative: SEVERE difficulty breathing (e.g., struggling for each breath, speaks in single words)   Negative: Difficult to awaken or acting confused (e.g., disoriented, slurred speech)   Negative: Bluish (or gray) lips or face now   Negative: Shock suspected (e.g., cold/pale/clammy skin, too weak to stand, low BP, rapid pulse)   Negative: Sounds like a life-threatening emergency to the triager    Protocols used: CORONAVIRUS (COVID-19) DIAGNOSED OR [id number]

## 2022-07-19 NOTE — FIRST PROVIDER EVALUATION
Emergency Department TeleTriage Encounter Note      CHIEF COMPLAINT    Chief Complaint   Patient presents with    COVID-19 Concerns     + exposure, c/o chills, and body aches symptoms started last night       VITAL SIGNS   Initial Vitals [07/18/22 2258]   BP Pulse Resp Temp SpO2   (!) 123/90 (!) 120 18 (!) 100.8 °F (38.2 °C) 99 %      MAP       --            ALLERGIES    Review of patient's allergies indicates:   Allergen Reactions    Cephalexin Itching    Cephalosporins     Zofran [ondansetron hcl (pf)] Hives    Penicillins Rash       PROVIDER TRIAGE NOTE  This is a teletriage evaluation of a 52 y.o. female presenting to the ED complaining of body aches, chills, chest pain, shortness of breath, generalized weakness, lightheadedness for 24 hrs.  Autoimmune hx on immunosuppressing meds.  Febrile and tachycardic in triage.     Initial orders will be placed and care will be transferred to an alternate provider when patient is roomed for a full evaluation. Any additional orders and the final disposition will be determined by that provider.           ORDERS  Labs Reviewed   CULTURE, BLOOD   CULTURE, BLOOD   CBC W/ AUTO DIFFERENTIAL   COMPREHENSIVE METABOLIC PANEL   LACTIC ACID, PLASMA   URINALYSIS, REFLEX TO URINE CULTURE   TROPONIN I   B-TYPE NATRIURETIC PEPTIDE   POCT INFLUENZA A/B MOLECULAR   SARS-COV-2 RDRP GENE       ED Orders (720h ago, onward)    Start Ordered     Status Ordering Provider    07/19/22 0359 07/19/22 0000  Lactic acid, plasma #2  In 4 hours         Ordered SONA FLORES    07/19/22 0015 07/19/22 0000  acetaminophen tablet 1,000 mg  ED 1 Time         Ordered SONA FLORES    07/19/22 0015 07/19/22 0000  ibuprofen tablet 600 mg  ED 1 Time         Ordered SONA FLORES    07/19/22 0001 07/19/22 0000  Brain natriuretic peptide  STAT         Ordered SONA FLORES    07/19/22 0001 07/19/22 0000  POCT COVID-19 Rapid Screening  Once          Ordered SONA FLORES.    07/19/22 0000 07/19/22 0000  POCT Influenza A/B Molecular  Once         Ordered SONA FLORES.    07/19/22 0000 07/19/22 0000  Troponin I  STAT         Ordered SONA FLORES.    07/18/22 2359 07/19/22 0000  ED Preference List Used to Initiate Sepsis Orders  Until discontinued         Ordered SONA FLORES.    07/18/22 2359 07/19/22 0000  Blood culture x two cultures. Draw prior to antibiotics.  Every 15 min      Comments: Aerobic and anaerobic     Order ID Start Status Ordering Provider   [id number] 07/18/22 2359 Ordered SONA FLORES   [id number] 07/19/22 0014 Ordered SONA FLORES.       Ordered NAHUM-SONA ELLIOTT.    07/18/22 2359 07/19/22 0000  CBC auto differential  STAT         Ordered SONA FLORES.    07/18/22 2359 07/19/22 0000  Comprehensive metabolic panel  STAT         Ordered SONA FLORES.    07/18/22 2359 07/19/22 0000  Lactic acid, plasma #1  STAT         Ordered SONA FLORES.    07/18/22 2359 07/19/22 0000  Vital signs  Every 15 min        Comments: Every 15 minutes until SBP greater than 90 or MAP greater than 65, then every 30 minutes times one hour, then every one hour.    Ordered SONA FLORES.    07/18/22 2359 07/19/22 0000  Bed rest  Until discontinued         Ordered SONA FLORES.    07/18/22 2359 07/19/22 0000  Cardiac Monitoring - Adult  Continuous        Comments: Notify Physician If:    Ordered SONA FLORES.    07/18/22 2359 07/19/22 0000  Pulse Oximetry Continuous  Continuous         Ordered SONA FLORES.    07/18/22 2359 07/19/22 0000  Strict intake and output  Until discontinued         Ordered SONA FLORES.    07/18/22 2359 07/19/22 0000  Urinalysis, Reflex to Urine Culture Urine, Clean Catch  STAT         Ordered NAHUMSONA OBREGON    07/18/22  2359 07/19/22 0000  Saline lock IV  Once         Ordered SONA FLORES.    07/18/22 2359 07/19/22 0000  EKG 12-lead  Once         Ordered SONA FLORES.    07/18/22 2359 07/19/22 0000  X-Ray Chest AP Portable  1 time imaging         Ordered SONA FLORES            Virtual Visit Note: The provider triage portion of this emergency department evaluation and documentation was performed via [employer], a HIPAA-compliant telemedicine application, in concert with a tele-presenter in the room. A face to face patient evaluation with one of my colleagues will occur once the patient is placed in an emergency department room.      DISCLAIMER: This note was prepared with [employer] voice recognition transcription software. Garbled syntax, mangled pronouns, and other bizarre constructions may be attributed to that software system.

## 2022-07-19 NOTE — ED PROVIDER NOTES
Encounter Date: 2022       History     Chief Complaint   Patient presents with    COVID-19 Concerns     + exposure, c/o chills, and body aches symptoms started last night     Ms. Simons is a 52-year-old female with past medical history of anxiety, fibromyalgia, rheumatoid arthritis and thyroid disease who presents to the emergency department due to concerns for COVID infection. Patient states that her  recently tested positive for COVID and since yesterday she has had fevers, chills, cough and congestion. She was concerned that she may have COVID which is one of her biggest fears. She states that she has always been afraid of dying from COVID so she wanted to come to the emergency department for evaluation. She also states that she has been having generalized body aches.         Review of patient's allergies indicates:   Allergen Reactions    Cephalexin Itching    Cephalosporins     Zofran [ondansetron hcl (pf)] Hives    Penicillins Rash     Past Medical History:   Diagnosis Date    Acid reflux     Allergy     Alopecia     Anxiety     Arthralgia     Back pain     Depression     Dry eyes     from meds    Fever blister     Fibromyalgia     Interstitial cystitis     Irritable bowel syndrome     Kidney stone     Major depressive disorder, recurrent episode, in partial or unspecified remission 2013    OAB (overactive bladder) 2015    PTSD (post-traumatic stress disorder)     Pure hypercholesterolemia 2022    Rheumatoid arthritis     Rheumatoid arthritis 2022    Systemic lupus erythematosus     Thyroid disease     Urinary tract infection     Vaginal infection      Past Surgical History:   Procedure Laterality Date    BLADDER SURGERY       SECTION, LOW TRANSVERSE      x 2    COLONOSCOPY      COLONOSCOPY N/A 10/5/2017    Procedure: COLONOSCOPY;  Surgeon: Venkat He MD;  Location: [address];  Service: Endoscopy;  Laterality: N/A;     ESOPHAGOGASTRODUODENOSCOPY      ESOPHAGOGASTRODUODENOSCOPY N/A 10/25/2021    Procedure: EGD (ESOPHAGOGASTRODUODENOSCOPY);  Surgeon: Venkat He MD;  Location: [facility] (4TH FLR);  Service: Endoscopy;  Laterality: N/A;  Covid test on 10/22 at [email or web address]    10/19 lvm to confirm appt-rb    EYE SURGERY      Lasik-bilateral    HYSTERECTOMY      IMPLANTATION OF PERMANENT SACRAL NERVE STIMULATOR N/A 4/27/2022    Procedure: INSERTION, NEUROSTIMULATOR, PERMANENT, SACRAL;  Surgeon: Bandar Garcia MD;  Location: [facility] OR 2ND FLR;  Service: Urology;  Laterality: N/A;  2hr    INJECTION OF BOTULINUM TOXIN TYPE A N/A 9/12/2018    Procedure: INJECTION, BOTULINUM TOXIN, TYPE A  200 UNITS;  Surgeon: Bandar Garcia MD;  Location: [facility] OR 1ST FLR;  Service: Urology;  Laterality: N/A;    INSTILLATION OF URINARY BLADDER N/A 9/12/2018    Procedure: INSTILLATION, URINARY BLADDER;  Surgeon: Bandar Garcia MD;  [facility];  Service: Urology;  Laterality: N/A;    PARTIAL HYSTERECTOMY      REMOVAL OF ELECTRODE LEAD OF SACRAL NERVE STIMULATOR N/A 4/27/2022    Procedure: REMOVAL, ELECTRODE LEAD, SACRAL NERVE STIMULATOR;  Surgeon: Bandar Garcia MD;  [facility];  Service: Urology;  Laterality: N/A;    TRANSFORAMINAL EPIDURAL INJECTION OF STEROID Left 2/15/2021    Procedure: LUMBAR TRANSFORAMINAL LEFT L5 AND S1 DIRECT REFERRAL;  Surgeon: Marquez Nesbitt MD;  [facility];  Service: Pain Management;  Laterality: Left;  NEEDS CONSENT, PT REQUESTING IV SEDATION     Family History   Problem Relation Age of Onset    Irritable bowel syndrome Mother     Irritable bowel syndrome Sister     Lupus Sister     Rheum arthritis Sister     Fibromyalgia Sister     Irritable bowel syndrome Sister     Celiac disease Neg Hx     Cirrhosis Neg Hx     Colon cancer Neg Hx     Colon polyps Neg Hx     Crohn's disease Neg Hx     Cystic fibrosis Neg Hx     Esophageal cancer Neg Hx     Hemochromatosis Neg  Hx     Inflammatory bowel disease Neg Hx     Liver cancer Neg Hx     Liver disease Neg Hx     Rectal cancer Neg Hx     Stomach cancer Neg Hx     Ulcerative colitis Neg Hx     Boris's disease Neg Hx     Melanoma Neg Hx     Amblyopia Neg Hx     Blindness Neg Hx     Cataracts Neg Hx     Glaucoma Neg Hx     Macular degeneration Neg Hx     Retinal detachment Neg Hx     Strabismus Neg Hx      Social History     Tobacco Use    Smoking status: Never Smoker    Smokeless tobacco: Never Used   Substance Use Topics    Alcohol use: No    Drug use: No     Review of Systems   Constitutional: Positive for chills, fatigue and fever. Negative for activity change, appetite change, diaphoresis and unexpected weight change.   HENT: Positive for congestion. Negative for facial swelling, nosebleeds, postnasal drip, rhinorrhea, sinus pressure, sinus pain, sore throat and trouble swallowing.    Eyes: Negative for photophobia, pain and visual disturbance.   Respiratory: Positive for cough. Negative for choking, chest tightness, shortness of breath and wheezing.    Cardiovascular: Negative for chest pain, palpitations and leg swelling.   Gastrointestinal: Negative for abdominal pain, constipation, nausea and vomiting.   Endocrine: Negative for polydipsia and polyuria.   Genitourinary: Negative for decreased urine volume, difficulty urinating, dysuria, flank pain, hematuria and pelvic pain.   Musculoskeletal: Negative for arthralgias, back pain, joint swelling and myalgias.   Skin: Negative for color change and wound.   Neurological: Negative for dizziness, seizures, weakness, light-headedness, numbness and headaches.   Psychiatric/Behavioral: Negative for agitation and confusion.       Physical Exam     Initial Vitals [07/18/22 2258]   BP Pulse Resp Temp SpO2   (!) 123/90 (!) 120 18 (!) 100.8 °F (38.2 °C) 99 %      MAP       --         Physical Exam    Nursing note and vitals reviewed.  Constitutional: She appears  well-developed and well-nourished. She is not diaphoretic. No distress.   HENT:   Head: Normocephalic and atraumatic.   Mouth/Throat: Oropharynx is clear and moist. No oropharyngeal exudate.   Eyes: Conjunctivae and EOM are normal. Pupils are equal, round, and reactive to light. Right eye exhibits no discharge. Left eye exhibits no discharge. No scleral icterus.   Neck: No thyromegaly present. No tracheal deviation present. No JVD present.   Normal range of motion.  Cardiovascular: Normal heart sounds and intact distal pulses. Tachycardia present.  Exam reveals no gallop.    No murmur heard.  Pulmonary/Chest: She has no wheezes. She has no rales. She exhibits no tenderness.   Abdominal: Abdomen is soft. Bowel sounds are normal. She exhibits no distension. There is no abdominal tenderness. There is no guarding.   Musculoskeletal:         General: No tenderness or edema. Normal range of motion.      Cervical back: Normal range of motion.     Neurological: She is alert and oriented to person, place, and time. She has normal strength. No cranial nerve deficit or sensory deficit.   Skin: Skin is warm. Capillary refill takes less than 2 seconds. No erythema.   Psychiatric: Her mood appears anxious.         ED Course   Procedures  Labs Reviewed   CBC W/ AUTO DIFFERENTIAL - Abnormal; Notable for the following components:       Result Value    RBC 3.99 (*)     Hemoglobin 11.3 (*)     Hematocrit 35.5 (*)     MCHC 31.8 (*)     Lymph # 0.6 (*)     Gran % 82.1 (*)     Lymph % 7.1 (*)     All other components within normal limits   COMPREHENSIVE METABOLIC PANEL - Abnormal; Notable for the following components:    CO2 22 (*)     Glucose 148 (*)     All other components within normal limits   LACTIC ACID, PLASMA - Abnormal; Notable for the following components:    Lactate (Lactic Acid) 2.7 (*)     All other components within normal limits   URINALYSIS, REFLEX TO URINE CULTURE - Abnormal; Notable for the following components:     Color, UA Green (*)     Appearance, UA Hazy (*)     Occult Blood UA 1+ (*)     Leukocytes, UA Trace (*)     All other components within normal limits    Narrative:     Specimen Source->Urine   SARS-COV-2 RNA AMPLIFICATION, QUAL - Abnormal; Notable for the following components:    SARS-CoV-2 RNA, Amplification, Qual Positive (*)     All other components within normal limits    Narrative:     Is the patient symptomatic?->Yes  Is testing needed for patient travel?->No  Is this needed for pre-procedure or pre-op testing?->No   URINALYSIS MICROSCOPIC - Abnormal; Notable for the following components:    RBC, UA 15 (*)     All other components within normal limits    Narrative:     Specimen Source->Urine   ISTAT PROCEDURE - Abnormal; Notable for the following components:    POC SATURATED O2 80 (*)     All other components within normal limits   CULTURE, BLOOD   CULTURE, BLOOD   TROPONIN I   B-TYPE NATRIURETIC PEPTIDE   LACTIC ACID, PLASMA   POCT INFLUENZA A/B MOLECULAR     EKG Readings: (Independently Interpreted)   Rhythm: Sinus Tachycardia. Heart Rate: 124. Ectopy: No Ectopy. Conduction: Normal.       Imaging Results          X-Ray Chest AP Portable (Final result)  Result time 07/19/22 02:19:25    Final result by Hunter Zheng MD (07/19/22 02:19:25)                 Impression:      No acute findings in the chest.      Electronically signed by: Hunter Zheng MD  Date:    07/19/2022  Time:    02:19             Narrative:    EXAMINATION:  XR CHEST AP PORTABLE    CLINICAL HISTORY:  Sepsis;    TECHNIQUE:  Single frontal view of the chest was performed.    COMPARISON:  None    FINDINGS:  No consolidation, pleural effusion or pneumothorax.    Cardiomediastinal silhouette is unchanged.                                 Medications   acetaminophen tablet 1,000 mg (1,000 mg Oral Given 7/19/22 0138)   ibuprofen tablet 600 mg (600 mg Oral Given 7/19/22 0127)   lactated ringers bolus 1,000 mL (0 mLs Intravenous Stopped 7/19/22  0415)   cyclobenzaprine tablet 10 mg (10 mg Oral Given 7/19/22 0418)   traMADoL tablet 50 mg (50 mg Oral Given 7/19/22 0418)     Medical Decision Making:   Initial Assessment:   Ms. Simons is a 52-year-old female with past medical history of anxiety, fibromyalgia, rheumatoid arthritis and thyroid disease who presents to the emergency department due to concerns for COVID infection.  Differential Diagnosis:   Covid Pneumonia  Bacteria Pneumonia  Pulmonary embolism  ACS  Sepsis        Clinical Tests:   Lab Tests: Ordered and Reviewed  Radiological Study: Ordered and Reviewed  ED Management:  Patient presents with symptoms consistent with COVID infection.   On history or examination, no convincing evidence of other underlying etiology including CHF, COPD, ACS, PE, pneumothorax, tamponade, bacterial pneumonia  SpO2  98 % on room air at rest  SpO2 96% on room air with exertion  Patient's lactate was initially elevated but after fluids both her lactate as well as her vital signs improved  Patient does not meet criteria for admission based on COVID severity, hypoxia, lung involvement, and overall assessment of patient's stability  Patient  provided with quarantine guidelines, educational materials, return precautions, and symptom management plan. PCP follow up recommended                Attending Attestation:             Attending ED Notes:   Attending Note:  I have seen the patient, have repeated the key portions of the history and physical, reviewed and agree with the medical documentation, and supervised and managed the medical care of the patient. Additionally, I was present for the critical portion of any procedure(s) performed.    52 F hx RA on plaquenil, xolair here for covid concerns.  VSS  Covid positive  Initial lactate 2.7, improved after IVF  CXR clear  Referred to [facility]  No indication for admission.      ED Course as of 07/19/22 0428   Tue Jul 19, 2022   0131 SARS-CoV-2 RNA, Amplification, Qual(!): Positive [GM]    0131 Lactate, Fredo(!): 2.7 [GM]   0131 WBC: 7.74 [GM]   0131 BNP: <10 [GM]   0131 Troponin I: <0.006 [GM]      ED Course User Index  [GM] Arlen Little MD             Clinical Impression:   Final diagnoses:  [R00.0] Tachycardia  [U07.1] COVID-19 virus infection (Primary)          ED Disposition Condition    Discharge Stable        ED Prescriptions     None        Follow-up Information     Follow up With Specialties Details Why Contact Info    Vannessa Marie MD Internal Medicine Schedule an appointment as soon as possible for a visit in 3 days  [address] [facility] [place] [address]  [phone]             Yaima Dalton MD  Resident  07/19/22 0428       Arlen Little MD  07/20/22 0619

## 2022-07-20 ENCOUNTER — TELEPHONE (OUTPATIENT)
Dept: INFECTIOUS DISEASES | Facility: HOSPITAL | Age: 52
End: 2022-07-20
Payer: COMMERCIAL

## 2022-07-20 ENCOUNTER — NURSE TRIAGE (OUTPATIENT)
Dept: ADMINISTRATIVE | Facility: CLINIC | Age: 52
End: 2022-07-20
Payer: COMMERCIAL

## 2022-07-20 ENCOUNTER — PATIENT MESSAGE (OUTPATIENT)
Dept: RHEUMATOLOGY | Facility: CLINIC | Age: 52
End: 2022-07-20
Payer: COMMERCIAL

## 2022-07-20 ENCOUNTER — OFFICE VISIT (OUTPATIENT)
Dept: PSYCHIATRY | Facility: CLINIC | Age: 52
End: 2022-07-20
Payer: COMMERCIAL

## 2022-07-20 DIAGNOSIS — F41.1 GENERALIZED ANXIETY DISORDER: ICD-10-CM

## 2022-07-20 DIAGNOSIS — F33.1 MAJOR DEPRESSIVE DISORDER, RECURRENT EPISODE, MODERATE: Primary | ICD-10-CM

## 2022-07-20 DIAGNOSIS — U07.1 COVID-19: Primary | ICD-10-CM

## 2022-07-20 PROCEDURE — 1160F RVW MEDS BY RX/DR IN RCRD: CPT | Mod: CPTII,95,, | Performed by: PSYCHIATRY & NEUROLOGY

## 2022-07-20 PROCEDURE — 90833 PSYTX W PT W E/M 30 MIN: CPT | Mod: 95,,, | Performed by: PSYCHIATRY & NEUROLOGY

## 2022-07-20 PROCEDURE — 1111F PR DISCHARGE MEDS RECONCILED W/ CURRENT OUTPATIENT MED LIST: ICD-10-PCS | Mod: CPTII,95,, | Performed by: PSYCHIATRY & NEUROLOGY

## 2022-07-20 PROCEDURE — 1111F DSCHRG MED/CURRENT MED MERGE: CPT | Mod: CPTII,95,, | Performed by: PSYCHIATRY & NEUROLOGY

## 2022-07-20 PROCEDURE — 1159F PR MEDICATION LIST DOCUMENTED IN MEDICAL RECORD: ICD-10-PCS | Mod: CPTII,95,, | Performed by: PSYCHIATRY & NEUROLOGY

## 2022-07-20 PROCEDURE — 90833 PR PSYCHOTHERAPY W/PATIENT W/E&M, 30 MIN (ADD ON): ICD-10-PCS | Mod: 95,,, | Performed by: PSYCHIATRY & NEUROLOGY

## 2022-07-20 PROCEDURE — 99214 OFFICE O/P EST MOD 30 MIN: CPT | Mod: 95,,, | Performed by: PSYCHIATRY & NEUROLOGY

## 2022-07-20 PROCEDURE — 1159F MED LIST DOCD IN RCRD: CPT | Mod: CPTII,95,, | Performed by: PSYCHIATRY & NEUROLOGY

## 2022-07-20 PROCEDURE — 99214 PR OFFICE/OUTPT VISIT, EST, LEVL IV, 30-39 MIN: ICD-10-PCS | Mod: 95,,, | Performed by: PSYCHIATRY & NEUROLOGY

## 2022-07-20 PROCEDURE — 1160F PR REVIEW ALL MEDS BY PRESCRIBER/CLIN PHARMACIST DOCUMENTED: ICD-10-PCS | Mod: CPTII,95,, | Performed by: PSYCHIATRY & NEUROLOGY

## 2022-07-20 RX ORDER — LAMOTRIGINE 100 MG/1
100 TABLET ORAL DAILY
Qty: 30 TABLET | Refills: 5 | Status: SHIPPED | OUTPATIENT
Start: 2022-07-20 | End: 2022-11-03 | Stop reason: SDUPTHER

## 2022-07-20 RX ORDER — CLONAZEPAM 1 MG/1
TABLET ORAL
Qty: 90 TABLET | Refills: 2 | Status: SHIPPED | OUTPATIENT
Start: 2022-07-20 | End: 2022-11-03 | Stop reason: SDUPTHER

## 2022-07-20 RX ORDER — DULOXETIN HYDROCHLORIDE 60 MG/1
CAPSULE, DELAYED RELEASE ORAL
Qty: 60 CAPSULE | Refills: 3 | Status: SHIPPED | OUTPATIENT
Start: 2022-07-20 | End: 2022-12-05

## 2022-07-20 NOTE — TELEPHONE ENCOUNTER
Pt called and she said that she was suppose to get EUA infusion and she c/o body aches but is refusing triage she was just interested in getting the infusion scheduled. Pt told to call back OOC if any problems and she will call us back if she changes her mind about triage. Pt was c/o service she received in the ED  Reason for Disposition   Information only question and nurse able to answer    Protocols used: INFORMATION ONLY CALL - NO TRIAGE-A-[place]

## 2022-07-20 NOTE — TELEPHONE ENCOUNTER
F.u with patient regarding treatment for covid symptoms. Patient is currently feeling sore throat, body aches, chills and fever as well as burning ears.    Per [facility] policy, this patient was triaged for conversion to the appropriate treatment (as outlined in the policy and standing order). This patient was evaluated for drug interactions based on the current medication list, and drug interactions to Paxlovid were present. ( clarithromycin, hydroxyzine, clonazepam. Patient met criteria for conversion to the second line treatment, molnupriavir (under standing orders) and was offered a prescription for monupiravir. Patient initially resistant and insisted on infusion treatement. Due to patient's last O2 saturation reported on 7/19 of 80% infusion treatment was strongly advised against. When informed of the risk of infusion treatment with reduced oxygen saturation, patient accepted molnupiravir. Patient is not pregnant and had hysterectomy. No new allergies identified. No issues with kidney or liver reported. Symptom onset 7/18 and covid positive 7/19.

## 2022-07-20 NOTE — PROGRESS NOTES
Subjective:       Patient ID: Marilee Simons is a 52 y.o. female.    Chief Complaint: No chief complaint on file.    Follow up: 52 year old female who presents to clinic for follow up on psoriatic arthritis. 7/18/22 she had Covid, she had back and headache, chills and fever.stayed overnight  She is doing poorly on plaquenil. She has generalized pain throughout her body. She has joint pain in her hands, wrists, elbows, shoulders, knee, and low back. Pain is constant and aching. She has increased stiffness and weakness in her hands--she is dropping objects. She has hypersensitivity of her elbows unable to rest her arms on a surface.  SNeeds interstim replaced soon with Urology.     Current tx:  1. Plaquenil    Prior tx:  1. Humira      Review of Systems   Constitutional: Positive for activity change, chills, fatigue, fever and unexpected weight change.   HENT: Positive for mouth sores. Negative for trouble swallowing.    Eyes: Negative for redness.   Respiratory: Negative for cough and shortness of breath.    Cardiovascular: Negative for chest pain.   Gastrointestinal: Positive for diarrhea. Negative for constipation.   Genitourinary: Negative for dysuria and genital sores.   Musculoskeletal: Positive for arthralgias, joint swelling, myalgias, neck pain and neck stiffness.   Skin: Negative for rash.   Neurological: Positive for headaches.   Hematological: Bruises/bleeds easily.         Objective:     There were no vitals filed for this visit.    Past Medical History:   Diagnosis Date    Acid reflux     Allergy     Alopecia     Anxiety     Arthralgia     Back pain     Depression     Dry eyes     from meds    Fever blister     Fibromyalgia     Interstitial cystitis     Irritable bowel syndrome     Kidney stone     Major depressive disorder, recurrent episode, in partial or unspecified remission 6/25/2013    OAB (overactive bladder) 7/21/2015    PTSD (post-traumatic stress disorder)     Pure  hypercholesterolemia 2022    Rheumatoid arthritis     Rheumatoid arthritis 2022    Systemic lupus erythematosus     Thyroid disease     Urinary tract infection     Vaginal infection      Past Surgical History:   Procedure Laterality Date    BLADDER SURGERY       SECTION, LOW TRANSVERSE      x 2    COLONOSCOPY      COLONOSCOPY N/A 10/5/2017    Procedure: COLONOSCOPY;  Surgeon: Venkat He MD;  Location: [facility] ENDO (4TH FLR);  Service: Endoscopy;  Laterality: N/A;    ESOPHAGOGASTRODUODENOSCOPY      ESOPHAGOGASTRODUODENOSCOPY N/A 10/25/2021    Procedure: EGD (ESOPHAGOGASTRODUODENOSCOPY);  Surgeon: Venkat He MD;  Location: [facility] ENDO (4TH FLR);  Service: Endoscopy;  Laterality: N/A;  Covid test on 10/22 at [email or web address]    10/19 lvm to confirm appt-rb    EYE SURGERY      Lasik-bilateral    HYSTERECTOMY      IMPLANTATION OF PERMANENT SACRAL NERVE STIMULATOR N/A 2022    Procedure: INSERTION, NEUROSTIMULATOR, PERMANENT, SACRAL;  Surgeon: Bandar Garcia MD;  [facility];  Service: Urology;  Laterality: N/A;  2hr    INJECTION OF BOTULINUM TOXIN TYPE A N/A 2018    Procedure: INJECTION, BOTULINUM TOXIN, TYPE A  200 UNITS;  Surgeon: Bandar Garcia MD;  [facility];  Service: Urology;  Laterality: N/A;    INSTILLATION OF URINARY BLADDER N/A 2018    Procedure: INSTILLATION, URINARY BLADDER;  Surgeon: Bandar Garcia MD;  [facility];  Service: Urology;  Laterality: N/A;    PARTIAL HYSTERECTOMY      REMOVAL OF ELECTRODE LEAD OF SACRAL NERVE STIMULATOR N/A 2022    Procedure: REMOVAL, ELECTRODE LEAD, SACRAL NERVE STIMULATOR;  Surgeon: Bandar Garcia MD;  [facility];  Service: Urology;  Laterality: N/A;    TRANSFORAMINAL EPIDURAL INJECTION OF STEROID Left 2/15/2021    Procedure: LUMBAR TRANSFORAMINAL LEFT L5 AND S1 DIRECT REFERRAL;  Surgeon: Marquez Nesbitt MD;  Location: [facility] PAIN MGT;  Service: Pain Management;   Laterality: Left;  NEEDS CONSENT, PT REQUESTING IV SEDATION          Physical Exam   Constitutional: She is oriented to person, place, and time.   Neurological: She is alert and oriented to person, place, and time.   Psychiatric: Mood, affect and judgment normal.       Results for orders placed or performed during the hospital encounter of 07/19/22   Blood culture x two cultures. Draw prior to antibiotics.    Specimen: Peripheral, Antecubital, Right; Blood   Result Value Ref Range    Blood Culture, Routine No Growth to date    Blood culture x two cultures. Draw prior to antibiotics.    Specimen: Peripheral, Hand, Left; Blood   Result Value Ref Range    Blood Culture, Routine No Growth to date    CBC auto differential   Result Value Ref Range    WBC 7.74 3.90 - 12.70 K/uL    RBC 3.99 (L) 4.00 - 5.40 M/uL    Hemoglobin 11.3 (L) 12.0 - 16.0 g/dL    Hematocrit 35.5 (L) 37.0 - 48.5 %    MCV 89 82 - 98 fL    MCH 28.3 27.0 - 31.0 pg    MCHC 31.8 (L) 32.0 - 36.0 g/dL    RDW 13.0 11.5 - 14.5 %    Platelets 261 150 - 450 K/uL    MPV 9.8 9.2 - 12.9 fL    Immature Granulocytes 0.4 0.0 - 0.5 %    Gran # (ANC) 6.4 1.8 - 7.7 K/uL    Immature Grans (Abs) 0.03 0.00 - 0.04 K/uL    Lymph # 0.6 (L) 1.0 - 4.8 K/uL    Mono # 0.6 0.3 - 1.0 K/uL    Eos # 0.2 0.0 - 0.5 K/uL    Baso # 0.04 0.00 - 0.20 K/uL    nRBC 0 0 /100 WBC    Gran % 82.1 (H) 38.0 - 73.0 %    Lymph % 7.1 (L) 18.0 - 48.0 %    Mono % 8.0 4.0 - 15.0 %    Eosinophil % 1.9 0.0 - 8.0 %    Basophil % 0.5 0.0 - 1.9 %    Differential Method Automated    Comprehensive metabolic panel   Result Value Ref Range    Sodium 139 136 - 145 mmol/L    Potassium 4.4 3.5 - 5.1 mmol/L    Chloride 107 95 - 110 mmol/L    CO2 22 (L) 23 - 29 mmol/L    Glucose 148 (H) 70 - 110 mg/dL    BUN 15 6 - 20 mg/dL    Creatinine 1.0 0.5 - 1.4 mg/dL    Calcium 9.3 8.7 - 10.5 mg/dL    Total Protein 6.8 6.0 - 8.4 g/dL    Albumin 3.8 3.5 - 5.2 g/dL    Total Bilirubin 0.3 0.1 - 1.0 mg/dL    Alkaline Phosphatase  103 55 - 135 U/L    AST 33 10 - 40 U/L    ALT 31 10 - 44 U/L    Anion Gap 10 8 - 16 mmol/L    eGFR if African American >60.0 >60 mL/min/1.73 m^2    eGFR if non African American >60.0 >60 mL/min/1.73 m^2   Lactic acid, plasma #1   Result Value Ref Range    Lactate (Lactic Acid) 2.7 (H) 0.5 - 2.2 mmol/L   Urinalysis, Reflex to Urine Culture Urine, Clean Catch    Specimen: Urine   Result Value Ref Range    Specimen UA Urine, Clean Catch     Color, UA Green (A) Yellow, Straw, [place]    Appearance, UA Hazy (A) Clear    pH, UA 6.0 5.0 - 8.0    Specific Gravity, UA 1.020 1.005 - 1.030    Protein, UA Negative Negative    Glucose, UA Negative Negative    Ketones, UA Negative Negative    Bilirubin (UA) Negative Negative    Occult Blood UA 1+ (A) Negative    Nitrite, UA Negative Negative    Leukocytes, UA Trace (A) Negative   Troponin I   Result Value Ref Range    Troponin I <0.006 0.000 - 0.026 ng/mL   Brain natriuretic peptide   Result Value Ref Range    BNP <10 0 - 99 pg/mL   COVID-19 Rapid Screening   Result Value Ref Range    SARS-CoV-2 RNA, Amplification, Qual Positive (A) Negative   Lactic acid, plasma #2   Result Value Ref Range    Lactate (Lactic Acid) 1.7 0.5 - 2.2 mmol/L   Urinalysis Microscopic   Result Value Ref Range    RBC, UA 15 (H) 0 - 4 /hpf    WBC, UA 1 0 - 5 /hpf    Bacteria Occasional None-Occ /hpf    Squam Epithel, UA 2 /hpf    Microscopic Comment SEE COMMENT    POCT Influenza A/B Molecular   Result Value Ref Range    POC Molecular Influenza A Ag Negative Negative, Not Reported    POC Molecular Influenza B Ag Negative Negative, Not Reported     Acceptable Yes    ISTAT PROCEDURE   Result Value Ref Range    POC PH 7.384 7.35 - 7.45    POC PCO2 42.4 35 - 45 mmHg    POC PO2 46 40 - 60 mmHg    POC HCO3 25.3 24 - 28 mmol/L    POC BE 0 -2 to 2 mmol/L    POC SATURATED O2 80 (L) 95 - 100 %    POC TCO2 27 24 - 29 mmol/L    Sample VENOUS     Site Other     Allens Test N/A     DelSys Room Air     Mode  SPONT      *Note: Due to a large number of results and/or encounters for the requested time period, some results have not been displayed. A complete set of results can be found in Results Review.        Assessment:       1. Pneumonia due to COVID-19 virus    2. Seronegative rheumatoid arthritis    3. Psoriatic arthritis    4. Chronic pain syndrome    5. Lumbar and sacral osteoarthritis    6. Fibromyalgia            Plan:       Pneumonia due to COVID-19 virus  -     clarithromycin (BIAXIN) 500 MG tablet; Take 1 tablet (500 mg total) by mouth every 12 (twelve) hours. for 10 days  Dispense: 20 tablet; Refill: 0  -     dexAMETHasone (DECADRON) 1 MG Tab; Take 1 tablet (1 mg total) by mouth every 12 (twelve) hours. for 10 days  Dispense: 20 tablet; Refill: 0  -     ipratropium-albuteroL (COMBIVENT)  mcg/actuation inhaler; Inhale 1 puff into the lungs 4 (four) times daily. Rescue  Dispense: 4 each; Refill: 1  -     promethazine-codeine 6.25-10 mg/5 ml (PHENERGAN WITH CODEINE) 6.25-10 mg/5 mL syrup; Take 5 mLs by mouth every 4 (four) hours as needed for Cough.  Dispense: 240 mL; Refill: 0    Seronegative rheumatoid arthritis  -     clarithromycin (BIAXIN) 500 MG tablet; Take 1 tablet (500 mg total) by mouth every 12 (twelve) hours. for 10 days  Dispense: 20 tablet; Refill: 0  -     dexAMETHasone (DECADRON) 1 MG Tab; Take 1 tablet (1 mg total) by mouth every 12 (twelve) hours. for 10 days  Dispense: 20 tablet; Refill: 0  -     ipratropium-albuteroL (COMBIVENT)  mcg/actuation inhaler; Inhale 1 puff into the lungs 4 (four) times daily. Rescue  Dispense: 4 each; Refill: 1  -     promethazine-codeine 6.25-10 mg/5 ml (PHENERGAN WITH CODEINE) 6.25-10 mg/5 mL syrup; Take 5 mLs by mouth every 4 (four) hours as needed for Cough.  Dispense: 240 mL; Refill: 0    Psoriatic arthritis  -     clarithromycin (BIAXIN) 500 MG tablet; Take 1 tablet (500 mg total) by mouth every 12 (twelve) hours. for 10 days  Dispense: 20 tablet;  Refill: 0  -     dexAMETHasone (DECADRON) 1 MG Tab; Take 1 tablet (1 mg total) by mouth every 12 (twelve) hours. for 10 days  Dispense: 20 tablet; Refill: 0  -     ipratropium-albuteroL (COMBIVENT)  mcg/actuation inhaler; Inhale 1 puff into the lungs 4 (four) times daily. Rescue  Dispense: 4 each; Refill: 1  -     promethazine-codeine 6.25-10 mg/5 ml (PHENERGAN WITH CODEINE) 6.25-10 mg/5 mL syrup; Take 5 mLs by mouth every 4 (four) hours as needed for Cough.  Dispense: 240 mL; Refill: 0    Chronic pain syndrome  -     clarithromycin (BIAXIN) 500 MG tablet; Take 1 tablet (500 mg total) by mouth every 12 (twelve) hours. for 10 days  Dispense: 20 tablet; Refill: 0  -     dexAMETHasone (DECADRON) 1 MG Tab; Take 1 tablet (1 mg total) by mouth every 12 (twelve) hours. for 10 days  Dispense: 20 tablet; Refill: 0  -     ipratropium-albuteroL (COMBIVENT)  mcg/actuation inhaler; Inhale 1 puff into the lungs 4 (four) times daily. Rescue  Dispense: 4 each; Refill: 1  -     promethazine-codeine 6.25-10 mg/5 ml (PHENERGAN WITH CODEINE) 6.25-10 mg/5 mL syrup; Take 5 mLs by mouth every 4 (four) hours as needed for Cough.  Dispense: 240 mL; Refill: 0    Lumbar and sacral osteoarthritis  -     clarithromycin (BIAXIN) 500 MG tablet; Take 1 tablet (500 mg total) by mouth every 12 (twelve) hours. for 10 days  Dispense: 20 tablet; Refill: 0  -     dexAMETHasone (DECADRON) 1 MG Tab; Take 1 tablet (1 mg total) by mouth every 12 (twelve) hours. for 10 days  Dispense: 20 tablet; Refill: 0  -     ipratropium-albuteroL (COMBIVENT)  mcg/actuation inhaler; Inhale 1 puff into the lungs 4 (four) times daily. Rescue  Dispense: 4 each; Refill: 1  -     promethazine-codeine 6.25-10 mg/5 ml (PHENERGAN WITH CODEINE) 6.25-10 mg/5 mL syrup; Take 5 mLs by mouth every 4 (four) hours as needed for Cough.  Dispense: 240 mL; Refill: 0    Fibromyalgia  -     clarithromycin (BIAXIN) 500 MG tablet; Take 1 tablet (500 mg total) by mouth every 12  (twelve) hours. for 10 days  Dispense: 20 tablet; Refill: 0  -     dexAMETHasone (DECADRON) 1 MG Tab; Take 1 tablet (1 mg total) by mouth every 12 (twelve) hours. for 10 days  Dispense: 20 tablet; Refill: 0  -     ipratropium-albuteroL (COMBIVENT)  mcg/actuation inhaler; Inhale 1 puff into the lungs 4 (four) times daily. Rescue  Dispense: 4 each; Refill: 1  -     promethazine-codeine 6.25-10 mg/5 ml (PHENERGAN WITH CODEINE) 6.25-10 mg/5 mL syrup; Take 5 mLs by mouth every 4 (four) hours as needed for Cough.  Dispense: 240 mL; Refill: 0        Assessment:  52 year old female with  Psoriatic arthritis, seronegative RA  --chronic pain syndrome on tramadol  --uncontrolled major depression, anxiety followed by Psychiatry  --osteopenia  --hashimoto's thyroiditis followed by Endocrinology    Plan:  1. Hold tx for now  The patient location is: home  The chief complaint leading to consultation is: covid pos psa    Visit type: audiovisual    Face to Face time with patient: 30   minutes of total time spent on the encounter, which includes face to face time and non-face to face time preparing to see the patient (eg, review of tests), Obtaining and/or reviewing separately obtained history, Documenting clinical information in the electronic or other health record, Independently interpreting results (not separately reported) and communicating results to the patient/family/caregiver, or Care coordination (not separately reported).         Each patient to whom he or she provides medical services by telemedicine is:  (1) informed of the relationship between the physician and patient and the respective role of any other health care provider with respect to management of the patient; and (2) notified that he or she may decline to receive medical services by telemedicine and may withdraw from such care at any time.    Notes:

## 2022-07-20 NOTE — PROGRESS NOTES
"Outpatient Psychiatry Follow-Up Visit (MD/NP)    7/20/2022 The patient location is: home in   The chief complaint leading to consultation is: depression and anxiety    Visit type: audiovisual    Face to Face time with patient: 20" (2" on meds and 18" for supportive counseling and update of history and mental status)  24 minutes of total time spent on the encounter, which includes face to face time and non-face to face time preparing to see the patient (eg, review of tests), Obtaining and/or reviewing separately obtained history, Documenting clinical information in the electronic or other health record, Independently interpreting results (not separately reported) and communicating results to the patient/family/caregiver, or Care coordination (not separately reported).         Each patient to whom he or she provides medical services by telemedicine is:  (1) informed of the relationship between the physician and patient and the respective role of any other health care provider with respect to management of the patient; and (2) notified that he or she may decline to receive medical services by telemedicine and may withdraw from such care at any time.    Notes: See below.    Clinical Status of Patient:  Outpatient (Ambulatory)    Chief Complaint:  Marilee Simons is a 52 y.o. female who presents today for follow-up of depression and anxiety.  Met with patient and no relatives present for interview.      Interval History and Content of Current Session:  Interim Events/Subjective Report/Content of Current Session:Patient is in the middle of a covid episode. Patient is in good self control and has no thoughts of harming herself nor signs of josi or psychosis. Patient reports a reduced sense of enjoyment. Patient complains of body aches and headaches. Patient does report some depression, manifested by a reduced urge or drive to engage in activities. We again discussed my half-way, and the need to schedule with another " doctor. We discussed coping with her physical conditions to include chronic panic and covid and related pulmonary concerns. I urged patient to try and reduce her isolation as much as possible by at least sitting outside during he daylight in the shade. Patient mentioned some pulmonary problems with oxygen levels recently low as well. Patient gets out of breath quickly. Support was offered to patient. Patient is using her meds appropriately. Patient has some increase in her anxiety level due to covid and her current isolation.    Psychotherapy:  · Target symptoms: recurrent depression, anxiety   · Why chosen therapy is appropriate versus another modality: relevant to diagnosis  · Outcome monitoring methods: self-report  · Therapeutic intervention type: supportive psychotherapy  · Topics discussed/themes: mood and anxiety concerns, stress related to medical comorbidities  · The patient's response to the intervention is accepting. The patient's progress toward treatment goals is limited.   · Duration of intervention: 20 minutes.    Review of Systems   · PSYCHIATRIC: Pertinant items are noted in the narrative.    Past Medical, Family and Social History: The patient's past medical, family and social history have been reviewed and updated as appropriate within the electronic medical record - see encounter notes.    Compliance: yes    Side effects: None    Risk Parameters:  Patient reports no suicidal ideation  Patient reports no homicidal ideation  Patient reports no self-injurious behavior  Patient reports no violent behavior    Exam (detailed: at least 9 elements; comprehensive: all 15 elements)   Constitutional  Vitals:  Most recent vital signs, dated less than 90 days prior to this appointment, were reviewed.   There were no vitals filed for this visit. Patient had recent temporary increase in BP and heart rate due to covid     General:  unremarkable, age appropriate, casually dressed, neatly groomed      Musculoskeletal  Muscle Strength/Tone:  patient is inactive and reports some loss of muscle strength and tone   Gait & Station:  non-ataxic, slow, limits walking due to low oxygen levels     Psychiatric  Speech:  no latency; no press, spontaneous   Mood & Affect:  anxious, sad  congruent and appropriate   Thought Process:  normal and logical   Associations:  intact   Thought Content:  normal, no suicidality, no homicidality, delusions, or paranoia   Insight:  has awareness of illness   Judgement: behavior is adequate to circumstances   Orientation:  grossly intact   Memory: intact for content of interview   Language: grossly intact   Attention Span & Concentration:  able to focus   Fund of Knowledge:  not tested     Assessment and Diagnosis   Status/Progress: Based on the examination today, the patient's problem(s) is/are inadequately controlled.  New problems have been presented today.   Co-morbidities and covid and related complications are complicating management of the primary condition.  The working differential for this patient includes depression and anxiety.     General Impression: Patient is ill now due to covid and is weakened as a result with low oxygen levels. Patient is trying to do the best she can to cope with this and ongoing pain and physical limitations. Patient takes her meds reliably and appropriately. Patient is motivated to do what she can to improve her conditions. Patient is not an active danger to self or others at this time.      ICD-10-CM ICD-9-CM   1. Major depressive disorder, recurrent episode, moderate  F33.1 296.32   2. Generalized anxiety disorder  F41.1 300.02       Intervention/Counseling/Treatment Plan   Medication Management: The risks and benefits of medication were discussed with the patient.  Patient agrees to increase Lamictal to 100 mg q hs to help with mood stability, and continue Klonopin 1 mg tid,and Cymbaltak 60 mg bid.    Return to Clinic: Patient will see another  doctor due to my custodial

## 2022-07-21 ENCOUNTER — PATIENT MESSAGE (OUTPATIENT)
Dept: GASTROENTEROLOGY | Facility: CLINIC | Age: 52
End: 2022-07-21
Payer: COMMERCIAL

## 2022-07-22 RX ORDER — RABEPRAZOLE SODIUM 20 MG/1
20 TABLET, DELAYED RELEASE ORAL DAILY
Qty: 30 TABLET | Refills: 2 | Status: SHIPPED | OUTPATIENT
Start: 2022-07-22 | End: 2022-10-24

## 2022-07-24 LAB
BACTERIA BLD CULT: NORMAL
BACTERIA BLD CULT: NORMAL

## 2022-07-25 ENCOUNTER — PATIENT MESSAGE (OUTPATIENT)
Dept: OPTOMETRY | Facility: CLINIC | Age: 52
End: 2022-07-25
Payer: COMMERCIAL

## 2022-07-27 ENCOUNTER — NURSE TRIAGE (OUTPATIENT)
Dept: ADMINISTRATIVE | Facility: CLINIC | Age: 52
End: 2022-07-27
Payer: COMMERCIAL

## 2022-07-27 ENCOUNTER — HOSPITAL ENCOUNTER (EMERGENCY)
Facility: HOSPITAL | Age: 52
Discharge: HOME | End: 2022-07-27
Attending: EMERGENCY MEDICINE
Payer: COMMERCIAL

## 2022-07-27 VITALS
WEIGHT: 165 LBS | HEART RATE: 70 BPM | RESPIRATION RATE: 18 BRPM | OXYGEN SATURATION: 98 % | BODY MASS INDEX: 33.33 KG/M2 | TEMPERATURE: 99 F | DIASTOLIC BLOOD PRESSURE: 83 MMHG | SYSTOLIC BLOOD PRESSURE: 128 MMHG

## 2022-07-27 DIAGNOSIS — U07.1 COVID-19: ICD-10-CM

## 2022-07-27 LAB
ALBUMIN SERPL BCP-MCNC: 3.6 G/DL (ref 3.5–5.2)
ALP SERPL-CCNC: 105 U/L (ref 55–135)
ALT SERPL W/O P-5'-P-CCNC: 22 U/L (ref 10–44)
ANION GAP SERPL CALC-SCNC: 8 MMOL/L (ref 8–16)
AST SERPL-CCNC: 12 U/L (ref 10–40)
BASOPHILS # BLD AUTO: 0.04 K/UL (ref 0–0.2)
BASOPHILS NFR BLD: 0.3 % (ref 0–1.9)
BILIRUB SERPL-MCNC: 0.2 MG/DL (ref 0.1–1)
BNP SERPL-MCNC: 11 PG/ML (ref 0–99)
BUN SERPL-MCNC: 21 MG/DL (ref 6–20)
CALCIUM SERPL-MCNC: 9.4 MG/DL (ref 8.7–10.5)
CHLORIDE SERPL-SCNC: 105 MMOL/L (ref 95–110)
CK SERPL-CCNC: 36 U/L (ref 20–180)
CO2 SERPL-SCNC: 30 MMOL/L (ref 23–29)
CREAT SERPL-MCNC: 0.9 MG/DL (ref 0.5–1.4)
CRP SERPL-MCNC: 0.7 MG/L (ref 0–8.2)
DIFFERENTIAL METHOD: ABNORMAL
EOSINOPHIL # BLD AUTO: 0.1 K/UL (ref 0–0.5)
EOSINOPHIL NFR BLD: 0.4 % (ref 0–8)
ERYTHROCYTE [DISTWIDTH] IN BLOOD BY AUTOMATED COUNT: 13.1 % (ref 11.5–14.5)
EST. GFR  (AFRICAN AMERICAN): >60 ML/MIN/1.73 M^2
EST. GFR  (NON AFRICAN AMERICAN): >60 ML/MIN/1.73 M^2
FERRITIN SERPL-MCNC: 22 NG/ML (ref 20–300)
GLUCOSE SERPL-MCNC: 105 MG/DL (ref 70–110)
HCT VFR BLD AUTO: 38.4 % (ref 37–48.5)
HGB BLD-MCNC: 12.2 G/DL (ref 12–16)
IMM GRANULOCYTES # BLD AUTO: 0.33 K/UL (ref 0–0.04)
IMM GRANULOCYTES NFR BLD AUTO: 2.9 % (ref 0–0.5)
LACTATE SERPL-SCNC: 1.5 MMOL/L (ref 0.5–2.2)
LDH SERPL L TO P-CCNC: 172 U/L (ref 110–260)
LYMPHOCYTES # BLD AUTO: 2.8 K/UL (ref 1–4.8)
LYMPHOCYTES NFR BLD: 24 % (ref 18–48)
MCH RBC QN AUTO: 27.3 PG (ref 27–31)
MCHC RBC AUTO-ENTMCNC: 31.8 G/DL (ref 32–36)
MCV RBC AUTO: 86 FL (ref 82–98)
MONOCYTES # BLD AUTO: 1.5 K/UL (ref 0.3–1)
MONOCYTES NFR BLD: 12.6 % (ref 4–15)
NEUTROPHILS # BLD AUTO: 6.9 K/UL (ref 1.8–7.7)
NEUTROPHILS NFR BLD: 59.8 % (ref 38–73)
NRBC BLD-RTO: 0 /100 WBC
PLATELET # BLD AUTO: 388 K/UL (ref 150–450)
PMV BLD AUTO: 8.3 FL (ref 9.2–12.9)
POTASSIUM SERPL-SCNC: 4 MMOL/L (ref 3.5–5.1)
PROT SERPL-MCNC: 6.8 G/DL (ref 6–8.4)
RBC # BLD AUTO: 4.47 M/UL (ref 4–5.4)
SODIUM SERPL-SCNC: 143 MMOL/L (ref 136–145)
TROPONIN I SERPL DL<=0.01 NG/ML-MCNC: 0.01 NG/ML (ref 0–0.03)
TROPONIN I SERPL DL<=0.01 NG/ML-MCNC: 0.01 NG/ML (ref 0–0.03)
WBC # BLD AUTO: 11.52 K/UL (ref 3.9–12.7)

## 2022-07-27 PROCEDURE — 96361 HYDRATE IV INFUSION ADD-ON: CPT | Mod: 59

## 2022-07-27 PROCEDURE — 83615 LACTATE (LD) (LDH) ENZYME: CPT | Performed by: NURSE PRACTITIONER

## 2022-07-27 PROCEDURE — 84484 ASSAY OF TROPONIN QUANT: CPT | Mod: 91 | Performed by: NURSE PRACTITIONER

## 2022-07-27 PROCEDURE — 86140 C-REACTIVE PROTEIN: CPT | Performed by: NURSE PRACTITIONER

## 2022-07-27 PROCEDURE — 82728 ASSAY OF FERRITIN: CPT | Performed by: NURSE PRACTITIONER

## 2022-07-27 PROCEDURE — 85025 COMPLETE CBC W/AUTO DIFF WBC: CPT | Performed by: NURSE PRACTITIONER

## 2022-07-27 PROCEDURE — 83880 ASSAY OF NATRIURETIC PEPTIDE: CPT | Performed by: NURSE PRACTITIONER

## 2022-07-27 PROCEDURE — 83605 ASSAY OF LACTIC ACID: CPT | Performed by: NURSE PRACTITIONER

## 2022-07-27 PROCEDURE — 25000003 PHARM REV CODE 250: Performed by: NURSE PRACTITIONER

## 2022-07-27 PROCEDURE — 82550 ASSAY OF CK (CPK): CPT | Performed by: NURSE PRACTITIONER

## 2022-07-27 PROCEDURE — 80053 COMPREHEN METABOLIC PANEL: CPT | Performed by: NURSE PRACTITIONER

## 2022-07-27 PROCEDURE — 93010 EKG 12-LEAD: ICD-10-PCS | Mod: ,,, | Performed by: INTERNAL MEDICINE

## 2022-07-27 PROCEDURE — 93010 ELECTROCARDIOGRAM REPORT: CPT | Mod: ,,, | Performed by: INTERNAL MEDICINE

## 2022-07-27 PROCEDURE — 96365 THER/PROPH/DIAG IV INF INIT: CPT

## 2022-07-27 PROCEDURE — 93005 ELECTROCARDIOGRAM TRACING: CPT

## 2022-07-27 PROCEDURE — 63600175 PHARM REV CODE 636 W HCPCS: Performed by: NURSE PRACTITIONER

## 2022-07-27 PROCEDURE — 99284 EMERGENCY DEPT VISIT MOD MDM: CPT | Mod: 25

## 2022-07-27 RX ORDER — PROMETHAZINE HYDROCHLORIDE 25 MG/1
25 TABLET ORAL EVERY 6 HOURS PRN
Qty: 15 TABLET | Refills: 0 | Status: SHIPPED | OUTPATIENT
Start: 2022-07-27 | End: 2022-12-08

## 2022-07-27 RX ADMIN — PROMETHAZINE HYDROCHLORIDE 12.5 MG: 25 INJECTION INTRAMUSCULAR; INTRAVENOUS at 02:07

## 2022-07-27 NOTE — ED NOTES
Pt ambulated in [facility] with ED tech. O2 sat % with no visible shortness of breath or respiratory distress. JORDAN Mullins updated. Further orders pending.

## 2022-07-27 NOTE — TELEPHONE ENCOUNTER
[place]    PCP:  Dr. Vannessa Marie    Pt reports tested + for Covid on 7/19.  She reports that she is high risk.  C/O loss of appetite, decreased fluid intake, weakness, dizziness, and nausea.  She is taking Molnupiravir; 2 days left.  Denies SOB, CP, and fever.  SpO2=97% LE=633.  Per protocol, care advised is go to the ED.  Instructed to call for questions/concerns.  VU.    Reason for Disposition   Patient sounds very sick or weak to the triager    Additional Information   Negative: SEVERE difficulty breathing (e.g., struggling for each breath, speaks in single words)   Negative: Difficult to awaken or acting confused (e.g., disoriented, slurred speech)   Negative: Bluish (or gray) lips or face now   Negative: Shock suspected (e.g., cold/pale/clammy skin, too weak to stand, low BP, rapid pulse)   Negative: Sounds like a life-threatening emergency to the triager   Negative: SEVERE or constant chest pain or pressure  (Exception: Mild central chest pain, present only when coughing.)   Negative: MODERATE difficulty breathing (e.g., speaks in phrases, SOB even at rest, pulse 100-120)   Negative: Headache and stiff neck (can't touch chin to chest)   Negative: Oxygen level (e.g., pulse oximetry) 90 percent or lower   Negative: Chest pain or pressure    Protocols used: CORONAVIRUS (COVID-19) DIAGNOSED OR [id number]

## 2022-07-27 NOTE — ED NOTES
Patient ambulatory in ED with stead gait.  Rounding conducted, with no safety concerns identified.  Care plan reviewed with patient and family.  Opportunities provided to ask questions, with answers to meet their needs. Bathroom needs addressed.  No new complaints or noted concerns.

## 2022-07-27 NOTE — ED NOTES
Patient received in WC from the  with report of nausea, SOB and weakness on exertion.  Decreased appetite.  Patient states she was diagnosed with Covid 7/18/2022.  Also reports mid sternal chest pain present intermittent since Covid started.  Non radiating.     Pain:  Rated 6/10.     Psychosocial:  Patient is calm and cooperative.  Patients insight and judgement are appropriate to situation.  Appears clean, well maintained, with clothing appropriate to environment.  No evidence of delusions, hallucinations, or psychosis.     Neuro:  Eyes open spontaneously.  Awake, alert, oriented x 4.  Speech clear and appropriate.  Tolerating saliva secretions well.  Able to follow commands, demonstrating ability to actively and appropriately communicate within context of current conversation.  Symmetrical facial muscles.  Moving all extremities well with no noted weakness.  Adequate muscle tone present.    Movement is purposeful.        Airway:  Bilateral chest rise and fall.  RR regular and non-labored.  Air entry patent and clear x 5 lobes of the lungs.  No crepitus or subcutaneous emphysema noted on palpation.       Circulatory:  Skin warm, dry, and pink.  Apical and radial pulses strong and regular.  Capillary refill/skin blanching less than 3 seconds to distal of 4 extremities.     Abdomen:  Abdomen soft and non-distended.  Positive normo-active bowel sounds x 4 quadrants.       Urinary:  Patient denies pain, frequency, or urgency.  Voids independently.  Reports urine appears sandy/yellow in color.     Extremities:  No redness, heat, swelling, deformity, or pain.     Skin:  Intact with no bruising/discolorations noted.

## 2022-07-27 NOTE — DISCHARGE INSTRUCTIONS

## 2022-07-27 NOTE — ED PROVIDER NOTES
"Encounter Date: 7/27/2022    SCRIBE #1 NOTE: I, Anam Baldwin , am scribing for, and in the presence of, Susanna Vazquez NP.       History     Chief Complaint   Patient presents with    COVID-19 Concerns     C/O having weakness, SOB, and chest discomfort, reports positive for covid, states "my rheumatologist prescribed me combivent inhaler", hx of lupus, pain is described as "heart burn"     Patient is a 52 year old female who presents to the ED COVID-19 Concerns has a medical history of lupus. Patient reports she tested positive for COVID 7/18 and was prescribed clarithromycin,lagevrio,dexamethasone,and a combivent respimat inhaler.  She states she doesn't feel better and "it could be the medicine making me sick". She also reports she was supposed to have a IV treatment but was cancelled. Patient reports weakness, SOB, chest discomfort that she describes as "heart burn" that all began a few days ago. Patient denies symptoms of rash, fever, and neck stiffness. Pt states she feels like she needs fluids and is dehydrated.             The history is provided by the patient. No  was used.     Review of patient's allergies indicates:   Allergen Reactions    Cephalexin Itching    Cephalosporins     Zofran [ondansetron hcl (pf)] Hives    Penicillins Rash     Past Medical History:   Diagnosis Date    Acid reflux     Allergy     Alopecia     Anxiety     Arthralgia     Back pain     Depression     Dry eyes     from meds    Fever blister     Fibromyalgia     Interstitial cystitis     Irritable bowel syndrome     Kidney stone     Major depressive disorder, recurrent episode, in partial or unspecified remission 6/25/2013    OAB (overactive bladder) 7/21/2015    PTSD (post-traumatic stress disorder)     Pure hypercholesterolemia 7/6/2022    Rheumatoid arthritis     Rheumatoid arthritis 6/21/2022    Systemic lupus erythematosus     Thyroid disease     Urinary tract infection  "    Vaginal infection      Past Surgical History:   Procedure Laterality Date    BLADDER SURGERY       SECTION, LOW TRANSVERSE      x 2    COLONOSCOPY      COLONOSCOPY N/A 10/5/2017    Procedure: COLONOSCOPY;  Surgeon: Venkat He MD;  Location: [facility] ENDO (4TH FLR);  Service: Endoscopy;  Laterality: N/A;    ESOPHAGOGASTRODUODENOSCOPY      ESOPHAGOGASTRODUODENOSCOPY N/A 10/25/2021    Procedure: EGD (ESOPHAGOGASTRODUODENOSCOPY);  Surgeon: Venkat He MD;  Location: [facility] ENDO (4TH FLR);  Service: Endoscopy;  Laterality: N/A;  Covid test on 10/22 at [facility]    10/19 lvm to confirm appt-rb    EYE SURGERY      Lasik-bilateral    HYSTERECTOMY      IMPLANTATION OF PERMANENT SACRAL NERVE STIMULATOR N/A 2022    Procedure: INSERTION, NEUROSTIMULATOR, PERMANENT, SACRAL;  Surgeon: Bandar Garcia MD;  [facility];  Service: Urology;  Laterality: N/A;  2hr    INJECTION OF BOTULINUM TOXIN TYPE A N/A 2018    Procedure: INJECTION, BOTULINUM TOXIN, TYPE A  200 UNITS;  Surgeon: Bandar Garcia MD;  Location: [facility] OR [address] [place] [address];  Service: Urology;  Laterality: N/A;    INSTILLATION OF URINARY BLADDER N/A 2018    Procedure: INSTILLATION, URINARY BLADDER;  Surgeon: Bandar Garcia MD;  [facility];  Service: Urology;  Laterality: N/A;    PARTIAL HYSTERECTOMY      REMOVAL OF ELECTRODE LEAD OF SACRAL NERVE STIMULATOR N/A 2022    Procedure: REMOVAL, ELECTRODE LEAD, SACRAL NERVE STIMULATOR;  Surgeon: Bandar Garcia MD;  [facility];  Service: Urology;  Laterality: N/A;    TRANSFORAMINAL EPIDURAL INJECTION OF STEROID Left 2/15/2021    Procedure: LUMBAR TRANSFORAMINAL LEFT L5 AND S1 DIRECT REFERRAL;  Surgeon: Marquez Nesbitt MD;  Location: [facility] PAIN MGT;  Service: Pain Management;  Laterality: Left;  NEEDS CONSENT, PT REQUESTING IV SEDATION     Family History   Problem Relation Age of Onset    Irritable bowel syndrome Mother     Irritable bowel  syndrome Sister     Lupus Sister     Rheum arthritis Sister     Fibromyalgia Sister     Irritable bowel syndrome Sister     Celiac disease Neg Hx     Cirrhosis Neg Hx     Colon cancer Neg Hx     Colon polyps Neg Hx     Crohn's disease Neg Hx     Cystic fibrosis Neg Hx     Esophageal cancer Neg Hx     Hemochromatosis Neg Hx     Inflammatory bowel disease Neg Hx     Liver cancer Neg Hx     Liver disease Neg Hx     Rectal cancer Neg Hx     Stomach cancer Neg Hx     Ulcerative colitis Neg Hx     Boris's disease Neg Hx     Melanoma Neg Hx     Amblyopia Neg Hx     Blindness Neg Hx     Cataracts Neg Hx     Glaucoma Neg Hx     Macular degeneration Neg Hx     Retinal detachment Neg Hx     Strabismus Neg Hx      Social History     Tobacco Use    Smoking status: Never Smoker    Smokeless tobacco: Never Used   Substance Use Topics    Alcohol use: No    Drug use: No     Review of Systems   Constitutional: Negative for fever.   HENT: Negative for sore throat.    Respiratory: Positive for cough and shortness of breath.    Cardiovascular: Negative for chest pain.   Gastrointestinal: Positive for nausea. Negative for abdominal pain.   Genitourinary: Negative for dysuria.   Musculoskeletal: Negative for back pain and neck stiffness.   Skin: Negative for rash.   Neurological: Positive for weakness.   Hematological: Does not bruise/bleed easily.       Physical Exam     Initial Vitals [07/27/22 1232]   BP Pulse Resp Temp SpO2   117/69 92 18 98.5 °F (36.9 °C) 97 %      MAP       --         Physical Exam    Constitutional: She appears well-developed and well-nourished.  Non-toxic appearance. She appears ill.   Sickly   Non-toxic    HENT:   Head: Normocephalic.   Right Ear: Hearing and tympanic membrane normal.   Left Ear: Hearing and tympanic membrane normal.   Nose: Nose normal.   Mouth/Throat: Oropharynx is clear and moist.   Eyes: Lids are normal. Pupils are equal, round, and reactive to light.   Neck:    Normal range of motion.  Cardiovascular: Normal rate.   Pulmonary/Chest: Breath sounds normal. No respiratory distress. She has no wheezes. She has no rhonchi.   Abdominal: Abdomen is soft. There is no abdominal tenderness.   Musculoskeletal:         General: Normal range of motion.      Cervical back: Normal range of motion. No edema, erythema or rigidity. Normal range of motion.     Neurological: She is alert and oriented to person, place, and time.   Skin: Skin is warm and dry. No rash noted.   Psychiatric: She has a normal mood and affect. Her behavior is normal. Judgment and thought content normal.         ED Course   Procedures  Labs Reviewed   CBC W/ AUTO DIFFERENTIAL - Abnormal; Notable for the following components:       Result Value    MCHC 31.8 (*)     MPV 8.3 (*)     Immature Granulocytes 2.9 (*)     Immature Grans (Abs) 0.33 (*)     Mono # 1.5 (*)     All other components within normal limits   COMPREHENSIVE METABOLIC PANEL - Abnormal; Notable for the following components:    CO2 30 (*)     BUN 21 (*)     All other components within normal limits   C-REACTIVE PROTEIN   FERRITIN   LACTATE DEHYDROGENASE   CK   LACTIC ACID, PLASMA   TROPONIN I   B-TYPE NATRIURETIC PEPTIDE   TROPONIN I        ECG Results          EKG 12-lead (In process)  Result time 07/27/22 15:12:26    In process by Interface, Lab In [facility] (07/27/22 15:12:26)                 Narrative:    Test Reason : U07.1,    Vent. Rate : 091 BPM     Atrial Rate : 091 BPM     P-R Int : 134 ms          QRS Dur : 086 ms      QT Int : 360 ms       P-R-T Axes : 052 -03 056 degrees     QTc Int : 442 ms    Normal sinus rhythm  Moderate voltage criteria for LVH, may be normal variant  Nonspecific T wave abnormality  Abnormal ECG  When compared with ECG of 19-JUL-2022 00:07,  No significant change was found    Referred By: [email or web address]   SELF           Confirmed By:                             Imaging Results          X-Ray Chest 1 View (Final result)  Result  time 07/27/22 13:27:28    Final result by Neeraj Gar III, MD (07/27/22 13:27:28)                 Impression:      No acute process seen.      Electronically signed by: Neeraj Gar MD  Date:    07/27/2022  Time:    13:27             Narrative:    EXAMINATION:  XR CHEST 1 VIEW    CLINICAL HISTORY:  COVID-19    FINDINGS:  Chest one view: Heart size is normal.  Lungs are clear and the bones bowel gas are noncontributory.                                 Medications   sodium chloride 0.9% bolus 1,000 mL (0 mLs Intravenous Stopped 7/27/22 1521)   promethazine (PHENERGAN) 12.5 mg in dextrose 5 % 50 mL IVPB (0 mg Intravenous Stopped 7/27/22 1521)     Medical Decision Making:   Differential Diagnosis:   Differential Diagnosis includes, but is not limited to:  COVID-19 infection, influenza, bacterial sinusitis, allergic rhinitis, bacterial/viral pharyngitis, peritonsillar abscess, retropharyngeal abscess, bacterial/viral pneumonia.    Clinical Tests:   Lab Tests: Ordered and Reviewed  Radiological Study: Ordered and Reviewed  Medical Tests: Ordered and Reviewed          Scribe Attestation:   Scribe #1: I performed the above scribed service and the documentation accurately describes the services I performed. I attest to the accuracy of the note.        ED Course as of 07/27/22 1924 Wed Jul 27, 2022   1443 Pt was requesting nausea meds prior. Allergic to Zofran therefore Phenergan ordered   [DT]   1612 Repeat troponin pending. Pt is resting and tolerated ambulatory pulse ox well. [DT]   1923 I had reviewed with the pt her test results and need for follow up care with pcp. Pt had much improvement with the phenergan and fluids given. Repeat troponin was negative. She has been strict return precautions and placed on the COVID home monitoring program.  [DT]      ED Course User Index  [DT] Susanna Vazquez NP             Clinical Impression:   Final diagnoses:  [U07.1] COVID-19          ED Disposition Condition     Discharge Stable        ED Prescriptions     Medication Sig Dispense Start Date End Date Auth. Provider    promethazine (PHENERGAN) 25 MG tablet Take 1 tablet (25 mg total) by mouth every 6 (six) hours as needed for Nausea. 15 tablet 7/27/2022  Susanna Vazquez NP        Follow-up Information     Follow up With Specialties Details Why Contact Info    Vannessa Marie MD Internal Medicine Schedule an appointment as soon as possible for a visit in 2 days  [address] [facility] [address]  [phone]         I, Susanna Vazquez NP, personally performed the services described in this documentation.All medical record entries made by the scribe were at my direction and in my presence.I have reviewed the chart and agree that the record reflects my personal performance and is accurate and complete.        Susanna Vazquez NP  07/27/22 1924

## 2022-08-01 ENCOUNTER — PATIENT MESSAGE (OUTPATIENT)
Dept: PSYCHIATRY | Facility: CLINIC | Age: 52
End: 2022-08-01
Payer: COMMERCIAL

## 2022-08-03 ENCOUNTER — OFFICE VISIT (OUTPATIENT)
Dept: PSYCHIATRY | Facility: CLINIC | Age: 52
End: 2022-08-03
Payer: COMMERCIAL

## 2022-08-03 DIAGNOSIS — F33.1 MAJOR DEPRESSIVE DISORDER, RECURRENT EPISODE, MODERATE: Primary | ICD-10-CM

## 2022-08-03 DIAGNOSIS — F41.1 GENERALIZED ANXIETY DISORDER: ICD-10-CM

## 2022-08-03 PROCEDURE — 90834 PSYTX W PT 45 MINUTES: CPT | Mod: 95,,, | Performed by: SOCIAL WORKER

## 2022-08-03 PROCEDURE — 90834 PR PSYCHOTHERAPY W/PATIENT, 45 MIN: ICD-10-PCS | Mod: 95,,, | Performed by: SOCIAL WORKER

## 2022-08-03 NOTE — PROGRESS NOTES
Individual Psychotherapy (PhD/LCSW)    8/3/2022    Site:  [facility]         Therapeutic Intervention: Met with patient.  Outpatient - Insight oriented psychotherapy 45 min - CPT code 12033    Chief complaint/reason for encounter: depression, anxiety and ptsd     Interval history and content of current session: The patient location is: home in [place]  The chief complaint leading to consultation is: depression and BPD    Visit type: audiovisual    Face to Face time with patient: 45  45 minutes of total time spent on the encounter, which includes face to face time and non-face to face time preparing to see the patient (eg, review of tests), Obtaining and/or reviewing separately obtained history, Documenting clinical information in the electronic or other health record, Independently interpreting results (not separately reported) and communicating results to the patient/family/caregiver, or Care coordination (not separately reported).         Each patient to whom he or she provides medical services by telemedicine is:  (1) informed of the relationship between the physician and patient and the respective role of any other health care provider with respect to management of the patient; and (2) notified that he or she may decline to receive medical services by telemedicine and may withdraw from such care at any time.    Notes: Pt seen for follow-up.  Last seen in December.  She says that she has had covid for the past 2 weeks and can't seems to shake it.  The medication for it caused her stomach distress and she had to stop it.  Prior to getting covid she was in the hospital for sepsis.  While she has been sick her father in law fell and had a brain bleed and .  She didn't like him as he was mean to her throughout her marriage and feels nothing about his death.  Her 2 young adult children who grew up around him are very upset at the loss and angry with her for not showing any emotion about it.  She wants to  know what to do.    Treatment plan:  · Target symptoms: depression, anxiety , PTSD  · Why chosen therapy is appropriate versus another modality: relevant to diagnosis  · Outcome monitoring methods: self-report, observation  · Therapeutic intervention type: insight oriented psychotherapy    Risk parameters:  Patient reports no suicidal ideation  Patient reports no homicidal ideation  Patient reports no self-injurious behavior  Patient reports no violent behavior    Verbal deficits: None    Patient's response to intervention:  The patient's response to intervention is motivated.    Progress toward goals and other mental status changes:  The patient's progress toward goals is fair .    Diagnosis:     ICD-10-CM ICD-9-CM   1. Major depressive disorder, recurrent episode, moderate  F33.1 296.32   2. Generalized anxiety disorder  F41.1 300.02       Plan:  individual psychotherapy and medication management by physician    Return to clinic: as scheduled    Length of Service (minutes): 45

## 2022-08-04 ENCOUNTER — OFFICE VISIT (OUTPATIENT)
Dept: INTERNAL MEDICINE | Facility: CLINIC | Age: 52
End: 2022-08-04
Payer: COMMERCIAL

## 2022-08-04 VITALS
HEIGHT: 59 IN | TEMPERATURE: 98 F | WEIGHT: 175.94 LBS | OXYGEN SATURATION: 96 % | DIASTOLIC BLOOD PRESSURE: 78 MMHG | HEART RATE: 132 BPM | BODY MASS INDEX: 35.47 KG/M2 | SYSTOLIC BLOOD PRESSURE: 122 MMHG

## 2022-08-04 DIAGNOSIS — I20.89 ANGINAL EQUIVALENT: ICD-10-CM

## 2022-08-04 DIAGNOSIS — E66.01 SEVERE OBESITY (BMI 35.0-39.9) WITH COMORBIDITY: ICD-10-CM

## 2022-08-04 DIAGNOSIS — Z12.31 VISIT FOR SCREENING MAMMOGRAM: ICD-10-CM

## 2022-08-04 DIAGNOSIS — Z00.00 ANNUAL PHYSICAL EXAM: Primary | ICD-10-CM

## 2022-08-04 PROCEDURE — 3074F SYST BP LT 130 MM HG: CPT | Mod: CPTII,S$GLB,, | Performed by: INTERNAL MEDICINE

## 2022-08-04 PROCEDURE — 3078F DIAST BP <80 MM HG: CPT | Mod: CPTII,S$GLB,, | Performed by: INTERNAL MEDICINE

## 2022-08-04 PROCEDURE — 3078F PR MOST RECENT DIASTOLIC BLOOD PRESSURE < 80 MM HG: ICD-10-PCS | Mod: CPTII,S$GLB,, | Performed by: INTERNAL MEDICINE

## 2022-08-04 PROCEDURE — 99386 PREV VISIT NEW AGE 40-64: CPT | Mod: S$GLB,,, | Performed by: INTERNAL MEDICINE

## 2022-08-04 PROCEDURE — 1159F MED LIST DOCD IN RCRD: CPT | Mod: CPTII,S$GLB,, | Performed by: INTERNAL MEDICINE

## 2022-08-04 PROCEDURE — 3008F PR BODY MASS INDEX (BMI) DOCUMENTED: ICD-10-PCS | Mod: CPTII,S$GLB,, | Performed by: INTERNAL MEDICINE

## 2022-08-04 PROCEDURE — 99999 PR PBB SHADOW E&M-EST. PATIENT-LVL III: CPT | Mod: PBBFAC,,, | Performed by: INTERNAL MEDICINE

## 2022-08-04 PROCEDURE — 1160F RVW MEDS BY RX/DR IN RCRD: CPT | Mod: CPTII,S$GLB,, | Performed by: INTERNAL MEDICINE

## 2022-08-04 PROCEDURE — 99386 PR PREVENTIVE VISIT,NEW,40-64: ICD-10-PCS | Mod: S$GLB,,, | Performed by: INTERNAL MEDICINE

## 2022-08-04 PROCEDURE — 1160F PR REVIEW ALL MEDS BY PRESCRIBER/CLIN PHARMACIST DOCUMENTED: ICD-10-PCS | Mod: CPTII,S$GLB,, | Performed by: INTERNAL MEDICINE

## 2022-08-04 PROCEDURE — 99999 PR PBB SHADOW E&M-EST. PATIENT-LVL III: ICD-10-PCS | Mod: PBBFAC,,, | Performed by: INTERNAL MEDICINE

## 2022-08-04 PROCEDURE — 3074F PR MOST RECENT SYSTOLIC BLOOD PRESSURE < 130 MM HG: ICD-10-PCS | Mod: CPTII,S$GLB,, | Performed by: INTERNAL MEDICINE

## 2022-08-04 PROCEDURE — 3008F BODY MASS INDEX DOCD: CPT | Mod: CPTII,S$GLB,, | Performed by: INTERNAL MEDICINE

## 2022-08-04 PROCEDURE — 1159F PR MEDICATION LIST DOCUMENTED IN MEDICAL RECORD: ICD-10-PCS | Mod: CPTII,S$GLB,, | Performed by: INTERNAL MEDICINE

## 2022-08-04 RX ORDER — TIZANIDINE 4 MG/1
4 TABLET ORAL 3 TIMES DAILY
COMMUNITY
Start: 2022-06-30 | End: 2023-07-10

## 2022-08-04 NOTE — PROGRESS NOTES
Subjective:       Patient ID: Marilee Simons is a 52 y.o. female.    Chief Complaint: Annual Exam (Establish care), Menopause, and Otalgia    HPI    52 y.o. female with Active Diagnosis Review (HCC)     Chronic             HCC 40 - Rheumatoid Arthritis and Inflammatory Connective Tissue Disease       Psoriatic arthritis [L40.50]     Rheumatoid arthritis [M06.9]     Lupus [M32.9]     HCC 47 - Disorders of Immunity     Immunocompromised state [D84.9]     HCC 59 - Major Depressive, Bipolar, and Paranoid Disorders     Major depressive disorder, recurrent episode, moderate [F33.1]     HCC 88 - Angina Pectoris     Anginal equivalent [I20.8]     HCC 60 - Personality Disorders     Cluster B personality disorder [F60.89]          Acute             HCC 2 - Septicemia, Sepsis, Systemic Inflammatory Response   Syndrome/Shock     Sepsis, unspecified organism [A41.9]     Sepsis [A41.9]           here for annual exam and to establish care. She is accompanied by her Aunt today. She was recently hospitalized for sepsis of unknown source, and a later separate admission for Covid. She continues to have fatigue, dyspnea, HA, palpitations, chest tightness, arthralgias and myalgias. She follow w/ rheumatology for chronic pain related to RA, lupus, and FM. She follows w/ psychiatry for MDD, PTSD. She has chronic urologic disorders including IC and having stimulator for which she closely follows w/ Urology. She saw cardiologist for evaluation of chest pain and palpitations prior to having covid, stress test was ordered but she had to cancel apt for covid isolation- symptoms persist, but has not yet rescheduled stress evaluation. She has seen endocrinology in the past for hypothyroidism management. She reports a history of migraines, but has apt to establish with NEurology as she has had worsening of symptoms since having covid. She has gynecologist who she sees regularly, now is interested in pursuing HRT.    Health Maintenance/  Screening:   Labs: reviewed, ordered  Gyn Dr. Wiedemann  Breast Cancer: Mammogram 2016- due    Colorectal Cancer: 2017        Vaccines: reviewed, pfizer. shingrix due.    Health Maintenance Topics with due status: Not Due       Topic Last Completion Date    Colorectal Cancer Screening 10/05/2017    Lipid Panel 2021    Influenza Vaccine Not Due       Health Maintenance Due   Topic Date Due    Pneumococcal Vaccines (Age 0-64) (1 - PCV) Never done    Shingles Vaccine (1 of 2) Never done    TETANUS VACCINE  2017    Mammogram  10/21/2017    COVID-19 Vaccine (3 - Booster for Pfizer series) 2022             Past Medical History:   Diagnosis Date    Acid reflux     Allergy     Alopecia     Anxiety     Arthralgia     Back pain     Depression     Dry eyes     from meds    Fever blister     Fibromyalgia     Interstitial cystitis     Irritable bowel syndrome     Kidney stone     Major depressive disorder, recurrent episode, in partial or unspecified remission 2013    OAB (overactive bladder) 2015    PTSD (post-traumatic stress disorder)     Pure hypercholesterolemia 2022    Rheumatoid arthritis     Rheumatoid arthritis 2022    Systemic lupus erythematosus     Thyroid disease     Urinary tract infection     Vaginal infection      Past Surgical History:   Procedure Laterality Date    BLADDER SURGERY       SECTION, LOW TRANSVERSE      x 2    COLONOSCOPY      COLONOSCOPY N/A 10/5/2017    Procedure: COLONOSCOPY;  Surgeon: Venkat He MD;  Location: [address]);  Service: Endoscopy;  Laterality: N/A;    ESOPHAGOGASTRODUODENOSCOPY      ESOPHAGOGASTRODUODENOSCOPY N/A 10/25/2021    Procedure: EGD (ESOPHAGOGASTRODUODENOSCOPY);  Surgeon: Venkat He MD;  Location: [address]);  Service: Endoscopy;  Laterality: N/A;  Covid test on 10/22 at [email or web address]    10/19 lv to confirm appt-rb    EYE SURGERY      Lasik-bilateral    HYSTERECTOMY       IMPLANTATION OF PERMANENT SACRAL NERVE STIMULATOR N/A 4/27/2022    Procedure: INSERTION, NEUROSTIMULATOR, PERMANENT, SACRAL;  Surgeon: Bandar Garcia MD;  Location: [facility] OR 2ND FLR;  Service: Urology;  Laterality: N/A;  2hr    INJECTION OF BOTULINUM TOXIN TYPE A N/A 9/12/2018    Procedure: INJECTION, BOTULINUM TOXIN, TYPE A  200 UNITS;  Surgeon: Bandar Garcia MD;  [facility];  Service: Urology;  Laterality: N/A;    INSTILLATION OF URINARY BLADDER N/A 9/12/2018    Procedure: INSTILLATION, URINARY BLADDER;  Surgeon: Bandar Garcia MD;  Location: [facility] OR 1ST FLR;  Service: Urology;  Laterality: N/A;    PARTIAL HYSTERECTOMY      REMOVAL OF ELECTRODE LEAD OF SACRAL NERVE STIMULATOR N/A 4/27/2022    Procedure: REMOVAL, ELECTRODE LEAD, SACRAL NERVE STIMULATOR;  Surgeon: Bandar Garcia MD;  Location: [facility] OR 2ND FLR;  Service: Urology;  Laterality: N/A;    TRANSFORAMINAL EPIDURAL INJECTION OF STEROID Left 2/15/2021    Procedure: LUMBAR TRANSFORAMINAL LEFT L5 AND S1 DIRECT REFERRAL;  Surgeon: Marquez Nesbitt MD;  Location: [facility] [place], operated by [facility];  Service: Pain Management;  Laterality: Left;  NEEDS CONSENT, PT REQUESTING IV SEDATION     Family History   Problem Relation Age of Onset    Irritable bowel syndrome Mother     Irritable bowel syndrome Sister     Lupus Sister     Rheum arthritis Sister     Fibromyalgia Sister     Irritable bowel syndrome Sister     Celiac disease Neg Hx     Cirrhosis Neg Hx     Colon cancer Neg Hx     Colon polyps Neg Hx     Crohn's disease Neg Hx     Cystic fibrosis Neg Hx     Esophageal cancer Neg Hx     Hemochromatosis Neg Hx     Inflammatory bowel disease Neg Hx     Liver cancer Neg Hx     Liver disease Neg Hx     Rectal cancer Neg Hx     Stomach cancer Neg Hx     Ulcerative colitis Neg Hx     Boris's disease Neg Hx     Melanoma Neg Hx     Amblyopia Neg Hx     Blindness Neg Hx     Cataracts Neg Hx     Glaucoma Neg Hx     Macular degeneration Neg Hx      Retinal detachment Neg Hx     Strabismus Neg Hx      Social History     Socioeconomic History    Marital status:    Occupational History     Employer: OTHER   Tobacco Use    Smoking status: Never Smoker    Smokeless tobacco: Never Used   Substance and Sexual Activity    Alcohol use: No    Drug use: No    Sexual activity: Never   Other Topics Concern    Are you pregnant or think you may be? No    Breast-feeding No     Social Determinants of Health     Financial Resource Strain: Low Risk     Difficulty of Paying Living Expenses: Not very hard   Food Insecurity: No Food Insecurity    Worried About Running Out of Food in the Last Year: Never true    Ran Out of Food in the Last Year: Never true   Transportation Needs: No Transportation Needs    Lack of Transportation (Medical): No    Lack of Transportation (Non-Medical): No   Physical Activity: Inactive    Days of Exercise per Week: 0 days    Minutes of Exercise per Session: 0 min   Stress: Stress Concern Present    Feeling of Stress : To some extent   Social Connections: Unknown    Frequency of Communication with Friends and Family: More than three times a week    Frequency of Social Gatherings with Friends and Family: Never    Active Member of Clubs or Organizations: No    Attends Club or Organization Meetings: Never    Marital Status:    Housing Stability: Low Risk     Unable to Pay for Housing in the Last Year: No    Number of Places Lived in the Last Year: 1    Unstable Housing in the Last Year: No     Review of patient's allergies indicates:   Allergen Reactions    Cephalexin Itching    Cephalosporins     Zofran [ondansetron hcl (pf)] Hives    Penicillins Rash       Current Outpatient Medications:     alosetron (LOTRONEX) 0.5 MG tablet, Take 1 tablet (0.5 mg total) by mouth daily as needed. (Patient taking differently: Take 0.5 mg by mouth once daily.), Disp: 30 tablet, Rfl: 0    amitriptyline (ELAVIL) 10 MG tablet,  TAKE 1 TABLET(10 MG) BY MOUTH EVERY NIGHT AS NEEDED (Patient taking differently: Take 10 mg by mouth every evening.), Disp: 90 tablet, Rfl: 3    butalbitaL-acetaminophen  mg Tab, TAKE 1 TABLET BY MOUTH EVERY 4 HOURS (Patient taking differently: Take 1 tablet by mouth every 4 (four) hours as needed.), Disp: 60 tablet, Rfl: 3    calcium glycerophosphate (PRELIEF) 65 mg Tab, Take 2 tablets by mouth before meals and at bedtime as needed. (Patient taking differently: Take 2 tablets by mouth 3 (three) times daily with meals.), Disp: 100 each, Rfl: prn    cetirizine (ZYRTEC) 10 MG tablet, Take 1 tablet (10 mg total) by mouth once daily., Disp: 120 tablet, Rfl: 2    clonazePAM (KLONOPIN) 1 MG tablet, TAKE 1 TABLET(1 MG) BY MOUTH THREE TIMES DAILY, Disp: 90 tablet, Rfl: 2    cyclobenzaprine (FLEXERIL) 10 MG tablet, TAKE 1 TABLET(10 MG) BY MOUTH TWICE DAILY AS NEEDED (Patient taking differently: Take 10 mg by mouth 2 (two) times a day.), Disp: 90 tablet, Rfl: 6    dicyclomine (BENTYL) 20 mg tablet, Take 20 mg by mouth daily as needed., Disp: , Rfl:     DULoxetine (CYMBALTA) 60 MG capsule, TAKE 2 CAPSULES(120 MG) BY MOUTH EVERY DAY, Disp: 60 capsule, Rfl: 3    fluticasone propionate (FLONASE) 50 mcg/actuation nasal spray, 2 sprays (100 mcg total) by Each Nostril route 2 (two) times daily. (Patient taking differently: 2 sprays by Each Nostril route 2 (two) times daily as needed for Allergies.), Disp: 31.6 mL, Rfl: 5    gabapentin (NEURONTIN) 800 MG tablet, TAKE 1 TABLET(800 MG) BY MOUTH THREE TIMES DAILY (Patient taking differently: Take 800 mg by mouth 3 (three) times daily.), Disp: 90 tablet, Rfl: 11    hydrocortisone 2.5 % cream, Apply to affected areas twice a day when eczema is moderate. (Patient taking differently: Apply to affected areas twice a day as needed when eczema is moderate.), Disp: 453.6 g, Rfl: 1    hydrOXYchloroQUINE (PLAQUENIL) 200 mg tablet, Take 1 tablet (200 mg total) by mouth 2 (two)  times daily., Disp: 60 tablet, Rfl: 6    hydrOXYzine HCL (ATARAX) 25 MG tablet, 1-4 tablets as needed for itching, Disp: 240 tablet, Rfl: 3    lamoTRIgine (LAMICTAL) 100 MG tablet, Take 1 tablet (100 mg total) by mouth once daily., Disp: 30 tablet, Rfl: 5    levothyroxine (SYNTHROID) 50 MCG tablet, Take 1 tablet (50 mcg) by mouth Mon-Sat and take 2 tablets (100 mcg) on Sun only, Disp: 34 tablet, Rfl: 11    rpdule-vjrdx-b.blue-sal-naphos (USTELL) 120-0.12 mg Cap, Take 1 capsule by mouth 3 (three) times daily. (Patient taking differently: Take 1 capsule by mouth 2 (two) times a day.), Disp: 90 each, Rfl: 3    omalizumab (XOLAIR) 150 mg/mL injection, Inject 2 mLs (300 mg total) into the skin every 28 days. for 13 doses, Disp: 13 each, Rfl: 1    predniSONE (DELTASONE) 2.5 MG tablet, 1 to 2 tabs PO daily prn for arthritis flare, Disp: 60 tablet, Rfl: 0    promethazine (PHENERGAN) 25 MG tablet, Take 1 tablet (25 mg total) by mouth every 6 (six) hours as needed for Nausea., Disp: 15 tablet, Rfl: 0    RABEprazole (ACIPHEX) 20 mg tablet, Take 1 tablet (20 mg total) by mouth once daily., Disp: 30 tablet, Rfl: 2    tiZANidine (ZANAFLEX) 4 MG tablet, Take 4 mg by mouth 3 (three) times daily., Disp: , Rfl:     tofacitinib (XELJANZ XR) 11 mg Tb24, Take 11 mg by mouth once daily., Disp: 30 tablet, Rfl: 6    traMADoL (ULTRAM) 50 mg tablet, TAKE 1 TABLET(50 MG) BY MOUTH EVERY 6 HOURS, Disp: 90 tablet, Rfl: 3    famotidine (PEPCID) 40 MG tablet, Take 1 tablet (40 mg total) by mouth nightly as needed for Heartburn. (Patient taking differently: Take 40 mg by mouth every evening.), Disp: 30 tablet, Rfl: 11    ipratropium-albuteroL (COMBIVENT)  mcg/actuation inhaler, Inhale 1 puff into the lungs 4 (four) times daily. Rescue (Patient not taking: Reported on 8/4/2022), Disp: 4 each, Rfl: 1    oxybutynin (DITROPAN) 5 MG Tab, Take 1 tablet (5 mg total) by mouth 2 (two) times daily., Disp: 60 tablet, Rfl: 12        Review  "of Systems   Constitutional: Positive for activity change, diaphoresis (hot flashes), fatigue and unexpected weight change.   HENT: Positive for congestion and ear pain.    Respiratory: Positive for cough, chest tightness and shortness of breath.    Cardiovascular: Positive for chest pain and palpitations.   Genitourinary: Positive for frequency and urgency.   Musculoskeletal: Positive for arthralgias and myalgias.   Neurological: Positive for headaches.   Psychiatric/Behavioral: Positive for dysphoric mood. The patient is nervous/anxious.        Objective:        Vitals:    08/04/22 1013   BP: 122/78   BP Location: Right arm   Patient Position: Sitting   BP Method: Medium (Manual)   Pulse: (!) 132   Temp: 97.5 °F (36.4 °C)   TempSrc: Temporal   SpO2: 96%   Weight: 79.8 kg (175 lb 14.8 oz)   Height: 4' 11" (1.499 m)       Body mass index is 35.53 kg/m².    Physical Exam  Constitutional:       General: She is not in acute distress.     Appearance: She is well-developed. She is not diaphoretic.   HENT:      Head: Normocephalic and atraumatic.      Right Ear: External ear normal. No middle ear effusion. There is no impacted cerumen. Tympanic membrane is not erythematous.      Left Ear: External ear normal.  No middle ear effusion. There is no impacted cerumen. Tympanic membrane is not erythematous.   Eyes:      Conjunctiva/sclera: Conjunctivae normal.   Cardiovascular:      Rate and Rhythm: Regular rhythm. Tachycardia present.   Pulmonary:      Effort: Pulmonary effort is normal. No respiratory distress.      Breath sounds: Normal breath sounds.   Abdominal:      General: Bowel sounds are normal.      Palpations: Abdomen is soft.   Musculoskeletal:      Cervical back: Neck supple.      Right lower leg: No edema.      Left lower leg: No edema.   Skin:     General: Skin is warm and dry.      Nails: There is no clubbing.   Neurological:      Mental Status: She is alert.      Gait: Gait normal.   Psychiatric:         Mood " and Affect: Mood is anxious.         Assessment:     1. Annual physical exam    2. Visit for screening mammogram    3. Severe obesity (BMI 35.0-39.9) with comorbidity    4. Anginal equivalent           Plan:         1. Annual physical exam  - medical records reviewed.   - vaccines reviewed  - she will f/u w/ gyn regarding HRT  - mammo ordered  - CRC screen utd  - CBC Auto Differential; Future  - Comprehensive Metabolic Panel; Future  - Hemoglobin A1C; Future  - Lipid Panel; Future  - TSH; Future  - Vitamin D; Future    2. Visit for screening mammogram  - Mammo Digital Screening Bilat w/ Xavier; Future    3. Severe obesity (BMI 35.0-39.9) with comorbidity  - encourage healthy lifestyle including diet and exercise for weight loss    4. Anginal equivalent  - stress test ordered by cardiologist, encouraged her to schedule for evaluation.        Unless there are intervening problems, Follow up in about 1 year (around 8/4/2023), or if symptoms worsen or fail to improve, for annual.      Patient note was created using MModal Dictation.  Any errors in syntax or even information may not have been identified and edited on initial review prior to signing this note.    45 minutes total time spent on the encounter, which includes face to face time and non-face to face time preparing to see the patient (eg, review of tests), Obtaining and/or reviewing separately obtained history, Documenting clinical information in the electronic or other health record, Independently interpreting results (not separately reported) and communicating results to the patient/family/caregiver, or Care coordination (not separately reported).

## 2022-08-08 ENCOUNTER — PATIENT MESSAGE (OUTPATIENT)
Dept: OPTOMETRY | Facility: CLINIC | Age: 52
End: 2022-08-08
Payer: COMMERCIAL

## 2022-08-08 ENCOUNTER — TELEPHONE (OUTPATIENT)
Dept: OPTOMETRY | Facility: CLINIC | Age: 52
End: 2022-08-08
Payer: COMMERCIAL

## 2022-08-08 DIAGNOSIS — Z79.899 LONG-TERM USE OF PLAQUENIL: Primary | ICD-10-CM

## 2022-08-08 NOTE — TELEPHONE ENCOUNTER
----- Message from Doris Fernandes sent at 8/8/2022  3:40 PM CDT -----  Regarding: orders  Good afternoon, This patient has HVF/OCT testing on Tuesday, can orders be placed for testing. Thanks, Doris

## 2022-08-09 DIAGNOSIS — L40.50 PSORIATIC ARTHRITIS: ICD-10-CM

## 2022-08-09 DIAGNOSIS — M06.00 SERONEGATIVE RHEUMATOID ARTHRITIS: ICD-10-CM

## 2022-08-09 DIAGNOSIS — M81.0 OSTEOPOROSIS, UNSPECIFIED OSTEOPOROSIS TYPE, UNSPECIFIED PATHOLOGICAL FRACTURE PRESENCE: ICD-10-CM

## 2022-08-09 DIAGNOSIS — M47.817 LUMBAR AND SACRAL OSTEOARTHRITIS: ICD-10-CM

## 2022-08-09 DIAGNOSIS — Z79.899 IMMUNOCOMPROMISED STATE DUE TO DRUG THERAPY: ICD-10-CM

## 2022-08-09 DIAGNOSIS — M79.7 FIBROMYALGIA: ICD-10-CM

## 2022-08-09 DIAGNOSIS — G89.4 CHRONIC PAIN SYNDROME: ICD-10-CM

## 2022-08-09 DIAGNOSIS — D84.821 IMMUNOCOMPROMISED STATE DUE TO DRUG THERAPY: ICD-10-CM

## 2022-08-09 DIAGNOSIS — F43.10 PTSD (POST-TRAUMATIC STRESS DISORDER): ICD-10-CM

## 2022-08-09 DIAGNOSIS — N30.10 IC (INTERSTITIAL CYSTITIS): ICD-10-CM

## 2022-08-10 RX ORDER — FAMOTIDINE 40 MG/1
TABLET, FILM COATED ORAL
Qty: 30 TABLET | Refills: 11 | Status: SHIPPED | OUTPATIENT
Start: 2022-08-10 | End: 2023-08-11

## 2022-08-11 DIAGNOSIS — L50.1 CHRONIC IDIOPATHIC URTICARIA: Primary | ICD-10-CM

## 2022-08-12 ENCOUNTER — PATIENT MESSAGE (OUTPATIENT)
Dept: CARDIOLOGY | Facility: CLINIC | Age: 52
End: 2022-08-12
Payer: COMMERCIAL

## 2022-08-15 ENCOUNTER — PATIENT MESSAGE (OUTPATIENT)
Dept: INTERNAL MEDICINE | Facility: CLINIC | Age: 52
End: 2022-08-15
Payer: COMMERCIAL

## 2022-08-15 DIAGNOSIS — U09.9 MULTIPLE PERSISTENT SYMPTOMS AFTER COVID-19: ICD-10-CM

## 2022-08-15 DIAGNOSIS — Z86.16 HISTORY OF COVID-19: Primary | ICD-10-CM

## 2022-08-15 RX ORDER — FLUTICASONE PROPIONATE 50 MCG
2 SPRAY, SUSPENSION (ML) NASAL 2 TIMES DAILY
Qty: 31.6 ML | Refills: 1 | Status: SHIPPED | OUTPATIENT
Start: 2022-08-15

## 2022-08-18 ENCOUNTER — HOSPITAL ENCOUNTER (OUTPATIENT)
Dept: RADIOLOGY | Facility: HOSPITAL | Age: 52
Discharge: HOME | End: 2022-08-18
Attending: INTERNAL MEDICINE
Payer: COMMERCIAL

## 2022-08-18 DIAGNOSIS — Z12.31 VISIT FOR SCREENING MAMMOGRAM: ICD-10-CM

## 2022-08-18 PROCEDURE — 77067 SCR MAMMO BI INCL CAD: CPT | Mod: TC,PO

## 2022-08-18 PROCEDURE — 77063 BREAST TOMOSYNTHESIS BI: CPT | Mod: TC,PO

## 2022-08-18 PROCEDURE — 77067 MAMMO DIGITAL SCREENING BILAT WITH TOMO: ICD-10-PCS | Mod: 26,,, | Performed by: RADIOLOGY

## 2022-08-18 PROCEDURE — 77067 SCR MAMMO BI INCL CAD: CPT | Mod: 26,,, | Performed by: RADIOLOGY

## 2022-08-18 PROCEDURE — 77063 BREAST TOMOSYNTHESIS BI: CPT | Mod: 26,,, | Performed by: RADIOLOGY

## 2022-08-18 PROCEDURE — 77063 MAMMO DIGITAL SCREENING BILAT WITH TOMO: ICD-10-PCS | Mod: 26,,, | Performed by: RADIOLOGY

## 2022-08-19 ENCOUNTER — TELEPHONE (OUTPATIENT)
Dept: INTERNAL MEDICINE | Facility: CLINIC | Age: 52
End: 2022-08-19
Payer: COMMERCIAL

## 2022-08-19 DIAGNOSIS — E87.0 HYPERNATREMIA: Primary | ICD-10-CM

## 2022-08-19 DIAGNOSIS — E78.5 HYPERLIPIDEMIA, UNSPECIFIED HYPERLIPIDEMIA TYPE: ICD-10-CM

## 2022-08-19 NOTE — ASSESSMENT & PLAN NOTE
8/2022:  The 10-year ASCVD risk score (Florentino CARLSON Jr., et al., 2013) is: 1.6%    Values used to calculate the score:      Age: 52 years      Sex: Female      Is Non- : No      Diabetic: No      Tobacco smoker: No      Systolic Blood Pressure: 122 mmHg      Is BP treated: No      HDL Cholesterol: 70 mg/dL      Total Cholesterol: 247 mg/dL

## 2022-08-19 NOTE — TELEPHONE ENCOUNTER
----- Message from Vannessa Marie MD sent at 8/19/2022  5:58 AM CDT -----  Non-fasting bmp 1 week      Called pt and LVM for pt to call clinic for scheduling.

## 2022-08-20 ENCOUNTER — TELEPHONE (OUTPATIENT)
Dept: INTERNAL MEDICINE | Facility: CLINIC | Age: 52
End: 2022-08-20
Payer: COMMERCIAL

## 2022-08-21 ENCOUNTER — PATIENT MESSAGE (OUTPATIENT)
Dept: INTERNAL MEDICINE | Facility: CLINIC | Age: 52
End: 2022-08-21
Payer: COMMERCIAL

## 2022-08-21 ENCOUNTER — TELEPHONE (OUTPATIENT)
Dept: INTERNAL MEDICINE | Facility: CLINIC | Age: 52
End: 2022-08-21
Payer: COMMERCIAL

## 2022-08-21 NOTE — TELEPHONE ENCOUNTER
Message sent to pt letting her know she needs a repeat lab and to either call to schedule or let me know so I can schedule for her

## 2022-08-22 ENCOUNTER — TELEPHONE (OUTPATIENT)
Dept: RADIOLOGY | Facility: HOSPITAL | Age: 52
End: 2022-08-22
Payer: COMMERCIAL

## 2022-08-23 ENCOUNTER — TELEPHONE (OUTPATIENT)
Dept: RADIOLOGY | Facility: HOSPITAL | Age: 52
End: 2022-08-23
Payer: COMMERCIAL

## 2022-08-24 ENCOUNTER — TELEPHONE (OUTPATIENT)
Dept: RADIOLOGY | Facility: HOSPITAL | Age: 52
End: 2022-08-24
Payer: COMMERCIAL

## 2022-08-24 ENCOUNTER — PATIENT MESSAGE (OUTPATIENT)
Dept: RADIOLOGY | Facility: HOSPITAL | Age: 52
End: 2022-08-24
Payer: COMMERCIAL

## 2022-08-24 NOTE — TELEPHONE ENCOUNTER
Spoke with patient and explained mammogram findings.Patient expressed understanding of results. Patient scheduled abnormal mammogram follow up appointment at [facility] [place] on 8/26/2022.

## 2022-08-29 ENCOUNTER — TELEPHONE (OUTPATIENT)
Dept: OPHTHALMOLOGY | Facility: CLINIC | Age: 52
End: 2022-08-29
Payer: COMMERCIAL

## 2022-08-30 ENCOUNTER — HOSPITAL ENCOUNTER (OUTPATIENT)
Dept: RADIOLOGY | Facility: HOSPITAL | Age: 52
Discharge: HOME | End: 2022-08-30
Attending: INTERNAL MEDICINE
Payer: COMMERCIAL

## 2022-08-30 DIAGNOSIS — R92.8 ABNORMAL FINDING ON BREAST IMAGING: ICD-10-CM

## 2022-08-30 PROCEDURE — 77061 MAMMO DIGITAL DIAGNOSTIC RIGHT WITH TOMO: ICD-10-PCS | Mod: 26,RT,, | Performed by: RADIOLOGY

## 2022-08-30 PROCEDURE — 76642 ULTRASOUND BREAST LIMITED: CPT | Mod: TC,RT

## 2022-08-30 PROCEDURE — 77065 DX MAMMO INCL CAD UNI: CPT | Mod: 26,RT,, | Performed by: RADIOLOGY

## 2022-08-30 PROCEDURE — 76642 ULTRASOUND BREAST LIMITED: CPT | Mod: 26,RT,, | Performed by: RADIOLOGY

## 2022-08-30 PROCEDURE — 77065 MAMMO DIGITAL DIAGNOSTIC RIGHT WITH TOMO: ICD-10-PCS | Mod: 26,RT,, | Performed by: RADIOLOGY

## 2022-08-30 PROCEDURE — 77065 DX MAMMO INCL CAD UNI: CPT | Mod: TC,RT

## 2022-08-30 PROCEDURE — 76642 US BREAST RIGHT LIMITED: ICD-10-PCS | Mod: 26,RT,, | Performed by: RADIOLOGY

## 2022-08-30 PROCEDURE — 77061 BREAST TOMOSYNTHESIS UNI: CPT | Mod: 26,RT,, | Performed by: RADIOLOGY

## 2022-08-31 ENCOUNTER — TELEPHONE (OUTPATIENT)
Dept: RADIOLOGY | Facility: HOSPITAL | Age: 52
End: 2022-08-31
Payer: COMMERCIAL

## 2022-09-01 ENCOUNTER — TELEPHONE (OUTPATIENT)
Dept: RADIOLOGY | Facility: HOSPITAL | Age: 52
End: 2022-09-01
Payer: COMMERCIAL

## 2022-09-09 ENCOUNTER — HOSPITAL ENCOUNTER (OUTPATIENT)
Dept: RADIOLOGY | Facility: HOSPITAL | Age: 52
Discharge: HOME | End: 2022-09-09
Attending: INTERNAL MEDICINE
Payer: COMMERCIAL

## 2022-09-09 DIAGNOSIS — R92.8 ABNORMAL FINDING ON BREAST IMAGING: ICD-10-CM

## 2022-09-09 PROCEDURE — 88360 TUMOR IMMUNOHISTOCHEM/MANUAL: CPT | Mod: 26,,, | Performed by: PATHOLOGY

## 2022-09-09 PROCEDURE — 77065 MAMMO DIGITAL DIAGNOSTIC RIGHT: ICD-10-PCS | Mod: 26,RT,, | Performed by: RADIOLOGY

## 2022-09-09 PROCEDURE — 88305 TISSUE EXAM BY PATHOLOGIST: CPT | Mod: 26,,, | Performed by: PATHOLOGY

## 2022-09-09 PROCEDURE — 19081 MAMMO BREAST STEREOTACTIC BREAST BIOPSY RIGHT: ICD-10-PCS | Mod: RT,,, | Performed by: RADIOLOGY

## 2022-09-09 PROCEDURE — 77065 DX MAMMO INCL CAD UNI: CPT | Mod: TC,RT

## 2022-09-09 PROCEDURE — 27200939 MAMMO BREAST STEREOTACTIC BREAST BIOPSY RIGHT

## 2022-09-09 PROCEDURE — 88342 IMHCHEM/IMCYTCHM 1ST ANTB: CPT | Mod: 59 | Performed by: PATHOLOGY

## 2022-09-09 PROCEDURE — 88360 PR  TUMOR IMMUNOHISTOCHEM/MANUAL: ICD-10-PCS | Mod: 26,,, | Performed by: PATHOLOGY

## 2022-09-09 PROCEDURE — 88360 TUMOR IMMUNOHISTOCHEM/MANUAL: CPT | Mod: 59 | Performed by: PATHOLOGY

## 2022-09-09 PROCEDURE — 19081 BX BREAST 1ST LESION STRTCTC: CPT | Mod: RT,,, | Performed by: RADIOLOGY

## 2022-09-09 PROCEDURE — 88305 TISSUE EXAM BY PATHOLOGIST: ICD-10-PCS | Mod: 26,,, | Performed by: PATHOLOGY

## 2022-09-09 PROCEDURE — 25000003 PHARM REV CODE 250: Performed by: INTERNAL MEDICINE

## 2022-09-09 PROCEDURE — 88342 IMHCHEM/IMCYTCHM 1ST ANTB: CPT | Mod: 26,59,, | Performed by: PATHOLOGY

## 2022-09-09 PROCEDURE — 77065 DX MAMMO INCL CAD UNI: CPT | Mod: 26,RT,, | Performed by: RADIOLOGY

## 2022-09-09 PROCEDURE — 88305 TISSUE EXAM BY PATHOLOGIST: CPT | Mod: 59 | Performed by: PATHOLOGY

## 2022-09-09 PROCEDURE — 88342 CHG IMMUNOCYTOCHEMISTRY: ICD-10-PCS | Mod: 26,59,, | Performed by: PATHOLOGY

## 2022-09-09 RX ORDER — LIDOCAINE HYDROCHLORIDE AND EPINEPHRINE 10; 10 MG/ML; UG/ML
20 INJECTION, SOLUTION INFILTRATION; PERINEURAL ONCE
Status: COMPLETED | OUTPATIENT
Start: 2022-09-09 | End: 2022-09-09

## 2022-09-09 RX ORDER — LIDOCAINE HYDROCHLORIDE 10 MG/ML
3 INJECTION INFILTRATION; PERINEURAL ONCE
Status: COMPLETED | OUTPATIENT
Start: 2022-09-09 | End: 2022-09-09

## 2022-09-09 RX ADMIN — LIDOCAINE HYDROCHLORIDE 3 ML: 10 INJECTION, SOLUTION EPIDURAL; INFILTRATION; INTRACAUDAL; PERINEURAL at 03:09

## 2022-09-09 RX ADMIN — LIDOCAINE HYDROCHLORIDE,EPINEPHRINE BITARTRATE 20 ML: 10; .01 INJECTION, SOLUTION INFILTRATION; PERINEURAL at 03:09

## 2022-09-15 LAB
COMMENT: NORMAL
COMMENT: NORMAL
FINAL PATHOLOGIC DIAGNOSIS: NORMAL
FINAL PATHOLOGIC DIAGNOSIS: NORMAL
GROSS: NORMAL
GROSS: NORMAL
Lab: NORMAL
Lab: NORMAL
MICROSCOPIC EXAM: NORMAL
MICROSCOPIC EXAM: NORMAL

## 2022-09-16 ENCOUNTER — TELEPHONE (OUTPATIENT)
Dept: SURGERY | Facility: CLINIC | Age: 52
End: 2022-09-16
Payer: COMMERCIAL

## 2022-09-16 ENCOUNTER — TELEPHONE (OUTPATIENT)
Dept: INTERNAL MEDICINE | Facility: CLINIC | Age: 52
End: 2022-09-16
Payer: COMMERCIAL

## 2022-09-16 ENCOUNTER — PATIENT MESSAGE (OUTPATIENT)
Dept: INTERNAL MEDICINE | Facility: CLINIC | Age: 52
End: 2022-09-16
Payer: COMMERCIAL

## 2022-09-16 NOTE — TELEPHONE ENCOUNTER
----- Message from Loan Buck sent at 9/16/2022 12:09 PM CDT -----  Contact: Pt [phone]  2TESTRESULTS    Type: Test Results    What test was performed? biopsy    Who ordered the test? Dr Marie    When and where were the test performed? 9/9/2022   [facility]    Would you like response via [facility]: Call back    Comments:Patient is reading on the Internet and getting nervious.    Please call and advise.    Thank You

## 2022-09-16 NOTE — TELEPHONE ENCOUNTER
Called patient with biopsy results from 9/9/2022. 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 Discussed what this means and the results were reviewed by Dr. Reinoso . These results are benign, concordant and a surgical consult is recommended. Patient was scheduled on 9/19/2022 with Dr. Oseguera. Reviewed Breast center location. Patient verbalized understanding.      ----- Message from Debbie Reinoso MD sent at 9/16/2022 11:32 AM CDT -----  The 2nd biopsy with a Q biopsy marker contains the suspicious calcifications.  These calcifications are concordant with the diagnosis of atypical ductal hyperplasia.    The 1st biopsy with an X biopsy marker contained no calcifications.  This is most compatible with the benign breast tissue diagnosis.     Both biopsy markers are adjacent to one another.  They are approximately 7 mm cranial and lateral to the residual calcifications been.     The diagnosis of ADH warrants surgical excision.  Please schedule with breast surgery.

## 2022-09-17 NOTE — TELEPHONE ENCOUNTER
"Duplicate encounter. Results were discussed with her by the [facility] Breast Department on 9/16/22. See message below:  "Called patient with biopsy results from 9/9/2022. 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 Discussed what this means and the results were reviewed by Dr. Reinoso . These results are benign, concordant and a surgical consult is recommended. Patient was scheduled on 9/19/2022 with Dr. Oseguera. Reviewed Breast center location. Patient verbalized understanding.        ----- Message from Debbie Reinoso MD sent at 9/16/2022 11:32 AM CDT -----  The 2nd biopsy with a Q biopsy marker contains the suspicious calcifications.  These calcifications are concordant with the diagnosis of atypical ductal hyperplasia.     The 1st biopsy with an X biopsy marker contained no calcifications.  This is most compatible with the benign breast tissue diagnosis.      Both biopsy markers are adjacent to one another.  They are approximately 7 mm cranial and lateral to the residual calcifications been.      The diagnosis of ADH warrants surgical excision.  Please schedule with breast surgery. "      "

## 2022-09-17 NOTE — TELEPHONE ENCOUNTER
"Another duplicate encounter. Results were discussed with her by the [facility] Breast Department on 9/16/22. See message below:  "Called patient with biopsy results from 9/9/2022. 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 Discussed what this means and the results were reviewed by Dr. Reinoso . These results are benign, concordant and a surgical consult is recommended. Patient was scheduled on 9/19/2022 with Dr. Oseguera. Reviewed Breast center location. Patient verbalized understanding.        ----- Message from Debbie Reinoso MD sent at 9/16/2022 11:32 AM CDT -----  The 2nd biopsy with a Q biopsy marker contains the suspicious calcifications.  These calcifications are concordant with the diagnosis of atypical ductal hyperplasia.     The 1st biopsy with an X biopsy marker contained no calcifications.  This is most compatible with the benign breast tissue diagnosis.      Both biopsy markers are adjacent to one another.  They are approximately 7 mm cranial and lateral to the residual calcifications been.      The diagnosis of ADH warrants surgical excision.  Please schedule with breast surgery. "     "

## 2022-09-19 ENCOUNTER — OFFICE VISIT (OUTPATIENT)
Dept: SURGERY | Facility: CLINIC | Age: 52
End: 2022-09-19
Payer: COMMERCIAL

## 2022-09-19 VITALS
BODY MASS INDEX: 35.28 KG/M2 | WEIGHT: 175 LBS | HEART RATE: 88 BPM | HEIGHT: 59 IN | SYSTOLIC BLOOD PRESSURE: 156 MMHG | DIASTOLIC BLOOD PRESSURE: 85 MMHG

## 2022-09-19 DIAGNOSIS — N60.91 ATYPICAL DUCTAL HYPERPLASIA OF RIGHT BREAST: Primary | ICD-10-CM

## 2022-09-19 DIAGNOSIS — Z91.89 AT HIGH RISK FOR BREAST CANCER: ICD-10-CM

## 2022-09-19 PROCEDURE — 3044F HG A1C LEVEL LT 7.0%: CPT | Mod: CPTII,S$GLB,, | Performed by: SURGERY

## 2022-09-19 PROCEDURE — 99999 PR PBB SHADOW E&M-EST. PATIENT-LVL IV: CPT | Mod: PBBFAC,,, | Performed by: SURGERY

## 2022-09-19 PROCEDURE — 99203 OFFICE O/P NEW LOW 30 MIN: CPT | Mod: S$GLB,,, | Performed by: SURGERY

## 2022-09-19 PROCEDURE — 3008F PR BODY MASS INDEX (BMI) DOCUMENTED: ICD-10-PCS | Mod: CPTII,S$GLB,, | Performed by: SURGERY

## 2022-09-19 PROCEDURE — 3008F BODY MASS INDEX DOCD: CPT | Mod: CPTII,S$GLB,, | Performed by: SURGERY

## 2022-09-19 PROCEDURE — 99203 PR OFFICE/OUTPT VISIT, NEW, LEVL III, 30-44 MIN: ICD-10-PCS | Mod: S$GLB,,, | Performed by: SURGERY

## 2022-09-19 PROCEDURE — 3077F SYST BP >= 140 MM HG: CPT | Mod: CPTII,S$GLB,, | Performed by: SURGERY

## 2022-09-19 PROCEDURE — 3077F PR MOST RECENT SYSTOLIC BLOOD PRESSURE >= 140 MM HG: ICD-10-PCS | Mod: CPTII,S$GLB,, | Performed by: SURGERY

## 2022-09-19 PROCEDURE — 3044F PR MOST RECENT HEMOGLOBIN A1C LEVEL <7.0%: ICD-10-PCS | Mod: CPTII,S$GLB,, | Performed by: SURGERY

## 2022-09-19 PROCEDURE — 1159F MED LIST DOCD IN RCRD: CPT | Mod: CPTII,S$GLB,, | Performed by: SURGERY

## 2022-09-19 PROCEDURE — 1159F PR MEDICATION LIST DOCUMENTED IN MEDICAL RECORD: ICD-10-PCS | Mod: CPTII,S$GLB,, | Performed by: SURGERY

## 2022-09-19 PROCEDURE — 99999 PR PBB SHADOW E&M-EST. PATIENT-LVL IV: ICD-10-PCS | Mod: PBBFAC,,, | Performed by: SURGERY

## 2022-09-19 PROCEDURE — 3079F DIAST BP 80-89 MM HG: CPT | Mod: CPTII,S$GLB,, | Performed by: SURGERY

## 2022-09-19 PROCEDURE — 3079F PR MOST RECENT DIASTOLIC BLOOD PRESSURE 80-89 MM HG: ICD-10-PCS | Mod: CPTII,S$GLB,, | Performed by: SURGERY

## 2022-09-19 NOTE — PROGRESS NOTES
History and Physical  [facility]  Department of Surgery    REFERRING PROVIDER: [place] [address]    CHIEF COMPLAINT: ADH       Subjective:     She presented for screening mammogram on 22 for yearly scheduled screening.. This identified that there is an asymmetry seen in the upper region of the right breast in the posterior depth. Follow-up mammogram and ultrasound on  22 showed right breast 6 mm calcifications at the upper outer anterior position. A stereotactic biopsy was performed on 22 with pathology revealing  atypical ductal hyperplasia of the right breast.     Findings at that time were the following:   Lesion 1:    Location: Upper outer anterior position   Clip:X-marker (there is a Q marker also in place and the pathology was benign)  Lesion size: 0.6 cm   Pathology: Atypical ductal hyperplasia       Patient has not noted a change on breast exam.  Patient denies nipple discharge. Patient admits to previous breast biopsy. Patient denies a personal history of breast cancer.  Radiology calculated Tyrer-Cuzick score was 9.47%.     Past Medical History:   Diagnosis Date    Acid reflux     Allergy     Alopecia     Anxiety     Arthralgia     Back pain     Depression     Dry eyes     from meds    Fever blister     Fibromyalgia     Interstitial cystitis     Irritable bowel syndrome     Kidney stone     Major depressive disorder, recurrent episode, in partial or unspecified remission 2013    OAB (overactive bladder) 2015    PTSD (post-traumatic stress disorder)     Pure hypercholesterolemia 2022    Rheumatoid arthritis     Rheumatoid arthritis 2022    Systemic lupus erythematosus     Thyroid disease     Urinary tract infection     Vaginal infection      Past Surgical History:   Procedure Laterality Date    BLADDER SURGERY       SECTION, LOW TRANSVERSE      x 2    COLONOSCOPY      COLONOSCOPY N/A 10/5/2017    Procedure:  COLONOSCOPY;  Surgeon: Venkat He MD;  Location: [facility] (4TH FLR);  Service: Endoscopy;  Laterality: N/A;    ESOPHAGOGASTRODUODENOSCOPY      ESOPHAGOGASTRODUODENOSCOPY N/A 10/25/2021    Procedure: EGD (ESOPHAGOGASTRODUODENOSCOPY);  Surgeon: Venkat He MD;  Location: [facility] (4TH FLR);  Service: Endoscopy;  Laterality: N/A;  Covid test on 10/22 at [email or web address]    10/19 lv to confirm appt-rb    EYE SURGERY      Lasik-bilateral    HYSTERECTOMY      IMPLANTATION OF PERMANENT SACRAL NERVE STIMULATOR N/A 4/27/2022    Procedure: INSERTION, NEUROSTIMULATOR, PERMANENT, SACRAL;  Surgeon: Bandar Garcia MD;  Location: [facility] OR 2ND FLR;  Service: Urology;  Laterality: N/A;  2hr    INJECTION OF BOTULINUM TOXIN TYPE A N/A 9/12/2018    Procedure: INJECTION, BOTULINUM TOXIN, TYPE A  200 UNITS;  Surgeon: Bandar Garcia MD;  Location: [facility] OR 1ST FLR;  Service: Urology;  Laterality: N/A;    INSTILLATION OF URINARY BLADDER N/A 9/12/2018    Procedure: INSTILLATION, URINARY BLADDER;  Surgeon: Bandar Garcia MD;  [facility];  Service: Urology;  Laterality: N/A;    PARTIAL HYSTERECTOMY      REMOVAL OF ELECTRODE LEAD OF SACRAL NERVE STIMULATOR N/A 4/27/2022    Procedure: REMOVAL, ELECTRODE LEAD, SACRAL NERVE STIMULATOR;  Surgeon: Bandar Garcia MD;  [facility];  Service: Urology;  Laterality: N/A;    TRANSFORAMINAL EPIDURAL INJECTION OF STEROID Left 2/15/2021    Procedure: LUMBAR TRANSFORAMINAL LEFT L5 AND S1 DIRECT REFERRAL;  Surgeon: Marquez Nesbitt MD;  Location: [facility] PAIN [facility]GT;  Service: Pain Management;  Laterality: Left;  NEEDS CONSENT, PT REQUESTING IV SEDATION     Current Outpatient Medications on File Prior to Visit   Medication Sig Dispense Refill    amitriptyline (ELAVIL) 10 MG tablet TAKE 1 TABLET(10 MG) BY MOUTH EVERY NIGHT AS NEEDED (Patient taking differently: Take 10 mg by mouth every evening.) 90 tablet 3    butalbitaL-acetaminophen  mg Tab TAKE 1 TABLET BY MOUTH EVERY 4  HOURS (Patient taking differently: Take 1 tablet by mouth every 4 (four) hours as needed.) 60 tablet 3    calcium glycerophosphate (PRELIEF) 65 mg Tab Take 2 tablets by mouth before meals and at bedtime as needed. (Patient taking differently: Take 2 tablets by mouth 3 (three) times daily with meals.) 100 each prn    cetirizine (ZYRTEC) 10 MG tablet Take 1 tablet (10 mg total) by mouth once daily. 120 tablet 2    clonazePAM (KLONOPIN) 1 MG tablet TAKE 1 TABLET(1 MG) BY MOUTH THREE TIMES DAILY 90 tablet 2    cyclobenzaprine (FLEXERIL) 10 MG tablet TAKE 1 TABLET(10 MG) BY MOUTH TWICE DAILY AS NEEDED (Patient taking differently: Take 10 mg by mouth 2 (two) times a day.) 90 tablet 6    dicyclomine (BENTYL) 20 mg tablet Take 20 mg by mouth daily as needed.      DULoxetine (CYMBALTA) 60 MG capsule TAKE 2 CAPSULES(120 MG) BY MOUTH EVERY DAY 60 capsule 3    famotidine (PEPCID) 40 MG tablet TAKE 1 TABLET(40 MG) BY MOUTH EVERY NIGHT AS NEEDED FOR HEARTBURN 30 tablet 11    fluticasone propionate (FLONASE) 50 mcg/actuation nasal spray 2 sprays (100 mcg total) by Each Nostril route 2 (two) times daily. 31.6 mL 1    gabapentin (NEURONTIN) 800 MG tablet TAKE 1 TABLET(800 MG) BY MOUTH THREE TIMES DAILY (Patient taking differently: Take 800 mg by mouth 3 (three) times daily.) 90 tablet 11    hydrocortisone 2.5 % cream Apply to affected areas twice a day when eczema is moderate. (Patient taking differently: Apply to affected areas twice a day as needed when eczema is moderate.) 453.6 g 1    hydrOXYchloroQUINE (PLAQUENIL) 200 mg tablet Take 1 tablet (200 mg total) by mouth 2 (two) times daily. 60 tablet 6    hydrOXYzine HCL (ATARAX) 25 MG tablet 1-4 tablets as needed for itching 240 tablet 3    levothyroxine (SYNTHROID) 50 MCG tablet Take 1 tablet (50 mcg) by mouth Mon-Sat and take 2 tablets (100 mcg) on Sun only 34 tablet 11    brbirb-sjgan-t.blue-sal-naphos (USTELL) 120-0.12 mg Cap Take 1 capsule by mouth 3 (three) times daily.  (Patient taking differently: Take 1 capsule by mouth 2 (two) times a day.) 90 each 3    oxybutynin (DITROPAN) 5 MG Tab TAKE 1 TABLET(5 MG) BY MOUTH THREE TIMES DAILY 90 tablet 3    promethazine (PHENERGAN) 25 MG tablet Take 1 tablet (25 mg total) by mouth every 6 (six) hours as needed for Nausea. 15 tablet 0    RABEprazole (ACIPHEX) 20 mg tablet Take 1 tablet (20 mg total) by mouth once daily. 30 tablet 2    tiZANidine (ZANAFLEX) 4 MG tablet Take 4 mg by mouth 3 (three) times daily.      traMADoL (ULTRAM) 50 mg tablet TAKE 1 TABLET(50 MG) BY MOUTH EVERY 6 HOURS 90 tablet 3    alosetron (LOTRONEX) 0.5 MG tablet Take 1 tablet (0.5 mg total) by mouth daily as needed. (Patient taking differently: Take 0.5 mg by mouth once daily.) 30 tablet 0    ipratropium-albuteroL (COMBIVENT)  mcg/actuation inhaler Inhale 1 puff into the lungs 4 (four) times daily. Rescue 4 each 1    lamoTRIgine (LAMICTAL) 100 MG tablet Take 1 tablet (100 mg total) by mouth once daily. 30 tablet 5    omalizumab (XOLAIR) 150 mg/mL injection Inject 2 mLs (300 mg total) into the skin every 28 days. for 13 doses (Patient not taking: Reported on 9/19/2022) 13 each 1    predniSONE (DELTASONE) 2.5 MG tablet 1 to 2 tabs PO daily prn for arthritis flare (Patient not taking: Reported on 9/19/2022) 60 tablet 0    tofacitinib (XELJANZ XR) 11 mg Tb24 Take 11 mg by mouth once daily. 30 tablet 6    [DISCONTINUED] hyoscyamine (ANASPAZ,LEVSIN) 0.125 mg Tab Take 1 tablet (125 mcg total) by mouth every 8 (eight) hours as needed (abdominal cramps). 60 tablet 0    [DISCONTINUED] ketoconazole (NIZORAL) 2 % shampoo Wash scalp with medicated shampoo at least 2x/week. Let sit on scalp at least 5 minutes prior to rinsing 240 mL 5     No current facility-administered medications on file prior to visit.     Social History     Socioeconomic History    Marital status:    Occupational History     Employer: OTHER   Tobacco Use    Smoking status: Never    Smokeless  tobacco: Never   Substance and Sexual Activity    Alcohol use: No    Drug use: No    Sexual activity: Never   Other Topics Concern    Are you pregnant or think you may be? No    Breast-feeding No     Social Determinants of Health     Financial Resource Strain: Low Risk     Difficulty of Paying Living Expenses: Not very hard   Food Insecurity: No Food Insecurity    Worried About Running Out of Food in the Last Year: Never true    Ran Out of Food in the Last Year: Never true   Transportation Needs: No Transportation Needs    Lack of Transportation (Medical): No    Lack of Transportation (Non-Medical): No   Physical Activity: Inactive    Days of Exercise per Week: 0 days    Minutes of Exercise per Session: 0 min   Stress: Stress Concern Present    Feeling of Stress : To some extent   Social Connections: Unknown    Frequency of Communication with Friends and Family: More than three times a week    Frequency of Social Gatherings with Friends and Family: Never    Active Member of Clubs or Organizations: No    Attends Club or Organization Meetings: Never    Marital Status:    Housing Stability: Low Risk     Unable to Pay for Housing in the Last Year: No    Number of Places Lived in the Last Year: 1    Unstable Housing in the Last Year: No     Family History   Problem Relation Age of Onset    Irritable bowel syndrome Mother     Irritable bowel syndrome Sister     Lupus Sister     Rheum arthritis Sister     Fibromyalgia Sister     Irritable bowel syndrome Sister     Celiac disease Neg Hx     Cirrhosis Neg Hx     Colon cancer Neg Hx     Colon polyps Neg Hx     Crohn's disease Neg Hx     Cystic fibrosis Neg Hx     Esophageal cancer Neg Hx     Hemochromatosis Neg Hx     Inflammatory bowel disease Neg Hx     Liver cancer Neg Hx     Liver disease Neg Hx     Rectal cancer Neg Hx     Stomach cancer Neg Hx     Ulcerative colitis Neg Hx     Boris's disease Neg Hx     Melanoma Neg Hx     Amblyopia Neg Hx     Blindness Neg Hx   "   Cataracts Neg Hx     Glaucoma Neg Hx     Macular degeneration Neg Hx     Retinal detachment Neg Hx     Strabismus Neg Hx        Review of Systems  Review of Systems   All other systems reviewed and are negative.     Objective:     BP (!) 156/85 (BP Location: Right arm, Patient Position: Sitting, BP Method: Medium (Automatic))   Pulse 88   Ht 4' 11" (1.499 m)   Wt 79.4 kg (175 lb)   LMP  (LMP Unknown)   BMI 35.35 kg/m²     Physical Exam   Cardiovascular:  Normal rate.      Exam reveals no gallop and no friction rub.       No murmur heard.  Pulmonary/Chest: Effort normal. No respiratory distress. She has no wheezes. She has no rales. Right breast exhibits no inverted nipple, no mass, no nipple discharge, no skin change and no tenderness. Left breast exhibits no inverted nipple, no mass, no nipple discharge, no skin change and no tenderness.   Abdominal: Normal appearance.   Lymphadenopathy:     She has no cervical adenopathy.        Right: No supraclavicular adenopathy present.        Left: No supraclavicular adenopathy present.   Neurological: She is alert.   Skin: Skin is warm and dry.     Psychiatric: Her behavior is normal. Mood, judgment and thought content normal.     Radiology review: Images personally reviewed by me in the clinic and images were shown to the patient at the time of consulation    Assessment:      Marilee Simons is a 52 y.o. female with atypical ductal hyperplasia of the right breast     Plan:    Atypical ductal hyperplasia  Atypical ductal hyperplasia is a atypical breast lesion that signifies increased risk of future breast cancer and when identified on core needle biopsy can be associated with malignancy.  When identified on a core needle biopsy it is recommended that the area of concern be surgically excised.  There is an approximate 20% risk of upgrading of the lesion to ductal carcinoma in-situ or invasive cancer on final surgical pathology.  While studies have attempted to " identify criteria that signified low risk ADH there is no agreed upon guidelines of lesions that are safe to observe.  However some studies have shown low upgrade rates for lesions less than 6 mm or lesions completely removed at time of biopsy.    Plan to proceed with a right breast excisional biopsy with reflector localization.  The risks and benefits of the procedure were discussed with the patient.  The benefits the procedure is to obtain a definitive diagnosis of the breast lesion and to rule out atypia or malignancy.  We discussed that she may need a 2nd surgery if atypia or malignancy were identified.  The risks of the procedure include but are not limited to bleeding, infection, scarring, numbness, and need for additional surgery.  She will require preoperative localization of the lesion which will be placed at a separate appointment.     ADH confers a lifetime risk of ipsilateral or contralateral breast cancer of about 1% per year.  High risk screening with alternating mammogram and MRI is recommended.  We briefly discussed chemoprevention with tamoxifen.  We discussed that this is typically decrease her risk of breast cancer.  We will plan to refer her to Medical Oncology for further discussion after surgery.    30 minutes were spent on this encounter, 25 of which was face to face counseling and 5 minutes were spent on chart review and coordination of care.

## 2022-09-19 NOTE — H&P (VIEW-ONLY)
History and Physical  [facility]  Department of Surgery    REFERRING PROVIDER: [place] [address]    CHIEF COMPLAINT: ADH       Subjective:     She presented for screening mammogram on 22 for yearly scheduled screening.. This identified that there is an asymmetry seen in the upper region of the right breast in the posterior depth. Follow-up mammogram and ultrasound on  22 showed right breast 6 mm calcifications at the upper outer anterior position. A stereotactic biopsy was performed on 22 with pathology revealing  atypical ductal hyperplasia of the right breast.     Findings at that time were the following:   Lesion 1:    Location: Upper outer anterior position   Clip:X-marker (there is a Q marker also in place and the pathology was benign)  Lesion size: 0.6 cm   Pathology: Atypical ductal hyperplasia       Patient has not noted a change on breast exam.  Patient denies nipple discharge. Patient admits to previous breast biopsy. Patient denies a personal history of breast cancer.  Radiology calculated Tyrer-Cuzick score was 9.47%.     Past Medical History:   Diagnosis Date    Acid reflux     Allergy     Alopecia     Anxiety     Arthralgia     Back pain     Depression     Dry eyes     from meds    Fever blister     Fibromyalgia     Interstitial cystitis     Irritable bowel syndrome     Kidney stone     Major depressive disorder, recurrent episode, in partial or unspecified remission 2013    OAB (overactive bladder) 2015    PTSD (post-traumatic stress disorder)     Pure hypercholesterolemia 2022    Rheumatoid arthritis     Rheumatoid arthritis 2022    Systemic lupus erythematosus     Thyroid disease     Urinary tract infection     Vaginal infection      Past Surgical History:   Procedure Laterality Date    BLADDER SURGERY       SECTION, LOW TRANSVERSE      x 2    COLONOSCOPY      COLONOSCOPY N/A 10/5/2017    Procedure:  COLONOSCOPY;  Surgeon: Venkat He MD;  Location: [facility] (4TH FLR);  Service: Endoscopy;  Laterality: N/A;    ESOPHAGOGASTRODUODENOSCOPY      ESOPHAGOGASTRODUODENOSCOPY N/A 10/25/2021    Procedure: EGD (ESOPHAGOGASTRODUODENOSCOPY);  Surgeon: Venkat He MD;  Location: [facility] ENDO (4TH FLR);  Service: Endoscopy;  Laterality: N/A;  Covid test on 10/22 at [email or web address]    10/19 lv to confirm appt-rb    EYE SURGERY      Lasik-bilateral    HYSTERECTOMY      IMPLANTATION OF PERMANENT SACRAL NERVE STIMULATOR N/A 4/27/2022    Procedure: INSERTION, NEUROSTIMULATOR, PERMANENT, SACRAL;  Surgeon: Bandar Garcia MD;  Location: [facility] OR 2ND FLR;  Service: Urology;  Laterality: N/A;  2hr    INJECTION OF BOTULINUM TOXIN TYPE A N/A 9/12/2018    Procedure: INJECTION, BOTULINUM TOXIN, TYPE A  200 UNITS;  Surgeon: Bandar Garcia MD;  Location: [facility] OR 1ST FLR;  Service: Urology;  Laterality: N/A;    INSTILLATION OF URINARY BLADDER N/A 9/12/2018    Procedure: INSTILLATION, URINARY BLADDER;  Surgeon: Bandar Garcia MD;  [facility];  Service: Urology;  Laterality: N/A;    PARTIAL HYSTERECTOMY      REMOVAL OF ELECTRODE LEAD OF SACRAL NERVE STIMULATOR N/A 4/27/2022    Procedure: REMOVAL, ELECTRODE LEAD, SACRAL NERVE STIMULATOR;  Surgeon: Bandar Garcia MD;  [facility];  Service: Urology;  Laterality: N/A;    TRANSFORAMINAL EPIDURAL INJECTION OF STEROID Left 2/15/2021    Procedure: LUMBAR TRANSFORAMINAL LEFT L5 AND S1 DIRECT REFERRAL;  Surgeon: Marquez Nesbitt MD;  Location: [facility] PAIN MGT;  Service: Pain Management;  Laterality: Left;  NEEDS CONSENT, PT REQUESTING IV SEDATION     Current Outpatient Medications on File Prior to Visit   Medication Sig Dispense Refill    amitriptyline (ELAVIL) 10 MG tablet TAKE 1 TABLET(10 MG) BY MOUTH EVERY NIGHT AS NEEDED (Patient taking differently: Take 10 mg by mouth every evening.) 90 tablet 3    butalbitaL-acetaminophen  mg Tab TAKE 1 TABLET BY MOUTH EVERY 4  HOURS (Patient taking differently: Take 1 tablet by mouth every 4 (four) hours as needed.) 60 tablet 3    calcium glycerophosphate (PRELIEF) 65 mg Tab Take 2 tablets by mouth before meals and at bedtime as needed. (Patient taking differently: Take 2 tablets by mouth 3 (three) times daily with meals.) 100 each prn    cetirizine (ZYRTEC) 10 MG tablet Take 1 tablet (10 mg total) by mouth once daily. 120 tablet 2    clonazePAM (KLONOPIN) 1 MG tablet TAKE 1 TABLET(1 MG) BY MOUTH THREE TIMES DAILY 90 tablet 2    cyclobenzaprine (FLEXERIL) 10 MG tablet TAKE 1 TABLET(10 MG) BY MOUTH TWICE DAILY AS NEEDED (Patient taking differently: Take 10 mg by mouth 2 (two) times a day.) 90 tablet 6    dicyclomine (BENTYL) 20 mg tablet Take 20 mg by mouth daily as needed.      DULoxetine (CYMBALTA) 60 MG capsule TAKE 2 CAPSULES(120 MG) BY MOUTH EVERY DAY 60 capsule 3    famotidine (PEPCID) 40 MG tablet TAKE 1 TABLET(40 MG) BY MOUTH EVERY NIGHT AS NEEDED FOR HEARTBURN 30 tablet 11    fluticasone propionate (FLONASE) 50 mcg/actuation nasal spray 2 sprays (100 mcg total) by Each Nostril route 2 (two) times daily. 31.6 mL 1    gabapentin (NEURONTIN) 800 MG tablet TAKE 1 TABLET(800 MG) BY MOUTH THREE TIMES DAILY (Patient taking differently: Take 800 mg by mouth 3 (three) times daily.) 90 tablet 11    hydrocortisone 2.5 % cream Apply to affected areas twice a day when eczema is moderate. (Patient taking differently: Apply to affected areas twice a day as needed when eczema is moderate.) 453.6 g 1    hydrOXYchloroQUINE (PLAQUENIL) 200 mg tablet Take 1 tablet (200 mg total) by mouth 2 (two) times daily. 60 tablet 6    hydrOXYzine HCL (ATARAX) 25 MG tablet 1-4 tablets as needed for itching 240 tablet 3    levothyroxine (SYNTHROID) 50 MCG tablet Take 1 tablet (50 mcg) by mouth Mon-Sat and take 2 tablets (100 mcg) on Sun only 34 tablet 11    ptbmoi-ddjbu-l.blue-sal-naphos (USTELL) 120-0.12 mg Cap Take 1 capsule by mouth 3 (three) times daily.  (Patient taking differently: Take 1 capsule by mouth 2 (two) times a day.) 90 each 3    oxybutynin (DITROPAN) 5 MG Tab TAKE 1 TABLET(5 MG) BY MOUTH THREE TIMES DAILY 90 tablet 3    promethazine (PHENERGAN) 25 MG tablet Take 1 tablet (25 mg total) by mouth every 6 (six) hours as needed for Nausea. 15 tablet 0    RABEprazole (ACIPHEX) 20 mg tablet Take 1 tablet (20 mg total) by mouth once daily. 30 tablet 2    tiZANidine (ZANAFLEX) 4 MG tablet Take 4 mg by mouth 3 (three) times daily.      traMADoL (ULTRAM) 50 mg tablet TAKE 1 TABLET(50 MG) BY MOUTH EVERY 6 HOURS 90 tablet 3    alosetron (LOTRONEX) 0.5 MG tablet Take 1 tablet (0.5 mg total) by mouth daily as needed. (Patient taking differently: Take 0.5 mg by mouth once daily.) 30 tablet 0    ipratropium-albuteroL (COMBIVENT)  mcg/actuation inhaler Inhale 1 puff into the lungs 4 (four) times daily. Rescue 4 each 1    lamoTRIgine (LAMICTAL) 100 MG tablet Take 1 tablet (100 mg total) by mouth once daily. 30 tablet 5    omalizumab (XOLAIR) 150 mg/mL injection Inject 2 mLs (300 mg total) into the skin every 28 days. for 13 doses (Patient not taking: Reported on 9/19/2022) 13 each 1    predniSONE (DELTASONE) 2.5 MG tablet 1 to 2 tabs PO daily prn for arthritis flare (Patient not taking: Reported on 9/19/2022) 60 tablet 0    tofacitinib (XELJANZ XR) 11 mg Tb24 Take 11 mg by mouth once daily. 30 tablet 6    [DISCONTINUED] hyoscyamine (ANASPAZ,LEVSIN) 0.125 mg Tab Take 1 tablet (125 mcg total) by mouth every 8 (eight) hours as needed (abdominal cramps). 60 tablet 0    [DISCONTINUED] ketoconazole (NIZORAL) 2 % shampoo Wash scalp with medicated shampoo at least 2x/week. Let sit on scalp at least 5 minutes prior to rinsing 240 mL 5     No current facility-administered medications on file prior to visit.     Social History     Socioeconomic History    Marital status:    Occupational History     Employer: OTHER   Tobacco Use    Smoking status: Never    Smokeless  tobacco: Never   Substance and Sexual Activity    Alcohol use: No    Drug use: No    Sexual activity: Never   Other Topics Concern    Are you pregnant or think you may be? No    Breast-feeding No     Social Determinants of Health     Financial Resource Strain: Low Risk     Difficulty of Paying Living Expenses: Not very hard   Food Insecurity: No Food Insecurity    Worried About Running Out of Food in the Last Year: Never true    Ran Out of Food in the Last Year: Never true   Transportation Needs: No Transportation Needs    Lack of Transportation (Medical): No    Lack of Transportation (Non-Medical): No   Physical Activity: Inactive    Days of Exercise per Week: 0 days    Minutes of Exercise per Session: 0 min   Stress: Stress Concern Present    Feeling of Stress : To some extent   Social Connections: Unknown    Frequency of Communication with Friends and Family: More than three times a week    Frequency of Social Gatherings with Friends and Family: Never    Active Member of Clubs or Organizations: No    Attends Club or Organization Meetings: Never    Marital Status:    Housing Stability: Low Risk     Unable to Pay for Housing in the Last Year: No    Number of Places Lived in the Last Year: 1    Unstable Housing in the Last Year: No     Family History   Problem Relation Age of Onset    Irritable bowel syndrome Mother     Irritable bowel syndrome Sister     Lupus Sister     Rheum arthritis Sister     Fibromyalgia Sister     Irritable bowel syndrome Sister     Celiac disease Neg Hx     Cirrhosis Neg Hx     Colon cancer Neg Hx     Colon polyps Neg Hx     Crohn's disease Neg Hx     Cystic fibrosis Neg Hx     Esophageal cancer Neg Hx     Hemochromatosis Neg Hx     Inflammatory bowel disease Neg Hx     Liver cancer Neg Hx     Liver disease Neg Hx     Rectal cancer Neg Hx     Stomach cancer Neg Hx     Ulcerative colitis Neg Hx     Boris's disease Neg Hx     Melanoma Neg Hx     Amblyopia Neg Hx     Blindness Neg Hx   "   Cataracts Neg Hx     Glaucoma Neg Hx     Macular degeneration Neg Hx     Retinal detachment Neg Hx     Strabismus Neg Hx        Review of Systems  Review of Systems   All other systems reviewed and are negative.     Objective:     BP (!) 156/85 (BP Location: Right arm, Patient Position: Sitting, BP Method: Medium (Automatic))   Pulse 88   Ht 4' 11" (1.499 m)   Wt 79.4 kg (175 lb)   LMP  (LMP Unknown)   BMI 35.35 kg/m²     Physical Exam   Cardiovascular:  Normal rate.      Exam reveals no gallop and no friction rub.       No murmur heard.  Pulmonary/Chest: Effort normal. No respiratory distress. She has no wheezes. She has no rales. Right breast exhibits no inverted nipple, no mass, no nipple discharge, no skin change and no tenderness. Left breast exhibits no inverted nipple, no mass, no nipple discharge, no skin change and no tenderness.   Abdominal: Normal appearance.   Lymphadenopathy:     She has no cervical adenopathy.        Right: No supraclavicular adenopathy present.        Left: No supraclavicular adenopathy present.   Neurological: She is alert.   Skin: Skin is warm and dry.     Psychiatric: Her behavior is normal. Mood, judgment and thought content normal.     Radiology review: Images personally reviewed by me in the clinic and images were shown to the patient at the time of consulation    Assessment:      Marilee Simons is a 52 y.o. female with atypical ductal hyperplasia of the right breast     Plan:    Atypical ductal hyperplasia  Atypical ductal hyperplasia is a atypical breast lesion that signifies increased risk of future breast cancer and when identified on core needle biopsy can be associated with malignancy.  When identified on a core needle biopsy it is recommended that the area of concern be surgically excised.  There is an approximate 20% risk of upgrading of the lesion to ductal carcinoma in-situ or invasive cancer on final surgical pathology.  While studies have attempted to " identify criteria that signified low risk ADH there is no agreed upon guidelines of lesions that are safe to observe.  However some studies have shown low upgrade rates for lesions less than 6 mm or lesions completely removed at time of biopsy.    Plan to proceed with a right breast excisional biopsy with reflector localization.  The risks and benefits of the procedure were discussed with the patient.  The benefits the procedure is to obtain a definitive diagnosis of the breast lesion and to rule out atypia or malignancy.  We discussed that she may need a 2nd surgery if atypia or malignancy were identified.  The risks of the procedure include but are not limited to bleeding, infection, scarring, numbness, and need for additional surgery.  She will require preoperative localization of the lesion which will be placed at a separate appointment.     ADH confers a lifetime risk of ipsilateral or contralateral breast cancer of about 1% per year.  High risk screening with alternating mammogram and MRI is recommended.  We briefly discussed chemoprevention with tamoxifen.  We discussed that this is typically decrease her risk of breast cancer.  We will plan to refer her to Medical Oncology for further discussion after surgery.    30 minutes were spent on this encounter, 25 of which was face to face counseling and 5 minutes were spent on chart review and coordination of care.

## 2022-09-22 ENCOUNTER — PATIENT MESSAGE (OUTPATIENT)
Dept: SURGERY | Facility: HOSPITAL | Age: 52
End: 2022-09-22
Payer: COMMERCIAL

## 2022-09-26 ENCOUNTER — PATIENT MESSAGE (OUTPATIENT)
Dept: PSYCHIATRY | Facility: CLINIC | Age: 52
End: 2022-09-26
Payer: COMMERCIAL

## 2022-09-26 ENCOUNTER — PATIENT MESSAGE (OUTPATIENT)
Dept: SURGERY | Facility: HOSPITAL | Age: 52
End: 2022-09-26
Payer: COMMERCIAL

## 2022-09-26 ENCOUNTER — PATIENT MESSAGE (OUTPATIENT)
Dept: RHEUMATOLOGY | Facility: CLINIC | Age: 52
End: 2022-09-26
Payer: COMMERCIAL

## 2022-09-27 ENCOUNTER — PATIENT MESSAGE (OUTPATIENT)
Dept: RHEUMATOLOGY | Facility: CLINIC | Age: 52
End: 2022-09-27
Payer: COMMERCIAL

## 2022-09-28 ENCOUNTER — PATIENT MESSAGE (OUTPATIENT)
Dept: PSYCHIATRY | Facility: CLINIC | Age: 52
End: 2022-09-28
Payer: COMMERCIAL

## 2022-09-29 ENCOUNTER — OFFICE VISIT (OUTPATIENT)
Dept: PSYCHIATRY | Facility: CLINIC | Age: 52
End: 2022-09-29
Payer: COMMERCIAL

## 2022-09-29 DIAGNOSIS — F41.1 GENERALIZED ANXIETY DISORDER: ICD-10-CM

## 2022-09-29 DIAGNOSIS — F33.1 MAJOR DEPRESSIVE DISORDER, RECURRENT EPISODE, MODERATE: Primary | ICD-10-CM

## 2022-09-29 PROCEDURE — 3044F HG A1C LEVEL LT 7.0%: CPT | Mod: CPTII,95,, | Performed by: SOCIAL WORKER

## 2022-09-29 PROCEDURE — 90834 PSYTX W PT 45 MINUTES: CPT | Mod: 95,,, | Performed by: SOCIAL WORKER

## 2022-09-29 PROCEDURE — 3044F PR MOST RECENT HEMOGLOBIN A1C LEVEL <7.0%: ICD-10-PCS | Mod: CPTII,95,, | Performed by: SOCIAL WORKER

## 2022-09-29 PROCEDURE — 90834 PR PSYCHOTHERAPY W/PATIENT, 45 MIN: ICD-10-PCS | Mod: 95,,, | Performed by: SOCIAL WORKER

## 2022-09-29 NOTE — PROGRESS NOTES
Individual Psychotherapy (PhD/LCSW)    9/29/2022    Site:  [facility]         Therapeutic Intervention: Met with patient.  Outpatient - Insight oriented psychotherapy 45 min - CPT code 61780    Chief complaint/reason for encounter: depression, anxiety and ptsd     Interval history and content of current session: The patient location is: home in [place]  The chief complaint leading to consultation is: depression and BPD    Visit type: audiovisual    Face to Face time with patient: 45  45 minutes of total time spent on the encounter, which includes face to face time and non-face to face time preparing to see the patient (eg, review of tests), Obtaining and/or reviewing separately obtained history, Documenting clinical information in the electronic or other health record, Independently interpreting results (not separately reported) and communicating results to the patient/family/caregiver, or Care coordination (not separately reported).         Each patient to whom he or she provides medical services by telemedicine is:  (1) informed of the relationship between the physician and patient and the respective role of any other health care provider with respect to management of the patient; and (2) notified that he or she may decline to receive medical services by telemedicine and may withdraw from such care at any time.    Notes: Pt seen for follow-up.  Pt talks about a recent diagnosis of pre-cancer in her breast.  She is having surgery in 2 weeks to remove the area and is highly anxious about this.  She says she is very sensitive to pain and is more worried about that than any other part of it.  Worked with her on allying her fears.  She also talks about death of father in law who was mean to her throughout her  life and her guilt at not spending more time with mother in law comforting her.  She is getting along better with her 2 young adult children.    Treatment plan:  Target symptoms: depression,  anxiety , PTSD  Why chosen therapy is appropriate versus another modality: relevant to diagnosis  Outcome monitoring methods: self-report, observation  Therapeutic intervention type: insight oriented psychotherapy    Risk parameters:  Patient reports no suicidal ideation  Patient reports no homicidal ideation  Patient reports no self-injurious behavior  Patient reports no violent behavior    Verbal deficits: None    Patient's response to intervention:  The patient's response to intervention is motivated.    Progress toward goals and other mental status changes:  The patient's progress toward goals is fair .    Diagnosis:     ICD-10-CM ICD-9-CM   1. Major depressive disorder, recurrent episode, moderate  F33.1 296.32   2. Generalized anxiety disorder  F41.1 300.02       Plan:  individual psychotherapy and medication management by physician    Return to clinic: as scheduled    Length of Service (minutes): 45

## 2022-10-03 ENCOUNTER — ANESTHESIA EVENT (OUTPATIENT)
Dept: SURGERY | Facility: HOSPITAL | Age: 52
End: 2022-10-03
Payer: COMMERCIAL

## 2022-10-03 ENCOUNTER — HOSPITAL ENCOUNTER (OUTPATIENT)
Dept: RADIOLOGY | Facility: OTHER | Age: 52
Discharge: HOME | End: 2022-10-03
Attending: SURGERY
Payer: COMMERCIAL

## 2022-10-03 DIAGNOSIS — Z91.89 AT HIGH RISK FOR BREAST CANCER: ICD-10-CM

## 2022-10-03 PROCEDURE — 77049 MRI BREAST W/WO CONTRAST, W/CAD, BILATERAL: ICD-10-PCS | Mod: 26,,, | Performed by: RADIOLOGY

## 2022-10-03 PROCEDURE — 77049 MRI BREAST C-+ W/CAD BI: CPT | Mod: TC

## 2022-10-03 PROCEDURE — A9577 INJ MULTIHANCE: HCPCS | Performed by: SURGERY

## 2022-10-03 PROCEDURE — 77049 MRI BREAST C-+ W/CAD BI: CPT | Mod: 26,,, | Performed by: RADIOLOGY

## 2022-10-03 PROCEDURE — 25500020 PHARM REV CODE 255: Performed by: SURGERY

## 2022-10-03 RX ADMIN — GADOBENATE DIMEGLUMINE 16 ML: 529 INJECTION, SOLUTION INTRAVENOUS at 02:10

## 2022-10-03 NOTE — ANESTHESIA PAT ROS NOTE
Chart review complete. Patient's medical history reviewed.  OK to proceed at [facility].    Patricia Silver MD   10/3/2022

## 2022-10-04 ENCOUNTER — HOSPITAL ENCOUNTER (OUTPATIENT)
Dept: RADIOLOGY | Facility: HOSPITAL | Age: 52
Discharge: HOME | End: 2022-10-04
Attending: SURGERY
Payer: COMMERCIAL

## 2022-10-04 DIAGNOSIS — N60.91 ATYPICAL DUCTAL HYPERPLASIA OF RIGHT BREAST: ICD-10-CM

## 2022-10-04 PROCEDURE — 25000003 PHARM REV CODE 250: Performed by: SURGERY

## 2022-10-04 PROCEDURE — 77065 DX MAMMO INCL CAD UNI: CPT | Mod: TC,RT

## 2022-10-04 PROCEDURE — A4648 IMPLANTABLE TISSUE MARKER: HCPCS

## 2022-10-04 PROCEDURE — 77065 DX MAMMO INCL CAD UNI: CPT | Mod: 26,RT,, | Performed by: RADIOLOGY

## 2022-10-04 PROCEDURE — 19281 PERQ DEVICE BREAST 1ST IMAG: CPT | Mod: RT,,, | Performed by: RADIOLOGY

## 2022-10-04 PROCEDURE — 19281 MAMMO BREAST RADAR REFLECTOR LOC W/MAMMO GUIDANCE, 1ST LESION, RIGHT: ICD-10-PCS | Mod: RT,,, | Performed by: RADIOLOGY

## 2022-10-04 PROCEDURE — 77065 MAMMO DIGITAL DIAGNOSTIC RIGHT: ICD-10-PCS | Mod: 26,RT,, | Performed by: RADIOLOGY

## 2022-10-04 PROCEDURE — 19281 PERQ DEVICE BREAST 1ST IMAG: CPT | Mod: RT

## 2022-10-04 RX ORDER — LIDOCAINE HYDROCHLORIDE 20 MG/ML
10 INJECTION, SOLUTION INFILTRATION; PERINEURAL ONCE
Status: COMPLETED | OUTPATIENT
Start: 2022-10-04 | End: 2022-10-04

## 2022-10-04 RX ADMIN — LIDOCAINE HYDROCHLORIDE 10 ML: 20 INJECTION, SOLUTION INFILTRATION; PERINEURAL at 11:10

## 2022-10-10 ENCOUNTER — TELEPHONE (OUTPATIENT)
Dept: CARDIOLOGY | Facility: CLINIC | Age: 52
End: 2022-10-10
Payer: COMMERCIAL

## 2022-10-13 ENCOUNTER — TELEPHONE (OUTPATIENT)
Dept: SURGERY | Facility: CLINIC | Age: 52
End: 2022-10-13
Payer: COMMERCIAL

## 2022-10-13 NOTE — TELEPHONE ENCOUNTER
Spoke to patient to give surgery arrival time of 0900 tomorrow at [place].  Pt verbalized understanding of arrival time and location.  Patient had no other concerns at this time.

## 2022-10-14 ENCOUNTER — HOSPITAL ENCOUNTER (OUTPATIENT)
Facility: HOSPITAL | Age: 52
Discharge: HOME | End: 2022-10-14
Attending: SURGERY | Admitting: SURGERY
Payer: COMMERCIAL

## 2022-10-14 ENCOUNTER — ANESTHESIA (OUTPATIENT)
Dept: SURGERY | Facility: HOSPITAL | Age: 52
End: 2022-10-14
Payer: COMMERCIAL

## 2022-10-14 ENCOUNTER — HOSPITAL ENCOUNTER (OUTPATIENT)
Dept: RADIOLOGY | Facility: HOSPITAL | Age: 52
Discharge: HOME | End: 2022-10-14
Attending: SURGERY
Payer: COMMERCIAL

## 2022-10-14 VITALS
OXYGEN SATURATION: 97 % | WEIGHT: 175 LBS | HEIGHT: 59 IN | SYSTOLIC BLOOD PRESSURE: 132 MMHG | BODY MASS INDEX: 35.28 KG/M2 | RESPIRATION RATE: 17 BRPM | TEMPERATURE: 98 F | DIASTOLIC BLOOD PRESSURE: 81 MMHG | HEART RATE: 64 BPM

## 2022-10-14 DIAGNOSIS — N60.91 ATYPICAL DUCTAL HYPERPLASIA OF RIGHT BREAST: ICD-10-CM

## 2022-10-14 DIAGNOSIS — N60.99 ATYPICAL DUCTAL HYPERPLASIA OF BREAST: Primary | ICD-10-CM

## 2022-10-14 PROCEDURE — 63600175 PHARM REV CODE 636 W HCPCS: Performed by: STUDENT IN AN ORGANIZED HEALTH CARE EDUCATION/TRAINING PROGRAM

## 2022-10-14 PROCEDURE — 36000706: Performed by: SURGERY

## 2022-10-14 PROCEDURE — 19125 EXCISION BREAST LESION: CPT | Mod: ,,, | Performed by: SURGERY

## 2022-10-14 PROCEDURE — 88307 TISSUE EXAM BY PATHOLOGIST: CPT | Mod: 26,,, | Performed by: PATHOLOGY

## 2022-10-14 PROCEDURE — 88307 TISSUE EXAM BY PATHOLOGIST: CPT | Performed by: PATHOLOGY

## 2022-10-14 PROCEDURE — 25000003 PHARM REV CODE 250: Performed by: STUDENT IN AN ORGANIZED HEALTH CARE EDUCATION/TRAINING PROGRAM

## 2022-10-14 PROCEDURE — 88304 TISSUE EXAM BY PATHOLOGIST: CPT | Performed by: PATHOLOGY

## 2022-10-14 PROCEDURE — D9220A PRA ANESTHESIA: Mod: ANES,,, | Performed by: ANESTHESIOLOGY

## 2022-10-14 PROCEDURE — 63600175 PHARM REV CODE 636 W HCPCS: Performed by: NURSE ANESTHETIST, CERTIFIED REGISTERED

## 2022-10-14 PROCEDURE — 88304 TISSUE EXAM BY PATHOLOGIST: CPT | Mod: 26,,, | Performed by: PATHOLOGY

## 2022-10-14 PROCEDURE — 71000015 HC POSTOP RECOV 1ST HR: Performed by: SURGERY

## 2022-10-14 PROCEDURE — 63600175 PHARM REV CODE 636 W HCPCS: Performed by: ANESTHESIOLOGY

## 2022-10-14 PROCEDURE — 64462 PVB THORACIC 2ND+ INJ SITE: CPT | Mod: 59,RT,, | Performed by: ANESTHESIOLOGY

## 2022-10-14 PROCEDURE — 19125 PR EXCISE BREAST LES W XRAY MARKER: ICD-10-PCS | Mod: ,,, | Performed by: SURGERY

## 2022-10-14 PROCEDURE — 99900035 HC TECH TIME PER 15 MIN (STAT)

## 2022-10-14 PROCEDURE — 11200 RMVL SKIN TAGS UP TO&INC 15: CPT | Mod: 51,,, | Performed by: SURGERY

## 2022-10-14 PROCEDURE — 37000008 HC ANESTHESIA 1ST 15 MINUTES: Performed by: SURGERY

## 2022-10-14 PROCEDURE — 25000003 PHARM REV CODE 250: Performed by: NURSE ANESTHETIST, CERTIFIED REGISTERED

## 2022-10-14 PROCEDURE — 11200 PR REMOVAL OF SKIN TAGS, UP TO 15: ICD-10-PCS | Mod: 51,,, | Performed by: SURGERY

## 2022-10-14 PROCEDURE — D9220A PRA ANESTHESIA: Mod: CRNA,,, | Performed by: NURSE ANESTHETIST, CERTIFIED REGISTERED

## 2022-10-14 PROCEDURE — 25000003 PHARM REV CODE 250: Performed by: SURGERY

## 2022-10-14 PROCEDURE — 64462 PVB THORACIC 2ND+ INJ SITE: CPT | Performed by: STUDENT IN AN ORGANIZED HEALTH CARE EDUCATION/TRAINING PROGRAM

## 2022-10-14 PROCEDURE — 64461 PR PVB THORACIC SINGLE INJ SITE, INCL IMAGING: ICD-10-PCS | Mod: 59,RT,, | Performed by: ANESTHESIOLOGY

## 2022-10-14 PROCEDURE — D9220A PRA ANESTHESIA: ICD-10-PCS | Mod: CRNA,,, | Performed by: NURSE ANESTHETIST, CERTIFIED REGISTERED

## 2022-10-14 PROCEDURE — D9220A PRA ANESTHESIA: ICD-10-PCS | Mod: ANES,,, | Performed by: ANESTHESIOLOGY

## 2022-10-14 PROCEDURE — 64462 PR PVB THORACIC 2ND AND ANY ADDL INJ SITE, INCL IMG GUIDE: ICD-10-PCS | Mod: 59,RT,, | Performed by: ANESTHESIOLOGY

## 2022-10-14 PROCEDURE — 76098 MAMMO BREAST SPECIMEN: ICD-10-PCS | Mod: 26,,, | Performed by: RADIOLOGY

## 2022-10-14 PROCEDURE — 76098 X-RAY EXAM SURGICAL SPECIMEN: CPT | Mod: TC

## 2022-10-14 PROCEDURE — 88304 PR  SURG PATH,LEVEL III: ICD-10-PCS | Mod: 26,,, | Performed by: PATHOLOGY

## 2022-10-14 PROCEDURE — 36000707: Performed by: SURGERY

## 2022-10-14 PROCEDURE — 64461 PVB THORACIC SINGLE INJ SITE: CPT | Mod: 59,RT,, | Performed by: ANESTHESIOLOGY

## 2022-10-14 PROCEDURE — 71000033 HC RECOVERY, INTIAL HOUR: Performed by: SURGERY

## 2022-10-14 PROCEDURE — C1894 INTRO/SHEATH, NON-LASER: HCPCS | Performed by: SURGERY

## 2022-10-14 PROCEDURE — 76098 X-RAY EXAM SURGICAL SPECIMEN: CPT | Mod: 26,,, | Performed by: RADIOLOGY

## 2022-10-14 PROCEDURE — 94761 N-INVAS EAR/PLS OXIMETRY MLT: CPT

## 2022-10-14 PROCEDURE — 25000003 PHARM REV CODE 250: Performed by: ANESTHESIOLOGY

## 2022-10-14 PROCEDURE — 88307 PR  SURG PATH,LEVEL V: ICD-10-PCS | Mod: 26,,, | Performed by: PATHOLOGY

## 2022-10-14 PROCEDURE — 25000003 PHARM REV CODE 250

## 2022-10-14 PROCEDURE — 37000009 HC ANESTHESIA EA ADD 15 MINS: Performed by: SURGERY

## 2022-10-14 RX ORDER — SODIUM CHLORIDE 9 MG/ML
INJECTION, SOLUTION INTRAVENOUS CONTINUOUS
Status: DISCONTINUED | OUTPATIENT
Start: 2022-10-14 | End: 2023-03-08

## 2022-10-14 RX ORDER — CLINDAMYCIN PHOSPHATE 900 MG/50ML
INJECTION, SOLUTION INTRAVENOUS
Status: DISCONTINUED | OUTPATIENT
Start: 2022-10-14 | End: 2022-10-14

## 2022-10-14 RX ORDER — ONDANSETRON 2 MG/ML
INJECTION INTRAMUSCULAR; INTRAVENOUS
Status: DISCONTINUED | OUTPATIENT
Start: 2022-10-14 | End: 2022-10-14

## 2022-10-14 RX ORDER — FENTANYL CITRATE 50 UG/ML
25-200 INJECTION, SOLUTION INTRAMUSCULAR; INTRAVENOUS
Status: DISCONTINUED | OUTPATIENT
Start: 2022-10-14 | End: 2023-03-08

## 2022-10-14 RX ORDER — CARBOXYMETHYLCELLULOSE SODIUM 10 MG/ML
GEL OPHTHALMIC
Status: DISCONTINUED | OUTPATIENT
Start: 2022-10-14 | End: 2022-10-14

## 2022-10-14 RX ORDER — PROPOFOL 10 MG/ML
VIAL (ML) INTRAVENOUS CONTINUOUS PRN
Status: DISCONTINUED | OUTPATIENT
Start: 2022-10-14 | End: 2022-10-14

## 2022-10-14 RX ORDER — FENTANYL CITRATE 50 UG/ML
25 INJECTION, SOLUTION INTRAMUSCULAR; INTRAVENOUS EVERY 5 MIN PRN
Status: DISCONTINUED | OUTPATIENT
Start: 2022-10-14 | End: 2022-10-14 | Stop reason: HOSPADM

## 2022-10-14 RX ORDER — OXYCODONE HYDROCHLORIDE 5 MG/1
5 TABLET ORAL
Status: DISCONTINUED | OUTPATIENT
Start: 2022-10-14 | End: 2022-10-14 | Stop reason: HOSPADM

## 2022-10-14 RX ORDER — CEFAZOLIN SODIUM 1 G/3ML
2 INJECTION, POWDER, FOR SOLUTION INTRAMUSCULAR; INTRAVENOUS
Status: DISCONTINUED | OUTPATIENT
Start: 2022-10-14 | End: 2023-03-08

## 2022-10-14 RX ORDER — DEXAMETHASONE SODIUM PHOSPHATE 4 MG/ML
INJECTION, SOLUTION INTRA-ARTICULAR; INTRALESIONAL; INTRAMUSCULAR; INTRAVENOUS; SOFT TISSUE
Status: DISCONTINUED | OUTPATIENT
Start: 2022-10-14 | End: 2022-10-14

## 2022-10-14 RX ORDER — FAMOTIDINE 10 MG/ML
INJECTION INTRAVENOUS
Status: DISCONTINUED | OUTPATIENT
Start: 2022-10-14 | End: 2022-10-14

## 2022-10-14 RX ORDER — CELECOXIB 200 MG/1
400 CAPSULE ORAL ONCE
Status: COMPLETED | OUTPATIENT
Start: 2022-10-14 | End: 2022-10-14

## 2022-10-14 RX ORDER — PROPOFOL 10 MG/ML
VIAL (ML) INTRAVENOUS
Status: DISCONTINUED | OUTPATIENT
Start: 2022-10-14 | End: 2022-10-14

## 2022-10-14 RX ORDER — DEXMEDETOMIDINE HYDROCHLORIDE 100 UG/ML
INJECTION, SOLUTION INTRAVENOUS
Status: DISCONTINUED | OUTPATIENT
Start: 2022-10-14 | End: 2022-10-14

## 2022-10-14 RX ORDER — MIDAZOLAM HYDROCHLORIDE 1 MG/ML
.5-4 INJECTION INTRAMUSCULAR; INTRAVENOUS
Status: DISCONTINUED | OUTPATIENT
Start: 2022-10-14 | End: 2023-03-08

## 2022-10-14 RX ORDER — ROPIVACAINE HYDROCHLORIDE 5 MG/ML
INJECTION, SOLUTION EPIDURAL; INFILTRATION; PERINEURAL
Status: COMPLETED | OUTPATIENT
Start: 2022-10-14 | End: 2022-10-14

## 2022-10-14 RX ORDER — LIDOCAINE HYDROCHLORIDE 10 MG/ML
INJECTION INFILTRATION; PERINEURAL
Status: DISCONTINUED | OUTPATIENT
Start: 2022-10-14 | End: 2022-10-14 | Stop reason: HOSPADM

## 2022-10-14 RX ORDER — OXYCODONE HYDROCHLORIDE 5 MG/1
5 TABLET ORAL EVERY 4 HOURS PRN
Qty: 10 TABLET | Refills: 0 | Status: SHIPPED | OUTPATIENT
Start: 2022-10-14 | End: 2022-10-31

## 2022-10-14 RX ORDER — ACETAMINOPHEN 500 MG
1000 TABLET ORAL
Status: COMPLETED | OUTPATIENT
Start: 2022-10-14 | End: 2022-10-14

## 2022-10-14 RX ORDER — MUPIROCIN 20 MG/G
OINTMENT TOPICAL
Status: DISCONTINUED | OUTPATIENT
Start: 2022-10-14 | End: 2023-03-08

## 2022-10-14 RX ORDER — LIDOCAINE HYDROCHLORIDE 20 MG/ML
INJECTION INTRAVENOUS
Status: DISCONTINUED | OUTPATIENT
Start: 2022-10-14 | End: 2022-10-14

## 2022-10-14 RX ADMIN — DEXAMETHASONE SODIUM PHOSPHATE 8 MG: 4 INJECTION, SOLUTION INTRAMUSCULAR; INTRAVENOUS at 12:10

## 2022-10-14 RX ADMIN — MUPIROCIN: 20 OINTMENT TOPICAL at 10:10

## 2022-10-14 RX ADMIN — FENTANYL CITRATE 25 MCG: 50 INJECTION, SOLUTION INTRAMUSCULAR; INTRAVENOUS at 02:10

## 2022-10-14 RX ADMIN — CELECOXIB 400 MG: 200 CAPSULE ORAL at 10:10

## 2022-10-14 RX ADMIN — CLINDAMYCIN IN 5 PERCENT DEXTROSE 900 MG: 18 INJECTION, SOLUTION INTRAVENOUS at 12:10

## 2022-10-14 RX ADMIN — FENTANYL CITRATE 50 MCG: 50 INJECTION INTRAMUSCULAR; INTRAVENOUS at 11:10

## 2022-10-14 RX ADMIN — PROPOFOL 40 MG: 10 INJECTION, EMULSION INTRAVENOUS at 12:10

## 2022-10-14 RX ADMIN — MIDAZOLAM HYDROCHLORIDE 4 MG: 1 INJECTION INTRAMUSCULAR; INTRAVENOUS at 10:10

## 2022-10-14 RX ADMIN — MIDAZOLAM HYDROCHLORIDE 2 MG: 1 INJECTION INTRAMUSCULAR; INTRAVENOUS at 12:10

## 2022-10-14 RX ADMIN — SODIUM CHLORIDE: 0.9 INJECTION, SOLUTION INTRAVENOUS at 10:10

## 2022-10-14 RX ADMIN — ACETAMINOPHEN 1000 MG: 500 TABLET ORAL at 10:10

## 2022-10-14 RX ADMIN — PROPOFOL 125 MCG/KG/MIN: 10 INJECTION, EMULSION INTRAVENOUS at 12:10

## 2022-10-14 RX ADMIN — LIDOCAINE HYDROCHLORIDE 60 MG: 20 INJECTION INTRAVENOUS at 12:10

## 2022-10-14 RX ADMIN — OXYCODONE 5 MG: 5 TABLET ORAL at 02:10

## 2022-10-14 RX ADMIN — DEXMEDETOMIDINE HYDROCHLORIDE 12 MCG: 100 INJECTION, SOLUTION INTRAVENOUS at 12:10

## 2022-10-14 RX ADMIN — ROPIVACAINE HYDROCHLORIDE 30 ML: 5 INJECTION EPIDURAL; INFILTRATION; PERINEURAL at 11:10

## 2022-10-14 RX ADMIN — FAMOTIDINE 20 MG: 10 INJECTION, SOLUTION INTRAVENOUS at 12:10

## 2022-10-14 RX ADMIN — CARBOXYMETHYLCELLULOSE SODIUM 1 DROP: 10 GEL OPHTHALMIC at 12:10

## 2022-10-14 RX ADMIN — DEXMEDETOMIDINE HYDROCHLORIDE 4 MCG: 100 INJECTION, SOLUTION INTRAVENOUS at 01:10

## 2022-10-14 NOTE — TRANSFER OF CARE
"Anesthesia Transfer of Care Note    Patient: Marilee Simons    Procedure(s) Performed: Procedure(s) (LRB):  EXCISIONAL BIOPSY-right with radiological marker (Right)  REMOVAL, SKIN TAGS (N/A)    Patient location: PACU    Anesthesia Type: general    Transport from OR: Transported from OR on 6-10 L/min O2 by face mask with adequate spontaneous ventilation    Post pain: adequate analgesia    Post assessment: no apparent anesthetic complications    Post vital signs: stable    Level of consciousness: awake    Nausea/Vomiting: no nausea/vomiting    Complications: none    Transfer of care protocol was followed      Last vitals:   Visit Vitals  /72   Pulse 94   Temp 36.6 °C (97.8 °F) (Temporal)   Resp 16   Ht 4' 11" (1.499 m)   Wt 79.4 kg (175 lb)   LMP  (LMP Unknown)   SpO2 95%   Breastfeeding No   BMI 35.35 kg/m²     "

## 2022-10-14 NOTE — ANESTHESIA POSTPROCEDURE EVALUATION
Anesthesia Post Evaluation    Patient: Marilee Simons    Procedure(s) Performed: Procedure(s) (LRB):  EXCISIONAL BIOPSY-right with radiological marker (Right)  REMOVAL, SKIN TAGS (N/A)    Final Anesthesia Type: general      Patient location during evaluation: PACU  Patient participation: Yes- Able to Participate  Level of consciousness: awake and alert  Post-procedure vital signs: reviewed and stable  Pain management: adequate  Airway patency: patent    PONV status at discharge: No PONV  Anesthetic complications: no      Cardiovascular status: blood pressure returned to baseline  Respiratory status: unassisted  Hydration status: euvolemic  Follow-up not needed.          Vitals Value Taken Time   /81 10/14/22 1501   Temp 36.6 °C (97.8 °F) 10/14/22 1412   Pulse 64 10/14/22 1514   Resp 24 10/14/22 1513   SpO2 97 % 10/14/22 1514   Vitals shown include unvalidated device data.      No case tracking events are documented in the log.      Pain/Jewel Score: Pain Rating Prior to Med Admin: 6 (10/14/2022  2:35 PM)  Pain Rating Post Med Admin: 6 (10/14/2022  2:25 PM)  Jewel Score: 5 (10/14/2022  2:12 PM)

## 2022-10-14 NOTE — PLAN OF CARE
VSS. Patient able to tolerate oral liquids. Patient reports tolerable pain level for discharge. Patient/family received home medication per bedside delivery. Dressing intact. No distress noted. Patient states she is ready for discharge. Discharge instructions reviewed with patient and family, verbalized understanding. IV discontinued with catheter tip intact. Staff and family at bedside to help patient dress. Patient voided before d/c. Patient wheeled to lobby via staff.

## 2022-10-14 NOTE — BRIEF OP NOTE
[place] - Surgery ([facility])  Brief Operative Note    Surgery Date: 10/14/2022     Surgeon(s) and Role:     * PALLAVI Oseguera MD - Primary      Leeroy Eller MD - Resident, Assisting    Assisting Surgeon: None    Pre-op Diagnosis:  Atypical ductal hyperplasia of right breast [N60.91]    Post-op Diagnosis:  Post-Op Diagnosis Codes:     * Atypical ductal hyperplasia of right breast [N60.91]    Procedure(s) (LRB):  EXCISIONAL BIOPSY-right with radiological marker (Right)  REMOVAL, SKIN TAGS (N/A)    Anesthesia: Regional    Operative Findings:   Right breast excisional biopsy  Multiple skin tags removed on the right neck and one inferior to the right breast.    Estimated Blood Loss: * No values recorded between 10/14/2022 12:56 PM and 10/14/2022  2:11 PM *         Specimens:   Specimen (24h ago, onward)       Start     Ordered    10/14/22 1352  Specimen to Pathology, Surgery General Surgery  Once        Comments: Pre-op Diagnosis: Atypical ductal hyperplasia of right breast [N60.91]Procedure(s):EXCISIONAL BIOPSY-right with radiological markerREMOVAL, SKIN TAGS Number of specimens:2Name of specimens:1. Right skin tags 2. Right breast excisional biopsy, short stitch superior, long stitch lateral     References:    Click here for ordering Quick Tip   Question Answer Comment   Procedure Type: General Surgery    Which provider would you like to cc? PALLAVI OSEGUERA    Release to patient Immediate        10/14/22 1356                      Discharge Note    OUTCOME: Patient tolerated treatment/procedure well without complication and is now ready for discharge.    DISPOSITION: Home or Self Care    FINAL DIAGNOSIS:  <principal problem not specified>    FOLLOWUP: In clinic    DISCHARGE INSTRUCTIONS:    Discharge Procedure Orders   Diet Adult Regular     No dressing needed   Order Comments: Wear surgical bra or similar tight fitting sports bra for 2 weeks after surgery. Wear day and night but ok to remove for showing and washing  the bra.     Notify your health care provider if you experience any of the following:  temperature >100.4     Notify your health care provider if you experience any of the following:  persistent nausea and vomiting or diarrhea     Notify your health care provider if you experience any of the following:  severe uncontrolled pain     Notify your health care provider if you experience any of the following:  redness, tenderness, or signs of infection (pain, swelling, redness, odor or green/yellow discharge around incision site)     Notify your health care provider if you experience any of the following:  difficulty breathing or increased cough     Notify your health care provider if you experience any of the following:  severe persistent headache     Notify your health care provider if you experience any of the following:  worsening rash     Notify your health care provider if you experience any of the following:  persistent dizziness, light-headedness, or visual disturbances     Notify your health care provider if you experience any of the following:  increased confusion or weakness     Activity as tolerated

## 2022-10-14 NOTE — PLAN OF CARE
Pre procedure complete. Waiting on anesthesia consent.  at bedside. Pt belongings in locker. Pt resting comfortably and all questions addressed.

## 2022-10-14 NOTE — PATIENT INSTRUCTIONS
POSTOPERATIVE INSTRUCTIONS FOLLOWING BREAST SURGERY    The following are post-operative instructions that will help you to recover from your surgery.  Please read over these instructions carefully and contact us if we can answer any of your questions or concerns.    Wound Care  A surgical bra may be placed around your chest after your surgery.  If you are given the bra, please wear it as close to 24 hours a day as possible until your post-operative clinic appointment (2 weeks after surgery).  If the elastic around the bra irritates your skin, you may wear a soft t-shirt underneath the bra.  You may go without wearing the bra long enough to shower, to launder and dry the bra.  If the bra is extremely uncomfortable, you may wear a supportive sports bra instead after 2 days.  You may shower beginning two days after surgery.  Do not take a tub bath and do not soak the surgical site for 2 weeks.    Activity   You should be able to return to your regular activities 2 days after your surgery.  However, do not engage in strenuous activities in which you use your upper body such as:  golf, tennis, aerobics, washing windows, raking the yard, mopping, vacuuming, heavy lifting (>10 lbs,e.g children) until you are seen for your follow-up appointment in clinic (about 2 weeks).    Medication for pain  You may find that over the counter pain medications may be sufficient for your pain.  You will be given a prescription for pain medication for more severe pain.  You should not drive or operate machinery while taking these.  Please take narcotics with food.  Narcotics can cause, or worsen constipation.  If taking narcotics you will need to increase your fluid intake, eat high fiber foods (such as fruits and bran) and make sure that you are up and walking. You may need to take an over the counter stool softener for constipation.      Please report the following:  Temperature greater than 101 degrees  Discharge or bad odor from the  wound  Excessive bleeding, such as bloody dressing or extreme bruising  Redness at incision and/or drain sites  Swelling or buildup of fluid around incision    Additional information  I will see you approximately 2 weeks following your surgery.  If this follow-up appointment has not been made, please call the office.    If you have any questions or problems, please call my office or my nurse.    After hours and on weekends, you may call the main [facility] line at [phone] and ask to have the general surgery resident paged or have me paged.

## 2022-10-14 NOTE — OP NOTE
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  The reflector was placed in the upper outer quadrant of the right breast. The localization films were reviewed.    She was then brought to the Operating Room and placed in the supine position. Anesthesia with local/monitored anesthesia care with sedation was administered.  The right breast, anterior chest, arm and axilla were then prepped and draped in a sterile fashion.     First we removed approximately 8 small skin tags from the right neck and 1 skin tag from the right chest.  This was performed with sharp transection of the base of the tags.  They were sent to pathology for confirmation of suspected etiology.    We turned our attention to the right breast. Local anesthetic was injected in the area. An incision was made in the upper outer quadrant position of the right breast. The specimen was dissected circumferentially around the lesion using the reflector and probe as a guide.  The localization lumpectomy specimen was marked using short stitch superior, long stitch lateral.  It was then submitted for specimen radiograph, which confirmed the reflector, clip and area of interest within the specimen.     Within the lumpectomy cavity, hemostasis was achieved with cautery. The wound was irrigated until clear. There was no evidence of bleeding. It was closed in multiple layers with deep dermal and subcutaneous interrupted Vicryl sutures and a running 4-0 Monocryl subcuticular skin closure.    Dermabond was applied. Sterile fluff gauze was placed and a post-procedure bra was  placed. She tolerated the procedure well without complication and was turned over to Anesthesia for transport to the recovery area in a satisfactory condition. All specimens were sent to Pathology for permanent sectioning.    ESTIMATED BLOOD LOSS:   5 ml    COMPLICATIONS: none    DISPOSITION:  PACU--hemodynamically stable    ATTESTATION:  I was present and scrubbed for the entire procedure.

## 2022-10-14 NOTE — ANESTHESIA PREPROCEDURE EVALUATION
10/14/2022  Marilee Simons is a 52 y.o., female.  Pre-operative evaluation for Procedure(s) (LRB):  EXCISIONAL BIOPSY-right with radiological marker (Right)    Marilee Simons is a 52 y.o. female     Patient Active Problem List   Diagnosis    IBS (irritable bowel syndrome)    Arthralgia    Chronic fatigue    Bladder pain    GERD (gastroesophageal reflux disease)    Major depressive disorder, recurrent episode, moderate    PTSD (post-traumatic stress disorder)    Generalized anxiety disorder    OAB (overactive bladder)    Urgency incontinence    Straining to void    Cluster B personality disorder    Interstitial cystitis    Fibromyalgia    Pelvic pain in female    Blood poisoning    Decreased bladder capacity    Urethral pain    Acute left-sided low back pain with left-sided sciatica    Impaired gait    Decreased strength    Chronic pain    Hypothyroidism due to Hashimoto's thyroiditis    Neurostimulator device in situ    Sepsis    Rheumatoid arthritis    Class 1 obesity due to excess calories in adult    Lupus    Anginal equivalent    Pure hypercholesterolemia    COVID    Severe obesity (BMI 35.0-39.9) with comorbidity    Multiple persistent symptoms after COVID-19    Hypernatremia    Hyperlipidemia       Review of patient's allergies indicates:   Allergen Reactions    Cephalexin Itching    Cephalosporins     Zofran [ondansetron hcl (pf)] Hives    Penicillins Rash       No current facility-administered medications on file prior to encounter.     Current Outpatient Medications on File Prior to Encounter   Medication Sig Dispense Refill    alosetron (LOTRONEX) 0.5 MG tablet Take 1 tablet (0.5 mg total) by mouth daily as needed. (Patient taking differently: Take 0.5 mg by mouth once daily.) 30 tablet 0    amitriptyline (ELAVIL) 10 MG tablet TAKE 1 TABLET(10 MG) BY MOUTH  EVERY NIGHT AS NEEDED (Patient taking differently: Take 10 mg by mouth every evening.) 90 tablet 3    butalbitaL-acetaminophen  mg Tab TAKE 1 TABLET BY MOUTH EVERY 4 HOURS (Patient taking differently: Take 1 tablet by mouth every 4 (four) hours as needed.) 60 tablet 3    calcium glycerophosphate (PRELIEF) 65 mg Tab Take 2 tablets by mouth before meals and at bedtime as needed. (Patient taking differently: Take 2 tablets by mouth 3 (three) times daily with meals.) 100 each prn    cetirizine (ZYRTEC) 10 MG tablet Take 1 tablet (10 mg total) by mouth once daily. 120 tablet 2    clonazePAM (KLONOPIN) 1 MG tablet TAKE 1 TABLET(1 MG) BY MOUTH THREE TIMES DAILY 90 tablet 2    cyclobenzaprine (FLEXERIL) 10 MG tablet TAKE 1 TABLET(10 MG) BY MOUTH TWICE DAILY AS NEEDED (Patient taking differently: Take 10 mg by mouth 2 (two) times a day.) 90 tablet 6    dicyclomine (BENTYL) 20 mg tablet Take 20 mg by mouth daily as needed.      DULoxetine (CYMBALTA) 60 MG capsule TAKE 2 CAPSULES(120 MG) BY MOUTH EVERY DAY 60 capsule 3    famotidine (PEPCID) 40 MG tablet TAKE 1 TABLET(40 MG) BY MOUTH EVERY NIGHT AS NEEDED FOR HEARTBURN 30 tablet 11    fluticasone propionate (FLONASE) 50 mcg/actuation nasal spray 2 sprays (100 mcg total) by Each Nostril route 2 (two) times daily. 31.6 mL 1    gabapentin (NEURONTIN) 800 MG tablet TAKE 1 TABLET(800 MG) BY MOUTH THREE TIMES DAILY (Patient taking differently: Take 800 mg by mouth 3 (three) times daily.) 90 tablet 11    hydrOXYzine HCL (ATARAX) 25 MG tablet 1-4 tablets as needed for itching 240 tablet 3    lamoTRIgine (LAMICTAL) 100 MG tablet Take 1 tablet (100 mg total) by mouth once daily. 30 tablet 5    levothyroxine (SYNTHROID) 50 MCG tablet Take 1 tablet (50 mcg) by mouth Mon-Sat and take 2 tablets (100 mcg) on Sun only 34 tablet 11    hwqkei-sjrgu-t.blue-sal-naphos (USTELL) 120-0.12 mg Cap Take 1 capsule by mouth 3 (three) times daily. (Patient taking differently: Take 1  capsule by mouth 2 (two) times a day.) 90 each 3    oxybutynin (DITROPAN) 5 MG Tab TAKE 1 TABLET(5 MG) BY MOUTH THREE TIMES DAILY 90 tablet 3    RABEprazole (ACIPHEX) 20 mg tablet Take 1 tablet (20 mg total) by mouth once daily. 30 tablet 2    tiZANidine (ZANAFLEX) 4 MG tablet Take 4 mg by mouth 3 (three) times daily.      tofacitinib (XELJANZ XR) 11 mg Tb24 Take 11 mg by mouth once daily. 30 tablet 6    hydrocortisone 2.5 % cream Apply to affected areas twice a day when eczema is moderate. (Patient taking differently: Apply to affected areas twice a day as needed when eczema is moderate.) 453.6 g 1    hydrOXYchloroQUINE (PLAQUENIL) 200 mg tablet Take 1 tablet (200 mg total) by mouth 2 (two) times daily. 60 tablet 6    omalizumab (XOLAIR) 150 mg/mL injection Inject 2 mLs (300 mg total) into the skin every 28 days. for 13 doses (Patient not taking: Reported on 2022) 13 each 1    predniSONE (DELTASONE) 2.5 MG tablet 1 to 2 tabs PO daily prn for arthritis flare (Patient not taking: Reported on 2022) 60 tablet 0    promethazine (PHENERGAN) 25 MG tablet Take 1 tablet (25 mg total) by mouth every 6 (six) hours as needed for Nausea. 15 tablet 0    [DISCONTINUED] hyoscyamine (ANASPAZ,LEVSIN) 0.125 mg Tab Take 1 tablet (125 mcg total) by mouth every 8 (eight) hours as needed (abdominal cramps). 60 tablet 0    [DISCONTINUED] ketoconazole (NIZORAL) 2 % shampoo Wash scalp with medicated shampoo at least 2x/week. Let sit on scalp at least 5 minutes prior to rinsing 240 mL 5       Past Surgical History:   Procedure Laterality Date    BLADDER SURGERY       SECTION, LOW TRANSVERSE      x 2    COLONOSCOPY      COLONOSCOPY N/A 10/5/2017    Procedure: COLONOSCOPY;  Surgeon: Venkat He MD;  Location: [address];  Service: Endoscopy;  Laterality: N/A;    ESOPHAGOGASTRODUODENOSCOPY      ESOPHAGOGASTRODUODENOSCOPY N/A 10/25/2021    Procedure: EGD (ESOPHAGOGASTRODUODENOSCOPY);  Surgeon:  Venkat He MD;  Location: [facility] ENDO (4TH FLR);  Service: Endoscopy;  Laterality: N/A;  Covid test on 10/22 at [place].[facility]    10/19 lvm to confirm appt-rb    EYE SURGERY      Lasik-bilateral    HYSTERECTOMY      IMPLANTATION OF PERMANENT SACRAL NERVE STIMULATOR N/A 4/27/2022    Procedure: INSERTION, NEUROSTIMULATOR, PERMANENT, SACRAL;  Surgeon: Bandar Garcia MD;  Location: [facility] OR 2ND FLR;  Service: Urology;  Laterality: N/A;  2hr    INJECTION OF BOTULINUM TOXIN TYPE A N/A 9/12/2018    Procedure: INJECTION, BOTULINUM TOXIN, TYPE A  200 UNITS;  Surgeon: Bandar Garcia MD;  Location: [facility] OR 1ST FLR;  Service: Urology;  Laterality: N/A;    INSTILLATION OF URINARY BLADDER N/A 9/12/2018    Procedure: INSTILLATION, URINARY BLADDER;  Surgeon: Bandar Garcia MD;  [facility];  Service: Urology;  Laterality: N/A;    PARTIAL HYSTERECTOMY      REMOVAL OF ELECTRODE LEAD OF SACRAL NERVE STIMULATOR N/A 4/27/2022    Procedure: REMOVAL, ELECTRODE LEAD, SACRAL NERVE STIMULATOR;  Surgeon: Bandar Garcia MD;  Location: [facility] OR 2ND FLR;  Service: Urology;  Laterality: N/A;    TRANSFORAMINAL EPIDURAL INJECTION OF STEROID Left 2/15/2021    Procedure: LUMBAR TRANSFORAMINAL LEFT L5 AND S1 DIRECT REFERRAL;  Surgeon: Marquez Nesbitt MD;  Location[facility];  Service: Pain Management;  Laterality: Left;  NEEDS CONSENT, PT REQUESTING IV SEDATION         CBC:  Lab Results   Component Value Date    WBC 7.39 08/18/2022    RBC 4.30 08/18/2022    HGB 11.8 (L) 08/18/2022    HCT 38.4 08/18/2022     08/18/2022    MCV 89 08/18/2022    MCH 27.4 08/18/2022    MCHC 30.7 (L) 08/18/2022       CMP:   Lab Results   Component Value Date     (H) 08/18/2022    K 3.7 08/18/2022     08/18/2022    CO2 27 08/18/2022    BUN 14 08/18/2022    CREATININE 1.0 08/18/2022    GLU 92 08/18/2022    CALCIUM 9.4 08/18/2022    ALBUMIN 4.0 08/18/2022    PROT 6.5 08/18/2022    ALKPHOS 104 08/18/2022    ALT 17 08/18/2022    AST  "11 08/18/2022    BILITOT 0.3 08/18/2022       INR:  No results found for: PT, INR, PROTIME, APTT      Diagnostic Studies:      EKG:   Results for orders placed or performed during the hospital encounter of 07/27/22   EKG 12-lead    Collection Time: 07/27/22 12:29 PM    Narrative    Test Reason : U07.1,    Vent. Rate : 091 BPM     Atrial Rate : 091 BPM     P-R Int : 134 ms          QRS Dur : 086 ms      QT Int : 360 ms       P-R-T Axes : 052 -03 056 degrees     QTc Int : 442 ms    Normal sinus rhythm  Moderate voltage criteria for LVH, may be normal variant  Nonspecific T wave abnormality  Abnormal ECG  When compared with ECG of 19-JUL-2022 00:07,  No significant change was found  Confirmed by Jamil CRUZ, Piero PINEDA ([id number]) on 7/28/2022 8:12:45 AM    Referred By: CAROLERR   SELF           Confirmed By:Peiro Negron MD        2D Echo:  No results found. However, due to the size of the patient record, not all encounters were searched. Please check Results Review for a complete set of results.    Stress Test:   No results found for this or any previous visit.      Pre-op Vitals [10/14/22 1011]   BP Pulse Resp Temp SpO2   115/60 68 17 36.6 °C (97.9 °F) 100 %      Height Weight BMI (Calculated)     4' 11" 175 lb 35.3         Pre-op Vitals [10/14/22 1011]   BP Pulse Resp Temp SpO2   115/60 68 17 36.6 °C (97.9 °F) 100 %      Height Weight BMI (Calculated)     4' 11" 175 lb 35.3            Pre-op Assessment          Review of Systems      Physical Exam  General: Well nourished    Airway:  Mallampati: I / I  Mouth Opening: Normal  TM Distance: Normal  Tongue: Normal  Neck ROM: Normal ROM    Dental:  Intact    Chest/Lungs:  Clear to auscultation    Heart:  Rate: Normal  Rhythm: Regular Rhythm  Sounds: Normal        Anesthesia Plan  Type of Anesthesia, risks & benefits discussed:    Anesthesia Type: MAC, Gen ETT, Gen Supraglottic Airway, Gen Natural Airway, Regional  Intra-op Monitoring Plan: Standard ASA Monitors  Post Op Pain " Control Plan: multimodal analgesia and peripheral nerve block  Induction:  IV  Airway Plan: Direct  Informed Consent: Informed consent signed with the Patient and all parties understand the risks and agree with anesthesia plan.  All questions answered.   ASA Score: 2  Day of Surgery Review of History & Physical: H&P Update referred to the surgeon/provider.    Ready For Surgery From Anesthesia Perspective.     .

## 2022-10-14 NOTE — ANESTHESIA PROCEDURE NOTES
Right paravertebral SS    Patient location during procedure: pre-op   Block not for primary anesthetic.  Reason for block: at surgeon's request and post-op pain management   Post-op Pain Location: Right chest pain   Start time: 10/14/2022 10:56 AM  Timeout: 10/14/2022 10:55 AM   End time: 10/14/2022 11:05 AM    Staffing  Authorizing Provider: Ilsa He MD  Performing Provider: Benjamin Burns MD    Preanesthetic Checklist  Completed: patient identified, IV checked, site marked, risks and benefits discussed, surgical consent, monitors and equipment checked, pre-op evaluation and timeout performed  Peripheral Block  Patient position: sitting  Prep: ChloraPrep  Patient monitoring: heart rate, cardiac monitor, continuous pulse ox, continuous capnometry and frequent blood pressure checks  Block type: paravertebral - thoracic  Laterality: right  Injection technique: single shot  Interspace: T1-2 and T3-4    Needle  Needle type: Tuohy   Needle gauge: 17 G  Needle length: 3.5 in  Needle localization: anatomical landmarks     Assessment  Injection assessment: negative aspiration and negative parasthesia  Paresthesia pain: none  Heart rate change: no  Slow fractionated injection: yes  Pain Tolerance: comfortable throughout block and no complaints  Medications:    Medications: ropivacaine (NAROPIN) injection 0.5% - Perineural   30 mL - 10/14/2022 11:05:00 AM    Additional Notes  T2 os at 5 cm  T4 os at 4 cm  VSS.  [facility] RN monitoring vitals throughout procedure.  Patient tolerated procedure well.

## 2022-10-14 NOTE — PLAN OF CARE
Patient requested skin tag removal during the procedure.  Multiple skin tags are within the surgical field including the right lower breast and right neck.  We discussed risks and benefits of removal of the skin tags.  This will be done at the time of her breast procedure.  The consent was amended to include this

## 2022-10-20 ENCOUNTER — PATIENT MESSAGE (OUTPATIENT)
Dept: SURGERY | Facility: CLINIC | Age: 52
End: 2022-10-20
Payer: COMMERCIAL

## 2022-10-20 LAB
COMMENT: NORMAL
FINAL PATHOLOGIC DIAGNOSIS: NORMAL
GROSS: NORMAL
Lab: NORMAL

## 2022-10-25 ENCOUNTER — PATIENT MESSAGE (OUTPATIENT)
Dept: ENDOCRINOLOGY | Facility: CLINIC | Age: 52
End: 2022-10-25
Payer: COMMERCIAL

## 2022-10-25 DIAGNOSIS — E03.9 HYPOTHYROIDISM, UNSPECIFIED TYPE: ICD-10-CM

## 2022-10-25 RX ORDER — LEVOTHYROXINE SODIUM 50 UG/1
TABLET ORAL
Qty: 34 TABLET | Refills: 5 | Status: SHIPPED | OUTPATIENT
Start: 2022-10-25 | End: 2022-11-03

## 2022-10-31 ENCOUNTER — OFFICE VISIT (OUTPATIENT)
Dept: SURGERY | Facility: CLINIC | Age: 52
End: 2022-10-31
Payer: COMMERCIAL

## 2022-10-31 VITALS
SYSTOLIC BLOOD PRESSURE: 131 MMHG | BODY MASS INDEX: 35.28 KG/M2 | DIASTOLIC BLOOD PRESSURE: 74 MMHG | WEIGHT: 175 LBS | HEIGHT: 59 IN | HEART RATE: 108 BPM

## 2022-10-31 DIAGNOSIS — Z12.31 SCREENING MAMMOGRAM, ENCOUNTER FOR: Primary | ICD-10-CM

## 2022-10-31 DIAGNOSIS — N60.91 ATYPICAL DUCTAL HYPERPLASIA OF RIGHT BREAST: ICD-10-CM

## 2022-10-31 DIAGNOSIS — Z91.89 AT HIGH RISK FOR BREAST CANCER: ICD-10-CM

## 2022-10-31 PROCEDURE — 99999 PR PBB SHADOW E&M-EST. PATIENT-LVL III: CPT | Mod: PBBFAC,,, | Performed by: SURGERY

## 2022-10-31 PROCEDURE — 1160F RVW MEDS BY RX/DR IN RCRD: CPT | Mod: CPTII,S$GLB,, | Performed by: SURGERY

## 2022-10-31 PROCEDURE — 3075F SYST BP GE 130 - 139MM HG: CPT | Mod: CPTII,S$GLB,, | Performed by: SURGERY

## 2022-10-31 PROCEDURE — 99024 POSTOP FOLLOW-UP VISIT: CPT | Mod: S$GLB,,, | Performed by: SURGERY

## 2022-10-31 PROCEDURE — 99024 PR POST-OP FOLLOW-UP VISIT: ICD-10-PCS | Mod: S$GLB,,, | Performed by: SURGERY

## 2022-10-31 PROCEDURE — 3044F PR MOST RECENT HEMOGLOBIN A1C LEVEL <7.0%: ICD-10-PCS | Mod: CPTII,S$GLB,, | Performed by: SURGERY

## 2022-10-31 PROCEDURE — 1159F MED LIST DOCD IN RCRD: CPT | Mod: CPTII,S$GLB,, | Performed by: SURGERY

## 2022-10-31 PROCEDURE — 99999 PR PBB SHADOW E&M-EST. PATIENT-LVL III: ICD-10-PCS | Mod: PBBFAC,,, | Performed by: SURGERY

## 2022-10-31 PROCEDURE — 3044F HG A1C LEVEL LT 7.0%: CPT | Mod: CPTII,S$GLB,, | Performed by: SURGERY

## 2022-10-31 PROCEDURE — 3078F PR MOST RECENT DIASTOLIC BLOOD PRESSURE < 80 MM HG: ICD-10-PCS | Mod: CPTII,S$GLB,, | Performed by: SURGERY

## 2022-10-31 PROCEDURE — 3075F PR MOST RECENT SYSTOLIC BLOOD PRESS GE 130-139MM HG: ICD-10-PCS | Mod: CPTII,S$GLB,, | Performed by: SURGERY

## 2022-10-31 PROCEDURE — 1159F PR MEDICATION LIST DOCUMENTED IN MEDICAL RECORD: ICD-10-PCS | Mod: CPTII,S$GLB,, | Performed by: SURGERY

## 2022-10-31 PROCEDURE — 1160F PR REVIEW ALL MEDS BY PRESCRIBER/CLIN PHARMACIST DOCUMENTED: ICD-10-PCS | Mod: CPTII,S$GLB,, | Performed by: SURGERY

## 2022-10-31 PROCEDURE — 3078F DIAST BP <80 MM HG: CPT | Mod: CPTII,S$GLB,, | Performed by: SURGERY

## 2022-10-31 NOTE — PROGRESS NOTES
[facility]       Post-Op        REFERRING PHYSICIAN:  No referring provider defined for this encounter.       Vannessa Marie MD    DIAGNOSIS:    This is a 52 y.o. female with Atypical ductal hyperplasia of the right breast.    TREATMENT SUMMARY:  The patient is status post right excisional biopsy on 10/14/2022.  Final pathology showed no residual atypical ductal hyperplasia without evidence of  in-situ or invasive carcinoma. She also had multiple skin tags removed on her neck and upper  chest which were consistent with fibroepithelial polyps on pathology.     INTERVAL HISTORY:   Marilee Simons comes in for a post-op check.  She held her arthritis medication for surgery and has had severe pain due to her arthritis.  She had bruising of the right breast immediately after surgery but has improved since that time.    MEDICATIONS:  Current Outpatient Medications   Medication Sig Dispense Refill    alosetron (LOTRONEX) 0.5 MG tablet Take 1 tablet (0.5 mg total) by mouth daily as needed. (Patient taking differently: Take 0.5 mg by mouth once daily.) 30 tablet 0    amitriptyline (ELAVIL) 10 MG tablet TAKE 1 TABLET(10 MG) BY MOUTH EVERY NIGHT AS NEEDED (Patient taking differently: Take 10 mg by mouth every evening.) 90 tablet 3    budesonide (ENTOCORT EC) 3 mg capsule TAKE 3 CAPSULES(9 MG) BY MOUTH EVERY DAY FOR 10 DAYS 30 capsule 3    butalbitaL-acetaminophen  mg Tab TAKE 1 TABLET BY MOUTH EVERY 4 HOURS (Patient taking differently: Take 1 tablet by mouth every 4 (four) hours as needed.) 60 tablet 3    calcium glycerophosphate (PRELIEF) 65 mg Tab Take 2 tablets by mouth before meals and at bedtime as needed. (Patient taking differently: Take 2 tablets by mouth 3 (three) times daily with meals.) 100 each prn    cetirizine (ZYRTEC) 10 MG tablet Take 1 tablet (10 mg total) by mouth once daily. 120 tablet 2    clonazePAM (KLONOPIN) 1 MG tablet TAKE 1 TABLET(1 MG) BY MOUTH THREE TIMES DAILY 90 tablet 2     cyclobenzaprine (FLEXERIL) 10 MG tablet TAKE 1 TABLET(10 MG) BY MOUTH TWICE DAILY AS NEEDED (Patient taking differently: Take 10 mg by mouth 2 (two) times a day.) 90 tablet 6    dicyclomine (BENTYL) 20 mg tablet Take 20 mg by mouth daily as needed.      DULoxetine (CYMBALTA) 60 MG capsule TAKE 2 CAPSULES(120 MG) BY MOUTH EVERY DAY 60 capsule 3    famotidine (PEPCID) 40 MG tablet TAKE 1 TABLET(40 MG) BY MOUTH EVERY NIGHT AS NEEDED FOR HEARTBURN 30 tablet 11    fluticasone propionate (FLONASE) 50 mcg/actuation nasal spray 2 sprays (100 mcg total) by Each Nostril route 2 (two) times daily. 31.6 mL 1    gabapentin (NEURONTIN) 800 MG tablet TAKE 1 TABLET(800 MG) BY MOUTH THREE TIMES DAILY (Patient taking differently: Take 800 mg by mouth 3 (three) times daily.) 90 tablet 11    hydrocortisone 2.5 % cream Apply to affected areas twice a day when eczema is moderate. (Patient taking differently: Apply to affected areas twice a day as needed when eczema is moderate.) 453.6 g 1    hydrOXYchloroQUINE (PLAQUENIL) 200 mg tablet Take 1 tablet (200 mg total) by mouth 2 (two) times daily. 60 tablet 6    hydrOXYzine HCL (ATARAX) 25 MG tablet 1-4 tablets as needed for itching 240 tablet 3    lamoTRIgine (LAMICTAL) 100 MG tablet Take 1 tablet (100 mg total) by mouth once daily. 30 tablet 5    levothyroxine (SYNTHROID) 50 MCG tablet Take 1 tablet (50 mcg) by mouth Mon-Sat and take 2 tablets (100 mcg) on Sun only 34 tablet 5    lbmjvu-uevnk-a.blue-sal-naphos (USTELL) 120-0.12 mg Cap Take 1 capsule by mouth 3 (three) times daily. (Patient taking differently: Take 1 capsule by mouth 2 (two) times a day.) 90 each 3    omalizumab (XOLAIR) 150 mg/mL injection Inject 2 mLs (300 mg total) into the skin every 28 days. for 13 doses (Patient not taking: Reported on 9/19/2022) 13 each 1    oxybutynin (DITROPAN) 5 MG Tab TAKE 1 TABLET(5 MG) BY MOUTH THREE TIMES DAILY 90 tablet 3    oxyCODONE (ROXICODONE) 5 MG immediate release tablet Take 1 tablet  (5 mg total) by mouth every 4 (four) hours as needed for Pain. 10 tablet 0    predniSONE (DELTASONE) 2.5 MG tablet 1 to 2 tabs PO daily prn for arthritis flare (Patient not taking: Reported on 9/19/2022) 60 tablet 0    promethazine (PHENERGAN) 25 MG tablet Take 1 tablet (25 mg total) by mouth every 6 (six) hours as needed for Nausea. 15 tablet 0    RABEprazole (ACIPHEX) 20 mg tablet TAKE 1 TABLET(20 MG) BY MOUTH EVERY DAY 30 tablet 2    tiZANidine (ZANAFLEX) 4 MG tablet Take 4 mg by mouth 3 (three) times daily.      tofacitinib (XELJANZ XR) 11 mg Tb24 Take 11 mg by mouth once daily. 30 tablet 6    traMADoL (ULTRAM) 50 mg tablet TAKE 1 TABLET(50 MG) BY MOUTH EVERY 6 HOURS 90 tablet 3     No current facility-administered medications for this visit.     Facility-Administered Medications Ordered in Other Visits   Medication Dose Route Frequency Provider Last Rate Last Admin    0.9%  NaCl infusion   Intravenous Continuous Leeroy Eller MD 70 mL/hr at 10/14/22 1029 Restarted at 10/14/22 1227    ceFAZolin injection 2 g  2 g Intravenous On Call Procedure Leeroy Eller MD        fentaNYL 50 mcg/mL injection  mcg   mcg Intravenous PRN Benjamin Burns MD   50 mcg at 10/14/22 1104    midazolam (VERSED) 1 mg/mL injection 0.5-4 mg  0.5-4 mg Intravenous PRN Benjamin Burns MD   2 mg at 10/14/22 1229    mupirocin 2 % ointment   Nasal On Call Procedure Leeroy Eller MD   Given at 10/14/22 1028       ALLERGIES:   Review of patient's allergies indicates:   Allergen Reactions    Cephalexin Itching    Cephalosporins     Zofran [ondansetron hcl (pf)] Hives    Penicillins Rash       PHYSICAL EXAMINATION:   General:  This is a well appearing female with appropriate speech, affect and gait.     Breast:  Incision clean, dry, and intact with slightly thickened area of crusting at the medial aspect.    IMPRESSION:   The patient has had an uneventful postoperative course.    PLAN:   1. return in August for a follow up office visit and  breast exam  2. bilateral mammogram due in August.  We will plan for high-risk screening with alternating breast MRI and mammogram.  We will order MRI at the next follow-up for an approximately 6 months after the mammogram.  3. The patient is advised in continued exam of the breast chest wall and to report to this office sooner should she note any areas of abnormality or concern.   4. We discussed there is an estimated risk of breast cancer of about 20% with a diagnosis of atypical ductal hyperplasia.  We discussed that this risk can be decreased with the use of chemoprevention.  We discussed that most typically the medication tamoxifen is given.  She is had her uterus removed so tamoxifen may be a reasonable option for her.  Another option can be aromatase inhibitor however she has severe arthritis.  She is willing to meet with Medical Oncology to discuss the use of chemoprevention to decrease her risk of future breast cancer

## 2022-11-01 ENCOUNTER — DOCUMENTATION ONLY (OUTPATIENT)
Dept: HEMATOLOGY/ONCOLOGY | Facility: CLINIC | Age: 52
End: 2022-11-01
Payer: COMMERCIAL

## 2022-11-01 NOTE — PROGRESS NOTES
Met with pt during her post op visit with Dr Oseguera on 10/31/22.  Per Dr Oseguera's request, pt scheduled with Dr Morgan on 11/4/22. Called pt to review upcoming appt location, date and time.  Pt verbalized understanding.

## 2022-11-02 ENCOUNTER — TELEPHONE (OUTPATIENT)
Dept: HEMATOLOGY/ONCOLOGY | Facility: CLINIC | Age: 52
End: 2022-11-02
Payer: COMMERCIAL

## 2022-11-03 ENCOUNTER — OFFICE VISIT (OUTPATIENT)
Dept: PSYCHIATRY | Facility: CLINIC | Age: 52
End: 2022-11-03
Payer: COMMERCIAL

## 2022-11-03 ENCOUNTER — TELEPHONE (OUTPATIENT)
Dept: HEMATOLOGY/ONCOLOGY | Facility: CLINIC | Age: 52
End: 2022-11-03
Payer: COMMERCIAL

## 2022-11-03 DIAGNOSIS — F33.1 MAJOR DEPRESSIVE DISORDER, RECURRENT EPISODE, MODERATE: Primary | ICD-10-CM

## 2022-11-03 PROCEDURE — 90833 PSYTX W PT W E/M 30 MIN: CPT | Mod: 95,S$GLB,, | Performed by: NURSE PRACTITIONER

## 2022-11-03 PROCEDURE — 3044F PR MOST RECENT HEMOGLOBIN A1C LEVEL <7.0%: ICD-10-PCS | Mod: CPTII,95,S$GLB, | Performed by: NURSE PRACTITIONER

## 2022-11-03 PROCEDURE — 99214 PR OFFICE/OUTPT VISIT, EST, LEVL IV, 30-39 MIN: ICD-10-PCS | Mod: 95,S$GLB,, | Performed by: NURSE PRACTITIONER

## 2022-11-03 PROCEDURE — 99214 OFFICE O/P EST MOD 30 MIN: CPT | Mod: 95,S$GLB,, | Performed by: NURSE PRACTITIONER

## 2022-11-03 PROCEDURE — 3044F HG A1C LEVEL LT 7.0%: CPT | Mod: CPTII,95,S$GLB, | Performed by: NURSE PRACTITIONER

## 2022-11-03 PROCEDURE — 99999 PR PBB SHADOW E&M-EST. PATIENT-LVL I: CPT | Mod: PBBFAC,,, | Performed by: NURSE PRACTITIONER

## 2022-11-03 PROCEDURE — 99999 PR PBB SHADOW E&M-EST. PATIENT-LVL I: ICD-10-PCS | Mod: PBBFAC,,, | Performed by: NURSE PRACTITIONER

## 2022-11-03 PROCEDURE — 90833 PR PSYCHOTHERAPY W/PATIENT W/E&M, 30 MIN (ADD ON): ICD-10-PCS | Mod: 95,S$GLB,, | Performed by: NURSE PRACTITIONER

## 2022-11-03 RX ORDER — CLONAZEPAM 1 MG/1
1 TABLET ORAL 2 TIMES DAILY
Qty: 60 TABLET | Refills: 1 | Status: SHIPPED | OUTPATIENT
Start: 2022-11-03 | End: 2023-01-18

## 2022-11-03 RX ORDER — FLUOXETINE HYDROCHLORIDE 20 MG/1
20 CAPSULE ORAL DAILY
Qty: 30 CAPSULE | Refills: 2 | Status: SHIPPED | OUTPATIENT
Start: 2022-11-03 | End: 2022-12-08

## 2022-11-03 RX ORDER — LAMOTRIGINE 200 MG/1
200 TABLET ORAL DAILY
Qty: 30 TABLET | Refills: 5 | Status: SHIPPED | OUTPATIENT
Start: 2022-11-03 | End: 2022-12-08
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
## 2022-11-04 ENCOUNTER — NURSE TRIAGE (OUTPATIENT)
Dept: ADMINISTRATIVE | Facility: CLINIC | Age: 52
End: 2022-11-04
Payer: COMMERCIAL

## 2022-11-05 NOTE — TELEPHONE ENCOUNTER
Reason for Disposition   Caller has already spoken with the PCP and has no further questions.    Additional Information   Negative: Caller is angry or rude (e.g., hangs up, verbally abusive, yelling)   Negative: Caller hangs up    Protocols used: No Contact or Duplicate Contact Call-A-  Pt states she already spoke with surgeon and denies further questions

## 2022-11-07 ENCOUNTER — PATIENT MESSAGE (OUTPATIENT)
Dept: PSYCHIATRY | Facility: CLINIC | Age: 52
End: 2022-11-07
Payer: COMMERCIAL

## 2022-11-07 ENCOUNTER — OFFICE VISIT (OUTPATIENT)
Dept: SURGERY | Facility: CLINIC | Age: 52
End: 2022-11-07
Payer: COMMERCIAL

## 2022-11-07 ENCOUNTER — PATIENT MESSAGE (OUTPATIENT)
Dept: SURGERY | Facility: CLINIC | Age: 52
End: 2022-11-07

## 2022-11-07 VITALS
HEART RATE: 94 BPM | BODY MASS INDEX: 36 KG/M2 | WEIGHT: 178.56 LBS | DIASTOLIC BLOOD PRESSURE: 65 MMHG | SYSTOLIC BLOOD PRESSURE: 137 MMHG | HEIGHT: 59 IN

## 2022-11-07 DIAGNOSIS — Z09 POSTOP CHECK: Primary | ICD-10-CM

## 2022-11-07 PROCEDURE — 1159F MED LIST DOCD IN RCRD: CPT | Mod: CPTII,S$GLB,, | Performed by: NURSE PRACTITIONER

## 2022-11-07 PROCEDURE — 3044F HG A1C LEVEL LT 7.0%: CPT | Mod: CPTII,S$GLB,, | Performed by: NURSE PRACTITIONER

## 2022-11-07 PROCEDURE — 99024 PR POST-OP FOLLOW-UP VISIT: ICD-10-PCS | Mod: S$GLB,,, | Performed by: NURSE PRACTITIONER

## 2022-11-07 PROCEDURE — 3008F BODY MASS INDEX DOCD: CPT | Mod: CPTII,S$GLB,, | Performed by: NURSE PRACTITIONER

## 2022-11-07 PROCEDURE — 99999 PR PBB SHADOW E&M-EST. PATIENT-LVL III: CPT | Mod: PBBFAC,,, | Performed by: NURSE PRACTITIONER

## 2022-11-07 PROCEDURE — 1159F PR MEDICATION LIST DOCUMENTED IN MEDICAL RECORD: ICD-10-PCS | Mod: CPTII,S$GLB,, | Performed by: NURSE PRACTITIONER

## 2022-11-07 PROCEDURE — 3078F DIAST BP <80 MM HG: CPT | Mod: CPTII,S$GLB,, | Performed by: NURSE PRACTITIONER

## 2022-11-07 PROCEDURE — 3044F PR MOST RECENT HEMOGLOBIN A1C LEVEL <7.0%: ICD-10-PCS | Mod: CPTII,S$GLB,, | Performed by: NURSE PRACTITIONER

## 2022-11-07 PROCEDURE — 99999 PR PBB SHADOW E&M-EST. PATIENT-LVL III: ICD-10-PCS | Mod: PBBFAC,,, | Performed by: NURSE PRACTITIONER

## 2022-11-07 PROCEDURE — 3075F SYST BP GE 130 - 139MM HG: CPT | Mod: CPTII,S$GLB,, | Performed by: NURSE PRACTITIONER

## 2022-11-07 PROCEDURE — 3078F PR MOST RECENT DIASTOLIC BLOOD PRESSURE < 80 MM HG: ICD-10-PCS | Mod: CPTII,S$GLB,, | Performed by: NURSE PRACTITIONER

## 2022-11-07 PROCEDURE — 99024 POSTOP FOLLOW-UP VISIT: CPT | Mod: S$GLB,,, | Performed by: NURSE PRACTITIONER

## 2022-11-07 PROCEDURE — 3008F PR BODY MASS INDEX (BMI) DOCUMENTED: ICD-10-PCS | Mod: CPTII,S$GLB,, | Performed by: NURSE PRACTITIONER

## 2022-11-07 PROCEDURE — 3075F PR MOST RECENT SYSTOLIC BLOOD PRESS GE 130-139MM HG: ICD-10-PCS | Mod: CPTII,S$GLB,, | Performed by: NURSE PRACTITIONER

## 2022-11-07 NOTE — PROGRESS NOTES
[facility]       Post-Op        REFERRING PHYSICIAN:  No referring provider defined for this encounter.       Vannessa Marie MD    DIAGNOSIS:    This is a 52 y.o. female with Atypical ductal hyperplasia of the right breast.    TREATMENT SUMMARY:  The patient is status post right excisional biopsy on 10/14/2022.  Final pathology showed no residual atypical ductal hyperplasia without evidence of  in-situ or invasive carcinoma. She also had multiple skin tags removed on her neck and upper  chest which were consistent with fibroepithelial polyps on pathology.     INTERVAL HISTORY:   Marilee Simons comes in for a post-op check. She had bruising of the right breast immediately after surgery but has improved since that time. Patient reports copious amount of bloody drainage from the incision over the weekend. Reports pain is 5/10.     MEDICATIONS:  Current Outpatient Medications   Medication Sig Dispense Refill    alosetron (LOTRONEX) 0.5 MG tablet Take 1 tablet (0.5 mg total) by mouth daily as needed. (Patient taking differently: Take 0.5 mg by mouth once daily.) 30 tablet 0    amitriptyline (ELAVIL) 10 MG tablet TAKE 1 TABLET(10 MG) BY MOUTH EVERY NIGHT AS NEEDED (Patient taking differently: Take 10 mg by mouth every evening.) 90 tablet 3    budesonide (ENTOCORT EC) 3 mg capsule TAKE 3 CAPSULES(9 MG) BY MOUTH EVERY DAY FOR 10 DAYS 30 capsule 3    butalbitaL-acetaminophen  mg Tab TAKE 1 TABLET BY MOUTH EVERY 4 HOURS (Patient taking differently: Take 1 tablet by mouth every 4 (four) hours as needed.) 60 tablet 3    calcium glycerophosphate (PRELIEF) 65 mg Tab Take 2 tablets by mouth before meals and at bedtime as needed. (Patient taking differently: Take 2 tablets by mouth 3 (three) times daily with meals.) 100 each prn    cetirizine (ZYRTEC) 10 MG tablet Take 1 tablet (10 mg total) by mouth once daily. 120 tablet 2    clonazePAM (KLONOPIN) 1 MG tablet Take 1 tablet (1 mg total) by mouth 2 (two) times  daily. 60 tablet 1    cyclobenzaprine (FLEXERIL) 10 MG tablet TAKE 1 TABLET(10 MG) BY MOUTH TWICE DAILY AS NEEDED (Patient taking differently: Take 10 mg by mouth 2 (two) times a day.) 90 tablet 6    dicyclomine (BENTYL) 20 mg tablet Take 20 mg by mouth daily as needed.      DULoxetine (CYMBALTA) 60 MG capsule TAKE 2 CAPSULES(120 MG) BY MOUTH EVERY DAY 60 capsule 3    famotidine (PEPCID) 40 MG tablet TAKE 1 TABLET(40 MG) BY MOUTH EVERY NIGHT AS NEEDED FOR HEARTBURN 30 tablet 11    FLUoxetine 20 MG capsule Take 1 capsule (20 mg total) by mouth once daily. 30 capsule 2    fluticasone propionate (FLONASE) 50 mcg/actuation nasal spray 2 sprays (100 mcg total) by Each Nostril route 2 (two) times daily. 31.6 mL 1    gabapentin (NEURONTIN) 800 MG tablet TAKE 1 TABLET(800 MG) BY MOUTH THREE TIMES DAILY (Patient taking differently: Take 800 mg by mouth 3 (three) times daily.) 90 tablet 11    hydrocortisone 2.5 % cream Apply to affected areas twice a day when eczema is moderate. (Patient taking differently: Apply to affected areas twice a day as needed when eczema is moderate.) 453.6 g 1    hydrOXYzine HCL (ATARAX) 25 MG tablet 1-4 tablets as needed for itching 240 tablet 3    lamoTRIgine (LAMICTAL) 200 MG tablet Take 1 tablet (200 mg total) by mouth once daily. 30 tablet 5    levothyroxine (SYNTHROID) 50 MCG tablet TAKE 1 TABLET(50 MCG) BY MOUTH EVERY DAY 30 tablet 6    tsnnbi-ncanb-n.blue-sal-naphos (USTELL) 120-0.12 mg Cap Take 1 capsule by mouth 3 (three) times daily. (Patient taking differently: Take 1 capsule by mouth 2 (two) times a day.) 90 each 3    omalizumab (XOLAIR) 150 mg/mL injection Inject 2 mLs (300 mg total) into the skin every 28 days. for 13 doses 13 each 1    oxybutynin (DITROPAN) 5 MG Tab TAKE 1 TABLET(5 MG) BY MOUTH THREE TIMES DAILY 90 tablet 3    predniSONE (DELTASONE) 2.5 MG tablet 1 to 2 tabs PO daily prn for arthritis flare 60 tablet 0    promethazine (PHENERGAN) 25 MG tablet Take 1 tablet (25 mg  total) by mouth every 6 (six) hours as needed for Nausea. 15 tablet 0    RABEprazole (ACIPHEX) 20 mg tablet TAKE 1 TABLET(20 MG) BY MOUTH EVERY DAY 30 tablet 2    tiZANidine (ZANAFLEX) 4 MG tablet Take 4 mg by mouth 3 (three) times daily.      tofacitinib (XELJANZ XR) 11 mg Tb24 Take 11 mg by mouth once daily. 30 tablet 6    traMADoL (ULTRAM) 50 mg tablet TAKE 1 TABLET(50 MG) BY MOUTH EVERY 6 HOURS 90 tablet 3     No current facility-administered medications for this visit.     Facility-Administered Medications Ordered in Other Visits   Medication Dose Route Frequency Provider Last Rate Last Admin    0.9%  NaCl infusion   Intravenous Continuous Leeroy Eller MD 70 mL/hr at 10/14/22 1029 Restarted at 10/14/22 1227    ceFAZolin injection 2 g  2 g Intravenous On Call Procedure Leeroy Eller MD        fentaNYL 50 mcg/mL injection  mcg   mcg Intravenous PRN Benjamin Burns MD   50 mcg at 10/14/22 1104    midazolam (VERSED) 1 mg/mL injection 0.5-4 mg  0.5-4 mg Intravenous PRN Benjamin Burns MD   2 mg at 10/14/22 1229    mupirocin 2 % ointment   Nasal On Call Procedure Leeroy Eller MD   Given at 10/14/22 1028       ALLERGIES:   Review of patient's allergies indicates:   Allergen Reactions    Cephalexin Itching    Cephalosporins     Zofran [ondansetron hcl (pf)] Hives    Penicillins Rash       PHYSICAL EXAMINATION:   General:  This is a well appearing female with appropriate speech, affect and gait.     Breast:  Incision clean, dry, and intact with area of eschar tissue at medial aspect of incision. No drainage.     IMPRESSION:   The patient has had an uneventful postoperative course. Most likely hematoma made its way out at weakened area of incision. No signs of infection.     PLAN:   Keep area clean and dry. Follow up with me as needed

## 2022-11-08 ENCOUNTER — PATIENT MESSAGE (OUTPATIENT)
Dept: SURGERY | Facility: CLINIC | Age: 52
End: 2022-11-08
Payer: COMMERCIAL

## 2022-11-08 ENCOUNTER — PATIENT MESSAGE (OUTPATIENT)
Dept: PSYCHIATRY | Facility: CLINIC | Age: 52
End: 2022-11-08
Payer: COMMERCIAL

## 2022-11-09 ENCOUNTER — TELEPHONE (OUTPATIENT)
Dept: SURGERY | Facility: CLINIC | Age: 52
End: 2022-11-09
Payer: COMMERCIAL

## 2022-11-09 ENCOUNTER — PATIENT MESSAGE (OUTPATIENT)
Dept: SURGERY | Facility: CLINIC | Age: 52
End: 2022-11-09

## 2022-11-09 RX ORDER — HYDROCODONE BITARTRATE AND ACETAMINOPHEN 5; 325 MG/1; MG/1
1 TABLET ORAL EVERY 6 HOURS PRN
Qty: 12 TABLET | Refills: 0 | Status: SHIPPED | OUTPATIENT
Start: 2022-11-09 | End: 2022-12-08

## 2022-11-09 NOTE — TELEPHONE ENCOUNTER
Called and discussed her concerns.  She describes a pinpoint area of opening on incision that leak serous fluid.  We discussed this likely is representing a seroma that is decompressing through the small opening in her incision.  We discussed her coming in for evaluation of the area and possible aspiration of any underlying fluid collection to aid in healing of the wound.  She had missed her appointment this morning.  She has transportation issues and does not wish to come in until Friday.  We discussed potential for wound infection importance of evaluating the wound.  She would like to delay in-person evaluation until Friday.  She denies erythema of the overlying skin she denies fever.  No signs of infection at this time.  She is having a lot of discomfort related to the chronic irritation of the wound.  She just breast that the anxiety around her wound is flaring her arthritis and fibromyalgia.  A short course of pain medication was prescribed.   She is willing to come in on Friday for evaluation.  She was scheduled for possible aspiration of any underlying fluid collection evaluation of the wound at that time.  If any signs of infection begin such as redness or fever she has been instructed to call us back immediately for earlier evaluation.

## 2022-11-11 ENCOUNTER — PATIENT MESSAGE (OUTPATIENT)
Dept: HEMATOLOGY/ONCOLOGY | Facility: CLINIC | Age: 52
End: 2022-11-11

## 2022-11-11 ENCOUNTER — OFFICE VISIT (OUTPATIENT)
Dept: HEMATOLOGY/ONCOLOGY | Facility: CLINIC | Age: 52
End: 2022-11-11
Payer: COMMERCIAL

## 2022-11-11 VITALS
SYSTOLIC BLOOD PRESSURE: 144 MMHG | DIASTOLIC BLOOD PRESSURE: 75 MMHG | BODY MASS INDEX: 36.73 KG/M2 | RESPIRATION RATE: 18 BRPM | TEMPERATURE: 98 F | WEIGHT: 182.19 LBS | HEIGHT: 59 IN | OXYGEN SATURATION: 97 % | HEART RATE: 96 BPM

## 2022-11-11 DIAGNOSIS — F33.1 MAJOR DEPRESSIVE DISORDER, RECURRENT EPISODE, MODERATE: ICD-10-CM

## 2022-11-11 DIAGNOSIS — F41.1 GENERALIZED ANXIETY DISORDER: ICD-10-CM

## 2022-11-11 DIAGNOSIS — N60.91 ATYPICAL DUCTAL HYPERPLASIA OF RIGHT BREAST: Primary | ICD-10-CM

## 2022-11-11 PROCEDURE — 3008F BODY MASS INDEX DOCD: CPT | Mod: CPTII,S$GLB,, | Performed by: INTERNAL MEDICINE

## 2022-11-11 PROCEDURE — 3077F PR MOST RECENT SYSTOLIC BLOOD PRESSURE >= 140 MM HG: ICD-10-PCS | Mod: CPTII,S$GLB,, | Performed by: INTERNAL MEDICINE

## 2022-11-11 PROCEDURE — 3078F DIAST BP <80 MM HG: CPT | Mod: CPTII,S$GLB,, | Performed by: INTERNAL MEDICINE

## 2022-11-11 PROCEDURE — 99999 PR PBB SHADOW E&M-EST. PATIENT-LVL III: ICD-10-PCS | Mod: PBBFAC,,, | Performed by: INTERNAL MEDICINE

## 2022-11-11 PROCEDURE — 3044F HG A1C LEVEL LT 7.0%: CPT | Mod: CPTII,S$GLB,, | Performed by: INTERNAL MEDICINE

## 2022-11-11 PROCEDURE — 3008F PR BODY MASS INDEX (BMI) DOCUMENTED: ICD-10-PCS | Mod: CPTII,S$GLB,, | Performed by: INTERNAL MEDICINE

## 2022-11-11 PROCEDURE — 99417 PROLNG OP E/M EACH 15 MIN: CPT | Mod: S$GLB,,, | Performed by: INTERNAL MEDICINE

## 2022-11-11 PROCEDURE — 99205 OFFICE O/P NEW HI 60 MIN: CPT | Mod: S$GLB,,, | Performed by: INTERNAL MEDICINE

## 2022-11-11 PROCEDURE — 99205 PR OFFICE/OUTPT VISIT, NEW, LEVL V, 60-74 MIN: ICD-10-PCS | Mod: S$GLB,,, | Performed by: INTERNAL MEDICINE

## 2022-11-11 PROCEDURE — 99417 PR PROLONGED SVC, OUTPT, W/WO DIRECT PT CONTACT,  EA ADDTL 15 MIN: ICD-10-PCS | Mod: S$GLB,,, | Performed by: INTERNAL MEDICINE

## 2022-11-11 PROCEDURE — 3077F SYST BP >= 140 MM HG: CPT | Mod: CPTII,S$GLB,, | Performed by: INTERNAL MEDICINE

## 2022-11-11 PROCEDURE — 3044F PR MOST RECENT HEMOGLOBIN A1C LEVEL <7.0%: ICD-10-PCS | Mod: CPTII,S$GLB,, | Performed by: INTERNAL MEDICINE

## 2022-11-11 PROCEDURE — 99999 PR PBB SHADOW E&M-EST. PATIENT-LVL III: CPT | Mod: PBBFAC,,, | Performed by: INTERNAL MEDICINE

## 2022-11-11 PROCEDURE — 3078F PR MOST RECENT DIASTOLIC BLOOD PRESSURE < 80 MM HG: ICD-10-PCS | Mod: CPTII,S$GLB,, | Performed by: INTERNAL MEDICINE

## 2022-11-11 NOTE — PROGRESS NOTES
Answers submitted by the patient for this visit:  Review of Systems Questionnaire (Submitted on 11/10/2022)  appetite change : No  unexpected weight change: No  mouth sores: No  visual disturbance: No  cough: No  shortness of breath: No  chest pain: No  abdominal pain: No  diarrhea: No  frequency: No  back pain: No  rash: No  headaches: No  adenopathy: No  nervous/ anxious: Yes                                                   CONSULT NOTE    Subjective:       Patient ID: Marilee Simons is a 52 y.o. female.  MRN: [id number]  : 1970    Chief Complaint: Atypical ductal hyperplasia of Right breast       History of Present Illness:   Marilee Simons is a 52 y.o. female who is referred for ADH  consultation of increased risk of breast cancer. She is perimenopausal. She presented for screening mammogram on 22 that showed a focal asymmetry in the right breast. A breast biopsy showed ADH. She then underwent right excisional biopsy on 10/14/22 that did not show any residual disease.     She is referred to me to discuss endocrine therapy as a risk reduction strategy.  She has multiple other medical issues including fibromyalgia, rheumatoid arthritis, chronic joint pains and severe and worsening depression.  She is tearful throughout our visit today.  She is seeing a therapist as well as a psychiatrist.  She sometimes reports suicidal ideation, has had 2 prior psychiatric admissions but is not to suicidal today and has no plan to harm herself.     On Cymbalta, gabapentin for FM. Just started Prozac. Increased Lamictal.     Her father and sister both committed suicide. Her brother  in a car accident. She has personal difficulties as well. Is worried about finances. She is worried that she may be bipolar.     High Risk Breast cancer specific history:  - Age: 52  - Height:  4 11  - Weight: 182  - Breast density per BI-RADS:    - Age at menarche:  10  - Number of pregnancies: ; age of first live birth: 24     -  "History of breast feeding: No.   - Age at menopause, if applicable: N/A had hysterectomy   -Uterus and ovaries intact: Has ovaries, had hysterectomy 2012  - HRT: Yes - for a year       - Genetic testing: No  - Personal history of cancer: No  - Previous chest radiation exposure between ages 10-30 years old: No  - Personal history of breast biopsy: Yes - x 1   - Ashkenazi Restorationism Inheritance: No  - Family history of cancer:  GM had bladder cancer at 82    Social History:  Tobacco use:  No   Alcohol use:  rare    Exercise regimen: No  Employment: Not currently   Diet: Fast food mostly   Weight 182lb       Oncology History:  8/4/22  Impression:  Right  Asymmetry: Right breast asymmetry at the upper posterior position. Assessment: 0 - Incomplete. Diagnostic Mammogram and/or Ultrasound is recommended.      Left  There is no mammographic evidence of malignancy in the left breast.     BI-RADS Category:   Overall: 0 - Incomplete: Needs Additional Imaging Evaluation    8/22/22  Diganostic R mammogram and usg     Impression:  Right  Calcifications: Right breast 6 mm calcifications at the upper outer anterior position.    Assessment: 4 - Suspicious finding. Stereotactic Biopsy is recommended.         9/9/22  Final Pathologic Diagnosis 1.  Right breast, biopsy:   Atypical ductal hyperplasia (ADH)   Calcifications associated with ADH and benign breast parenchyma   2.  Right breast biopsy:   Benign breast parenchyma with stromal fibrosis   Calcifications associated with benign breast parenchyma   Negative for atypia or malignancy      10/14/22  Final Pathologic Diagnosis 1. Skin, "right skin tags", excision:   - Consistent with fibroepithelial polyp   2. Breast, right, excisional biopsy:   - Negative for residual atypical ductal hyperplasia   - Benign breast tissue with microcyst formation, stromal fibrosis, and duct   ectasia   - Columnar cell change is present   - Calcifications associated with benign ductal elements   - Biopsy " site changes and clips identified   - Margins of resection are negative for malignancy   - Negative for in-situ and invasive carcinoma        History:  Past Medical History:   Diagnosis Date    Acid reflux     Allergy     Alopecia     Anxiety     Arthralgia     Back pain     Depression     Dry eyes     from meds    Fever blister     Fibromyalgia     Interstitial cystitis     Irritable bowel syndrome     Kidney stone     Major depressive disorder, recurrent episode, in partial or unspecified remission 2013    OAB (overactive bladder) 2015    PTSD (post-traumatic stress disorder)     Pure hypercholesterolemia 2022    Rheumatoid arthritis     Rheumatoid arthritis 2022    Systemic lupus erythematosus     Thyroid disease     Urinary tract infection     Vaginal infection       Past Surgical History:   Procedure Laterality Date    BLADDER SURGERY       SECTION, LOW TRANSVERSE      x 2    COLONOSCOPY      COLONOSCOPY N/A 10/5/2017    Procedure: COLONOSCOPY;  Surgeon: Venkat He MD;  Location: [facility] ([address] [place] [address]);  Service: Endoscopy;  Laterality: N/A;    ESOPHAGOGASTRODUODENOSCOPY      ESOPHAGOGASTRODUODENOSCOPY N/A 10/25/2021    Procedure: EGD (ESOPHAGOGASTRODUODENOSCOPY);  Surgeon: Venkat He MD;  Location: [address]);  Service: Endoscopy;  Laterality: N/A;  Covid test on 10/22 at [email or web address]    10/19 lvm to confirm appt-rb    EXCISIONAL BIOPSY Right 10/14/2022    Procedure: EXCISIONAL BIOPSY-right with radiological marker;  Surgeon: PALLAVI Oseguera MD;  Location: [facility] ER;  Service: General;  Laterality: Right;    EYE SURGERY      Lasik-bilateral    HYSTERECTOMY      IMPLANTATION OF PERMANENT SACRAL NERVE STIMULATOR N/A 2022    Procedure: INSERTION, NEUROSTIMULATOR, PERMANENT, SACRAL;  Surgeon: Bandar Garcia MD;  Location: [facility] 2ND FLR;  Service: Urology;  Laterality: N/A;  2hr    INJECTION OF BOTULINUM TOXIN TYPE A N/A 2018    Procedure: INJECTION, BOTULINUM  TOXIN, TYPE A  200 UNITS;  Surgeon: Bandar Garcia MD;  Location: [facility] OR 1ST FLR;  Service: Urology;  Laterality: N/A;    INSTILLATION OF URINARY BLADDER N/A 9/12/2018    Procedure: INSTILLATION, URINARY BLADDER;  Surgeon: Bandar Garcia MD;  Location: [facility] OR 1ST FLR;  Service: Urology;  Laterality: N/A;    PARTIAL HYSTERECTOMY      REMOVAL OF ELECTRODE LEAD OF SACRAL NERVE STIMULATOR N/A 4/27/2022    Procedure: REMOVAL, ELECTRODE LEAD, SACRAL NERVE STIMULATOR;  Surgeon: Bandar Garcia MD;  Location: [facility] OR 2ND FLR;  Service: Urology;  Laterality: N/A;    SKIN TAG REMOVAL N/A 10/14/2022    Procedure: REMOVAL, SKIN TAGS;  Surgeon: PALLAVI Oseguera MD;  Location: [facility];  Service: General;  Laterality: N/A;    TRANSFORAMINAL EPIDURAL INJECTION OF STEROID Left 2/15/2021    Procedure: LUMBAR TRANSFORAMINAL LEFT L5 AND S1 DIRECT REFERRAL;  Surgeon: Marquez Nesbitt MD;  Location: [facility] PAIN MGT;  Service: Pain Management;  Laterality: Left;  NEEDS CONSENT, PT REQUESTING IV SEDATION     Family History   Problem Relation Age of Onset    Irritable bowel syndrome Mother     Irritable bowel syndrome Sister     Lupus Sister     Rheum arthritis Sister     Fibromyalgia Sister     Irritable bowel syndrome Sister     Celiac disease Neg Hx     Cirrhosis Neg Hx     Colon cancer Neg Hx     Colon polyps Neg Hx     Crohn's disease Neg Hx     Cystic fibrosis Neg Hx     Esophageal cancer Neg Hx     Hemochromatosis Neg Hx     Inflammatory bowel disease Neg Hx     Liver cancer Neg Hx     Liver disease Neg Hx     Rectal cancer Neg Hx     Stomach cancer Neg Hx     Ulcerative colitis Neg Hx     Boris's disease Neg Hx     Melanoma Neg Hx     Amblyopia Neg Hx     Blindness Neg Hx     Cataracts Neg Hx     Glaucoma Neg Hx     Macular degeneration Neg Hx     Retinal detachment Neg Hx     Strabismus Neg Hx       Social History     Tobacco Use    Smoking status: Never    Smokeless tobacco: Never   Substance and Sexual Activity    Alcohol use: No  "   Drug use: No    Sexual activity: Never        ROS:   Review of Systems   Constitutional:  Negative for fever, malaise/fatigue and weight loss.   HENT:  Negative for congestion, ear pain, nosebleeds and sore throat.    Eyes:  Negative for blurred vision, double vision and photophobia.   Respiratory:  Negative for cough, hemoptysis, sputum production, shortness of breath, wheezing and stridor.    Cardiovascular:  Negative for chest pain, palpitations, orthopnea and leg swelling.   Gastrointestinal:  Negative for abdominal pain, blood in stool, constipation, diarrhea, heartburn, melena, nausea and vomiting.   Genitourinary:  Negative for dysuria, frequency and hematuria.   Musculoskeletal:  Positive for back pain and joint pain. Negative for myalgias and neck pain.   Skin:  Negative for itching and rash.   Neurological:  Negative for dizziness, focal weakness, seizures, weakness and headaches.   Endo/Heme/Allergies:  Negative for polydipsia. Does not bruise/bleed easily.   Psychiatric/Behavioral:  Positive for depression. Negative for memory loss. The patient is nervous/anxious. The patient does not have insomnia.       Objective:     Vitals:    11/11/22 1519   BP: (!) 144/75   Pulse: 96   Resp: 18   Temp: 98.4 °F (36.9 °C)   TempSrc: Oral   SpO2: 97%   Weight: 82.6 kg (182 lb 3.4 oz)   Height: 4' 11" (1.499 m)   PainSc:   5       Physical Examination:   Physical Exam  Vitals and nursing note reviewed.   Constitutional:       General: She is not in acute distress.     Appearance: She is not diaphoretic.   HENT:      Head: Normocephalic.      Mouth/Throat:      Pharynx: No oropharyngeal exudate.   Eyes:      General: No scleral icterus.     Conjunctiva/sclera: Conjunctivae normal.   Neck:      Thyroid: No thyromegaly.   Cardiovascular:      Rate and Rhythm: Normal rate and regular rhythm.      Heart sounds: Normal heart sounds. No murmur heard.  Pulmonary:      Effort: Pulmonary effort is normal. No respiratory " distress.      Breath sounds: No stridor. No wheezing or rales.   Chest:      Chest wall: No tenderness.   Abdominal:      General: Bowel sounds are normal. There is no distension.      Palpations: Abdomen is soft. There is no mass.      Tenderness: There is no abdominal tenderness. There is no rebound.   Musculoskeletal:         General: No tenderness or deformity. Normal range of motion.      Cervical back: Neck supple.   Lymphadenopathy:      Cervical: No cervical adenopathy.   Skin:     General: Skin is warm and dry.      Findings: No erythema or rash.   Neurological:      Mental Status: She is alert and oriented to person, place, and time.      Cranial Nerves: No cranial nerve deficit.      Coordination: Coordination normal.      Gait: Gait is intact.   Psychiatric:         Mood and Affect: Affect normal.         Cognition and Memory: Memory normal.         Judgment: Judgment normal.      Comments: Tearful, depressed, anxious         Diagnostic Tests:  Significant Imaging: I have reviewed and interpreted all pertinent imaging results/findings.    Laboratory Data:  All pertinent labs have been reviewed.    Labs:   Lab Results   Component Value Date    WBC 7.39 08/18/2022    HGB 11.8 (L) 08/18/2022    HCT 38.4 08/18/2022    MCV 89 08/18/2022     08/18/2022       Assessment/Plan:   Atypical ductal hyperplasia of right breast  We discussed that she has a benign breast lesion that may increase her risk for developing breast cancer in the future.  We reviewed the Milagros model according to which her lifetime risk of developing breast cancer is 20%, as opposed to 10% for general population.    I agree with high-risk screening alternating mammograms and MRIs.    She is a candidate for chemoprevention with endocrine therapy.  Aromatase inhibitor therapy may not be ideal given her underlying pain and arthritis issues.  Tamoxifen may be a better choice, however, she is on antidepressants, Cymbalta and Prozac, that  lower the efficacy of tamoxifen.  Her anxiety and depression are also poorly controlled and she may need does adjustment or alternative therapy.  I discussed that Effexor can we given with tamoxifen and may be an option for her and she will discuss with her psychiatrist.    At this time, given acute mental health issues, I will not be offering her any endocrine therapy.  I will see her back in 2-3 months and discuss endocrine therapy again.  We discussed that endocrine therapy is typically given for 5 years and reduces the risk of developing breast cancer over the next 10-20 years and therefore we can revisit at a later time and still offer her treatment if she is able to tolerate it.         Major depressive disorder, recurrent episode, moderate  Generalized anxiety disorder  As above.      ECOG SCORE    1 - Restricted in strenuous activity-ambulatory and able to carry out work of a light nature           Discussion:   No follow-ups on file.    Plan was discussed with the patient at length, and she verbalized understanding. Marilee was given an opportunity to ask questions that were answered to her satisfaction, and she was advised to call in the interval if any problems or questions arise.    Electronically signed by Keren Morgan MD      Route Chart for Scheduling    Med Onc Chart Routing      Follow up with physician 3 months.   Follow up with NIK    Infusion scheduling note    Injection scheduling note    Labs    Imaging    Pharmacy appointment    Other referrals

## 2022-11-11 NOTE — PROGRESS NOTES
"Answers submitted by the patient for this visit:  Review of Systems Questionnaire (Submitted on 11/10/2022)  appetite change : No  unexpected weight change: No  mouth sores: No  visual disturbance: No  adenopathy: No                                                   CONSULT NOTE    Subjective:       Patient ID: Marilee Simons is a 52 y.o. female.  MRN: [id number]  : 1970    Chief Complaint: No chief complaint on file.      History of Present Illness:   Marilee Simons is a 52 y.o. female who is referred for ***  Oncology History:  22  Final Pathologic Diagnosis 1.  Right breast, biopsy:   Atypical ductal hyperplasia (ADH)   Calcifications associated with ADH and benign breast parenchyma   2.  Right breast biopsy:   Benign breast parenchyma with stromal fibrosis   Calcifications associated with benign breast parenchyma   Negative for atypia or malignancy      10/14/22  Final Pathologic Diagnosis 1. Skin, "right skin tags", excision:   - Consistent with fibroepithelial polyp   2. Breast, right, excisional biopsy:   - Negative for residual atypical ductal hyperplasia   - Benign breast tissue with microcyst formation, stromal fibrosis, and duct   ectasia   - Columnar cell change is present   - Calcifications associated with benign ductal elements   - Biopsy site changes and clips identified   - Margins of resection are negative for malignancy   - Negative for in-situ and invasive carcinoma        History:  Past Medical History:   Diagnosis Date    Acid reflux     Allergy     Alopecia     Anxiety     Arthralgia     Back pain     Depression     Dry eyes     from meds    Fever blister     Fibromyalgia     Interstitial cystitis     Irritable bowel syndrome     Kidney stone     Major depressive disorder, recurrent episode, in partial or unspecified remission 2013    OAB (overactive bladder) 2015    PTSD (post-traumatic stress disorder)     Pure hypercholesterolemia 2022    Rheumatoid arthritis     " Rheumatoid arthritis 2022    Systemic lupus erythematosus     Thyroid disease     Urinary tract infection     Vaginal infection       Past Surgical History:   Procedure Laterality Date    BLADDER SURGERY       SECTION, LOW TRANSVERSE      x 2    COLONOSCOPY      COLONOSCOPY N/A 10/5/2017    Procedure: COLONOSCOPY;  Surgeon: Venkat He MD;  Location: [facility] (4TH FLR);  Service: Endoscopy;  Laterality: N/A;    ESOPHAGOGASTRODUODENOSCOPY      ESOPHAGOGASTRODUODENOSCOPY N/A 10/25/2021    Procedure: EGD (ESOPHAGOGASTRODUODENOSCOPY);  Surgeon: Venkat He MD;  Location: [facility] ENDO (4TH FLR);  Service: Endoscopy;  Laterality: N/A;  Covid test on 10/22 at [email or web address]    10/19 lvm to confirm appt-rb    EXCISIONAL BIOPSY Right 10/14/2022    Procedure: EXCISIONAL BIOPSY-right with radiological marker;  Surgeon: PALLAVI Oseguera MD;  Location: [facility];  Service: General;  Laterality: Right;    EYE SURGERY      Lasik-bilateral    HYSTERECTOMY      IMPLANTATION OF PERMANENT SACRAL NERVE STIMULATOR N/A 2022    Procedure: INSERTION, NEUROSTIMULATOR, PERMANENT, SACRAL;  Surgeon: Bandar Garcia MD;  Location: [facility] OR 2ND [facility];  Service: Urology;  Laterality: N/A;  2hr    INJECTION OF BOTULINUM TOXIN TYPE A N/A 2018    Procedure: INJECTION, BOTULINUM TOXIN, TYPE A  200 UNITS;  Surgeon: Bandar Garcia MD;  Location: [facility] OR 1ST FLR;  Service: Urology;  Laterality: N/A;    INSTILLATION OF URINARY BLADDER N/A 2018    Procedure: INSTILLATION, URINARY BLADDER;  Surgeon: Bandar Garcia MD;  [facility];  Service: Urology;  Laterality: N/A;    PARTIAL HYSTERECTOMY      REMOVAL OF ELECTRODE LEAD OF SACRAL NERVE STIMULATOR N/A 2022    Procedure: REMOVAL, ELECTRODE LEAD, SACRAL NERVE STIMULATOR;  Surgeon: Bandar Garcia MD;  Location: [facility] OR 2ND FLR;  Service: Urology;  Laterality: N/A;    SKIN TAG REMOVAL N/A 10/14/2022    Procedure: REMOVAL, SKIN TAGS;  Surgeon: PALLAVI Oseguera MD;   Location: [facility] OR;  Service: General;  Laterality: N/A;    TRANSFORAMINAL EPIDURAL INJECTION OF STEROID Left 2/15/2021    Procedure: LUMBAR TRANSFORAMINAL LEFT L5 AND S1 DIRECT REFERRAL;  Surgeon: Marquez Nesbitt MD;  Location: [facility] PAIN MGT;  Service: Pain Management;  Laterality: Left;  NEEDS CONSENT, PT REQUESTING IV SEDATION     Family History   Problem Relation Age of Onset    Irritable bowel syndrome Mother     Irritable bowel syndrome Sister     Lupus Sister     Rheum arthritis Sister     Fibromyalgia Sister     Irritable bowel syndrome Sister     Celiac disease Neg Hx     Cirrhosis Neg Hx     Colon cancer Neg Hx     Colon polyps Neg Hx     Crohn's disease Neg Hx     Cystic fibrosis Neg Hx     Esophageal cancer Neg Hx     Hemochromatosis Neg Hx     Inflammatory bowel disease Neg Hx     Liver cancer Neg Hx     Liver disease Neg Hx     Rectal cancer Neg Hx     Stomach cancer Neg Hx     Ulcerative colitis Neg Hx     Boris's disease Neg Hx     Melanoma Neg Hx     Amblyopia Neg Hx     Blindness Neg Hx     Cataracts Neg Hx     Glaucoma Neg Hx     Macular degeneration Neg Hx     Retinal detachment Neg Hx     Strabismus Neg Hx       Social History     Tobacco Use    Smoking status: Never    Smokeless tobacco: Never   Substance and Sexual Activity    Alcohol use: No    Drug use: No    Sexual activity: Never        ROS:   Review of Systems   Respiratory:  Negative for cough and shortness of breath.    Cardiovascular:  Negative for chest pain.   Gastrointestinal:  Negative for abdominal pain and diarrhea.   Genitourinary:  Negative for frequency.   Musculoskeletal:  Negative for back pain.   Skin:  Negative for rash.   Neurological:  Negative for headaches.   Psychiatric/Behavioral:  The patient is nervous/anxious.       Objective:   There were no vitals filed for this visit.    Physical Examination:   Physical Exam     Diagnostic Tests:  Significant Imaging: I have reviewed and interpreted all pertinent imaging  results/findings.  CT Chest Without Contrast No results found for this or any previous visit.    CT Chest With ContrastNo results found for this or any previous visit.    CT Abdomen/Pelvis Without Contrast No results found for this or any previous visit.     CT Abdomen/Pelvis With Contrast Results for orders placed during the hospital encounter of 04/29/22    CT Abdomen Pelvis With Contrast    Narrative  EXAMINATION:  CT ABDOMEN PELVIS WITH CONTRAST    CLINICAL HISTORY:  Abdominal pain, acute, nonlocalized;recent bladder stimulator surgery, concern for possible infection;    TECHNIQUE:  Low dose axial images, sagittal and coronal reformations were obtained from the lung bases to the pubic symphysis following the IV administration of 75 mL of Omnipaque 350 no    COMPARISON:  None.    FINDINGS:  Stimulator over the left piriformis muscle with generator in the left buttocks appears unremarkable with no adjacent fluid collection or inflammation of the generator or lead.    The lung bases are clear.  The heart and pericardium appear unremarkable to the extent visualized.    The liver demonstrates mild steatosis without focal mass or biliary ductal dilatation.  The gallbladder and bile ducts appear unremarkable.    The pancreas, spleen, adrenal glands, right and left kidneys aorta and vena cava appear normal.  There is no intra-abdominal mass, lymph node enlargement, free air or free fluid.    Prominent waste material throughout the colon is noted.  There is no zone of transition or inflammation.  Small intestines appear unremarkable.  The urinary bladder appears normal.  The uterus is absent.    Bony structures are intact with mild spondylosis.  The abdominal wall appears intact.    Impression  A bladder stimulator and lead in the left flank and pelvis unremarkable without inflammation mass or fluid collection adjacent to them.    Prominent waste material.  Correlate for constipation.    Hepatic steatosis without bile  duct dilatation.      Electronically signed by: Vahid Hampton  Date:    04/30/2022  Time:    01:35    CT Abdomen/Pelvis With Without Contrast No results found for this or any previous visit.     PET Scan No results found for this or any previous visit.      Laboratory Data:  All pertinent labs have been reviewed.    Labs:   Lab Results   Component Value Date    WBC 7.39 08/18/2022    HGB 11.8 (L) 08/18/2022    HCT 38.4 08/18/2022    MCV 89 08/18/2022     08/18/2022       Assessment/Plan:   There are no diagnoses linked to this encounter.   ECOG SCORE               Discussion:   No follow-ups on file.    Plan was discussed with the patient at length, and she verbalized understanding. Marilee was given an opportunity to ask questions that were answered to her satisfaction, and she was advised to call in the interval if any problems or questions arise.    Electronically signed by Keren Morgan MD        Answers submitted by the patient for this visit:  Review of Systems Questionnaire (Submitted on 11/3/2022)  appetite change : No  unexpected weight change: No  mouth sores: No  visual disturbance: No  adenopathy: No

## 2022-11-12 PROBLEM — N60.91 ATYPICAL DUCTAL HYPERPLASIA OF RIGHT BREAST: Status: ACTIVE | Noted: 2022-11-12

## 2022-11-17 ENCOUNTER — PATIENT MESSAGE (OUTPATIENT)
Dept: SURGERY | Facility: CLINIC | Age: 52
End: 2022-11-17
Payer: COMMERCIAL

## 2022-11-17 ENCOUNTER — TELEPHONE (OUTPATIENT)
Dept: SURGERY | Facility: CLINIC | Age: 52
End: 2022-11-17
Payer: COMMERCIAL

## 2022-11-17 NOTE — TELEPHONE ENCOUNTER
Returned call to  regarding having spasms in her breast . Patient hasn't been wearing the support bar also in speaking with  she has been lifting  heavy boxes of [employer] decorations as well. As she wanted to decorate the outside of her house. Instructed patient to try using cold compresses to help with the spasms . Told   to start wearing the compression sport bar again.  Give the cold compresses and the sports bra a chance . Please call [facility] back if no improvement. Patient voiced understanding to this call.

## 2022-11-21 ENCOUNTER — PATIENT MESSAGE (OUTPATIENT)
Dept: PSYCHIATRY | Facility: CLINIC | Age: 52
End: 2022-11-21
Payer: COMMERCIAL

## 2022-11-23 ENCOUNTER — PATIENT MESSAGE (OUTPATIENT)
Dept: GASTROENTEROLOGY | Facility: CLINIC | Age: 52
End: 2022-11-23
Payer: COMMERCIAL

## 2022-11-23 DIAGNOSIS — M06.00 SERONEGATIVE RHEUMATOID ARTHRITIS: ICD-10-CM

## 2022-11-23 DIAGNOSIS — M81.0 OSTEOPOROSIS, UNSPECIFIED OSTEOPOROSIS TYPE, UNSPECIFIED PATHOLOGICAL FRACTURE PRESENCE: ICD-10-CM

## 2022-11-23 DIAGNOSIS — G89.4 CHRONIC PAIN SYNDROME: ICD-10-CM

## 2022-11-23 DIAGNOSIS — M79.7 FIBROMYALGIA: ICD-10-CM

## 2022-11-23 DIAGNOSIS — L40.50 PSORIATIC ARTHRITIS: ICD-10-CM

## 2022-11-23 DIAGNOSIS — M47.817 LUMBAR AND SACRAL OSTEOARTHRITIS: ICD-10-CM

## 2022-11-23 RX ORDER — PROMETHAZINE HYDROCHLORIDE 25 MG/1
25 TABLET ORAL EVERY 6 HOURS PRN
Qty: 30 TABLET | Refills: 0 | Status: SHIPPED | OUTPATIENT
Start: 2022-11-23 | End: 2023-04-03 | Stop reason: SDUPTHER

## 2022-11-28 RX ORDER — CYCLOBENZAPRINE HCL 10 MG
TABLET ORAL
Qty: 90 TABLET | Refills: 6 | Status: SHIPPED | OUTPATIENT
Start: 2022-11-28 | End: 2023-02-16 | Stop reason: SDUPTHER

## 2022-12-02 ENCOUNTER — PATIENT MESSAGE (OUTPATIENT)
Dept: PSYCHIATRY | Facility: CLINIC | Age: 52
End: 2022-12-02
Payer: COMMERCIAL

## 2022-12-02 ENCOUNTER — OFFICE VISIT (OUTPATIENT)
Dept: PSYCHIATRY | Facility: CLINIC | Age: 52
End: 2022-12-02
Payer: COMMERCIAL

## 2022-12-02 DIAGNOSIS — F41.1 GENERALIZED ANXIETY DISORDER: ICD-10-CM

## 2022-12-02 DIAGNOSIS — F33.1 MAJOR DEPRESSIVE DISORDER, RECURRENT EPISODE, MODERATE: Primary | ICD-10-CM

## 2022-12-02 PROCEDURE — 3044F PR MOST RECENT HEMOGLOBIN A1C LEVEL <7.0%: ICD-10-PCS | Mod: CPTII,S$GLB,, | Performed by: NURSE PRACTITIONER

## 2022-12-02 PROCEDURE — 3044F HG A1C LEVEL LT 7.0%: CPT | Mod: CPTII,S$GLB,, | Performed by: NURSE PRACTITIONER

## 2022-12-02 PROCEDURE — 99999 PR PBB SHADOW E&M-EST. PATIENT-LVL II: ICD-10-PCS | Mod: PBBFAC,,, | Performed by: NURSE PRACTITIONER

## 2022-12-02 PROCEDURE — 99214 OFFICE O/P EST MOD 30 MIN: CPT | Mod: S$GLB,,, | Performed by: NURSE PRACTITIONER

## 2022-12-02 PROCEDURE — 99214 PR OFFICE/OUTPT VISIT, EST, LEVL IV, 30-39 MIN: ICD-10-PCS | Mod: S$GLB,,, | Performed by: NURSE PRACTITIONER

## 2022-12-02 PROCEDURE — 99999 PR PBB SHADOW E&M-EST. PATIENT-LVL II: CPT | Mod: PBBFAC,,, | Performed by: NURSE PRACTITIONER

## 2022-12-02 RX ORDER — VENLAFAXINE HYDROCHLORIDE 37.5 MG/1
75 CAPSULE, EXTENDED RELEASE ORAL DAILY
Qty: 60 CAPSULE | Refills: 2 | Status: SHIPPED | OUTPATIENT
Start: 2022-12-02 | End: 2023-02-24 | Stop reason: SDUPTHER
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
## 2022-12-02 NOTE — PATIENT INSTRUCTIONS
Week 1: Start venlafaxine ER 37.5 mg by mouth once daily                Decrease Cymbalta to 90 mg by mouth once daily  Week 2: Continue venlafaxine ER 37.5 mg by mouth once daily                Decrease Cymbalta to 60 mg by mouth once daily  Week 3: Increase venlafaxine ER to 75 mg by mouth once daily                Decrease Cymbalta to 30 mg by mouth once daily  Week 4: Continue venlafaxine ER 75 mg by mouth once daily                STOP Cymbalta.     Week 5: Follow up Visit.

## 2022-12-05 ENCOUNTER — PATIENT MESSAGE (OUTPATIENT)
Dept: PSYCHIATRY | Facility: CLINIC | Age: 52
End: 2022-12-05
Payer: COMMERCIAL

## 2022-12-05 RX ORDER — DULOXETIN HYDROCHLORIDE 30 MG/1
30 CAPSULE, DELAYED RELEASE ORAL DAILY
Qty: 30 CAPSULE | Refills: 0 | Status: SHIPPED | OUTPATIENT
Start: 2022-12-05 | End: 2023-02-24

## 2022-12-06 ENCOUNTER — PATIENT MESSAGE (OUTPATIENT)
Dept: UROLOGY | Facility: CLINIC | Age: 52
End: 2022-12-06
Payer: COMMERCIAL

## 2022-12-06 DIAGNOSIS — N30.10 IC (INTERSTITIAL CYSTITIS): ICD-10-CM

## 2022-12-07 ENCOUNTER — HOSPITAL ENCOUNTER (OUTPATIENT)
Dept: RADIOLOGY | Facility: OTHER | Age: 52
Discharge: HOME | End: 2022-12-07
Attending: NURSE PRACTITIONER
Payer: COMMERCIAL

## 2022-12-07 ENCOUNTER — OFFICE VISIT (OUTPATIENT)
Dept: SPINE | Facility: CLINIC | Age: 52
End: 2022-12-07
Payer: COMMERCIAL

## 2022-12-07 VITALS
DIASTOLIC BLOOD PRESSURE: 87 MMHG | RESPIRATION RATE: 18 BRPM | SYSTOLIC BLOOD PRESSURE: 118 MMHG | WEIGHT: 176.38 LBS | HEIGHT: 59 IN | BODY MASS INDEX: 35.56 KG/M2 | TEMPERATURE: 98 F | HEART RATE: 98 BPM | OXYGEN SATURATION: 100 %

## 2022-12-07 DIAGNOSIS — M51.36 DDD (DEGENERATIVE DISC DISEASE), LUMBAR: ICD-10-CM

## 2022-12-07 DIAGNOSIS — M79.7 FIBROMYALGIA: ICD-10-CM

## 2022-12-07 DIAGNOSIS — M17.0 PRIMARY OSTEOARTHRITIS OF BOTH KNEES: ICD-10-CM

## 2022-12-07 DIAGNOSIS — M47.816 SPONDYLOSIS OF LUMBAR REGION WITHOUT MYELOPATHY OR RADICULOPATHY: ICD-10-CM

## 2022-12-07 DIAGNOSIS — M54.9 DORSALGIA: Primary | ICD-10-CM

## 2022-12-07 PROCEDURE — 1160F RVW MEDS BY RX/DR IN RCRD: CPT | Mod: CPTII,S$GLB,, | Performed by: NURSE PRACTITIONER

## 2022-12-07 PROCEDURE — 3074F PR MOST RECENT SYSTOLIC BLOOD PRESSURE < 130 MM HG: ICD-10-PCS | Mod: CPTII,S$GLB,, | Performed by: NURSE PRACTITIONER

## 2022-12-07 PROCEDURE — 99214 PR OFFICE/OUTPT VISIT, EST, LEVL IV, 30-39 MIN: ICD-10-PCS | Mod: S$GLB,,, | Performed by: NURSE PRACTITIONER

## 2022-12-07 PROCEDURE — 73562 X-RAY EXAM OF KNEE 3: CPT | Mod: 26,,, | Performed by: RADIOLOGY

## 2022-12-07 PROCEDURE — 73562 X-RAY EXAM OF KNEE 3: CPT | Mod: TC,50,FY

## 2022-12-07 PROCEDURE — 1159F MED LIST DOCD IN RCRD: CPT | Mod: CPTII,S$GLB,, | Performed by: NURSE PRACTITIONER

## 2022-12-07 PROCEDURE — 3074F SYST BP LT 130 MM HG: CPT | Mod: CPTII,S$GLB,, | Performed by: NURSE PRACTITIONER

## 2022-12-07 PROCEDURE — 99214 OFFICE O/P EST MOD 30 MIN: CPT | Mod: S$GLB,,, | Performed by: NURSE PRACTITIONER

## 2022-12-07 PROCEDURE — 3008F PR BODY MASS INDEX (BMI) DOCUMENTED: ICD-10-PCS | Mod: CPTII,S$GLB,, | Performed by: NURSE PRACTITIONER

## 2022-12-07 PROCEDURE — 99999 PR PBB SHADOW E&M-EST. PATIENT-LVL V: CPT | Mod: PBBFAC,,, | Performed by: NURSE PRACTITIONER

## 2022-12-07 PROCEDURE — 3044F PR MOST RECENT HEMOGLOBIN A1C LEVEL <7.0%: ICD-10-PCS | Mod: CPTII,S$GLB,, | Performed by: NURSE PRACTITIONER

## 2022-12-07 PROCEDURE — 1160F PR REVIEW ALL MEDS BY PRESCRIBER/CLIN PHARMACIST DOCUMENTED: ICD-10-PCS | Mod: CPTII,S$GLB,, | Performed by: NURSE PRACTITIONER

## 2022-12-07 PROCEDURE — 1159F PR MEDICATION LIST DOCUMENTED IN MEDICAL RECORD: ICD-10-PCS | Mod: CPTII,S$GLB,, | Performed by: NURSE PRACTITIONER

## 2022-12-07 PROCEDURE — 99999 PR PBB SHADOW E&M-EST. PATIENT-LVL V: ICD-10-PCS | Mod: PBBFAC,,, | Performed by: NURSE PRACTITIONER

## 2022-12-07 PROCEDURE — 3079F DIAST BP 80-89 MM HG: CPT | Mod: CPTII,S$GLB,, | Performed by: NURSE PRACTITIONER

## 2022-12-07 PROCEDURE — 3008F BODY MASS INDEX DOCD: CPT | Mod: CPTII,S$GLB,, | Performed by: NURSE PRACTITIONER

## 2022-12-07 PROCEDURE — 3044F HG A1C LEVEL LT 7.0%: CPT | Mod: CPTII,S$GLB,, | Performed by: NURSE PRACTITIONER

## 2022-12-07 PROCEDURE — 73562 XR KNEE 3 VIEW BILATERAL: ICD-10-PCS | Mod: 26,,, | Performed by: RADIOLOGY

## 2022-12-07 PROCEDURE — 3079F PR MOST RECENT DIASTOLIC BLOOD PRESSURE 80-89 MM HG: ICD-10-PCS | Mod: CPTII,S$GLB,, | Performed by: NURSE PRACTITIONER

## 2022-12-07 RX ORDER — METHENAMINE, SODIUM PHOSPHATE, MONOBASIC, MONOHYDRATE, PHENYL SALICYLATE, METHYLENE BLUE, AND HYOSCYAMINE SULFATE 120; 40.8; 36; 10; .12 MG/1; MG/1; MG/1; MG/1; MG/1
CAPSULE ORAL
OUTPATIENT
Start: 2022-12-07

## 2022-12-07 NOTE — PROGRESS NOTES
Subjective:      Patient ID: Marilee Simons is a 52 y.o. female.    Chief Complaint: No chief complaint on file.    12/7/2022:   Ms. Simons presents today for followup. She was last seen by Dr. Knapp in 12/2020. She has a left L5 and S1 TF ZOEY in 2/2021 with excellent relief. She is not having sciatica pain at this time. She c/o back pain across the low back and bilateral knee pain.     Ms Simons is a 49 yo female here for evaluation of low back pain.  She was last seen by me on 3/14/2018 for neck pain. She has fibromyalgia, RA, psoriatic arthritis and lupus.  She has a bladder stim. She has had back pain for past 3 weeks.  She woke up with sciatica out of blue on 12/14.  The pain is the left buttock and down the back of the leg to the calf.  The back of the thigh is the worst.  The pain has been constant.  She felt like heat was helping but no longer.  She has more pain with sitting and bending and turning over in bed.  The pain in the left leg is all words.  The pain is 8/10 now, worst 10/10 activity, best 5/10 lying on heating pad.  She has been lying on heating pad and resting.  She has been taking ibuprofen 800 BID, gabapentin 3 times a day, she is taking tzanidine 2 times a day.  She also tramadol 2 times a day.She did take a medrol dose juan pablo, with no relief.  There is no numbness and no tingling.      CT Lumbar 2020    FINDINGS:  Lumbar spine vertebral body heights and alignment appear maintained.  There is mild multilevel circumferential bulge without significant bony central canal stenosis.  Left paracentral soft tissue attenuation at L5-S1 concerning for superimposed protrusion with contact and possible encroachment of the left transiting S1 nerve root (series 2, image 283).  Suspected mild neural foraminal narrowing on the left at L5-S1.     Lead extending along the posterior paraspinal soft tissues, incompletely visualized and potentially related to stimulator lead.  Limited evaluation of lower  abdominal contents demonstrates no acute abnormality.  Minimal abdominal aortic atherosclerosis.     Impression:     Left paracentral soft tissue attenuation at L5-S1 concerning for superimposed disc protrusion with contact and possible encroachment of the left transiting S1 nerve root.  Further evaluation with dedicated lumbar spine MRI could be obtained as warranted (unless contraindicated for any reason).    X-ray lumbar 6/14/2019  Radiographic examination of the lumbar spine was performed.  7 radiographs are submitted.  There is an electrical stimulator device overlying the pelvis for which clinical and historical correlation is needed.  Calcifications of the pelvis are nonspecific and may represent phleboliths.  On the AP view there is curvature of the lumbar spine, convex to the left.  Chronic appearing endplate changes are noted, there is no evidence for high-grade or acute compression fracture deformity there is no evidence for high-grade spondylolisthesis, there is no evidence for translational instability on flexion and extension views.  Facet arthropathy is noted, there is no evidence for facet dislocation or facet fracture deformity.  The osseous structures appear intact without evidence for osseous destructive process or acute fracture deformity.     Impression:     Chronic changes are noted there is no evidence for acute fracture deformity.    X-ray si 6/14/2019  Sacral stimulator noted.  SI joints are fairly well maintained allowing for positioning.  No bony destruction.  Pelvic calcifications presumably phleboliths.     Impression:     No convincing abnormality.  Sacral stimulator noted.    MRI lumbar 2016  Alignment:  Within normal limits. Normal lumbar lordosis is preserved.  Vertebrae:  Body heights are well maintained. No osseous fracture or compression deformity.  No marrow signal abnormality suspicious for an infiltrative process.    Discs:  Intervertebral disk space heights are  maintained.  Cord:  Visualized lower thoracic spinal cord, conus medullaris and lumbar spinal nerve roots demonstrate normal signal.  The conus terminates at L1.  Soft tissue structures:  No significant abnormalities.     L1-2:  There is no focal disk abnormality.  No significant spinal canal stenosis.  No significant neural foraminal narrowing.     L2-3:  There is no focal disk abnormality.  No significant spinal canal stenosis.  No significant neural foraminal narrowing.     L3-4:  There is mild diffuse disc bulge.  No significant spinal canal stenosis.  No significant neural foraminal narrowing.     L4-5:  There is mild diffuse disc bulge.  No significant spinal canal stenosis.  No significant neural foraminal narrowing.     L5-S1:  There is mild diffuse disc bulge.  No significant spinal canal stenosis.  No significant neural foraminal narrowing.  IMPRESSION:         Unremarkable lumbar spine MRI.    Past Medical History:  No date: Acid reflux  No date: Allergy  No date: Alopecia  No date: Anxiety  No date: Arthralgia  No date: Back pain  No date: Depression  No date: Dry eyes      Comment:  from meds  No date: Fever blister  No date: Fibromyalgia  No date: Interstitial cystitis  No date: Irritable bowel syndrome  No date: Kidney stone  2013: Major depressive disorder, recurrent episode, in partial   or unspecified remission  2015: OAB (overactive bladder)  No date: PTSD (post-traumatic stress disorder)  No date: Rheumatoid arthritis  No date: Systemic lupus erythematosus  No date: Thyroid disease  No date: Urinary tract infection  No date: Vaginal infection    Past Surgical History:  No date: BLADDER SURGERY  No date:  SECTION, LOW TRANSVERSE      Comment:  x 2  No date: COLONOSCOPY  10/5/2017: COLONOSCOPY; N/A      Comment:  Procedure: COLONOSCOPY;  Surgeon: Venkat He MD;                Location: [address]);  Service: Endoscopy;                 Laterality: N/A;  No date:  ESOPHAGOGASTRODUODENOSCOPY  No date: EYE SURGERY      Comment:  Lasik-bilateral  No date: HYSTERECTOMY  9/12/2018: INJECTION OF BOTULINUM TOXIN TYPE A; N/A      Comment:  Procedure: INJECTION, BOTULINUM TOXIN, TYPE A  200                UNITS;  Surgeon: Bandar Garcia MD;  [facility];  Service: Urology;  Laterality: N/A;  9/12/2018: INSTILLATION OF URINARY BLADDER; N/A      Comment:  Procedure: INSTILLATION, URINARY BLADDER;  Surgeon:                Bandar Garcia MD;  [facility];  Service:                Urology;  Laterality: N/A;  No date: PARTIAL HYSTERECTOMY    Review of patient's family history indicates:  Problem: Irritable bowel syndrome      Relation: Mother          Age of Onset: (Not Specified)  Problem: Irritable bowel syndrome      Relation: Sister          Age of Onset: (Not Specified)  Problem: Lupus      Relation: Sister          Age of Onset: (Not Specified)  Problem: Rheum arthritis      Relation: Sister          Age of Onset: (Not Specified)  Problem: Fibromyalgia      Relation: Sister          Age of Onset: (Not Specified)  Problem: Irritable bowel syndrome      Relation: Sister          Age of Onset: (Not Specified)  Problem: Celiac disease      Relation: Neg Hx          Age of Onset: (Not Specified)  Problem: Cirrhosis      Relation: Neg Hx          Age of Onset: (Not Specified)  Problem: Colon cancer      Relation: Neg Hx          Age of Onset: (Not Specified)  Problem: Colon polyps      Relation: Neg Hx          Age of Onset: (Not Specified)  Problem: Crohn's disease      Relation: Neg Hx          Age of Onset: (Not Specified)  Problem: Cystic fibrosis      Relation: Neg Hx          Age of Onset: (Not Specified)  Problem: Esophageal cancer      Relation: Neg Hx          Age of Onset: (Not Specified)  Problem: Hemochromatosis      Relation: Neg Hx          Age of Onset: (Not Specified)  Problem: Inflammatory bowel disease      Relation: Neg Hx           Age of Onset: (Not Specified)  Problem: Liver cancer      Relation: Neg Hx          Age of Onset: (Not Specified)  Problem: Liver disease      Relation: Neg Hx          Age of Onset: (Not Specified)  Problem: Rectal cancer      Relation: Neg Hx          Age of Onset: (Not Specified)  Problem: Stomach cancer      Relation: Neg Hx          Age of Onset: (Not Specified)  Problem: Ulcerative colitis      Relation: Neg Hx          Age of Onset: (Not Specified)  Problem: Boris's disease      Relation: Neg Hx          Age of Onset: (Not Specified)  Problem: Melanoma      Relation: Neg Hx          Age of Onset: (Not Specified)  Problem: Amblyopia      Relation: Neg Hx          Age of Onset: (Not Specified)  Problem: Blindness      Relation: Neg Hx          Age of Onset: (Not Specified)  Problem: Cataracts      Relation: Neg Hx          Age of Onset: (Not Specified)  Problem: Glaucoma      Relation: Neg Hx          Age of Onset: (Not Specified)  Problem: Macular degeneration      Relation: Neg Hx          Age of Onset: (Not Specified)  Problem: Retinal detachment      Relation: Neg Hx          Age of Onset: (Not Specified)  Problem: Strabismus      Relation: Neg Hx          Age of Onset: (Not Specified)      Social History    Socioeconomic History      Marital status:       Spouse name: Not on file      Number of children: Not on file      Years of education: Not on file      Highest education level: Not on file    Occupational History        Employer: OTHER    Social Needs      Financial resource strain: Not on file      Food insecurity        Worry: Not on file        Inability: Not on file      Transportation needs        Medical: Not on file        Non-medical: Not on file    Tobacco Use      Smoking status: Never Smoker      Smokeless tobacco: Never Used    Substance and Sexual Activity      Alcohol use: No      Drug use: No      Sexual activity: Never    Lifestyle      Physical activity        Days per week:  Not on file        Minutes per session: Not on file      Stress: Not on file    Relationships      Social connections        Talks on phone: Not on file        Gets together: Not on file        Attends Sikhism service: Not on file        Active member of club or organization: Not on file        Attends meetings of clubs or organizations: Not on file        Relationship status: Not on file    Other Topics      Concerns:        Are you pregnant or think you may be?: No        Breast-feeding: No    Social History Narrative      Not on file      Current Outpatient Medications:  adalimumab (HUMIRA,CF, PEN) 40 mg/0.4 mL PnKt, Inject 0.4 mLs (40 mg total) into the skin every 14 (fourteen) days. (Patient not taking: Reported on 10/15/2020), Disp: 2 pen, Rfl: 12  alosetron (LOTRONEX) 0.5 MG tablet, TAKE 1 TABLET BY MOUTH DAILY AS NEEDED, Disp: 30 tablet, Rfl: 0  amitriptyline (ELAVIL) 10 MG tablet, Take 1 tablet (10 mg total) by mouth nightly as needed., Disp: 90 tablet, Rfl: 3  butalbitaL-acetaminophen  mg Tab, Take 1 tablet by mouth every 4 (four) hours., Disp: 60 tablet, Rfl: 5  calcium glycerophosphate (PRELIEF) 65 mg Tab, Take 2 tablets by mouth before meals and at bedtime as needed., Disp: 100 each, Rfl: 3  cetirizine (ZYRTEC) 10 MG tablet, Take 2 tablets (20 mg total) by mouth once daily., Disp: 60 tablet, Rfl: 3  clonazePAM (KLONOPIN) 1 MG tablet, TAKE 1 TABLET(1 MG) BY MOUTH TWICE DAILY, Disp: 60 tablet, Rfl: 0  cyclobenzaprine (FLEXERIL) 10 MG tablet, TAKE 1 TABLET(10 MG) BY MOUTH TWICE DAILY PRN, Disp: 90 tablet, Rfl: 6  dicyclomine (BENTYL) 20 mg tablet, Take 1 tablet (20 mg total) by mouth every 6 (six) hours., Disp: 120 tablet, Rfl: 0  DULoxetine (CYMBALTA) 60 MG capsule, Take 2 capsules (120 mg total) by mouth once daily., Disp: 60 capsule, Rfl: 3  estradioL (ESTRACE) 0.5 MG tablet, TAKE 1 TABLET BY MOUTH DAILY, Disp: 30 tablet, Rfl: 3  famotidine (PEPCID) 20 MG tablet, Take 1 tablet (20 mg total) by  mouth 2 (two) times daily., Disp: 60 tablet, Rfl: 11  fluticasone propionate (FLONASE) 50 mcg/actuation nasal spray, 2 sprays (100 mcg total) by Each Nostril route 2 (two) times daily., Disp: 31.6 mL, Rfl: 5  gabapentin (NEURONTIN) 800 MG tablet, Take 1 tablet (800 mg total) by mouth 3 (three) times daily., Disp: 90 tablet, Rfl: 11  hydrocortisone 2.5 % cream, Apply to affected areas twice a day when eczema is moderate., Disp: 453.6 g, Rfl: 1  hydrOXYchloroQUINE (PLAQUENIL) 200 mg tablet, Take 1 tablet (200 mg total) by mouth 2 (two) times daily., Disp: 60 tablet, Rfl: 2  hydrOXYzine HCL (ATARAX) 25 MG tablet, 1 tablet at bed time, Disp: 90 tablet, Rfl: 3  levothyroxine (SYNTHROID) 50 MCG tablet, TAKE 1 TABLET BY MOUTH EVERY DAY, Disp: 30 tablet, Rfl: 3  LIDOCAINE 2 %, VALIUM 5 MG, BACLOFEN 4 % SUPPOSITORY, Place 1 suppository vaginally 2 (two) times daily., Disp: 60 each, Rfl: 1  lifitegrast (XIIDRA) 5 % Dpet, Apply 1 drop to eye 2 (two) times daily., Disp: 60 each, Rfl: 12  xnjysn-gywlp-e.blue-sal-naphos (USTELL) 120-0.12 mg Cap, Take 1 capsule by mouth 4 (four) times daily as needed., Disp: 120 each, Rfl: 3  methylPREDNISolone (MEDROL DOSEPACK) 4 mg tablet, use as directed, Disp: 1 Package, Rfl: 0  multivitamin with minerals tablet, Take 1 tablet by mouth once daily., Disp: , Rfl:   omalizumab (XOLAIR) 150 mg/mL injection, Inject 2 mLs (300 mg total) into the skin every 28 days. for 13 doses, Disp: 26 Syringe, Rfl: 1  OXcarbazepine (TRILEPTAL) 150 MG Tab, Take 1 tablet (150 mg total) by mouth 2 (two) times daily., Disp: 60 tablet, Rfl: 0  oxybutynin (DITROPAN) 5 MG Tab, Take 1 tablet (5 mg total) by mouth 2 (two) times daily., Disp: 60 tablet, Rfl: 12  potassium citrate (UROCIT-K 10) 10 mEq (1,080 mg) TbSR, Take 1 tablet (10 mEq total) by mouth 3 (three) times daily with meals., Disp: 90 tablet, Rfl: 11  promethazine (PHENERGAN) 25 MG tablet, TAKE 1 TABLET(25 MG) BY MOUTH EVERY 6 HOURS AS NEEDED FOR NAUSEA, Disp:  30 tablet, Rfl: 0  RABEprazole (ACIPHEX) 20 mg tablet, TAKE 1 TABLET(20 MG) BY MOUTH EVERY DAY, Disp: 30 tablet, Rfl: 2  tamsulosin (FLOMAX) 0.4 mg Cap, Take 1 capsule (0.4 mg total) by mouth once daily., Disp: 30 capsule, Rfl: 11  tiZANidine (ZANAFLEX) 4 MG tablet, TAKE 1 TABLET(4 MG) BY MOUTH EVERY 8 HOURS AS NEEDED, Disp: 90 tablet, Rfl: 12  traMADoL (ULTRAM) 50 mg tablet, Take 1 tablet (50 mg total) by mouth every 6 (six) hours., Disp: 90 tablet, Rfl: 3    Current Facility-Administered Medications:  betamethasone acetate-betamethasone sodium phosphate injection 12 mg, 12 mg, Intramuscular, 1 time in Clinic/HOD, Fei Adkins MD        Review of patient's allergies indicates:   -- Cephalexin -- Itching   -- Zofran [ondansetron hcl (pf)] -- Hives   -- Penicillins -- Rash        Review of Systems   Constitutional: Negative for weight gain and weight loss.   Cardiovascular:  Negative for chest pain.   Respiratory:  Negative for shortness of breath.    Musculoskeletal:  Positive for back pain (left leg) and joint pain (knee).   Gastrointestinal:  Positive for nausea (with pain). Negative for abdominal pain, bowel incontinence and vomiting.   Genitourinary:  Negative for bladder incontinence.   Neurological:  Negative for numbness and paresthesias.       Objective:        General: Marilee is well-developed, well-nourished, appears stated age, in no acute distress, alert and oriented to time, place and person.     General    Vitals reviewed.  Constitutional: She is oriented to person, place, and time. She appears well-developed and well-nourished.   HENT:   Head: Normocephalic and atraumatic.   Pulmonary/Chest: Effort normal.   Neurological: She is alert and oriented to person, place, and time.   Psychiatric: She has a normal mood and affect. Her behavior is normal. Judgment and thought content normal.     General Musculoskeletal Exam   Gait: normal       Right Knee Exam     Range of Motion   The patient has normal  right knee ROM.    Left Knee Exam     Range of Motion   The patient has normal left knee ROM.Back (L-Spine & T-Spine) / Neck (C-Spine) Exam     Tenderness Right paramedian tenderness of the Lower L-Spine. Left paramedian tenderness of the Lower L-Spine.     Back (L-Spine & T-Spine) Range of Motion   Extension:  20   Flexion:  90   Lateral bend right:  20   Lateral bend left:  20     Spinal Sensation   Right Side Sensation  L-Spine Level: normal  S-Spine Level: normal  Left Side Sensation  L-Spine Level: normal  S-Spine Level: normal    Back (L-Spine & T-Spine) Tests   Right Side Tests  Straight leg raise:        Sitting SLR: > 70 degrees    Left Side Tests  Straight leg raise:       Sitting SLR: > 70 degrees      Other   She has no scoliosis .  Spinal Kyphosis:  Absent      Muscle Strength   Right Upper Extremity   Biceps: 5/5   Deltoid:  5/5  Triceps:  5/5  Left Upper Extremity  Biceps: 5/5   Deltoid:  5/5  Triceps:  5/5  Right Lower Extremity   Hip Flexion: 5/5   Quadriceps:  5/5   Anterior tibial:  5/5   EHL:  5/5  Left Lower Extremity   Hip Flexion: 5/5   Quadriceps:  5/5   Anterior tibial:  5/5   EHL:  5/5    Reflexes     Left Side  Biceps:  2+  Triceps:  2+  Brachioradialis:  2+  Achilles:  2+  Left Kent's Sign:  Absent  Babinski Sign:  absent    Right Side   Biceps:  2+  Triceps:  2+  Brachioradialis:  2+  Achilles:  2+  Right Kent's Sign:  absent  Babinski Sign:  absent    Vascular Exam     Right Pulses        Carotid:                  2+    Left Pulses        Carotid:                  2+            Assessment:       1. Dorsalgia    2. Fibromyalgia    3. Spondylosis of lumbar region without myelopathy or radiculopathy    4. DDD (degenerative disc disease), lumbar    5. Primary osteoarthritis of both knees             Plan:          Prior imaging and records reviewed today  Had left L5 and S1 TF ZOEY in 2020 with excellent relief of her leg pain, she is no longer having sciatic symptoms.  C/o low back  pain across the back and bilateral knee pain, back pain appears more facetogenic  Order bilateral knee xray  Referral to orthopedics for evaluation of knee pain  Referral to PT for low back pain and knee pain  Consider MBB/RFA with pain clinic pending relief with PT.   RTC for followup in 3 months        Follow-up: Follow up in about 3 months (around 3/7/2023). If there are any questions prior to this, the patient was instructed to contact the office.

## 2022-12-08 ENCOUNTER — PATIENT MESSAGE (OUTPATIENT)
Dept: PSYCHIATRY | Facility: CLINIC | Age: 52
End: 2022-12-08
Payer: COMMERCIAL

## 2022-12-09 ENCOUNTER — PATIENT MESSAGE (OUTPATIENT)
Dept: SPINE | Facility: CLINIC | Age: 52
End: 2022-12-09
Payer: COMMERCIAL

## 2022-12-14 ENCOUNTER — TELEPHONE (OUTPATIENT)
Dept: ORTHOPEDICS | Facility: CLINIC | Age: 52
End: 2022-12-14
Payer: COMMERCIAL

## 2022-12-16 ENCOUNTER — TELEPHONE (OUTPATIENT)
Dept: ORTHOPEDICS | Facility: CLINIC | Age: 52
End: 2022-12-16
Payer: COMMERCIAL

## 2022-12-16 DIAGNOSIS — M06.00 SERONEGATIVE RHEUMATOID ARTHRITIS: ICD-10-CM

## 2022-12-16 DIAGNOSIS — L40.50 PSORIATIC ARTHRITIS: ICD-10-CM

## 2022-12-16 RX ORDER — TOFACITINIB 11 MG/1
11 TABLET, FILM COATED, EXTENDED RELEASE ORAL DAILY
Qty: 30 TABLET | Refills: 6 | OUTPATIENT
Start: 2022-12-16

## 2022-12-16 NOTE — TELEPHONE ENCOUNTER
----- Message from Schuyler Hughes sent at 12/16/2022  3:20 PM CST -----  Type:  Pharmacy Calling to Clarify an RX    Name of Caller:  Melvi/ [facility]  Pharmacy Name:    [facility]--- Please add to file      Prescription Name:  tofacitinib (XELJANZ XR) 11 mg Tb24      What do they need to clarify?:  Jag LOZANO  Best Call Back Number:  [phone]  Additional Information:  Ref# [id number]

## 2022-12-16 NOTE — TELEPHONE ENCOUNTER
Tried contacting patient regarding rescheduling her orthopedics appointment ,no answer left message on voice mail

## 2022-12-19 ENCOUNTER — PATIENT MESSAGE (OUTPATIENT)
Dept: PSYCHIATRY | Facility: CLINIC | Age: 52
End: 2022-12-19
Payer: COMMERCIAL

## 2022-12-20 ENCOUNTER — OFFICE VISIT (OUTPATIENT)
Dept: PSYCHIATRY | Facility: CLINIC | Age: 52
End: 2022-12-20
Payer: COMMERCIAL

## 2022-12-20 ENCOUNTER — TELEPHONE (OUTPATIENT)
Dept: RHEUMATOLOGY | Facility: CLINIC | Age: 52
End: 2022-12-20
Payer: COMMERCIAL

## 2022-12-20 DIAGNOSIS — F41.1 GENERALIZED ANXIETY DISORDER: ICD-10-CM

## 2022-12-20 DIAGNOSIS — F33.1 MAJOR DEPRESSIVE DISORDER, RECURRENT EPISODE, MODERATE: Primary | ICD-10-CM

## 2022-12-20 PROCEDURE — 90834 PSYTX W PT 45 MINUTES: CPT | Mod: 95,,, | Performed by: SOCIAL WORKER

## 2022-12-20 PROCEDURE — 3044F HG A1C LEVEL LT 7.0%: CPT | Mod: CPTII,95,, | Performed by: SOCIAL WORKER

## 2022-12-20 PROCEDURE — 90834 PR PSYCHOTHERAPY W/PATIENT, 45 MIN: ICD-10-PCS | Mod: 95,,, | Performed by: SOCIAL WORKER

## 2022-12-20 PROCEDURE — 3044F PR MOST RECENT HEMOGLOBIN A1C LEVEL <7.0%: ICD-10-PCS | Mod: CPTII,95,, | Performed by: SOCIAL WORKER

## 2022-12-20 NOTE — TELEPHONE ENCOUNTER
Called [facility] to let them know the patient will need an appointment before we can submit the PA on hold for 10 with no answer  ----- Message from Schuyler Hughes sent at 12/20/2022  7:52 AM CST -----  Type: Needs Medical Advice  Who Called:  Melvi/ Jagdish     Pharmacy name and phone #:    [facility]-- Please add to file      Best Call Back Number:  [phone]    Additional Information: Caller states that she would like a callback regarding the status of the patient's medication PA:    tofacitinib (XELJANZ XR) 11 mg Tb24    Ref #[id number]

## 2022-12-20 NOTE — PROGRESS NOTES
Individual Psychotherapy (PhD/LCSW)    12/20/2022    Site:  [facility]         Therapeutic Intervention: Met with patient.  Outpatient - Insight oriented psychotherapy 45 min - CPT code 57449    Chief complaint/reason for encounter: depression, anxiety and ptsd     Interval history and content of current session: The patient location is: home in [place]  The chief complaint leading to consultation is: depression and BPD    Visit type: audiovisual    Face to Face time with patient: 45  45 minutes of total time spent on the encounter, which includes face to face time and non-face to face time preparing to see the patient (eg, review of tests), Obtaining and/or reviewing separately obtained history, Documenting clinical information in the electronic or other health record, Independently interpreting results (not separately reported) and communicating results to the patient/family/caregiver, or Care coordination (not separately reported).         Each patient to whom he or she provides medical services by telemedicine is:  (1) informed of the relationship between the physician and patient and the respective role of any other health care provider with respect to management of the patient; and (2) notified that he or she may decline to receive medical services by telemedicine and may withdraw from such care at any time.    Notes: Pt seen for follow-up.  Pt has not been seen in 3 months due to multiple last minute cancellations due to crises in her life.  She talks about problems with her mother and sister.  Her mother is difficult and needing lots of help with daily activities.  Sister works and tells pt she has to take care of mother but pt gets agitated and upset when with her mother.  Pt is asking if it is OK for her to say no to sister and to not want to take care of her mother.  Pt also continues to have a number of health issues that have been bothering her.  She talks nonstop, making it difficult for me to  give her any feedback/advice.    Treatment plan:  Target symptoms: depression, anxiety , PTSD  Why chosen therapy is appropriate versus another modality: relevant to diagnosis  Outcome monitoring methods: self-report, observation  Therapeutic intervention type: insight oriented psychotherapy    Risk parameters:  Patient reports no suicidal ideation  Patient reports no homicidal ideation  Patient reports no self-injurious behavior  Patient reports no violent behavior    Verbal deficits: None    Patient's response to intervention:  The patient's response to intervention is motivated.    Progress toward goals and other mental status changes:  The patient's progress toward goals is fair .    Diagnosis:     ICD-10-CM ICD-9-CM   1. Major depressive disorder, recurrent episode, moderate  F33.1 296.32   2. Generalized anxiety disorder  F41.1 300.02       Plan:  individual psychotherapy and medication management by physician    Return to clinic: as scheduled    Length of Service (minutes): 45

## 2022-12-22 ENCOUNTER — TELEPHONE (OUTPATIENT)
Dept: RHEUMATOLOGY | Facility: CLINIC | Age: 52
End: 2022-12-22
Payer: COMMERCIAL

## 2022-12-22 NOTE — TELEPHONE ENCOUNTER
Called pharmacy to let them know patient will need appointment before we can process the medication  ----- Message from Floridalma Reynolds sent at 12/22/2022  8:31 AM CST -----  Regarding: pharmacy  Contact: Melvi with [facility]  Type:  Pharmacy Calling to Clarify an RX    Name of Caller:  Melvi  Pharmacy Name:  [facility]  Prescription Name:  tofacitinib (XELJANZ XR) 11 mg Tb24  What do they need to clarify?:  PA  Best Call Back Number:  [phone] REF [id number]  Additional Information:  Melvi has left a previous message regarding the PA. She has not received a response. She submitted a form and it can be faxed back or call to give verbally. Please fax to [phone]. Please call Melvi to advise of status.Thanks!

## 2022-12-28 ENCOUNTER — PATIENT MESSAGE (OUTPATIENT)
Dept: SURGERY | Facility: CLINIC | Age: 52
End: 2022-12-28
Payer: COMMERCIAL

## 2022-12-28 ENCOUNTER — HOSPITAL ENCOUNTER (EMERGENCY)
Facility: HOSPITAL | Age: 52
Discharge: HOME | End: 2022-12-28
Attending: EMERGENCY MEDICINE
Payer: COMMERCIAL

## 2022-12-28 VITALS
RESPIRATION RATE: 16 BRPM | HEART RATE: 100 BPM | BODY MASS INDEX: 35.35 KG/M2 | TEMPERATURE: 98 F | SYSTOLIC BLOOD PRESSURE: 143 MMHG | WEIGHT: 175 LBS | DIASTOLIC BLOOD PRESSURE: 68 MMHG | OXYGEN SATURATION: 96 %

## 2022-12-28 DIAGNOSIS — M79.10 MYALGIA: ICD-10-CM

## 2022-12-28 DIAGNOSIS — F07.81 POST CONCUSSION SYNDROME: ICD-10-CM

## 2022-12-28 DIAGNOSIS — R07.9 CHEST PAIN: ICD-10-CM

## 2022-12-28 DIAGNOSIS — S20.01XA CONTUSION OF RIGHT BREAST, INITIAL ENCOUNTER: ICD-10-CM
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
## 2022-12-28 DIAGNOSIS — G44.319 ACUTE POST-TRAUMATIC HEADACHE, NOT INTRACTABLE: Primary | ICD-10-CM

## 2022-12-28 PROCEDURE — 99284 EMERGENCY DEPT VISIT MOD MDM: CPT | Mod: 25

## 2022-12-28 NOTE — ED PROVIDER NOTES
Encounter Date: 12/28/2022       History     Chief Complaint   Patient presents with    Fall     Pt reports a trip and fall yesterday and is now c/o dizziness, headache, neck pain, shoulder pain and back pain. Reports possible LOC after fall. Denies being on any blood thinners. Recently dx with breast CA, but not receiving any therapy yet     Marilee Simons is a 52 y.o. female who  has a past medical history of Acid reflux, Allergy, Alopecia, Anxiety, Arthralgia, Back pain, Depression, Dry eyes, Fever blister, Fibromyalgia, Interstitial cystitis, Irritable bowel syndrome, Kidney stone, Major depressive disorder, recurrent episode, in partial or unspecified remission (6/25/2013), OAB (overactive bladder) (7/21/2015), PTSD (post-traumatic stress disorder), Pure hypercholesterolemia (7/6/2022), Rheumatoid arthritis, Rheumatoid arthritis (6/21/2022), Systemic lupus erythematosus, Thyroid disease, Urinary tract infection, and Vaginal infection.    The patient presents to the ED due to headache, dizziness, chest pain.   Patient reports symptoms started after tripping and falling yesterday. She tripped over a treadmill at home and fell forward onto her chest. She states she hit her head and might have passed out. No fever, vomiting, focal weakness/numbness, or additional extremity injury. She reports persistent nausea and dizziness today. She has taken OTC medication without improvement. No blood thinner use. No other complaints or concerns.     Review of patient's allergies indicates:   Allergen Reactions    Cephalexin Itching    Cephalosporins     Zofran [ondansetron hcl (pf)] Hives    Penicillins Rash     Past Medical History:   Diagnosis Date    Acid reflux     Allergy     Alopecia     Anxiety     Arthralgia     Back pain     Depression     Dry eyes     from meds    Fever blister     Fibromyalgia     Interstitial cystitis     Irritable bowel syndrome     Kidney stone     Major depressive disorder, recurrent episode,  in partial or unspecified remission 2013    OAB (overactive bladder) 2015    PTSD (post-traumatic stress disorder)     Pure hypercholesterolemia 2022    Rheumatoid arthritis     Rheumatoid arthritis 2022    Systemic lupus erythematosus     Thyroid disease     Urinary tract infection     Vaginal infection      Past Surgical History:   Procedure Laterality Date    BLADDER SURGERY       SECTION, LOW TRANSVERSE      x 2    COLONOSCOPY      COLONOSCOPY N/A 10/5/2017    Procedure: COLONOSCOPY;  Surgeon: Venkat He MD;  Location: [facility] (4TH FLR);  Service: Endoscopy;  Laterality: N/A;    ESOPHAGOGASTRODUODENOSCOPY      ESOPHAGOGASTRODUODENOSCOPY N/A 10/25/2021    Procedure: EGD (ESOPHAGOGASTRODUODENOSCOPY);  Surgeon: Venkat He MD;  Location: [facility] (4TH FLR);  Service: Endoscopy;  Laterality: N/A;  Covid test on 10/22 at [email or web address]    10/19 lvm to confirm appt-rb    EXCISIONAL BIOPSY Right 10/14/2022    Procedure: EXCISIONAL BIOPSY-right with radiological marker;  Surgeon: PALLAVI Oseguera MD;  Location: [facility];  Service: General;  Laterality: Right;    EYE SURGERY      Lasik-bilateral    HYSTERECTOMY      IMPLANTATION OF PERMANENT SACRAL NERVE STIMULATOR N/A 2022    Procedure: INSERTION, NEUROSTIMULATOR, PERMANENT, SACRAL;  Surgeon: Bandar Garcia MD;  Location: [facility] OR 2ND FLR;  Service: Urology;  Laterality: N/A;  2hr    INJECTION OF BOTULINUM TOXIN TYPE A N/A 2018    Procedure: INJECTION, BOTULINUM TOXIN, TYPE A  200 UNITS;  Surgeon: Bandar Garcia MD;  Location: [facility] OR 1ST FLR;  Service: Urology;  Laterality: N/A;    INSTILLATION OF URINARY BLADDER N/A 2018    Procedure: INSTILLATION, URINARY BLADDER;  Surgeon: Bandar Garcia MD;  Location: [facility] OR 1ST FLR;  Service: Urology;  Laterality: N/A;    PARTIAL HYSTERECTOMY      REMOVAL OF ELECTRODE LEAD OF SACRAL NERVE STIMULATOR N/A 2022    Procedure: REMOVAL, ELECTRODE LEAD, SACRAL NERVE STIMULATOR;   Surgeon: Bandar Garcia MD;  Location: [facility] OR 2ND FLR;  Service: Urology;  Laterality: N/A;    SKIN TAG REMOVAL N/A 10/14/2022    Procedure: REMOVAL, SKIN TAGS;  Surgeon: PALLAVI Oseguera MD;  Location: [facility] OR;  Service: General;  Laterality: N/A;    TRANSFORAMINAL EPIDURAL INJECTION OF STEROID Left 2/15/2021    Procedure: LUMBAR TRANSFORAMINAL LEFT L5 AND S1 DIRECT REFERRAL;  Surgeon: Marquez Nesbitt MD;  Location: [facility] PAIN MGT;  Service: Pain Management;  Laterality: Left;  NEEDS CONSENT, PT REQUESTING IV SEDATION     Family History   Problem Relation Age of Onset    Irritable bowel syndrome Mother     Irritable bowel syndrome Sister     Lupus Sister     Rheum arthritis Sister     Fibromyalgia Sister     Irritable bowel syndrome Sister     Celiac disease Neg Hx     Cirrhosis Neg Hx     Colon cancer Neg Hx     Colon polyps Neg Hx     Crohn's disease Neg Hx     Cystic fibrosis Neg Hx     Esophageal cancer Neg Hx     Hemochromatosis Neg Hx     Inflammatory bowel disease Neg Hx     Liver cancer Neg Hx     Liver disease Neg Hx     Rectal cancer Neg Hx     Stomach cancer Neg Hx     Ulcerative colitis Neg Hx     Boris's disease Neg Hx     Melanoma Neg Hx     Amblyopia Neg Hx     Blindness Neg Hx     Cataracts Neg Hx     Glaucoma Neg Hx     Macular degeneration Neg Hx     Retinal detachment Neg Hx     Strabismus Neg Hx      Social History     Tobacco Use    Smoking status: Never    Smokeless tobacco: Never   Substance Use Topics    Alcohol use: No    Drug use: No     Review of Systems   Constitutional:  Negative for chills and fever.   HENT:  Negative for sore throat.    Respiratory:  Negative for cough and shortness of breath.    Cardiovascular:  Positive for chest pain.   Gastrointestinal:  Positive for nausea. Negative for vomiting.   Genitourinary:  Negative for dysuria, frequency and urgency.   Musculoskeletal:  Negative for back pain.   Skin:  Negative for rash and wound.   Neurological:  Positive for dizziness  and headaches. Negative for syncope and weakness.   Hematological:  Does not bruise/bleed easily.   Psychiatric/Behavioral:  Negative for agitation, behavioral problems and confusion.      Physical Exam     Initial Vitals [12/28/22 1639]   BP Pulse Resp Temp SpO2   (!) 143/68 100 16 98.4 °F (36.9 °C) 96 %      MAP       --         Physical Exam    Nursing note and vitals reviewed.  Constitutional: She appears well-developed and well-nourished. She is not diaphoretic. No distress.   HENT:   Head: Normocephalic and atraumatic.   Mouth/Throat: Oropharynx is clear and moist.   Eyes: EOM are normal. Pupils are equal, round, and reactive to light.   Neck: No tracheal deviation present.   Cardiovascular:  Normal rate, regular rhythm, normal heart sounds and intact distal pulses.           Pulmonary/Chest: Breath sounds normal. No stridor. No respiratory distress. She has no wheezes.   Abdominal: Abdomen is soft. Bowel sounds are normal. She exhibits no distension and no mass. There is no abdominal tenderness.   Musculoskeletal:         General: No edema. Normal range of motion.     Neurological: She is alert and oriented to person, place, and time. She has normal strength. She is not disoriented. No cranial nerve deficit or sensory deficit. She exhibits normal muscle tone. Gait normal. GCS eye subscore is 4. GCS verbal subscore is 5. GCS motor subscore is 6.   Skin: Skin is warm and dry. Capillary refill takes less than 2 seconds. No pallor.   Psychiatric: She has a normal mood and affect. Her behavior is normal. Thought content normal.       ED Course   Procedures  Labs Reviewed - No data to display       Imaging Results              X-Ray Chest AP Portable (Final result)  Result time 12/28/22 18:06:39      Final result by Diego Jin DO (12/28/22 18:06:39)                   Impression:      No acute abnormality.      Electronically signed by: Diego Jin  Date:    12/28/2022  Time:    18:06                Narrative:    EXAMINATION:  XR CHEST AP PORTABLE    CLINICAL HISTORY:  chest pain after fall;    TECHNIQUE:  Single frontal view of the chest was performed.    COMPARISON:  07/27/2022.    FINDINGS:  The lungs are well expanded and clear. No focal opacities are seen. The pleural spaces are clear.    The cardiac silhouette is unremarkable.    The visualized osseous structures are unremarkable.                                       X-Ray Sternum (Final result)  Result time 12/28/22 18:07:47      Final result by Diego Jin DO (12/28/22 18:07:47)                   Impression:      As above.      Electronically signed by: Diego Jin  Date:    12/28/2022  Time:    18:07               Narrative:    EXAMINATION:  XR STERNUM PA AND LATERAL    CLINICAL HISTORY:  Chest pain, unspecified    TECHNIQUE:  PA and lateral views of the sternum.    COMPARISON:  None    FINDINGS:  There is no evidence of an acute, displaced fracture of the sternum.  Remaining visualized osseous structures are intact.                                       CT Head Without Contrast (Final result)  Result time 12/28/22 18:06:34      Final result by Mina Alan MD (12/28/22 18:06:34)                   Impression:      No acute intracranial process.      Electronically signed by: Mina Alan  Date:    12/28/2022  Time:    18:06               Narrative:    EXAMINATION:  CT HEAD WITHOUT CONTRAST    CLINICAL HISTORY:  Head trauma, moderate-severe;Head trauma, abnormal mental status (Age 19-64y);    TECHNIQUE:  Low dose axial CT images obtained throughout the head without intravenous contrast. Sagittal and coronal reconstructions were performed.    COMPARISON:  02/11/2022    FINDINGS:  Intracranial compartment:    Ventricles and sulci are normal in size for age without evidence of hydrocephalus. No extra-axial blood or fluid collections.    Moderate involutional changes and chronic microvascular ischemic changes in the periventricular white  matter.  Motion artifact.    No parenchymal mass, hemorrhage, edema or major vascular distribution infarct.    Skull/extracranial contents (limited evaluation): No fracture. Mastoid air cells and paranasal sinuses are essentially clear.                                       CT Cervical Spine Without Contrast (Final result)  Result time 12/28/22 18:14:44      Final result by Mina De La O MD (12/28/22 18:14:44)                   Impression:      1. No acute abnormality.  2. Mild degenerative changes, most prominent at C5-6.      Electronically signed by: Mian De La O  Date:    12/28/2022  Time:    18:14               Narrative:    EXAMINATION:  CT CERVICAL SPINE WITHOUT CONTRAST    CLINICAL HISTORY:  Neck trauma, dangerous injury mechanism (Age 16-64y);    TECHNIQUE:  Low dose axial CT images through the cervical spine, with sagittal and coronal reformations.  Contrast was not administered.    COMPARISON:  None    FINDINGS:  No acute fractures of the cervical spine.  Alignment is satisfactory.    Mild disc space narrowing at C5-6.  Mild posterior disc osteophyte complex at C5-6 and to a lesser degree C4-5.  Mild facet arthropathy at C4-5 on the left.    Central canal is adequately maintained.  Moderate foraminal narrowing at C5-6 on the right.    Limited evaluation of the intraspinal contents demonstrates no hematoma or mass.Paraspinal soft tissues exhibit no acute abnormalities.                                       Medications - No data to display  Medical Decision Making:   History:   Old Medical Records: I decided to obtain old medical records.  Old Records Summarized: records from clinic visits and other records.       <> Summary of Records: Followed by Psychiatry for depression. Followed by [facility] for chronic back pain.   Differential Diagnosis:   Differential Diagnosis includes, but is not limited to:  Polytrauma, fall/syncope, traumatic SAH/intracranial bleed, skull/c-spine/facial fracture,  concussion, neck injury, chest trauma, intraabdominal bleed, solid organ injury, pelvic fracture, long bone fracture/dislocation, nerve injury/palsy, vascular injury, hemarthrosis, septic joint, osteoarthritis, compartment syndrome, rhabdomyolysis, soft tissue contusion, muscle strain, ligament tear/sprain, foreign body, laceration, abrasion.    Clinical Tests:   Radiological Study: Ordered and Reviewed  ED Management:  CXR independently interpreted: no infiltrate, effusion, pneumothorax, wide mediastinum, or free air.   CT head, c-spine, sternum x-ray negative for acute traumatic injury.      Counseled on symptomatic and supportive care, stable for D/C.    On re-evaluation, the patient's status has improved.  After complete ED evaluation, clinical impression is most consistent with fall, headache.  PCP follow-up within 2-3 days was recommended.    After taking into careful account the patient's history, physical exam findings, as well as empirical and objective data obtained throughout ED workup, I feel no emergent medical condition has been identified. No further evaluation or admission was felt to be required, and the patient is stable for discharge from the ED. The patient and any additional family present were updated with test results, overall clinical impression, and recommended further plan of care, including discharge instructions as provided and outpatient follow-up for continued evaluation and management as needed. All questions were answered. The patient expressed understanding and agreed with current plan for discharge and follow-up plan of care. Strict ED return precautions were provided, including return/worsening of current symptoms, new symptoms, or any other concerns.               ED Course as of 01/01/23 1617   Wed Dec 28, 2022   1721 51 yo F presents with persistent headache, neck/upper back pain, chest pain after tripping and falling yesterday. Reports +LOC and nausea with persistent dizziness  today.   Vitals and exam benign. Will obtain CT head, c-spine, CXR, sternum x-ray, and reassess.  [SS]      ED Course User Index  [SS] Vinay Lincoln MD                 Clinical Impression:   Final diagnoses:  [W19.XXXA] Fall from standing  [R07.9] Chest pain  [G44.319] Acute post-traumatic headache, not intractable (Primary)  [M79.10] Myalgia  [F07.81] Post concussion syndrome  [S20.01XA] Contusion of right breast, initial encounter        ED Disposition Condition    Discharge Stable          ED Prescriptions    None       Follow-up Information       Follow up With Specialties Details Why Contact Info    Vannessa Marie MD Internal Medicine   [address] [facility] [address]  [phone]               Vinay Lincoln MD  12/29/22 1909       Vinay Lincoln MD  01/01/23 3704

## 2022-12-29 NOTE — DISCHARGE INSTRUCTIONS
Thank you for choosing [facility]!     Our goal in the Emergency Department is to always provide outstanding medical care. You may receive a survey by mail or e-mail in the next week regarding your experience today. We would greatly appreciate you completing and returning the survey. Your feedback provides us with a way to recognize our staff who provide very good care, and it helps us learn how to improve when your experience was below our aspiration of excellence.      It is important to remember that some problems are difficult to diagnose and may not be found during your first visit. Be sure to follow up with your primary care doctor and review any labs/imaging that was performed during your visit with them. If you do not have a primary care doctor, you may contact the one listed on your discharge paperwork, or you may also call the [facility] Appointment Desk at [phone] to schedule an appointment.     All medications may potentially have side effects and it is impossible to predict which medications may give you side effects. If you feel that you are having a negative effect of any medication you should immediately stop taking them and seek medical attention.  Do not drive or make any important decisions for 24 hours if you have received any pain medications, sedatives or mood altering drugs during your ER visit.    We appreciate you trusting us with your medical care. We will be happy to take care of you for all of your future medical needs. You may return to the ER at any time for any new/concerning symptoms, worsening condition, or failure to improve. We hope you feel better soon.     Sincerely,    Vinay Lincoln Jr., MD  Board-Certified Emergency Medicine Physician  [facility]

## 2023-01-01 DIAGNOSIS — M79.7 FIBROMYALGIA: ICD-10-CM

## 2023-01-01 DIAGNOSIS — L40.50 PSORIATIC ARTHRITIS: ICD-10-CM

## 2023-01-01 DIAGNOSIS — M47.817 LUMBAR AND SACRAL OSTEOARTHRITIS: ICD-10-CM

## 2023-01-01 DIAGNOSIS — M06.00 SERONEGATIVE RHEUMATOID ARTHRITIS: ICD-10-CM

## 2023-01-01 DIAGNOSIS — G89.4 CHRONIC PAIN SYNDROME: ICD-10-CM

## 2023-01-01 DIAGNOSIS — M81.0 OSTEOPOROSIS, UNSPECIFIED OSTEOPOROSIS TYPE, UNSPECIFIED PATHOLOGICAL FRACTURE PRESENCE: ICD-10-CM

## 2023-01-08 RX ORDER — BUTALBITAL AND ACETAMINOPHEN 300; 50 MG/1; MG/1
TABLET ORAL
Qty: 60 TABLET | Refills: 3 | Status: SHIPPED | OUTPATIENT
Start: 2023-01-08 | End: 2023-12-08 | Stop reason: ALTCHOICE

## 2023-01-13 ENCOUNTER — PATIENT MESSAGE (OUTPATIENT)
Dept: RHEUMATOLOGY | Facility: CLINIC | Age: 53
End: 2023-01-13
Payer: COMMERCIAL

## 2023-01-13 DIAGNOSIS — L40.50 PSORIATIC ARTHRITIS: ICD-10-CM

## 2023-01-13 DIAGNOSIS — M06.00 SERONEGATIVE RHEUMATOID ARTHRITIS: ICD-10-CM

## 2023-01-16 ENCOUNTER — PATIENT MESSAGE (OUTPATIENT)
Dept: RHEUMATOLOGY | Facility: CLINIC | Age: 53
End: 2023-01-16
Payer: COMMERCIAL

## 2023-01-16 RX ORDER — TOFACITINIB 11 MG/1
11 TABLET, FILM COATED, EXTENDED RELEASE ORAL DAILY
Qty: 30 TABLET | Refills: 6 | Status: SHIPPED | OUTPATIENT
Start: 2023-01-16 | End: 2023-03-08

## 2023-01-17 ENCOUNTER — PATIENT MESSAGE (OUTPATIENT)
Dept: PSYCHIATRY | Facility: CLINIC | Age: 53
End: 2023-01-17
Payer: COMMERCIAL

## 2023-01-17 DIAGNOSIS — L40.50 PSORIATIC ARTHRITIS: ICD-10-CM

## 2023-01-17 DIAGNOSIS — N30.10 IC (INTERSTITIAL CYSTITIS): ICD-10-CM

## 2023-01-17 DIAGNOSIS — M06.00 SERONEGATIVE RHEUMATOID ARTHRITIS: ICD-10-CM

## 2023-01-17 DIAGNOSIS — M47.817 LUMBAR AND SACRAL OSTEOARTHRITIS: ICD-10-CM

## 2023-01-17 DIAGNOSIS — M79.7 FIBROMYALGIA: ICD-10-CM

## 2023-01-17 RX ORDER — GABAPENTIN 800 MG/1
TABLET ORAL
Qty: 90 TABLET | Refills: 5 | Status: SHIPPED | OUTPATIENT
Start: 2023-01-17 | End: 2023-08-11 | Stop reason: SDUPTHER

## 2023-01-19 ENCOUNTER — SPECIALTY PHARMACY (OUTPATIENT)
Dept: PHARMACY | Facility: CLINIC | Age: 53
End: 2023-01-19
Payer: COMMERCIAL

## 2023-01-19 NOTE — TELEPHONE ENCOUNTER
Kristine, this is Moe Castro with [facility].  We are working on your prescription that your doctor has sent us. We will be working with your insurance to get this approved for you. We will be calling you along the way with updates on your medication.  If you have any questions, you can reach us at [phone].    Welcome call outcome: Patient/caregiver reached

## 2023-01-19 NOTE — TELEPHONE ENCOUNTER
-PA approved from 12/20/2022 to 07/18/2023  Case ID: [id number]    [facility] OON. Patient fills Xeljanz with [facility]. Notified patient that the prescription would be transferred to [facility]. Patient verbalized understanding.     Closing out at [facility].

## 2023-02-07 ENCOUNTER — TELEPHONE (OUTPATIENT)
Dept: RHEUMATOLOGY | Facility: CLINIC | Age: 53
End: 2023-02-07

## 2023-02-08 ENCOUNTER — PATIENT MESSAGE (OUTPATIENT)
Dept: RHEUMATOLOGY | Facility: CLINIC | Age: 53
End: 2023-02-08
Payer: COMMERCIAL

## 2023-02-08 DIAGNOSIS — M47.817 LUMBAR AND SACRAL OSTEOARTHRITIS: Primary | ICD-10-CM

## 2023-02-08 RX ORDER — METHYLPREDNISOLONE 4 MG/1
TABLET ORAL
Qty: 21 EACH | Refills: 0 | Status: SHIPPED | OUTPATIENT
Start: 2023-02-08 | End: 2023-03-01

## 2023-02-08 RX ORDER — HYDROCODONE BITARTRATE AND ACETAMINOPHEN 7.5; 325 MG/1; MG/1
1 TABLET ORAL EVERY 6 HOURS PRN
Qty: 28 TABLET | Refills: 0 | Status: SHIPPED | OUTPATIENT
Start: 2023-02-08 | End: 2023-02-15

## 2023-02-08 NOTE — TELEPHONE ENCOUNTER
----- Message from Fadi Jaimes MA sent at 2/8/2023  3:42 PM CST -----  Regarding: Pt Called  Name of Who is Calling: TROY GUNN         What is the request in detail: Pt is calling back regarding scheduling a Virtual Appt. for today.      Can the clinic reply by [facility]: No        What Number to Call Back if not in [facility]: Telephone Information:  Mobile          [phone]

## 2023-02-09 NOTE — TELEPHONE ENCOUNTER
Spoke to patient we will reschedule her virtual for another day it just got to a to do it today we will call in 7 days of [place] for patient

## 2023-02-14 ENCOUNTER — PATIENT MESSAGE (OUTPATIENT)
Dept: RHEUMATOLOGY | Facility: CLINIC | Age: 53
End: 2023-02-14
Payer: COMMERCIAL

## 2023-02-16 ENCOUNTER — PATIENT MESSAGE (OUTPATIENT)
Dept: UROLOGY | Facility: CLINIC | Age: 53
End: 2023-02-16
Payer: COMMERCIAL

## 2023-02-16 DIAGNOSIS — L40.50 PSORIATIC ARTHRITIS: ICD-10-CM

## 2023-02-16 DIAGNOSIS — G89.4 CHRONIC PAIN SYNDROME: ICD-10-CM

## 2023-02-16 DIAGNOSIS — M47.817 LUMBAR AND SACRAL OSTEOARTHRITIS: ICD-10-CM

## 2023-02-16 DIAGNOSIS — M79.7 FIBROMYALGIA: ICD-10-CM

## 2023-02-16 DIAGNOSIS — M81.0 OSTEOPOROSIS, UNSPECIFIED OSTEOPOROSIS TYPE, UNSPECIFIED PATHOLOGICAL FRACTURE PRESENCE: ICD-10-CM

## 2023-02-16 DIAGNOSIS — M06.00 SERONEGATIVE RHEUMATOID ARTHRITIS: ICD-10-CM

## 2023-02-17 RX ORDER — CYCLOBENZAPRINE HCL 10 MG
10 TABLET ORAL 3 TIMES DAILY PRN
Qty: 90 TABLET | Refills: 6 | Status: SHIPPED | OUTPATIENT
Start: 2023-02-17 | End: 2023-07-10 | Stop reason: SDUPTHER

## 2023-02-19 ENCOUNTER — PATIENT MESSAGE (OUTPATIENT)
Dept: UROLOGY | Facility: CLINIC | Age: 53
End: 2023-02-19
Payer: COMMERCIAL

## 2023-02-19 RX ORDER — CYCLOBENZAPRINE HCL 10 MG
TABLET ORAL
Qty: 90 TABLET | Refills: 6 | OUTPATIENT
Start: 2023-02-19

## 2023-02-20 ENCOUNTER — TELEPHONE (OUTPATIENT)
Dept: UROLOGY | Facility: CLINIC | Age: 53
End: 2023-02-20

## 2023-02-20 ENCOUNTER — OFFICE VISIT (OUTPATIENT)
Dept: FAMILY MEDICINE | Facility: CLINIC | Age: 53
End: 2023-02-20
Attending: FAMILY MEDICINE
Payer: COMMERCIAL

## 2023-02-20 DIAGNOSIS — N30.00 ACUTE CYSTITIS WITHOUT HEMATURIA: Primary | ICD-10-CM

## 2023-02-20 PROCEDURE — 99213 OFFICE O/P EST LOW 20 MIN: CPT | Mod: 95,,, | Performed by: FAMILY MEDICINE

## 2023-02-20 PROCEDURE — 1159F MED LIST DOCD IN RCRD: CPT | Mod: CPTII,95,, | Performed by: FAMILY MEDICINE

## 2023-02-20 PROCEDURE — 1159F PR MEDICATION LIST DOCUMENTED IN MEDICAL RECORD: ICD-10-PCS | Mod: CPTII,95,, | Performed by: FAMILY MEDICINE

## 2023-02-20 PROCEDURE — 99213 PR OFFICE/OUTPT VISIT, EST, LEVL III, 20-29 MIN: ICD-10-PCS | Mod: 95,,, | Performed by: FAMILY MEDICINE

## 2023-02-20 PROCEDURE — 1160F PR REVIEW ALL MEDS BY PRESCRIBER/CLIN PHARMACIST DOCUMENTED: ICD-10-PCS | Mod: CPTII,95,, | Performed by: FAMILY MEDICINE

## 2023-02-20 PROCEDURE — 1160F RVW MEDS BY RX/DR IN RCRD: CPT | Mod: CPTII,95,, | Performed by: FAMILY MEDICINE

## 2023-02-20 RX ORDER — CIPROFLOXACIN 500 MG/1
500 TABLET ORAL EVERY 12 HOURS
Qty: 20 TABLET | Refills: 0 | Status: SHIPPED | OUTPATIENT
Start: 2023-02-20 | End: 2023-03-25

## 2023-02-20 NOTE — PROGRESS NOTES
The patient location is: home  The chief complaint leading to consultation is: dysuria    Visit type: audiovisual    Face to Face time with patient: 15  20 minutes of total time spent on the encounter, which includes face to face time and non-face to face time preparing to see the patient (eg, review of tests), Obtaining and/or reviewing separately obtained history, Documenting clinical information in the electronic or other health record, Independently interpreting results (not separately reported) and communicating results to the patient/family/caregiver, or Care coordination (not separately reported).         Each patient to whom he or she provides medical services by telemedicine is:  (1) informed of the relationship between the physician and patient and the respective role of any other health care provider with respect to management of the patient; and (2) notified that he or she may decline to receive medical services by telemedicine and may withdraw from such care at any time.    Notes:   Subjective:       Patient ID: Marilee Simons is a 53 y.o. female.    Chief Complaint: No chief complaint on file.    Dysuria   This is a new problem. The current episode started yesterday. The problem occurs intermittently. The problem has been rapidly worsening. The quality of the pain is described as aching, burning, shooting and stabbing. The pain is at a severity of 10/10. The pain is moderate. There has been no fever. She is Sexually active. There is No history of pyelonephritis. Associated symptoms include flank pain, frequency and urgency. Pertinent negatives include no behavior changes, chills, discharge, hematuria, hesitancy, nausea, possible pregnancy, sweats, vomiting, weight loss, constipation, rash or withholding. She has tried acetaminophen, NSAIDs and increased fluids for the symptoms. The treatment provided no relief. Her past medical history is significant for a urological procedure. There is no history of  "catheterization, diabetes insipidus, diabetes mellitus, genitourinary reflux, hypertension, recurrent UTIs, a single kidney, STD or urinary stasis.   Pt with IC in virtual visit for c/o dysuria with urgency 2 days she is unable to contact her urologist no n/v/f/c/d/c no gross hematuria   Review of Systems   Constitutional:  Negative for chills, fatigue, fever and weight loss.   Respiratory:  Negative for cough, chest tightness and shortness of breath.    Cardiovascular:  Negative for chest pain and palpitations.   Gastrointestinal:  Negative for constipation, nausea and vomiting.   Genitourinary:  Positive for dysuria, flank pain, frequency and urgency. Negative for hematuria and hesitancy.   Skin:  Negative for rash.     Objective:      Physical Exam  Constitutional:       Appearance: Normal appearance. She is not ill-appearing.   HENT:      Head: Normocephalic and atraumatic.      Nose: Nose normal.   Eyes:      Extraocular Movements: Extraocular movements intact.   Pulmonary:      Effort: Pulmonary effort is normal. No respiratory distress.   Neurological:      General: No focal deficit present.      Mental Status: She is alert and oriented to person, place, and time.      Cranial Nerves: No cranial nerve deficit.      Coordination: Coordination normal.   Psychiatric:         Mood and Affect: Mood normal.         Behavior: Behavior normal.         Thought Content: Thought content normal.         Judgment: Judgment normal.       Assessment:       1. Acute cystitis without hematuria          Plan:     Orders declined  Start cipro  Increase fluids  Add cranberry juice  F/u urology         "This note will not be shared with the patient."     "

## 2023-02-24 ENCOUNTER — PATIENT MESSAGE (OUTPATIENT)
Dept: PSYCHIATRY | Facility: CLINIC | Age: 53
End: 2023-02-24
Payer: COMMERCIAL

## 2023-02-24 ENCOUNTER — OFFICE VISIT (OUTPATIENT)
Dept: PSYCHIATRY | Facility: CLINIC | Age: 53
End: 2023-02-24
Payer: COMMERCIAL

## 2023-02-24 DIAGNOSIS — F33.1 MAJOR DEPRESSIVE DISORDER, RECURRENT EPISODE, MODERATE: Primary | ICD-10-CM

## 2023-02-24 DIAGNOSIS — F41.1 GENERALIZED ANXIETY DISORDER: ICD-10-CM

## 2023-02-24 PROCEDURE — 99214 PR OFFICE/OUTPT VISIT, EST, LEVL IV, 30-39 MIN: ICD-10-PCS | Mod: 95,,, | Performed by: NURSE PRACTITIONER

## 2023-02-24 PROCEDURE — 99214 OFFICE O/P EST MOD 30 MIN: CPT | Mod: 95,,, | Performed by: NURSE PRACTITIONER

## 2023-02-24 RX ORDER — TRAZODONE HYDROCHLORIDE 50 MG/1
50 TABLET ORAL NIGHTLY PRN
Qty: 30 TABLET | Refills: 2 | Status: SHIPPED | OUTPATIENT
Start: 2023-02-24 | End: 2023-05-31 | Stop reason: SDUPTHER

## 2023-02-24 RX ORDER — VENLAFAXINE HYDROCHLORIDE 150 MG/1
150 CAPSULE, EXTENDED RELEASE ORAL DAILY
Qty: 30 CAPSULE | Refills: 5 | Status: SHIPPED | OUTPATIENT
Start: 2023-02-24 | End: 2023-04-10 | Stop reason: SDUPTHER
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
## 2023-02-27 ENCOUNTER — TELEPHONE (OUTPATIENT)
Dept: GASTROENTEROLOGY | Facility: CLINIC | Age: 53
End: 2023-02-27
Payer: COMMERCIAL

## 2023-02-27 NOTE — TELEPHONE ENCOUNTER
----- Message from Venkat He MD sent at 2/27/2023  7:55 AM CST -----  Please schedule a follow up visit.

## 2023-02-28 ENCOUNTER — PATIENT MESSAGE (OUTPATIENT)
Dept: GASTROENTEROLOGY | Facility: CLINIC | Age: 53
End: 2023-02-28
Payer: COMMERCIAL

## 2023-03-01 ENCOUNTER — PATIENT MESSAGE (OUTPATIENT)
Dept: GASTROENTEROLOGY | Facility: CLINIC | Age: 53
End: 2023-03-01
Payer: COMMERCIAL

## 2023-03-08 ENCOUNTER — PATIENT MESSAGE (OUTPATIENT)
Dept: RHEUMATOLOGY | Facility: CLINIC | Age: 53
End: 2023-03-08

## 2023-03-08 ENCOUNTER — OFFICE VISIT (OUTPATIENT)
Dept: RHEUMATOLOGY | Facility: CLINIC | Age: 53
End: 2023-03-08
Payer: COMMERCIAL

## 2023-03-08 DIAGNOSIS — D84.9 IMMUNOCOMPROMISED STATE: ICD-10-CM

## 2023-03-08 DIAGNOSIS — L03.90 CELLULITIS, UNSPECIFIED CELLULITIS SITE: Primary | ICD-10-CM

## 2023-03-08 DIAGNOSIS — M06.00 SERONEGATIVE RHEUMATOID ARTHRITIS: ICD-10-CM

## 2023-03-08 DIAGNOSIS — E66.01 SEVERE OBESITY (BMI 35.0-39.9) WITH COMORBIDITY: ICD-10-CM

## 2023-03-08 DIAGNOSIS — I10 HYPERTENSION, UNSPECIFIED TYPE: ICD-10-CM

## 2023-03-08 DIAGNOSIS — L40.50 PSORIATIC ARTHRITIS: ICD-10-CM

## 2023-03-08 DIAGNOSIS — M54.50 LOW BACK PAIN, UNSPECIFIED BACK PAIN LATERALITY, UNSPECIFIED CHRONICITY, UNSPECIFIED WHETHER SCIATICA PRESENT: ICD-10-CM

## 2023-03-08 PROCEDURE — 1160F PR REVIEW ALL MEDS BY PRESCRIBER/CLIN PHARMACIST DOCUMENTED: ICD-10-PCS | Mod: CPTII,95,, | Performed by: INTERNAL MEDICINE

## 2023-03-08 PROCEDURE — 1159F PR MEDICATION LIST DOCUMENTED IN MEDICAL RECORD: ICD-10-PCS | Mod: CPTII,95,, | Performed by: INTERNAL MEDICINE

## 2023-03-08 PROCEDURE — 1159F MED LIST DOCD IN RCRD: CPT | Mod: CPTII,95,, | Performed by: INTERNAL MEDICINE

## 2023-03-08 PROCEDURE — 99215 PR OFFICE/OUTPT VISIT, EST, LEVL V, 40-54 MIN: ICD-10-PCS | Mod: 95,,, | Performed by: INTERNAL MEDICINE

## 2023-03-08 PROCEDURE — 99215 OFFICE O/P EST HI 40 MIN: CPT | Mod: 95,,, | Performed by: INTERNAL MEDICINE

## 2023-03-08 PROCEDURE — 1160F RVW MEDS BY RX/DR IN RCRD: CPT | Mod: CPTII,95,, | Performed by: INTERNAL MEDICINE

## 2023-03-08 RX ORDER — UPADACITINIB 15 MG/1
15 TABLET, EXTENDED RELEASE ORAL DAILY
Qty: 30 TABLET | Refills: 11 | Status: SHIPPED | OUTPATIENT
Start: 2023-03-08 | End: 2023-07-16

## 2023-03-08 RX ORDER — DOXYCYCLINE HYCLATE 100 MG
100 TABLET ORAL 2 TIMES DAILY
Qty: 20 TABLET | Refills: 0 | Status: SHIPPED | OUTPATIENT
Start: 2023-03-08 | End: 2023-03-18

## 2023-03-08 RX ORDER — SEMAGLUTIDE 0.25 MG/.5ML
0.25 INJECTION, SOLUTION SUBCUTANEOUS
Qty: 0.5 ML | Refills: 3 | Status: SHIPPED | OUTPATIENT
Start: 2023-03-08 | End: 2023-04-03

## 2023-03-08 NOTE — PROGRESS NOTES
Subjective:       Patient ID: Marilee Simons is a 53 y.o. female.    Chief Complaint: No chief complaint on file.    Follow up: 53 year old female who presents to clinic for follow up on psoriatic arthritis.on xeljanz, she had back and headache,she had breast surgery. She had She is doing poorly on plaquenil. She has generalized pain throughout her body. She has joint pain in her hands, wrists, elbows, shoulders, knee, and low back. Pain is constant and aching. She has increased stiffness and weakness in her hands--she is dropping objects. She has hypersensitivity of her elbows unable to rest her arms on a surface.  Had the interstim replaced soon with Urology.     Current tx:  1. Plaquenil  2. Xeljanz    Prior tx:  1. Humira      Review of Systems   Constitutional:  Positive for activity change, chills, fatigue and unexpected weight change. Negative for fever.   HENT:  Positive for mouth sores. Negative for trouble swallowing.    Eyes:  Negative for redness.   Respiratory:  Negative for cough and shortness of breath.    Cardiovascular:  Negative for chest pain.   Gastrointestinal:  Positive for diarrhea. Negative for constipation.   Genitourinary:  Negative for dysuria and genital sores.   Musculoskeletal:  Positive for arthralgias, joint swelling, myalgias, neck pain and neck stiffness.   Skin:  Negative for rash.   Neurological:  Positive for headaches.   Hematological:  Bruises/bleeds easily.       Objective:     There were no vitals filed for this visit.    Past Medical History:   Diagnosis Date    Acid reflux     Allergy     Alopecia     Anxiety     Arthralgia     Back pain     Depression     Dry eyes     from meds    Fever blister     Fibromyalgia     Interstitial cystitis     Irritable bowel syndrome     Kidney stone     Major depressive disorder, recurrent episode, in partial or unspecified remission 6/25/2013    OAB (overactive bladder) 7/21/2015    PTSD (post-traumatic stress disorder)     Pure  hypercholesterolemia 2022    Rheumatoid arthritis     Rheumatoid arthritis 2022    Systemic lupus erythematosus     Thyroid disease     Urinary tract infection     Vaginal infection      Past Surgical History:   Procedure Laterality Date    BLADDER SURGERY       SECTION, LOW TRANSVERSE      x 2    COLONOSCOPY      COLONOSCOPY N/A 10/5/2017    Procedure: COLONOSCOPY;  Surgeon: Venkat He MD;  Location: [facility] ENDO (4TH FLR);  Service: Endoscopy;  Laterality: N/A;    ESOPHAGOGASTRODUODENOSCOPY      ESOPHAGOGASTRODUODENOSCOPY N/A 10/25/2021    Procedure: EGD (ESOPHAGOGASTRODUODENOSCOPY);  Surgeon: Venkat He MD;  Location: [facility] ENDO (4TH FLR);  Service: Endoscopy;  Laterality: N/A;  Covid test on 10/22 at [facility]    10/19 lvm to confirm appt-rb    EXCISIONAL BIOPSY Right 10/14/2022    Procedure: EXCISIONAL BIOPSY-right with radiological marker;  Surgeon: PALLAVI Oseguera MD;  Location: [facility];  Service: General;  Laterality: Right;    EYE SURGERY      Lasik-bilateral    HYSTERECTOMY      IMPLANTATION OF PERMANENT SACRAL NERVE STIMULATOR N/A 2022    Procedure: INSERTION, NEUROSTIMULATOR, PERMANENT, SACRAL;  Surgeon: Bandar Garcia MD;  Location: [facility] OR [address] [place] [address];  Service: Urology;  Laterality: N/A;  2hr    INJECTION OF BOTULINUM TOXIN TYPE A N/A 2018    Procedure: INJECTION, BOTULINUM TOXIN, TYPE A  200 UNITS;  Surgeon: Bandar Garcia MD;  Location: [facility] OR [address] [place] [address];  Service: Urology;  Laterality: N/A;    INSTILLATION OF URINARY BLADDER N/A 2018    Procedure: INSTILLATION, URINARY BLADDER;  Surgeon: Bandar Garcia MD;  Location: [facility] OR [address] [place] [address];  Service: Urology;  Laterality: N/A;    PARTIAL HYSTERECTOMY      REMOVAL OF ELECTRODE LEAD OF SACRAL NERVE STIMULATOR N/A 2022    Procedure: REMOVAL, ELECTRODE LEAD, SACRAL NERVE STIMULATOR;  Surgeon: Bandar Garcia MD;  Location: [facility] OR [address] [place] [address];  Service: Urology;  Laterality: N/A;    SKIN TAG REMOVAL N/A 10/14/2022     "Procedure: REMOVAL, SKIN TAGS;  Surgeon: PALLAVI Oseguera MD;  Location: [facility] OR;  Service: General;  Laterality: N/A;    TRANSFORAMINAL EPIDURAL INJECTION OF STEROID Left 2/15/2021    Procedure: LUMBAR TRANSFORAMINAL LEFT L5 AND S1 DIRECT REFERRAL;  Surgeon: Marquez Nesbitt MD;  Location: [facility] PAIN MGT;  Service: Pain Management;  Laterality: Left;  NEEDS CONSENT, PT REQUESTING IV SEDATION          Physical Exam   Constitutional: She is oriented to person, place, and time.   Neurological: She is alert and oriented to person, place, and time.   Psychiatric: Mood, affect and judgment normal.     Results for orders placed or performed during the hospital encounter of 10/14/22   Specimen to Pathology, Surgery General Surgery   Result Value Ref Range    Final Pathologic Diagnosis       1. Skin, "right skin tags", excision:  - Consistent with fibroepithelial polyp  2. Breast, right, excisional biopsy:  - Negative for residual atypical ductal hyperplasia  - Benign breast tissue with microcyst formation, stromal fibrosis, and duct  ectasia  - Columnar cell change is present  - Calcifications associated with benign ductal elements  - Biopsy site changes and clips identified  - Margins of resection are negative for malignancy  - Negative for in-situ and invasive carcinoma      Comment       The specimen has been entirely submitted for histologic examination.    Gross       Patient MRN:  [id number]    1:  The specimen is received fresh with proper patient identification,  labeled "right skin tag". The specimen consists of 1.2 x 0.7 x 0.3 cm  aggregate brown-tan disrupted skin which is wrapped and entirely submitted in  cassette ELS--1-A.    2:  The specimen is received fresh with proper patient identification,  labeled "right breast excisional biopsy short stitch superior, long stitch  lateral". The specimen consists of a 2.8 g, right lumpectomy specimen  measuring 4.3 cm from medial to lateral, 4.1 cm from superior to " inferior,  2.6 cm from anterior to posterior.  There is a short stitch designating  superior and a long stitch designating lateral per the requisition form the  specimen container.  The specimen is inked the following:  Green - inferior  Blue - superior  Orange - lateral  Yellow - anterior  Black - posterior  Red - medial  The specimen is serially sectioned from medial (slice 1) to lateral (slice  10) into 10 slices to reveal two adjacen t biopsy markers (Q shaped and X  shaped) in slice 5.  The biopsy markers are 0.3 cm to the anterior margin,  1.4 cm to the superior margin, 2.0 cm to the lateral margin, and 2.2 cm to  the anterior, medial, and posterior margins.  Also identified within slice 4  is a reflector adjacent to the two biopsy markers.  No definite masses are  grossly identified.  The remaining breast parenchyma is comprised 75% yellow  lobulated adipose tissue and 25% dense tan-white fibrous tissue.  The  specimen is entirely submitted.  The specimen is collected on 10/14/2022 and placed in formalin on 10/14/2022  at 1433.    ELS--2-A - ELS--2-B :  Slice 1, medial margin, perpendicular  sections  ELS--2-E - ELS--2-F :  Slice 2, bisected (2E:  Inferior,  posterior margins; 2F:  Superior, posterior margins)  ELS--2-G - ELS--2-H :  Slice 3, bisected (2G:  Anterior,  inferior, posterior margins; 2H:  Anterior, superior, posterior margins)  ELS--2-I - ELS--2-J :  S lice 4, bisected (2I:  Anterior,  inferior, posterior margins; 2J:  Anterior, superior, posterior margins)  ELS--2-K - ELS--2-L :  Slice 5, biopsy marker site, bisected (2K:   Anterior, inferior, posterior margins; 2L:  Anterior, superior, posterior  margins)  ELS--2-M - ELS--2-N :  Slice 6, bisected (2M:  Anterior,  inferior, posterior margins; 2N:  Anterior, superior, posterior margins)  ELS--2-O - ELS--2-P :  Slice 7, bisected (2O:  Anterior,  inferior, posterior  margins; 2P:  Anterior, superior, posterior margins)  ELS--2-Q - ELS--2-R :  Slice 8, bisected (2Q:  Anterior,  inferior, posterior margins; 2R:  Anterior, superior, posterior margins)  ELS--2-S :  Slice 9, anterior, inferior, posterior, and superior margins  ELS--2-T - ELS--2-V :  Slice 10, lateral margin, perpendicular  sections    Veronica He MS, PA([facility])      Disclaimer       Unless the case is a 'gross only' or additional testing only, the final  diagnosis for each specimen is based on a microscopic examination of  appropriate tissue sections.       *Note: Due to a large number of results and/or encounters for the requested time period, some results have not been displayed. A complete set of results can be found in Results Review.        Assessment:       1. Cellulitis, unspecified cellulitis site    2. Seronegative rheumatoid arthritis    3. Psoriatic arthritis    4. BMI 40.0-44.9, adult    5. Hypertension, unspecified type    6. Immunocompromised state    7. Severe obesity (BMI 35.0-39.9) with comorbidity    8. Low back pain, unspecified back pain laterality, unspecified chronicity, unspecified whether sciatica present              Plan:       Cellulitis, unspecified cellulitis site  -     doxycycline (VIBRA-TABS) 100 MG tablet; Take 1 tablet (100 mg total) by mouth 2 (two) times daily. for 10 days  Dispense: 20 tablet; Refill: 0    Seronegative rheumatoid arthritis  -     upadacitinib (RINVOQ) 15 mg 24 hr tablet; Take 1 tablet (15 mg total) by mouth once daily.  Dispense: 30 tablet; Refill: 11  -     semaglutide, weight loss, (WEGOVY) 0.25 mg/0.5 mL PnIj; Inject 0.25 mg into the skin every 7 days.  Dispense: 0.5 mL; Refill: 3  -     Sedimentation rate; Future; Expected date: 03/08/2023  -     C-Reactive Protein; Future; Expected date: 03/08/2023  -     Comprehensive Metabolic Panel; Future; Expected date: 03/08/2023  -     CBC Auto Differential; Future; Expected date:  03/08/2023  -     Hepatitis C Antibody; Future; Expected date: 03/08/2023  -     Quantiferon Gold TB; Future; Expected date: 03/08/2023  -     Hepatitis B Surface Antigen; Future; Expected date: 03/08/2023  -     Hepatitis B Surface Antibody, Qual/Quant; Future; Expected date: 03/08/2023  -     Hepatitis B Core Antibody, Total; Future; Expected date: 03/08/2023    Psoriatic arthritis  -     upadacitinib (RINVOQ) 15 mg 24 hr tablet; Take 1 tablet (15 mg total) by mouth once daily.  Dispense: 30 tablet; Refill: 11  -     Sedimentation rate; Future; Expected date: 03/08/2023  -     C-Reactive Protein; Future; Expected date: 03/08/2023  -     Comprehensive Metabolic Panel; Future; Expected date: 03/08/2023  -     CBC Auto Differential; Future; Expected date: 03/08/2023  -     Hepatitis C Antibody; Future; Expected date: 03/08/2023  -     Quantiferon Gold TB; Future; Expected date: 03/08/2023  -     Hepatitis B Surface Antigen; Future; Expected date: 03/08/2023  -     Hepatitis B Surface Antibody, Qual/Quant; Future; Expected date: 03/08/2023  -     Hepatitis B Core Antibody, Total; Future; Expected date: 03/08/2023    BMI 40.0-44.9, adult  -     semaglutide, weight loss, (WEGOVY) 0.25 mg/0.5 mL PnIj; Inject 0.25 mg into the skin every 7 days.  Dispense: 0.5 mL; Refill: 3  -     Sedimentation rate; Future; Expected date: 03/08/2023  -     C-Reactive Protein; Future; Expected date: 03/08/2023  -     Comprehensive Metabolic Panel; Future; Expected date: 03/08/2023  -     CBC Auto Differential; Future; Expected date: 03/08/2023  -     Hepatitis C Antibody; Future; Expected date: 03/08/2023  -     Quantiferon Gold TB; Future; Expected date: 03/08/2023  -     Hepatitis B Surface Antigen; Future; Expected date: 03/08/2023  -     Hepatitis B Surface Antibody, Qual/Quant; Future; Expected date: 03/08/2023  -     Hepatitis B Core Antibody, Total; Future; Expected date: 03/08/2023    Hypertension, unspecified type  -      semaglutide, weight loss, (WEGOVY) 0.25 mg/0.5 mL PnIj; Inject 0.25 mg into the skin every 7 days.  Dispense: 0.5 mL; Refill: 3  -     Sedimentation rate; Future; Expected date: 03/08/2023  -     C-Reactive Protein; Future; Expected date: 03/08/2023  -     Comprehensive Metabolic Panel; Future; Expected date: 03/08/2023  -     CBC Auto Differential; Future; Expected date: 03/08/2023  -     Hepatitis C Antibody; Future; Expected date: 03/08/2023  -     Quantiferon Gold TB; Future; Expected date: 03/08/2023  -     Hepatitis B Surface Antigen; Future; Expected date: 03/08/2023  -     Hepatitis B Surface Antibody, Qual/Quant; Future; Expected date: 03/08/2023  -     Hepatitis B Core Antibody, Total; Future; Expected date: 03/08/2023    Immunocompromised state    Severe obesity (BMI 35.0-39.9) with comorbidity    Low back pain, unspecified back pain laterality, unspecified chronicity, unspecified whether sciatica present  -     Ambulatory referral/consult to Pain Clinic; Future; Expected date: 03/15/2023          Assessment:  52 year old female with  Psoriatic arthritis, seronegative RA  --chronic pain syndrome on tramadol  --uncontrolled major depression, anxiety followed by Psychiatry  --osteopenia  --hashimoto's thyroiditis followed by Endocrinology    Plan:  1. Start Rinvoq  2. Labs  3 added wegovy for weight loss    The patient location is: home  The chief complaint leading to consultation is:  psa,     Visit type: audiovisual    Face to Face time with patient: 43   minutes of total time spent on the encounter, which includes face to face time and non-face to face time preparing to see the patient (eg, review of tests), Obtaining and/or reviewing separately obtained history, Documenting clinical information in the electronic or other health record, Independently interpreting results (not separately reported) and communicating results to the patient/family/caregiver, or Care coordination (not separately reported).          Each patient to whom he or she provides medical services by telemedicine is:  (1) informed of the relationship between the physician and patient and the respective role of any other health care provider with respect to management of the patient; and (2) notified that he or she may decline to receive medical services by telemedicine and may withdraw from such care at any time.    Notes:

## 2023-03-09 ENCOUNTER — SPECIALTY PHARMACY (OUTPATIENT)
Dept: PHARMACY | Facility: CLINIC | Age: 53
End: 2023-03-09
Payer: COMMERCIAL

## 2023-03-09 DIAGNOSIS — L40.50 PSORIATIC ARTHRITIS: Primary | ICD-10-CM

## 2023-03-09 NOTE — TELEPHONE ENCOUNTER
Kristine, this is Dania Frausto with [facility].  We are working on your prescription that your doctor has sent us. We will be working with your insurance to get this approved for you. We will be calling you along the way with updates on your medication.  If you have any questions, you can reach us at [phone].    Welcome call outcome: Left voicemail    New order for Rinvoq.  PA required.  Awaiting available chart notes for Prior Authorization submission. Continuing to follow up with provider.

## 2023-03-10 ENCOUNTER — PATIENT MESSAGE (OUTPATIENT)
Dept: RHEUMATOLOGY | Facility: CLINIC | Age: 53
End: 2023-03-10
Payer: COMMERCIAL

## 2023-03-15 NOTE — TELEPHONE ENCOUNTER
Awaiting available chart notes for Prior Authorization submission. Continuing to follow up with provider.

## 2023-03-21 ENCOUNTER — TELEPHONE (OUTPATIENT)
Dept: GASTROENTEROLOGY | Facility: CLINIC | Age: 53
End: 2023-03-21
Payer: COMMERCIAL

## 2023-03-22 ENCOUNTER — OFFICE VISIT (OUTPATIENT)
Dept: PSYCHIATRY | Facility: CLINIC | Age: 53
End: 2023-03-22
Payer: COMMERCIAL

## 2023-03-22 ENCOUNTER — PATIENT MESSAGE (OUTPATIENT)
Dept: GASTROENTEROLOGY | Facility: CLINIC | Age: 53
End: 2023-03-22
Payer: COMMERCIAL

## 2023-03-22 DIAGNOSIS — F41.1 GENERALIZED ANXIETY DISORDER: Primary | ICD-10-CM

## 2023-03-22 DIAGNOSIS — F33.1 MAJOR DEPRESSIVE DISORDER, RECURRENT EPISODE, MODERATE: ICD-10-CM

## 2023-03-22 DIAGNOSIS — F43.10 PTSD (POST-TRAUMATIC STRESS DISORDER): ICD-10-CM

## 2023-03-22 PROCEDURE — 90833 PR PSYCHOTHERAPY W/PATIENT W/E&M, 30 MIN (ADD ON): ICD-10-PCS | Mod: 95,,, | Performed by: NURSE PRACTITIONER

## 2023-03-22 PROCEDURE — 99213 PR OFFICE/OUTPT VISIT, EST, LEVL III, 20-29 MIN: ICD-10-PCS | Mod: 95,,, | Performed by: NURSE PRACTITIONER

## 2023-03-22 PROCEDURE — 90833 PSYTX W PT W E/M 30 MIN: CPT | Mod: 95,,, | Performed by: NURSE PRACTITIONER

## 2023-03-22 PROCEDURE — 99213 OFFICE O/P EST LOW 20 MIN: CPT | Mod: 95,,, | Performed by: NURSE PRACTITIONER

## 2023-03-22 NOTE — PATIENT INSTRUCTIONS
Dialectical Behavioral Therapy (DBT)  Eye Movement and Desensitization and Reprocessing Therapy (EMDR)    Increase lamotrigine to 300 mg once nightly for depressive symptoms.     Be aware of a rash that sometimes develops when people increase this medication. If blisters develops on the nose or arms
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
## 2023-03-23 ENCOUNTER — PATIENT MESSAGE (OUTPATIENT)
Dept: PSYCHIATRY | Facility: CLINIC | Age: 53
End: 2023-03-23
Payer: COMMERCIAL

## 2023-03-25 ENCOUNTER — PATIENT MESSAGE (OUTPATIENT)
Dept: RHEUMATOLOGY | Facility: CLINIC | Age: 53
End: 2023-03-25
Payer: COMMERCIAL

## 2023-03-27 ENCOUNTER — PATIENT MESSAGE (OUTPATIENT)
Dept: PHARMACY | Facility: CLINIC | Age: 53
End: 2023-03-27
Payer: COMMERCIAL

## 2023-03-27 NOTE — TELEPHONE ENCOUNTER
Rinvoq PA submitted via [facility] website (Key: [id number]).    Rinvoq PA approved until 9/23/2023.  OSP OON.  Pt is required to fill at [facility].      Outgoing call to pt to inform her of approval.  No answer, LVM.  Will send [employer] message to pt regarding approval and link to enroll in copay card.  Forwarding prescription to [facility] and closing pt out.

## 2023-03-28 ENCOUNTER — PATIENT MESSAGE (OUTPATIENT)
Dept: OPTOMETRY | Facility: CLINIC | Age: 53
End: 2023-03-28
Payer: COMMERCIAL

## 2023-03-31 ENCOUNTER — HOSPITAL ENCOUNTER (EMERGENCY)
Facility: HOSPITAL | Age: 53
Discharge: HOME | End: 2023-04-01
Attending: STUDENT IN AN ORGANIZED HEALTH CARE EDUCATION/TRAINING PROGRAM
Payer: COMMERCIAL

## 2023-03-31 DIAGNOSIS — N30.10 IC (INTERSTITIAL CYSTITIS): ICD-10-CM

## 2023-03-31 DIAGNOSIS — M81.0 OSTEOPOROSIS, UNSPECIFIED OSTEOPOROSIS TYPE, UNSPECIFIED PATHOLOGICAL FRACTURE PRESENCE: ICD-10-CM

## 2023-03-31 DIAGNOSIS — D84.821 IMMUNOCOMPROMISED STATE DUE TO DRUG THERAPY: ICD-10-CM

## 2023-03-31 DIAGNOSIS — Z79.899 IMMUNOCOMPROMISED STATE DUE TO DRUG THERAPY: ICD-10-CM

## 2023-03-31 DIAGNOSIS — N30.00 ACUTE CYSTITIS WITHOUT HEMATURIA: ICD-10-CM

## 2023-03-31 DIAGNOSIS — G89.4 CHRONIC PAIN SYNDROME: ICD-10-CM

## 2023-03-31 DIAGNOSIS — R51.9 NONINTRACTABLE EPISODIC HEADACHE, UNSPECIFIED HEADACHE TYPE: Primary | ICD-10-CM

## 2023-03-31 DIAGNOSIS — F43.10 PTSD (POST-TRAUMATIC STRESS DISORDER): ICD-10-CM

## 2023-03-31 DIAGNOSIS — R10.13 EPIGASTRIC PAIN: ICD-10-CM

## 2023-03-31 DIAGNOSIS — R07.9 CHEST PAIN: ICD-10-CM

## 2023-03-31 DIAGNOSIS — M06.00 SERONEGATIVE RHEUMATOID ARTHRITIS: ICD-10-CM

## 2023-03-31 DIAGNOSIS — M79.7 FIBROMYALGIA: ICD-10-CM

## 2023-03-31 DIAGNOSIS — M47.817 LUMBAR AND SACRAL OSTEOARTHRITIS: ICD-10-CM

## 2023-03-31 DIAGNOSIS — L40.50 PSORIATIC ARTHRITIS: ICD-10-CM

## 2023-03-31 LAB
ALBUMIN SERPL BCP-MCNC: 4.7 G/DL (ref 3.5–5.2)
ALP SERPL-CCNC: 113 U/L (ref 55–135)
ALT SERPL W/O P-5'-P-CCNC: 59 U/L (ref 10–44)
ANION GAP SERPL CALC-SCNC: 17 MMOL/L (ref 8–16)
AST SERPL-CCNC: 53 U/L (ref 10–40)
BASOPHILS # BLD AUTO: 0.03 K/UL (ref 0–0.2)
BASOPHILS NFR BLD: 0.3 % (ref 0–1.9)
BILIRUB SERPL-MCNC: 0.3 MG/DL (ref 0.1–1)
BNP SERPL-MCNC: <10 PG/ML (ref 0–99)
BUN SERPL-MCNC: 11 MG/DL (ref 6–20)
CALCIUM SERPL-MCNC: 9.8 MG/DL (ref 8.7–10.5)
CHLORIDE SERPL-SCNC: 107 MMOL/L (ref 95–110)
CO2 SERPL-SCNC: 18 MMOL/L (ref 23–29)
CREAT SERPL-MCNC: 1.1 MG/DL (ref 0.5–1.4)
DIFFERENTIAL METHOD: ABNORMAL
EOSINOPHIL # BLD AUTO: 0 K/UL (ref 0–0.5)
EOSINOPHIL NFR BLD: 0.3 % (ref 0–8)
ERYTHROCYTE [DISTWIDTH] IN BLOOD BY AUTOMATED COUNT: 13.9 % (ref 11.5–14.5)
EST. GFR  (NO RACE VARIABLE): 59 ML/MIN/1.73 M^2
GLUCOSE SERPL-MCNC: 96 MG/DL (ref 70–110)
HCT VFR BLD AUTO: 40.5 % (ref 37–48.5)
HGB BLD-MCNC: 13.4 G/DL (ref 12–16)
IMM GRANULOCYTES # BLD AUTO: 0.03 K/UL (ref 0–0.04)
IMM GRANULOCYTES NFR BLD AUTO: 0.3 % (ref 0–0.5)
LIPASE SERPL-CCNC: 25 U/L (ref 4–60)
LYMPHOCYTES # BLD AUTO: 1.8 K/UL (ref 1–4.8)
LYMPHOCYTES NFR BLD: 15.4 % (ref 18–48)
MCH RBC QN AUTO: 28.7 PG (ref 27–31)
MCHC RBC AUTO-ENTMCNC: 33.1 G/DL (ref 32–36)
MCV RBC AUTO: 87 FL (ref 82–98)
MONOCYTES # BLD AUTO: 1 K/UL (ref 0.3–1)
MONOCYTES NFR BLD: 8.6 % (ref 4–15)
NEUTROPHILS # BLD AUTO: 8.9 K/UL (ref 1.8–7.7)
NEUTROPHILS NFR BLD: 75.1 % (ref 38–73)
NRBC BLD-RTO: 0 /100 WBC
PLATELET # BLD AUTO: 263 K/UL (ref 150–450)
PMV BLD AUTO: 9.3 FL (ref 9.2–12.9)
POTASSIUM SERPL-SCNC: 4.2 MMOL/L (ref 3.5–5.1)
PROT SERPL-MCNC: 7.8 G/DL (ref 6–8.4)
RBC # BLD AUTO: 4.67 M/UL (ref 4–5.4)
SODIUM SERPL-SCNC: 142 MMOL/L (ref 136–145)
TROPONIN I SERPL DL<=0.01 NG/ML-MCNC: <0.006 NG/ML (ref 0–0.03)
WBC # BLD AUTO: 11.83 K/UL (ref 3.9–12.7)

## 2023-03-31 PROCEDURE — 25000003 PHARM REV CODE 250: Performed by: STUDENT IN AN ORGANIZED HEALTH CARE EDUCATION/TRAINING PROGRAM

## 2023-03-31 PROCEDURE — 83880 ASSAY OF NATRIURETIC PEPTIDE: CPT | Performed by: STUDENT IN AN ORGANIZED HEALTH CARE EDUCATION/TRAINING PROGRAM

## 2023-03-31 PROCEDURE — 96374 THER/PROPH/DIAG INJ IV PUSH: CPT

## 2023-03-31 PROCEDURE — 81000 URINALYSIS NONAUTO W/SCOPE: CPT | Performed by: STUDENT IN AN ORGANIZED HEALTH CARE EDUCATION/TRAINING PROGRAM

## 2023-03-31 PROCEDURE — 99285 EMERGENCY DEPT VISIT HI MDM: CPT | Mod: 25

## 2023-03-31 PROCEDURE — 85025 COMPLETE CBC W/AUTO DIFF WBC: CPT | Performed by: STUDENT IN AN ORGANIZED HEALTH CARE EDUCATION/TRAINING PROGRAM

## 2023-03-31 PROCEDURE — 96375 TX/PRO/DX INJ NEW DRUG ADDON: CPT

## 2023-03-31 PROCEDURE — 80053 COMPREHEN METABOLIC PANEL: CPT | Performed by: STUDENT IN AN ORGANIZED HEALTH CARE EDUCATION/TRAINING PROGRAM

## 2023-03-31 PROCEDURE — 83690 ASSAY OF LIPASE: CPT | Performed by: STUDENT IN AN ORGANIZED HEALTH CARE EDUCATION/TRAINING PROGRAM

## 2023-03-31 PROCEDURE — 84484 ASSAY OF TROPONIN QUANT: CPT | Performed by: STUDENT IN AN ORGANIZED HEALTH CARE EDUCATION/TRAINING PROGRAM

## 2023-03-31 PROCEDURE — 63600175 PHARM REV CODE 636 W HCPCS: Performed by: STUDENT IN AN ORGANIZED HEALTH CARE EDUCATION/TRAINING PROGRAM

## 2023-03-31 PROCEDURE — 96361 HYDRATE IV INFUSION ADD-ON: CPT

## 2023-03-31 RX ORDER — KETOROLAC TROMETHAMINE 30 MG/ML
15 INJECTION, SOLUTION INTRAMUSCULAR; INTRAVENOUS
Status: COMPLETED | OUTPATIENT
Start: 2023-03-31 | End: 2023-03-31

## 2023-03-31 RX ORDER — PROCHLORPERAZINE EDISYLATE 5 MG/ML
10 INJECTION INTRAMUSCULAR; INTRAVENOUS ONCE
Status: COMPLETED | OUTPATIENT
Start: 2023-03-31 | End: 2023-03-31

## 2023-03-31 RX ORDER — DEXAMETHASONE SODIUM PHOSPHATE 4 MG/ML
10 INJECTION, SOLUTION INTRA-ARTICULAR; INTRALESIONAL; INTRAMUSCULAR; INTRAVENOUS; SOFT TISSUE ONCE
Status: COMPLETED | OUTPATIENT
Start: 2023-04-01 | End: 2023-03-31

## 2023-03-31 RX ADMIN — PROCHLORPERAZINE EDISYLATE 10 MG: 5 INJECTION INTRAMUSCULAR; INTRAVENOUS at 11:03

## 2023-03-31 RX ADMIN — SODIUM CHLORIDE 1000 ML: 0.9 INJECTION, SOLUTION INTRAVENOUS at 11:03

## 2023-03-31 RX ADMIN — DEXAMETHASONE SODIUM PHOSPHATE 10 MG: 4 INJECTION, SOLUTION INTRA-ARTICULAR; INTRALESIONAL; INTRAMUSCULAR; INTRAVENOUS; SOFT TISSUE at 11:03

## 2023-03-31 RX ADMIN — KETOROLAC TROMETHAMINE 15 MG: 30 INJECTION, SOLUTION INTRAMUSCULAR; INTRAVENOUS at 11:03

## 2023-04-01 VITALS
TEMPERATURE: 99 F | HEART RATE: 98 BPM | DIASTOLIC BLOOD PRESSURE: 69 MMHG | RESPIRATION RATE: 20 BRPM | OXYGEN SATURATION: 97 % | SYSTOLIC BLOOD PRESSURE: 112 MMHG | BODY MASS INDEX: 34.34 KG/M2 | WEIGHT: 170 LBS

## 2023-04-01 LAB
BACTERIA #/AREA URNS HPF: ABNORMAL /HPF
BILIRUB UR QL STRIP: NEGATIVE
CLARITY UR: ABNORMAL
COLOR UR: YELLOW
GLUCOSE UR QL STRIP: NEGATIVE
HGB UR QL STRIP: ABNORMAL
HYALINE CASTS #/AREA URNS LPF: 5 /LPF
KETONES UR QL STRIP: ABNORMAL
LEUKOCYTE ESTERASE UR QL STRIP: ABNORMAL
MICROSCOPIC COMMENT: ABNORMAL
NITRITE UR QL STRIP: NEGATIVE
PH UR STRIP: 6 [PH] (ref 5–8)
PROT UR QL STRIP: ABNORMAL
RBC #/AREA URNS HPF: 7 /HPF (ref 0–4)
SP GR UR STRIP: 1.02 (ref 1–1.03)
SQUAMOUS #/AREA URNS HPF: 8 /HPF
URN SPEC COLLECT METH UR: ABNORMAL
UROBILINOGEN UR STRIP-ACNC: NEGATIVE EU/DL
WBC #/AREA URNS HPF: 7 /HPF (ref 0–5)

## 2023-04-01 PROCEDURE — 25000003 PHARM REV CODE 250: Performed by: STUDENT IN AN ORGANIZED HEALTH CARE EDUCATION/TRAINING PROGRAM

## 2023-04-01 PROCEDURE — 63600175 PHARM REV CODE 636 W HCPCS: Performed by: STUDENT IN AN ORGANIZED HEALTH CARE EDUCATION/TRAINING PROGRAM

## 2023-04-01 RX ORDER — NITROFURANTOIN 25; 75 MG/1; MG/1
100 CAPSULE ORAL 2 TIMES DAILY
Qty: 10 CAPSULE | Refills: 0 | Status: SHIPPED | OUTPATIENT
Start: 2023-04-01 | End: 2023-04-06

## 2023-04-01 RX ORDER — NITROFURANTOIN 25; 75 MG/1; MG/1
100 CAPSULE ORAL ONCE
Status: COMPLETED | OUTPATIENT
Start: 2023-04-01 | End: 2023-04-01

## 2023-04-01 RX ORDER — PROMETHAZINE HYDROCHLORIDE 25 MG/1
25 SUPPOSITORY RECTAL EVERY 6 HOURS PRN
Qty: 10 SUPPOSITORY | Refills: 0 | Status: SHIPPED | OUTPATIENT
Start: 2023-04-01 | End: 2023-07-10

## 2023-04-01 RX ADMIN — NITROFURANTOIN MONOHYDRATE/MACROCRYSTALLINE 100 MG: 25; 75 CAPSULE ORAL at 12:04

## 2023-04-01 NOTE — ED NOTES
Pt given cup of ice water. Pt tolerated well. Nadn. Pt states nausea is better after receiving medicine

## 2023-04-01 NOTE — ED NOTES
Pt brought in by [facility] from home c/o migraine headache since Saturday with photophobia. Pt states that she has a hx of migraines. Pt states that she took advil and tylenol without relief. Pt also reports left side cp radiating to left arm that started tonight and one episode of vomiting. Pt states that she has not been able to eat since Sunday. Pt also reports abd pain. Pt rates head pain 8/10 and cp 5/10. Ccm/bp/o2 monitoring in place. Pending MD koehler. Ed work-up in process.

## 2023-04-01 NOTE — ED PROVIDER NOTES
NAME:  Marilee Simons  CSN:     [id number]  MRN:    [id number]  ADMIT DATE: 3/31/2023        eMERGENCY dEPARTMENT eNCOUnter    CHIEF COMPLAINT    Chief Complaint   Patient presents with    Chest Pain      Patient brought in by EMS from home. Reports daily migraines since Saturday. N/V with left sided chest pain that started tonight. Pain radiates down left arm. Patient reports history of Lupus, RA and breast cancer.        HPI    Marilee Simons is a 53 y.o. female with a past medical history of  has a past medical history of Acid reflux, Allergy, Alopecia, Anxiety, Arthralgia, Back pain, Depression, Dry eyes, Fever blister, Fibromyalgia, Interstitial cystitis, Irritable bowel syndrome, Kidney stone, Major depressive disorder, recurrent episode, in partial or unspecified remission (6/25/2013), OAB (overactive bladder) (7/21/2015), PTSD (post-traumatic stress disorder), Pure hypercholesterolemia (7/6/2022), Rheumatoid arthritis, Rheumatoid arthritis (6/21/2022), Systemic lupus erythematosus, Thyroid disease, Urinary tract infection, and Vaginal infection.     she presents to the ED due to multiple concerns.    She states she has been in bed for most of the week suffering from severe migraines.  She states this is not uncommon for her, however, this is more frequent than she has had in the past.  She is been intermittently taking her migraine medications, can not name them off hand, but does note that she takes a Fioricet that does not have caffeine in it.  She states that she does use her migraine medications sparingly only when they feel very severe.  She has had severe photophobia and persistent nausea and vomiting.  States she is been unable to keep anything down for most of the week.  Today she had some crawfish biscuit and subsequently vomited this and all of her medications up.    She states that she has also had persistent left-sided chest pain and feeling of heart racing throughout the week.  She states around  330 today she felt her heart racing and checked her blood pressure and that it was high and that it continued to fluctuate.  She states this also concerned her as she was worried she could be having a heart attack.  She states she did see a cardiologist last year but was unable to schedule the follow-up stress test.    HPI       PAST MEDICAL HISTORY  Past Medical History:   Diagnosis Date    Acid reflux     Allergy     Alopecia     Anxiety     Arthralgia     Back pain     Depression     Dry eyes     from meds    Fever blister     Fibromyalgia     Interstitial cystitis     Irritable bowel syndrome     Kidney stone     Major depressive disorder, recurrent episode, in partial or unspecified remission 2013    OAB (overactive bladder) 2015    PTSD (post-traumatic stress disorder)     Pure hypercholesterolemia 2022    Rheumatoid arthritis     Rheumatoid arthritis 2022    Systemic lupus erythematosus     Thyroid disease     Urinary tract infection     Vaginal infection        SURGICAL HISTORY    Past Surgical History:   Procedure Laterality Date    BLADDER SURGERY       SECTION, LOW TRANSVERSE      x 2    COLONOSCOPY      COLONOSCOPY N/A 10/5/2017    Procedure: COLONOSCOPY;  Surgeon: Venkat He MD;  Location: [address]);  Service: Endoscopy;  Laterality: N/A;    ESOPHAGOGASTRODUODENOSCOPY      ESOPHAGOGASTRODUODENOSCOPY N/A 10/25/2021    Procedure: EGD (ESOPHAGOGASTRODUODENOSCOPY);  Surgeon: Venkat He MD;  Location: [address]);  Service: Endoscopy;  Laterality: N/A;  Covid test on 10/22 at [email or web address]    10/19 [facility] to confirm appt-rb    EXCISIONAL BIOPSY Right 10/14/2022    Procedure: EXCISIONAL BIOPSY-right with radiological marker;  Surgeon: PALLAVI Oseguera MD;  Location: [facility];  Service: General;  Laterality: Right;    EYE SURGERY      Lasik-bilateral    HYSTERECTOMY      IMPLANTATION OF PERMANENT SACRAL NERVE STIMULATOR N/A 2022    Procedure: INSERTION,  NEUROSTIMULATOR, PERMANENT, SACRAL;  Surgeon: Bandar Garcia MD;  Location: [facility] OR 2ND FLR;  Service: Urology;  Laterality: N/A;  2hr    INJECTION OF BOTULINUM TOXIN TYPE A N/A 9/12/2018    Procedure: INJECTION, BOTULINUM TOXIN, TYPE A  200 UNITS;  Surgeon: Bandar Garcia MD;  Location: [facility] OR 1ST FLR;  Service: Urology;  Laterality: N/A;    INSTILLATION OF URINARY BLADDER N/A 9/12/2018    Procedure: INSTILLATION, URINARY BLADDER;  Surgeon: Bandar Garcia MD;  [facility];  Service: Urology;  Laterality: N/A;    PARTIAL HYSTERECTOMY      REMOVAL OF ELECTRODE LEAD OF SACRAL NERVE STIMULATOR N/A 4/27/2022    Procedure: REMOVAL, ELECTRODE LEAD, SACRAL NERVE STIMULATOR;  Surgeon: Bandar Garcia MD;  Location: [facility] OR 2ND FLR;  Service: Urology;  Laterality: N/A;    SKIN TAG REMOVAL N/A 10/14/2022    Procedure: REMOVAL, SKIN TAGS;  Surgeon: PALLAVI Oseguera MD;  Location: [facility];  Service: General;  Laterality: N/A;    TRANSFORAMINAL EPIDURAL INJECTION OF STEROID Left 2/15/2021    Procedure: LUMBAR TRANSFORAMINAL LEFT L5 AND S1 DIRECT REFERRAL;  Surgeon: Marquez Nesbitt MD;  Location: [facility] PAIN MGT;  Service: Pain Management;  Laterality: Left;  NEEDS CONSENT, PT REQUESTING IV SEDATION       FAMILY HISTORY    Family History   Problem Relation Age of Onset    Irritable bowel syndrome Mother     Irritable bowel syndrome Sister     Lupus Sister     Rheum arthritis Sister     Fibromyalgia Sister     Irritable bowel syndrome Sister     Celiac disease Neg Hx     Cirrhosis Neg Hx     Colon cancer Neg Hx     Colon polyps Neg Hx     Crohn's disease Neg Hx     Cystic fibrosis Neg Hx     Esophageal cancer Neg Hx     Hemochromatosis Neg Hx     Inflammatory bowel disease Neg Hx     Liver cancer Neg Hx     Liver disease Neg Hx     Rectal cancer Neg Hx     Stomach cancer Neg Hx     Ulcerative colitis Neg Hx     Boris's disease Neg Hx     Melanoma Neg Hx     Amblyopia Neg Hx     Blindness Neg Hx     Cataracts Neg Hx      Glaucoma Neg Hx     Macular degeneration Neg Hx     Retinal detachment Neg Hx     Strabismus Neg Hx        SOCIAL HISTORY    Social History     Socioeconomic History    Marital status:    Occupational History     Employer: OTHER   Tobacco Use    Smoking status: Never    Smokeless tobacco: Never   Substance and Sexual Activity    Alcohol use: No    Drug use: No    Sexual activity: Never   Other Topics Concern    Are you pregnant or think you may be? No    Breast-feeding No     Social Determinants of Health     Financial Resource Strain: Low Risk     Difficulty of Paying Living Expenses: Not very hard   Food Insecurity: No Food Insecurity    Worried About Running Out of Food in the Last Year: Never true    Ran Out of Food in the Last Year: Never true   Transportation Needs: No Transportation Needs    Lack of Transportation (Medical): No    Lack of Transportation (Non-Medical): No   Physical Activity: Inactive    Days of Exercise per Week: 0 days    Minutes of Exercise per Session: 0 min   Stress: Stress Concern Present    Feeling of Stress : To some extent   Social Connections: Unknown    Frequency of Communication with Friends and Family: More than three times a week    Frequency of Social Gatherings with Friends and Family: Never    Active Member of Clubs or Organizations: No    Attends Club or Organization Meetings: Never    Marital Status:    Housing Stability: Low Risk     Unable to Pay for Housing in the Last Year: No    Number of Places Lived in the Last Year: 1    Unstable Housing in the Last Year: No       MEDICATIONS  Current Outpatient Medications   Medication Instructions    alosetron (LOTRONEX) 0.5 MG tablet TAKE 1 TABLET(0.5 MG) BY MOUTH DAILY AS NEEDED    amitriptyline (ELAVIL) 10 MG tablet TAKE 1 TABLET(10 MG) BY MOUTH EVERY NIGHT AS NEEDED    amitriptyline (ELAVIL) 10 MG tablet TAKE 1 TABLET BY MOUTH EVERY NIGHT AS NEEDED    budesonide (ENTOCORT EC) 3 mg capsule TAKE 3 CAPSULES(9 MG)  BY MOUTH EVERY DAY FOR 10 DAYS    butalbitaL-acetaminophen  mg Tab TAKE 1 TABLET BY MOUTH EVERY 4 HOURS    calcium glycerophosphate (PRELIEF) 65 mg Tab 2 tablets, Oral, Before meals & nightly PRN    cetirizine (ZYRTEC) 10 mg, Oral, Daily    clonazePAM (KLONOPIN) 1 MG tablet TAKE 1 TABLET(1 MG) BY MOUTH TWICE DAILY    cyclobenzaprine (FLEXERIL) 10 mg, Oral, 3 times daily PRN    dicyclomine (BENTYL) 20 mg, Oral, Daily PRN    famotidine (PEPCID) 40 MG tablet TAKE 1 TABLET(40 MG) BY MOUTH EVERY NIGHT AS NEEDED FOR HEARTBURN    fluticasone propionate (FLONASE) 100 mcg, Each Nostril, 2 times daily    gabapentin (NEURONTIN) 800 MG tablet TAKE 1 TABLET(800 MG) BY MOUTH THREE TIMES DAILY    hydrocortisone 2.5 % cream Apply to affected areas twice a day when eczema is moderate.    hydrOXYzine HCL (ATARAX) 25 MG tablet 1-4 tablets as needed for itching    levothyroxine (SYNTHROID) 50 MCG tablet TAKE 1 TABLET(50 MCG) BY MOUTH EVERY DAY    otgxcg-zdoyk-b.blue-sal-naphos (USTELL) 120-0.12 mg Cap 1 capsule, Oral, 3 times daily    nitrofurantoin, macrocrystal-monohydrate, (MACROBID) 100 MG capsule 100 mg, Oral, 2 times daily    oxybutynin (DITROPAN) 5 MG Tab TAKE 1 TABLET(5 MG) BY MOUTH THREE TIMES DAILY    predniSONE (DELTASONE) 2.5 MG tablet 1 to 2 tabs PO daily prn for arthritis flare    promethazine (PHENERGAN) 25 mg, Oral, Every 6 hours PRN    promethazine (PHENERGAN) 25 mg, Rectal, Every 6 hours PRN    RABEprazole (ACIPHEX) 20 mg tablet TAKE 1 TABLET(20 MG) BY MOUTH EVERY DAY    RINVOQ 15 mg, Oral, Daily    tiZANidine (ZANAFLEX) 4 mg, Oral, 3 times daily    traZODone (DESYREL) 50 mg, Oral, Nightly PRN    venlafaxine (EFFEXOR-XR) 150 mg, Oral, Daily    WEGOVY 0.25 mg, Subcutaneous, Every 7 days       ALLERGIES    Review of patient's allergies indicates:   Allergen Reactions    Cephalexin Itching    Cephalosporins     Zofran [ondansetron hcl (pf)] Hives    Penicillins Rash         REVIEW OF SYSTEMS   Review of Systems        PHYSICAL EXAM    Reviewed Triage Note    VITAL SIGNS:   ED Triage Vitals [03/31/23 2158]   Enc Vitals Group      /87      Pulse 96      Resp 17      Temp 99.8 °F (37.7 °C)      Temp Source Oral      SpO2 100 %      Weight 170 lb      Height       Head Circumference       Peak Flow       Pain Score       Pain Loc       Pain Edu?       Excl. in GC?        Patient Vitals for the past 24 hrs:   BP Temp Temp src Pulse Resp SpO2 Weight   03/31/23 2300 120/69 -- -- 101 18 98 % --   03/31/23 2158 119/87 99.8 °F (37.7 °C) Oral 96 17 100 % 77.1 kg (170 lb)       Physical Exam    Nursing note and vitals reviewed.  Constitutional: She appears well-developed and well-nourished.   Appears uncomfortable   HENT:   Head: Normocephalic and atraumatic.   Eyes: EOM are normal. Pupils are equal, round, and reactive to light.   Neck: Neck supple.   Normal range of motion.  Cardiovascular:  Normal rate and regular rhythm.           Pulmonary/Chest: Breath sounds normal. No respiratory distress.   Abdominal: Abdomen is soft. There is abdominal tenderness (epigastric).   Musculoskeletal:         General: Normal range of motion.      Cervical back: Normal range of motion and neck supple.     Neurological: She is alert and oriented to person, place, and time. She has normal strength. No cranial nerve deficit or sensory deficit. GCS score is 15. GCS eye subscore is 4. GCS verbal subscore is 5. GCS motor subscore is 6.   Skin: Skin is warm and dry.   Psychiatric: She has a normal mood and affect.          EKG     Interpreted by EM physician if performed:   Rate of 101. Sinus tach. No MALORIE or depression. Qtc 497.               LABS  Pertinent labs reviewed. (See chart for details)   Labs Reviewed   CBC W/ AUTO DIFFERENTIAL - Abnormal; Notable for the following components:       Result Value    Gran # (ANC) 8.9 (*)     Gran % 75.1 (*)     Lymph % 15.4 (*)     All other components within normal limits   COMPREHENSIVE METABOLIC PANEL -  Abnormal; Notable for the following components:    CO2 18 (*)     AST 53 (*)     ALT 59 (*)     Anion Gap 17 (*)     eGFR 59 (*)     All other components within normal limits   URINALYSIS, REFLEX TO URINE CULTURE - Abnormal; Notable for the following components:    Appearance, UA Hazy (*)     Protein, UA 1+ (*)     Ketones, UA Trace (*)     Occult Blood UA Trace (*)     Leukocytes, UA 3+ (*)     All other components within normal limits    Narrative:     Specimen Source->Urine   URINALYSIS MICROSCOPIC - Abnormal; Notable for the following components:    RBC, UA 7 (*)     WBC, UA 7 (*)     Hyaline Casts, UA 5 (*)     All other components within normal limits    Narrative:     Specimen Source->Urine   TROPONIN I   B-TYPE NATRIURETIC PEPTIDE   LIPASE   LIPASE         RADIOLOGY          Imaging Results              X-Ray Chest AP Portable (Final result)  Result time 03/31/23 22:36:37      Final result by Fili Marr MD (03/31/23 22:36:37)                   Impression:      No acute findings      Electronically signed by: Fili Marr  Date:    03/31/2023  Time:    22:36               Narrative:    EXAMINATION:  XR CHEST AP PORTABLE    CLINICAL HISTORY:  Chest Pain;    TECHNIQUE:  Single frontal view of the chest was performed.    COMPARISON:  None    FINDINGS:  Lungs are clear.  No focal consolidation, pleural fluid, or pneumothorax.  Normal heart size.                                        PROCEDURES    Procedures      ED COURSE & MEDICAL DECISION MAKING    Pertinent Labs & Imaging studies reviewed. (See chart for details and specific orders.)        Summary of review of records:     Follows closely rheum, psych, FM.     MDM:  Marilee Simons is a 53 y.o. female presents for 1 week of HA, n/v, epigastric pain, with intermittent CP through the week.     CBC, CMP ordered as well as lipase, urinalysis, troponin, BNP, chest x-ray do not feel that she requires CT imaging of her head with known migrainous  history which she states this feels to          Medications   nitrofurantoin (macrocrystal-monohydrate) 100 MG capsule 100 mg (has no administration in time range)   prochlorperazine injection Soln 10 mg (10 mg Intravenous Given 3/31/23 2304)   sodium chloride 0.9% bolus 1,000 mL 1,000 mL (1,000 mLs Intravenous New Bag 3/31/23 2307)   ketorolac injection 15 mg (15 mg Intravenous Given 3/31/23 2304)   dexAMETHasone injection 10 mg (10 mg Intravenous Given 3/31/23 2304)       ED Course as of 04/01/23 0030   Fri Mar 31, 2023   2241 Troponin I: <0.006 [HL]   2254 Creatinine: 1.1  baseline [HL]   2255 Comprehensive metabolic panel(!)  Mild transaminitis noted [HL]   2255 CBC auto differential(!)  No acute abnormalities  [HL]   2319 Lipase: 25 [HL]   2319 BNP: <10 [HL]   2320 X-Ray Chest AP Portable  No acute findings [HL]   Sat Apr 01, 2023   0016 On re-evaluation, patient states her headache has improved significantly.  She states it was approximately a 10/10 and is now 5/10.  She was also able to tolerate some fluids bedside.  Plan to discharge home with supportive care and reasons to return discussed [HL]   0017 Urinalysis, Reflex to Urine Culture Urine, Clean Catch(!)  Does appear contaminated, no urianry sxs, however with persistent n/v over the past week, will treat for possible underlying infection.  [HL]      ED Course User Index  [HL] Adelina Coy DO         FINAL IMPRESSION    Final diagnoses:  [R07.9] Chest pain  [R51.9] Nonintractable episodic headache, unspecified headache type (Primary)  [R10.13] Epigastric pain  [N30.00] Acute cystitis without hematuria       DISPOSITION  Patient discharged in stable condition        ED Prescriptions       Medication Sig Dispense Start Date End Date Auth. Provider    promethazine (PHENERGAN) 25 MG suppository Place 1 suppository (25 mg total) rectally every 6 (six) hours as needed for Nausea. 10 suppository 4/1/2023 -- Adelina Coy DO    nitrofurantoin,  macrocrystal-monohydrate, (MACROBID) 100 MG capsule Take 1 capsule (100 mg total) by mouth 2 (two) times daily. for 5 days 10 capsule 4/1/2023 4/6/2023 Adelina Coy DO          Follow-up Information       Follow up With Specialties Details Why Contact [facility] Emergency Dept Emergency Medicine  As needed, If symptoms worsen [address] [address] [phone]    Leti Howe MD Internal Medicine Schedule an appointment as soon as possible for a visit   [address]  [address]  [facility] [id number] [phone]                DISCLAIMER: This note was prepared with [employer] voice recognition transcription software. Garbled syntax, mangled pronouns, and other bizarre constructions may be attributed to that software system.      DO Adelina Johnson DO  04/01/23 0036

## 2023-04-02 ENCOUNTER — PATIENT MESSAGE (OUTPATIENT)
Dept: GASTROENTEROLOGY | Facility: CLINIC | Age: 53
End: 2023-04-02
Payer: COMMERCIAL

## 2023-04-03 ENCOUNTER — PATIENT MESSAGE (OUTPATIENT)
Dept: UROLOGY | Facility: CLINIC | Age: 53
End: 2023-04-03
Payer: COMMERCIAL

## 2023-04-03 ENCOUNTER — OFFICE VISIT (OUTPATIENT)
Dept: PRIMARY CARE CLINIC | Facility: CLINIC | Age: 53
End: 2023-04-03
Payer: COMMERCIAL

## 2023-04-03 DIAGNOSIS — N30.10 INTERSTITIAL CYSTITIS: ICD-10-CM

## 2023-04-03 DIAGNOSIS — R30.0 DYSURIA: Primary | ICD-10-CM

## 2023-04-03 PROCEDURE — 1159F MED LIST DOCD IN RCRD: CPT | Mod: CPTII,95,, | Performed by: INTERNAL MEDICINE

## 2023-04-03 PROCEDURE — 99213 OFFICE O/P EST LOW 20 MIN: CPT | Mod: 95,,, | Performed by: INTERNAL MEDICINE

## 2023-04-03 PROCEDURE — 99213 PR OFFICE/OUTPT VISIT, EST, LEVL III, 20-29 MIN: ICD-10-PCS | Mod: 95,,, | Performed by: INTERNAL MEDICINE

## 2023-04-03 PROCEDURE — 1159F PR MEDICATION LIST DOCUMENTED IN MEDICAL RECORD: ICD-10-PCS | Mod: CPTII,95,, | Performed by: INTERNAL MEDICINE

## 2023-04-03 RX ORDER — LAMOTRIGINE 200 MG/1
TABLET ORAL
COMMUNITY
Start: 2023-03-30 | End: 2023-05-31 | Stop reason: SDUPTHER

## 2023-04-03 RX ORDER — PROMETHAZINE HYDROCHLORIDE 25 MG/1
25 TABLET ORAL EVERY 6 HOURS PRN
Qty: 30 TABLET | Refills: 0 | Status: SHIPPED | OUTPATIENT
Start: 2023-04-03 | End: 2023-04-17 | Stop reason: SDUPTHER

## 2023-04-03 RX ORDER — CIPROFLOXACIN 500 MG/1
500 TABLET ORAL EVERY 12 HOURS
Qty: 14 TABLET | Refills: 0 | Status: SHIPPED | OUTPATIENT
Start: 2023-04-03 | End: 2023-07-10

## 2023-04-03 RX ORDER — TOFACITINIB 11 MG/1
1 TABLET, FILM COATED, EXTENDED RELEASE ORAL
COMMUNITY
Start: 2023-03-23 | End: 2023-07-10

## 2023-04-03 RX ORDER — TRAMADOL HYDROCHLORIDE 50 MG/1
50 TABLET ORAL 4 TIMES DAILY PRN
COMMUNITY
Start: 2023-01-30 | End: 2023-04-17

## 2023-04-03 NOTE — PROGRESS NOTES
[facility] Progress Note  4/3/2023    This was a virtual visit conducted via webcam.      Reason for Visit:      had concerns including Urinary Frequency, Dysuria, and vulva pain.      History of Present Illness:     Pt has history of autoimmune diseases.  Has interstitial cystitis  Has an implanted device (interstim) for the IC.  Seen Friday in ER   Was having nausea/vomiting.  Didn't think it was a virus  Was having headaches turning into migraines  They did a urine sample to make sure she didn't have UTI  She didn't think she had a UTI, but   They said UA was abnormal  (3+ LE, Trace blood)  Started on macrobid.  Pt states that Dr. Garcia usually gives ciprofloxacin.  She has been taking macrobid since the ER visit without improvement.  Unfortuantely, athough it looks like a UA with reflex to culture was ordered, I don't see a culture pending - it may not have been reflexed?  WE discussed that it is possible the intersitital cystitis itself could be causing the symptoms?  Many previous culutres in chart appear negative.      Problem List:     Problem List:  2023-03: Immunocompromised state  2022-11: Atypical ductal hyperplasia of right breast  2022-08: Hypernatremia  2022-08: Hyperlipidemia  2022-08: Multiple persistent symptoms after COVID-19  2022-08: Severe obesity (BMI 35.0-39.9) with comorbidity  2022-07: COVID  2022-07: Anginal equivalent  2022-07: Pure hypercholesterolemia  2022-06: Sepsis  2022-06: Rheumatoid arthritis  2022-06: Class 1 obesity due to excess calories in adult  2022-06: Lupus  2022-04: MRI contraindicated due to metal implant  2022-04: Neurostimulator device in situ  2021-10: Dysphagia  2021-10: Hypothyroidism due to Hashimoto's thyroiditis  2021-02: Chronic pain  2021-01: Acute left-sided low back pain with left-sided sciatica  2021-01: Impaired gait  2021-01: Decreased strength  2020-11: Urethral pain  2018-08: Decreased bladder capacity  2018-04: Decreased range of  motion of neck  2018-04: Muscle weakness  2018-04: Neck pain  2017-10: Nausea  2017-06: BRBPR (bright red blood per rectum)  2017-06: Acute hemorrhagic colitis  2017-06: Blood poisoning  2016-11: Pelvic pain in female  2016-08: Fibromyalgia  2016-08: Interstitial cystitis  2016-06: Cluster B personality disorder  2016-05: Straining to void  2015-09: JAYNE (stress urinary incontinence, female)  2015-09: Frequency-urgency syndrome  2015-08: Urgency incontinence  2015-07: OAB (overactive bladder)  2015-07: Mixed incontinence urge and stress  2015-06: PTSD (post-traumatic stress disorder)  2015-06: Generalized anxiety disorder  2015-04: Anxiety state, unspecified  2015-02: Major depressive disorder, recurrent episode, moderate  2015-02: Borderline personality disorder  2015-01: GERD (gastroesophageal reflux disease)  2014-10: Headache  2014-03: Acute UTI  2014-03: Potassium (K) deficiency  2014-03: IC (interstitial cystitis)  2014-03: Bladder pain  2014-03: Hematuria, microscopic  2013-12: Major depressive disorder, recurrent episode, unspecified  2013-09: Arthralgia  2013-09: Chronic fatigue  2013-06: Major depressive disorder, recurrent episode, in partial or   unspecified remission  2013-06: Recurrent major depression  2013-05: Atypical chest pain  2013-01: IBS (irritable bowel syndrome)  2013-01: Diarrhea        Medications:       Current Outpatient Medications:     alosetron (LOTRONEX) 0.5 MG tablet, TAKE 1 TABLET(0.5 MG) BY MOUTH DAILY AS NEEDED, Disp: 30 tablet, Rfl: 0    amitriptyline (ELAVIL) 10 MG tablet, TAKE 1 TABLET(10 MG) BY MOUTH EVERY NIGHT AS NEEDED, Disp: 90 tablet, Rfl: 3    budesonide (ENTOCORT EC) 3 mg capsule, TAKE 3 CAPSULES(9 MG) BY MOUTH EVERY DAY FOR 10 DAYS, Disp: 30 capsule, Rfl: 3    butalbitaL-acetaminophen  mg Tab, TAKE 1 TABLET BY MOUTH EVERY 4 HOURS, Disp: 60 tablet, Rfl: 3    calcium glycerophosphate (PRELIEF) 65 mg Tab, Take 2 tablets by mouth before meals and at bedtime as needed.  (Patient taking differently: Take 2 tablets by mouth 3 (three) times daily with meals.), Disp: 100 each, Rfl: prn    cetirizine (ZYRTEC) 10 MG tablet, Take 1 tablet (10 mg total) by mouth once daily., Disp: 120 tablet, Rfl: 2    clonazePAM (KLONOPIN) 1 MG tablet, TAKE 1 TABLET(1 MG) BY MOUTH TWICE DAILY, Disp: 60 tablet, Rfl: 0    cyclobenzaprine (FLEXERIL) 10 MG tablet, Take 1 tablet (10 mg total) by mouth 3 (three) times daily as needed for Muscle spasms., Disp: 90 tablet, Rfl: 6    dicyclomine (BENTYL) 20 mg tablet, Take 20 mg by mouth daily as needed., Disp: , Rfl:     famotidine (PEPCID) 40 MG tablet, TAKE 1 TABLET(40 MG) BY MOUTH EVERY NIGHT AS NEEDED FOR HEARTBURN, Disp: 30 tablet, Rfl: 11    fluticasone propionate (FLONASE) 50 mcg/actuation nasal spray, 2 sprays (100 mcg total) by Each Nostril route 2 (two) times daily., Disp: 31.6 mL, Rfl: 1    gabapentin (NEURONTIN) 800 MG tablet, TAKE 1 TABLET(800 MG) BY MOUTH THREE TIMES DAILY, Disp: 90 tablet, Rfl: 5    hydrocortisone 2.5 % cream, Apply to affected areas twice a day when eczema is moderate. (Patient taking differently: Apply to affected areas twice a day as needed when eczema is moderate.), Disp: 453.6 g, Rfl: 1    hydrOXYzine HCL (ATARAX) 25 MG tablet, 1-4 tablets as needed for itching, Disp: 240 tablet, Rfl: 3    lamoTRIgine (LAMICTAL) 200 MG tablet, , Disp: , Rfl:     levothyroxine (SYNTHROID) 50 MCG tablet, TAKE 1 TABLET(50 MCG) BY MOUTH EVERY DAY, Disp: 30 tablet, Rfl: 6    fiaqmv-zwoff-p.blue-sal-naphos (USTELL) 120-0.12 mg Cap, Take 1 capsule by mouth 3 (three) times daily. (Patient taking differently: Take 1 capsule by mouth 2 (two) times a day.), Disp: 90 each, Rfl: 3    nitrofurantoin, macrocrystal-monohydrate, (MACROBID) 100 MG capsule, Take 1 capsule (100 mg total) by mouth 2 (two) times daily. for 5 days, Disp: 10 capsule, Rfl: 0    oxybutynin (DITROPAN) 5 MG Tab, TAKE 1 TABLET(5 MG) BY MOUTH THREE TIMES DAILY, Disp: 90 tablet, Rfl: 3     predniSONE (DELTASONE) 2.5 MG tablet, 1 to 2 tabs PO daily prn for arthritis flare, Disp: 60 tablet, Rfl: 0    promethazine (PHENERGAN) 25 MG suppository, Place 1 suppository (25 mg total) rectally every 6 (six) hours as needed for Nausea., Disp: 10 suppository, Rfl: 0    promethazine (PHENERGAN) 25 MG tablet, Take 1 tablet (25 mg total) by mouth every 6 (six) hours as needed for Nausea., Disp: 30 tablet, Rfl: 0    RABEprazole (ACIPHEX) 20 mg tablet, TAKE 1 TABLET(20 MG) BY MOUTH EVERY DAY, Disp: 30 tablet, Rfl: 2    tiZANidine (ZANAFLEX) 4 MG tablet, Take 4 mg by mouth 3 (three) times daily., Disp: , Rfl:     traMADoL (ULTRAM) 50 mg tablet, Take 50 mg by mouth 4 (four) times daily as needed., Disp: , Rfl:     traZODone (DESYREL) 50 MG tablet, Take 1 tablet (50 mg total) by mouth nightly as needed for Insomnia., Disp: 30 tablet, Rfl: 2    upadacitinib (RINVOQ) 15 mg 24 hr tablet, Take 1 tablet (15 mg total) by mouth once daily., Disp: 30 tablet, Rfl: 11    venlafaxine (EFFEXOR-XR) 150 MG Cp24, Take 1 capsule (150 mg total) by mouth once daily., Disp: 30 capsule, Rfl: 5    XELJANZ XR 11 mg Tb24, Take 1 tablet by mouth., Disp: , Rfl:     ciprofloxacin HCl (CIPRO) 500 MG tablet, Take 1 tablet (500 mg total) by mouth every 12 (twelve) hours., Disp: 14 tablet, Rfl: 0      Review of Systems:     Review of Systems   Constitutional:  Negative for chills.   Gastrointestinal:  Negative for constipation, nausea and vomiting.   Genitourinary:  Positive for dysuria, flank pain, frequency, hematuria and urgency.   Skin:  Negative for rash.         Physical Exam:     Pt is alert and oriented.  Speech is clear and coherent.  Speaking in complete sentences.  No distress.  Mood and affect are normal.      Labs/Imaging/Testing:     Most recent CBC:  Lab Results   Component Value Date    WBC 11.83 03/31/2023    HGB 13.4 03/31/2023     03/31/2023    MCV 87 03/31/2023       Most recent Lipids:  Lab Results   Component Value Date     CHOL 247 (H) 08/18/2022    HDL 70 08/18/2022    LDLCALC 154.0 08/18/2022    TRIG 115 08/18/2022       Most recent Chemistry:  Lab Results   Component Value Date     03/31/2023    K 4.2 03/31/2023     03/31/2023    CO2 18 (L) 03/31/2023    BUN 11 03/31/2023    CREATININE 1.1 03/31/2023    GLU 96 03/31/2023    CALCIUM 9.8 03/31/2023    ALT 59 (H) 03/31/2023    AST 53 (H) 03/31/2023    ALKPHOS 113 03/31/2023    PROT 7.8 03/31/2023    ALBUMIN 4.7 03/31/2023       Other tests:  Lab Results   Component Value Date    TSH 2.432 08/18/2022    FREET4 0.94 07/23/2021    INR 1.0 06/10/2017    HGBA1C 5.4 08/18/2022    IRON 96 07/23/2021    FERRITIN 22 07/27/2022    FRFCBLAG92 773 10/23/2018    [id number] 46 08/18/2022    MG 1.7 05/22/2021    CRP 0.7 07/27/2022    SEDRATE 10 04/29/2022    LIPASE 25 03/31/2023    AMYLASE 61 04/20/2017               Assessment and Plan:     1. Dysuria    2. Interstitial cystitis    I agreed to change to cipro and see if she has empiric improvement since it doesn't appear that culture was reflexed from the UA.  If no improvement or worsening, rtc.  Pt has appt scheduled with her urologist to disucss as well.     Enoch Patton MD  4/3/2023    This note was prepared using voice-recognition software.  Although efforts are made to proofread the note, some errors may persist in the final document.    Dr. Patton's [place] License number is [id number]  This was a virtual (audio/video visit) in lieu of in-person visit in context of the coronavirus emergency.      Patient/Family members identity was confirmed, and confidentiality/privacy confirmed prior to visit. Verbal informed consent was obtained from the patient's legal guardian or patient when appropriate to conduct this virtual visit.  They authorized me to provide medical care and voiced understanding of the risks, benefits, and alternatives of virtual care.  Guardian understands the limitations inherent of a virtual visit, that they may  choose to be seen in person if desired or needed, and that they may halt the virtual visit at any time for any reason.     Originating Site: Patient's home   Distant Site:  [facility]     I certify that this visit was done via secure two-way simultaneous audio and video transmission with informed consent of the patient and/or guardian. Over 50% of the time was counseling or coordinating care.

## 2023-04-07 RX ORDER — TRAMADOL HYDROCHLORIDE 50 MG/1
TABLET ORAL
Qty: 90 TABLET | Refills: 3 | Status: SHIPPED | OUTPATIENT
Start: 2023-04-07 | End: 2023-11-08 | Stop reason: SDUPTHER

## 2023-04-10 ENCOUNTER — PATIENT MESSAGE (OUTPATIENT)
Dept: OPTOMETRY | Facility: CLINIC | Age: 53
End: 2023-04-10
Payer: COMMERCIAL

## 2023-04-10 ENCOUNTER — TELEPHONE (OUTPATIENT)
Dept: PRIMARY CARE CLINIC | Facility: CLINIC | Age: 53
End: 2023-04-10
Payer: COMMERCIAL

## 2023-04-10 ENCOUNTER — PATIENT MESSAGE (OUTPATIENT)
Dept: PRIMARY CARE CLINIC | Facility: CLINIC | Age: 53
End: 2023-04-10

## 2023-04-10 ENCOUNTER — PATIENT MESSAGE (OUTPATIENT)
Dept: RHEUMATOLOGY | Facility: CLINIC | Age: 53
End: 2023-04-10
Payer: COMMERCIAL

## 2023-04-10 ENCOUNTER — OFFICE VISIT (OUTPATIENT)
Dept: PSYCHIATRY | Facility: CLINIC | Age: 53
End: 2023-04-10
Payer: COMMERCIAL

## 2023-04-10 DIAGNOSIS — F43.10 PTSD (POST-TRAUMATIC STRESS DISORDER): Primary | ICD-10-CM

## 2023-04-10 DIAGNOSIS — F41.1 GENERALIZED ANXIETY DISORDER: ICD-10-CM

## 2023-04-10 DIAGNOSIS — F33.1 MAJOR DEPRESSIVE DISORDER, RECURRENT EPISODE, MODERATE: ICD-10-CM

## 2023-04-10 PROCEDURE — 99214 OFFICE O/P EST MOD 30 MIN: CPT | Mod: 95,,, | Performed by: NURSE PRACTITIONER

## 2023-04-10 PROCEDURE — 99214 PR OFFICE/OUTPT VISIT, EST, LEVL IV, 30-39 MIN: ICD-10-PCS | Mod: 95,,, | Performed by: NURSE PRACTITIONER

## 2023-04-10 RX ORDER — VENLAFAXINE HYDROCHLORIDE 75 MG/1
225 CAPSULE, EXTENDED RELEASE ORAL DAILY
Qty: 90 CAPSULE | Refills: 5 | Status: SHIPPED | OUTPATIENT
Start: 2023-04-10 | End: 2023-08-17
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
## 2023-04-14 ENCOUNTER — PATIENT MESSAGE (OUTPATIENT)
Dept: PSYCHIATRY | Facility: CLINIC | Age: 53
End: 2023-04-14
Payer: COMMERCIAL

## 2023-04-17 ENCOUNTER — TELEPHONE (OUTPATIENT)
Dept: GASTROENTEROLOGY | Facility: CLINIC | Age: 53
End: 2023-04-17

## 2023-04-17 ENCOUNTER — OFFICE VISIT (OUTPATIENT)
Dept: GASTROENTEROLOGY | Facility: CLINIC | Age: 53
End: 2023-04-17
Payer: COMMERCIAL

## 2023-04-17 ENCOUNTER — LAB VISIT (OUTPATIENT)
Dept: LAB | Facility: HOSPITAL | Age: 53
End: 2023-04-17
Attending: INTERNAL MEDICINE
Payer: COMMERCIAL

## 2023-04-17 ENCOUNTER — PATIENT MESSAGE (OUTPATIENT)
Dept: OPTOMETRY | Facility: CLINIC | Age: 53
End: 2023-04-17
Payer: COMMERCIAL

## 2023-04-17 VITALS
DIASTOLIC BLOOD PRESSURE: 87 MMHG | SYSTOLIC BLOOD PRESSURE: 136 MMHG | HEIGHT: 59 IN | WEIGHT: 168 LBS | HEART RATE: 101 BPM | BODY MASS INDEX: 33.87 KG/M2

## 2023-04-17 DIAGNOSIS — R74.8 ELEVATED LIVER ENZYMES: Primary | ICD-10-CM

## 2023-04-17 DIAGNOSIS — R11.0 NAUSEA: ICD-10-CM

## 2023-04-17 DIAGNOSIS — I10 HYPERTENSION, UNSPECIFIED TYPE: ICD-10-CM

## 2023-04-17 DIAGNOSIS — M06.00 SERONEGATIVE RHEUMATOID ARTHRITIS: ICD-10-CM

## 2023-04-17 DIAGNOSIS — K21.9 GASTROESOPHAGEAL REFLUX DISEASE, UNSPECIFIED WHETHER ESOPHAGITIS PRESENT: ICD-10-CM

## 2023-04-17 DIAGNOSIS — L40.50 PSORIATIC ARTHRITIS: ICD-10-CM

## 2023-04-17 LAB
ALBUMIN SERPL BCP-MCNC: 4.8 G/DL (ref 3.5–5.2)
ALP SERPL-CCNC: 124 U/L (ref 55–135)
ALT SERPL W/O P-5'-P-CCNC: 40 U/L (ref 10–44)
ANION GAP SERPL CALC-SCNC: 13 MMOL/L (ref 8–16)
AST SERPL-CCNC: 34 U/L (ref 10–40)
BASOPHILS # BLD AUTO: 0.07 K/UL (ref 0–0.2)
BASOPHILS NFR BLD: 0.9 % (ref 0–1.9)
BILIRUB SERPL-MCNC: 0.5 MG/DL (ref 0.1–1)
BUN SERPL-MCNC: 15 MG/DL (ref 6–20)
CALCIUM SERPL-MCNC: 10.5 MG/DL (ref 8.7–10.5)
CHLORIDE SERPL-SCNC: 103 MMOL/L (ref 95–110)
CO2 SERPL-SCNC: 24 MMOL/L (ref 23–29)
CREAT SERPL-MCNC: 1 MG/DL (ref 0.5–1.4)
CRP SERPL-MCNC: 1.3 MG/L (ref 0–8.2)
DIFFERENTIAL METHOD: ABNORMAL
EOSINOPHIL # BLD AUTO: 0.1 K/UL (ref 0–0.5)
EOSINOPHIL NFR BLD: 1.7 % (ref 0–8)
ERYTHROCYTE [DISTWIDTH] IN BLOOD BY AUTOMATED COUNT: 13.7 % (ref 11.5–14.5)
ERYTHROCYTE [SEDIMENTATION RATE] IN BLOOD BY PHOTOMETRIC METHOD: 10 MM/HR (ref 0–36)
EST. GFR  (NO RACE VARIABLE): >60 ML/MIN/1.73 M^2
GLUCOSE SERPL-MCNC: 88 MG/DL (ref 70–110)
HBV CORE AB SERPL QL IA: NORMAL
HBV SURFACE AG SERPL QL IA: NORMAL
HCT VFR BLD AUTO: 39.9 % (ref 37–48.5)
HCV AB SERPL QL IA: NORMAL
HGB BLD-MCNC: 12.3 G/DL (ref 12–16)
IMM GRANULOCYTES # BLD AUTO: 0.02 K/UL (ref 0–0.04)
IMM GRANULOCYTES NFR BLD AUTO: 0.2 % (ref 0–0.5)
LYMPHOCYTES # BLD AUTO: 4 K/UL (ref 1–4.8)
LYMPHOCYTES NFR BLD: 49.4 % (ref 18–48)
MCH RBC QN AUTO: 28.1 PG (ref 27–31)
MCHC RBC AUTO-ENTMCNC: 30.8 G/DL (ref 32–36)
MCV RBC AUTO: 91 FL (ref 82–98)
MONOCYTES # BLD AUTO: 0.6 K/UL (ref 0.3–1)
MONOCYTES NFR BLD: 7.6 % (ref 4–15)
NEUTROPHILS # BLD AUTO: 3.3 K/UL (ref 1.8–7.7)
NEUTROPHILS NFR BLD: 40.2 % (ref 38–73)
NRBC BLD-RTO: 0 /100 WBC
PLATELET # BLD AUTO: 399 K/UL (ref 150–450)
PMV BLD AUTO: 9.6 FL (ref 9.2–12.9)
POTASSIUM SERPL-SCNC: 4.1 MMOL/L (ref 3.5–5.1)
PROT SERPL-MCNC: 7.7 G/DL (ref 6–8.4)
RBC # BLD AUTO: 4.38 M/UL (ref 4–5.4)
SODIUM SERPL-SCNC: 140 MMOL/L (ref 136–145)
WBC # BLD AUTO: 8.16 K/UL (ref 3.9–12.7)

## 2023-04-17 PROCEDURE — 86803 HEPATITIS C AB TEST: CPT | Performed by: INTERNAL MEDICINE

## 2023-04-17 PROCEDURE — 80053 COMPREHEN METABOLIC PANEL: CPT | Performed by: INTERNAL MEDICINE

## 2023-04-17 PROCEDURE — 1159F PR MEDICATION LIST DOCUMENTED IN MEDICAL RECORD: ICD-10-PCS | Mod: CPTII,S$GLB,, | Performed by: STUDENT IN AN ORGANIZED HEALTH CARE EDUCATION/TRAINING PROGRAM

## 2023-04-17 PROCEDURE — 1159F MED LIST DOCD IN RCRD: CPT | Mod: CPTII,S$GLB,, | Performed by: STUDENT IN AN ORGANIZED HEALTH CARE EDUCATION/TRAINING PROGRAM

## 2023-04-17 PROCEDURE — 86706 HEP B SURFACE ANTIBODY: CPT | Performed by: INTERNAL MEDICINE

## 2023-04-17 PROCEDURE — 3079F DIAST BP 80-89 MM HG: CPT | Mod: CPTII,S$GLB,, | Performed by: STUDENT IN AN ORGANIZED HEALTH CARE EDUCATION/TRAINING PROGRAM

## 2023-04-17 PROCEDURE — 85652 RBC SED RATE AUTOMATED: CPT | Performed by: INTERNAL MEDICINE

## 2023-04-17 PROCEDURE — 3075F SYST BP GE 130 - 139MM HG: CPT | Mod: CPTII,S$GLB,, | Performed by: STUDENT IN AN ORGANIZED HEALTH CARE EDUCATION/TRAINING PROGRAM

## 2023-04-17 PROCEDURE — 99214 OFFICE O/P EST MOD 30 MIN: CPT | Mod: S$GLB,,, | Performed by: STUDENT IN AN ORGANIZED HEALTH CARE EDUCATION/TRAINING PROGRAM

## 2023-04-17 PROCEDURE — 85025 COMPLETE CBC W/AUTO DIFF WBC: CPT | Performed by: INTERNAL MEDICINE

## 2023-04-17 PROCEDURE — 3079F PR MOST RECENT DIASTOLIC BLOOD PRESSURE 80-89 MM HG: ICD-10-PCS | Mod: CPTII,S$GLB,, | Performed by: STUDENT IN AN ORGANIZED HEALTH CARE EDUCATION/TRAINING PROGRAM

## 2023-04-17 PROCEDURE — 99999 PR PBB SHADOW E&M-EST. PATIENT-LVL V: ICD-10-PCS | Mod: PBBFAC,,, | Performed by: STUDENT IN AN ORGANIZED HEALTH CARE EDUCATION/TRAINING PROGRAM

## 2023-04-17 PROCEDURE — 3075F PR MOST RECENT SYSTOLIC BLOOD PRESS GE 130-139MM HG: ICD-10-PCS | Mod: CPTII,S$GLB,, | Performed by: STUDENT IN AN ORGANIZED HEALTH CARE EDUCATION/TRAINING PROGRAM

## 2023-04-17 PROCEDURE — 86140 C-REACTIVE PROTEIN: CPT | Performed by: INTERNAL MEDICINE

## 2023-04-17 PROCEDURE — 3008F BODY MASS INDEX DOCD: CPT | Mod: CPTII,S$GLB,, | Performed by: STUDENT IN AN ORGANIZED HEALTH CARE EDUCATION/TRAINING PROGRAM

## 2023-04-17 PROCEDURE — 99999 PR PBB SHADOW E&M-EST. PATIENT-LVL V: CPT | Mod: PBBFAC,,, | Performed by: STUDENT IN AN ORGANIZED HEALTH CARE EDUCATION/TRAINING PROGRAM

## 2023-04-17 PROCEDURE — 86480 TB TEST CELL IMMUN MEASURE: CPT | Performed by: INTERNAL MEDICINE

## 2023-04-17 PROCEDURE — 86704 HEP B CORE ANTIBODY TOTAL: CPT | Performed by: INTERNAL MEDICINE

## 2023-04-17 PROCEDURE — 3008F PR BODY MASS INDEX (BMI) DOCUMENTED: ICD-10-PCS | Mod: CPTII,S$GLB,, | Performed by: STUDENT IN AN ORGANIZED HEALTH CARE EDUCATION/TRAINING PROGRAM

## 2023-04-17 PROCEDURE — 36415 COLL VENOUS BLD VENIPUNCTURE: CPT | Performed by: INTERNAL MEDICINE

## 2023-04-17 PROCEDURE — 87340 HEPATITIS B SURFACE AG IA: CPT | Performed by: INTERNAL MEDICINE

## 2023-04-17 PROCEDURE — 99214 PR OFFICE/OUTPT VISIT, EST, LEVL IV, 30-39 MIN: ICD-10-PCS | Mod: S$GLB,,, | Performed by: STUDENT IN AN ORGANIZED HEALTH CARE EDUCATION/TRAINING PROGRAM

## 2023-04-17 RX ORDER — PROMETHAZINE HYDROCHLORIDE 25 MG/1
25 TABLET ORAL EVERY 6 HOURS PRN
Qty: 30 TABLET | Refills: 0 | Status: SHIPPED | OUTPATIENT
Start: 2023-04-17 | End: 2023-07-13

## 2023-04-17 NOTE — PROGRESS NOTES
[facility] Follow-UP Note    Reason for Follow-Up:  IBS, GERD     PCP:   Primary Doctor No         HPI:  This is a 53 y.o. female last seen in GI clinic in 2021 by Dr He. She has a PMHx of RA, SLE, GERD, Depression, FM. She was following in GI clinic for history of GERD and IBS-D. She was managed in the past with alosetron which she was using PRN. Never had any issues with ischemic colitis.  Doing well with no abdominal pains.  She does have chronic heartburn, managed on Aciphex without any issues. Last EGD in 2021 showed some retained gastric contents but otherwise normal. Small bowel biopsies were negative for celiac. Most recent abdominal imaging is a CT scan from 4/2022 which showed hepatic steatosis and stool burden.        Interval History:  She presents today mostly for medication refill as she has run out of phenergan. She has not been using the alosetron and she seems to have more issues now with constipation. Not on a bowel regimen. No blood in stool. Most recent blood work showed normal CBC and lipase. CMP showed mild elevation in liver enzymes which is new compared to prior.     ROS:  Constitutional: No fevers, chills, No weight loss  CV: No chest pain  Pulm: No cough, No shortness of breath  GI: see HPI    PMH, PSH, SH, FH reviewed   Past Medical History:   Diagnosis Date    Acid reflux     Allergy     Alopecia     Anxiety     Arthralgia     Back pain     Depression     Dry eyes     from meds    Fever blister     Fibromyalgia     Interstitial cystitis     Irritable bowel syndrome     Kidney stone     Major depressive disorder, recurrent episode, in partial or unspecified remission 6/25/2013    OAB (overactive bladder) 7/21/2015    PTSD (post-traumatic stress disorder)     Pure hypercholesterolemia 7/6/2022    Rheumatoid arthritis     Rheumatoid arthritis 6/21/2022    Systemic lupus erythematosus     Thyroid disease     Urinary tract infection     Vaginal infection           All:   Review of patient's allergies indicates:   Allergen Reactions    Cephalexin Itching    Cephalosporins     Zofran [ondansetron hcl (pf)] Hives    Penicillins Rash       Current Outpatient Rx   Medication Sig Dispense Refill    alosetron (LOTRONEX) 0.5 MG tablet TAKE 1 TABLET(0.5 MG) BY MOUTH DAILY AS NEEDED 30 tablet 0    amitriptyline (ELAVIL) 10 MG tablet TAKE 1 TABLET(10 MG) BY MOUTH EVERY NIGHT AS NEEDED 90 tablet 3    budesonide (ENTOCORT EC) 3 mg capsule TAKE 3 CAPSULES(9 MG) BY MOUTH EVERY DAY FOR 10 DAYS 30 capsule 3    butalbitaL-acetaminophen  mg Tab TAKE 1 TABLET BY MOUTH EVERY 4 HOURS 60 tablet 3    calcium glycerophosphate (PRELIEF) 65 mg Tab Take 2 tablets by mouth before meals and at bedtime as needed. (Patient taking differently: Take 2 tablets by mouth 3 (three) times daily with meals.) 100 each prn    cetirizine (ZYRTEC) 10 MG tablet Take 1 tablet (10 mg total) by mouth once daily. 120 tablet 2    clonazePAM (KLONOPIN) 1 MG tablet TAKE 1 TABLET(1 MG) BY MOUTH TWICE DAILY 60 tablet 0    cyclobenzaprine (FLEXERIL) 10 MG tablet Take 1 tablet (10 mg total) by mouth 3 (three) times daily as needed for Muscle spasms. 90 tablet 6    dicyclomine (BENTYL) 20 mg tablet Take 20 mg by mouth daily as needed.      famotidine (PEPCID) 40 MG tablet TAKE 1 TABLET(40 MG) BY MOUTH EVERY NIGHT AS NEEDED FOR HEARTBURN 30 tablet 11    fluticasone propionate (FLONASE) 50 mcg/actuation nasal spray 2 sprays (100 mcg total) by Each Nostril route 2 (two) times daily. 31.6 mL 1    gabapentin (NEURONTIN) 800 MG tablet TAKE 1 TABLET(800 MG) BY MOUTH THREE TIMES DAILY 90 tablet 5    hydrOXYzine HCL (ATARAX) 25 MG tablet 1-4 tablets as needed for itching 240 tablet 3    lamoTRIgine (LAMICTAL) 200 MG tablet       levothyroxine (SYNTHROID) 50 MCG tablet TAKE 1 TABLET(50 MCG) BY MOUTH EVERY DAY 30 tablet 6    wazqts-qciaf-h.blue-sal-naphos (USTELL) 120-0.12 mg Cap Take 1 capsule by mouth 3 (three) times daily.  "(Patient taking differently: Take 1 capsule by mouth 2 (two) times a day.) 90 each 3    oxybutynin (DITROPAN) 5 MG Tab TAKE 1 TABLET(5 MG) BY MOUTH THREE TIMES DAILY 90 tablet 3    RABEprazole (ACIPHEX) 20 mg tablet TAKE 1 TABLET(20 MG) BY MOUTH EVERY DAY 30 tablet 2    tiZANidine (ZANAFLEX) 4 MG tablet Take 4 mg by mouth 3 (three) times daily.      traMADoL (ULTRAM) 50 mg tablet TAKE 1 TABLET(50 MG) BY MOUTH EVERY 6 HOURS 90 tablet 3    traZODone (DESYREL) 50 MG tablet Take 1 tablet (50 mg total) by mouth nightly as needed for Insomnia. 30 tablet 2    upadacitinib (RINVOQ) 15 mg 24 hr tablet Take 1 tablet (15 mg total) by mouth once daily. 30 tablet 11    venlafaxine (EFFEXOR-XR) 75 MG 24 hr capsule Take 3 capsules (225 mg total) by mouth once daily. 90 capsule 5    ciprofloxacin HCl (CIPRO) 500 MG tablet Take 1 tablet (500 mg total) by mouth every 12 (twelve) hours. (Patient not taking: Reported on 4/17/2023) 14 tablet 0    hydrocortisone 2.5 % cream Apply to affected areas twice a day when eczema is moderate. (Patient not taking: Reported on 4/17/2023) 453.6 g 1    predniSONE (DELTASONE) 2.5 MG tablet 1 to 2 tabs PO daily prn for arthritis flare (Patient not taking: Reported on 4/17/2023) 60 tablet 0    promethazine (PHENERGAN) 25 MG suppository Place 1 suppository (25 mg total) rectally every 6 (six) hours as needed for Nausea. (Patient not taking: Reported on 4/17/2023) 10 suppository 0    promethazine (PHENERGAN) 25 MG tablet Take 1 tablet (25 mg total) by mouth every 6 (six) hours as needed for Nausea. 30 tablet 0    traMADoL (ULTRAM) 50 mg tablet Take 50 mg by mouth 4 (four) times daily as needed.      XELJANZ XR 11 mg Tb24 Take 1 tablet by mouth.         Objective Findings:    Vital Signs:  /87 (BP Location: Right arm, Patient Position: Sitting, BP Method: Small (Automatic))   Pulse 101   Ht 4' 11" (1.499 m)   Wt 76.2 kg (167 lb 15.9 oz)   LMP  (LMP Unknown)   BMI 33.93 kg/m²   Body mass index is " 33.93 kg/m².    Physical Exam:  General Appearance: Well appearing in no acute distress  Head:   Normocephalic, without obvious abnormality  Eyes:    No scleral icterus    Lungs: CTA bilaterally in anterior and posterior fields   Heart:  Regular rate and rhythm, no murmurs heard  Abdomen: Soft, non tender, non distended with positive bowel sounds in all four quadrants.       Labs:  Lab Results   Component Value Date    WBC 11.83 03/31/2023    HGB 13.4 03/31/2023    HCT 40.5 03/31/2023     03/31/2023    CHOL 247 (H) 08/18/2022    TRIG 115 08/18/2022    HDL 70 08/18/2022    ALT 59 (H) 03/31/2023    AST 53 (H) 03/31/2023     03/31/2023    K 4.2 03/31/2023     03/31/2023    CREATININE 1.1 03/31/2023    BUN 11 03/31/2023    CO2 18 (L) 03/31/2023    TSH 2.432 08/18/2022    INR 1.0 06/10/2017    HGBA1C 5.4 08/18/2022       Imaging:  Reviewed     CT 4/2022   FINDINGS:  Stimulator over the left piriformis muscle with generator in the left buttocks appears unremarkable with no adjacent fluid collection or inflammation of the generator or lead.     The lung bases are clear.  The heart and pericardium appear unremarkable to the extent visualized.     The liver demonstrates mild steatosis without focal mass or biliary ductal dilatation.  The gallbladder and bile ducts appear unremarkable.     The pancreas, spleen, adrenal glands, right and left kidneys aorta and vena cava appear normal.  There is no intra-abdominal mass, lymph node enlargement, free air or free fluid.     Prominent waste material throughout the colon is noted.  There is no zone of transition or inflammation.  Small intestines appear unremarkable.  The urinary bladder appears normal.  The uterus is absent.     Bony structures are intact with mild spondylosis.  The abdominal wall appears intact.     Impression:     A bladder stimulator and lead in the left flank and pelvis unremarkable without inflammation mass or fluid collection adjacent to  them.     Prominent waste material.  Correlate for constipation.     Hepatic steatosis without bile duct dilatation.  Endoscopy:      EGD 2021   Findings:        The Z-line was regular and was found 35 cm from the incisors.        The examined esophagus was normal.        A medium amount of food (residue) was found in the gastric body.        The exam of the stomach was otherwise normal.        The examined duodenum was normal. Biopsies were taken with a cold        forceps for histology. Estimated blood loss was minimal.     SMALL INTESTINE, BIOPSY:   - Benign small intestinal mucosa with focal active inflammation, see comment.   - No evidence of villous architectural distortion, increased intraepithelial   lymphocytes, granulomas or malignancy.   - No Helicobacter type organisms identified on the *Helicobacter stain.   - Multiple levels examined.   COMMENT:  Sections reveal a focal neutrophilic infiltrate within a few deep   glands.  There is no evidence of villous blunting or increased   intraepithelial lymphocytes.  The inflamed glands reveal reactive epithelial   changes.  Possible underlying etiologies include peptic ulcer disease,   infectious gastroenteritis and NSAID injury.  Clinical correlation   recommended.     Assessment:    IBS, Constipation predominant currently    GERD  Nausea     Patient with history of IBS which was previously diarrhea predominant but now more constipation. Increased stool burden on last CT. Discussed how to start a bowel regimen of fiber and miralax and how to increase as needed for goal stool consistency. She is on several medications which are likely contributing to constipation. Told her she should not be using the alosetron if not having diarrhea. For the new findings of elevated liver enzymes will check US. No NSAID use. Repeat CMP today. Consider hep referral if still elevated.      Recommendations:    - Us Ab   - Repeat CMP, CBC   - Continue PPI, discussed how to take, her  symptoms are well controlled   - Start bowel regimen with fiber and miralax, given handout   - Patient states her rheumatologist recommended gluten free diet based on DQ2 HLA phenotyping but she has had negative celiac panel in the past and recent EGD with small bowel biopsies are not consistent with celiac disease   - Referral to GI nutrition for IBS diet/low FODMAP     RTC 3 months       Order summary:  Orders Placed This Encounter    US Abdomen Complete    promethazine (PHENERGAN) 25 MG tablet         Thank you so much for allowing me to participate in the care of Marilee Jenkins MD  Gastroenterology   [facility]

## 2023-04-17 NOTE — PATIENT INSTRUCTIONS
Start daily fiber.  Take 1 tsp of fiber powder (psyllium or other sugar-free powder).  Mix in 8 oz of water.  Take x 3-5 days.  Then, increase fiber by 1 tsp every 3-5 days until stool is easy to pass.  Stop and continue at that dose.   Do not exceed 6 tsps/day. GOAL:  More well-formed stool (one continuous well-formed piece vs. Pellets) and minimize straining with initiation.    Start miralax daily, can increase to BID   _____________________________________________________________________________________________________    [facility]  DIET RECOMMENDATIONS    Fruits:  Use all fruits and juices liberally; fresh, cooked, dried or canned. Eat fruit raw and with skins when possible. Have at least four servings of fruit daily including a citrus fruit and a stewed dried fruit. Hard seeds of fruits (berries, figs, Grapes, mangoes, tomatoes) etc. may be removed.    Vegetables: Use all vegetables liberally. Green leafy vegetables, such as cabbage, spinach, lettuce, broccoli, and other greens are particularly good.    Potato: As desired. Serve baked frequently and eat the skin. Other starchy foods such as rice, macaroni, etc., may be occasionally substituted. Chew popcorn well and do not eat hard kernels.    Meat, Fish, Poultry: One or two servings daily.    Eggs: One daily if you are not on a low cholesterol diet.    Milk: Include at least one-half pint daily. Whole milk or skimmed may be used.     Cereals: Use whole grain breads and cereals such as bran, bran flakes, grape nut flakes, puffed wheat, oatmeal, Wheaties, etc., as much as possible. Refined breaks and cereals are not restricted; however, they do not contain the bulk necessary for this diet.     Sugars, Sweets: Use very moderately. Depend on fruit as dessert.    Fats: Use butter or margarine as desired. Oil or dressing on salads as desired. Fried foods may be used in moderation. Nuts may be eaten if you chew them well and may be ground or finely  chopped for cooking.   Sample Menu                                                                                 Breakfast                          Lunch  Orange juice, 4 ounces                                                Vegetable soup                    Stewed fruit                                                                 Fresh fruit plate with cottage cheese  Shredded wheat                                                           Whole wheat toast  Scrambled eggs                                                           Butter  Whole wheat toast                                                       Coffee or tea  Dinner                                                                         Bedtime  Large glass tomato juice                                             1 glass milk  Roast beef                                                                   stewed fruit  Baked potato with skin  Buttered spinach  Raw vegetable salad  Baked apple with skin   Coffee or tea      [facility]  High Fiber Diet    15 grams of fiber per day is recommended  Fiber cereal = 5 grams (Raisin Bran, Shredded Wheat, Grape Nuts)  Konsyl 1 teaspoon = 6 grams  Metamucil 1 tablespoon = 3 grams  Citrucel 1 tablespoon = 2 grams  Fiber Choice = 3-4 per day    This diet furnishes adequate amounts of all the essential nutrients needed by the body and a very liberal fiber or roughage content. Roughage is indigestible fiber found in fruits, vegetables and whole grain cereals. It provides bulk to the large intestine and, accompanied by an adequate fluid intake, is a stimulant to elimination. Regular eating and elimination habits are vital to good health.     How to Increase Your Fiber Intake    Drink at least 4-5 glasses of liquids per day or the fiber can be constipating rather then stimulating to your gut.  Boil and bake potatoes in their skin. Eat the skin, too.  Include fresh fruits and raw vegetables in your  daily diet. Raw fruits and vegetables have more useful fiber than those that have been peeled, cooked, pureed, or otherwise processed.  Eat a wide variety of fibrous foods in reasonable amounts. Increase fiber intake slowly especially if you have been on a low-fiber diet.  Eat more legumes-peas, beans, soybeans.  For snacks, try dried fruit, whole wheat and rye crackers.  Avoid instant-cook hot cereals. Use the longer cooking cereals.  Use bran whenever possible. Sprinkle it on top of cereal, mix it into mashed potatoes or hamburger meat, or use it in combination dishes such as meat loaf.   Substitute whole grain, whole wheat and bran products for white flour products.  Eat slowly and chew your food thoroughly.

## 2023-04-17 NOTE — TELEPHONE ENCOUNTER
----- Message from Naomi Lindsay MA sent at 4/17/2023  3:24 PM CDT -----    ----- Message -----  From: Bryanna Jenkins MD  Sent: 4/17/2023   1:50 PM CDT  To: [facility] Staff    Patient would like to see GI nutrition for IBS/Low FODMAP diet

## 2023-04-18 LAB
GAMMA INTERFERON BACKGROUND BLD IA-ACNC: 0 IU/ML
M TB IFN-G CD4+ BCKGRND COR BLD-ACNC: 0 IU/ML
MITOGEN IGNF BCKGRD COR BLD-ACNC: 5.51 IU/ML
TB GOLD PLUS: NEGATIVE
TB2 - NIL: 0 IU/ML

## 2023-04-18 RX ORDER — PROMETHAZINE HYDROCHLORIDE 25 MG/1
TABLET ORAL
Qty: 30 TABLET | Refills: 0 | OUTPATIENT
Start: 2023-04-18

## 2023-04-19 ENCOUNTER — PATIENT MESSAGE (OUTPATIENT)
Dept: PSYCHIATRY | Facility: CLINIC | Age: 53
End: 2023-04-19
Payer: COMMERCIAL

## 2023-04-19 ENCOUNTER — OFFICE VISIT (OUTPATIENT)
Dept: PRIMARY CARE CLINIC | Facility: CLINIC | Age: 53
End: 2023-04-19
Payer: COMMERCIAL

## 2023-04-19 VITALS
BODY MASS INDEX: 33.6 KG/M2 | HEIGHT: 59 IN | SYSTOLIC BLOOD PRESSURE: 138 MMHG | DIASTOLIC BLOOD PRESSURE: 74 MMHG | HEART RATE: 109 BPM | WEIGHT: 166.69 LBS | OXYGEN SATURATION: 97 %

## 2023-04-19 DIAGNOSIS — I20.89 ANGINAL EQUIVALENT: ICD-10-CM

## 2023-04-19 DIAGNOSIS — F33.1 MAJOR DEPRESSIVE DISORDER, RECURRENT EPISODE, MODERATE: ICD-10-CM

## 2023-04-19 DIAGNOSIS — K58.0 IRRITABLE BOWEL SYNDROME WITH DIARRHEA: Primary | ICD-10-CM

## 2023-04-19 PROCEDURE — 3078F PR MOST RECENT DIASTOLIC BLOOD PRESSURE < 80 MM HG: ICD-10-PCS | Mod: CPTII,S$GLB,, | Performed by: STUDENT IN AN ORGANIZED HEALTH CARE EDUCATION/TRAINING PROGRAM

## 2023-04-19 PROCEDURE — 3078F DIAST BP <80 MM HG: CPT | Mod: CPTII,S$GLB,, | Performed by: STUDENT IN AN ORGANIZED HEALTH CARE EDUCATION/TRAINING PROGRAM

## 2023-04-19 PROCEDURE — 3008F PR BODY MASS INDEX (BMI) DOCUMENTED: ICD-10-PCS | Mod: CPTII,S$GLB,, | Performed by: STUDENT IN AN ORGANIZED HEALTH CARE EDUCATION/TRAINING PROGRAM

## 2023-04-19 PROCEDURE — 3008F BODY MASS INDEX DOCD: CPT | Mod: CPTII,S$GLB,, | Performed by: STUDENT IN AN ORGANIZED HEALTH CARE EDUCATION/TRAINING PROGRAM

## 2023-04-19 PROCEDURE — 99214 PR OFFICE/OUTPT VISIT, EST, LEVL IV, 30-39 MIN: ICD-10-PCS | Mod: S$GLB,,, | Performed by: STUDENT IN AN ORGANIZED HEALTH CARE EDUCATION/TRAINING PROGRAM

## 2023-04-19 PROCEDURE — 99999 PR PBB SHADOW E&M-EST. PATIENT-LVL V: CPT | Mod: PBBFAC,,, | Performed by: STUDENT IN AN ORGANIZED HEALTH CARE EDUCATION/TRAINING PROGRAM

## 2023-04-19 PROCEDURE — 99214 OFFICE O/P EST MOD 30 MIN: CPT | Mod: S$GLB,,, | Performed by: STUDENT IN AN ORGANIZED HEALTH CARE EDUCATION/TRAINING PROGRAM

## 2023-04-19 PROCEDURE — 99999 PR PBB SHADOW E&M-EST. PATIENT-LVL V: ICD-10-PCS | Mod: PBBFAC,,, | Performed by: STUDENT IN AN ORGANIZED HEALTH CARE EDUCATION/TRAINING PROGRAM

## 2023-04-19 PROCEDURE — 3075F SYST BP GE 130 - 139MM HG: CPT | Mod: CPTII,S$GLB,, | Performed by: STUDENT IN AN ORGANIZED HEALTH CARE EDUCATION/TRAINING PROGRAM

## 2023-04-19 PROCEDURE — 3075F PR MOST RECENT SYSTOLIC BLOOD PRESS GE 130-139MM HG: ICD-10-PCS | Mod: CPTII,S$GLB,, | Performed by: STUDENT IN AN ORGANIZED HEALTH CARE EDUCATION/TRAINING PROGRAM

## 2023-04-20 LAB
HBV SURFACE AB SER QL IA: POSITIVE
HBV SURFACE AB SERPL IA-ACNC: 921 MIU/ML

## 2023-04-23 ENCOUNTER — PATIENT MESSAGE (OUTPATIENT)
Dept: RHEUMATOLOGY | Facility: CLINIC | Age: 53
End: 2023-04-23
Payer: COMMERCIAL

## 2023-04-25 ENCOUNTER — NUTRITION (OUTPATIENT)
Dept: GASTROENTEROLOGY | Facility: CLINIC | Age: 53
End: 2023-04-25
Payer: COMMERCIAL

## 2023-04-25 ENCOUNTER — OFFICE VISIT (OUTPATIENT)
Dept: OPTOMETRY | Facility: CLINIC | Age: 53
End: 2023-04-25
Payer: COMMERCIAL

## 2023-04-25 DIAGNOSIS — M79.7 FIBROMYALGIA: ICD-10-CM

## 2023-04-25 DIAGNOSIS — N30.10 INTERSTITIAL CYSTITIS: ICD-10-CM

## 2023-04-25 DIAGNOSIS — Z46.0 FITTING AND ADJUSTMENT OF SPECTACLES AND CONTACT LENSES: Primary | ICD-10-CM

## 2023-04-25 DIAGNOSIS — H52.4 PRESBYOPIA: ICD-10-CM

## 2023-04-25 DIAGNOSIS — K58.0 IRRITABLE BOWEL SYNDROME WITH DIARRHEA: ICD-10-CM

## 2023-04-25 DIAGNOSIS — K21.9 GASTROESOPHAGEAL REFLUX DISEASE, UNSPECIFIED WHETHER ESOPHAGITIS PRESENT: Primary | ICD-10-CM

## 2023-04-25 DIAGNOSIS — H04.123 DRY EYE SYNDROME, BILATERAL: Primary | ICD-10-CM

## 2023-04-25 PROCEDURE — 92015 DETERMINE REFRACTIVE STATE: CPT | Mod: S$GLB,,, | Performed by: OPTOMETRIST

## 2023-04-25 PROCEDURE — 92310 PR CONTACT LENS FITTING (NO CHANGE): ICD-10-PCS | Mod: CSM,S$GLB,, | Performed by: OPTOMETRIST

## 2023-04-25 PROCEDURE — 99999 PR PBB SHADOW E&M-EST. PATIENT-LVL I: CPT | Mod: PBBFAC,,, | Performed by: OPTOMETRIST

## 2023-04-25 PROCEDURE — 99204 OFFICE O/P NEW MOD 45 MIN: CPT | Mod: S$GLB,,, | Performed by: OPTOMETRIST

## 2023-04-25 PROCEDURE — 99999 PR PBB SHADOW E&M-EST. PATIENT-LVL I: ICD-10-PCS | Mod: PBBFAC,,, | Performed by: OPTOMETRIST

## 2023-04-25 PROCEDURE — 99999 PR PBB SHADOW E&M-EST. PATIENT-LVL I: ICD-10-PCS | Mod: PBBFAC,,,

## 2023-04-25 PROCEDURE — 99999 PR PBB SHADOW E&M-EST. PATIENT-LVL I: CPT | Mod: PBBFAC,,,

## 2023-04-25 PROCEDURE — 99204 PR OFFICE/OUTPT VISIT, NEW, LEVL IV, 45-59 MIN: ICD-10-PCS | Mod: S$GLB,,, | Performed by: OPTOMETRIST

## 2023-04-25 PROCEDURE — 1159F PR MEDICATION LIST DOCUMENTED IN MEDICAL RECORD: ICD-10-PCS | Mod: CPTII,S$GLB,, | Performed by: OPTOMETRIST

## 2023-04-25 PROCEDURE — 92310 CONTACT LENS FITTING OU: CPT | Mod: CSM,S$GLB,, | Performed by: OPTOMETRIST

## 2023-04-25 PROCEDURE — 92015 PR REFRACTION: ICD-10-PCS | Mod: S$GLB,,, | Performed by: OPTOMETRIST

## 2023-04-25 PROCEDURE — 1159F MED LIST DOCD IN RCRD: CPT | Mod: CPTII,S$GLB,, | Performed by: OPTOMETRIST

## 2023-04-25 PROCEDURE — 99999 PR PBB SHADOW E&M-EST. PATIENT-LVL III: CPT | Mod: PBBFAC,,, | Performed by: OPTOMETRIST

## 2023-04-25 PROCEDURE — 99999 PR PBB SHADOW E&M-EST. PATIENT-LVL III: ICD-10-PCS | Mod: PBBFAC,,, | Performed by: OPTOMETRIST

## 2023-04-25 NOTE — PROGRESS NOTES
"Referring Provider: Bryanna Jenkins MD  Reason for Nutrition Referral: Irritable Bowel Syndrome and GERD; patient voices interest in gluten free diet ( - Biopsy for celiac ) but notes she does not cook and all meals are purchased outside the home by  ( fast food, fried foods )     Patient Information: Marilee Simons 53 y.o.-year-old White female   Nutrition-related concerns: Migraines , Interstitial cystitis , Lupus, Hashimoto's          A = Nutrition Assessment  Anthropometric Data Estimated body mass index is 33.66 kg/m² as calculated from the following:    Height as of 4/19/23: 4' 11" (1.499 m).    Weight as of 4/19/23: 75.6 kg (166 lb 10.7 oz).  Ideal Body Weight (IBW): 98lbs 170%I BW   Last 3 Weights:   Wt Readings from Last 3 Encounters:   04/19/23 75.6 kg (166 lb 10.7 oz)   04/17/23 76.2 kg (167 lb 15.9 oz)   03/31/23 77.1 kg (170 lb)     Relevant Wt hx:   Biochemical Data Labs:   Lab Results   Component Value Date    [id number] 46 08/18/2022    [id number] 773 10/23/2018     Lab Results   Component Value Date    HGB 12.3 04/17/2023    HCT 39.9 04/17/2023     Lab Results   Component Value Date    ALBUMIN 4.8 04/17/2023     Hemoglobin A1C   Date Value Ref Range Status   08/18/2022 5.4 4.0 - 5.6 % Final     Comment:     ADA Screening Guidelines:  5.7-6.4%  Consistent with prediabetes  >or=6.5%  Consistent with diabetes    High levels of fetal hemoglobin interfere with the HbA1C  assay. Heterozygous hemoglobin variants (HbS, HgC, etc)do  not significantly interfere with this assay.   However, presence of multiple variants may affect accuracy.       Meds: Started Miralax and fiber supplement several days ago per Dr Jenkins recommendation - notes has not had BM in several days    Dietary Data  Appetite: Good- history of self soothing with sweets, starches, fried foods ,chocolate ; grew up eating quick meals prepared on her own or by siblings  - hot dogs, sandwiches, etc , few vegetables and fruit ; "  will bring home fast food at end of day- Pizza, salad, fried chicken, fried seafood . Notes bloating occurs with high fat foods , desserts, dairy . She notes her depression interferes with exercise or food preparation, grocery shopping, etc . Drinks adequate fluid as water and coffee with milk added . Drinks alcohol during holidays . Counseled by Urologist to avoid acidic foods - orange juice, coffee etc.   Dietary Intake:  Breakfast:Eggs and grits with extra fat added ; Coffee with Lactaid milk and sugar added ; oatmeal with milk added - prepared by    Lunch: will often skip lunch and consume snack cookies, pretzels , chocolate etc    Dinner-  will bring home from work- Fried chicken or shrimp, pasta, pizza , salad   Overall impression of dietary recall:   Use of starches, fat and sugar to maintain mood triggers bloating . Reliance on prepared foods limits control over components of diet . Desires weight loss, less sugar intake , more salads, less gluten - but difficulty activating changes due to chronic depressed mood    Other Data:  Supplements/ MVI:none                   Food Allergies/intolerances: Fat and lactose triggers bloating   Review of patient's allergies indicates:   Allergen Reactions    Cephalexin Itching    Cephalosporins     Zofran [ondansetron hcl (pf)] Hives    Penicillins Rash     Current Activity Level:sedentary - would like to exercise but difficulty with motivation to join Fibromyalgia group at [facility] ; difficult to walk in neighborhood due to fear of animals, etc   Dx: IBS     D = Nutrition Diagnosis   Patient Assessment: Patient knowledge of healthy eating and exercise patterns was assessed to be the best patient can do with current mental health issues.  Focused on use of liquid meal replacement ( Premier Protein shake or Owyn ) which could serve as easy to digest product with sweet taste and less fat and sugar. Reviewed Fodmap foods for those  which are also interstitial cystitis friendly . In terms of gluten free interest - we discussed options such as rice, potatoes, corn pasta , rice noodles , sourdough bread, rice crackers - if outside foods ordered - avoid breaded , fried and sandwich products .     Discussed use of crock pot /instapot for cooking simple meals without having to stand for food prep and frozen dinners lower in fat .Also discussed need for vitamin and mineral supplementation given limited intake of fruit and vegetables      Patient verbalized understanding.  Provided RD contact information for concerns or questions. Further education will be required to support efforts to make changes.  Expect fair  compliance with dietary recommendations at this time.     Primary Problem: Altered GI function  and Food and nutrition related knowledge deficit   Etiology: Related to self-limitation of foods/food groups due to food preference  Signs/symptoms: As evidenced by frequent, excessive fast food/restaurant intake per diet recall.     Education Materials provided:   1. Academy of Nutrition and Dietetics/NCM: Low-FODMAP Nutrition Therapy  2. Interstitial Cystitis Dietary Guidelines   3. Suggestions for meal replacements low Fodmap, lactose free      I = Nutrition Intervention  Calorie Requirements:1500 kcal/day (20-25 kcal/kg) *using actual body weight   Protein requirements: 60 g/day (0.8-1.0 g/kg) *using IBW  Recommendation #1 Include more fruits/vegetables, whole grains, or low-fat dairy in daily food choices.    Recommendation #2  1 MVI daily.    Recommendation #3 Avoid fried foods and high calorie beverages. Aim for limiting fast food to 1-2x/month or less.     Recommendation #4 Meal replacement    Recommendation #5 Address with Mental health provider current difficulty in making changes as outlined above      M = Nutrition Monitoring   Indicator 1. Diet recall    Indicator 2. Weight/BMI      E= Nutrition Evaluation   Goal 1. Diet recall shows  good compliance with recommendations reviewed during session    Goal 2. Weight shows trend towards goal of  weight stability      Consultation Time: 60  Minutes  Follow Up: Patient provided with Dietitian contact number and advised to call or make future appointment if further consultation is needed/desired.    Communication with provider via Epic  Rocio Stewart RD, MPH, CDE

## 2023-04-26 NOTE — PROGRESS NOTES
HPI    DLS: 1/31/20    No eyedrops  S/p Lasik 2010    Pt here for routine eye exam . Pt without visual complaints today OU.  Pt   denies flashes, floaters, headaches or eye pain OU.  Pt denies itching,   tearing or burning OU.     Last edited by Nishi Fry MA on 4/25/2023  2:19 PM.            Assessment /Plan     For exam results, see Encounter Report.    Dry eye syndrome, bilateral  S/P LASIK (laser assisted in situ keratomileusis) of both eyes  -     lifitegrast (XIIDRA) 5 % Dpet; Apply 1 drop to eye every 12 (twelve) hours.  Dispense: 180 each; Refill: 4      Fibromyalgia  Pain in joints  Pt no longer taking Plaquenil              2018 OCT WNL , HVF 10-2 WNL             Presbyopia  Hyperopia, bilateral    Rx specs    Fitting and adjustment of spectacles and contact lenses              Dispensed trials of biofinity toric OD only for near vision                          monovision OD near, OS distance no lens    Order trials of dailies for pt to try   Ok to order if happy with vision and comfort OU     RTC 1 year

## 2023-04-27 ENCOUNTER — PATIENT MESSAGE (OUTPATIENT)
Dept: OPTOMETRY | Facility: CLINIC | Age: 53
End: 2023-04-27
Payer: COMMERCIAL

## 2023-04-27 ENCOUNTER — PATIENT MESSAGE (OUTPATIENT)
Dept: PSYCHIATRY | Facility: CLINIC | Age: 53
End: 2023-04-27
Payer: COMMERCIAL

## 2023-04-28 ENCOUNTER — HOSPITAL ENCOUNTER (OUTPATIENT)
Dept: RADIOLOGY | Facility: HOSPITAL | Age: 53
Discharge: HOME | End: 2023-04-28
Attending: STUDENT IN AN ORGANIZED HEALTH CARE EDUCATION/TRAINING PROGRAM
Payer: COMMERCIAL

## 2023-04-28 DIAGNOSIS — R74.8 ELEVATED LIVER ENZYMES: ICD-10-CM

## 2023-04-28 PROCEDURE — 76700 US ABDOMEN COMPLETE: ICD-10-PCS | Mod: 26,,, | Performed by: INTERNAL MEDICINE

## 2023-04-28 PROCEDURE — 76700 US EXAM ABDOM COMPLETE: CPT | Mod: 26,,, | Performed by: INTERNAL MEDICINE

## 2023-04-28 PROCEDURE — 76700 US EXAM ABDOM COMPLETE: CPT | Mod: TC

## 2023-04-30 NOTE — PROGRESS NOTES
SUBJECTIVE     Chief Complaint   Patient presents with    Establish Care       HPI  Marilee Simons is a 53 y.o. female with RA, SLE, GERD, Depression, MDD, PTSDand IBS that presents for IBS. Pt is establishing with me today.    Pt is UTD on age appropriate CA screening.    Family, social, surgical Hx reviewed     Health Maintenance         Date Due Completion Date    Pneumococcal Vaccines (Age 0-64) (1 - PCV) Never done ---    Aspirin/Antiplatelet Therapy Never done ---    Shingles Vaccine (1 of 2) Never done ---    TETANUS VACCINE 08/08/2017 8/8/2007    Influenza Vaccine (1) Never done ---    Mammogram 10/04/2023 10/4/2022    Hemoglobin A1c (Diabetic Prevention Screening) 08/18/2025 8/18/2022    Lipid Panel 08/18/2027 8/18/2022    Colorectal Cancer Screening 10/05/2027 10/5/2017    Abdominal Aortic Aneurysm Screening 02/12/2033 4/28/2023            Chronic Conditions:    IBS-D:  GI-Dr. Jenkins, last seen 4/17/2023  Previous Diarrhea prominent but now more episodes of constipation  Fiber and Miralax started by GI to titrate to correct consistency.  PPI daily  GI Nutrition appt upcoming.    Anginal equivalent: stress test ordered by cardiologist.    MDD: Lamictal 200mg daily, Klonopin 1mg daily, amitriptyline 10mg daily, venlafaxine 75 mg daily, and trazodone 50mg nightly prn. Follows regularly with Psychiatry who manages.    PAST MEDICAL HISTORY:  Past Medical History:   Diagnosis Date    Acid reflux     Allergy     Alopecia     Anxiety     Arthralgia     Back pain     Depression     Dry eyes     from meds    Fever blister     Fibromyalgia     Interstitial cystitis     Irritable bowel syndrome     Kidney stone     Major depressive disorder, recurrent episode, in partial or unspecified remission 6/25/2013    OAB (overactive bladder) 7/21/2015    PTSD (post-traumatic stress disorder)     Pure hypercholesterolemia 7/6/2022    Rheumatoid arthritis     Rheumatoid arthritis 6/21/2022    Systemic lupus erythematosus      Thyroid disease     Urinary tract infection     Vaginal infection        PAST SURGICAL HISTORY:  Past Surgical History:   Procedure Laterality Date    BLADDER SURGERY       SECTION, LOW TRANSVERSE      x 2    COLONOSCOPY      COLONOSCOPY N/A 10/05/2017    Procedure: COLONOSCOPY;  Surgeon: Venkat He MD;  Location: [facility] ENDO (4TH FLR);  Service: Endoscopy;  Laterality: N/A;    ESOPHAGOGASTRODUODENOSCOPY      ESOPHAGOGASTRODUODENOSCOPY N/A 10/25/2021    Procedure: EGD (ESOPHAGOGASTRODUODENOSCOPY);  Surgeon: Venkat He MD;  Location: [facility] END[facility] (4TH FLR);  Service: Endoscopy;  Laterality: N/A;  Covid test on 10/22 at [email or web address]    10/19 lvm to confirm appt-rb    EXCISIONAL BIOPSY Right 10/14/2022    Procedure: EXCISIONAL BIOPSY-right with radiological marker;  Surgeon: PALLAVI Oseguera MD;  Location: [facility];  Service: General;  Laterality: Right;    EYE SURGERY      Lasik-bilateral    HYSTERECTOMY      IMPLANTATION OF PERMANENT SACRAL NERVE STIMULATOR N/A 2022    Procedure: INSERTION, NEUROSTIMULATOR, PERMANENT, SACRAL;  Surgeon: Bandar Garcia MD;  Location: [facility] OR 2ND [facility];  Service: Urology;  Laterality: N/A;  2hr    INJECTION OF BOTULINUM TOXIN TYPE A N/A 2018    Procedure: INJECTION, BOTULINUM TOXIN, TYPE A  200 UNITS;  Surgeon: Bandar Garcia MD;  Location: [facility] OR 1ST FLR;  Service: Urology;  Laterality: N/A;    INSTILLATION OF URINARY BLADDER N/A 2018    Procedure: INSTILLATION, URINARY BLADDER;  Surgeon: Bandar Garcia MD;  Location: [facility] OR 1ST FLR;  Service: Urology;  Laterality: N/A;    PARTIAL HYSTERECTOMY      REMOVAL OF ELECTRODE LEAD OF SACRAL NERVE STIMULATOR N/A 2022    Procedure: REMOVAL, ELECTRODE LEAD, SACRAL NERVE STIMULATOR;  Surgeon: Bandar Garcia MD;  Location: [facility] OR 2ND FLR;  Service: Urology;  Laterality: N/A;    SKIN TAG REMOVAL N/A 10/14/2022    Procedure: REMOVAL, SKIN TAGS;  Surgeon: PALLAVI Oseguera MD;  Location: [facility];  Service: General;   Laterality: N/A;    TRANSFORAMINAL EPIDURAL INJECTION OF STEROID Left 02/15/2021    Procedure: LUMBAR TRANSFORAMINAL LEFT L5 AND S1 DIRECT REFERRAL;  Surgeon: Marquez Nesbitt MD;  Location: [facility];  Service: Pain Management;  Laterality: Left;  NEEDS CONSENT, PT REQUESTING IV SEDATION       SOCIAL HISTORY:  Social History     Socioeconomic History    Marital status:    Occupational History     Employer: OTHER   Tobacco Use    Smoking status: Former    Smokeless tobacco: Never    Tobacco comments:     14 years old-18 years old   Substance and Sexual Activity    Alcohol use: No    Drug use: No    Sexual activity: Yes     Partners: Male     Birth control/protection: See Surgical Hx, Other-see comments     Comment: Hysterectomy   Other Topics Concern    Are you pregnant or think you may be? No    Breast-feeding No     Social Determinants of Health     Financial Resource Strain: Low Risk     Difficulty of Paying Living Expenses: Not very hard   Food Insecurity: No Food Insecurity    Worried About Running Out of Food in the Last Year: Never true    Ran Out of Food in the Last Year: Never true   Transportation Needs: No Transportation Needs    Lack of Transportation (Medical): No    Lack of Transportation (Non-Medical): No   Physical Activity: Inactive    Days of Exercise per Week: 0 days    Minutes of Exercise per Session: 0 min   Stress: Stress Concern Present    Feeling of Stress : To some extent   Social Connections: Unknown    Frequency of Communication with Friends and Family: More than three times a week    Frequency of Social Gatherings with Friends and Family: Never    Active Member of Clubs or Organizations: No    Attends Club or Organization Meetings: Never    Marital Status:    Housing Stability: Low Risk     Unable to Pay for Housing in the Last Year: No    Number of Places Lived in the Last Year: 1    Unstable Housing in the Last Year: No       FAMILY HISTORY:  Family History   Problem  Relation Age of Onset    Irritable bowel syndrome Mother     Arthritis Mother     Hypertension Mother     Irritable bowel syndrome Sister     Lupus Sister     Rheum arthritis Sister     Fibromyalgia Sister     Irritable bowel syndrome Sister     Celiac disease Neg Hx     Cirrhosis Neg Hx     Colon cancer Neg Hx     Colon polyps Neg Hx     Crohn's disease Neg Hx     Cystic fibrosis Neg Hx     Esophageal cancer Neg Hx     Hemochromatosis Neg Hx     Inflammatory bowel disease Neg Hx     Liver cancer Neg Hx     Liver disease Neg Hx     Rectal cancer Neg Hx     Stomach cancer Neg Hx     Ulcerative colitis Neg Hx     Boris's disease Neg Hx     Melanoma Neg Hx     Amblyopia Neg Hx     Blindness Neg Hx     Cataracts Neg Hx     Glaucoma Neg Hx     Macular degeneration Neg Hx     Retinal detachment Neg Hx     Strabismus Neg Hx        ALLERGIES AND MEDICATIONS: updated and reviewed.  Review of patient's allergies indicates:   Allergen Reactions    Cephalexin Itching    Cephalosporins     Zofran [ondansetron hcl (pf)] Hives    Penicillins Rash     Current Outpatient Medications   Medication Sig Dispense Refill    alosetron (LOTRONEX) 0.5 MG tablet TAKE 1 TABLET(0.5 MG) BY MOUTH DAILY AS NEEDED 30 tablet 0    amitriptyline (ELAVIL) 10 MG tablet TAKE 1 TABLET(10 MG) BY MOUTH EVERY NIGHT AS NEEDED 90 tablet 3    budesonide (ENTOCORT EC) 3 mg capsule TAKE 3 CAPSULES(9 MG) BY MOUTH EVERY DAY FOR 10 DAYS 30 capsule 3    butalbitaL-acetaminophen  mg Tab TAKE 1 TABLET BY MOUTH EVERY 4 HOURS 60 tablet 3    calcium glycerophosphate (PRELIEF) 65 mg Tab Take 2 tablets by mouth before meals and at bedtime as needed. (Patient taking differently: Take 2 tablets by mouth 3 (three) times daily with meals.) 100 each prn    cetirizine (ZYRTEC) 10 MG tablet Take 1 tablet (10 mg total) by mouth once daily. 120 tablet 2    clonazePAM (KLONOPIN) 1 MG tablet TAKE 1 TABLET(1 MG) BY MOUTH TWICE DAILY 60 tablet 0    cyclobenzaprine (FLEXERIL) 10  MG tablet Take 1 tablet (10 mg total) by mouth 3 (three) times daily as needed for Muscle spasms. 90 tablet 6    dicyclomine (BENTYL) 20 mg tablet Take 20 mg by mouth daily as needed.      famotidine (PEPCID) 40 MG tablet TAKE 1 TABLET(40 MG) BY MOUTH EVERY NIGHT AS NEEDED FOR HEARTBURN 30 tablet 11    fluticasone propionate (FLONASE) 50 mcg/actuation nasal spray 2 sprays (100 mcg total) by Each Nostril route 2 (two) times daily. 31.6 mL 1    gabapentin (NEURONTIN) 800 MG tablet TAKE 1 TABLET(800 MG) BY MOUTH THREE TIMES DAILY 90 tablet 5    lamoTRIgine (LAMICTAL) 200 MG tablet       levothyroxine (SYNTHROID) 50 MCG tablet TAKE 1 TABLET(50 MCG) BY MOUTH EVERY DAY 30 tablet 6    vzqsfc-utcgd-g.blue-sal-naphos (USTELL) 120-0.12 mg Cap Take 1 capsule by mouth 3 (three) times daily. (Patient taking differently: Take 1 capsule by mouth 2 (two) times a day.) 90 each 3    oxybutynin (DITROPAN) 5 MG Tab TAKE 1 TABLET(5 MG) BY MOUTH THREE TIMES DAILY 90 tablet 3    promethazine (PHENERGAN) 25 MG tablet Take 1 tablet (25 mg total) by mouth every 6 (six) hours as needed for Nausea. 30 tablet 0    RABEprazole (ACIPHEX) 20 mg tablet TAKE 1 TABLET(20 MG) BY MOUTH EVERY DAY 30 tablet 2    traMADoL (ULTRAM) 50 mg tablet TAKE 1 TABLET(50 MG) BY MOUTH EVERY 6 HOURS 90 tablet 3    traZODone (DESYREL) 50 MG tablet Take 1 tablet (50 mg total) by mouth nightly as needed for Insomnia. 30 tablet 2    upadacitinib (RINVOQ) 15 mg 24 hr tablet Take 1 tablet (15 mg total) by mouth once daily. 30 tablet 11    ciprofloxacin HCl (CIPRO) 500 MG tablet Take 1 tablet (500 mg total) by mouth every 12 (twelve) hours. 14 tablet 0    hydrocortisone 2.5 % cream Apply to affected areas twice a day when eczema is moderate. 453.6 g 1    hydrOXYzine HCL (ATARAX) 25 MG tablet 1-4 tablets as needed for itching 240 tablet 3    lifitegrast (XIIDRA) 5 % Dpet Apply 1 drop to eye every 12 (twelve) hours. 180 each 4    predniSONE (DELTASONE) 2.5 MG tablet 1 to 2  "tabs PO daily prn for arthritis flare 60 tablet 0    promethazine (PHENERGAN) 25 MG suppository Place 1 suppository (25 mg total) rectally every 6 (six) hours as needed for Nausea. 10 suppository 0    tiZANidine (ZANAFLEX) 4 MG tablet Take 4 mg by mouth 3 (three) times daily.      venlafaxine (EFFEXOR-XR) 75 MG 24 hr capsule Take 3 capsules (225 mg total) by mouth once daily. 90 capsule 5    XELJANZ XR 11 mg Tb24 Take 1 tablet by mouth.       No current facility-administered medications for this visit.       ROS  Review of Systems   Constitutional:  Negative for fever and weight loss.   Respiratory:  Negative for cough and shortness of breath.    Cardiovascular:  Negative for chest pain and palpitations.   Gastrointestinal:  Negative for abdominal pain, constipation, diarrhea, nausea and vomiting.   Genitourinary:  Negative for dysuria.   Musculoskeletal:  Negative for back pain and joint pain.   Skin:  Negative for rash.   Neurological:  Negative for dizziness, weakness and headaches.   Psychiatric/Behavioral:  Negative for depression. The patient is not nervous/anxious.        OBJECTIVE     Physical Exam  Vitals:    04/19/23 1501   BP: 138/74   Pulse: 109    Body mass index is 33.66 kg/m².  Weight: 75.6 kg (166 lb 10.7 oz)   Height: 4' 11" (149.9 cm)     Physical Exam  HENT:      Head: Normocephalic and atraumatic.      Nose: Nose normal.      Mouth/Throat:      Mouth: Mucous membranes are moist.      Pharynx: Oropharynx is clear.   Eyes:      Extraocular Movements: Extraocular movements intact.      Conjunctiva/sclera: Conjunctivae normal.      Pupils: Pupils are equal, round, and reactive to light.   Pulmonary:      Effort: Pulmonary effort is normal.   Musculoskeletal:         General: No swelling. Normal range of motion.      Cervical back: Normal range of motion.      Right lower leg: No edema.      Left lower leg: No edema.   Skin:     General: Skin is warm.      Findings: No lesion or rash.   Neurological: "      General: No focal deficit present.      Mental Status: She is alert and oriented to person, place, and time.      Motor: No weakness.   Psychiatric:         Mood and Affect: Mood normal.         Thought Content: Thought content normal.             ASSESSMENT     53 y.o. female with     1. Irritable bowel syndrome with diarrhea    2. Anginal equivalent    3. Major depressive disorder, recurrent episode, moderate        PLAN:     1. Irritable bowel syndrome with diarrhea  Overview:  GI-Dr. Jenkins, last seen 4/17/2023  Fiber and Miralax started by GI to titrate to correct consistency.  PPI daily      Assessment & Plan:  Stable on medications, continue regimen        2. Anginal equivalent  Overview:  stress test ordered by cardiologist      3. Major depressive disorder, recurrent episode, moderate  Overview:   Lamictal 200mg daily, Klonopin 1mg daily, amitriptyline 10mg daily, venlafaxine 75 mg daily, and trazodone 50mg nightly prn. Follows regularly with Psychiatry who manages.    Assessment & Plan:  Stable on medications, continue regimen            RTC in 6 months    Leti Howe MD

## 2023-05-01 ENCOUNTER — OFFICE VISIT (OUTPATIENT)
Dept: PSYCHIATRY | Facility: CLINIC | Age: 53
End: 2023-05-01
Payer: COMMERCIAL

## 2023-05-01 DIAGNOSIS — F41.1 GENERALIZED ANXIETY DISORDER: ICD-10-CM

## 2023-05-01 DIAGNOSIS — F43.10 PTSD (POST-TRAUMATIC STRESS DISORDER): Primary | ICD-10-CM

## 2023-05-01 DIAGNOSIS — K76.0 HEPATIC STEATOSIS: Primary | ICD-10-CM

## 2023-05-01 DIAGNOSIS — F33.1 MAJOR DEPRESSIVE DISORDER, RECURRENT EPISODE, MODERATE: ICD-10-CM

## 2023-05-01 PROCEDURE — 90834 PR PSYCHOTHERAPY W/PATIENT, 45 MIN: ICD-10-PCS | Mod: 95,,, | Performed by: SOCIAL WORKER

## 2023-05-01 PROCEDURE — 90834 PSYTX W PT 45 MINUTES: CPT | Mod: 95,,, | Performed by: SOCIAL WORKER
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
## 2023-05-02 ENCOUNTER — TELEPHONE (OUTPATIENT)
Dept: UROLOGY | Facility: CLINIC | Age: 53
End: 2023-05-02
Payer: COMMERCIAL

## 2023-05-02 ENCOUNTER — TELEPHONE (OUTPATIENT)
Dept: HEPATOLOGY | Facility: CLINIC | Age: 53
End: 2023-05-02
Payer: COMMERCIAL

## 2023-05-02 ENCOUNTER — PATIENT MESSAGE (OUTPATIENT)
Dept: UROLOGY | Facility: CLINIC | Age: 53
End: 2023-05-02
Payer: COMMERCIAL

## 2023-05-02 DIAGNOSIS — R39.89 BLADDER PAIN: Primary | ICD-10-CM

## 2023-05-02 RX ORDER — PHENAZOPYRIDINE HYDROCHLORIDE 200 MG/1
200 TABLET, FILM COATED ORAL 3 TIMES DAILY PRN
Qty: 60 TABLET | Refills: 3 | Status: SHIPPED | OUTPATIENT
Start: 2023-05-02 | End: 2023-09-11

## 2023-05-02 NOTE — TELEPHONE ENCOUNTER
----- Message from Abby Stinson LPN sent at 5/2/2023  1:19 PM CDT -----  Cliff said they can't get Ustell anymore and that Dr Garcia would need to choose another type of pill. It has something to do with the . Thanks, Marilee Simons

## 2023-05-02 NOTE — TELEPHONE ENCOUNTER
Bladder pain  -     phenazopyridine (PYRIDIUM) 200 MG tablet; Take 1 tablet (200 mg total) by mouth 3 (three) times daily as needed for Pain.  Dispense: 60 tablet; Refill: 3

## 2023-05-02 NOTE — TELEPHONE ENCOUNTER
Referral to hepatology for fatty liver, previously elevated liver enzymes     Scheduling attempt # 1    Please call pt to schedule appt with NIK  Encourage video visits for new patient appts with APPs

## 2023-05-03 ENCOUNTER — PATIENT MESSAGE (OUTPATIENT)
Dept: GASTROENTEROLOGY | Facility: CLINIC | Age: 53
End: 2023-05-03
Payer: COMMERCIAL

## 2023-05-08 ENCOUNTER — PATIENT MESSAGE (OUTPATIENT)
Dept: PSYCHIATRY | Facility: CLINIC | Age: 53
End: 2023-05-08
Payer: COMMERCIAL

## 2023-05-08 NOTE — TELEPHONE ENCOUNTER
Referral to hepatology for fatty liver, previously elevated liver enzymes      Scheduling attempt # 2    Called the patient to schedule hepatology consult from referral.  No answer, left a voicemail with returned call #.

## 2023-05-11 ENCOUNTER — PATIENT MESSAGE (OUTPATIENT)
Dept: OPTOMETRY | Facility: CLINIC | Age: 53
End: 2023-05-11
Payer: COMMERCIAL

## 2023-05-15 ENCOUNTER — OFFICE VISIT (OUTPATIENT)
Dept: PSYCHIATRY | Facility: CLINIC | Age: 53
End: 2023-05-15
Payer: COMMERCIAL

## 2023-05-15 DIAGNOSIS — F41.1 GENERALIZED ANXIETY DISORDER: ICD-10-CM

## 2023-05-15 DIAGNOSIS — F33.1 MAJOR DEPRESSIVE DISORDER, RECURRENT EPISODE, MODERATE: Primary | ICD-10-CM

## 2023-05-15 DIAGNOSIS — F43.10 PTSD (POST-TRAUMATIC STRESS DISORDER): ICD-10-CM

## 2023-05-15 PROCEDURE — 99214 OFFICE O/P EST MOD 30 MIN: CPT | Mod: 95,,, | Performed by: NURSE PRACTITIONER

## 2023-05-15 PROCEDURE — 99214 PR OFFICE/OUTPT VISIT, EST, LEVL IV, 30-39 MIN: ICD-10-PCS | Mod: 95,,, | Performed by: NURSE PRACTITIONER
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
## 2023-05-22 DIAGNOSIS — K21.9 GASTROESOPHAGEAL REFLUX DISEASE, UNSPECIFIED WHETHER ESOPHAGITIS PRESENT: Primary | ICD-10-CM

## 2023-05-22 RX ORDER — RABEPRAZOLE SODIUM 20 MG/1
20 TABLET, DELAYED RELEASE ORAL DAILY
Qty: 30 TABLET | Refills: 11 | Status: SHIPPED | OUTPATIENT
Start: 2023-05-22

## 2023-05-26 ENCOUNTER — PATIENT MESSAGE (OUTPATIENT)
Dept: PSYCHIATRY | Facility: CLINIC | Age: 53
End: 2023-05-26
Payer: COMMERCIAL

## 2023-05-29 ENCOUNTER — TELEPHONE (OUTPATIENT)
Dept: ADMINISTRATIVE | Facility: HOSPITAL | Age: 53
End: 2023-05-29
Payer: COMMERCIAL

## 2023-05-29 DIAGNOSIS — F41.1 GENERALIZED ANXIETY DISORDER: Primary | ICD-10-CM

## 2023-05-29 RX ORDER — CLONAZEPAM 1 MG/1
1 TABLET ORAL 2 TIMES DAILY
Qty: 60 TABLET | Refills: 0 | Status: SHIPPED | OUTPATIENT
Start: 2023-05-29 | End: 2023-07-09 | Stop reason: SDUPTHER

## 2023-05-30 ENCOUNTER — TELEPHONE (OUTPATIENT)
Dept: SURGERY | Facility: CLINIC | Age: 53
End: 2023-05-30
Payer: COMMERCIAL

## 2023-05-30 ENCOUNTER — OFFICE VISIT (OUTPATIENT)
Dept: DERMATOLOGY | Facility: CLINIC | Age: 53
End: 2023-05-30
Payer: COMMERCIAL

## 2023-05-30 DIAGNOSIS — L64.9 ANDROGENETIC ALOPECIA: ICD-10-CM

## 2023-05-30 DIAGNOSIS — L29.9 SCALP PRURITUS: Primary | ICD-10-CM

## 2023-05-30 PROCEDURE — 99214 OFFICE O/P EST MOD 30 MIN: CPT | Mod: S$GLB,,, | Performed by: STUDENT IN AN ORGANIZED HEALTH CARE EDUCATION/TRAINING PROGRAM

## 2023-05-30 PROCEDURE — 1159F MED LIST DOCD IN RCRD: CPT | Mod: CPTII,S$GLB,, | Performed by: STUDENT IN AN ORGANIZED HEALTH CARE EDUCATION/TRAINING PROGRAM

## 2023-05-30 PROCEDURE — 99999 PR PBB SHADOW E&M-EST. PATIENT-LVL IV: ICD-10-PCS | Mod: PBBFAC,,, | Performed by: STUDENT IN AN ORGANIZED HEALTH CARE EDUCATION/TRAINING PROGRAM

## 2023-05-30 PROCEDURE — 99214 PR OFFICE/OUTPT VISIT, EST, LEVL IV, 30-39 MIN: ICD-10-PCS | Mod: S$GLB,,, | Performed by: STUDENT IN AN ORGANIZED HEALTH CARE EDUCATION/TRAINING PROGRAM

## 2023-05-30 PROCEDURE — 1160F RVW MEDS BY RX/DR IN RCRD: CPT | Mod: CPTII,S$GLB,, | Performed by: STUDENT IN AN ORGANIZED HEALTH CARE EDUCATION/TRAINING PROGRAM

## 2023-05-30 PROCEDURE — 1160F PR REVIEW ALL MEDS BY PRESCRIBER/CLIN PHARMACIST DOCUMENTED: ICD-10-PCS | Mod: CPTII,S$GLB,, | Performed by: STUDENT IN AN ORGANIZED HEALTH CARE EDUCATION/TRAINING PROGRAM

## 2023-05-30 PROCEDURE — 1159F PR MEDICATION LIST DOCUMENTED IN MEDICAL RECORD: ICD-10-PCS | Mod: CPTII,S$GLB,, | Performed by: STUDENT IN AN ORGANIZED HEALTH CARE EDUCATION/TRAINING PROGRAM

## 2023-05-30 PROCEDURE — 99999 PR PBB SHADOW E&M-EST. PATIENT-LVL IV: CPT | Mod: PBBFAC,,, | Performed by: STUDENT IN AN ORGANIZED HEALTH CARE EDUCATION/TRAINING PROGRAM

## 2023-05-30 RX ORDER — CLOBETASOL PROPIONATE 0.46 MG/ML
SOLUTION TOPICAL 2 TIMES DAILY
Qty: 50 ML | Refills: 2 | Status: SHIPPED | OUTPATIENT
Start: 2023-05-30 | End: 2024-04-03

## 2023-05-30 RX ORDER — KETOCONAZOLE 20 MG/ML
SHAMPOO, SUSPENSION TOPICAL
Qty: 240 ML | Refills: 3 | Status: SHIPPED | OUTPATIENT
Start: 2023-05-31 | End: 2024-04-03

## 2023-05-30 NOTE — PROGRESS NOTES
Subjective:      Patient ID:  Marilee Simons is a 53 y.o. female who presents for   Chief Complaint   Patient presents with    Hair Loss     Hair Loss - Follow-up  Symptom course: worsening  AFFECTED LOCATIONS F/U: scalp - has used keto shampoo, hasn't tried rogaine.  Signs / symptoms: itching    Scalp started itching about 3 weeks ago. She had recently started a new shampoo. She does dye her hair about every 4-6 weeks    Review of Systems   Hematologic/Lymphatic: Does not bruise/bleed easily.     Objective:   Physical Exam   Constitutional: She appears well-developed and well-nourished. No distress.   Neurological: She is alert and oriented to person, place, and time. She is not disoriented.   Psychiatric: She has a normal mood and affect.   Skin:   Areas Examined (abnormalities noted in diagram):   Scalp / Hair Palpated and Inspected  Head / Face Inspection Performed          Diagram Legend     Erythematous scaling macule/papule c/w actinic keratosis       Vascular papule c/w angioma      Pigmented verrucoid papule/plaque c/w seborrheic keratosis      Yellow umbilicated papule c/w sebaceous hyperplasia      Irregularly shaped tan macule c/w lentigo     1-2 mm smooth white papules consistent with Milia      Movable subcutaneous cyst with punctum c/w epidermal inclusion cyst      Subcutaneous movable cyst c/w pilar cyst      Firm pink to brown papule c/w dermatofibroma      Pedunculated fleshy papule(s) c/w skin tag(s)      Evenly pigmented macule c/w junctional nevus     Mildly variegated pigmented, slightly irregular-bordered macule c/w mildly atypical nevus      Flesh colored to evenly pigmented papule c/w intradermal nevus       Pink pearly papule/plaque c/w basal cell carcinoma      Erythematous hyperkeratotic cursted plaque c/w SCC      Surgical scar with no sign of skin cancer recurrence      Open and closed comedones      Inflammatory papules and pustules      Verrucoid papule consistent consistent with  wart     Erythematous eczematous patches and plaques     Dystrophic onycholytic nail with subungual debris c/w onychomycosis     Umbilicated papule    Erythematous-base heme-crusted tan verrucoid plaque consistent with inflamed seborrheic keratosis     Erythematous Silvery Scaling Plaque c/w Psoriasis     See annotation      Assessment / Plan:        Scalp pruritus--few flakes but no severe seb derm to explain degree of itching. Possibly ACD as it did start after new shampoo and she does dye her hair regularly  - Stop all products. Switch to vanicream. Avoid dying hair if possible  - Can consider patch testing if not improving  -     ketoconazole (NIZORAL) 2 % shampoo; Apply topically 3 (three) times a week. Leave on scalp for 5-10 minutes then wash off  Dispense: 240 mL; Refill: 3  -     clobetasoL (TEMOVATE) 0.05 % external solution; Apply topically 2 (two) times daily. Use to affected areas for up to 2 weeks then take a 1 week break or decrease to 3 times weekly. Do not apply to groin or face. Use on scalp  Dispense: 50 mL; Refill: 2    Androgenetic alopecia  -We discussed that in this type of hairloss (FPHL), the goal of treatment is to stabilize the hair loss i.e. prevent further hairloss and that regrowing a significant amount of previously lost hair is unlikely.   -We discussed off-label use of topical minoxidil (Rogaine) need for at least 6 months of use to determine efficacy and likelihood of rapid shedding of retained hairs (hairs that would have been lost anyway had minoxidil not been used) if minoxidil is abruptly stopped and risks including scalp irritation, scalp itching, and rarely a sensation of dizziness.    - Start 5% minoxidil BID (off label for use in women or children) as the 5% minoxidil appears to be more efficacious.   -  Will hold off on any hormonal PO medications due to recent diagnosis of atypical ductal hyperplasia of right breast           Follow up if symptoms worsen or fail to  improve.

## 2023-05-30 NOTE — TELEPHONE ENCOUNTER
Attempted to reach patient regarding rescheduling appt as Dr. Oseguera will be on maternity leave.

## 2023-05-31 ENCOUNTER — OFFICE VISIT (OUTPATIENT)
Dept: PSYCHIATRY | Facility: CLINIC | Age: 53
End: 2023-05-31
Payer: COMMERCIAL

## 2023-05-31 DIAGNOSIS — F43.10 PTSD (POST-TRAUMATIC STRESS DISORDER): ICD-10-CM

## 2023-05-31 DIAGNOSIS — F41.1 GENERALIZED ANXIETY DISORDER: ICD-10-CM

## 2023-05-31 DIAGNOSIS — F33.1 MAJOR DEPRESSIVE DISORDER, RECURRENT EPISODE, MODERATE: Primary | ICD-10-CM

## 2023-05-31 PROCEDURE — 99214 OFFICE O/P EST MOD 30 MIN: CPT | Mod: 95,,, | Performed by: NURSE PRACTITIONER

## 2023-05-31 PROCEDURE — 99214 PR OFFICE/OUTPT VISIT, EST, LEVL IV, 30-39 MIN: ICD-10-PCS | Mod: 95,,, | Performed by: NURSE PRACTITIONER

## 2023-05-31 RX ORDER — TRAZODONE HYDROCHLORIDE 50 MG/1
50 TABLET ORAL NIGHTLY PRN
Qty: 30 TABLET | Refills: 2 | Status: SHIPPED | OUTPATIENT
Start: 2023-05-31 | End: 2023-11-03

## 2023-05-31 RX ORDER — LAMOTRIGINE 200 MG/1
200 TABLET ORAL DAILY
Qty: 30 TABLET | Refills: 11 | Status: SHIPPED | OUTPATIENT
Start: 2023-05-31 | End: 2024-01-10 | Stop reason: SDUPTHER
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
## 2023-06-04 ENCOUNTER — PATIENT MESSAGE (OUTPATIENT)
Dept: PSYCHIATRY | Facility: CLINIC | Age: 53
End: 2023-06-04
Payer: COMMERCIAL

## 2023-06-05 ENCOUNTER — PATIENT MESSAGE (OUTPATIENT)
Dept: PSYCHIATRY | Facility: CLINIC | Age: 53
End: 2023-06-05
Payer: COMMERCIAL

## 2023-06-09 ENCOUNTER — TELEPHONE (OUTPATIENT)
Dept: HEPATOLOGY | Facility: CLINIC | Age: 53
End: 2023-06-09
Payer: COMMERCIAL

## 2023-06-15 ENCOUNTER — PATIENT MESSAGE (OUTPATIENT)
Dept: RHEUMATOLOGY | Facility: CLINIC | Age: 53
End: 2023-06-15
Payer: COMMERCIAL

## 2023-06-15 DIAGNOSIS — M06.00 SERONEGATIVE RHEUMATOID ARTHRITIS: ICD-10-CM

## 2023-06-15 RX ORDER — PREDNISONE 2.5 MG/1
TABLET ORAL
Qty: 90 TABLET | Refills: 0 | Status: SHIPPED | OUTPATIENT
Start: 2023-06-15 | End: 2024-03-14 | Stop reason: SDUPTHER

## 2023-06-22 ENCOUNTER — PATIENT MESSAGE (OUTPATIENT)
Dept: PSYCHIATRY | Facility: CLINIC | Age: 53
End: 2023-06-22
Payer: COMMERCIAL

## 2023-06-22 ENCOUNTER — OFFICE VISIT (OUTPATIENT)
Dept: PSYCHIATRY | Facility: CLINIC | Age: 53
End: 2023-06-22
Payer: COMMERCIAL

## 2023-06-22 DIAGNOSIS — F33.1 MAJOR DEPRESSIVE DISORDER, RECURRENT EPISODE, MODERATE: Primary | ICD-10-CM

## 2023-06-22 DIAGNOSIS — F41.1 GENERALIZED ANXIETY DISORDER: ICD-10-CM

## 2023-06-22 PROCEDURE — 90834 PSYTX W PT 45 MINUTES: CPT | Mod: S$GLB,,, | Performed by: SOCIAL WORKER

## 2023-06-22 PROCEDURE — 99999 PR PBB SHADOW E&M-EST. PATIENT-LVL I: CPT | Mod: PBBFAC,,, | Performed by: SOCIAL WORKER

## 2023-06-22 PROCEDURE — 99999 PR PBB SHADOW E&M-EST. PATIENT-LVL I: ICD-10-PCS | Mod: PBBFAC,,, | Performed by: SOCIAL WORKER

## 2023-06-22 PROCEDURE — 90834 PR PSYCHOTHERAPY W/PATIENT, 45 MIN: ICD-10-PCS | Mod: S$GLB,,, | Performed by: SOCIAL WORKER

## 2023-06-26 ENCOUNTER — PATIENT MESSAGE (OUTPATIENT)
Dept: SURGERY | Facility: CLINIC | Age: 53
End: 2023-06-26
Payer: COMMERCIAL
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
## 2023-07-05 ENCOUNTER — PATIENT MESSAGE (OUTPATIENT)
Dept: OPTOMETRY | Facility: CLINIC | Age: 53
End: 2023-07-05
Payer: COMMERCIAL

## 2023-07-07 ENCOUNTER — PATIENT MESSAGE (OUTPATIENT)
Dept: OPTOMETRY | Facility: CLINIC | Age: 53
End: 2023-07-07
Payer: COMMERCIAL

## 2023-07-09 DIAGNOSIS — F41.1 GENERALIZED ANXIETY DISORDER: ICD-10-CM

## 2023-07-10 ENCOUNTER — OFFICE VISIT (OUTPATIENT)
Dept: RHEUMATOLOGY | Facility: CLINIC | Age: 53
End: 2023-07-10
Payer: COMMERCIAL

## 2023-07-10 ENCOUNTER — PATIENT MESSAGE (OUTPATIENT)
Dept: RHEUMATOLOGY | Facility: CLINIC | Age: 53
End: 2023-07-10

## 2023-07-10 ENCOUNTER — LAB VISIT (OUTPATIENT)
Dept: LAB | Facility: HOSPITAL | Age: 53
End: 2023-07-10
Payer: COMMERCIAL

## 2023-07-10 VITALS
BODY MASS INDEX: 32.86 KG/M2 | HEART RATE: 103 BPM | SYSTOLIC BLOOD PRESSURE: 127 MMHG | WEIGHT: 163 LBS | DIASTOLIC BLOOD PRESSURE: 79 MMHG | HEIGHT: 59 IN

## 2023-07-10 DIAGNOSIS — L40.50 PSORIATIC ARTHRITIS: ICD-10-CM

## 2023-07-10 DIAGNOSIS — E03.8 HYPOTHYROIDISM DUE TO HASHIMOTO'S THYROIDITIS: ICD-10-CM

## 2023-07-10 DIAGNOSIS — Z78.0 ASYMPTOMATIC POSTMENOPAUSAL STATE: ICD-10-CM

## 2023-07-10 DIAGNOSIS — E06.3 HYPOTHYROIDISM DUE TO HASHIMOTO'S THYROIDITIS: ICD-10-CM

## 2023-07-10 DIAGNOSIS — M06.00 SERONEGATIVE RHEUMATOID ARTHRITIS: ICD-10-CM

## 2023-07-10 DIAGNOSIS — M06.00 SERONEGATIVE RHEUMATOID ARTHRITIS: Primary | ICD-10-CM

## 2023-07-10 DIAGNOSIS — D84.9 IMMUNOCOMPROMISED STATE: ICD-10-CM

## 2023-07-10 DIAGNOSIS — M85.80 OSTEOPENIA, UNSPECIFIED LOCATION: ICD-10-CM

## 2023-07-10 LAB
BASOPHILS # BLD AUTO: 0.06 K/UL (ref 0–0.2)
BASOPHILS NFR BLD: 0.8 % (ref 0–1.9)
DIFFERENTIAL METHOD: ABNORMAL
EOSINOPHIL # BLD AUTO: 0 K/UL (ref 0–0.5)
EOSINOPHIL NFR BLD: 0.4 % (ref 0–8)
ERYTHROCYTE [DISTWIDTH] IN BLOOD BY AUTOMATED COUNT: 13.1 % (ref 11.5–14.5)
ERYTHROCYTE [SEDIMENTATION RATE] IN BLOOD BY PHOTOMETRIC METHOD: <2 MM/HR (ref 0–36)
HCT VFR BLD AUTO: 33.2 % (ref 37–48.5)
HGB BLD-MCNC: 10.4 G/DL (ref 12–16)
IMM GRANULOCYTES # BLD AUTO: 0.05 K/UL (ref 0–0.04)
IMM GRANULOCYTES NFR BLD AUTO: 0.7 % (ref 0–0.5)
LYMPHOCYTES # BLD AUTO: 3.8 K/UL (ref 1–4.8)
LYMPHOCYTES NFR BLD: 49.8 % (ref 18–48)
MCH RBC QN AUTO: 32 PG (ref 27–31)
MCHC RBC AUTO-ENTMCNC: 31.3 G/DL (ref 32–36)
MCV RBC AUTO: 102 FL (ref 82–98)
MONOCYTES # BLD AUTO: 0.4 K/UL (ref 0.3–1)
MONOCYTES NFR BLD: 5.8 % (ref 4–15)
NEUTROPHILS # BLD AUTO: 3.2 K/UL (ref 1.8–7.7)
NEUTROPHILS NFR BLD: 42.5 % (ref 38–73)
NRBC BLD-RTO: 0 /100 WBC
PLATELET # BLD AUTO: 392 K/UL (ref 150–450)
PMV BLD AUTO: 9.2 FL (ref 9.2–12.9)
RBC # BLD AUTO: 3.25 M/UL (ref 4–5.4)
WBC # BLD AUTO: 7.59 K/UL (ref 3.9–12.7)

## 2023-07-10 PROCEDURE — 86235 NUCLEAR ANTIGEN ANTIBODY: CPT | Mod: 59 | Performed by: PHYSICIAN ASSISTANT

## 2023-07-10 PROCEDURE — 3078F DIAST BP <80 MM HG: CPT | Mod: CPTII,S$GLB,, | Performed by: PHYSICIAN ASSISTANT

## 2023-07-10 PROCEDURE — 1159F MED LIST DOCD IN RCRD: CPT | Mod: CPTII,S$GLB,, | Performed by: PHYSICIAN ASSISTANT

## 2023-07-10 PROCEDURE — 1160F PR REVIEW ALL MEDS BY PRESCRIBER/CLIN PHARMACIST DOCUMENTED: ICD-10-PCS | Mod: CPTII,S$GLB,, | Performed by: PHYSICIAN ASSISTANT

## 2023-07-10 PROCEDURE — 83516 IMMUNOASSAY NONANTIBODY: CPT | Performed by: PHYSICIAN ASSISTANT

## 2023-07-10 PROCEDURE — 99215 OFFICE O/P EST HI 40 MIN: CPT | Mod: 25,S$GLB,, | Performed by: PHYSICIAN ASSISTANT

## 2023-07-10 PROCEDURE — 99999 PR PBB SHADOW E&M-EST. PATIENT-LVL V: ICD-10-PCS | Mod: PBBFAC,,, | Performed by: PHYSICIAN ASSISTANT

## 2023-07-10 PROCEDURE — 85025 COMPLETE CBC W/AUTO DIFF WBC: CPT | Performed by: PHYSICIAN ASSISTANT

## 2023-07-10 PROCEDURE — 86225 DNA ANTIBODY NATIVE: CPT | Performed by: PHYSICIAN ASSISTANT

## 2023-07-10 PROCEDURE — 86160 COMPLEMENT ANTIGEN: CPT | Performed by: PHYSICIAN ASSISTANT

## 2023-07-10 PROCEDURE — 80053 COMPREHEN METABOLIC PANEL: CPT | Performed by: PHYSICIAN ASSISTANT

## 2023-07-10 PROCEDURE — 1159F PR MEDICATION LIST DOCUMENTED IN MEDICAL RECORD: ICD-10-PCS | Mod: CPTII,S$GLB,, | Performed by: PHYSICIAN ASSISTANT

## 2023-07-10 PROCEDURE — 3078F PR MOST RECENT DIASTOLIC BLOOD PRESSURE < 80 MM HG: ICD-10-PCS | Mod: CPTII,S$GLB,, | Performed by: PHYSICIAN ASSISTANT

## 2023-07-10 PROCEDURE — 86235 NUCLEAR ANTIGEN ANTIBODY: CPT | Performed by: PHYSICIAN ASSISTANT

## 2023-07-10 PROCEDURE — 85651 RBC SED RATE NONAUTOMATED: CPT | Mod: PO | Performed by: PHYSICIAN ASSISTANT

## 2023-07-10 PROCEDURE — 86160 COMPLEMENT ANTIGEN: CPT | Mod: 59 | Performed by: PHYSICIAN ASSISTANT

## 2023-07-10 PROCEDURE — 84439 ASSAY OF FREE THYROXINE: CPT | Performed by: PHYSICIAN ASSISTANT

## 2023-07-10 PROCEDURE — 3008F PR BODY MASS INDEX (BMI) DOCUMENTED: ICD-10-PCS | Mod: CPTII,S$GLB,, | Performed by: PHYSICIAN ASSISTANT

## 2023-07-10 PROCEDURE — 3074F PR MOST RECENT SYSTOLIC BLOOD PRESSURE < 130 MM HG: ICD-10-PCS | Mod: CPTII,S$GLB,, | Performed by: PHYSICIAN ASSISTANT

## 2023-07-10 PROCEDURE — 36415 COLL VENOUS BLD VENIPUNCTURE: CPT | Mod: PO | Performed by: PHYSICIAN ASSISTANT

## 2023-07-10 PROCEDURE — 1160F RVW MEDS BY RX/DR IN RCRD: CPT | Mod: CPTII,S$GLB,, | Performed by: PHYSICIAN ASSISTANT

## 2023-07-10 PROCEDURE — 96372 PR INJECTION,THERAP/PROPH/DIAG2ST, IM OR SUBCUT: ICD-10-PCS | Mod: S$GLB,,, | Performed by: PHYSICIAN ASSISTANT

## 2023-07-10 PROCEDURE — 3008F BODY MASS INDEX DOCD: CPT | Mod: CPTII,S$GLB,, | Performed by: PHYSICIAN ASSISTANT

## 2023-07-10 PROCEDURE — 99215 PR OFFICE/OUTPT VISIT, EST, LEVL V, 40-54 MIN: ICD-10-PCS | Mod: 25,S$GLB,, | Performed by: PHYSICIAN ASSISTANT

## 2023-07-10 PROCEDURE — 3074F SYST BP LT 130 MM HG: CPT | Mod: CPTII,S$GLB,, | Performed by: PHYSICIAN ASSISTANT

## 2023-07-10 PROCEDURE — 86140 C-REACTIVE PROTEIN: CPT | Performed by: PHYSICIAN ASSISTANT

## 2023-07-10 PROCEDURE — 86038 ANTINUCLEAR ANTIBODIES: CPT | Performed by: PHYSICIAN ASSISTANT

## 2023-07-10 PROCEDURE — 99999 PR PBB SHADOW E&M-EST. PATIENT-LVL V: CPT | Mod: PBBFAC,,, | Performed by: PHYSICIAN ASSISTANT

## 2023-07-10 PROCEDURE — 84443 ASSAY THYROID STIM HORMONE: CPT | Performed by: PHYSICIAN ASSISTANT

## 2023-07-10 PROCEDURE — 96372 THER/PROPH/DIAG INJ SC/IM: CPT | Mod: S$GLB,,, | Performed by: PHYSICIAN ASSISTANT

## 2023-07-10 RX ORDER — METHYLPREDNISOLONE ACETATE 80 MG/ML
80 INJECTION, SUSPENSION INTRA-ARTICULAR; INTRALESIONAL; INTRAMUSCULAR; SOFT TISSUE
Status: COMPLETED | OUTPATIENT
Start: 2023-07-10 | End: 2023-07-10

## 2023-07-10 RX ORDER — KETOROLAC TROMETHAMINE 30 MG/ML
60 INJECTION, SOLUTION INTRAMUSCULAR; INTRAVENOUS
Status: COMPLETED | OUTPATIENT
Start: 2023-07-10 | End: 2023-07-10

## 2023-07-10 RX ORDER — GUSELKUMAB 100 MG/ML
100 INJECTION SUBCUTANEOUS
Qty: 1 ML | Refills: 6 | Status: ACTIVE | OUTPATIENT
Start: 2023-07-10 | End: 2023-08-01

## 2023-07-10 RX ORDER — CLINDAMYCIN HYDROCHLORIDE 150 MG/1
150 CAPSULE ORAL 4 TIMES DAILY
COMMUNITY
Start: 2023-07-04 | End: 2023-08-02

## 2023-07-10 RX ORDER — CHLORHEXIDINE GLUCONATE ORAL RINSE 1.2 MG/ML
5 SOLUTION DENTAL 2 TIMES DAILY
COMMUNITY
Start: 2023-07-04 | End: 2023-08-02

## 2023-07-10 RX ORDER — GUSELKUMAB 100 MG/ML
100 INJECTION SUBCUTANEOUS
Qty: 1 ML | Refills: 1 | Status: ACTIVE | OUTPATIENT
Start: 2023-07-10 | End: 2023-08-01

## 2023-07-10 RX ORDER — CYANOCOBALAMIN 1000 UG/ML
1000 INJECTION, SOLUTION INTRAMUSCULAR; SUBCUTANEOUS
Status: COMPLETED | OUTPATIENT
Start: 2023-07-10 | End: 2023-07-10

## 2023-07-10 RX ORDER — CYCLOBENZAPRINE HCL 10 MG
10 TABLET ORAL 3 TIMES DAILY PRN
Qty: 90 TABLET | Refills: 6 | Status: SHIPPED | OUTPATIENT
Start: 2023-07-10 | End: 2024-03-14 | Stop reason: SDUPTHER

## 2023-07-10 RX ADMIN — METHYLPREDNISOLONE ACETATE 80 MG: 80 INJECTION, SUSPENSION INTRA-ARTICULAR; INTRALESIONAL; INTRAMUSCULAR; SOFT TISSUE at 03:07

## 2023-07-10 RX ADMIN — KETOROLAC TROMETHAMINE 60 MG: 30 INJECTION, SOLUTION INTRAMUSCULAR; INTRAVENOUS at 03:07

## 2023-07-10 RX ADMIN — CYANOCOBALAMIN 1000 MCG: 1000 INJECTION, SOLUTION INTRAMUSCULAR; SUBCUTANEOUS at 03:07

## 2023-07-10 NOTE — TELEPHONE ENCOUNTER
Former pt of NP Pregeant. Requesting clonazepam refill. L/s  5/31/23 when plan made to continue meds unchanged, no refills given on last RX. Has f/u with new provider JORDAN Gant 7/20/23. [place]  reviewed- no irregularities noted. Will confer with Dr. Ovalles for review/approval one month supply of requested med.

## 2023-07-10 NOTE — PROGRESS NOTES
Subjective:       Patient ID: Marilee Simons is a 53 y.o. female.    Chief Complaint: Disease Management    Mrs. Simons is a 53 year old female who presents to clinic for follow up on psoriatic arthritis. She has generalized pain throughout her body. She has joint pain in her hands, wrists, elbows, shoulders, knee, and low back. Pain is constant and aching. She has increased stiffness and weakness in her hands--she is dropping objects. She has hypersensitivity of her elbows unable to rest her arms on a surface. She has low back pain with radiating pain down the legs.  Currently on tramadol, gabapentin, and flexeril for pain.    Stress is high with family issues. She was taking care of her mother who had a major fall and multiple fractures, but she is not allowed to see her at this time because her sister is caring for her.     Current tx:  1. Plaquenil    Prior tx:  1. Humira      Review of Systems   Constitutional:  Negative for fever and unexpected weight change.   HENT:  Positive for mouth sores. Negative for trouble swallowing.    Eyes:  Negative for redness.   Respiratory:  Negative for cough and shortness of breath.    Cardiovascular:  Negative for chest pain.   Gastrointestinal:  Positive for diarrhea. Negative for constipation.   Genitourinary:  Negative for dysuria and genital sores.   Musculoskeletal:  Positive for arthralgias, joint swelling and myalgias.   Skin:  Negative for rash.   Neurological:  Positive for headaches.   Hematological:  Bruises/bleeds easily.       Objective:     Vitals:    07/10/23 1403   BP: 127/79   Pulse: 103       Past Medical History:   Diagnosis Date    Acid reflux     Allergy     Alopecia     Anxiety     Arthralgia     Back pain     Depression     Dry eyes     from meds    Fever blister     Fibromyalgia     Interstitial cystitis     Irritable bowel syndrome     Kidney stone     Major depressive disorder, recurrent episode, in partial or unspecified remission 6/25/2013    OAB  (overactive bladder) 2015    PTSD (post-traumatic stress disorder)     Pure hypercholesterolemia 2022    Rheumatoid arthritis     Rheumatoid arthritis 2022    Systemic lupus erythematosus     Thyroid disease     Urinary tract infection     Vaginal infection      Past Surgical History:   Procedure Laterality Date    BLADDER SURGERY       SECTION, LOW TRANSVERSE      x 2    COLONOSCOPY      COLONOSCOPY N/A 10/05/2017    Procedure: COLONOSCOPY;  Surgeon: Venkat He MD;  Location: [facility] ENDO (4TH FLR);  Service: Endoscopy;  Laterality: N/A;    ESOPHAGOGASTRODUODENOSCOPY      ESOPHAGOGASTRODUODENOSCOPY N/A 10/25/2021    Procedure: EGD (ESOPHAGOGASTRODUODENOSCOPY);  Surgeon: Venkat He MD;  Location: [facility] ENDO (4TH FLR);  Service: Endoscopy;  Laterality: N/A;  Covid test on 10/22 at [email or web address]    10/19 lvm to confirm appt-rb    EXCISIONAL BIOPSY Right 10/14/2022    Procedure: EXCISIONAL BIOPSY-right with radiological marker;  Surgeon: PALLAVI Oseguera MD;  Location: [facility];  Service: General;  Laterality: Right;    EYE SURGERY      Lasik-bilateral    HYSTERECTOMY      IMPLANTATION OF PERMANENT SACRAL NERVE STIMULATOR N/A 2022    Procedure: INSERTION, NEUROSTIMULATOR, PERMANENT, SACRAL;  Surgeon: Bandar Garcia MD;  Location: [facility] OR [address] [place] [address];  Service: Urology;  Laterality: N/A;  2hr    INJECTION OF BOTULINUM TOXIN TYPE A N/A 2018    Procedure: INJECTION, BOTULINUM TOXIN, TYPE A  200 UNITS;  Surgeon: Bandar Garcia MD;  Location: [facility] OR [address] [place] [address];  Service: Urology;  Laterality: N/A;    INSTILLATION OF URINARY BLADDER N/A 2018    Procedure: INSTILLATION, URINARY BLADDER;  Surgeon: Bandar Garcia MD;  [facility];  Service: Urology;  Laterality: N/A;    PARTIAL HYSTERECTOMY      REMOVAL OF ELECTRODE LEAD OF SACRAL NERVE STIMULATOR N/A 2022    Procedure: REMOVAL, ELECTRODE LEAD, SACRAL NERVE STIMULATOR;  Surgeon: Bandar Garcia MD;  Location: [facility] OR [facility]  FLR;  Service: Urology;  Laterality: N/A;    SKIN TAG REMOVAL N/A 10/14/2022    Procedure: REMOVAL, SKIN TAGS;  Surgeon: PALLAVI Oseguera MD;  Location: [facility] OR;  Service: General;  Laterality: N/A;    TRANSFORAMINAL EPIDURAL INJECTION OF STEROID Left 02/15/2021    Procedure: LUMBAR TRANSFORAMINAL LEFT L5 AND S1 DIRECT REFERRAL;  Surgeon: Marquez Nesbitt MD;  Location: [facility];  Service: Pain Management;  Laterality: Left;  NEEDS CONSENT, PT REQUESTING IV SEDATION          Physical Exam   Eyes: Right conjunctiva is not injected. Left conjunctiva is not injected.   Neck: No JVD present. No thyromegaly present.   Cardiovascular: Normal rate and regular rhythm. Exam reveals no decreased pulses.   Pulmonary/Chest: Effort normal.   Musculoskeletal:      Right shoulder: Tenderness present.      Left shoulder: Tenderness present.      Right elbow: Tenderness present.      Left elbow: Tenderness present.      Right wrist: Tenderness present.      Left wrist: Tenderness present.      Right knee: Normal.      Left knee: Normal.   Lymphadenopathy:     She has no cervical adenopathy.   Neurological: Gait normal.   Skin: No rash noted.   Psychiatric: Affect normal. Her mood appears anxious. She exhibits a depressed mood.       Right Side Rheumatological Exam     Examination finds the knee, 1st MCP, 2nd MCP, 3rd MCP, 4th MCP and 5th MCP normal.    The patient is tender to palpation of the shoulder, elbow, wrist, 1st PIP, 2nd PIP, 3rd PIP, 4th PIP and 5th PIP    She has swelling of the 1st PIP, 2nd PIP, 3rd PIP, 4th PIP and 5th PIP    Left Side Rheumatological Exam     Examination finds the knee, 1st MCP, 2nd MCP, 3rd MCP, 4th MCP and 5th MCP normal.    The patient is tender to palpation of the shoulder, elbow, wrist, 1st PIP, 2nd PIP, 3rd PIP, 4th PIP and 5th PIP.    She has swelling of the 1st PIP, 2nd PIP, 3rd PIP, 4th PIP and 5th PIP      Labs reviewed:  Component      Latest Ref Rng & Units 7/10/2023 4/17/2023   WBC       3.90 - 12.70 K/uL  8.16   RBC      4.00 - 5.40 M/uL  4.38   Hemoglobin      12.0 - 16.0 g/dL  12.3   Hematocrit      37.0 - 48.5 %  39.9   MCV      82 - 98 fL  91   MCH      27.0 - 31.0 pg  28.1   MCHC      32.0 - 36.0 g/dL  30.8 (L)   RDW      11.5 - 14.5 %  13.7   Platelets      150 - 450 K/uL  399   MPV      9.2 - 12.9 fL  9.6   Immature Granulocytes      0.0 - 0.5 %  0.2   Gran # (ANC)      1.8 - 7.7 K/uL  3.3   Immature Grans (Abs)      0.00 - 0.04 K/uL  0.02   Lymph #      1.0 - 4.8 K/uL  4.0   Mono #      0.3 - 1.0 K/uL  0.6   Eos #      0.0 - 0.5 K/uL  0.1   Baso #      0.00 - 0.20 K/uL  0.07   nRBC      0 /100 WBC  0   Gran %      38.0 - 73.0 %  40.2   Lymph %      18.0 - 48.0 %  49.4 (H)   Mono %      4.0 - 15.0 %  7.6   Eosinophil %      0.0 - 8.0 %  1.7   Basophil %      0.0 - 1.9 %  0.9   Differential Method        Automated   Sodium      136 - 145 mmol/L  140   Potassium      3.5 - 5.1 mmol/L  4.1   Chloride      95 - 110 mmol/L  103   CO2      23 - 29 mmol/L  24   Glucose      70 - 110 mg/dL  88   BUN      6 - 20 mg/dL  15   Creatinine      0.5 - 1.4 mg/dL  1.0   Calcium      8.7 - 10.5 mg/dL  10.5   PROTEIN TOTAL      6.0 - 8.4 g/dL  7.7   Albumin      3.5 - 5.2 g/dL  4.8   BILIRUBIN TOTAL      0.1 - 1.0 mg/dL  0.5   Alkaline Phosphatase      55 - 135 U/L  124   AST      10 - 40 U/L  34   ALT      10 - 44 U/L  40   Anion Gap      8 - 16 mmol/L  13   eGFR      >60 mL/min/1.73 m:2  >60.0   NIL      IU/mL  0.49199   TB1 - Nil      IU/mL  0.000   TB2 - Nil      IU/mL  0.000   Mitogen - Nil      IU/mL  5.510   TB Gold Plus      Negative  Negative   Hep. B Surf Ab, Qual        POSITIVE   Hep. B Surf Ab, Quant.      mIU/mL  921   Sed Rate      0 - 36 mm/Hr  10   CRP      0.0 - 8.2 mg/L  1.3   Hepatitis C Ab      Non-reactive  Non-reactive   Hepatitis B Surface Ag      Non-reactive  Non-reactive   Hep B Core Total Ab      Non-reactive  Non-reactive   Complement (C-3)      50 - 180 mg/dL 115    Complement  (C-4)      11 - 44 mg/dL 24           Assessment:       1. Seronegative rheumatoid arthritis    2. Psoriatic arthritis    3. Hypothyroidism due to Hashimoto's thyroiditis    4. Osteopenia, unspecified location    5. Asymptomatic postmenopausal state    6. Immunocompromised state              Plan:       Seronegative rheumatoid arthritis  -     ketorolac injection 60 mg  -     cyanocobalamin injection 1,000 mcg  -     methylPREDNISolone acetate injection 80 mg  -     PATRICIA Screen w/Reflex; Future; Expected date: 07/10/2023  -     Anti-DNA Ab, Double-Stranded; Future; Expected date: 07/10/2023  -     Protein/Creatinine Ratio, Urine; Future; Expected date: 07/10/2023  -     Sjogrens syndrome-A extractable nuclear antibody; Future; Expected date: 07/10/2023  -     Sjogrens syndrome-B extractable nuclear antibody; Future; Expected date: 07/10/2023  -     Anti Sm/RNP Antibody; Future; Expected date: 07/10/2023  -     Anti-Histone Antibody; Future; Expected date: 07/10/2023  -     Anti-Smith Antibody; Future; Expected date: 07/10/2023  -     C3 Complement; Future; Expected date: 07/10/2023  -     C4 Complement; Future; Expected date: 07/10/2023    Psoriatic arthritis  -     CBC Auto Differential; Future; Expected date: 07/10/2023  -     Comprehensive Metabolic Panel; Future; Expected date: 07/10/2023  -     C-Reactive Protein; Future; Expected date: 07/10/2023  -     Sedimentation rate; Future; Expected date: 07/10/2023  -     guselkumab (TREMFYA) 100 mg/mL AtIn; Inject 100 mg into the skin every 28 days.  Dispense: 1 mL; Refill: 1  -     guselkumab (TREMFYA) 100 mg/mL AtIn; Inject 100 mg into the skin every 8 weeks.  Dispense: 1 mL; Refill: 6  -     cyclobenzaprine (FLEXERIL) 10 MG tablet; Take 1 tablet (10 mg total) by mouth 3 (three) times daily as needed for Muscle spasms.  Dispense: 90 tablet; Refill: 6  -     ketorolac injection 60 mg  -     cyanocobalamin injection 1,000 mcg  -     methylPREDNISolone acetate injection  80 mg  -     PATRICIA Screen w/Reflex; Future; Expected date: 07/10/2023  -     Anti-DNA Ab, Double-Stranded; Future; Expected date: 07/10/2023  -     Protein/Creatinine Ratio, Urine; Future; Expected date: 07/10/2023  -     Sjogrens syndrome-A extractable nuclear antibody; Future; Expected date: 07/10/2023  -     Sjogrens syndrome-B extractable nuclear antibody; Future; Expected date: 07/10/2023  -     Anti Sm/RNP Antibody; Future; Expected date: 07/10/2023  -     Anti-Histone Antibody; Future; Expected date: 07/10/2023  -     Anti-Smith Antibody; Future; Expected date: 07/10/2023  -     C3 Complement; Future; Expected date: 07/10/2023  -     C4 Complement; Future; Expected date: 07/10/2023    Hypothyroidism due to Hashimoto's thyroiditis  -     TSH; Future; Expected date: 07/10/2023  -     T4, Free; Future; Expected date: 07/10/2023    Osteopenia, unspecified location    Asymptomatic postmenopausal state  -     DXA Bone Density Axial Skeleton 1 or more sites; Future; Expected date: 07/10/2023    Immunocompromised state          Assessment:  53 year old female with  Psoriatic arthritis, seronegative RA  --chronic pain syndrome on tramadol  --uncontrolled major depression, anxiety followed by Psychiatry  --osteopenia  --hashimoto's thyroiditis followed by Endocrinology    Plan:  1. toradol 60, depo 80, b12 given today for flare  2. Start tremfya 100 mg wk 0, wk 4 then every 8 weeks  3. Tramadol, gabapentin per MD.  I have checked [place] prescription monitoring program site and no unusual or abnormal behavior has occurred pt understand the risk and benefits of taking opioid medications and has decided to continue the medication.  4. Cont flexeril prn  5. Cont prednisone PRN  6. DEXA scan    Follow up:  4 mo Dr. Samaniego

## 2023-07-11 ENCOUNTER — TELEPHONE (OUTPATIENT)
Dept: SURGERY | Facility: CLINIC | Age: 53
End: 2023-07-11
Payer: COMMERCIAL

## 2023-07-11 ENCOUNTER — PATIENT MESSAGE (OUTPATIENT)
Dept: RHEUMATOLOGY | Facility: CLINIC | Age: 53
End: 2023-07-11
Payer: COMMERCIAL

## 2023-07-11 LAB
ALBUMIN SERPL BCP-MCNC: 4.6 G/DL (ref 3.5–5.2)
ALP SERPL-CCNC: 102 U/L (ref 55–135)
ALT SERPL W/O P-5'-P-CCNC: 40 U/L (ref 10–44)
ANA SER QL IF: NORMAL
ANION GAP SERPL CALC-SCNC: 12 MMOL/L (ref 8–16)
AST SERPL-CCNC: 29 U/L (ref 10–40)
BILIRUB SERPL-MCNC: 0.4 MG/DL (ref 0.1–1)
BUN SERPL-MCNC: 17 MG/DL (ref 6–20)
C3 SERPL-MCNC: 115 MG/DL (ref 50–180)
C4 SERPL-MCNC: 24 MG/DL (ref 11–44)
CALCIUM SERPL-MCNC: 9.7 MG/DL (ref 8.7–10.5)
CHLORIDE SERPL-SCNC: 105 MMOL/L (ref 95–110)
CO2 SERPL-SCNC: 22 MMOL/L (ref 23–29)
CREAT SERPL-MCNC: 1 MG/DL (ref 0.5–1.4)
CRP SERPL-MCNC: 0.5 MG/L (ref 0–8.2)
DSDNA AB SER-ACNC: NORMAL [IU]/ML
EST. GFR  (NO RACE VARIABLE): >60 ML/MIN/1.73 M^2
GLUCOSE SERPL-MCNC: 105 MG/DL (ref 70–110)
POTASSIUM SERPL-SCNC: 4.2 MMOL/L (ref 3.5–5.1)
PROT SERPL-MCNC: 7.2 G/DL (ref 6–8.4)
SODIUM SERPL-SCNC: 139 MMOL/L (ref 136–145)
T4 FREE SERPL-MCNC: 0.83 NG/DL (ref 0.71–1.51)
TSH SERPL DL<=0.005 MIU/L-ACNC: 0.35 UIU/ML (ref 0.4–4)

## 2023-07-11 RX ORDER — CLONAZEPAM 1 MG/1
1 TABLET ORAL 2 TIMES DAILY
Qty: 60 TABLET | Refills: 0 | Status: SHIPPED | OUTPATIENT
Start: 2023-07-11 | End: 2023-08-17 | Stop reason: SDUPTHER

## 2023-07-12 ENCOUNTER — PATIENT MESSAGE (OUTPATIENT)
Dept: RHEUMATOLOGY | Facility: CLINIC | Age: 53
End: 2023-07-12
Payer: COMMERCIAL

## 2023-07-12 LAB
ANTI SM ANTIBODY: 0.07 RATIO (ref 0–0.99)
ANTI SM/RNP ANTIBODY: 0.07 RATIO (ref 0–0.99)
ANTI-SM INTERPRETATION: NEGATIVE
ANTI-SM/RNP INTERPRETATION: NEGATIVE
ANTI-SSA ANTIBODY: 0.06 RATIO (ref 0–0.99)
ANTI-SSA INTERPRETATION: NEGATIVE
ANTI-SSB ANTIBODY: 0.08 RATIO (ref 0–0.99)
ANTI-SSB INTERPRETATION: NEGATIVE

## 2023-07-12 NOTE — TELEPHONE ENCOUNTER
----- Message from Iwona Manzano sent at 3/13/2020 10:23 AM CDT -----  Contact: Marilee Douglas pt  Type: Needs Medical Advice    Who Called:  Marilee Quiroz Call Back Number: [phone]  Additional Information: Pls call pt regarding Jury Duty letter     Topical Metronidazole Pregnancy And Lactation Text: This medication is Pregnancy Category B and considered safe during pregnancy.  It is also considered safe to use while breastfeeding.

## 2023-07-13 DIAGNOSIS — R11.0 NAUSEA: ICD-10-CM

## 2023-07-13 LAB — HISTONE IGG SER IA-ACNC: 0.5 UNITS (ref 0–0.9)

## 2023-07-13 RX ORDER — PROMETHAZINE HYDROCHLORIDE 25 MG/1
TABLET ORAL
Qty: 30 TABLET | Refills: 0 | OUTPATIENT
Start: 2023-07-13 | End: 2023-07-20

## 2023-07-14 ENCOUNTER — PATIENT MESSAGE (OUTPATIENT)
Dept: OPTOMETRY | Facility: CLINIC | Age: 53
End: 2023-07-14
Payer: COMMERCIAL

## 2023-07-14 ENCOUNTER — TELEPHONE (OUTPATIENT)
Dept: PHARMACY | Facility: CLINIC | Age: 53
End: 2023-07-14
Payer: COMMERCIAL

## 2023-07-14 ENCOUNTER — PATIENT MESSAGE (OUTPATIENT)
Dept: PSYCHIATRY | Facility: CLINIC | Age: 53
End: 2023-07-14
Payer: COMMERCIAL

## 2023-07-14 NOTE — TELEPHONE ENCOUNTER
Kristine, this is Dania Frausto, clinical pharmacist with [facility] that is part of your care team.  We have begun working on your prescription that your doctor has sent us. Our next steps include:     Working with your insurance company to obtain approval for your medication  Working with you to ensure your medication is affordable     We will be calling you along the way with updates on your medication but if you have any concerns or receive information that you would like to discuss please reach us at [phone].    Welcome call outcome: Left voicemail

## 2023-07-18 DIAGNOSIS — N60.91 ATYPICAL DUCTAL HYPERPLASIA OF RIGHT BREAST: Primary | ICD-10-CM

## 2023-07-19 ENCOUNTER — HOSPITAL ENCOUNTER (EMERGENCY)
Facility: HOSPITAL | Age: 53
Discharge: HOME | End: 2023-07-20
Attending: EMERGENCY MEDICINE
Payer: COMMERCIAL

## 2023-07-19 DIAGNOSIS — R07.9 CHEST PAIN: ICD-10-CM

## 2023-07-19 DIAGNOSIS — R42 LIGHTHEADEDNESS: Primary | ICD-10-CM

## 2023-07-19 DIAGNOSIS — N30.90 CYSTITIS: ICD-10-CM

## 2023-07-19 LAB
ABO + RH BLD: NORMAL
ALBUMIN SERPL BCP-MCNC: 4.6 G/DL (ref 3.5–5.2)
ALP SERPL-CCNC: 106 U/L (ref 55–135)
ALT SERPL W/O P-5'-P-CCNC: 30 U/L (ref 10–44)
ANION GAP SERPL CALC-SCNC: 9 MMOL/L (ref 8–16)
AST SERPL-CCNC: 20 U/L (ref 10–40)
BACTERIA #/AREA URNS HPF: ABNORMAL /HPF
BASOPHILS # BLD AUTO: 0.09 K/UL (ref 0–0.2)
BASOPHILS NFR BLD: 1.1 % (ref 0–1.9)
BILIRUB SERPL-MCNC: 0.5 MG/DL (ref 0.1–1)
BILIRUB UR QL STRIP: ABNORMAL
BLD GP AB SCN CELLS X3 SERPL QL: NORMAL
BUN SERPL-MCNC: 14 MG/DL (ref 6–20)
CALCIUM SERPL-MCNC: 9.8 MG/DL (ref 8.7–10.5)
CHLORIDE SERPL-SCNC: 103 MMOL/L (ref 95–110)
CLARITY UR: ABNORMAL
CO2 SERPL-SCNC: 27 MMOL/L (ref 23–29)
COLOR UR: ABNORMAL
CREAT SERPL-MCNC: 1.1 MG/DL (ref 0.5–1.4)
DIFFERENTIAL METHOD: ABNORMAL
EOSINOPHIL # BLD AUTO: 0.1 K/UL (ref 0–0.5)
EOSINOPHIL NFR BLD: 0.8 % (ref 0–8)
ERYTHROCYTE [DISTWIDTH] IN BLOOD BY AUTOMATED COUNT: 13.1 % (ref 11.5–14.5)
EST. GFR  (NO RACE VARIABLE): 59 ML/MIN/1.73 M^2
GLUCOSE SERPL-MCNC: 89 MG/DL (ref 70–110)
GLUCOSE UR QL STRIP: NEGATIVE
HCT VFR BLD AUTO: 32.9 % (ref 37–48.5)
HGB BLD-MCNC: 10.6 G/DL (ref 12–16)
HGB UR QL STRIP: NEGATIVE
HYALINE CASTS #/AREA URNS LPF: 0 /LPF
IMM GRANULOCYTES # BLD AUTO: 0.03 K/UL (ref 0–0.04)
IMM GRANULOCYTES NFR BLD AUTO: 0.4 % (ref 0–0.5)
INFLUENZA A, MOLECULAR: NEGATIVE
INFLUENZA B, MOLECULAR: NEGATIVE
KETONES UR QL STRIP: NEGATIVE
LEUKOCYTE ESTERASE UR QL STRIP: ABNORMAL
LIPASE SERPL-CCNC: 24 U/L (ref 4–60)
LYMPHOCYTES # BLD AUTO: 3.6 K/UL (ref 1–4.8)
LYMPHOCYTES NFR BLD: 44.7 % (ref 18–48)
MCH RBC QN AUTO: 31.8 PG (ref 27–31)
MCHC RBC AUTO-ENTMCNC: 32.2 G/DL (ref 32–36)
MCV RBC AUTO: 99 FL (ref 82–98)
MICROSCOPIC COMMENT: ABNORMAL
MONOCYTES # BLD AUTO: 0.6 K/UL (ref 0.3–1)
MONOCYTES NFR BLD: 8.1 % (ref 4–15)
NEUTROPHILS # BLD AUTO: 3.6 K/UL (ref 1.8–7.7)
NEUTROPHILS NFR BLD: 44.9 % (ref 38–73)
NITRITE UR QL STRIP: POSITIVE
NRBC BLD-RTO: 0 /100 WBC
PH UR STRIP: 6 [PH] (ref 5–8)
PLATELET # BLD AUTO: 359 K/UL (ref 150–450)
PMV BLD AUTO: 8.9 FL (ref 9.2–12.9)
POTASSIUM SERPL-SCNC: 3.8 MMOL/L (ref 3.5–5.1)
PROT SERPL-MCNC: 7.1 G/DL (ref 6–8.4)
PROT UR QL STRIP: ABNORMAL
RBC # BLD AUTO: 3.33 M/UL (ref 4–5.4)
RBC #/AREA URNS HPF: 8 /HPF (ref 0–4)
SARS-COV-2 RDRP RESP QL NAA+PROBE: NEGATIVE
SODIUM SERPL-SCNC: 139 MMOL/L (ref 136–145)
SP GR UR STRIP: 1.01 (ref 1–1.03)
SPECIMEN OUTDATE: NORMAL
SPECIMEN SOURCE: NORMAL
SQUAMOUS #/AREA URNS HPF: 9 /HPF
TROPONIN I SERPL DL<=0.01 NG/ML-MCNC: <0.006 NG/ML (ref 0–0.03)
TROPONIN I SERPL DL<=0.01 NG/ML-MCNC: <0.006 NG/ML (ref 0–0.03)
URN SPEC COLLECT METH UR: ABNORMAL
UROBILINOGEN UR STRIP-ACNC: >=8 EU/DL
WBC # BLD AUTO: 7.95 K/UL (ref 3.9–12.7)
WBC #/AREA URNS HPF: 14 /HPF (ref 0–5)
WBC CLUMPS URNS QL MICRO: ABNORMAL
YEAST URNS QL MICRO: ABNORMAL

## 2023-07-19 PROCEDURE — 25000003 PHARM REV CODE 250: Performed by: EMERGENCY MEDICINE

## 2023-07-19 PROCEDURE — 87086 URINE CULTURE/COLONY COUNT: CPT | Performed by: PHYSICIAN ASSISTANT

## 2023-07-19 PROCEDURE — 87502 INFLUENZA DNA AMP PROBE: CPT | Performed by: EMERGENCY MEDICINE

## 2023-07-19 PROCEDURE — 83690 ASSAY OF LIPASE: CPT | Performed by: PHYSICIAN ASSISTANT

## 2023-07-19 PROCEDURE — 93010 ELECTROCARDIOGRAM REPORT: CPT | Mod: ,,, | Performed by: INTERNAL MEDICINE

## 2023-07-19 PROCEDURE — 99285 EMERGENCY DEPT VISIT HI MDM: CPT | Mod: 25

## 2023-07-19 PROCEDURE — 81000 URINALYSIS NONAUTO W/SCOPE: CPT | Performed by: PHYSICIAN ASSISTANT

## 2023-07-19 PROCEDURE — 63600175 PHARM REV CODE 636 W HCPCS: Performed by: EMERGENCY MEDICINE

## 2023-07-19 PROCEDURE — 84484 ASSAY OF TROPONIN QUANT: CPT | Performed by: EMERGENCY MEDICINE

## 2023-07-19 PROCEDURE — 96365 THER/PROPH/DIAG IV INF INIT: CPT

## 2023-07-19 PROCEDURE — 96375 TX/PRO/DX INJ NEW DRUG ADDON: CPT

## 2023-07-19 PROCEDURE — U0002 COVID-19 LAB TEST NON-CDC: HCPCS | Performed by: EMERGENCY MEDICINE

## 2023-07-19 PROCEDURE — 93010 EKG 12-LEAD: ICD-10-PCS | Mod: ,,, | Performed by: INTERNAL MEDICINE

## 2023-07-19 PROCEDURE — 85025 COMPLETE CBC W/AUTO DIFF WBC: CPT | Performed by: PHYSICIAN ASSISTANT

## 2023-07-19 PROCEDURE — 63600175 PHARM REV CODE 636 W HCPCS: Performed by: PHYSICIAN ASSISTANT

## 2023-07-19 PROCEDURE — 93005 ELECTROCARDIOGRAM TRACING: CPT

## 2023-07-19 PROCEDURE — 86900 BLOOD TYPING SEROLOGIC ABO: CPT | Performed by: EMERGENCY MEDICINE

## 2023-07-19 PROCEDURE — 80053 COMPREHEN METABOLIC PANEL: CPT | Performed by: PHYSICIAN ASSISTANT

## 2023-07-19 PROCEDURE — 84484 ASSAY OF TROPONIN QUANT: CPT | Mod: 91 | Performed by: PHYSICIAN ASSISTANT

## 2023-07-19 RX ORDER — PROCHLORPERAZINE EDISYLATE 5 MG/ML
10 INJECTION INTRAMUSCULAR; INTRAVENOUS
Status: COMPLETED | OUTPATIENT
Start: 2023-07-19 | End: 2023-07-19

## 2023-07-19 RX ORDER — KETOROLAC TROMETHAMINE 30 MG/ML
10 INJECTION, SOLUTION INTRAMUSCULAR; INTRAVENOUS
Status: COMPLETED | OUTPATIENT
Start: 2023-07-19 | End: 2023-07-19

## 2023-07-19 RX ORDER — MAGNESIUM SULFATE HEPTAHYDRATE 40 MG/ML
2 INJECTION, SOLUTION INTRAVENOUS ONCE
Status: COMPLETED | OUTPATIENT
Start: 2023-07-19 | End: 2023-07-19

## 2023-07-19 RX ORDER — ACETAMINOPHEN 500 MG
500 TABLET ORAL
Status: COMPLETED | OUTPATIENT
Start: 2023-07-19 | End: 2023-07-19

## 2023-07-19 RX ORDER — MECLIZINE HYDROCHLORIDE 25 MG/1
25 TABLET ORAL
Status: COMPLETED | OUTPATIENT
Start: 2023-07-19 | End: 2023-07-19

## 2023-07-19 RX ADMIN — PROCHLORPERAZINE EDISYLATE 10 MG: 5 INJECTION INTRAMUSCULAR; INTRAVENOUS at 08:07

## 2023-07-19 RX ADMIN — SODIUM CHLORIDE, POTASSIUM CHLORIDE, SODIUM LACTATE AND CALCIUM CHLORIDE 1000 ML: 600; 310; 30; 20 INJECTION, SOLUTION INTRAVENOUS at 08:07

## 2023-07-19 RX ADMIN — ACETAMINOPHEN 500 MG: 500 TABLET ORAL at 10:07

## 2023-07-19 RX ADMIN — MECLIZINE HYDROCHLORIDE 25 MG: 25 TABLET ORAL at 11:07

## 2023-07-19 RX ADMIN — MAGNESIUM SULFATE 2 G: 2 INJECTION INTRAVENOUS at 10:07

## 2023-07-19 RX ADMIN — KETOROLAC TROMETHAMINE 10 MG: 30 INJECTION, SOLUTION INTRAMUSCULAR; INTRAVENOUS at 08:07

## 2023-07-20 ENCOUNTER — PATIENT MESSAGE (OUTPATIENT)
Dept: PSYCHIATRY | Facility: CLINIC | Age: 53
End: 2023-07-20
Payer: COMMERCIAL

## 2023-07-20 ENCOUNTER — PATIENT MESSAGE (OUTPATIENT)
Dept: RHEUMATOLOGY | Facility: CLINIC | Age: 53
End: 2023-07-20
Payer: COMMERCIAL

## 2023-07-20 VITALS
TEMPERATURE: 98 F | DIASTOLIC BLOOD PRESSURE: 72 MMHG | WEIGHT: 160 LBS | HEART RATE: 60 BPM | RESPIRATION RATE: 16 BRPM | BODY MASS INDEX: 32.32 KG/M2 | OXYGEN SATURATION: 99 % | SYSTOLIC BLOOD PRESSURE: 143 MMHG

## 2023-07-20 PROCEDURE — 63600175 PHARM REV CODE 636 W HCPCS: Performed by: EMERGENCY MEDICINE

## 2023-07-20 PROCEDURE — 96367 TX/PROPH/DG ADDL SEQ IV INF: CPT

## 2023-07-20 RX ORDER — METOCLOPRAMIDE 10 MG/1
10 TABLET ORAL EVERY 6 HOURS PRN
Qty: 12 TABLET | Refills: 0 | Status: SHIPPED | OUTPATIENT
Start: 2023-07-20 | End: 2023-07-23

## 2023-07-20 RX ORDER — CIPROFLOXACIN 2 MG/ML
400 INJECTION, SOLUTION INTRAVENOUS
Status: COMPLETED | OUTPATIENT
Start: 2023-07-20 | End: 2023-07-20

## 2023-07-20 RX ORDER — CIPROFLOXACIN 500 MG/1
500 TABLET ORAL 2 TIMES DAILY
Qty: 14 TABLET | Refills: 0 | Status: SHIPPED | OUTPATIENT
Start: 2023-07-20 | End: 2023-07-27

## 2023-07-20 RX ADMIN — CIPROFLOXACIN 400 MG: 2 INJECTION, SOLUTION INTRAVENOUS at 12:07

## 2023-07-20 NOTE — ED NOTES
RN talked to the doctor about patient saying that her h/a is worse she is still dizzy, and still not feeling well. MD will review her records and go and talk to her.

## 2023-07-20 NOTE — FIRST PROVIDER EVALUATION
" Emergency Department TeleTriage Encounter Note      CHIEF COMPLAINT    Chief Complaint   Patient presents with    Chest Pain     Patient states her and  started with "stomach bug" last week. Symptoms include N/V/D, headaches, body aches and fever. Did not get swabbed for Covid or Flu. Left sided chest pain started 3 days ago. Has been constant. Has been unable to eat and drink. Reports dehydration with dizziness.        VITAL SIGNS   Initial Vitals [07/19/23 1944]   BP Pulse Resp Temp SpO2   (!) 125/98 102 18 98.3 °F (36.8 °C) 95 %      MAP       --            ALLERGIES    Review of patient's allergies indicates:   Allergen Reactions    Cephalexin Itching    Cephalosporins     Zofran [ondansetron hcl (pf)] Hives    Penicillins Rash       PROVIDER TRIAGE NOTE  53-year-old female presents with a headache, nausea, vomiting, generalized malaise not feeling well for the past week.  Vital signs are normal, no distress on exam.      ORDERS  Labs Reviewed   CBC W/ AUTO DIFFERENTIAL   COMPREHENSIVE METABOLIC PANEL   LIPASE   URINALYSIS, REFLEX TO URINE CULTURE       ED Orders (720h ago, onward)      Start Ordered     Status Ordering Provider    07/19/23 2030 07/19/23 2028  lactated ringers bolus 1,000 mL  ED 1 Time         Ordered GENESIS WALKER    07/19/23 2030 07/19/23 2028  ketorolac injection 9.999 mg  ED 1 Time         Ordered GENESIS WALKER    07/19/23 2030 07/19/23 2028  prochlorperazine injection Soln 10 mg  ED 1 Time         Ordered GENESIS WALKER    07/19/23 2029 07/19/23 2028  Saline lock IV  Once         Ordered GENESIS WALKER    07/19/23 2029 07/19/23 2028  CBC Auto Differential  STAT         Ordered GENESIS WALKER    07/19/23 2029 07/19/23 2028  Comprehensive Metabolic Panel  STAT         Ordered GENESIS WALKER    07/19/23 2029 07/19/23 2028  Lipase  STAT         Ordered GENESIS WALKER    07/19/23 2029 07/19/23 2028  Urinalysis, Reflex to Urine Culture  STAT         Ordered AARON, " GENESIS MCNEAL    07/19/23 1951 07/19/23 1951  EKG 12-lead  Once         Completed by GIACOMO AGEE on 7/19/2023 at  7:53 PM BELEN HARMAN              Virtual Visit Note: The provider triage portion of this emergency department evaluation and documentation was performed via [email or web address], a HIPAA-compliant telemedicine application, in concert with a tele-presenter in the room. A face to face patient evaluation with one of my colleagues will occur once the patient is placed in an emergency department room.      DISCLAIMER: This note was prepared with "[employer] voice recognition transcription software. Garbled syntax, mangled pronouns, and other bizarre constructions may be attributed to that software system.

## 2023-07-20 NOTE — ED NOTES
Patient resting in bed. Lights turned down for patient comfort. Covered with blanket. Awake, alert, oriented. GCS of 15. No neurological deficits. Patient reports still just not feeling well. Updated on plan of care.    Edmund Rosas), Geriatric Medicine; Internal Medicine  [address] [place] [address]  Phone: [phone]  Fax: [phone]

## 2023-07-20 NOTE — ED PROVIDER NOTES
"Encounter Date: 7/19/2023       History     Chief Complaint   Patient presents with    Chest Pain     Patient states her and  started with "stomach bug" last week. Symptoms include N/V/D, headaches, body aches and fever. Did not get swabbed for Covid or Flu. Left sided chest pain started 3 days ago. Has been constant. Has been unable to eat and drink. Reports dehydration with dizziness.      Marilee Simons is a 53 y.o. female who  has a past medical history of Acid reflux, Allergy, Alopecia, Anxiety, Arthralgia, Back pain, Depression, Dry eyes, Fever blister, Fibromyalgia, Interstitial cystitis, Irritable bowel syndrome, Kidney stone, Major depressive disorder, recurrent episode, in partial or unspecified remission (6/25/2013), OAB (overactive bladder) (7/21/2015), PTSD (post-traumatic stress disorder), Pure hypercholesterolemia (7/6/2022), Rheumatoid arthritis, Rheumatoid arthritis (6/21/2022), Systemic lupus erythematosus, Thyroid disease, Urinary tract infection, and Vaginal infection.    The patient presents to the ED due to chest pain and dizziness/weakness these have been intermittent symptoms for the past few days.  She reports the past week she had been having nausea vomiting diarrhea.  These were nonbloody.  She then developed days ago.  She reports generalized weakness and feeling lightheaded when she stands up or bends over. She denies any shortness of breath vomiting or diarrhea this time    The history is provided by the patient.   Review of patient's allergies indicates:   Allergen Reactions    Cephalexin Itching    Cephalosporins     Zofran [ondansetron hcl (pf)] Hives    Penicillins Rash     Past Medical History:   Diagnosis Date    Acid reflux     Allergy     Alopecia     Anxiety     Arthralgia     Back pain     Depression     Dry eyes     from meds    Fever blister     Fibromyalgia     Interstitial cystitis     Irritable bowel syndrome     Kidney stone     Major depressive disorder, " recurrent episode, in partial or unspecified remission 2013    OAB (overactive bladder) 2015    PTSD (post-traumatic stress disorder)     Pure hypercholesterolemia 2022    Rheumatoid arthritis     Rheumatoid arthritis 2022    Systemic lupus erythematosus     Thyroid disease     Urinary tract infection     Vaginal infection      Past Surgical History:   Procedure Laterality Date    BLADDER SURGERY       SECTION, LOW TRANSVERSE      x 2    COLONOSCOPY      COLONOSCOPY N/A 10/05/2017    Procedure: COLONOSCOPY;  Surgeon: Venkat He MD;  Location: [facility] (4TH FLR);  Service: Endoscopy;  Laterality: N/A;    ESOPHAGOGASTRODUODENOSCOPY      ESOPHAGOGASTRODUODENOSCOPY N/A 10/25/2021    Procedure: EGD (ESOPHAGOGASTRODUODENOSCOPY);  Surgeon: Venkat He MD;  Location: [facility] (4TH FLR);  Service: Endoscopy;  Laterality: N/A;  Covid test on 10/22 at [place]    10/19 lvm to confirm appt-rb    EXCISIONAL BIOPSY Right 10/14/2022    Procedure: EXCISIONAL BIOPSY-right with radiological marker;  Surgeon: PALLAVI Oseguera MD;  Location: [facility];  Service: General;  Laterality: Right;    EYE SURGERY      Lasik-bilateral    HYSTERECTOMY      IMPLANTATION OF PERMANENT SACRAL NERVE STIMULATOR N/A 2022    Procedure: INSERTION, NEUROSTIMULATOR, PERMANENT, SACRAL;  Surgeon: Bandar Garcia MD;  Location: [facility] OR 2ND FLR;  Service: Urology;  Laterality: N/A;  2hr    INJECTION OF BOTULINUM TOXIN TYPE A N/A 2018    Procedure: INJECTION, BOTULINUM TOXIN, TYPE A  200 UNITS;  Surgeon: Bandar Garcia MD;  Location: [facility] OR 1ST FLR;  Service: Urology;  Laterality: N/A;    INSTILLATION OF URINARY BLADDER N/A 2018    Procedure: INSTILLATION, URINARY BLADDER;  Surgeon: Bandar Garcia MD;  Location: [facility] OR 1ST FLR;  Service: Urology;  Laterality: N/A;    PARTIAL HYSTERECTOMY      REMOVAL OF ELECTRODE LEAD OF SACRAL NERVE STIMULATOR N/A 2022    Procedure: REMOVAL, ELECTRODE LEAD,  SACRAL NERVE STIMULATOR;  Surgeon: Bandar Garcia MD;  Location: [facility] OR 2ND FLR;  Service: Urology;  Laterality: N/A;    SKIN TAG REMOVAL N/A 10/14/2022    Procedure: REMOVAL, SKIN TAGS;  Surgeon: PALLAVI Oseguera MD;  Location: [facility] OR;  Service: General;  Laterality: N/A;    TRANSFORAMINAL EPIDURAL INJECTION OF STEROID Left 02/15/2021    Procedure: LUMBAR TRANSFORAMINAL LEFT L5 AND S1 DIRECT REFERRAL;  Surgeon: Marquez Nesbitt MD;  Location: [facility] PAIN MGT;  Service: Pain Management;  Laterality: Left;  NEEDS CONSENT, PT REQUESTING IV SEDATION     Family History   Problem Relation Age of Onset    Irritable bowel syndrome Mother     Arthritis Mother     Hypertension Mother     Irritable bowel syndrome Sister     Lupus Sister     Rheum arthritis Sister     Fibromyalgia Sister     Irritable bowel syndrome Sister     Celiac disease Neg Hx     Cirrhosis Neg Hx     Colon cancer Neg Hx     Colon polyps Neg Hx     Crohn's disease Neg Hx     Cystic fibrosis Neg Hx     Esophageal cancer Neg Hx     Hemochromatosis Neg Hx     Inflammatory bowel disease Neg Hx     Liver cancer Neg Hx     Liver disease Neg Hx     Rectal cancer Neg Hx     Stomach cancer Neg Hx     Ulcerative colitis Neg Hx     Boris's disease Neg Hx     Melanoma Neg Hx     Amblyopia Neg Hx     Blindness Neg Hx     Cataracts Neg Hx     Glaucoma Neg Hx     Macular degeneration Neg Hx     Retinal detachment Neg Hx     Strabismus Neg Hx      Social History     Tobacco Use    Smoking status: Former    Smokeless tobacco: Never    Tobacco comments:     14 years old-18 years old   Substance Use Topics    Alcohol use: No    Drug use: No     Review of Systems   Constitutional:  Positive for fatigue. Negative for chills and fever.   HENT:  Negative for sore throat.    Respiratory:  Negative for cough and shortness of breath.    Cardiovascular:  Positive for chest pain.   Gastrointestinal:  Positive for nausea. Negative for vomiting.   Genitourinary:  Negative for dysuria,  frequency and urgency.   Musculoskeletal:  Negative for back pain, neck pain and neck stiffness.   Skin:  Negative for rash and wound.   Neurological:  Positive for light-headedness. Negative for syncope and weakness.   Hematological:  Does not bruise/bleed easily.   Psychiatric/Behavioral:  Negative for agitation, behavioral problems and confusion.      Physical Exam     Initial Vitals [07/19/23 1944]   BP Pulse Resp Temp SpO2   (!) 125/98 102 18 98.3 °F (36.8 °C) 95 %      MAP       --         Physical Exam    Nursing note and vitals reviewed.  Constitutional: She appears well-developed and well-nourished. She is not diaphoretic. No distress.   HENT:   Head: Normocephalic and atraumatic.   Mouth/Throat: Oropharynx is clear and moist.   Eyes: Conjunctivae and EOM are normal. Pupils are equal, round, and reactive to light.   Neck: No tracheal deviation present.   Cardiovascular:  Normal rate, regular rhythm, normal heart sounds and intact distal pulses.           Pulmonary/Chest: Breath sounds normal. No stridor. No respiratory distress. She has no wheezes.   Abdominal: Abdomen is soft. Bowel sounds are normal. She exhibits no distension and no mass. There is no abdominal tenderness.   Musculoskeletal:         General: No edema. Normal range of motion.     Neurological: She is alert and oriented to person, place, and time. No cranial nerve deficit or sensory deficit.   CN 2-12 intact  Finger to nose within normal limits    Gait normal  Equal strength to bilateral upper extremities, SILT  Equal strength to bilateral lower extremities, SILT     Skin: Skin is warm and dry. Capillary refill takes less than 2 seconds. No rash noted.   Psychiatric: She has a normal mood and affect. Her behavior is normal. Thought content normal.       ED Course   Procedures  Labs Reviewed   CBC W/ AUTO DIFFERENTIAL - Abnormal; Notable for the following components:       Result Value    RBC 3.33 (*)     Hemoglobin 10.6 (*)     Hematocrit  32.9 (*)     MCV 99 (*)     MCH 31.8 (*)     MPV 8.9 (*)     All other components within normal limits   COMPREHENSIVE METABOLIC PANEL - Abnormal; Notable for the following components:    eGFR 59 (*)     All other components within normal limits   URINALYSIS, REFLEX TO URINE CULTURE - Abnormal; Notable for the following components:    Color, UA Brown (*)     Appearance, UA Hazy (*)     Protein, UA 1+ (*)     Bilirubin (UA) 2+ (*)     Nitrite, UA Positive (*)     Urobilinogen, UA >=8.0 (*)     Leukocytes, UA 1+ (*)     All other components within normal limits    Narrative:     Specimen Source->Urine   URINALYSIS MICROSCOPIC - Abnormal; Notable for the following components:    RBC, UA 8 (*)     WBC, UA 14 (*)     WBC Clumps, UA Few (*)     Bacteria Few (*)     Yeast, UA Occasional (*)     All other components within normal limits    Narrative:     Specimen Source->Urine   INFLUENZA A & B BY MOLECULAR   CULTURE, URINE    Narrative:     Specimen Source->Urine   LIPASE   TROPONIN I   TROPONIN I   SARS-COV-2 RNA AMPLIFICATION, QUAL   TROPONIN I   TYPE & SCREEN        ECG Results              EKG 12-lead (Final result)  Result time 07/21/23 10:47:01      Final result by Interface, Lab In [facility] (07/21/23 10:47:01)                   Narrative:    Test Reason : R07.9,    Vent. Rate : 107 BPM     Atrial Rate : 107 BPM     P-R Int : 144 ms          QRS Dur : 080 ms      QT Int : 326 ms       P-R-T Axes : 073 029 095 degrees     QTc Int : 435 ms    Sinus tachycardia  Cannot rule out Anterior infarct ,age undetermined  ST and T wave abnormality, consider lateral ischemia  Abnormal ECG  Confirmed by Altagracia Sam MD ([id number]) on 7/21/2023 10:46:51 AM    Referred By: [email or web address]   SELF           Confirmed By:Altagracia Sam MD                                  Imaging Results              X-Ray Chest AP Portable (Final result)  Result time 07/19/23 23:46:04      Final result by Shannon Chambers MD (07/19/23 23:46:04)                    Impression:      No acute intrathoracic abnormality identified on this single radiographic view of the chest.      Electronically signed by: Shannon Chambers MD  Date:    07/19/2023  Time:    23:46               Narrative:    EXAMINATION:  XR CHEST AP PORTABLE    CLINICAL HISTORY:  chest pain;    TECHNIQUE:  Single frontal view of the chest was performed.    COMPARISON:  03/31/2023    FINDINGS:  Cardiac monitoring leads overlie the chest.  The cardiomediastinal silhouette is within normal limits of size and configuration.  Mediastinal structures are midline.  The lungs appear symmetrically expanded without definite evidence of confluent airspace consolidation, significant volume of pleural fluid or pneumothorax.  Visualized osseous structures are intact.                                       Medications   lactated ringers bolus 1,000 mL (1,000 mLs Intravenous New Bag 7/19/23 2048)   ketorolac injection 9.999 mg (9.999 mg Intravenous Given 7/19/23 2048)   prochlorperazine injection Soln 10 mg (10 mg Intravenous Given 7/19/23 2047)   acetaminophen tablet 500 mg (500 mg Oral Given 7/19/23 2211)   magnesium sulfate 2g in water 50mL IVPB (premix) (0 g Intravenous Stopped 7/19/23 2313)   meclizine tablet 25 mg (25 mg Oral Given 7/19/23 2320)   ciprofloxacin (CIPRO)400mg/200ml D5W IVPB 400 mg (0 mg Intravenous Stopped 7/20/23 0139)     Medical Decision Making:   Differential Diagnosis:   Differential Diagnosis includes, but is not limited to:  ACS,Peripheral vertigo (labyrinthitis, vestibular neuritis, BPPV, Meniere's disease), cerebellar stroke/CVA, TIA, SAH, carotid artery dissection, intracranial mass, medication reaction/noncompliance, substance abuse, depression, electrolyte abnormality, anemia, hemorrhage, renal failure, hepatic failure, sepsis/infection, UTI, viral illness, arrhythmia, CHF, thyroid disease, dehydration, depression, chronic disease.      ED Management:  Patient presents with multiple intermittent  complaints.  Her vital signs are stable.  Her neurological exam is reassuring.  There is no nystagmus or signs at this time to suggest central vertigo.  Patient's heart rate improved in the ED with IV fluids.  She reported resolution of her dizziness/chest pain.  PE is unlikely based on her response to treatment and was improved signs.  Patient did develop headache here that resolved with Toradol and magnesium.  Her repeat troponins are negative x2.  She states she is back to her regular state of health.  She was found to have urinary tract infection and after discussion of the known attention complications of ciprofloxacin which include tendon rupture will prescribe this as she is allergic to Keflex and cephalosporins.  She is stable and comfortable with discharge at this time Discussed close follow-up with her PCP, return precautions for worsening symptoms or any other concerns.    .[facility]             ED Course as of 07/21/23 1557   Wed Jul 19, 2023 2120 EKG: Rate 107.  Sinus tachycardia.  No STEMI.  No ectopy. [RN]   5912 Patient reports feeling better at this time   [RN]      ED Course User Index  [RN] Johnathan Marie Jr., MD                   Clinical Impression:   Final diagnoses:  [R07.9] Chest pain  [R42] Lightheadedness (Primary)  [N30.90] Cystitis        ED Disposition Condition    Discharge Stable          ED Prescriptions       Medication Sig Dispense Start Date End Date Auth. Provider    ciprofloxacin HCl (CIPRO) 500 MG tablet Take 1 tablet (500 mg total) by mouth 2 (two) times daily. for 7 days 14 tablet 7/20/2023 7/27/2023 Johnathan Marie Jr., MD    metoclopramide HCl (REGLAN) 10 MG tablet Take 1 tablet (10 mg total) by mouth every 6 (six) hours as needed. 12 tablet 7/20/2023 7/23/2023 Johnathan Marie Jr., MD          Follow-up Information       Follow up With Specialties Details Why Contact Info    Leti Howe MD Internal Medicine In 3 days  [address]  [facility]  [address]  [phone]            Portions of this note were dictated using voice recognition software and may contain dictation related errors in spelling/grammar/syntax not found on text review       Johnathan Marie Jr., MD  07/21/23 3231

## 2023-07-20 NOTE — DISCHARGE INSTRUCTIONS

## 2023-07-21 ENCOUNTER — PATIENT MESSAGE (OUTPATIENT)
Dept: RHEUMATOLOGY | Facility: CLINIC | Age: 53
End: 2023-07-21
Payer: COMMERCIAL

## 2023-07-21 LAB
BACTERIA UR CULT: NORMAL
BACTERIA UR CULT: NORMAL

## 2023-07-23 ENCOUNTER — PATIENT MESSAGE (OUTPATIENT)
Dept: OPTOMETRY | Facility: CLINIC | Age: 53
End: 2023-07-23
Payer: COMMERCIAL

## 2023-07-23 ENCOUNTER — PATIENT MESSAGE (OUTPATIENT)
Dept: PRIMARY CARE CLINIC | Facility: CLINIC | Age: 53
End: 2023-07-23
Payer: COMMERCIAL

## 2023-07-24 DIAGNOSIS — D64.9 ANEMIA, UNSPECIFIED TYPE: Primary | ICD-10-CM

## 2023-07-25 ENCOUNTER — TELEPHONE (OUTPATIENT)
Dept: RHEUMATOLOGY | Facility: CLINIC | Age: 53
End: 2023-07-25
Payer: COMMERCIAL

## 2023-07-25 ENCOUNTER — PATIENT MESSAGE (OUTPATIENT)
Dept: RHEUMATOLOGY | Facility: CLINIC | Age: 53
End: 2023-07-25
Payer: COMMERCIAL

## 2023-07-25 NOTE — TELEPHONE ENCOUNTER
----- Message from Maggy Hagen PA-C sent at 7/24/2023 10:08 AM CDT -----  Needs additional labs to eval anemia. Please schedule

## 2023-07-27 ENCOUNTER — PATIENT MESSAGE (OUTPATIENT)
Dept: UROLOGY | Facility: CLINIC | Age: 53
End: 2023-07-27
Payer: COMMERCIAL

## 2023-07-28 ENCOUNTER — PATIENT MESSAGE (OUTPATIENT)
Dept: NEUROLOGY | Facility: CLINIC | Age: 53
End: 2023-07-28
Payer: COMMERCIAL

## 2023-07-28 ENCOUNTER — TELEPHONE (OUTPATIENT)
Dept: NEUROLOGY | Facility: CLINIC | Age: 53
End: 2023-07-28
Payer: COMMERCIAL

## 2023-07-28 ENCOUNTER — PATIENT MESSAGE (OUTPATIENT)
Dept: PRIMARY CARE CLINIC | Facility: CLINIC | Age: 53
End: 2023-07-28
Payer: COMMERCIAL

## 2023-07-28 NOTE — TELEPHONE ENCOUNTER
Left a message for the patient to let her know her appointment for today was scheduled incorrectly. The patient was rescheduled for the same day and time as an in person appointment. Patient was asked to call back to reschedule if needed.

## 2023-07-31 ENCOUNTER — PATIENT MESSAGE (OUTPATIENT)
Dept: UROLOGY | Facility: CLINIC | Age: 53
End: 2023-07-31
Payer: COMMERCIAL

## 2023-08-01 ENCOUNTER — TELEPHONE (OUTPATIENT)
Dept: RHEUMATOLOGY | Facility: CLINIC | Age: 53
End: 2023-08-01

## 2023-08-01 DIAGNOSIS — L40.50 PSORIATIC ARTHRITIS: Primary | ICD-10-CM

## 2023-08-01 RX ORDER — RISANKIZUMAB-RZAA 150 MG/ML
150 INJECTION SUBCUTANEOUS
Qty: 1 ML | Refills: 1 | Status: ACTIVE | OUTPATIENT
Start: 2023-08-01 | End: 2023-08-29

## 2023-08-01 RX ORDER — RISANKIZUMAB-RZAA 150 MG/ML
150 INJECTION SUBCUTANEOUS
Qty: 1 ML | Refills: 4 | Status: ACTIVE | OUTPATIENT
Start: 2023-08-01 | End: 2023-08-29

## 2023-08-01 NOTE — TELEPHONE ENCOUNTER
Changed to skyrizi. Can you notify pt why med was changed? If no, please let me know and I will have staff reach out to her

## 2023-08-01 NOTE — TELEPHONE ENCOUNTER
----- Message from Dania Frausto, VeronicaD sent at 7/21/2023  3:38 PM CDT -----  Regarding: Suzanelijah  Good afternoon Dr. Samaniego and Maggy,    The prior authorization for Ms. Kaleb Boss has been denied.  The medication is an excluded on her plan.  There are no listed alternatives on the denial letter.  She can possibly be changed to Orencia or Skyrizi.  How would you like to proceed?    Thank you,  Dania Frausto, PharmD  Clinical Pharmacist    [facility]  [address] [place] [id number]  P [phone]  F [phone]

## 2023-08-02 ENCOUNTER — OFFICE VISIT (OUTPATIENT)
Dept: CARDIOLOGY | Facility: CLINIC | Age: 53
End: 2023-08-02
Payer: COMMERCIAL

## 2023-08-02 VITALS
WEIGHT: 156 LBS | SYSTOLIC BLOOD PRESSURE: 114 MMHG | BODY MASS INDEX: 31.45 KG/M2 | HEIGHT: 59 IN | DIASTOLIC BLOOD PRESSURE: 83 MMHG

## 2023-08-02 DIAGNOSIS — Z13.6 ENCOUNTER FOR SCREENING FOR CARDIOVASCULAR DISORDERS: ICD-10-CM

## 2023-08-02 DIAGNOSIS — I20.89 ANGINAL EQUIVALENT: Primary | ICD-10-CM

## 2023-08-02 PROCEDURE — 3008F BODY MASS INDEX DOCD: CPT | Mod: CPTII,S$GLB,, | Performed by: INTERNAL MEDICINE

## 2023-08-02 PROCEDURE — 1159F PR MEDICATION LIST DOCUMENTED IN MEDICAL RECORD: ICD-10-PCS | Mod: CPTII,S$GLB,, | Performed by: INTERNAL MEDICINE

## 2023-08-02 PROCEDURE — 1159F MED LIST DOCD IN RCRD: CPT | Mod: CPTII,S$GLB,, | Performed by: INTERNAL MEDICINE

## 2023-08-02 PROCEDURE — 99214 OFFICE O/P EST MOD 30 MIN: CPT | Mod: S$GLB,,, | Performed by: INTERNAL MEDICINE

## 2023-08-02 PROCEDURE — 99214 PR OFFICE/OUTPT VISIT, EST, LEVL IV, 30-39 MIN: ICD-10-PCS | Mod: S$GLB,,, | Performed by: INTERNAL MEDICINE

## 2023-08-02 PROCEDURE — 99999 PR PBB SHADOW E&M-EST. PATIENT-LVL III: ICD-10-PCS | Mod: PBBFAC,,, | Performed by: INTERNAL MEDICINE

## 2023-08-02 PROCEDURE — 3074F PR MOST RECENT SYSTOLIC BLOOD PRESSURE < 130 MM HG: ICD-10-PCS | Mod: CPTII,S$GLB,, | Performed by: INTERNAL MEDICINE

## 2023-08-02 PROCEDURE — 3074F SYST BP LT 130 MM HG: CPT | Mod: CPTII,S$GLB,, | Performed by: INTERNAL MEDICINE

## 2023-08-02 PROCEDURE — 3079F DIAST BP 80-89 MM HG: CPT | Mod: CPTII,S$GLB,, | Performed by: INTERNAL MEDICINE

## 2023-08-02 PROCEDURE — 3008F PR BODY MASS INDEX (BMI) DOCUMENTED: ICD-10-PCS | Mod: CPTII,S$GLB,, | Performed by: INTERNAL MEDICINE

## 2023-08-02 PROCEDURE — 3079F PR MOST RECENT DIASTOLIC BLOOD PRESSURE 80-89 MM HG: ICD-10-PCS | Mod: CPTII,S$GLB,, | Performed by: INTERNAL MEDICINE

## 2023-08-02 PROCEDURE — 1160F PR REVIEW ALL MEDS BY PRESCRIBER/CLIN PHARMACIST DOCUMENTED: ICD-10-PCS | Mod: CPTII,S$GLB,, | Performed by: INTERNAL MEDICINE

## 2023-08-02 PROCEDURE — 1160F RVW MEDS BY RX/DR IN RCRD: CPT | Mod: CPTII,S$GLB,, | Performed by: INTERNAL MEDICINE

## 2023-08-02 PROCEDURE — 99999 PR PBB SHADOW E&M-EST. PATIENT-LVL III: CPT | Mod: PBBFAC,,, | Performed by: INTERNAL MEDICINE

## 2023-08-02 NOTE — PROGRESS NOTES
Subjective:   08/02/2023     Patient ID:  Marilee Simons is a 53 y.o. female who presents for evaulation of Follow-up      Comes in for cardiac evaluation, I had seen her several months ago with complaints of dyspnea on exertion.  We are going to do a stress echo and a coronary calcium score, but she had COVID several times and a lot of illness in the family in never could arrange it.  She is back with continued dyspnea on exertion for re-evaluation.      Prior note:  Family history is negative for premature coronary atherosclerosis.  He has not had chest pains tightness, comes in noting concerns about high blood pressures, elevated heart rate increasing shortness of breath.  This has been occurring for the last several months.  She was recently hospitalized with sepsis.  Blood cultures were negative.  She does have psoriatic arthritis and rheumatoid arthritis.  She  quit smoking 26 years ago.        The 10-year ASCVD risk score (Valeria EWING, et al., 2019) is: 1.6%    Values used to calculate the score:      Age: 53 years      Sex: Female      Is Non- : No      Diabetic: No      Tobacco smoker: No      Systolic Blood Pressure: 114 mmHg      Is BP treated: No      HDL Cholesterol: 70 mg/dL      Total Cholesterol: 247 mg/dL            Past Medical History:   Diagnosis Date    Acid reflux     Allergy     Alopecia     Anxiety     Arthralgia     Back pain     Depression     Dry eyes     from meds    Fever blister     Fibromyalgia     Interstitial cystitis     Irritable bowel syndrome     Kidney stone     Major depressive disorder, recurrent episode, in partial or unspecified remission 6/25/2013    OAB (overactive bladder) 7/21/2015    PTSD (post-traumatic stress disorder)     Pure hypercholesterolemia 7/6/2022    Rheumatoid arthritis     Rheumatoid arthritis 6/21/2022    Systemic lupus erythematosus     Thyroid disease     Urinary tract infection     Vaginal infection        Review of patient's  allergies indicates:   Allergen Reactions    Cephalexin Itching    Cephalosporins     Zofran [ondansetron hcl (pf)] Hives    Penicillins Rash         Current Outpatient Medications:     alosetron (LOTRONEX) 0.5 MG tablet, TAKE 1 TABLET(0.5 MG) BY MOUTH DAILY AS NEEDED, Disp: 30 tablet, Rfl: 0    amitriptyline (ELAVIL) 10 MG tablet, TAKE 1 TABLET(10 MG) BY MOUTH EVERY NIGHT AS NEEDED, Disp: 90 tablet, Rfl: 3    budesonide (ENTOCORT EC) 3 mg capsule, TAKE 3 CAPSULES(9 MG) BY MOUTH EVERY DAY FOR 10 DAYS, Disp: 30 capsule, Rfl: 3    butalbitaL-acetaminophen  mg Tab, TAKE 1 TABLET BY MOUTH EVERY 4 HOURS, Disp: 60 tablet, Rfl: 3    calcium glycerophosphate (PRELIEF) 65 mg Tab, Take 2 tablets by mouth before meals and at bedtime as needed. (Patient taking differently: Take 2 tablets by mouth 3 (three) times daily with meals.), Disp: 100 each, Rfl: prn    clobetasoL (TEMOVATE) 0.05 % external solution, Apply topically 2 (two) times daily. Use to affected areas for up to 2 weeks then take a 1 week break or decrease to 3 times weekly. Do not apply to groin or face. Use on scalp, Disp: 50 mL, Rfl: 2    clonazePAM (KLONOPIN) 1 MG tablet, Take 1 tablet (1 mg total) by mouth 2 (two) times daily., Disp: 60 tablet, Rfl: 0    cyclobenzaprine (FLEXERIL) 10 MG tablet, Take 1 tablet (10 mg total) by mouth 3 (three) times daily as needed for Muscle spasms., Disp: 90 tablet, Rfl: 6    dicyclomine (BENTYL) 20 mg tablet, Take 20 mg by mouth daily as needed., Disp: , Rfl:     famotidine (PEPCID) 40 MG tablet, TAKE 1 TABLET(40 MG) BY MOUTH EVERY NIGHT AS NEEDED FOR HEARTBURN, Disp: 30 tablet, Rfl: 11    fluticasone propionate (FLONASE) 50 mcg/actuation nasal spray, 2 sprays (100 mcg total) by Each Nostril route 2 (two) times daily., Disp: 31.6 mL, Rfl: 1    gabapentin (NEURONTIN) 800 MG tablet, TAKE 1 TABLET(800 MG) BY MOUTH THREE TIMES DAILY, Disp: 90 tablet, Rfl: 5    hydrocortisone 2.5 % cream, Apply to affected areas twice a day  when eczema is moderate., Disp: 453.6 g, Rfl: 1    hydrOXYzine HCL (ATARAX) 25 MG tablet, 1-4 tablets as needed for itching, Disp: 240 tablet, Rfl: 3    ketoconazole (NIZORAL) 2 % shampoo, Apply topically 3 (three) times a week. Leave on scalp for 5-10 minutes then wash off, Disp: 240 mL, Rfl: 3    lamoTRIgine (LAMICTAL) 200 MG tablet, Take 1 tablet (200 mg total) by mouth once daily., Disp: 30 tablet, Rfl: 11    levothyroxine (SYNTHROID) 50 MCG tablet, TAKE 1 TABLET(50 MCG) BY MOUTH EVERY DAY, Disp: 30 tablet, Rfl: 6    lifitegrast (XIIDRA) 5 % Dpet, Apply 1 drop to eye every 12 (twelve) hours., Disp: 180 each, Rfl: 4    oxybutynin (DITROPAN) 5 MG Tab, TAKE 1 TABLET(5 MG) BY MOUTH THREE TIMES DAILY, Disp: 90 tablet, Rfl: 3    predniSONE (DELTASONE) 2.5 MG tablet, 1 to 3 tabs PO daily prn for arthritis flare, Disp: 90 tablet, Rfl: 0    RABEprazole (ACIPHEX) 20 mg tablet, Take 1 tablet (20 mg total) by mouth once daily., Disp: 30 tablet, Rfl: 11    risankizumab-rzaa (SKYRIZI) 150 mg/mL PnIj, Inject 150 mg into the skin every 28 days., Disp: 1 mL, Rfl: 1    risankizumab-rzaa (SKYRIZI) 150 mg/mL PnIj, Inject 150 mg into the skin every 12 weeks., Disp: 1 mL, Rfl: 4    traMADoL (ULTRAM) 50 mg tablet, TAKE 1 TABLET(50 MG) BY MOUTH EVERY 6 HOURS, Disp: 90 tablet, Rfl: 3    traZODone (DESYREL) 50 MG tablet, Take 1 tablet (50 mg total) by mouth nightly as needed for Insomnia., Disp: 30 tablet, Rfl: 2    venlafaxine (EFFEXOR-XR) 75 MG 24 hr capsule, Take 3 capsules (225 mg total) by mouth once daily., Disp: 90 capsule, Rfl: 5    cetirizine (ZYRTEC) 10 MG tablet, Take 1 tablet (10 mg total) by mouth once daily., Disp: 120 tablet, Rfl: 2    ujexic-fqaax-e.blue-sal-naphos (USTELL) 120-0.12 mg Cap, Take 1 capsule by mouth 3 (three) times daily. (Patient taking differently: Take 1 capsule by mouth 2 (two) times a day.), Disp: 90 each, Rfl: 3     Objective:   Review of Systems   Cardiovascular:  Positive for dyspnea on exertion and  irregular heartbeat.         Vitals:    08/02/23 1556   BP: 114/83       Wt Readings from Last 3 Encounters:   08/02/23 70.8 kg (156 lb)   07/19/23 72.6 kg (160 lb)   07/10/23 73.9 kg (163 lb)     Temp Readings from Last 3 Encounters:   07/20/23 98.3 °F (36.8 °C)   04/01/23 98.5 °F (36.9 °C) (Oral)   12/28/22 98.4 °F (36.9 °C) (Oral)     BP Readings from Last 3 Encounters:   08/02/23 114/83   07/20/23 (!) 143/72   07/10/23 127/79     Pulse Readings from Last 3 Encounters:   07/20/23 60   07/10/23 103   04/19/23 109             Physical Exam  Vitals reviewed.   Constitutional:       General: She is not in acute distress.     Appearance: She is well-developed.   HENT:      Head: Normocephalic and atraumatic.      Nose: Nose normal.   Eyes:      Conjunctiva/sclera: Conjunctivae normal.      Pupils: Pupils are equal, round, and reactive to light.   Neck:      Vascular: No carotid bruit or JVD.   Cardiovascular:      Rate and Rhythm: Normal rate and regular rhythm.      Pulses: Normal pulses and intact distal pulses.      Heart sounds: Normal heart sounds. No murmur heard.     No friction rub. No gallop.   Pulmonary:      Effort: Pulmonary effort is normal. No respiratory distress.      Breath sounds: Normal breath sounds. No wheezing or rales.   Chest:      Chest wall: No tenderness.   Abdominal:      General: Bowel sounds are normal. There is no distension.      Palpations: Abdomen is soft.      Tenderness: There is no abdominal tenderness.   Musculoskeletal:         General: No tenderness or deformity. Normal range of motion.      Cervical back: Normal range of motion and neck supple.      Right lower leg: No edema.      Left lower leg: No edema.   Skin:     General: Skin is warm and dry.      Findings: No erythema or rash.   Neurological:      Mental Status: She is alert and oriented to person, place, and time.      Cranial Nerves: No cranial nerve deficit.      Motor: No abnormal muscle tone.      Coordination:  Coordination normal.   Psychiatric:         Behavior: Behavior normal.         Thought Content: Thought content normal.         Judgment: Judgment normal.           Lab Results   Component Value Date    CHOL 247 (H) 08/18/2022    CHOL 232 (H) 09/16/2021    CHOL 221 (H) 01/07/2009     Lab Results   Component Value Date    HDL 70 08/18/2022    HDL 64 09/16/2021    HDL 51 01/07/2009     Lab Results   Component Value Date    LDLCALC 154.0 08/18/2022    LDLCALC 140.6 09/16/2021    LDLCALC 126.6 01/07/2009     Lab Results   Component Value Date    ALT 30 07/19/2023    AST 20 07/19/2023    AST 29 07/10/2023    AST 34 04/17/2023     Lab Results   Component Value Date    CREATININE 1.1 07/19/2023    BUN 14 07/19/2023     07/19/2023    K 3.8 07/19/2023    CO2 27 07/19/2023    CO2 22 (L) 07/10/2023    CO2 24 04/17/2023     Lab Results   Component Value Date    HGB 10.6 (L) 07/19/2023    HCT 32.9 (L) 07/19/2023    HCT 33.2 (L) 07/10/2023    HCT 39.9 04/17/2023           Seen EKG independent review shows ECHO cardia with no ST abnormalities              Assessment and Plan:     Anginal equivalent  -     Stress Echo Which stress agent will be used? Treadmill Exercise (Low ramp); Color Flow Doppler? No; Future; Expected date: 08/03/2023    Encounter for screening for cardiovascular disorders  -     CT Cardiac Scoring; Future; Expected date: 08/02/2023       Will obtain stress ECHO to assess for myocardial ischemia in the presence of dyspnea on exertion we could be related to deconditioning.    In order to further define cardiac risk, a coronary calcium score will be obtained.    No follow-ups on file.

## 2023-08-03 ENCOUNTER — OFFICE VISIT (OUTPATIENT)
Dept: UROLOGY | Facility: CLINIC | Age: 53
End: 2023-08-03
Payer: COMMERCIAL

## 2023-08-03 ENCOUNTER — PATIENT MESSAGE (OUTPATIENT)
Dept: RHEUMATOLOGY | Facility: CLINIC | Age: 53
End: 2023-08-03
Payer: COMMERCIAL

## 2023-08-03 VITALS
BODY MASS INDEX: 31.92 KG/M2 | WEIGHT: 158.31 LBS | SYSTOLIC BLOOD PRESSURE: 111 MMHG | HEIGHT: 59 IN | DIASTOLIC BLOOD PRESSURE: 73 MMHG | HEART RATE: 102 BPM

## 2023-08-03 DIAGNOSIS — N30.10 INTERSTITIAL CYSTITIS: ICD-10-CM

## 2023-08-03 DIAGNOSIS — R39.89 BLADDER PAIN: Primary | ICD-10-CM

## 2023-08-03 DIAGNOSIS — N39.3 SUI (STRESS URINARY INCONTINENCE, FEMALE): ICD-10-CM

## 2023-08-03 DIAGNOSIS — N32.81 OAB (OVERACTIVE BLADDER): ICD-10-CM

## 2023-08-03 DIAGNOSIS — M79.7 FIBROMYALGIA: ICD-10-CM

## 2023-08-03 DIAGNOSIS — N39.46 MIXED INCONTINENCE URGE AND STRESS: ICD-10-CM

## 2023-08-03 DIAGNOSIS — Z96.82 NEUROSTIMULATOR DEVICE IN SITU: ICD-10-CM

## 2023-08-03 PROCEDURE — 1159F PR MEDICATION LIST DOCUMENTED IN MEDICAL RECORD: ICD-10-PCS | Mod: CPTII,S$GLB,, | Performed by: UROLOGY

## 2023-08-03 PROCEDURE — 99999 PR PBB SHADOW E&M-EST. PATIENT-LVL III: CPT | Mod: PBBFAC,,, | Performed by: UROLOGY

## 2023-08-03 PROCEDURE — 1159F MED LIST DOCD IN RCRD: CPT | Mod: CPTII,S$GLB,, | Performed by: UROLOGY

## 2023-08-03 PROCEDURE — 99215 OFFICE O/P EST HI 40 MIN: CPT | Mod: 25,S$GLB,, | Performed by: UROLOGY

## 2023-08-03 PROCEDURE — 3074F PR MOST RECENT SYSTOLIC BLOOD PRESSURE < 130 MM HG: ICD-10-PCS | Mod: CPTII,S$GLB,, | Performed by: UROLOGY

## 2023-08-03 PROCEDURE — 3078F DIAST BP <80 MM HG: CPT | Mod: CPTII,S$GLB,, | Performed by: UROLOGY

## 2023-08-03 PROCEDURE — 99215 PR OFFICE/OUTPT VISIT, EST, LEVL V, 40-54 MIN: ICD-10-PCS | Mod: 25,S$GLB,, | Performed by: UROLOGY

## 2023-08-03 PROCEDURE — 99999 PR PBB SHADOW E&M-EST. PATIENT-LVL III: ICD-10-PCS | Mod: PBBFAC,,, | Performed by: UROLOGY

## 2023-08-03 PROCEDURE — 3008F PR BODY MASS INDEX (BMI) DOCUMENTED: ICD-10-PCS | Mod: CPTII,S$GLB,, | Performed by: UROLOGY

## 2023-08-03 PROCEDURE — 95971 ALYS SMPL SP/PN NPGT W/PRGRM: CPT | Mod: S$GLB,,, | Performed by: UROLOGY

## 2023-08-03 PROCEDURE — 3078F PR MOST RECENT DIASTOLIC BLOOD PRESSURE < 80 MM HG: ICD-10-PCS | Mod: CPTII,S$GLB,, | Performed by: UROLOGY

## 2023-08-03 PROCEDURE — 95971 PR ANALYZE NEUROSTIM,SIMPLE/PROG: ICD-10-PCS | Mod: S$GLB,,, | Performed by: UROLOGY

## 2023-08-03 PROCEDURE — 3074F SYST BP LT 130 MM HG: CPT | Mod: CPTII,S$GLB,, | Performed by: UROLOGY

## 2023-08-03 PROCEDURE — 3008F BODY MASS INDEX DOCD: CPT | Mod: CPTII,S$GLB,, | Performed by: UROLOGY

## 2023-08-03 RX ORDER — METHENAMINE, SODIUM PHOSPHATE, MONOBASIC, MONOHYDRATE, PHENYL SALICYLATE, METHYLENE BLUE, AND HYOSCYAMINE SULFATE 118; 40.8; 36; 10; .12 MG/1; MG/1; MG/1; MG/1; MG/1
1 CAPSULE ORAL 3 TIMES DAILY
Qty: 45 CAPSULE | Refills: 3 | Status: SHIPPED | OUTPATIENT
Start: 2023-08-03 | End: 2023-11-27 | Stop reason: SDUPTHER

## 2023-08-03 NOTE — PROGRESS NOTES
HPI:   CC: post op pain following InterStim Therapy replacement    Marilee Simons is a 53 y.o. woman c/o pain over the InterStim stimulation site.  Since InterStim Therapy, it helped her bladder symptoms tremendously.  However, she c/o that her right perineal and rectal area is sensitive.  It is sensitive to touch, can't lay on her side.  He fell in tub recently.  Also has a problem with Interstitial cystitis.  She is on hydroxyzine, pepcid ( anti-histamine blockers), elavil, and Uribel prn.    She has a hx of fibromyalgia and has seen Dr. Ben Samaniego.  She is on neurontin 800 mg a day.    Hx of urgency, frequency and urge urinary incontinence.  This has been refractory to medical management. She presents for explantation of her original interstim device with replacement with interstim X.   Date: 04/27/2022  Pre-Op Diagnosis: urinary urgency, frequency, urge urinary incontinence  Post-Op Diagnosis: same  Procedure(s) Performed:  1.  Explantation of InterStim generator  2.  Removal of InterStim tined lead  3.  Incision for implantation of neurostimulator electrodes, sacral nerve (transforamenal placement)  4.  Insertion of peripheral neurostimulator pulse generator  5.  Fluoro < 1 h  6.  Electronic analysis of implanted neurostimulator pulse generator system with intraoperative programming  Findings:  Old lead removed from right side and generator removed from left side  New lead placed on left side  Generator placed on left side  Good sensory and motor function c/w S3 stimulation seen  The rolling pen technique was used to localize S3      Procedure(s) Performed: 9/12/18  1.  Cystoscopy with hydrodistension  2.  Botox injections into detrusor muscle  Findings:   - Initial bladder capacity 900 mL with terminal hematuria.   - Glomerulations were diffusely seen, Hunner's ulcers seen overlying bilateral UOs, diffuse mild degree of hyperemia  - Subsequent bladder capacity was 950 mL.    Since the procedure, she has  done very well.  She needs refills on meds.    s/p InterStim Therapy for refractory frequency and urgency on 8/3/16.  Lead placed on right side  Generator placed on left side  Good sensory and motor function c/w S3 stimulation seen  She is doing great.  Is very pleased with the bladder control outcome.      She used to use valium crushed in the vagina to help her with her pelvic pain.    SUDS cysto on 1/20/15  --- Bladder ---   CYSTOMETROGRAM ( Filling Phase ):   Cystometric Numeric Data:   - First Desire (Sensation): 68 mL at 1cm of water.   - Normal Desire: 75mL at 1 cm of water.   - Strong Desire: 96 mL at 2 cm of water.   - Urgency (Imminent Void) : 108 mL at 1cm of water.   - Maximum Cystometric Capacity: 109 mL.   Compliance:   - low.   Leak Point Pressure:   - Valsalva ( Abdominal ) Leak Point Pressure: none.   UROFLOW:   - Voided Volume: 134 mL.   - Residual Urine: 5 mL.   - Maximum Flow Rate: 8 mL/sec.   - Flow Pattern: low amplitude  VOIDING PRESSURE STUDY ( Voiding Phase ):   Detrusor Pressure:   - Maximum Detrusor Pressure: 32 cm of water.   - Detrusor Pressure at Maximum Flow: 2 cm of water.   - Detrusor Contraction Characteristics: Sustained contraction(s).     ELECTROMYOGRAM:   - incomplete relaxation.     ---Diagnostic Cystourethroscopy ---   Normal urethra.    minimal hyperemic area of the bladder  Width of Bladder Neck Opening: Approximately 18 Fr.   Normal bladder.   Normal ureteral orifices bilaterally.     CONCLUSIONS:   1.  Decreased bladder capacity with sensory urgency  2.  IC  3.  OAB    Past Medical History:   Diagnosis Date    Acid reflux     Allergy     Alopecia     Anxiety     Arthralgia     Back pain     Depression     Dry eyes     from meds    Fever blister     Fibromyalgia     Interstitial cystitis     Irritable bowel syndrome     Kidney stone     Major depressive disorder, recurrent episode, in partial or unspecified remission 6/25/2013    OAB (overactive bladder) 7/21/2015    PTSD  (post-traumatic stress disorder)     Pure hypercholesterolemia 2022    Rheumatoid arthritis     Rheumatoid arthritis 2022    Systemic lupus erythematosus     Thyroid disease     Urinary tract infection     Vaginal infection      Past Surgical History:   Procedure Laterality Date    BLADDER SURGERY       SECTION, LOW TRANSVERSE      x 2    COLONOSCOPY      COLONOSCOPY N/A 10/05/2017    Procedure: COLONOSCOPY;  Surgeon: Venkat He MD;  Location: [facility] (4TH FLR);  Service: Endoscopy;  Laterality: N/A;    ESOPHAGOGASTRODUODENOSCOPY      ESOPHAGOGASTRODUODENOSCOPY N/A 10/25/2021    Procedure: EGD (ESOPHAGOGASTRODUODENOSCOPY);  Surgeon: Venkat He MD;  Location: [facility] ENDO (4TH FLR);  Service: Endoscopy;  Laterality: N/A;  Covid test on 10/22 at [place]    10/19 lvm to confirm appt-rb    EXCISIONAL BIOPSY Right 10/14/2022    Procedure: EXCISIONAL BIOPSY-right with radiological marker;  Surgeon: PALLAVI Oseguera MD;  Location: [facility];  Service: General;  Laterality: Right;    EYE SURGERY      Lasik-bilateral    HYSTERECTOMY      IMPLANTATION OF PERMANENT SACRAL NERVE STIMULATOR N/A 2022    Procedure: INSERTION, NEUROSTIMULATOR, PERMANENT, SACRAL;  Surgeon: Bandar Garcia MD;  Location: [facility] OR 2ND FLR;  Service: Urology;  Laterality: N/A;  2hr    INJECTION OF BOTULINUM TOXIN TYPE A N/A 2018    Procedure: INJECTION, BOTULINUM TOXIN, TYPE A  200 UNITS;  Surgeon: Bandar Garcia MD;  [facility];  Service: Urology;  Laterality: N/A;    INSTILLATION OF URINARY BLADDER N/A 2018    Procedure: INSTILLATION, URINARY BLADDER;  Surgeon: Bandar Garcia MD;  Location: [facility] OR 1ST FLR;  Service: Urology;  Laterality: N/A;    PARTIAL HYSTERECTOMY      REMOVAL OF ELECTRODE LEAD OF SACRAL NERVE STIMULATOR N/A 2022    Procedure: REMOVAL, ELECTRODE LEAD, SACRAL NERVE STIMULATOR;  Surgeon: Bandar Garcia MD;  Location: [facility] OR 2ND FLR;  Service: Urology;  Laterality:  N/A;    SKIN TAG REMOVAL N/A 10/14/2022    Procedure: REMOVAL, SKIN TAGS;  Surgeon: PALLAVI Oseguera MD;  Location: [facility] OR;  Service: General;  Laterality: N/A;    TRANSFORAMINAL EPIDURAL INJECTION OF STEROID Left 02/15/2021    Procedure: LUMBAR TRANSFORAMINAL LEFT L5 AND S1 DIRECT REFERRAL;  Surgeon: Marquez Nebsitt MD;  Location: [facility] PAIN MGT;  Service: Pain Management;  Laterality: Left;  NEEDS CONSENT, PT REQUESTING IV SEDATION     Social History     Tobacco Use    Smoking status: Former     Current packs/day: 0.00    Smokeless tobacco: Never    Tobacco comments:     14 years old-18 years old   Substance Use Topics    Alcohol use: No    Drug use: No     Family History   Problem Relation Age of Onset    Irritable bowel syndrome Mother     Arthritis Mother     Hypertension Mother     Irritable bowel syndrome Sister     Lupus Sister     Rheum arthritis Sister     Fibromyalgia Sister     Irritable bowel syndrome Sister     Celiac disease Neg Hx     Cirrhosis Neg Hx     Colon cancer Neg Hx     Colon polyps Neg Hx     Crohn's disease Neg Hx     Cystic fibrosis Neg Hx     Esophageal cancer Neg Hx     Hemochromatosis Neg Hx     Inflammatory bowel disease Neg Hx     Liver cancer Neg Hx     Liver disease Neg Hx     Rectal cancer Neg Hx     Stomach cancer Neg Hx     Ulcerative colitis Neg Hx     Boris's disease Neg Hx     Melanoma Neg Hx     Amblyopia Neg Hx     Blindness Neg Hx     Cataracts Neg Hx     Glaucoma Neg Hx     Macular degeneration Neg Hx     Retinal detachment Neg Hx     Strabismus Neg Hx      Allergy:  Review of patient's allergies indicates:   Allergen Reactions    Cephalexin Itching    Zofran [ondansetron hcl (pf)] Hives    Penicillins Rash     Outpatient Encounter Medications as of 8/3/2023   Medication Sig Dispense Refill    alosetron (LOTRONEX) 0.5 MG tablet TAKE 1 TABLET(0.5 MG) BY MOUTH DAILY AS NEEDED 30 tablet 0    amitriptyline (ELAVIL) 10 MG tablet TAKE 1 TABLET(10 MG) BY MOUTH EVERY NIGHT AS  NEEDED 90 tablet 3    budesonide (ENTOCORT EC) 3 mg capsule TAKE 3 CAPSULES(9 MG) BY MOUTH EVERY DAY FOR 10 DAYS 30 capsule 3    butalbitaL-acetaminophen  mg Tab TAKE 1 TABLET BY MOUTH EVERY 4 HOURS 60 tablet 3    calcium glycerophosphate (PRELIEF) 65 mg Tab Take 2 tablets by mouth before meals and at bedtime as needed. (Patient taking differently: Take 2 tablets by mouth 3 (three) times daily with meals.) 100 each prn    clobetasoL (TEMOVATE) 0.05 % external solution Apply topically 2 (two) times daily. Use to affected areas for up to 2 weeks then take a 1 week break or decrease to 3 times weekly. Do not apply to groin or face. Use on scalp 50 mL 2    clonazePAM (KLONOPIN) 1 MG tablet Take 1 tablet (1 mg total) by mouth 2 (two) times daily. 60 tablet 0    cyclobenzaprine (FLEXERIL) 10 MG tablet Take 1 tablet (10 mg total) by mouth 3 (three) times daily as needed for Muscle spasms. 90 tablet 6    dicyclomine (BENTYL) 20 mg tablet Take 20 mg by mouth daily as needed.      famotidine (PEPCID) 40 MG tablet TAKE 1 TABLET(40 MG) BY MOUTH EVERY NIGHT AS NEEDED FOR HEARTBURN 30 tablet 11    fluticasone propionate (FLONASE) 50 mcg/actuation nasal spray 2 sprays (100 mcg total) by Each Nostril route 2 (two) times daily. 31.6 mL 1    gabapentin (NEURONTIN) 800 MG tablet TAKE 1 TABLET(800 MG) BY MOUTH THREE TIMES DAILY 90 tablet 5    hydrocortisone 2.5 % cream Apply to affected areas twice a day when eczema is moderate. 453.6 g 1    hydrOXYzine HCL (ATARAX) 25 MG tablet 1-4 tablets as needed for itching 240 tablet 3    ketoconazole (NIZORAL) 2 % shampoo Apply topically 3 (three) times a week. Leave on scalp for 5-10 minutes then wash off 240 mL 3    lamoTRIgine (LAMICTAL) 200 MG tablet Take 1 tablet (200 mg total) by mouth once daily. 30 tablet 11    levothyroxine (SYNTHROID) 50 MCG tablet TAKE 1 TABLET(50 MCG) BY MOUTH EVERY DAY 30 tablet 6    lifitegrast (XIIDRA) 5 % Dpet Apply 1 drop to eye every 12 (twelve) hours.  180 each 4    oxybutynin (DITROPAN) 5 MG Tab TAKE 1 TABLET(5 MG) BY MOUTH THREE TIMES DAILY 90 tablet 3    predniSONE (DELTASONE) 2.5 MG tablet 1 to 3 tabs PO daily prn for arthritis flare 90 tablet 0    RABEprazole (ACIPHEX) 20 mg tablet Take 1 tablet (20 mg total) by mouth once daily. 30 tablet 11    risankizumab-rzaa (SKYRIZI) 150 mg/mL PnIj Inject 150 mg into the skin every 28 days. 1 mL 1    risankizumab-rzaa (SKYRIZI) 150 mg/mL PnIj Inject 150 mg into the skin every 12 weeks. 1 mL 4    traMADoL (ULTRAM) 50 mg tablet TAKE 1 TABLET(50 MG) BY MOUTH EVERY 6 HOURS 90 tablet 3    traZODone (DESYREL) 50 MG tablet Take 1 tablet (50 mg total) by mouth nightly as needed for Insomnia. 30 tablet 2    venlafaxine (EFFEXOR-XR) 75 MG 24 hr capsule Take 3 capsules (225 mg total) by mouth once daily. 90 capsule 5    cetirizine (ZYRTEC) 10 MG tablet Take 1 tablet (10 mg total) by mouth once daily. 120 tablet 2    methen-m.blue-s.phos-phsal-hyo (URIBEL) 118-10-40.8-36 mg Cap Take 1 capsule by mouth 3 (three) times daily. 45 capsule 3    [DISCONTINUED] chlorhexidine (PERIDEX) 0.12 % solution 5 mLs 2 (two) times daily.      [DISCONTINUED] clindamycin (CLEOCIN) 150 MG capsule Take 150 mg by mouth 4 (four) times daily.      [DISCONTINUED] guselkumab (TREMFYA) 100 mg/mL AtIn Inject 100 mg into the skin every 28 days. 1 mL 1    [DISCONTINUED] guselkumab (TREMFYA) 100 mg/mL AtIn Inject 100 mg into the skin every 8 weeks. 1 mL 6    [DISCONTINUED] hyoscyamine (ANASPAZ,LEVSIN) 0.125 mg Tab Take 1 tablet (125 mcg total) by mouth every 8 (eight) hours as needed (abdominal cramps). 60 tablet 0    [DISCONTINUED] kztssa-wybgc-m.blue-sal-naphos (USTELL) 120-0.12 mg Cap Take 1 capsule by mouth 3 (three) times daily. (Patient taking differently: Take 1 capsule by mouth 2 (two) times a day.) 90 each 3     No facility-administered encounter medications on file as of 8/3/2023.     Review of Systems   General ROS: GENERAL:  No weight gain or  loss  SKIN:  No rashes or lacerations  HEAD:  No headaches  EYES:  No exophthalmos, jaundice or blindness  EARS:  No dizziness, tinnitus or hearing loss  NOSE:  No changes in smell  MOUTH & THROAT:  No dyskinetic movements or obvious goiter  CHEST:  No shortness of breath, hyperventilation or cough  CARDIOVASCULAR:  No tachycardia or chest pain  ABDOMEN:  No nausea, vomiting, pain, constipation or diarrhea  URINARY:  No frequency, dysuria or sexual dysfunction  ENDOCRINE:  No polydipsia, polyuria  MUSCULOSKELETAL:  No pain or stiffness of the joints  NEUROLOGIC:  No weakness, sensory changes, seizures, confusion, memory loss, tremor or other abnormal movements  Physical Exam   wound well healed.  No evidence of cellulites.  No tenderness noted.    Vitals:    08/03/23 1326   BP: 111/73   Pulse: 102     Physical Examination:   n/a  LABS:  Lab Results   Component Value Date    CREATININE 1.1 07/19/2023    CREATININE 1.0 07/10/2023    CREATININE 1.0 04/17/2023     Urine Culture, Routine   Date Value Ref Range Status   07/19/2023   Final    Multiple organisms isolated. None in predominance.  Repeat if   07/19/2023 clinically necessary.  Final     Procedure:  InterStim Analysis and Program  InterStim Reprogram:  PW decreased to 160 from 210.  She feels stimulations in the lower buttock area on the left side.  P1  ( 0.5), P2 ( 0.9), P4 ( 0.9) and P3 (0.7)  All stimulations were well WNL      Has mild JAYNE        Assessment and Plan:  Marilee was seen today for other.    Diagnoses and all orders for this visit:    Bladder pain  -     methen-m.blue-s.phos-phsal-hyo (URIBEL) 118-10-40.8-36 mg Cap; Take 1 capsule by mouth 3 (three) times daily.    JAYNE (stress urinary incontinence, female)    OAB (overactive bladder)    Neurostimulator device in situ    Mixed incontinence urge and stress    Interstitial cystitis    Fibromyalgia      Interstim reprogramming done today ( decreased PW and amplitude).  The device is working well and  she has responded to InterStim Therapy.  The discomfort she feels is that I think it is related to flare up of fibromyalgia.  She can try the new InterStim program setting.  Also she can turn it off temporarily and see how she feels.  However, I think that she may contact to Dr. Samaniego to manage her Fibromyalgia flare up better.    Since her last visit she was diagnosed with breast cancer and was treated.  Her mother fell and broke her bones.  She has been on a lot of stress.  I think she needs to contact Dr. Samaniego, her PCP and psychiatry if needed.  I spent 40 minutes with the patient of which more than half was spent in direct consultation with the patient in regards to our treatment and plan.     Follow-up:  Follow up in about 1 year (around 8/3/2024).

## 2023-08-08 ENCOUNTER — SPECIALTY PHARMACY (OUTPATIENT)
Dept: PHARMACY | Facility: CLINIC | Age: 53
End: 2023-08-08
Payer: COMMERCIAL

## 2023-08-08 DIAGNOSIS — L40.50 PSORIATIC ARTHRITIS: Primary | ICD-10-CM

## 2023-08-08 NOTE — TELEPHONE ENCOUNTER
Tremfya PA denied.  Tremfya is a plan/benefit exclusion.  MDO changed to Erika.  Closing Tremfya enrollemnt.

## 2023-08-09 ENCOUNTER — TELEPHONE (OUTPATIENT)
Dept: PHARMACY | Facility: CLINIC | Age: 53
End: 2023-08-09
Payer: COMMERCIAL

## 2023-08-09 ENCOUNTER — OFFICE VISIT (OUTPATIENT)
Dept: PSYCHIATRY | Facility: CLINIC | Age: 53
End: 2023-08-09
Payer: COMMERCIAL

## 2023-08-09 DIAGNOSIS — F33.1 MAJOR DEPRESSIVE DISORDER, RECURRENT EPISODE, MODERATE: Primary | ICD-10-CM

## 2023-08-09 DIAGNOSIS — F41.1 GENERALIZED ANXIETY DISORDER: ICD-10-CM

## 2023-08-09 PROCEDURE — 90834 PSYTX W PT 45 MINUTES: CPT | Mod: 95,,, | Performed by: SOCIAL WORKER

## 2023-08-09 PROCEDURE — 90834 PR PSYCHOTHERAPY W/PATIENT, 45 MIN: ICD-10-PCS | Mod: 95,,, | Performed by: SOCIAL WORKER

## 2023-08-09 NOTE — TELEPHONE ENCOUNTER
Kristine, this is Dania Frausto, clinical pharmacist with [facility] that is part of your care team.  We have begun working on your prescription that your doctor has sent us. Our next steps include:     Working with your insurance company to obtain approval for your medication  Working with you to ensure your medication is affordable     We will be calling you along the way with updates on your medication but if you have any concerns or receive information that you would like to discuss please reach us at [phone].    Welcome call outcome: Left voicemail

## 2023-08-10 DIAGNOSIS — M81.0 OSTEOPOROSIS, UNSPECIFIED OSTEOPOROSIS TYPE, UNSPECIFIED PATHOLOGICAL FRACTURE PRESENCE: ICD-10-CM

## 2023-08-10 DIAGNOSIS — L40.50 PSORIATIC ARTHRITIS: ICD-10-CM

## 2023-08-10 DIAGNOSIS — F43.10 PTSD (POST-TRAUMATIC STRESS DISORDER): ICD-10-CM

## 2023-08-10 DIAGNOSIS — N30.10 IC (INTERSTITIAL CYSTITIS): ICD-10-CM

## 2023-08-10 DIAGNOSIS — Z79.899 IMMUNOCOMPROMISED STATE DUE TO DRUG THERAPY: ICD-10-CM

## 2023-08-10 DIAGNOSIS — M79.7 FIBROMYALGIA: ICD-10-CM

## 2023-08-10 DIAGNOSIS — D84.821 IMMUNOCOMPROMISED STATE DUE TO DRUG THERAPY: ICD-10-CM

## 2023-08-10 DIAGNOSIS — M06.00 SERONEGATIVE RHEUMATOID ARTHRITIS: ICD-10-CM

## 2023-08-10 DIAGNOSIS — G89.4 CHRONIC PAIN SYNDROME: ICD-10-CM

## 2023-08-10 DIAGNOSIS — K21.9 GASTROESOPHAGEAL REFLUX DISEASE, UNSPECIFIED WHETHER ESOPHAGITIS PRESENT: Primary | ICD-10-CM

## 2023-08-10 DIAGNOSIS — M47.817 LUMBAR AND SACRAL OSTEOARTHRITIS: ICD-10-CM
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
## 2023-08-11 RX ORDER — GABAPENTIN 800 MG/1
800 TABLET ORAL 3 TIMES DAILY
Qty: 90 TABLET | Refills: 3 | Status: SHIPPED | OUTPATIENT
Start: 2023-08-11 | End: 2023-09-13 | Stop reason: ALTCHOICE

## 2023-08-11 RX ORDER — FAMOTIDINE 40 MG/1
TABLET, FILM COATED ORAL
Qty: 30 TABLET | Refills: 5 | Status: SHIPPED | OUTPATIENT
Start: 2023-08-11 | End: 2024-02-23 | Stop reason: SDUPTHER

## 2023-08-14 ENCOUNTER — PATIENT MESSAGE (OUTPATIENT)
Dept: RHEUMATOLOGY | Facility: CLINIC | Age: 53
End: 2023-08-14
Payer: COMMERCIAL

## 2023-08-14 DIAGNOSIS — L40.50 PSORIATIC ARTHRITIS: Primary | ICD-10-CM

## 2023-08-14 DIAGNOSIS — M79.7 FIBROMYALGIA: ICD-10-CM

## 2023-08-16 ENCOUNTER — PATIENT MESSAGE (OUTPATIENT)
Dept: NEUROLOGY | Facility: CLINIC | Age: 53
End: 2023-08-16

## 2023-08-16 ENCOUNTER — PATIENT MESSAGE (OUTPATIENT)
Dept: RHEUMATOLOGY | Facility: CLINIC | Age: 53
End: 2023-08-16
Payer: COMMERCIAL

## 2023-08-16 ENCOUNTER — PATIENT MESSAGE (OUTPATIENT)
Dept: UROLOGY | Facility: CLINIC | Age: 53
End: 2023-08-16
Payer: COMMERCIAL

## 2023-08-17 ENCOUNTER — PATIENT MESSAGE (OUTPATIENT)
Dept: NEUROLOGY | Facility: CLINIC | Age: 53
End: 2023-08-17
Payer: COMMERCIAL

## 2023-08-17 ENCOUNTER — OFFICE VISIT (OUTPATIENT)
Dept: PSYCHIATRY | Facility: CLINIC | Age: 53
End: 2023-08-17
Payer: COMMERCIAL

## 2023-08-17 DIAGNOSIS — F60.89 CLUSTER B PERSONALITY DISORDER: ICD-10-CM

## 2023-08-17 DIAGNOSIS — F33.1 MAJOR DEPRESSIVE DISORDER, RECURRENT EPISODE, MODERATE: ICD-10-CM

## 2023-08-17 DIAGNOSIS — F43.10 PTSD (POST-TRAUMATIC STRESS DISORDER): Primary | ICD-10-CM

## 2023-08-17 DIAGNOSIS — F41.1 GENERALIZED ANXIETY DISORDER: ICD-10-CM

## 2023-08-17 DIAGNOSIS — L29.9 ITCHING: ICD-10-CM

## 2023-08-17 PROCEDURE — 99215 PR OFFICE/OUTPT VISIT, EST, LEVL V, 40-54 MIN: ICD-10-PCS | Mod: S$GLB,,, | Performed by: NURSE PRACTITIONER

## 2023-08-17 PROCEDURE — 99215 OFFICE O/P EST HI 40 MIN: CPT | Mod: S$GLB,,, | Performed by: NURSE PRACTITIONER

## 2023-08-17 PROCEDURE — 90833 PSYTX W PT W E/M 30 MIN: CPT | Mod: S$GLB,,, | Performed by: NURSE PRACTITIONER

## 2023-08-17 PROCEDURE — 99999 PR PBB SHADOW E&M-EST. PATIENT-LVL I: CPT | Mod: PBBFAC,,, | Performed by: NURSE PRACTITIONER

## 2023-08-17 PROCEDURE — 99999 PR PBB SHADOW E&M-EST. PATIENT-LVL I: ICD-10-PCS | Mod: PBBFAC,,, | Performed by: NURSE PRACTITIONER

## 2023-08-17 PROCEDURE — 90833 PR PSYCHOTHERAPY W/PATIENT W/E&M, 30 MIN (ADD ON): ICD-10-PCS | Mod: S$GLB,,, | Performed by: NURSE PRACTITIONER

## 2023-08-17 RX ORDER — VENLAFAXINE HYDROCHLORIDE 150 MG/1
150 CAPSULE, EXTENDED RELEASE ORAL DAILY
Qty: 30 CAPSULE | Refills: 1 | Status: SHIPPED | OUTPATIENT
Start: 2023-08-17 | End: 2023-08-22

## 2023-08-17 RX ORDER — CLONAZEPAM 1 MG/1
1 TABLET ORAL 2 TIMES DAILY
Qty: 60 TABLET | Refills: 0 | Status: SHIPPED | OUTPATIENT
Start: 2023-08-17 | End: 2023-10-06 | Stop reason: SDUPTHER

## 2023-08-17 RX ORDER — PREGABALIN 150 MG/1
150 CAPSULE ORAL 3 TIMES DAILY
Qty: 90 CAPSULE | Refills: 5 | Status: SHIPPED | OUTPATIENT
Start: 2023-08-17 | End: 2024-03-14

## 2023-08-17 RX ORDER — HYDROXYZINE PAMOATE 25 MG/1
25 CAPSULE ORAL 3 TIMES DAILY
Qty: 90 CAPSULE | Refills: 1 | Status: SHIPPED | OUTPATIENT
Start: 2023-08-17 | End: 2023-10-16
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
## 2023-08-17 NOTE — TELEPHONE ENCOUNTER
Changing from gabapentin to Lyrica. Currently on Gabapentin 800 mg TID. Change to lyrica 150 mg TID.

## 2023-08-19 ENCOUNTER — PATIENT MESSAGE (OUTPATIENT)
Dept: PSYCHIATRY | Facility: CLINIC | Age: 53
End: 2023-08-19
Payer: COMMERCIAL

## 2023-08-22 ENCOUNTER — OFFICE VISIT (OUTPATIENT)
Dept: NEUROLOGY | Facility: CLINIC | Age: 53
End: 2023-08-22
Payer: COMMERCIAL

## 2023-08-22 ENCOUNTER — HOSPITAL ENCOUNTER (EMERGENCY)
Facility: HOSPITAL | Age: 53
Discharge: HOME | End: 2023-08-22
Attending: EMERGENCY MEDICINE
Payer: COMMERCIAL

## 2023-08-22 VITALS
SYSTOLIC BLOOD PRESSURE: 145 MMHG | DIASTOLIC BLOOD PRESSURE: 80 MMHG | TEMPERATURE: 99 F | WEIGHT: 170 LBS | BODY MASS INDEX: 34.27 KG/M2 | RESPIRATION RATE: 16 BRPM | HEART RATE: 75 BPM | OXYGEN SATURATION: 98 % | HEIGHT: 59 IN

## 2023-08-22 VITALS
SYSTOLIC BLOOD PRESSURE: 129 MMHG | BODY MASS INDEX: 32.68 KG/M2 | DIASTOLIC BLOOD PRESSURE: 87 MMHG | HEIGHT: 59 IN | WEIGHT: 162.13 LBS | HEART RATE: 112 BPM

## 2023-08-22 DIAGNOSIS — R51.9 CHRONIC HEADACHE WITH NEW FEATURES: Primary | ICD-10-CM
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
## 2023-08-22 DIAGNOSIS — Z96.82 NEUROSTIMULATOR DEVICE IN SITU: ICD-10-CM

## 2023-08-22 DIAGNOSIS — M06.9 RHEUMATOID ARTHRITIS, INVOLVING UNSPECIFIED SITE, UNSPECIFIED WHETHER RHEUMATOID FACTOR PRESENT: ICD-10-CM

## 2023-08-22 DIAGNOSIS — G89.29 CHRONIC HEADACHE WITH NEW FEATURES: Primary | ICD-10-CM

## 2023-08-22 DIAGNOSIS — S80.00XA CONTUSION OF KNEE, UNSPECIFIED LATERALITY, INITIAL ENCOUNTER: ICD-10-CM

## 2023-08-22 DIAGNOSIS — S60.212A CONTUSION OF LEFT WRIST, INITIAL ENCOUNTER: ICD-10-CM

## 2023-08-22 DIAGNOSIS — S09.90XA CLOSED HEAD INJURY, INITIAL ENCOUNTER: Primary | ICD-10-CM

## 2023-08-22 DIAGNOSIS — G43.811 OTHER MIGRAINE WITH STATUS MIGRAINOSUS, INTRACTABLE: ICD-10-CM

## 2023-08-22 DIAGNOSIS — E78.5 HYPERLIPIDEMIA, UNSPECIFIED HYPERLIPIDEMIA TYPE: ICD-10-CM

## 2023-08-22 LAB
ALBUMIN SERPL BCP-MCNC: 4.1 G/DL (ref 3.5–5.2)
ALP SERPL-CCNC: 95 U/L (ref 55–135)
ALT SERPL W/O P-5'-P-CCNC: 39 U/L (ref 10–44)
ANION GAP SERPL CALC-SCNC: 9 MMOL/L (ref 8–16)
AST SERPL-CCNC: 30 U/L (ref 10–40)
BACTERIA #/AREA URNS AUTO: ABNORMAL /HPF
BASOPHILS # BLD AUTO: 0.06 K/UL (ref 0–0.2)
BASOPHILS NFR BLD: 1.1 % (ref 0–1.9)
BILIRUB SERPL-MCNC: 0.2 MG/DL (ref 0.1–1)
BILIRUB UR QL STRIP: NEGATIVE
BUN SERPL-MCNC: 13 MG/DL (ref 6–20)
CALCIUM SERPL-MCNC: 9.2 MG/DL (ref 8.7–10.5)
CHLORIDE SERPL-SCNC: 105 MMOL/L (ref 95–110)
CLARITY UR REFRACT.AUTO: CLEAR
CO2 SERPL-SCNC: 24 MMOL/L (ref 23–29)
COLOR UR AUTO: ABNORMAL
CREAT SERPL-MCNC: 0.9 MG/DL (ref 0.5–1.4)
DIFFERENTIAL METHOD: ABNORMAL
EOSINOPHIL # BLD AUTO: 0 K/UL (ref 0–0.5)
EOSINOPHIL NFR BLD: 0.4 % (ref 0–8)
ERYTHROCYTE [DISTWIDTH] IN BLOOD BY AUTOMATED COUNT: 12 % (ref 11.5–14.5)
EST. GFR  (NO RACE VARIABLE): >60 ML/MIN/1.73 M^2
GLUCOSE SERPL-MCNC: 84 MG/DL (ref 70–110)
GLUCOSE UR QL STRIP: NEGATIVE
HCT VFR BLD AUTO: 32.9 % (ref 37–48.5)
HGB BLD-MCNC: 10.8 G/DL (ref 12–16)
HGB UR QL STRIP: NEGATIVE
HIV 1+2 AB+HIV1 P24 AG SERPL QL IA: NORMAL
IMM GRANULOCYTES # BLD AUTO: 0.02 K/UL (ref 0–0.04)
IMM GRANULOCYTES NFR BLD AUTO: 0.4 % (ref 0–0.5)
KETONES UR QL STRIP: NEGATIVE
LEUKOCYTE ESTERASE UR QL STRIP: ABNORMAL
LYMPHOCYTES # BLD AUTO: 2.6 K/UL (ref 1–4.8)
LYMPHOCYTES NFR BLD: 48.4 % (ref 18–48)
MAGNESIUM SERPL-MCNC: 1.9 MG/DL (ref 1.6–2.6)
MCH RBC QN AUTO: 32.4 PG (ref 27–31)
MCHC RBC AUTO-ENTMCNC: 32.8 G/DL (ref 32–36)
MCV RBC AUTO: 99 FL (ref 82–98)
MICROSCOPIC COMMENT: ABNORMAL
MONOCYTES # BLD AUTO: 0.4 K/UL (ref 0.3–1)
MONOCYTES NFR BLD: 7.9 % (ref 4–15)
NEUTROPHILS # BLD AUTO: 2.2 K/UL (ref 1.8–7.7)
NEUTROPHILS NFR BLD: 41.8 % (ref 38–73)
NITRITE UR QL STRIP: NEGATIVE
NRBC BLD-RTO: 0 /100 WBC
PH UR STRIP: 6 [PH] (ref 5–8)
PLATELET # BLD AUTO: 393 K/UL (ref 150–450)
PLATELET BLD QL SMEAR: ABNORMAL
PMV BLD AUTO: 9.7 FL (ref 9.2–12.9)
POTASSIUM SERPL-SCNC: 4.4 MMOL/L (ref 3.5–5.1)
PROT SERPL-MCNC: 6.8 G/DL (ref 6–8.4)
PROT UR QL STRIP: NEGATIVE
RBC # BLD AUTO: 3.33 M/UL (ref 4–5.4)
RBC #/AREA URNS AUTO: 5 /HPF (ref 0–4)
SODIUM SERPL-SCNC: 138 MMOL/L (ref 136–145)
SP GR UR STRIP: 1.01 (ref 1–1.03)
SQUAMOUS #/AREA URNS AUTO: 3 /HPF
URN SPEC COLLECT METH UR: ABNORMAL
WBC # BLD AUTO: 5.35 K/UL (ref 3.9–12.7)
WBC #/AREA URNS AUTO: 4 /HPF (ref 0–5)

## 2023-08-22 PROCEDURE — 81001 URINALYSIS AUTO W/SCOPE: CPT | Performed by: EMERGENCY MEDICINE

## 2023-08-22 PROCEDURE — 63600175 PHARM REV CODE 636 W HCPCS: Performed by: EMERGENCY MEDICINE

## 2023-08-22 PROCEDURE — 3074F SYST BP LT 130 MM HG: CPT | Mod: CPTII,S$GLB,, | Performed by: PHYSICIAN ASSISTANT

## 2023-08-22 PROCEDURE — 25000003 PHARM REV CODE 250: Performed by: EMERGENCY MEDICINE

## 2023-08-22 PROCEDURE — 3074F PR MOST RECENT SYSTOLIC BLOOD PRESSURE < 130 MM HG: ICD-10-PCS | Mod: CPTII,S$GLB,, | Performed by: PHYSICIAN ASSISTANT

## 2023-08-22 PROCEDURE — 99999 PR PBB SHADOW E&M-EST. PATIENT-LVL V: CPT | Mod: PBBFAC,,, | Performed by: PHYSICIAN ASSISTANT

## 2023-08-22 PROCEDURE — 99203 PR OFFICE/OUTPT VISIT, NEW, LEVL III, 30-44 MIN: ICD-10-PCS | Mod: S$GLB,,, | Performed by: PHYSICIAN ASSISTANT

## 2023-08-22 PROCEDURE — 3008F PR BODY MASS INDEX (BMI) DOCUMENTED: ICD-10-PCS | Mod: CPTII,S$GLB,, | Performed by: PHYSICIAN ASSISTANT

## 2023-08-22 PROCEDURE — 99285 EMERGENCY DEPT VISIT HI MDM: CPT | Mod: 25

## 2023-08-22 PROCEDURE — 83735 ASSAY OF MAGNESIUM: CPT | Performed by: EMERGENCY MEDICINE

## 2023-08-22 PROCEDURE — 96374 THER/PROPH/DIAG INJ IV PUSH: CPT

## 2023-08-22 PROCEDURE — 3008F BODY MASS INDEX DOCD: CPT | Mod: CPTII,S$GLB,, | Performed by: PHYSICIAN ASSISTANT

## 2023-08-22 PROCEDURE — 3079F DIAST BP 80-89 MM HG: CPT | Mod: CPTII,S$GLB,, | Performed by: PHYSICIAN ASSISTANT

## 2023-08-22 PROCEDURE — 3079F PR MOST RECENT DIASTOLIC BLOOD PRESSURE 80-89 MM HG: ICD-10-PCS | Mod: CPTII,S$GLB,, | Performed by: PHYSICIAN ASSISTANT

## 2023-08-22 PROCEDURE — 85025 COMPLETE CBC W/AUTO DIFF WBC: CPT | Performed by: EMERGENCY MEDICINE

## 2023-08-22 PROCEDURE — 80053 COMPREHEN METABOLIC PANEL: CPT | Performed by: EMERGENCY MEDICINE

## 2023-08-22 PROCEDURE — 99999 PR PBB SHADOW E&M-EST. PATIENT-LVL V: ICD-10-PCS | Mod: PBBFAC,,, | Performed by: PHYSICIAN ASSISTANT

## 2023-08-22 PROCEDURE — 99203 OFFICE O/P NEW LOW 30 MIN: CPT | Mod: S$GLB,,, | Performed by: PHYSICIAN ASSISTANT

## 2023-08-22 PROCEDURE — 87389 HIV-1 AG W/HIV-1&-2 AB AG IA: CPT | Performed by: PHYSICIAN ASSISTANT

## 2023-08-22 RX ORDER — PROCHLORPERAZINE EDISYLATE 5 MG/ML
10 INJECTION INTRAMUSCULAR; INTRAVENOUS ONCE
Status: COMPLETED | OUTPATIENT
Start: 2023-08-22 | End: 2023-08-22

## 2023-08-22 RX ORDER — VENLAFAXINE HYDROCHLORIDE 150 MG/1
300 CAPSULE, EXTENDED RELEASE ORAL DAILY
Qty: 60 CAPSULE | Refills: 1 | Status: SHIPPED | OUTPATIENT
Start: 2023-08-22 | End: 2023-09-13 | Stop reason: ALTCHOICE

## 2023-08-22 RX ORDER — ACETAMINOPHEN 500 MG
1000 TABLET ORAL
Status: COMPLETED | OUTPATIENT
Start: 2023-08-22 | End: 2023-08-22

## 2023-08-22 RX ADMIN — ACETAMINOPHEN 1000 MG: 500 TABLET ORAL at 07:08

## 2023-08-22 RX ADMIN — PROCHLORPERAZINE EDISYLATE 10 MG: 5 INJECTION INTRAMUSCULAR; INTRAVENOUS at 02:08

## 2023-08-22 NOTE — PROVIDER PROGRESS NOTES - EMERGENCY DEPT.
Encounter Date: 8/22/2023    ED Physician Progress Notes        ED Physician Hand-off Note:    ED Course: I assumed care of patient from off-going ED physician team. Briefly, Patient is a 54 yo F with history of migraine HA who presents with a HA after a recent fall.    At the time of signout plan was pending -MRI.    Medications given in the ED:    Medications   prochlorperazine injection Soln 10 mg (10 mg Intravenous Given 8/22/23 1437)   acetaminophen tablet 1,000 mg (1,000 mg Oral Given 8/22/23 1936)     Imaging Results              MRI Brain Without Contrast (Final result)  Result time 08/22/23 18:53:12      Final result by Deven Brannon MD (08/22/23 18:53:12)                   Impression:      Limited study.  No acute infarct or intracranial hemorrhage definitively seen allowing for significant metallic artifact from dental braces degrading some sequences.  Further evaluation/follow-up as warranted.    Involutional change with suspected sequela of chronic microvascular ischemic change.    Suspected new partial right mastoid effusion.      Electronically signed by: Deven Brannon MD  Date:    08/22/2023  Time:    18:53               Narrative:    EXAMINATION:  MRI BRAIN WITHOUT CONTRAST    CLINICAL HISTORY:  Headache, new or worsening (Age >= 50y);.    TECHNIQUE:  Multiplanar multisequence MR imaging of the brain was performed without contrast utilizing metallic dental braces protocol.    COMPARISON:  Head CT 12/28/2022    FINDINGS:  Suspected significant metallic artifact at the anterior face, degrade some sequences.    Intracranial compartment: Brain appears normally formed.    Age-related generalized cerebral volume loss.  Ventricles are midline and stable in size and configuration without distortion by mass effect acute hydrocephalus.  No extra-axial blood or fluid collections.    Mild degree of scattered nonspecific T2/FLAIR hyperintense signal foci of the subcortical and periventricular white matter  likely sequela of chronic microvascular ischemic change.  No mass lesion, acute hemorrhage, edema or acute infarct definitively seen allowing for significant metallic artifact from dental braces.    Normal vascular flow voids are preserved.  Sella and parasellar regions are within normal limits.    Skull/extracranial contents (limited evaluation): Bone marrow signal intensity is normal.  Craniocervical junction is within normal limits.  Suspected new partial right mastoid fluid.  Paranasal sinuses and left mastoid air cells are grossly clear.                                       X-Ray Knee 3 View Bilateral (Final result)  Result time 08/22/23 15:53:24      Final result by Deven Brannon MD (08/22/23 15:53:24)                   Impression:      No acute displaced fracture-dislocation identified.      Electronically signed by: Deven Brannon MD  Date:    08/22/2023  Time:    15:53               Narrative:    EXAMINATION:  XR KNEE 3 VIEW BILATERAL    CLINICAL HISTORY:  Unspecified fall, initial encounter    TECHNIQUE:  AP, lateral, and Merchant views of both knees were performed.    COMPARISON:  Bilateral knee series 12/07/2022    FINDINGS:  Bones are well mineralized. Overall alignment is within normal limits. No displaced fracture, dislocation or destructive osseous process.  Minimal degenerative change at each knee.  No large suprapatellar joint effusion identified.  No subcutaneous emphysema or radiodense retained foreign body.                                       X-Ray Wrist Complete Left (Final result)  Result time 08/22/23 15:52:11      Final result by Deven Brannon MD (08/22/23 15:52:11)                   Impression:      No acute displaced fracture-dislocation identified.      Electronically signed by: Deven Brannon MD  Date:    08/22/2023  Time:    15:52               Narrative:    EXAMINATION:  XR WRIST COMPLETE 3 VIEWS LEFT    CLINICAL HISTORY:  Unspecified fall, initial encounter    TECHNIQUE:  PA,  lateral, and oblique views of the left wrist were performed.    COMPARISON:  None    FINDINGS:  Suspected IV line in place at the dorsal aspect of the hand.    Bones are well mineralized. Overall alignment is within normal limits.  Carpus appears intact.  No displaced fracture, dislocation or destructive osseous process. Joint spaces appear relatively maintained. No subcutaneous emphysema or radiodense retained foreign body.                                        4:29 PM   is bringing controller for bladder stimulator so that she can go for MRI    Disposition: home    Patient comfortable with plan for discharge. Patient counseled regarding exam, results, diagnosis, treatment, and plan.    Impression: Final diagnoses:  [W19.XXXA] Fall  [G43.811] Other migraine with status migrainosus, intractable  [S09.90XA] Closed head injury, initial encounter (Primary)  [S60.212A] Contusion of left wrist, initial encounter  [S80.00XA] Contusion of knee, unspecified laterality, initial encounter

## 2023-08-22 NOTE — ED PROVIDER NOTES
Encounter Date: 8/22/2023       History     Chief Complaint   Patient presents with    Migraine     Sent from neurology clinic with migraine, fell on Sunday hit head, no loc, thinks may have uti     53-year-old female with history refractory migraine presents with complaint of headache.  The patient reports that she fell and struck her head several days ago.  Since then she has had an intractable headache that has not responded to over-the-counter medications.  She is not currently on any migraine abortive medications.  She was seen by Neurology today for regular follow-up for her headaches.  She was concerned about intracranial bleed in was told by neurology to follow-up in the emergency department.  Neurology recommends an MRI given her symptoms.  Patient also reports she has interstitial cystitis complains of dysuria in his concerned she has UTI.  She is concerned about a possible injury to her left hand in her bilateral knees from where she fell and caught herself.      She is no numbness or weakness.  She has nausea but no vomitin.  She has no vision changes or speech changes.  She has no neck stiffness.  She is not experienced fever altered mental status      Review of patient's allergies indicates:   Allergen Reactions    Cephalexin Itching    Cephalosporins     Zofran [ondansetron hcl (pf)] Hives    Penicillins Rash     Past Medical History:   Diagnosis Date    Acid reflux     Allergy     Alopecia     Anxiety     Arthralgia     Back pain     Depression     Dry eyes     from meds    Fever blister     Fibromyalgia     Interstitial cystitis     Irritable bowel syndrome     Kidney stone     Major depressive disorder, recurrent episode, in partial or unspecified remission 6/25/2013    OAB (overactive bladder) 7/21/2015    PTSD (post-traumatic stress disorder)     Pure hypercholesterolemia 7/6/2022    Rheumatoid arthritis     Rheumatoid arthritis 6/21/2022    Systemic lupus erythematosus     Thyroid disease      Urinary tract infection     Vaginal infection      Past Surgical History:   Procedure Laterality Date    BLADDER SURGERY       SECTION, LOW TRANSVERSE      x 2    COLONOSCOPY      COLONOSCOPY N/A 10/05/2017    Procedure: COLONOSCOPY;  Surgeon: Venkat He MD;  Location: [facility] ENDO (4TH FLR);  Service: Endoscopy;  Laterality: N/A;    ESOPHAGOGASTRODUODENOSCOPY      ESOPHAGOGASTRODUODENOSCOPY N/A 10/25/2021    Procedure: EGD (ESOPHAGOGASTRODUODENOSCOPY);  Surgeon: Venkat He MD;  Location: [facility] ENDO (4TH FLR);  Service: Endoscopy;  Laterality: N/A;  Covid test on 10/22 at [email or web address]    10/19 lvm to confirm appt-rb    EXCISIONAL BIOPSY Right 10/14/2022    Procedure: EXCISIONAL BIOPSY-right with radiological marker;  Surgeon: PALLAVI Oseguera MD;  Location: [facility];  Service: General;  Laterality: Right;    EYE SURGERY      Lasik-bilateral    HYSTERECTOMY      IMPLANTATION OF PERMANENT SACRAL NERVE STIMULATOR N/A 2022    Procedure: INSERTION, NEUROSTIMULATOR, PERMANENT, SACRAL;  Surgeon: Bandar Garcia MD;  Location: [facility] OR 2ND FLR;  Service: Urology;  Laterality: N/A;  2hr    INJECTION OF BOTULINUM TOXIN TYPE A N/A 2018    Procedure: INJECTION, BOTULINUM TOXIN, TYPE A  200 UNITS;  Surgeon: Bandar Garcia MD;  Location: [facility] OR 1ST FLR;  Service: Urology;  Laterality: N/A;    INSTILLATION OF URINARY BLADDER N/A 2018    Procedure: INSTILLATION, URINARY BLADDER;  Surgeon: Bandar Garcia MD;  Location: [facility] OR 1ST FLR;  Service: Urology;  Laterality: N/A;    PARTIAL HYSTERECTOMY      REMOVAL OF ELECTRODE LEAD OF SACRAL NERVE STIMULATOR N/A 2022    Procedure: REMOVAL, ELECTRODE LEAD, SACRAL NERVE STIMULATOR;  Surgeon: Bandar Garcia MD;  Location: [facility] OR 2ND FLR;  Service: Urology;  Laterality: N/A;    SKIN TAG REMOVAL N/A 10/14/2022    Procedure: REMOVAL, SKIN TAGS;  Surgeon: PALLAVI Oseguera MD;  Location: [facility];  Service: General;  Laterality: N/A;    TRANSFORAMINAL EPIDURAL  INJECTION OF STEROID Left 02/15/2021    Procedure: LUMBAR TRANSFORAMINAL LEFT L5 AND S1 DIRECT REFERRAL;  Surgeon: Marquez Nesbitt MD;  Location: [facility];  Service: Pain Management;  Laterality: Left;  NEEDS CONSENT, PT REQUESTING IV SEDATION     Family History   Problem Relation Age of Onset    Irritable bowel syndrome Mother     Arthritis Mother     Hypertension Mother     Irritable bowel syndrome Sister     Lupus Sister     Rheum arthritis Sister     Fibromyalgia Sister     Irritable bowel syndrome Sister     Celiac disease Neg Hx     Cirrhosis Neg Hx     Colon cancer Neg Hx     Colon polyps Neg Hx     Crohn's disease Neg Hx     Cystic fibrosis Neg Hx     Esophageal cancer Neg Hx     Hemochromatosis Neg Hx     Inflammatory bowel disease Neg Hx     Liver cancer Neg Hx     Liver disease Neg Hx     Rectal cancer Neg Hx     Stomach cancer Neg Hx     Ulcerative colitis Neg Hx     Boris's disease Neg Hx     Melanoma Neg Hx     Amblyopia Neg Hx     Blindness Neg Hx     Cataracts Neg Hx     Glaucoma Neg Hx     Macular degeneration Neg Hx     Retinal detachment Neg Hx     Strabismus Neg Hx      Social History     Tobacco Use    Smoking status: Former     Current packs/day: 0.00    Smokeless tobacco: Never    Tobacco comments:     14 years old-18 years old   Substance Use Topics    Alcohol use: No    Drug use: No     Review of Systems  All other systems reviewed and negative except as noted in HPI    Physical Exam     Initial Vitals [08/22/23 1339]   BP Pulse Resp Temp SpO2   (!) 142/82 97 18 99.9 °F (37.7 °C) 96 %      MAP       --         Physical Exam  General: AO x 3, NAD. Well nourished. Well Developed  Head: Normocephalic and Atraumatic  HEENT: PERRLA. EOMI. OP Clear  Neck: Supple, Nontender in midline.  Cardiovascular: RRR. No m/r/g. 2+ Distal Pulses  Pulm/Chest: Chest nontender. Lungs clear to auscultation. No respiratory distress.  Abdomen: Nontender. Nondistended. No rigidity, rebound, or guarding  MSK:   Abrasion, ecchymoses, tenderness to palpation to volar left wrist and bilateral knees   Ext: no clubbing, cyanosis, or edema  Neuro: GCS 15. CN II-XII intact. Intact symmetric sensation, strength, DTR x 4 extremities  Skin: no bullae, petechiae, or purpura. Warm, dry, and intact.  Psych: normal mood and affect.    ED Course   Procedures  Labs Reviewed   CBC W/ AUTO DIFFERENTIAL - Abnormal; Notable for the following components:       Result Value    RBC 3.33 (*)     Hemoglobin 10.8 (*)     Hematocrit 32.9 (*)     MCV 99 (*)     MCH 32.4 (*)     Lymph % 48.4 (*)     All other components within normal limits    Narrative:     Release to patient->Immediate   URINALYSIS, REFLEX TO URINE CULTURE - Abnormal; Notable for the following components:    Color, UA Blue (*)     Leukocytes, UA 2+ (*)     All other components within normal limits    Narrative:     Specimen Source->Urine   URINALYSIS MICROSCOPIC - Abnormal; Notable for the following components:    RBC, UA 5 (*)     All other components within normal limits    Narrative:     Specimen Source->Urine   HIV 1 / 2 ANTIBODY    Narrative:     Release to patient->Immediate   COMPREHENSIVE METABOLIC PANEL    Narrative:     Release to patient->Immediate   MAGNESIUM    Narrative:     Release to patient->Immediate          Imaging Results              MRI Brain Without Contrast (Final result)  Result time 08/22/23 18:53:12      Final result by Deven Brannon MD (08/22/23 18:53:12)                   Impression:      Limited study.  No acute infarct or intracranial hemorrhage definitively seen allowing for significant metallic artifact from dental braces degrading some sequences.  Further evaluation/follow-up as warranted.    Involutional change with suspected sequela of chronic microvascular ischemic change.    Suspected new partial right mastoid effusion.      Electronically signed by: Deven Brannon MD  Date:    08/22/2023  Time:    18:53               Narrative:     EXAMINATION:  MRI BRAIN WITHOUT CONTRAST    CLINICAL HISTORY:  Headache, new or worsening (Age >= 50y);.    TECHNIQUE:  Multiplanar multisequence MR imaging of the brain was performed without contrast utilizing metallic dental braces protocol.    COMPARISON:  Head CT 12/28/2022    FINDINGS:  Suspected significant metallic artifact at the anterior face, degrade some sequences.    Intracranial compartment: Brain appears normally formed.    Age-related generalized cerebral volume loss.  Ventricles are midline and stable in size and configuration without distortion by mass effect acute hydrocephalus.  No extra-axial blood or fluid collections.    Mild degree of scattered nonspecific T2/FLAIR hyperintense signal foci of the subcortical and periventricular white matter likely sequela of chronic microvascular ischemic change.  No mass lesion, acute hemorrhage, edema or acute infarct definitively seen allowing for significant metallic artifact from dental braces.    Normal vascular flow voids are preserved.  Sella and parasellar regions are within normal limits.    Skull/extracranial contents (limited evaluation): Bone marrow signal intensity is normal.  Craniocervical junction is within normal limits.  Suspected new partial right mastoid fluid.  Paranasal sinuses and left mastoid air cells are grossly clear.                                       X-Ray Knee 3 View Bilateral (Final result)  Result time 08/22/23 15:53:24      Final result by Deven Brannon MD (08/22/23 15:53:24)                   Impression:      No acute displaced fracture-dislocation identified.      Electronically signed by: Deven Brannon MD  Date:    08/22/2023  Time:    15:53               Narrative:    EXAMINATION:  XR KNEE 3 VIEW BILATERAL    CLINICAL HISTORY:  Unspecified fall, initial encounter    TECHNIQUE:  AP, lateral, and Merchant views of both knees were performed.    COMPARISON:  Bilateral knee series 12/07/2022    FINDINGS:  Bones are well  mineralized. Overall alignment is within normal limits. No displaced fracture, dislocation or destructive osseous process.  Minimal degenerative change at each knee.  No large suprapatellar joint effusion identified.  No subcutaneous emphysema or radiodense retained foreign body.                                       X-Ray Wrist Complete Left (Final result)  Result time 08/22/23 15:52:11      Final result by Deven Brannon MD (08/22/23 15:52:11)                   Impression:      No acute displaced fracture-dislocation identified.      Electronically signed by: Deven Brannon MD  Date:    08/22/2023  Time:    15:52               Narrative:    EXAMINATION:  XR WRIST COMPLETE 3 VIEWS LEFT    CLINICAL HISTORY:  Unspecified fall, initial encounter    TECHNIQUE:  PA, lateral, and oblique views of the left wrist were performed.    COMPARISON:  None    FINDINGS:  Suspected IV line in place at the dorsal aspect of the hand.    Bones are well mineralized. Overall alignment is within normal limits.  Carpus appears intact.  No displaced fracture, dislocation or destructive osseous process. Joint spaces appear relatively maintained. No subcutaneous emphysema or radiodense retained foreign body.                                       Medications   prochlorperazine injection Soln 10 mg (10 mg Intravenous Given 8/22/23 1437)   acetaminophen tablet 1,000 mg (1,000 mg Oral Given 8/22/23 1936)     Medical Decision Making  Presentation is less concerning for acute intracranial hemorrhage given duration of time since symptoms without deterioration.  However, given the patient was evaluated for Neurology and they felt like she needed an MRI for further workup of her headache, will perform MRI this will also help us assess for acute on chronic causes of headache as well as subacute intracranial hemorrhage.  CSF assays are not clinically indicated.  Patient's history, presentation, and course are less concerning for meningoencephalitis or  idiopathic intracranial hypertension.  Can not rule out other cause of patient's symptoms such as abnormally high or low sodium.  Will obtain lab assays.  Will perform urinalysis given complaint of dysuria and increased urinary frequency.  Verified the patient has an MRI compatible neurostimulator that is turned off.  Care transitioned to evening team pending final Radiology read of MRI brain    Amount and/or Complexity of Data Reviewed  External Data Reviewed: labs, radiology, ECG and notes.     Details: .  Radiology note from earlier today reviewed as documented in HPI and MDM  Labs: ordered. Decision-making details documented in ED Course.  Radiology: ordered and independent interpretation performed. Decision-making details documented in ED Course.     Details: Left wrist:  No fracture or dislocation   Bilateral knees:  No fracture or dislocation  MRI brain:  No acute hemorrhage, mass or mass effect  ECG/medicine tests: ordered and independent interpretation performed. Decision-making details documented in ED Course.    Risk  OTC drugs.  Prescription drug management.                               Clinical Impression:   Final diagnoses:  [W19.XXXA] Fall  [G43.811] Other migraine with status migrainosus, intractable  [S09.90XA] Closed head injury, initial encounter (Primary)  [S60.212A] Contusion of left wrist, initial encounter  [S80.00XA] Contusion of knee, unspecified laterality, initial encounter        ED Disposition Condition    Discharge Stable          ED Prescriptions    None       Follow-up Information       Follow up With Specialties Details Why Contact Info    Leti Howe MD Internal Medicine   [address]  [place] [address]  [phone]      Birdie Restrepo PA-C Neurology   [address] [facility] [address]  [phone]               Feliberto, Hernan BRUMFIELD MD  08/23/23 3114

## 2023-08-22 NOTE — ED TRIAGE NOTES
Pt arrives to ED stating she was went from her neurology clinic. Pt states she fell on Sunday and braced her fall with her left arm but hit her head on the concrete. Bruising noted to the left wrist. Pt states she has had a migraine since the fall. Pt states a hx of migraines. Pt states she also believes she has a UTI.

## 2023-08-22 NOTE — ED NOTES
Pt will have family member bring controller for bladder device that is required for pt to have MRI.

## 2023-08-22 NOTE — PROGRESS NOTES
Name: Marilee Simons  Date: 08/22/2023  YOB: 1970      Subjective     Chief Complaint- Headache      HPI:   Marilee Simons is a 53 y.o. who presents to clinic for headache evaluation. Notes that she has some associated facial tingling with the headaches. She notes that she had a fall on Sunday in which she did hit her head.     Headache history:   Age of onset -  Teens  Location - R temple and parietal area and can go into holocephalic  Nature of pain -  Throbbing  Aura/Prodrome - Denies    Duration of headache - 2-3 days  Time to peak intensity - Immediate  Pain scale - range of intensity - 10 /10  Frequency -  3-4 per month changed from just during menstrual cycle about 4-5 years ago  Status Migrainosus history - Does endorse some headaches lasting longer than 72 hours  Time of day of most headaches- anytime     Associated symptoms with the headache, bolded items are positive:   Meningeal symptoms - photophobia, phonophobia, exercise intolerance   Nausea/vomitting  Nasal drainage   Visual blurriness   Pallor/flushing  Dizziness   Vertigo  Confusion  Impaired concentration   Pain worsened with physical activity   Neck pain     Cluster headache symptoms, bolded items are positive:   Swollen or droopy eyelid  Swelling under or around the eye (may affect both eyes)  Excessive tearing  Red eye  Rhinorrhea or one-sided nasal congestion   Red, flushed face   Forehead and facial sweating    Symptoms of increased intracranial pressure, bolded items are positive:   Whooshing sounds   Visual spots/blotches     Basilar migraine symptoms, bolded items are positive:  Dysarthria  Vertigo  Tinnitus  Hypacusia  Diplopia  Simultaneous visual symptoms in both temporal and nasal fields of both eyes  Ataxia  Reduced level of consciousness  Bilateral paresthesias    Headache Triggers, bolded items are positive:  Bright or flickering light  Strong smell  Vigorous exercise  Dietary factors   Visual strain   Weather  changes  Exertion  Heat (hot weather, hot baths or showers)  Menses      Treatment history:  Resolution of headache with sleep - yes   Meds tried:  Effexor  Flexeril  Gabapentin  Tizanidine  Amitriptyline      Headache risk factors:   H/o TBI  - no   H/o Meningitis  - no   F/h Aneurysms  - Unknown Family Hx      PAST MEDICAL HISTORY:  Past Medical History:   Diagnosis Date    Acid reflux     Allergy     Alopecia     Anxiety     Arthralgia     Back pain     Depression     Dry eyes     from meds    Fever blister     Fibromyalgia     Interstitial cystitis     Irritable bowel syndrome     Kidney stone     Major depressive disorder, recurrent episode, in partial or unspecified remission 2013    OAB (overactive bladder) 2015    PTSD (post-traumatic stress disorder)     Pure hypercholesterolemia 2022    Rheumatoid arthritis     Rheumatoid arthritis 2022    Systemic lupus erythematosus     Thyroid disease     Urinary tract infection     Vaginal infection        PAST SURGICAL HISTORY:  Past Surgical History:   Procedure Laterality Date    BLADDER SURGERY       SECTION, LOW TRANSVERSE      x 2    COLONOSCOPY      COLONOSCOPY N/A 10/05/2017    Procedure: COLONOSCOPY;  Surgeon: Venkat He MD;  Location: [address]);  Service: Endoscopy;  Laterality: N/A;    ESOPHAGOGASTRODUODENOSCOPY      ESOPHAGOGASTRODUODENOSCOPY N/A 10/25/2021    Procedure: EGD (ESOPHAGOGASTRODUODENOSCOPY);  Surgeon: Venkat He MD;  Location: [address]);  Service: Endoscopy;  Laterality: N/A;  Covid test on 10/22 at [email or web address]    10/19 lvm to confirm appt-rb    EXCISIONAL BIOPSY Right 10/14/2022    Procedure: EXCISIONAL BIOPSY-right with radiological marker;  Surgeon: PALLAVI Oseguera MD;  Location: [facility];  Service: General;  Laterality: Right;    EYE SURGERY      Lasik-bilateral    HYSTERECTOMY      IMPLANTATION OF PERMANENT SACRAL NERVE STIMULATOR N/A 2022    Procedure: INSERTION,  NEUROSTIMULATOR, PERMANENT, SACRAL;  Surgeon: Bandar Garcia MD;  Location: [facility] OR 2ND FLR;  Service: Urology;  Laterality: N/A;  2hr    INJECTION OF BOTULINUM TOXIN TYPE A N/A 09/12/2018    Procedure: INJECTION, BOTULINUM TOXIN, TYPE A  200 UNITS;  Surgeon: Bandar Garcia MD;  Location: [facility] OR 1ST FLR;  Service: Urology;  Laterality: N/A;    INSTILLATION OF URINARY BLADDER N/A 09/12/2018    Procedure: INSTILLATION, URINARY BLADDER;  Surgeon: Bandar Garcia MD;  Location: [facility] OR [facility] FLR;  Service: Urology;  Laterality: N/A;    PARTIAL HYSTERECTOMY      REMOVAL OF ELECTRODE LEAD OF SACRAL NERVE STIMULATOR N/A 04/27/2022    Procedure: REMOVAL, ELECTRODE LEAD, SACRAL NERVE STIMULATOR;  Surgeon: Bandar Garcia MD;  Location: [facility] OR 2ND FLR;  Service: Urology;  Laterality: N/A;    SKIN TAG REMOVAL N/A 10/14/2022    Procedure: REMOVAL, SKIN TAGS;  Surgeon: PALLAVI Oseguera MD;  Location: [facility];  Service: General;  Laterality: N/A;    TRANSFORAMINAL EPIDURAL INJECTION OF STEROID Left 02/15/2021    Procedure: LUMBAR TRANSFORAMINAL LEFT L5 AND S1 DIRECT REFERRAL;  Surgeon: Marquez Nesbitt MD;  Location: [facility] PAIN T;  Service: Pain Management;  Laterality: Left;  NEEDS CONSENT, PT REQUESTING IV SEDATION       CURRENT MEDS:  No current facility-administered medications for this visit.     Current Outpatient Medications   Medication Sig Dispense Refill    alosetron (LOTRONEX) 0.5 MG tablet TAKE 1 TABLET(0.5 MG) BY MOUTH DAILY AS NEEDED 30 tablet 0    amitriptyline (ELAVIL) 10 MG tablet TAKE 1 TABLET(10 MG) BY MOUTH EVERY NIGHT AS NEEDED 90 tablet 3    budesonide (ENTOCORT EC) 3 mg capsule TAKE 3 CAPSULES(9 MG) BY MOUTH EVERY DAY FOR 10 DAYS 30 capsule 3    butalbitaL-acetaminophen  mg Tab TAKE 1 TABLET BY MOUTH EVERY 4 HOURS 60 tablet 3    calcium glycerophosphate (PRELIEF) 65 mg Tab Take 2 tablets by mouth before meals and at bedtime as needed. (Patient taking differently: Take 2 tablets by mouth 3 (three) times  daily with meals.) 100 each prn    cetirizine (ZYRTEC) 10 MG tablet Take 1 tablet (10 mg total) by mouth once daily. 120 tablet 2    clobetasoL (TEMOVATE) 0.05 % external solution Apply topically 2 (two) times daily. Use to affected areas for up to 2 weeks then take a 1 week break or decrease to 3 times weekly. Do not apply to groin or face. Use on scalp 50 mL 2    clonazePAM (KLONOPIN) 1 MG tablet Take 1 tablet (1 mg total) by mouth 2 (two) times daily. 60 tablet 0    cyclobenzaprine (FLEXERIL) 10 MG tablet Take 1 tablet (10 mg total) by mouth 3 (three) times daily as needed for Muscle spasms. 90 tablet 6    dicyclomine (BENTYL) 20 mg tablet Take 20 mg by mouth daily as needed.      famotidine (PEPCID) 40 MG tablet TAKE 1 TABLET(40 MG) BY MOUTH EVERY NIGHT AS NEEDED FOR HEARTBURN 30 tablet 5    fluticasone propionate (FLONASE) 50 mcg/actuation nasal spray 2 sprays (100 mcg total) by Each Nostril route 2 (two) times daily. 31.6 mL 1    gabapentin (NEURONTIN) 800 MG tablet Take 1 tablet (800 mg total) by mouth 3 (three) times daily. 90 tablet 3    hydrocortisone 2.5 % cream Apply to affected areas twice a day when eczema is moderate. 453.6 g 1    hydrOXYzine pamoate (VISTARIL) 25 MG Cap Take 1 capsule (25 mg total) by mouth 3 (three) times daily. 90 capsule 1    ketoconazole (NIZORAL) 2 % shampoo Apply topically 3 (three) times a week. Leave on scalp for 5-10 minutes then wash off 240 mL 3    lamoTRIgine (LAMICTAL) 200 MG tablet Take 1 tablet (200 mg total) by mouth once daily. 30 tablet 11    levothyroxine (SYNTHROID) 50 MCG tablet TAKE 1 TABLET(50 MCG) BY MOUTH EVERY DAY 30 tablet 6    lifitegrast (XIIDRA) 5 % Dpet Apply 1 drop to eye every 12 (twelve) hours. 180 each 4    methen-m.blue-s.phos-phsal-hyo (URIBEL) 118-10-40.8-36 mg Cap Take 1 capsule by mouth 3 (three) times daily. 45 capsule 3    oxybutynin (DITROPAN) 5 MG Tab TAKE 1 TABLET(5 MG) BY MOUTH THREE TIMES DAILY 90 tablet 3    predniSONE (DELTASONE) 2.5  MG tablet 1 to 3 tabs PO daily prn for arthritis flare 90 tablet 0    pregabalin (LYRICA) 150 MG capsule Take 1 capsule (150 mg total) by mouth 3 (three) times daily. 90 capsule 5    RABEprazole (ACIPHEX) 20 mg tablet Take 1 tablet (20 mg total) by mouth once daily. 30 tablet 11    risankizumab-rzaa (SKYRIZI) 150 mg/mL PnIj Inject 150 mg into the skin every 28 days. 1 mL 1    risankizumab-rzaa (SKYRIZI) 150 mg/mL PnIj Inject 150 mg into the skin every 12 weeks. 1 mL 4    traMADoL (ULTRAM) 50 mg tablet TAKE 1 TABLET(50 MG) BY MOUTH EVERY 6 HOURS 90 tablet 3    traZODone (DESYREL) 50 MG tablet Take 1 tablet (50 mg total) by mouth nightly as needed for Insomnia. 30 tablet 2    venlafaxine (EFFEXOR XR) 150 MG Cp24 Take 2 capsules (300 mg total) by mouth once daily. 60 capsule 1       ALLERGIES:  Review of patient's allergies indicates:   Allergen Reactions    Cephalexin Itching    Cephalosporins     Zofran [ondansetron hcl (pf)] Hives    Penicillins Rash       FAMILY HISTORY:  Family History   Problem Relation Age of Onset    Irritable bowel syndrome Mother     Arthritis Mother     Hypertension Mother     Irritable bowel syndrome Sister     Lupus Sister     Rheum arthritis Sister     Fibromyalgia Sister     Irritable bowel syndrome Sister     Celiac disease Neg Hx     Cirrhosis Neg Hx     Colon cancer Neg Hx     Colon polyps Neg Hx     Crohn's disease Neg Hx     Cystic fibrosis Neg Hx     Esophageal cancer Neg Hx     Hemochromatosis Neg Hx     Inflammatory bowel disease Neg Hx     Liver cancer Neg Hx     Liver disease Neg Hx     Rectal cancer Neg Hx     Stomach cancer Neg Hx     Ulcerative colitis Neg Hx     Boris's disease Neg Hx     Melanoma Neg Hx     Amblyopia Neg Hx     Blindness Neg Hx     Cataracts Neg Hx     Glaucoma Neg Hx     Macular degeneration Neg Hx     Retinal detachment Neg Hx     Strabismus Neg Hx        SOCIAL HISTORY:  Social History     Tobacco Use    Smoking status: Former     Current packs/day:  0.00    Smokeless tobacco: Never    Tobacco comments:     14 years old-18 years old   Substance Use Topics    Alcohol use: No    Drug use: No         Review of Systems:  Review of Systems   Constitutional:  Negative for chills, fever and weight loss.   HENT:  Negative for ear discharge, ear pain, hearing loss, sinus pain, sore throat and tinnitus.    Eyes:  Negative for blurred vision, double vision and photophobia.   Respiratory:  Negative for cough, shortness of breath and wheezing.    Cardiovascular:  Negative for chest pain, palpitations and orthopnea.   Gastrointestinal:  Negative for constipation, diarrhea, nausea and vomiting.   Genitourinary:  Negative for dysuria, frequency and urgency.   Musculoskeletal:  Positive for falls (Fell on  tripped) and neck pain. Negative for back pain and myalgias.   Neurological:  Positive for dizziness and headaches. Negative for tingling, seizures, loss of consciousness and weakness.         Objective   Vitals:    23 1302   BP: 129/87   Pulse: (!) 112           NEUROLOGICAL EXAMINATION:     MENTAL STATUS   Oriented to person, place, and time.   Level of consciousness: alert    CRANIAL NERVES     CN III, IV, VI   Pupils are equal, round, and reactive to light.  Extraocular motions are normal.   Pupils: dilated    CN V   Facial sensation intact.     CN VII   Facial expression full, symmetric.     CN VIII   CN VIII normal.     CN IX, X   CN IX normal.   CN X normal.     CN XI   CN XI normal.     CN XII   CN XII normal.     MOTOR EXAM     Strength   Strength 5/5 throughout.     SENSORY EXAM   Light touch normal.   Vibration normal.     GAIT AND COORDINATION     Gait  Gait: normal          Latest Imagin2022 CT HEAD WITHOUT CONTRAST     CLINICAL HISTORY:  Head trauma, moderate-severe;Head trauma, abnormal mental status (Age 19-64y);     TECHNIQUE:  Low dose axial CT images obtained throughout the head without intravenous contrast. Sagittal and coronal  reconstructions were performed.     COMPARISON:  02/11/2022     FINDINGS:  Intracranial compartment:     Ventricles and sulci are normal in size for age without evidence of hydrocephalus. No extra-axial blood or fluid collections.     Moderate involutional changes and chronic microvascular ischemic changes in the periventricular white matter.  Motion artifact.     No parenchymal mass, hemorrhage, edema or major vascular distribution infarct.     Skull/extracranial contents (limited evaluation): No fracture. Mastoid air cells and paranasal sinuses are essentially clear.     Impression:     No acute intracranial process.         Assessment     Marilee Simons is a 53 y.o. who presented to clinic today for the evaluation of headache.       Plan     My approach to every patient is multimodal.   I focused our discussion on addressing modifiable risk factors and trying to decrease them.  After discussion with the patient, I recommend the following:     Discussion that patient is very concerned she may have had a brain bleed following her fall and blow to the head and discussed with the patient that it would be important to present to the ED for evaluation will make my recommendations for MRI but if she is concerned for acute trauma that presentation to the ED would be more beneficial. Patient was sent to the ED and was amenable to this plan.     Preventive medications; these medications have to be taken every single day to decrease headache frequency and severity; it takes at least minimum of few weeks to see any results; and have to be taken for at least 1 to 2 years. The medication should be started at a small dose, and increased if no significant side effects. Patient compliance is key for effectiveness of the preventive medications. (we discussed the options of beta-blockers, calcium channel blockers, SSRIs, SNRIs, neuron modulating like topiramate options)     Failed:    Amitriptyline  Flexiril  Lyrica  Effexor  Trazodone    START:      Once imaging is received can consider CGRP antagonist     Patient has tried and failed 2 or more first line medication management options for prevention of migraine; therefore it is medically necessary that they take CGRP antagonist for prevention of migraine.         2. Acute (abortive) medications- these medications are to be taken only at the onset of severe headache and please limit a total of any combination of these medications to less than 6 days per month on average because they can cause rebound (medication overuse) headaches.     Failed:   Fioricet      START:       3. Natural approaches to increasing brain energy and serotonin:   Magnesium 400 mg daily  Vitamin B2 400 mg daily   Vitamin B12 1000 mcg daily      4. DIET: I recommend a diet that heavily favors fruits and vegetables while reducing carbs. More information is available upon request.     5. EXERCISE: Gentle movement exercises, concentrate on combination of muscle building and aerobic. I also recommend adding gentle Yoga therapy. The goal is 150 minutes per week of moderate to rigorous exercise, but you should start at your own pace and progress slowly and safely as discussed today.    6. ANXIETY/STRESS- Control stressors with yoga, meditation, counseling, or other methods of mindfulness.     7. SLEEP- aim for 7-8 hrs of restful sleep per night.     8. FUN- Increase fun time spend with friends and family     Benefits, side effects, and alternatives were discussed extensively with the patient.?     Marilee verbally endorsed understanding of all the recommendations.?     Follow Up after imaging will reach out to discuss possible therapy options.     Problem List Items Addressed This Visit          Neuro    Neurostimulator device in situ    Current Assessment & Plan     MRI compatible and MRI brain ordered            Cardiac/Vascular    Hyperlipidemia    Current Assessment & Plan      Patients hyperlipidemia is currently stable and well managed by the PCP. Discussed with the patient the associated stroke risk.               Immunology/Multi System    Rheumatoid arthritis    Current Assessment & Plan     Managed by rheumatology and on appropriate medications           Other Visit Diagnoses       Chronic headache with new features    -  Primary    Relevant Orders    MRI Brain Without Contrast                I spent a total of 30 minutes on the day of the visit. This includes face to face time and non-face to face time preparing to see the patient (eg, review of tests), obtaining and/or reviewing separately obtained history, documenting clinical information in the electronic or other health record, independently interpreting results and communicating results to the patient/family/caregiver, or care coordinator.      Birdie Restrepo PA-C  Supervising Physician Rubens Solomon MD was avalible for all questions during this exam.  [facility]

## 2023-08-22 NOTE — ED NOTES
MRI notified pt is now in possession of controller for bladder device. MRI states they will put pt in for transport for MRI.

## 2023-08-22 NOTE — PLAN OF CARE
Patient reports presence of an MRI compatible InterStim sacral nerve stimulator.  There should be no issue obtaining a 1.5 or 3 Jessy MRI brain.  The patient has received MRIs since the implantation of her stimulator.  She reports she is placed it in the off mode in anticipation of receiving her MRI today.

## 2023-08-23 ENCOUNTER — PATIENT MESSAGE (OUTPATIENT)
Dept: PSYCHIATRY | Facility: CLINIC | Age: 53
End: 2023-08-23
Payer: COMMERCIAL

## 2023-08-23 NOTE — DISCHARGE INSTRUCTIONS
I recommend that you call your neurologist to discuss further your migraine medications.  Your work up today in the ED did not show any acute emergent findings. Your MRI brain is negative for bleeding.

## 2023-08-25 ENCOUNTER — PATIENT MESSAGE (OUTPATIENT)
Dept: PRIMARY CARE CLINIC | Facility: CLINIC | Age: 53
End: 2023-08-25
Payer: COMMERCIAL

## 2023-08-25 ENCOUNTER — PATIENT MESSAGE (OUTPATIENT)
Dept: CARDIOLOGY | Facility: CLINIC | Age: 53
End: 2023-08-25
Payer: COMMERCIAL

## 2023-08-25 NOTE — TELEPHONE ENCOUNTER
It showed that her heart rate was faster than what it normally should be at that time when she was in the emergency room.  No need for concern.

## 2023-08-29 ENCOUNTER — TELEPHONE (OUTPATIENT)
Dept: RHEUMATOLOGY | Facility: CLINIC | Age: 53
End: 2023-08-29

## 2023-08-29 DIAGNOSIS — L40.50 PSORIATIC ARTHRITIS: Primary | ICD-10-CM

## 2023-08-29 RX ORDER — USTEKINUMAB 45 MG/.5ML
45 INJECTION, SOLUTION SUBCUTANEOUS
Qty: 0.5 ML | Refills: 3 | Status: ACTIVE | OUTPATIENT
Start: 2023-08-29 | End: 2023-11-08

## 2023-08-29 RX ORDER — USTEKINUMAB 45 MG/.5ML
45 INJECTION, SOLUTION SUBCUTANEOUS
Qty: 0.5 ML | Refills: 1 | Status: ACTIVE | OUTPATIENT
Start: 2023-08-29 | End: 2023-11-08

## 2023-08-29 NOTE — TELEPHONE ENCOUNTER
----- Message from Dania Frausto, Jossie sent at 8/21/2023  3:12 PM CDT -----  Regarding: Skyrjoshua  Good afternoon Dr. Samaniego and CHARLOTTE Winter has been working the PA for Ms. Bradley's Skyrizi.  Skyrizi is a plan/benefit exclusion.  The plan was not able to give me any covered alternatives.  I inquired about Cosentyx, Taltz, Orencia, Cimzia, and Simponi.  The insurance told me that these are also plan exclusions.  Her therapy may have to be changed to an IV infusion instead, such as Orencia or Simponi.  Ms. Simons said she is willing to try an infusion.  How would you like to proceed?    Thank you,  Dania Frausto, PharmD  Clinical Pharmacist    [facility]  [address] [place] [address]  P [phone]  F [phone]

## 2023-08-30 ENCOUNTER — PATIENT MESSAGE (OUTPATIENT)
Dept: PSYCHOLOGY | Facility: CLINIC | Age: 53
End: 2023-08-30
Payer: COMMERCIAL

## 2023-08-30 ENCOUNTER — PATIENT MESSAGE (OUTPATIENT)
Dept: RHEUMATOLOGY | Facility: CLINIC | Age: 53
End: 2023-08-30
Payer: COMMERCIAL

## 2023-08-31 ENCOUNTER — PATIENT MESSAGE (OUTPATIENT)
Dept: PSYCHOLOGY | Facility: CLINIC | Age: 53
End: 2023-08-31
Payer: COMMERCIAL

## 2023-09-01 ENCOUNTER — PATIENT MESSAGE (OUTPATIENT)
Dept: HEMATOLOGY/ONCOLOGY | Facility: CLINIC | Age: 53
End: 2023-09-01
Payer: COMMERCIAL

## 2023-09-01 ENCOUNTER — PATIENT MESSAGE (OUTPATIENT)
Dept: PRIMARY CARE CLINIC | Facility: CLINIC | Age: 53
End: 2023-09-01
Payer: COMMERCIAL

## 2023-09-06 ENCOUNTER — OFFICE VISIT (OUTPATIENT)
Dept: PRIMARY CARE CLINIC | Facility: CLINIC | Age: 53
End: 2023-09-06
Payer: COMMERCIAL

## 2023-09-06 VITALS
OXYGEN SATURATION: 92 % | SYSTOLIC BLOOD PRESSURE: 136 MMHG | BODY MASS INDEX: 33.43 KG/M2 | HEART RATE: 110 BPM | WEIGHT: 165.81 LBS | DIASTOLIC BLOOD PRESSURE: 72 MMHG | HEIGHT: 59 IN

## 2023-09-06 DIAGNOSIS — F41.1 GENERALIZED ANXIETY DISORDER: ICD-10-CM

## 2023-09-06 DIAGNOSIS — M79.7 FIBROMYALGIA: ICD-10-CM

## 2023-09-06 DIAGNOSIS — K58.0 IRRITABLE BOWEL SYNDROME WITH DIARRHEA: Primary | ICD-10-CM

## 2023-09-06 PROCEDURE — 3008F PR BODY MASS INDEX (BMI) DOCUMENTED: ICD-10-PCS | Mod: CPTII,S$GLB,, | Performed by: STUDENT IN AN ORGANIZED HEALTH CARE EDUCATION/TRAINING PROGRAM

## 2023-09-06 PROCEDURE — 3008F BODY MASS INDEX DOCD: CPT | Mod: CPTII,S$GLB,, | Performed by: STUDENT IN AN ORGANIZED HEALTH CARE EDUCATION/TRAINING PROGRAM

## 2023-09-06 PROCEDURE — 99214 OFFICE O/P EST MOD 30 MIN: CPT | Mod: S$GLB,,, | Performed by: STUDENT IN AN ORGANIZED HEALTH CARE EDUCATION/TRAINING PROGRAM

## 2023-09-06 PROCEDURE — 3075F SYST BP GE 130 - 139MM HG: CPT | Mod: CPTII,S$GLB,, | Performed by: STUDENT IN AN ORGANIZED HEALTH CARE EDUCATION/TRAINING PROGRAM

## 2023-09-06 PROCEDURE — 1160F PR REVIEW ALL MEDS BY PRESCRIBER/CLIN PHARMACIST DOCUMENTED: ICD-10-PCS | Mod: CPTII,S$GLB,, | Performed by: STUDENT IN AN ORGANIZED HEALTH CARE EDUCATION/TRAINING PROGRAM

## 2023-09-06 PROCEDURE — 99214 PR OFFICE/OUTPT VISIT, EST, LEVL IV, 30-39 MIN: ICD-10-PCS | Mod: S$GLB,,, | Performed by: STUDENT IN AN ORGANIZED HEALTH CARE EDUCATION/TRAINING PROGRAM

## 2023-09-06 PROCEDURE — 3078F DIAST BP <80 MM HG: CPT | Mod: CPTII,S$GLB,, | Performed by: STUDENT IN AN ORGANIZED HEALTH CARE EDUCATION/TRAINING PROGRAM

## 2023-09-06 PROCEDURE — 3075F PR MOST RECENT SYSTOLIC BLOOD PRESS GE 130-139MM HG: ICD-10-PCS | Mod: CPTII,S$GLB,, | Performed by: STUDENT IN AN ORGANIZED HEALTH CARE EDUCATION/TRAINING PROGRAM

## 2023-09-06 PROCEDURE — 99999 PR PBB SHADOW E&M-EST. PATIENT-LVL V: CPT | Mod: PBBFAC,,, | Performed by: STUDENT IN AN ORGANIZED HEALTH CARE EDUCATION/TRAINING PROGRAM

## 2023-09-06 PROCEDURE — 1159F MED LIST DOCD IN RCRD: CPT | Mod: CPTII,S$GLB,, | Performed by: STUDENT IN AN ORGANIZED HEALTH CARE EDUCATION/TRAINING PROGRAM

## 2023-09-06 PROCEDURE — 3078F PR MOST RECENT DIASTOLIC BLOOD PRESSURE < 80 MM HG: ICD-10-PCS | Mod: CPTII,S$GLB,, | Performed by: STUDENT IN AN ORGANIZED HEALTH CARE EDUCATION/TRAINING PROGRAM

## 2023-09-06 PROCEDURE — 1160F RVW MEDS BY RX/DR IN RCRD: CPT | Mod: CPTII,S$GLB,, | Performed by: STUDENT IN AN ORGANIZED HEALTH CARE EDUCATION/TRAINING PROGRAM

## 2023-09-06 PROCEDURE — 99999 PR PBB SHADOW E&M-EST. PATIENT-LVL V: ICD-10-PCS | Mod: PBBFAC,,, | Performed by: STUDENT IN AN ORGANIZED HEALTH CARE EDUCATION/TRAINING PROGRAM

## 2023-09-06 PROCEDURE — 1159F PR MEDICATION LIST DOCUMENTED IN MEDICAL RECORD: ICD-10-PCS | Mod: CPTII,S$GLB,, | Performed by: STUDENT IN AN ORGANIZED HEALTH CARE EDUCATION/TRAINING PROGRAM

## 2023-09-07 ENCOUNTER — PATIENT MESSAGE (OUTPATIENT)
Dept: CARDIOLOGY | Facility: CLINIC | Age: 53
End: 2023-09-07
Payer: COMMERCIAL

## 2023-09-08 ENCOUNTER — PATIENT MESSAGE (OUTPATIENT)
Dept: NEUROLOGY | Facility: CLINIC | Age: 53
End: 2023-09-08
Payer: COMMERCIAL

## 2023-09-08 ENCOUNTER — TELEPHONE (OUTPATIENT)
Dept: NEUROLOGY | Facility: CLINIC | Age: 53
End: 2023-09-08
Payer: COMMERCIAL

## 2023-09-09 ENCOUNTER — PATIENT MESSAGE (OUTPATIENT)
Dept: PSYCHOLOGY | Facility: CLINIC | Age: 53
End: 2023-09-09
Payer: COMMERCIAL

## 2023-09-09 DIAGNOSIS — E03.9 HYPOTHYROIDISM, UNSPECIFIED TYPE: ICD-10-CM

## 2023-09-10 NOTE — PROGRESS NOTES
SUBJECTIVE     Chief Complaint   Patient presents with    Follow-up     Restless leg syndrome       HPI  Marilee Simons is a 53 y.o. female with RA, SLE, GERD, Depression, MDD, PTSDand IBS that presents for anxiety follow up    IBS-D:  Previous Diarrhea prominent but now more episodes of constipation  Fiber and Miralax started by GI to titrate to correct consistency.  PPI daily    MDD/CHANTE:   Lamictal 200mg daily,   Down to Klonopin 1mg daily (titrating down with psychiatry-down from 1bid), amitriptyline 10mg daily, venlafaxine 300 mg daily, and trazodone 50mg nightly prn. Follows regularly with Psychiatry who manages.    PAST MEDICAL HISTORY:  Past Medical History:   Diagnosis Date    Acid reflux     Allergy     Alopecia     Anxiety     Arthralgia     Back pain     Depression     Dry eyes     from meds    Fever blister     Fibromyalgia     Interstitial cystitis     Irritable bowel syndrome     Kidney stone     Major depressive disorder, recurrent episode, in partial or unspecified remission 2013    OAB (overactive bladder) 2015    PTSD (post-traumatic stress disorder)     Pure hypercholesterolemia 2022    Rheumatoid arthritis     Rheumatoid arthritis 2022    Systemic lupus erythematosus     Thyroid disease     Urinary tract infection     Vaginal infection        PAST SURGICAL HISTORY:  Past Surgical History:   Procedure Laterality Date    BLADDER SURGERY       SECTION, LOW TRANSVERSE      x 2    COLONOSCOPY      COLONOSCOPY N/A 10/05/2017    Procedure: COLONOSCOPY;  Surgeon: Venkat He MD;  Location: [address]);  Service: Endoscopy;  Laterality: N/A;    ESOPHAGOGASTRODUODENOSCOPY      ESOPHAGOGASTRODUODENOSCOPY N/A 10/25/2021    Procedure: EGD (ESOPHAGOGASTRODUODENOSCOPY);  Surgeon: Venkat He MD;  Location: [address]);  Service: Endoscopy;  Laterality: N/A;  Covid test on 10/22 at [email or web address]    10/19 lvm to confirm appt-rb    EXCISIONAL BIOPSY Right  10/14/2022    Procedure: EXCISIONAL BIOPSY-right with radiological marker;  Surgeon: PALLAVI Oseguera MD;  Location: [facility] OR;  Service: General;  Laterality: Right;    EYE SURGERY      Lasik-bilateral    HYSTERECTOMY      IMPLANTATION OF PERMANENT SACRAL NERVE STIMULATOR N/A 04/27/2022    Procedure: INSERTION, NEUROSTIMULATOR, PERMANENT, SACRAL;  Surgeon: Bandar Garcia MD;  [facility];  Service: Urology;  Laterality: N/A;  2hr    INJECTION OF BOTULINUM TOXIN TYPE A N/A 09/12/2018    Procedure: INJECTION, BOTULINUM TOXIN, TYPE A  200 UNITS;  Surgeon: Bandar Garcia MD;  [facility];  Service: Urology;  Laterality: N/A;    INSTILLATION OF URINARY BLADDER N/A 09/12/2018    Procedure: INSTILLATION, URINARY BLADDER;  Surgeon: Bandar Garcia MD;  [facility];  Service: Urology;  Laterality: N/A;    PARTIAL HYSTERECTOMY      REMOVAL OF ELECTRODE LEAD OF SACRAL NERVE STIMULATOR N/A 04/27/2022    Procedure: REMOVAL, ELECTRODE LEAD, SACRAL NERVE STIMULATOR;  Surgeon: Bandar Garcia MD;  [facility];  Service: Urology;  Laterality: N/A;    SKIN TAG REMOVAL N/A 10/14/2022    Procedure: REMOVAL, SKIN TAGS;  Surgeon: PALLAVI Oseguera MD;  Location: [facility];  Service: General;  Laterality: N/A;    TRANSFORAMINAL EPIDURAL INJECTION OF STEROID Left 02/15/2021    Procedure: LUMBAR TRANSFORAMINAL LEFT L5 AND S1 DIRECT REFERRAL;  Surgeon: Marquez Nesbitt MD;  Location: [facility] T;  Service: Pain Management;  Laterality: Left;  NEEDS CONSENT, PT REQUESTING IV SEDATION       SOCIAL HISTORY:  Social History     Socioeconomic History    Marital status:    Occupational History     Employer: OTHER   Tobacco Use    Smoking status: Former    Smokeless tobacco: Never    Tobacco comments:     14 years old-18 years old   Substance and Sexual Activity    Alcohol use: No    Drug use: No    Sexual activity: Yes     Partners: Male     Birth control/protection: See Surgical Hx,  Other-see comments     Comment: Hysterectomy   Other Topics Concern    Are you pregnant or think you may be? No    Breast-feeding No     Social Determinants of Health     Financial Resource Strain: Medium Risk (8/8/2023)    Overall Financial Resource Strain (CARDIA)     Difficulty of Paying Living Expenses: Somewhat hard   Food Insecurity: Unknown (8/8/2023)    Hunger Vital Sign     Worried About Running Out of Food in the Last Year: Patient refused     Ran Out of Food in the Last Year: Never true   Transportation Needs: Unmet Transportation Needs (8/8/2023)    PRAPARE - Transportation     Lack of Transportation (Medical): Yes     Lack of Transportation (Non-Medical): No   Physical Activity: Unknown (8/8/2023)    Exercise Vital Sign     Days of Exercise per Week: 0 days     Minutes of Exercise per Session: Patient refused   Stress: Stress Concern Present (8/8/2023)    [employer] - Occupational Stress Questionnaire     Feeling of Stress : Very much   Social Connections: Unknown (8/8/2023)    Social Connection and Isolation Panel [NHANES]     Frequency of Communication with Friends and Family: More than three times a week     Frequency of Social Gatherings with Friends and Family: Never     Active Member of Clubs or Organizations: No     Attends Club or Organization Meetings: Never     Marital Status:    Housing Stability: Unknown (8/8/2023)    Housing Stability Vital Sign     Unable to Pay for Housing in the Last Year: No     Unstable Housing in the Last Year: No       FAMILY HISTORY:  Family History   Problem Relation Age of Onset    Irritable bowel syndrome Mother     Arthritis Mother     Hypertension Mother     Irritable bowel syndrome Sister     Lupus Sister     Rheum arthritis Sister     Fibromyalgia Sister     Irritable bowel syndrome Sister     Celiac disease Neg Hx     Cirrhosis Neg Hx     Colon cancer Neg Hx     Colon polyps Neg Hx     Crohn's disease Neg Hx     Cystic  fibrosis Neg Hx     Esophageal cancer Neg Hx     Hemochromatosis Neg Hx     Inflammatory bowel disease Neg Hx     Liver cancer Neg Hx     Liver disease Neg Hx     Rectal cancer Neg Hx     Stomach cancer Neg Hx     Ulcerative colitis Neg Hx     Boris's disease Neg Hx     Melanoma Neg Hx     Amblyopia Neg Hx     Blindness Neg Hx     Cataracts Neg Hx     Glaucoma Neg Hx     Macular degeneration Neg Hx     Retinal detachment Neg Hx     Strabismus Neg Hx        ALLERGIES AND MEDICATIONS: updated and reviewed.  Review of patient's allergies indicates:   Allergen Reactions    Cephalexin Itching    Cephalosporins     Zofran [ondansetron hcl (pf)] Hives    Penicillins Rash     Current Outpatient Medications   Medication Sig Dispense Refill    alosetron (LOTRONEX) 0.5 MG tablet TAKE 1 TABLET(0.5 MG) BY MOUTH DAILY AS NEEDED 30 tablet 0    amitriptyline (ELAVIL) 10 MG tablet TAKE 1 TABLET(10 MG) BY MOUTH EVERY NIGHT AS NEEDED 90 tablet 3    budesonide (ENTOCORT EC) 3 mg capsule TAKE 3 CAPSULES(9 MG) BY MOUTH EVERY DAY FOR 10 DAYS 30 capsule 3    butalbitaL-acetaminophen  mg Tab TAKE 1 TABLET BY MOUTH EVERY 4 HOURS 60 tablet 3    calcium glycerophosphate (PRELIEF) 65 mg Tab Take 2 tablets by mouth before meals and at bedtime as needed. (Patient taking differently: Take 2 tablets by mouth 3 (three) times daily with meals.) 100 each prn    cetirizine (ZYRTEC) 10 MG tablet Take 1 tablet (10 mg total) by mouth once daily. 120 tablet 2    clobetasoL (TEMOVATE) 0.05 % external solution Apply topically 2 (two) times daily. Use to affected areas for up to 2 weeks then take a 1 week break or decrease to 3 times weekly. Do not apply to groin or face. Use on scalp 50 mL 2    clonazePAM (KLONOPIN) 1 MG tablet Take 1 tablet (1 mg total) by mouth 2 (two) times daily. 60 tablet 0    cyclobenzaprine (FLEXERIL) 10 MG tablet Take 1 tablet (10 mg total) by mouth 3 (three) times daily as needed for Muscle spasms. 90 tablet 6    dicyclomine  (BENTYL) 20 mg tablet Take 20 mg by mouth daily as needed.      famotidine (PEPCID) 40 MG tablet TAKE 1 TABLET(40 MG) BY MOUTH EVERY NIGHT AS NEEDED FOR HEARTBURN 30 tablet 5    fluticasone propionate (FLONASE) 50 mcg/actuation nasal spray 2 sprays (100 mcg total) by Each Nostril route 2 (two) times daily. 31.6 mL 1    hydrOXYzine pamoate (VISTARIL) 25 MG Cap Take 1 capsule (25 mg total) by mouth 3 (three) times daily. 90 capsule 1    ketoconazole (NIZORAL) 2 % shampoo Apply topically 3 (three) times a week. Leave on scalp for 5-10 minutes then wash off 240 mL 3    lamoTRIgine (LAMICTAL) 200 MG tablet Take 1 tablet (200 mg total) by mouth once daily. 30 tablet 11    levothyroxine (SYNTHROID) 50 MCG tablet TAKE 1 TABLET(50 MCG) BY MOUTH EVERY DAY 30 tablet 6    lifitegrast (XIIDRA) 5 % Dpet Apply 1 drop to eye every 12 (twelve) hours. 180 each 4    methen-m.blue-s.phos-phsal-hyo (URIBEL) 118-10-40.8-36 mg Cap Take 1 capsule by mouth 3 (three) times daily. 45 capsule 3    oxybutynin (DITROPAN) 5 MG Tab TAKE 1 TABLET(5 MG) BY MOUTH THREE TIMES DAILY 90 tablet 3    predniSONE (DELTASONE) 2.5 MG tablet 1 to 3 tabs PO daily prn for arthritis flare 90 tablet 0    pregabalin (LYRICA) 150 MG capsule Take 1 capsule (150 mg total) by mouth 3 (three) times daily. 90 capsule 5    RABEprazole (ACIPHEX) 20 mg tablet Take 1 tablet (20 mg total) by mouth once daily. 30 tablet 11    traMADoL (ULTRAM) 50 mg tablet TAKE 1 TABLET(50 MG) BY MOUTH EVERY 6 HOURS 90 tablet 3    traZODone (DESYREL) 50 MG tablet Take 1 tablet (50 mg total) by mouth nightly as needed for Insomnia. 30 tablet 2    ustekinumab (STELARA) 45 mg/0.5 mL Syrg syringe Inject 0.5 mLs (45 mg total) into the skin every 28 days. 0.5 mL 1    ustekinumab (STELARA) 45 mg/0.5 mL Syrg syringe Inject 0.5 mLs (45 mg total) into the skin every 12 weeks. 0.5 mL 3    venlafaxine (EFFEXOR XR) 150 MG Cp24 Take 2 capsules (300 mg total) by mouth once daily. 60 capsule 1    gabapentin  "(NEURONTIN) 800 MG tablet Take 1 tablet (800 mg total) by mouth 3 (three) times daily. (Patient not taking: Reported on 9/6/2023) 90 tablet 3    hydrocortisone 2.5 % cream Apply to affected areas twice a day when eczema is moderate. (Patient not taking: Reported on 9/6/2023) 453.6 g 1     No current facility-administered medications for this visit.       ROS  Review of Systems   Constitutional:  Negative for fever and weight loss.   HENT:  Negative for hearing loss.    Eyes:  Negative for discharge.   Respiratory:  Negative for cough, shortness of breath and wheezing.    Cardiovascular:  Negative for chest pain and palpitations.   Gastrointestinal:  Negative for abdominal pain, blood in stool, constipation, diarrhea, nausea and vomiting.   Genitourinary:  Negative for dysuria and hematuria.   Musculoskeletal:  Negative for back pain, joint pain and neck pain.   Skin:  Negative for rash.   Neurological:  Negative for dizziness, weakness and headaches.   Endo/Heme/Allergies:  Negative for polydipsia.   Psychiatric/Behavioral:  Negative for depression. The patient is not nervous/anxious.          OBJECTIVE     Physical Exam  Vitals:    09/06/23 1507   BP: 136/72   Pulse: 110    Body mass index is 33.48 kg/m².  Weight: 75.2 kg (165 lb 12.6 oz)   Height: 4' 11" (149.9 cm)     Physical Exam  HENT:      Head: Normocephalic and atraumatic.      Nose: Nose normal.      Mouth/Throat:      Mouth: Mucous membranes are moist.      Pharynx: Oropharynx is clear.   Eyes:      Extraocular Movements: Extraocular movements intact.      Conjunctiva/sclera: Conjunctivae normal.      Pupils: Pupils are equal, round, and reactive to light.   Pulmonary:      Effort: Pulmonary effort is normal.   Musculoskeletal:         General: No swelling. Normal range of motion.      Cervical back: Normal range of motion.      Right lower leg: No edema.      Left lower leg: No edema.   Skin:     General: Skin is warm.      Findings: No lesion or rash. "   Neurological:      General: No focal deficit present.      Mental Status: She is alert and oriented to person, place, and time.      Motor: No weakness.   Psychiatric:         Mood and Affect: Mood normal.         Thought Content: Thought content normal.               ASSESSMENT     53 y.o. female with     1. Irritable bowel syndrome with diarrhea    2. Fibromyalgia    3. Generalized anxiety disorder          PLAN:     1. Irritable bowel syndrome with diarrhea  Overview:  GI-Dr. Jenkins, last seen 4/17/2023  Fiber and Miralax started by GI to titrate to correct consistency.  PPI daily        2. Fibromyalgia  Stable on medications, continue regimen    3. Generalized anxiety disorder  Stable on medications, continue regimen          RTC in 6 months    Leti Howe MD

## 2023-09-11 ENCOUNTER — TELEPHONE (OUTPATIENT)
Dept: NEUROLOGY | Facility: CLINIC | Age: 53
End: 2023-09-11
Payer: COMMERCIAL

## 2023-09-11 ENCOUNTER — PATIENT MESSAGE (OUTPATIENT)
Dept: NEUROLOGY | Facility: CLINIC | Age: 53
End: 2023-09-11
Payer: COMMERCIAL

## 2023-09-11 DIAGNOSIS — R39.89 BLADDER PAIN: ICD-10-CM

## 2023-09-11 RX ORDER — PHENAZOPYRIDINE HYDROCHLORIDE 200 MG/1
200 TABLET, FILM COATED ORAL
Qty: 60 TABLET | Refills: 3 | Status: SHIPPED | OUTPATIENT
Start: 2023-09-11 | End: 2023-12-28

## 2023-09-12 ENCOUNTER — TELEPHONE (OUTPATIENT)
Dept: RHEUMATOLOGY | Facility: CLINIC | Age: 53
End: 2023-09-12
Payer: COMMERCIAL

## 2023-09-12 ENCOUNTER — TELEPHONE (OUTPATIENT)
Dept: RHEUMATOLOGY | Facility: CLINIC | Age: 53
End: 2023-09-12

## 2023-09-12 RX ORDER — LEVOTHYROXINE SODIUM 50 UG/1
TABLET ORAL
Qty: 34 TABLET | Refills: 2 | Status: SHIPPED | OUTPATIENT
Start: 2023-09-12 | End: 2024-01-09 | Stop reason: SDUPTHER

## 2023-09-12 NOTE — TELEPHONE ENCOUNTER
Pt will need an office visit before I order an infusion. I will cancel Stelara and we will reach back out after her next office visit.

## 2023-09-12 NOTE — TELEPHONE ENCOUNTER
Please let the patient know I have applied for 3 different medications for psoriatic arthritis and they have been denied by her insurance. There are other medications available (infusion treatments), but I would prefer her to have an office visit to discuss this before I order them. She is scheduled with Dr. Samaniego in November.    Also, in regards to her other email with complaint of restless legs. She would need an appointment for evaluation in order to prescribe treatment.     You can offer her a sooner visit with me if there is one available or she can see Dr. Samaniego in November.

## 2023-09-12 NOTE — TELEPHONE ENCOUNTER
----- Message from Dania Frausto, PharmD sent at 9/12/2023  1:28 PM CDT -----  Regarding: Stephane Winter,    Ms. Simons's prior authorization for Stelara was denied.  The medication is a plan/benefit exclusion.   Her therapy may have to be changed to an IV infusion instead, such as Orencia or Simponi since several drugs have been plan/benefit exclusions.  How would you like to proceed?     Thank you,  Dania Frausto, PharmD  Clinical Pharmacist    [facility]  [address] [place] [address]  P [phone]  F [phone]

## 2023-09-12 NOTE — TELEPHONE ENCOUNTER
----- Message from Dania Frausto, PharmD sent at 9/12/2023  1:28 PM CDT -----  Regarding: Stepahne Winter,    Ms. Simons's prior authorization for Stelara was denied.  The medication is a plan/benefit exclusion.   Her therapy may have to be changed to an IV infusion instead, such as Orencia or Simponi since several drugs have been plan/benefit exclusions.  How would you like to proceed?     Thank you,  Dania Frausto, PharmD  Clinical Pharmacist    [facility]  [address] [place] [address]  P [phone]  F [phone]

## 2023-09-13 ENCOUNTER — PATIENT MESSAGE (OUTPATIENT)
Dept: PSYCHOLOGY | Facility: CLINIC | Age: 53
End: 2023-09-13
Payer: COMMERCIAL

## 2023-09-13 ENCOUNTER — OFFICE VISIT (OUTPATIENT)
Dept: PSYCHOLOGY | Facility: CLINIC | Age: 53
End: 2023-09-13
Payer: COMMERCIAL

## 2023-09-13 ENCOUNTER — PATIENT MESSAGE (OUTPATIENT)
Dept: NEUROLOGY | Facility: CLINIC | Age: 53
End: 2023-09-13
Payer: COMMERCIAL

## 2023-09-13 DIAGNOSIS — F60.89 CLUSTER B PERSONALITY DISORDER: ICD-10-CM

## 2023-09-13 DIAGNOSIS — F41.1 GENERALIZED ANXIETY DISORDER: Primary | ICD-10-CM

## 2023-09-13 DIAGNOSIS — F33.1 MAJOR DEPRESSIVE DISORDER, RECURRENT EPISODE, MODERATE: ICD-10-CM

## 2023-09-13 DIAGNOSIS — F43.10 PTSD (POST-TRAUMATIC STRESS DISORDER): ICD-10-CM

## 2023-09-13 PROCEDURE — 99215 PR OFFICE/OUTPT VISIT, EST, LEVL V, 40-54 MIN: ICD-10-PCS | Mod: 95,,, | Performed by: NURSE PRACTITIONER

## 2023-09-13 PROCEDURE — 99215 OFFICE O/P EST HI 40 MIN: CPT | Mod: 95,,, | Performed by: NURSE PRACTITIONER

## 2023-09-13 RX ORDER — VENLAFAXINE HYDROCHLORIDE 75 MG/1
225 CAPSULE, EXTENDED RELEASE ORAL DAILY
Qty: 90 CAPSULE | Refills: 1 | Status: SHIPPED | OUTPATIENT
Start: 2023-09-13 | End: 2023-11-03 | Stop reason: ALTCHOICE

## 2023-09-13 NOTE — PROGRESS NOTES
The patient location is: [place], [place]  The chief complaint leading to consultation is: Anxiety, Side effects    Visit type: audiovisual    Face to Face time with patient: 20  30 minutes of total time spent on the encounter, which includes face to face time and non-face to face time preparing to see the patient (eg, review of tests), Obtaining and/or reviewing separately obtained history, Documenting clinical information in the electronic or other health record, Independently interpreting results (not separately reported) and communicating results to the patient/family/caregiver, or Care coordination (not separately reported).     Each patient to whom he or she provides medical services by telemedicine is:  (1) informed of the relationship between the physician and patient and the respective role of any other health care provider with respect to management of the patient; and (2) notified that he or she may decline to receive medical services by telemedicine and may withdraw from such care at any time.    Notes:     Outpatient Psychiatry Follow-Up Visit (PHMNP-BC)    09/15/2023    Clinical Status of Patient:  Outpatient (Ambulatory)    Chief Complaint:  Marilee Simons is a 53 y.o. female who presents today for follow-up of depression, anxiety, and PTSD .  Met with patient.     Current Medications:   Lamictal 200 mg Daily  Trazodone 50 mg at bedtime PRN  Effexor  mg Daily  Klonopin 1 mg Twice daily  Lyrica 150 mg TID - (Prescribed by rheumatologist)  Amitriptyline 10 mg at bedtime for Cystitis - (Prescribed by urologist)  Tramadol 50 mg PRN (prescribed by Rheumatologist)  Hydroxyzine     Past Medication Trials:  Hydroxyzine   Prozac  Cymbalta- situational depression     Interval History and Content of Current Session:  Patient seen and chart reviewed. Last seen on 08/09/23    Patient reports suffering from SLE, RA, and Hashimoto's Thyroiditis.    Patient was recently changed from gabapentin to lyrica 4 weeks  before to increasing effexor. After increasing effexor she began experiencing headaches and restless leg syndrome. Then fell in her home 2 days later and was sent to the ER where they did an MRI of her brain. She reports her MRI showing microscopic brain bleeds that the CLARENCE told her could lead to dementia.  She dropped down to the 150 mg of Effexor mg Daily which mildly stopped DAILY headaches and RLS, she has also switched to taking the medication at night. She reports her mood has been worse, she feels down, short tempered increased     Reports her  has not been helping take care of the house because he wants to take care of his mother after his father passed. She feels her depression stems from situational from him.     Denies SI/HI/AVH. Denies adverse effects from medication  Pt reports taking medications as prescribed and behaving appropriately during interview today.    Pt appears:  well    Mood:   [place] talkative    Sleep:   Not well. Nightmares almost every night. Reports sleeping 6 hrs/night    Appetite:  Normal    Self Rates Depression: 10/10  Self Rated Anxiety:  10/10      Psychotherapy:  Target symptoms: depression, distractability, anxiety   Why chosen therapy is appropriate versus another modality: relevant to diagnosis  Outcome monitoring methods: self-report  Therapeutic intervention type: supportive psychotherapy  Topics discussed/themes: relationships difficulties, illness/death of a loved one, symptom recognition  The patient's response to the intervention is accepting. The patient's progress toward treatment goals is fair.   Duration of intervention: 20 minutes.    Review of Systems   PSYCHIATRIC: Pertinant items are noted in the narrative.        Past Medical, Family and Social History: The patient's past medical, family and social history have been reviewed and updated as appropriate within the electronic medical record - see encounter notes.    Compliance: no    Side effects:  None    Risk Parameters:  Patient reports no suicidal ideation  Patient reports no homicidal ideation  Patient reports no self-injurious behavior  Patient reports no violent behavior    Exam (detailed: at least 9 elements; comprehensive: all 15 elements)   Constitutional  Vitals:  Most recent vital signs, dated less than 90 days prior to this appointment, were reviewed.   There were no vitals filed for this visit.     General:  unremarkable, age appropriate     Musculoskeletal  Muscle Strength/Tone:  no spasicity, no rigidity, no cogwheeling, no flaccidity, no paratonia, no dyskinesia, no dystonia, no tremor, no tic, no choreoathetosis, no atrophy   Gait & Station:  non-ataxic     Psychiatric  Speech:  no latency; no press   Mood & Affect:  depressed, sad  congruent and appropriate, labile, sad   Thought Process:  normal and logical   Associations:  intact, tangential   Thought Content:  normal, no suicidality, no homicidality, delusions, or paranoia   Insight:  intact, has awareness of illness   Judgement: behavior is adequate to circumstances, age appropriate   Orientation:  grossly intact, person, place, situation, time/date, day of week   Memory: intact for content of interview, grossly intact   Language: grossly intact, able to name, able to repeat   Attention Span & Concentration:  able to focus, completed tasks   Fund of Knowledge:  intact and appropriate to age and level of education, familiar with aspects of current personal life     Assessment and Diagnosis   Status/Progress: Based on the examination today, the patient's problem(s) is/are inadequately controlled.  New problems have not been presented today.   Co-morbidities, Diagnostic uncertainty, and Lack of compliance are not complicating management of the primary condition.  There are no active rule-out diagnoses for this patient at this time.     General Impression: Marilee Simons, a 53 y.o. female, for follow up PTSD, MDD, CHANTE, Cluster B Personality  Disorder r/o Bipolar Disorder.  Presents 8/17/23- Increased significant anxiety and depression, with PTSD flashback. Increase Effexor to 300 mg Daily. Start hydroxyzine 25 mg TID PRN.  Presents 9/13/23 - Patient appears to be in anxious crisis, Increase Effexor to 225 mg Daily, IOP referral.     ICD-10-CM ICD-9-CM    1. Generalized anxiety disorder  F41.1 300.02 venlafaxine (EFFEXOR-XR) 75 MG 24 hr capsule      2. Cluster B personality disorder  F60.89 301.89       3. Major depressive disorder, recurrent episode, moderate  F33.1 296.32       4. PTSD (post-traumatic stress disorder)  F43.10 309.81           Intervention/Counseling/Treatment Plan   Medication Management: The risks and benefits of medication were discussed with the patient.  Referrals for DBT or CBT sent to patients  IOP referral   Continue Lamictal 200 mg Daily  Continue Trazodone 50 mg at bedtime PRN  Increase Effexor XR to 225 mg mg Daily  Continue Hydroxyzine 25 mg TID PRN for anxiety  Continue Klonopin 1 mg Twice daily for Anxiety   Discussed diagnosis, risk and benefits of proposed treatment above vs alternative treatment vs no treatment, and potential side effects of these treatments, and the inherent unpredictability of individual responses to these treatments. The patient expresses understanding and gives informed consent to pursue treatment at this time, believing that the potential benefits outweigh the potential risks. Patient has no other questions. Risks/adverse effects at this time include but are not limited to: GI side effects, sexual dysfunction, activation vs sedation, triggering of suicidal ideation, and serotonin syndrome.   Patient voices understanding and agreement with this plan  Provided crisis numbers  Encouraged patient to keep future appointments  Instruct patient to call or message with questions  In the event of an emergency, including suicidal ideation, patient was advised to go to the emergency room      Return to  Clinic: 1 month    Total time: 40 minutes with more than 50% of time spent counseling and/or coordinating care.  (which included pts differential diagnosis and prognosis for psychiatric conditions, risks, benefits of treatments, instructions and adherence to treatment plan, risk reduction, reviewing current psychiatric medication regimen, medical problems and social stressors. In addition to possible discussion with other healthcare provider/s)    Melanie Gant DNP, PMNEELP, FNP

## 2023-09-14 ENCOUNTER — PATIENT MESSAGE (OUTPATIENT)
Dept: NEUROLOGY | Facility: CLINIC | Age: 53
End: 2023-09-14
Payer: COMMERCIAL

## 2023-09-14 ENCOUNTER — PATIENT MESSAGE (OUTPATIENT)
Dept: PSYCHIATRY | Facility: CLINIC | Age: 53
End: 2023-09-14
Payer: COMMERCIAL

## 2023-09-15 ENCOUNTER — TELEPHONE (OUTPATIENT)
Dept: NEUROLOGY | Facility: CLINIC | Age: 53
End: 2023-09-15
Payer: COMMERCIAL

## 2023-09-15 ENCOUNTER — PATIENT MESSAGE (OUTPATIENT)
Dept: PSYCHIATRY | Facility: CLINIC | Age: 53
End: 2023-09-15
Payer: COMMERCIAL

## 2023-09-15 NOTE — TELEPHONE ENCOUNTER
----- Message from Adeline Castellon sent at 9/15/2023  2:59 PM CDT -----  Regarding: appt access  Contact: [phone]  TROY GUNN calling regarding Appointment Access (message) for # headaches. Pt would like a sooner appt before 12/21/23. She is a pt of Birdie Restrepo, as Birdie would like for her to be seen by a MD.

## 2023-09-20 ENCOUNTER — PATIENT MESSAGE (OUTPATIENT)
Dept: NEUROLOGY | Facility: CLINIC | Age: 53
End: 2023-09-20
Payer: COMMERCIAL

## 2023-09-21 ENCOUNTER — TELEPHONE (OUTPATIENT)
Dept: NEUROLOGY | Facility: CLINIC | Age: 53
End: 2023-09-21
Payer: COMMERCIAL

## 2023-09-21 ENCOUNTER — DOCUMENTATION ONLY (OUTPATIENT)
Dept: PSYCHIATRY | Facility: CLINIC | Age: 53
End: 2023-09-21
Payer: COMMERCIAL

## 2023-09-21 NOTE — TELEPHONE ENCOUNTER
----- Message from Tyson Conde MD sent at 9/21/2023 11:53 AM CDT -----  Edmond Lopes,  Thanks for reaching out. Apologize for the delay, I was out of the office for a few days. I reviewed the chart and imaging. This is a mild TBI best served by Patricio Solomon or Elle. Hope all is well.  kS

## 2023-09-22 ENCOUNTER — PATIENT MESSAGE (OUTPATIENT)
Dept: GASTROENTEROLOGY | Facility: CLINIC | Age: 53
End: 2023-09-22
Payer: COMMERCIAL

## 2023-09-22 ENCOUNTER — PATIENT MESSAGE (OUTPATIENT)
Dept: PSYCHIATRY | Facility: CLINIC | Age: 53
End: 2023-09-22
Payer: COMMERCIAL

## 2023-09-22 ENCOUNTER — TELEPHONE (OUTPATIENT)
Dept: GASTROENTEROLOGY | Facility: CLINIC | Age: 53
End: 2023-09-22
Payer: COMMERCIAL

## 2023-09-25 ENCOUNTER — PATIENT MESSAGE (OUTPATIENT)
Dept: OPTOMETRY | Facility: CLINIC | Age: 53
End: 2023-09-25
Payer: COMMERCIAL

## 2023-09-26 ENCOUNTER — HOSPITAL ENCOUNTER (OUTPATIENT)
Dept: CARDIOLOGY | Facility: HOSPITAL | Age: 53
Discharge: HOME | End: 2023-09-26
Attending: INTERNAL MEDICINE
Payer: COMMERCIAL

## 2023-09-26 ENCOUNTER — HOSPITAL ENCOUNTER (OUTPATIENT)
Dept: RADIOLOGY | Facility: HOSPITAL | Age: 53
Discharge: HOME | End: 2023-09-26
Attending: PHYSICIAN ASSISTANT
Payer: COMMERCIAL

## 2023-09-26 DIAGNOSIS — I20.89 ANGINAL EQUIVALENT: ICD-10-CM

## 2023-09-26 DIAGNOSIS — Z78.0 ASYMPTOMATIC POSTMENOPAUSAL STATE: ICD-10-CM

## 2023-09-26 LAB
ASCENDING AORTA: 2.8 CM
CV ECHO LV RWT: 0.45 CM
CV STRESS BASE HR: 80 BPM
DIASTOLIC BLOOD PRESSURE: 66 MMHG
DOP CALC LVOT AREA: 3.8 CM2
DOP CALC LVOT DIAMETER: 2.21 CM
DOP CALC LVOT PEAK VEL: 1.26 M/S
DOP CALC LVOT STROKE VOLUME: 97.42 CM3
DOP CALCLVOT PEAK VEL VTI: 25.41 CM
E WAVE DECELERATION TIME: 188.23 MSEC
E/A RATIO: 1.21
E/E' RATIO: 8.89 M/S
ECHO LV POSTERIOR WALL: 0.96 CM (ref 0.6–1.1)
FRACTIONAL SHORTENING: 28 % (ref 28–44)
INTERVENTRICULAR SEPTUM: 0.99 CM (ref 0.6–1.1)
LA MAJOR: 4.32 CM
LA MINOR: 4.32 CM
LA WIDTH: 3.56 CM
LEFT ATRIUM SIZE: 3.29 CM
LEFT ATRIUM VOLUME MOD: 34.81 CM3
LEFT ATRIUM VOLUME: 43.01 CM3
LEFT INTERNAL DIMENSION IN SYSTOLE: 3.11 CM (ref 2.1–4)
LEFT VENTRICLE DIASTOLIC VOLUME: 83.21 ML
LEFT VENTRICLE SYSTOLIC VOLUME: 38.31 ML
LEFT VENTRICULAR INTERNAL DIMENSION IN DIASTOLE: 4.3 CM (ref 3.5–6)
LEFT VENTRICULAR MASS: 137.58 G
LV LATERAL E/E' RATIO: 7.27 M/S
LV SEPTAL E/E' RATIO: 11.43 M/S
MV A" WAVE DURATION": 10.28 MSEC
MV PEAK A VEL: 0.66 M/S
MV PEAK E VEL: 0.8 M/S
MV STENOSIS PRESSURE HALF TIME: 54.59 MS
MV VALVE AREA P 1/2 METHOD: 4.03 CM2
OHS CV CPX 1 MINUTE RECOVERY HEART RATE: 117 BPM
OHS CV CPX 85 PERCENT MAX PREDICTED HEART RATE MALE: 134
OHS CV CPX ESTIMATED METS: 7
OHS CV CPX MAX PREDICTED HEART RATE: 158
OHS CV CPX PATIENT IS FEMALE: 1
OHS CV CPX PATIENT IS MALE: 0
OHS CV CPX PEAK DIASTOLIC BLOOD PRESSURE: 43 MMHG
OHS CV CPX PEAK HEAR RATE: 137 BPM
OHS CV CPX PEAK RATE PRESSURE PRODUCT: NORMAL
OHS CV CPX PEAK SYSTOLIC BLOOD PRESSURE: 120 MMHG
OHS CV CPX PERCENT MAX PREDICTED HEART RATE ACHIEVED: 87
OHS CV CPX RATE PRESSURE PRODUCT PRESENTING: 8960
PULM VEIN S/D RATIO: 0.93
PV PEAK D VEL: 0.43 M/S
PV PEAK S VEL: 0.4 M/S
RA MAJOR: 4.01 CM
RA PRESSURE ESTIMATED: 3 MMHG
RA WIDTH: 2.8 CM
RIGHT VENTRICULAR END-DIASTOLIC DIMENSION: 2.78 CM
SINUS: 2.46 CM
STJ: 2.51 CM
STRESS ECHO POST EXERCISE DUR MIN: 5 MINUTES
STRESS ECHO POST EXERCISE DUR SEC: 37 SECONDS
SYSTOLIC BLOOD PRESSURE: 112 MMHG
TDI LATERAL: 0.11 M/S
TDI SEPTAL: 0.07 M/S
TDI: 0.09 M/S
TRICUSPID ANNULAR PLANE SYSTOLIC EXCURSION: 1.62 CM

## 2023-09-26 PROCEDURE — 93351 STRESS TTE COMPLETE: CPT

## 2023-09-26 PROCEDURE — 77080 DXA BONE DENSITY AXIAL SKELETON 1 OR MORE SITES: ICD-10-PCS | Mod: 26,,, | Performed by: INTERNAL MEDICINE

## 2023-09-26 PROCEDURE — 93351 STRESS TTE COMPLETE: CPT | Mod: 26,,, | Performed by: INTERNAL MEDICINE

## 2023-09-26 PROCEDURE — 77080 DXA BONE DENSITY AXIAL: CPT | Mod: TC

## 2023-09-26 PROCEDURE — 77080 DXA BONE DENSITY AXIAL: CPT | Mod: 26,,, | Performed by: INTERNAL MEDICINE

## 2023-09-26 PROCEDURE — 93351 STRESS ECHO (CUPID ONLY): ICD-10-PCS | Mod: 26,,, | Performed by: INTERNAL MEDICINE

## 2023-09-28 ENCOUNTER — PATIENT MESSAGE (OUTPATIENT)
Dept: HEMATOLOGY/ONCOLOGY | Facility: CLINIC | Age: 53
End: 2023-09-28
Payer: COMMERCIAL

## 2023-09-28 ENCOUNTER — TELEPHONE (OUTPATIENT)
Dept: HEMATOLOGY/ONCOLOGY | Facility: CLINIC | Age: 53
End: 2023-09-28
Payer: COMMERCIAL

## 2023-09-30 DIAGNOSIS — S06.0XAA CONCUSSION WITH UNKNOWN LOSS OF CONSCIOUSNESS STATUS, INITIAL ENCOUNTER: Primary | ICD-10-CM

## 2023-10-05 ENCOUNTER — OFFICE VISIT (OUTPATIENT)
Dept: PSYCHIATRY | Facility: CLINIC | Age: 53
End: 2023-10-05
Payer: COMMERCIAL

## 2023-10-05 ENCOUNTER — PATIENT MESSAGE (OUTPATIENT)
Dept: PSYCHIATRY | Facility: CLINIC | Age: 53
End: 2023-10-05
Payer: COMMERCIAL

## 2023-10-05 DIAGNOSIS — F41.1 GENERALIZED ANXIETY DISORDER: ICD-10-CM

## 2023-10-05 DIAGNOSIS — F43.10 PTSD (POST-TRAUMATIC STRESS DISORDER): Primary | ICD-10-CM

## 2023-10-05 PROCEDURE — 90834 PR PSYCHOTHERAPY W/PATIENT, 45 MIN: ICD-10-PCS | Mod: 95,,, | Performed by: SOCIAL WORKER

## 2023-10-05 PROCEDURE — 90834 PSYTX W PT 45 MINUTES: CPT | Mod: 95,,, | Performed by: SOCIAL WORKER

## 2023-10-06 RX ORDER — CLONAZEPAM 1 MG/1
1 TABLET ORAL 2 TIMES DAILY
Qty: 28 TABLET | Refills: 0 | Status: SHIPPED | OUTPATIENT
Start: 2023-10-06 | End: 2023-11-03

## 2023-10-06 NOTE — TELEPHONE ENCOUNTER
14 day supply of Klonopin 1 mg BID,  E-prescribed to the patient. Patient will need an appointment for follow up care.

## 2023-10-09 ENCOUNTER — PATIENT MESSAGE (OUTPATIENT)
Dept: GASTROENTEROLOGY | Facility: CLINIC | Age: 53
End: 2023-10-09
Payer: COMMERCIAL
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
## 2023-10-26 ENCOUNTER — PATIENT MESSAGE (OUTPATIENT)
Dept: NEUROLOGY | Facility: CLINIC | Age: 53
End: 2023-10-26
Payer: COMMERCIAL

## 2023-11-01 ENCOUNTER — PATIENT MESSAGE (OUTPATIENT)
Dept: DERMATOLOGY | Facility: CLINIC | Age: 53
End: 2023-11-01
Payer: COMMERCIAL

## 2023-11-03 ENCOUNTER — PATIENT MESSAGE (OUTPATIENT)
Dept: PSYCHIATRY | Facility: CLINIC | Age: 53
End: 2023-11-03

## 2023-11-03 ENCOUNTER — OFFICE VISIT (OUTPATIENT)
Dept: PSYCHIATRY | Facility: CLINIC | Age: 53
End: 2023-11-03
Payer: COMMERCIAL

## 2023-11-03 DIAGNOSIS — F33.1 MAJOR DEPRESSIVE DISORDER, RECURRENT EPISODE, MODERATE: ICD-10-CM

## 2023-11-03 DIAGNOSIS — F41.1 GENERALIZED ANXIETY DISORDER: Primary | ICD-10-CM

## 2023-11-03 PROCEDURE — 99215 OFFICE O/P EST HI 40 MIN: CPT | Mod: 95,,, | Performed by: NURSE PRACTITIONER

## 2023-11-03 PROCEDURE — 99215 PR OFFICE/OUTPT VISIT, EST, LEVL V, 40-54 MIN: ICD-10-PCS | Mod: 95,,, | Performed by: NURSE PRACTITIONER

## 2023-11-03 RX ORDER — CLONAZEPAM 0.5 MG/1
0.5 TABLET ORAL 2 TIMES DAILY PRN
Qty: 30 TABLET | Refills: 0 | Status: SHIPPED | OUTPATIENT
Start: 2023-11-03 | End: 2023-12-04 | Stop reason: SDUPTHER

## 2023-11-03 RX ORDER — DULOXETIN HYDROCHLORIDE 30 MG/1
30 CAPSULE, DELAYED RELEASE ORAL DAILY
Qty: 30 CAPSULE | Refills: 1 | Status: SHIPPED | OUTPATIENT
Start: 2023-11-03 | End: 2023-12-08 | Stop reason: ALTCHOICE

## 2023-11-03 RX ORDER — HYDROXYZINE PAMOATE 25 MG/1
25 CAPSULE ORAL 3 TIMES DAILY
Qty: 90 CAPSULE | Refills: 1 | Status: SHIPPED | OUTPATIENT
Start: 2023-11-03 | End: 2024-01-02

## 2023-11-03 NOTE — PROGRESS NOTES
The patient location is: [place], [place]  The chief complaint leading to consultation is: Anxiety, Side effects    Visit type: audiovisual    Face to Face time with patient: 20  30 minutes of total time spent on the encounter, which includes face to face time and non-face to face time preparing to see the patient (eg, review of tests), Obtaining and/or reviewing separately obtained history, Documenting clinical information in the electronic or other health record, Independently interpreting results (not separately reported) and communicating results to the patient/family/caregiver, or Care coordination (not separately reported).     Each patient to whom he or she provides medical services by telemedicine is:  (1) informed of the relationship between the physician and patient and the respective role of any other health care provider with respect to management of the patient; and (2) notified that he or she may decline to receive medical services by telemedicine and may withdraw from such care at any time.    Notes:     Outpatient Psychiatry Follow-Up Visit (PHMNP-BC)    11/03/2023    Clinical Status of Patient:  Outpatient (Ambulatory)    Chief Complaint:  Marilee Simons is a 53 y.o. female who presents today for follow-up of depression, anxiety, and PTSD .  Met with patient.     Current Medications:   Lamictal 200 mg Daily  Trazodone 50 mg at bedtime PRN  Effexor  mg Daily  Klonopin 1 mg Twice daily  Lyrica 150 mg TID - (Prescribed by rheumatologist)  Amitriptyline 10 mg at bedtime for Cystitis - (Prescribed by urologist)  Tramadol 50 mg PRN (prescribed by Rheumatologist)  Hydroxyzine 25 mg TID PRN for anxiety    Past Medication Trials:  Hydroxyzine   Prozac  Cymbalta- situational depression     Interval History and Content of Current Session:  Patient seen and chart reviewed. Last seen on 08/09/23    Patient reports suffering from SLE, RA, and Hashimoto's Thyroiditis.    Patient reports increased heart rate  "on Effexor and feeling "off". Reports overall feeling more depressed  his month, feels guilty because she was supposed to take care of her mother and has been unable to do so. States that her sister is upset with her because of this. She reports feeling depressed down and sad still. Reports her rheumatology diseases have flared this passed month. She wants to discontinue Effexor and go back to Cymbalta as she now feels she did well on this medication in the past.     Denies SI/HI/AVH. Denies adverse effects from medication  Pt reports taking medications as prescribed and behaving appropriately during interview today.    Pt appears:  Appropriate affect and attire    Mood:   Flat, depressed    Sleep:   Not well. Nightmares almost every night. Reports sleeping 6 hrs/night    Appetite:  Normal    Self Rates Depression: 7-8/10  Self Rated Anxiety:  7/10      Psychotherapy:  Target symptoms: depression, distractability, anxiety   Why chosen therapy is appropriate versus another modality: relevant to diagnosis  Outcome monitoring methods: self-report  Therapeutic intervention type: supportive psychotherapy  Topics discussed/themes: relationships difficulties, illness/death of a loved one, symptom recognition  The patient's response to the intervention is accepting. The patient's progress toward treatment goals is fair.   Duration of intervention: 20 minutes.    Review of Systems   PSYCHIATRIC: Pertinant items are noted in the narrative.        Past Medical, Family and Social History: The patient's past medical, family and social history have been reviewed and updated as appropriate within the electronic medical record - see encounter notes.    Compliance: no    Side effects: None    Risk Parameters:  Patient reports no suicidal ideation  Patient reports no homicidal ideation  Patient reports no self-injurious behavior  Patient reports no violent behavior    Exam (detailed: at least 9 elements; comprehensive: all 15 " elements)   Constitutional  Vitals:  Most recent vital signs, dated less than 90 days prior to this appointment, were reviewed.   There were no vitals filed for this visit.     General:  unremarkable, age appropriate     Musculoskeletal  Muscle Strength/Tone:  no spasicity, no rigidity, no cogwheeling, no flaccidity, no paratonia, no dyskinesia, no dystonia, no tremor, no tic, no choreoathetosis, no atrophy   Gait & Station:  non-ataxic     Psychiatric  Speech:  no latency; no press   Mood & Affect:  depressed, sad  congruent and appropriate, labile, sad   Thought Process:  normal and logical   Associations:  intact, tangential   Thought Content:  normal, no suicidality, no homicidality, delusions, or paranoia   Insight:  intact, has awareness of illness   Judgement: behavior is adequate to circumstances, age appropriate   Orientation:  grossly intact, person, place, situation, time/date, day of week   Memory: intact for content of interview, grossly intact   Language: grossly intact, able to name, able to repeat   Attention Span & Concentration:  able to focus, completed tasks   Fund of Knowledge:  intact and appropriate to age and level of education, familiar with aspects of current personal life     Assessment and Diagnosis   Status/Progress: Based on the examination today, the patient's problem(s) is/are inadequately controlled.  New problems have not been presented today.   Co-morbidities, Diagnostic uncertainty, and Lack of compliance are not complicating management of the primary condition.  There are no active rule-out diagnoses for this patient at this time.     General Impression: Marilee Simons, a 53 y.o. female, for follow up PTSD, MDD, CHANTE, Cluster B Personality Disorder r/o Bipolar Disorder.  Presents 8/17/23- Increased significant anxiety and depression, with PTSD flashback. Increase Effexor to 300 mg Daily. Start hydroxyzine 25 mg TID PRN.  Presents 9/13/23 - Patient appears to be in anxious crisis,  Increase Effexor to 225 mg Daily, IOP referral.   Presents 11/3/21- D/C Effexor, Start Cymbalta     ICD-10-CM ICD-9-CM    1. Generalized anxiety disorder  F41.1 300.02 clonazePAM (KLONOPIN) 0.5 MG tablet      DULoxetine (CYMBALTA) 30 MG capsule      hydrOXYzine pamoate (VISTARIL) 25 MG Cap      2. Major depressive disorder, recurrent episode, moderate  F33.1 296.32 DULoxetine (CYMBALTA) 30 MG capsule          Intervention/Counseling/Treatment Plan   Medication Management: The risks and benefits of medication were discussed with the patient.  Continue Lamictal 200 mg Daily  Continue Trazodone 50 mg at bedtime PRN  D/c  Effexor   Start Cymbalta 30 mg Daily  Continue Hydroxyzine 25 mg TID PRN for anxiety  Decrease Klonopin 0.5 mg Twice daily for Anxiety - 30 day supply e-prescribed   Checked [place]  and no irregularities were noted.  Discussed diagnosis, risk and benefits of proposed treatment above vs alternative treatment vs no treatment, and potential side effects of these treatments, and the inherent unpredictability of individual responses to these treatments. The patient expresses understanding and gives informed consent to pursue treatment at this time, believing that the potential benefits outweigh the potential risks. Patient has no other questions. Risks/adverse effects at this time include but are not limited to: GI side effects, sexual dysfunction, activation vs sedation, triggering of suicidal ideation, and serotonin syndrome.   Patient voices understanding and agreement with this plan  Provided crisis numbers  Encouraged patient to keep future appointments  Instruct patient to call or message with questions  In the event of an emergency, including suicidal ideation, patient was advised to go to the emergency room      Return to Clinic: 1 month    Total time: 40 minutes with more than 50% of time spent counseling and/or coordinating care.  (which included pts differential diagnosis and prognosis for  psychiatric conditions, risks, benefits of treatments, instructions and adherence to treatment plan, risk reduction, reviewing current psychiatric medication regimen, medical problems and social stressors. In addition to possible discussion with other healthcare provider/s)    Melanie Gant DNP, PMNEELP, FNP

## 2023-11-07 ENCOUNTER — PATIENT MESSAGE (OUTPATIENT)
Dept: RHEUMATOLOGY | Facility: CLINIC | Age: 53
End: 2023-11-07
Payer: COMMERCIAL

## 2023-11-07 ENCOUNTER — PATIENT MESSAGE (OUTPATIENT)
Dept: PSYCHIATRY | Facility: CLINIC | Age: 53
End: 2023-11-07
Payer: COMMERCIAL

## 2023-11-08 ENCOUNTER — TELEPHONE (OUTPATIENT)
Dept: RHEUMATOLOGY | Facility: CLINIC | Age: 53
End: 2023-11-08
Payer: COMMERCIAL

## 2023-11-08 ENCOUNTER — PATIENT MESSAGE (OUTPATIENT)
Dept: RHEUMATOLOGY | Facility: CLINIC | Age: 53
End: 2023-11-08

## 2023-11-08 ENCOUNTER — OFFICE VISIT (OUTPATIENT)
Dept: RHEUMATOLOGY | Facility: CLINIC | Age: 53
End: 2023-11-08
Payer: COMMERCIAL

## 2023-11-08 DIAGNOSIS — D84.821 IMMUNOCOMPROMISED STATE DUE TO DRUG THERAPY: ICD-10-CM

## 2023-11-08 DIAGNOSIS — M79.7 FIBROMYALGIA: ICD-10-CM

## 2023-11-08 DIAGNOSIS — G89.4 CHRONIC PAIN SYNDROME: ICD-10-CM

## 2023-11-08 DIAGNOSIS — Z79.899 IMMUNOCOMPROMISED STATE DUE TO DRUG THERAPY: ICD-10-CM

## 2023-11-08 DIAGNOSIS — M81.0 OSTEOPOROSIS, UNSPECIFIED OSTEOPOROSIS TYPE, UNSPECIFIED PATHOLOGICAL FRACTURE PRESENCE: ICD-10-CM

## 2023-11-08 DIAGNOSIS — E53.8 B12 DEFICIENCY: ICD-10-CM

## 2023-11-08 DIAGNOSIS — N30.10 IC (INTERSTITIAL CYSTITIS): ICD-10-CM

## 2023-11-08 DIAGNOSIS — F43.10 PTSD (POST-TRAUMATIC STRESS DISORDER): ICD-10-CM

## 2023-11-08 DIAGNOSIS — L40.50 PSORIATIC ARTHRITIS: ICD-10-CM

## 2023-11-08 DIAGNOSIS — M47.817 LUMBAR AND SACRAL OSTEOARTHRITIS: ICD-10-CM

## 2023-11-08 DIAGNOSIS — M06.00 SERONEGATIVE RHEUMATOID ARTHRITIS: ICD-10-CM

## 2023-11-08 DIAGNOSIS — G44.009 CLUSTER HEADACHE, NOT INTRACTABLE, UNSPECIFIED CHRONICITY PATTERN: Primary | ICD-10-CM

## 2023-11-08 PROCEDURE — 99215 OFFICE O/P EST HI 40 MIN: CPT | Mod: 95,,, | Performed by: INTERNAL MEDICINE

## 2023-11-08 PROCEDURE — 1160F RVW MEDS BY RX/DR IN RCRD: CPT | Mod: CPTII,95,, | Performed by: INTERNAL MEDICINE

## 2023-11-08 PROCEDURE — 1159F PR MEDICATION LIST DOCUMENTED IN MEDICAL RECORD: ICD-10-PCS | Mod: CPTII,95,, | Performed by: INTERNAL MEDICINE

## 2023-11-08 PROCEDURE — 1160F PR REVIEW ALL MEDS BY PRESCRIBER/CLIN PHARMACIST DOCUMENTED: ICD-10-PCS | Mod: CPTII,95,, | Performed by: INTERNAL MEDICINE

## 2023-11-08 PROCEDURE — 1159F MED LIST DOCD IN RCRD: CPT | Mod: CPTII,95,, | Performed by: INTERNAL MEDICINE

## 2023-11-08 PROCEDURE — 99215 PR OFFICE/OUTPT VISIT, EST, LEVL V, 40-54 MIN: ICD-10-PCS | Mod: 95,,, | Performed by: INTERNAL MEDICINE

## 2023-11-08 RX ORDER — BUTALBITAL AND ACETAMINOPHEN 300; 50 MG/1; MG/1
1 TABLET ORAL EVERY 4 HOURS
Qty: 60 TABLET | Refills: 5 | Status: SHIPPED | OUTPATIENT
Start: 2023-11-08 | End: 2024-04-03

## 2023-11-08 RX ORDER — NEEDLES, DISPOSABLE 25GX5/8"
1 NEEDLE, DISPOSABLE MISCELLANEOUS WEEKLY
Qty: 25 EACH | Refills: 3 | Status: SHIPPED | OUTPATIENT
Start: 2023-11-08 | End: 2023-12-08 | Stop reason: ALTCHOICE

## 2023-11-08 RX ORDER — TRAMADOL HYDROCHLORIDE 50 MG/1
TABLET ORAL
Qty: 90 TABLET | Refills: 4 | Status: SHIPPED | OUTPATIENT
Start: 2023-11-08

## 2023-11-08 RX ORDER — METHYLPREDNISOLONE SOD SUCC 125 MG
100 VIAL (EA) INJECTION
Status: CANCELLED | OUTPATIENT
Start: 2023-11-08

## 2023-11-08 RX ORDER — SODIUM CHLORIDE 0.9 % (FLUSH) 0.9 %
10 SYRINGE (ML) INJECTION
Status: CANCELLED | OUTPATIENT
Start: 2023-11-08

## 2023-11-08 RX ORDER — ACETAMINOPHEN 325 MG/1
650 TABLET ORAL
Status: CANCELLED | OUTPATIENT
Start: 2023-11-08

## 2023-11-08 RX ORDER — EPINEPHRINE 0.3 MG/.3ML
0.3 INJECTION SUBCUTANEOUS ONCE AS NEEDED
Status: CANCELLED | OUTPATIENT
Start: 2023-11-08

## 2023-11-08 RX ORDER — HEPARIN 100 UNIT/ML
500 SYRINGE INTRAVENOUS
Status: CANCELLED | OUTPATIENT
Start: 2023-11-08

## 2023-11-08 RX ORDER — DIPHENHYDRAMINE HYDROCHLORIDE 50 MG/ML
50 INJECTION INTRAMUSCULAR; INTRAVENOUS
Status: CANCELLED | OUTPATIENT
Start: 2023-11-08

## 2023-11-08 RX ORDER — DIPHENHYDRAMINE HYDROCHLORIDE 50 MG/ML
50 INJECTION INTRAMUSCULAR; INTRAVENOUS ONCE AS NEEDED
Status: CANCELLED | OUTPATIENT
Start: 2023-11-08

## 2023-11-08 RX ORDER — CYANOCOBALAMIN 1000 UG/ML
1000 INJECTION, SOLUTION INTRAMUSCULAR; SUBCUTANEOUS
Qty: 10 ML | Refills: 3 | Status: SHIPPED | OUTPATIENT
Start: 2023-11-08 | End: 2023-12-08 | Stop reason: ALTCHOICE

## 2023-11-08 NOTE — PROGRESS NOTES
Subjective:       Patient ID: Marilee Simons is a 53 y.o. female.    Chief Complaint: No chief complaint on file.    Mrs. Simons is a 53 year old female who presents to clinic for follow up on psoriatic arthritis. Off treatment doing poorly, tried Humira and plaquenil.She has generalized pain throughout her body. She has joint pain in her hands, wrists, elbows, shoulders, knee, and low back. Pain is constant and aching. She has increased stiffness and weakness in her hands--she is dropping objects. She has hypersensitivity of her elbows unable to rest her arms on a surface. She has low back pain with radiating pain down the legs.  Currently on tramadol, gabapentin, and flexeril for pain.    Stress is high with family issues. She was taking care of her mother who had a major fall and multiple fractures, but she is not allowed to see her at this time because her sister is caring for her.     Current tx:  1. Plaquenil    Prior tx:  1. Humira        Review of Systems   Constitutional:  Positive for activity change. Negative for fever and unexpected weight change.   HENT:  Positive for mouth sores. Negative for trouble swallowing.    Eyes:  Negative for redness.   Respiratory:  Negative for cough and shortness of breath.    Cardiovascular:  Negative for chest pain.   Gastrointestinal:  Positive for diarrhea. Negative for constipation.   Genitourinary:  Negative for dysuria and genital sores.   Musculoskeletal:  Positive for arthralgias, joint swelling and myalgias.   Skin:  Negative for rash.   Neurological:  Positive for headaches.   Hematological:  Bruises/bleeds easily.         Objective:     There were no vitals filed for this visit.      Past Medical History:   Diagnosis Date    Acid reflux     Allergy     Alopecia     Anxiety     Arthralgia     Back pain     Depression     Dry eyes     from meds    Fever blister     Fibromyalgia     Interstitial cystitis     Irritable bowel syndrome     Kidney stone     Major  depressive disorder, recurrent episode, in partial or unspecified remission 2013    OAB (overactive bladder) 2015    PTSD (post-traumatic stress disorder)     Pure hypercholesterolemia 2022    Rheumatoid arthritis     Rheumatoid arthritis 2022    Systemic lupus erythematosus     Thyroid disease     Urinary tract infection     Vaginal infection      Past Surgical History:   Procedure Laterality Date    BLADDER SURGERY       SECTION, LOW TRANSVERSE      x 2    COLONOSCOPY      COLONOSCOPY N/A 10/05/2017    Procedure: COLONOSCOPY;  Surgeon: Venkat He MD;  Location: [facility] (4TH FLR);  Service: Endoscopy;  Laterality: N/A;    ESOPHAGOGASTRODUODENOSCOPY      ESOPHAGOGASTRODUODENOSCOPY N/A 10/25/2021    Procedure: EGD (ESOPHAGOGASTRODUODENOSCOPY);  Surgeon: Venkat He MD;  Location: [facility] (4TH FLR);  Service: Endoscopy;  Laterality: N/A;  Covid test on 10/22 at [place]    10/19 lvm to confirm appt-rb    EXCISIONAL BIOPSY Right 10/14/2022    Procedure: EXCISIONAL BIOPSY-right with radiological marker;  Surgeon: PALLAVI Oseguera MD;  Location: [facility];  Service: General;  Laterality: Right;    EYE SURGERY      Lasik-bilateral    HYSTERECTOMY      IMPLANTATION OF PERMANENT SACRAL NERVE STIMULATOR N/A 2022    Procedure: INSERTION, NEUROSTIMULATOR, PERMANENT, SACRAL;  Surgeon: Bandar Garcia MD;  Location: [facility] OR 2ND FLR;  Service: Urology;  Laterality: N/A;  2hr    INJECTION OF BOTULINUM TOXIN TYPE A N/A 2018    Procedure: INJECTION, BOTULINUM TOXIN, TYPE A  200 UNITS;  Surgeon: Bandra Garcia MD;  Location: [facility] OR [facility]ST [facility];  Service: Urology;  Laterality: N/A;    INSTILLATION OF URINARY BLADDER N/A 2018    Procedure: INSTILLATION, URINARY BLADDER;  Surgeon: Bandar Garcia MD;  Location: [facility] OR 1ST FLR;  Service: Urology;  Laterality: N/A;    PARTIAL HYSTERECTOMY      REMOVAL OF ELECTRODE LEAD OF SACRAL NERVE STIMULATOR N/A 2022    Procedure: REMOVAL,  ELECTRODE LEAD, SACRAL NERVE STIMULATOR;  Surgeon: Bandar Garcia MD;  [facility];  Service: Urology;  Laterality: N/A;    SKIN TAG REMOVAL N/A 10/14/2022    Procedure: REMOVAL, SKIN TAGS;  Surgeon: PALLAVI Oseguera MD;  Location: [facility] OR;  Service: General;  Laterality: N/A;    TRANSFORAMINAL EPIDURAL INJECTION OF STEROID Left 02/15/2021    Procedure: LUMBAR TRANSFORAMINAL LEFT L5 AND S1 DIRECT REFERRAL;  Surgeon: Marquez Nesbitt MD;  Location: [facility] PAIN MGT;  Service: Pain Management;  Laterality: Left;  NEEDS CONSENT, PT REQUESTING IV SEDATION          Physical Exam   Constitutional: She is oriented to person, place, and time.   Neurological: She is alert and oriented to person, place, and time.   Psychiatric: Mood, affect and judgment normal.       Results for orders placed or performed in visit on 09/26/23   Iron and TIBC   Result Value Ref Range    Iron 121 30 - 160 ug/dL    Transferrin 262 200 - 375 mg/dL    TIBC 388 250 - 450 ug/dL    Saturated Iron 31 20 - 50 %   Ferritin   Result Value Ref Range    Ferritin 61 20.0 - 300.0 ng/mL   Folate   Result Value Ref Range    Folate 5.2 4.0 - 24.0 ng/mL   Vitamin B12   Result Value Ref Range    Vitamin B-12 343 210 - 950 pg/mL     *Note: Due to a large number of results and/or encounters for the requested time period, some results have not been displayed. A complete set of results can be found in Results Review.        Assessment:       1. Cluster headache, not intractable, unspecified chronicity pattern    2. B12 deficiency    3. Psoriatic arthritis    4. Seronegative rheumatoid arthritis    5. Osteoporosis, unspecified osteoporosis type, unspecified pathological fracture presence    6. Lumbar and sacral osteoarthritis    7. Fibromyalgia    8. Chronic pain syndrome    9. Immunocompromised state due to drug therapy    10. IC (interstitial cystitis)    11. PTSD (post-traumatic stress disorder)                Plan:       Cluster headache, not  "intractable, unspecified chronicity pattern  -     butalbitaL-acetaminophen (BUPAP)  mg Tab; Take 1 tablet by mouth every 4 (four) hours.  Dispense: 60 tablet; Refill: 5    B12 deficiency  -     cyanocobalamin 1,000 mcg/mL injection; Inject 1 mL (1,000 mcg total) into the skin every 7 days.  Dispense: 10 mL; Refill: 3  -     syringe, disposable, 1 mL Syrg; 1 Syringe by Misc.(Non-Drug; Combo Route) route once a week.  Dispense: 25 each; Refill: 0  -     needle, disp, 30 gauge 30 gauge x 1/2" Ndle; 1 each by Misc.(Non-Drug; Combo Route) route once a week.  Dispense: 25 each; Refill: 3  -     blunt needle, disposable 18 x 1 1/2 " Ndle; 1 Syringe by Misc.(Non-Drug; Combo Route) route once a week.  Dispense: 25 each; Refill: 3    Psoriatic arthritis  -     traMADoL (ULTRAM) 50 mg tablet; TAKE 1 TABLET(50 MG) BY MOUTH EVERY 6 HOURS  Dispense: 90 tablet; Refill: 4  -     Sedimentation rate; Future; Expected date: 11/12/2023  -     C-Reactive Protein; Future; Expected date: 11/12/2023  -     Comprehensive Metabolic Panel; Future; Expected date: 11/12/2023  -     CBC Auto Differential; Future; Expected date: 11/12/2023    Seronegative rheumatoid arthritis  -     traMADoL (ULTRAM) 50 mg tablet; TAKE 1 TABLET(50 MG) BY MOUTH EVERY 6 HOURS  Dispense: 90 tablet; Refill: 4  -     Sedimentation rate; Future; Expected date: 11/12/2023  -     C-Reactive Protein; Future; Expected date: 11/12/2023  -     Comprehensive Metabolic Panel; Future; Expected date: 11/12/2023  -     CBC Auto Differential; Future; Expected date: 11/12/2023    Osteoporosis, unspecified osteoporosis type, unspecified pathological fracture presence  -     traMADoL (ULTRAM) 50 mg tablet; TAKE 1 TABLET(50 MG) BY MOUTH EVERY 6 HOURS  Dispense: 90 tablet; Refill: 4  -     Sedimentation rate; Future; Expected date: 11/12/2023  -     C-Reactive Protein; Future; Expected date: 11/12/2023  -     Comprehensive Metabolic Panel; Future; Expected date: 11/12/2023  -    "  CBC Auto Differential; Future; Expected date: 11/12/2023    Lumbar and sacral osteoarthritis  -     traMADoL (ULTRAM) 50 mg tablet; TAKE 1 TABLET(50 MG) BY MOUTH EVERY 6 HOURS  Dispense: 90 tablet; Refill: 4  -     Sedimentation rate; Future; Expected date: 11/12/2023  -     C-Reactive Protein; Future; Expected date: 11/12/2023  -     Comprehensive Metabolic Panel; Future; Expected date: 11/12/2023  -     CBC Auto Differential; Future; Expected date: 11/12/2023    Fibromyalgia  -     traMADoL (ULTRAM) 50 mg tablet; TAKE 1 TABLET(50 MG) BY MOUTH EVERY 6 HOURS  Dispense: 90 tablet; Refill: 4  -     Sedimentation rate; Future; Expected date: 11/12/2023  -     C-Reactive Protein; Future; Expected date: 11/12/2023  -     Comprehensive Metabolic Panel; Future; Expected date: 11/12/2023  -     CBC Auto Differential; Future; Expected date: 11/12/2023    Chronic pain syndrome  -     traMADoL (ULTRAM) 50 mg tablet; TAKE 1 TABLET(50 MG) BY MOUTH EVERY 6 HOURS  Dispense: 90 tablet; Refill: 4  -     Sedimentation rate; Future; Expected date: 11/12/2023  -     C-Reactive Protein; Future; Expected date: 11/12/2023  -     Comprehensive Metabolic Panel; Future; Expected date: 11/12/2023  -     CBC Auto Differential; Future; Expected date: 11/12/2023    Immunocompromised state due to drug therapy  -     traMADoL (ULTRAM) 50 mg tablet; TAKE 1 TABLET(50 MG) BY MOUTH EVERY 6 HOURS  Dispense: 90 tablet; Refill: 4    IC (interstitial cystitis)  -     traMADoL (ULTRAM) 50 mg tablet; TAKE 1 TABLET(50 MG) BY MOUTH EVERY 6 HOURS  Dispense: 90 tablet; Refill: 4    PTSD (post-traumatic stress disorder)  -     traMADoL (ULTRAM) 50 mg tablet; TAKE 1 TABLET(50 MG) BY MOUTH EVERY 6 HOURS  Dispense: 90 tablet; Refill: 4    Other orders  -     golimumab (SIMPONI ARIA) 2 mg/kg in sodium chloride 0.9% SolP 100 mL infusion  -     golimumab (SIMPONI ARIA) 2 mg/kg in sodium chloride 0.9% SolP 100 mL infusion  -     diphenhydrAMINE injection 50 mg  -      acetaminophen tablet 650 mg  -     methylPREDNISolone sodium succinate injection 100 mg  -     EPINEPHrine (EPIPEN) 0.3 mg/0.3 mL pen injection 0.3 mg  -     diphenhydrAMINE injection 50 mg  -     hydrocortisone sodium succinate injection 100 mg  -     sodium chloride 0.9% 250 mL flush bag  -     sodium chloride 0.9% flush 10 mL  -     heparin, porcine (PF) 100 unit/mL injection flush 500 Units  -     alteplase injection 2 mg            Assessment:  53 year old female with  Psoriatic arthritis, seronegative RA  --chronic pain syndrome on tramadol  --uncontrolled major depression, anxiety followed by Psychiatry  --osteopenia  --hashimoto's thyroiditis followed by Endocrinology    Plan:  1. Start iv  simponi aria .   2  refill bupap and  Tramadol, lyrica p.  I have checked [place] prescription monitoring program site and no unusual or abnormal behavior has occurred pt understand the risk and benefits of taking opioid medications and has decided to continue the medication.  3 she is weaning effexor and starting cymbata.      The patient location is: home  The chief complaint leading to consultation is: ra    Visit type: audiovisual    Face to Face time with patient: 44   minutes of total time spent on the encounter, which includes face to face time and non-face to face time preparing to see the patient (eg, review of tests), Obtaining and/or reviewing separately obtained history, Documenting clinical information in the electronic or other health record, Independently interpreting results (not separately reported) and communicating results to the patient/family/caregiver, or Care coordination (not separately reported).         Each patient to whom he or she provides medical services by telemedicine is:  (1) informed of the relationship between the physician and patient and the respective role of any other health care provider with respect to management of the patient; and (2) notified that he or she may decline to  receive medical services by telemedicine and may withdraw from such care at any time.    Notes:

## 2023-11-08 NOTE — TELEPHONE ENCOUNTER
Patient at f/u appointment with . Patient to receive Simponi infusion at [facility] in [place]. Entered therapy plan location as [facility] and sent to pre-service dept.    Tried/failed: Humira, plaquenil, MTX, skyrizi, xeljanz, and rinvoq  Insurance would not cover - Enbrel, Tremfya, Skyrizi    Annual Hep B, Hep C, and TB labs are up to date. Completed 4/17/23.

## 2023-11-27 DIAGNOSIS — N32.81 OAB (OVERACTIVE BLADDER): ICD-10-CM

## 2023-11-27 DIAGNOSIS — R39.89 BLADDER PAIN: ICD-10-CM

## 2023-11-27 RX ORDER — OXYBUTYNIN CHLORIDE 5 MG/1
5 TABLET ORAL 3 TIMES DAILY
Qty: 90 TABLET | Refills: 3 | Status: SHIPPED | OUTPATIENT
Start: 2023-11-27

## 2023-11-27 RX ORDER — METHENAMINE, SODIUM PHOSPHATE, MONOBASIC, MONOHYDRATE, PHENYL SALICYLATE, METHYLENE BLUE, AND HYOSCYAMINE SULFATE 118; 40.8; 36; 10; .12 MG/1; MG/1; MG/1; MG/1; MG/1
1 CAPSULE ORAL 3 TIMES DAILY
Qty: 30 CAPSULE | Refills: 11 | Status: SHIPPED | OUTPATIENT
Start: 2023-11-27 | End: 2024-04-03

## 2023-11-29 ENCOUNTER — PATIENT MESSAGE (OUTPATIENT)
Dept: PRIMARY CARE CLINIC | Facility: CLINIC | Age: 53
End: 2023-11-29
Payer: COMMERCIAL

## 2023-11-29 ENCOUNTER — PATIENT MESSAGE (OUTPATIENT)
Dept: RHEUMATOLOGY | Facility: CLINIC | Age: 53
End: 2023-11-29
Payer: COMMERCIAL

## 2023-11-30 ENCOUNTER — PATIENT MESSAGE (OUTPATIENT)
Dept: PSYCHIATRY | Facility: CLINIC | Age: 53
End: 2023-11-30
Payer: COMMERCIAL

## 2023-11-30 DIAGNOSIS — F41.1 GENERALIZED ANXIETY DISORDER: ICD-10-CM

## 2023-12-04 ENCOUNTER — PATIENT MESSAGE (OUTPATIENT)
Dept: PSYCHIATRY | Facility: CLINIC | Age: 53
End: 2023-12-04
Payer: COMMERCIAL

## 2023-12-04 ENCOUNTER — TELEPHONE (OUTPATIENT)
Dept: INFECTIOUS DISEASES | Facility: HOSPITAL | Age: 53
End: 2023-12-04
Payer: COMMERCIAL

## 2023-12-04 RX ORDER — CLONAZEPAM 0.5 MG/1
0.5 TABLET ORAL 2 TIMES DAILY PRN
Qty: 30 TABLET | Refills: 0 | Status: SHIPPED | OUTPATIENT
Start: 2023-12-04 | End: 2024-01-03

## 2023-12-04 RX ORDER — VENLAFAXINE HYDROCHLORIDE 37.5 MG/1
37.5 CAPSULE, EXTENDED RELEASE ORAL DAILY
Qty: 14 CAPSULE | Refills: 0 | Status: SHIPPED | OUTPATIENT
Start: 2023-12-04 | End: 2023-12-12 | Stop reason: ALTCHOICE

## 2023-12-04 NOTE — TELEPHONE ENCOUNTER
"Patient called to discuss symptoms.   She is crying hysterically and stating"I'm sorry I'm sorry,     Patient reports she stopped Effexor on Nov 19 th and didn't take any medication until starting Cymbalta on Nov 23rd. She then begin having nausea and diarrhea, body aches, shaking, severe anxiety, report "pains in her brain" Patient is possibly describing brain zaps.    "I'm sorry I don't mean to be rude to you're so sweet and beautiful, you don't deserve the way I messaged you. I got I feel like I'm going to throw up now"."Tell me you forgive me." Patient was redirected to focus on her symptoms. She states that she does not want to go to the ER because she fears they will send her to an inpatient psychiatric unit.She was informed that if symptoms are severe it is important that she seek care.     Patient is to start lowest dose of Effexor 37.5 mg Daily for 7 days, after this is symptoms continue she is to take 1 capsule every other day for 7 days. Continue low dose Cymbalta 30 mg Daily.     Patient provided information regarding Self harm and suicidal thoughts:  If you are having thoughts of hurting yourself or others please seek care at the nearest emergency department, call 911 or call the Suicide and Crisis Lifeline at 988            "

## 2023-12-05 ENCOUNTER — PATIENT MESSAGE (OUTPATIENT)
Dept: RHEUMATOLOGY | Facility: CLINIC | Age: 53
End: 2023-12-05
Payer: COMMERCIAL

## 2023-12-05 DIAGNOSIS — G89.4 CHRONIC PAIN SYNDROME: ICD-10-CM

## 2023-12-05 DIAGNOSIS — L40.50 PSORIATIC ARTHRITIS: Primary | ICD-10-CM

## 2023-12-06 ENCOUNTER — PATIENT MESSAGE (OUTPATIENT)
Dept: PRIMARY CARE CLINIC | Facility: CLINIC | Age: 53
End: 2023-12-06
Payer: COMMERCIAL

## 2023-12-07 ENCOUNTER — OFFICE VISIT (OUTPATIENT)
Dept: PSYCHIATRY | Facility: CLINIC | Age: 53
End: 2023-12-07
Payer: COMMERCIAL

## 2023-12-07 ENCOUNTER — PATIENT MESSAGE (OUTPATIENT)
Dept: PSYCHIATRY | Facility: CLINIC | Age: 53
End: 2023-12-07

## 2023-12-07 DIAGNOSIS — F33.1 MAJOR DEPRESSIVE DISORDER, RECURRENT EPISODE, MODERATE: ICD-10-CM

## 2023-12-07 DIAGNOSIS — F41.1 GENERALIZED ANXIETY DISORDER: Primary | ICD-10-CM

## 2023-12-07 PROCEDURE — 90834 PR PSYCHOTHERAPY W/PATIENT, 45 MIN: ICD-10-PCS | Mod: 95,,, | Performed by: SOCIAL WORKER

## 2023-12-07 PROCEDURE — 90834 PSYTX W PT 45 MINUTES: CPT | Mod: 95,,, | Performed by: SOCIAL WORKER

## 2023-12-08 ENCOUNTER — OFFICE VISIT (OUTPATIENT)
Dept: PSYCHIATRY | Facility: CLINIC | Age: 53
End: 2023-12-08
Payer: COMMERCIAL

## 2023-12-08 ENCOUNTER — PATIENT MESSAGE (OUTPATIENT)
Dept: PSYCHIATRY | Facility: CLINIC | Age: 53
End: 2023-12-08
Payer: COMMERCIAL

## 2023-12-08 DIAGNOSIS — M06.00 SERONEGATIVE RHEUMATOID ARTHRITIS: Primary | ICD-10-CM

## 2023-12-08 DIAGNOSIS — L40.50 PSORIATIC ARTHRITIS: ICD-10-CM

## 2023-12-08 DIAGNOSIS — F33.1 MAJOR DEPRESSIVE DISORDER, RECURRENT EPISODE, MODERATE: ICD-10-CM

## 2023-12-08 DIAGNOSIS — F41.1 GENERALIZED ANXIETY DISORDER: Primary | ICD-10-CM

## 2023-12-08 PROCEDURE — 99215 OFFICE O/P EST HI 40 MIN: CPT | Mod: 95,,, | Performed by: NURSE PRACTITIONER

## 2023-12-08 PROCEDURE — 99215 PR OFFICE/OUTPT VISIT, EST, LEVL V, 40-54 MIN: ICD-10-PCS | Mod: 95,,, | Performed by: NURSE PRACTITIONER

## 2023-12-08 RX ORDER — FLUOXETINE HYDROCHLORIDE 20 MG/1
20 CAPSULE ORAL DAILY
Qty: 30 CAPSULE | Refills: 2 | Status: SHIPPED | OUTPATIENT
Start: 2023-12-08 | End: 2023-12-11 | Stop reason: SDUPTHER

## 2023-12-08 NOTE — PROGRESS NOTES
The patient location is: [place], [place]  The chief complaint leading to consultation is: Anxiety, Side effects    Visit type: audiovisual    Face to Face time with patient: 20  30 minutes of total time spent on the encounter, which includes face to face time and non-face to face time preparing to see the patient (eg, review of tests), Obtaining and/or reviewing separately obtained history, Documenting clinical information in the electronic or other health record, Independently interpreting results (not separately reported) and communicating results to the patient/family/caregiver, or Care coordination (not separately reported).     Each patient to whom he or she provides medical services by telemedicine is:  (1) informed of the relationship between the physician and patient and the respective role of any other health care provider with respect to management of the patient; and (2) notified that he or she may decline to receive medical services by telemedicine and may withdraw from such care at any time.    Notes:     Outpatient Psychiatry Follow-Up Visit ([facility]NP-[facility])    11/03/2023    Clinical Status of Patient:  Outpatient (Ambulatory)    Chief Complaint:  Marilee Simons is a 53 y.o. female who presents today for follow-up of depression, anxiety, and PTSD .  Met with patient.     Patient reports suffering from SLE, RA, and Hashimoto's Thyroiditis      Current Medications:   Lamictal 200 mg Daily  Trazodone 50 mg at bedtime PRN  Effexor  mg Daily  Klonopin 1 mg Twice daily  Lyrica 150 mg TID - (Prescribed by rheumatologist)  Amitriptyline 10 mg at bedtime for Cystitis - (Prescribed by urologist)  Tramadol 50 mg PRN (prescribed by Rheumatologist)  Hydroxyzine 25 mg TID PRN for anxiety    Past Medication Trials:  Hydroxyzine   Prozac Patient does not recall.   Cymbalta- situational depression     Interval History and Content of Current Session:  Patient seen and chart reviewed. Last seen on 11/30/23    Patient  "reports continuing to feel badly with severe headaches, inability to sleep more than 2 hours at a time and hot flashes. She reports taking 2 COVID tests 3 days ago and that were negative. She reports being too afraid to go to the emergency room for fear that she will be  admitted to a psychiatric hospital. She was informed that the Effexor can either be increased again to slowly come back down, to lessen some withdrawal effects. Patient reports that she would like to start the prozac sooner then later.   Patient States " I dont want to be on medications at all I hate that I'm like this, I also don't want to be on addictive medication" . Patient reports always having SI but denies plan.      Denies SI/HI/AVH. Denies adverse effects from medication  Pt reports taking medications as prescribed and behaving appropriately during interview today.    Pt appears:  Appropriate affect and attire    Mood:  sad    Sleep:  Not well. Nightmares almost every night. Reports sleeping 6 hrs/night    Appetite:  Normal    Self Rates Depression: 7-8/10  Self Rated Anxiety:  7/10      Psychotherapy:  Target symptoms: depression, distractability, anxiety   Why chosen therapy is appropriate versus another modality: relevant to diagnosis  Outcome monitoring methods: self-report  Therapeutic intervention type: supportive psychotherapy  Topics discussed/themes: relationships difficulties, illness/death of a loved one, symptom recognition  The patient's response to the intervention is accepting. The patient's progress toward treatment goals is fair.   Duration of intervention: 20 minutes.    Review of Systems   PSYCHIATRIC: Pertinant items are noted in the narrative.        Past Medical, Family and Social History: The patient's past medical, family and social history have been reviewed and updated as appropriate within the electronic medical record - see encounter notes.    Compliance: no    Side effects: None    Risk Parameters:  Patient " reports no suicidal ideation  Patient reports no homicidal ideation  Patient reports no self-injurious behavior  Patient reports no violent behavior    Exam (detailed: at least 9 elements; comprehensive: all 15 elements)   Constitutional  Vitals:  Most recent vital signs, dated less than 90 days prior to this appointment, were reviewed.   There were no vitals filed for this visit.     General:  unremarkable, age appropriate     Musculoskeletal  Muscle Strength/Tone:  no spasicity, no rigidity, no cogwheeling, no flaccidity, no paratonia, no dyskinesia, no dystonia, no tremor, no tic, no choreoathetosis, no atrophy   Gait & Station:  non-ataxic     Psychiatric  Speech:  no latency; no press   Mood & Affect:  depressed, sad  congruent and appropriate, labile, sad   Thought Process:  normal and logical   Associations:  intact, tangential   Thought Content:  normal, no suicidality, no homicidality, delusions, or paranoia   Insight:  intact, has awareness of illness   Judgement: behavior is adequate to circumstances, age appropriate   Orientation:  grossly intact, person, place, situation, time/date, day of week   Memory: intact for content of interview, grossly intact   Language: grossly intact, able to name, able to repeat   Attention Span & Concentration:  able to focus, completed tasks   Fund of Knowledge:  intact and appropriate to age and level of education, familiar with aspects of current personal life     Assessment and Diagnosis   Status/Progress: Based on the examination today, the patient's problem(s) is/are inadequately controlled.  New problems have not been presented today.   Co-morbidities, Diagnostic uncertainty, and Lack of compliance are not complicating management of the primary condition.  There are no active rule-out diagnoses for this patient at this time.     General Impression: Marilee Simons, a 53 y.o. female, for follow up PTSD, MDD, CHANTE, Cluster B Personality Disorder r/o Bipolar  Disorder.  Presents 8/17/23- Increased significant anxiety and depression, with PTSD flashback. Increase Effexor to 300 mg Daily. Start hydroxyzine 25 mg TID PRN.  Presents 9/13/23 - Patient appears to be in anxious crisis, Increase Effexor to 225 mg Daily, IOP referral.   Presents 11/3/21- D/C Effexor, Start Cymbalta   Presents 12/8/23- d/c Cymbalta. Continue tapering off Effexor 37.5 mg Daily, then every other day, Start Prozac 20 mg daily this is to to be cross taperd.       ICD-10-CM ICD-9-CM    1. Generalized anxiety disorder  F41.1 300.02 clonazePAM (KLONOPIN) 0.5 MG tablet      DULoxetine (CYMBALTA) 30 MG capsule      hydrOXYzine pamoate (VISTARIL) 25 MG Cap      2. Major depressive disorder, recurrent episode, moderate  F33.1 296.32 DULoxetine (CYMBALTA) 30 MG capsule          Intervention/Counseling/Treatment Plan   Medication Management: The risks and benefits of medication were discussed with the patient.  Continue Lamictal 200 mg Daily  Continue Trazodone 50 mg at bedtime PRN  D/c Cymbalta  Continue Effexor taper from 37.5 mg Daily for 5 days then every other day  Start Prozac 20 mg Daily in 1 week  Continue Hydroxyzine 25 mg TID PRN for anxiety  Decrease Klonopin 0.5 mg Twice daily for Anxiety  Checked [place]  and no irregularities were noted.  Discussed diagnosis, risk and benefits of proposed treatment above vs alternative treatment vs no treatment, and potential side effects of these treatments, and the inherent unpredictability of individual responses to these treatments. The patient expresses understanding and gives informed consent to pursue treatment at this time, believing that the potential benefits outweigh the potential risks. Patient has no other questions. Risks/adverse effects at this time include but are not limited to: GI side effects, sexual dysfunction, activation vs sedation, triggering of suicidal ideation, and serotonin syndrome.   Patient voices understanding and agreement with this  plan  Provided crisis numbers  Encouraged patient to keep future appointments  Instruct patient to call or message with questions  In the event of an emergency, including suicidal ideation, patient was advised to go to the emergency room      Return to Clinic: 2 weeks    Total time: 40 minutes with more than 50% of time spent counseling and/or coordinating care.  (which included pts differential diagnosis and prognosis for psychiatric conditions, risks, benefits of treatments, instructions and adherence to treatment plan, risk reduction, reviewing current psychiatric medication regimen, medical problems and social stressors. In addition to possible discussion with other healthcare provider/s)    Melanie Gant DNP, PMHNP, FNP

## 2023-12-08 NOTE — PROGRESS NOTES
Individual Psychotherapy (PhD/LCSW)    12/7/2023    Site:  [facility]         Therapeutic Intervention: Met with patient.  Outpatient - Insight oriented psychotherapy 45 min - CPT code 65446    Chief complaint/reason for encounter: depression, anxiety and ptsd     Interval history and content of current session: The patient location is: home in [place]  The chief complaint leading to consultation is: depression and BPD    Visit type: audiovisual    Face to Face time with patient: 45  45 minutes of total time spent on the encounter, which includes face to face time and non-face to face time preparing to see the patient (eg, review of tests), Obtaining and/or reviewing separately obtained history, Documenting clinical information in the electronic or other health record, Independently interpreting results (not separately reported) and communicating results to the patient/family/caregiver, or Care coordination (not separately reported).         Each patient to whom he or she provides medical services by telemedicine is:  (1) informed of the relationship between the physician and patient and the respective role of any other health care provider with respect to management of the patient; and (2) notified that he or she may decline to receive medical services by telemedicine and may withdraw from such care at any time.    Notes: Pt seen for follow-up.  She states she is not feeling well and is going to urgent care once we have our session.  She has been highly anxious and upset the past 2 weeks due to tapering off Effexor and the side effects that has caused her.  She is calmer as we talk today but tends to get upset with providers when she feels they are not understanding her distress.  Things are stable at the moment with her son and daughter and her  has been more supportive recently.    Treatment plan:  Target symptoms: depression, anxiety , PTSD  Why chosen therapy is appropriate versus another  modality: relevant to diagnosis  Outcome monitoring methods: self-report, observation  Therapeutic intervention type: insight oriented psychotherapy    Risk parameters:  Patient reports no suicidal ideation  Patient reports no homicidal ideation  Patient reports no self-injurious behavior  Patient reports no violent behavior    Verbal deficits: None    Patient's response to intervention:  The patient's response to intervention is motivated.    Progress toward goals and other mental status changes:  The patient's progress toward goals is fair .    Diagnosis:     ICD-10-CM ICD-9-CM   1. Generalized anxiety disorder  F41.1 300.02   2. Major depressive disorder, recurrent episode, moderate  F33.1 296.32       Plan:  individual psychotherapy and medication management by physician    Return to clinic: as scheduled    Length of Service (minutes): 45

## 2023-12-10 ENCOUNTER — PATIENT MESSAGE (OUTPATIENT)
Dept: PSYCHIATRY | Facility: CLINIC | Age: 53
End: 2023-12-10
Payer: COMMERCIAL

## 2023-12-10 DIAGNOSIS — M47.817 LUMBAR AND SACRAL OSTEOARTHRITIS: ICD-10-CM

## 2023-12-11 ENCOUNTER — PATIENT MESSAGE (OUTPATIENT)
Dept: PSYCHIATRY | Facility: CLINIC | Age: 53
End: 2023-12-11
Payer: COMMERCIAL

## 2023-12-11 DIAGNOSIS — F41.1 GENERALIZED ANXIETY DISORDER: ICD-10-CM

## 2023-12-11 DIAGNOSIS — F33.1 MAJOR DEPRESSIVE DISORDER, RECURRENT EPISODE, MODERATE: ICD-10-CM

## 2023-12-11 DIAGNOSIS — L40.50 PSORIATIC ARTHRITIS: ICD-10-CM

## 2023-12-11 DIAGNOSIS — M06.00 SERONEGATIVE RHEUMATOID ARTHRITIS: ICD-10-CM

## 2023-12-11 RX ORDER — METHYLPREDNISOLONE 4 MG/1
TABLET ORAL
Qty: 21 EACH | Refills: 0 | OUTPATIENT
Start: 2023-12-11

## 2023-12-11 RX ORDER — METHYLPREDNISOLONE 4 MG/1
TABLET ORAL
Qty: 21 EACH | Refills: 0 | Status: SHIPPED | OUTPATIENT
Start: 2023-12-11 | End: 2023-12-11 | Stop reason: SDUPTHER

## 2023-12-12 RX ORDER — METHYLPREDNISOLONE 4 MG/1
TABLET ORAL
Qty: 21 EACH | Refills: 0 | Status: SHIPPED | OUTPATIENT
Start: 2023-12-12 | End: 2023-12-29

## 2023-12-12 RX ORDER — METHYLPREDNISOLONE 4 MG/1
TABLET ORAL
Qty: 21 EACH | Refills: 0 | Status: SHIPPED | OUTPATIENT
Start: 2023-12-12 | End: 2023-12-13

## 2023-12-12 RX ORDER — FLUOXETINE HYDROCHLORIDE 20 MG/1
20 CAPSULE ORAL DAILY
Qty: 30 CAPSULE | Refills: 2 | Status: SHIPPED | OUTPATIENT
Start: 2023-12-12 | End: 2024-01-10 | Stop reason: SDUPTHER

## 2023-12-13 ENCOUNTER — PATIENT MESSAGE (OUTPATIENT)
Dept: PSYCHIATRY | Facility: CLINIC | Age: 53
End: 2023-12-13
Payer: COMMERCIAL

## 2023-12-13 ENCOUNTER — TELEPHONE (OUTPATIENT)
Dept: RHEUMATOLOGY | Facility: CLINIC | Age: 53
End: 2023-12-13
Payer: COMMERCIAL

## 2023-12-13 ENCOUNTER — OFFICE VISIT (OUTPATIENT)
Dept: PRIMARY CARE CLINIC | Facility: CLINIC | Age: 53
End: 2023-12-13
Payer: COMMERCIAL

## 2023-12-13 VITALS
BODY MASS INDEX: 32.88 KG/M2 | DIASTOLIC BLOOD PRESSURE: 86 MMHG | HEIGHT: 59 IN | WEIGHT: 163.13 LBS | OXYGEN SATURATION: 95 % | SYSTOLIC BLOOD PRESSURE: 138 MMHG | HEART RATE: 97 BPM

## 2023-12-13 DIAGNOSIS — G43.109 MIGRAINE WITH AURA AND WITHOUT STATUS MIGRAINOSUS, NOT INTRACTABLE: Primary | ICD-10-CM

## 2023-12-13 DIAGNOSIS — F33.1 MAJOR DEPRESSIVE DISORDER, RECURRENT EPISODE, MODERATE: ICD-10-CM

## 2023-12-13 PROBLEM — G43.909 MIGRAINE: Status: ACTIVE | Noted: 2023-12-13

## 2023-12-13 PROCEDURE — 99214 OFFICE O/P EST MOD 30 MIN: CPT | Mod: S$GLB,,, | Performed by: STUDENT IN AN ORGANIZED HEALTH CARE EDUCATION/TRAINING PROGRAM

## 2023-12-13 PROCEDURE — 1160F PR REVIEW ALL MEDS BY PRESCRIBER/CLIN PHARMACIST DOCUMENTED: ICD-10-PCS | Mod: CPTII,S$GLB,, | Performed by: STUDENT IN AN ORGANIZED HEALTH CARE EDUCATION/TRAINING PROGRAM

## 2023-12-13 PROCEDURE — 3008F PR BODY MASS INDEX (BMI) DOCUMENTED: ICD-10-PCS | Mod: CPTII,S$GLB,, | Performed by: STUDENT IN AN ORGANIZED HEALTH CARE EDUCATION/TRAINING PROGRAM

## 2023-12-13 PROCEDURE — 1159F MED LIST DOCD IN RCRD: CPT | Mod: CPTII,S$GLB,, | Performed by: STUDENT IN AN ORGANIZED HEALTH CARE EDUCATION/TRAINING PROGRAM

## 2023-12-13 PROCEDURE — 3008F BODY MASS INDEX DOCD: CPT | Mod: CPTII,S$GLB,, | Performed by: STUDENT IN AN ORGANIZED HEALTH CARE EDUCATION/TRAINING PROGRAM

## 2023-12-13 PROCEDURE — 1160F RVW MEDS BY RX/DR IN RCRD: CPT | Mod: CPTII,S$GLB,, | Performed by: STUDENT IN AN ORGANIZED HEALTH CARE EDUCATION/TRAINING PROGRAM

## 2023-12-13 PROCEDURE — 99214 PR OFFICE/OUTPT VISIT, EST, LEVL IV, 30-39 MIN: ICD-10-PCS | Mod: S$GLB,,, | Performed by: STUDENT IN AN ORGANIZED HEALTH CARE EDUCATION/TRAINING PROGRAM

## 2023-12-13 PROCEDURE — 99999 PR PBB SHADOW E&M-EST. PATIENT-LVL V: ICD-10-PCS | Mod: PBBFAC,,, | Performed by: STUDENT IN AN ORGANIZED HEALTH CARE EDUCATION/TRAINING PROGRAM

## 2023-12-13 PROCEDURE — 1159F PR MEDICATION LIST DOCUMENTED IN MEDICAL RECORD: ICD-10-PCS | Mod: CPTII,S$GLB,, | Performed by: STUDENT IN AN ORGANIZED HEALTH CARE EDUCATION/TRAINING PROGRAM

## 2023-12-13 PROCEDURE — 99999 PR PBB SHADOW E&M-EST. PATIENT-LVL V: CPT | Mod: PBBFAC,,, | Performed by: STUDENT IN AN ORGANIZED HEALTH CARE EDUCATION/TRAINING PROGRAM

## 2023-12-13 PROCEDURE — 3079F DIAST BP 80-89 MM HG: CPT | Mod: CPTII,S$GLB,, | Performed by: STUDENT IN AN ORGANIZED HEALTH CARE EDUCATION/TRAINING PROGRAM

## 2023-12-13 PROCEDURE — 3075F PR MOST RECENT SYSTOLIC BLOOD PRESS GE 130-139MM HG: ICD-10-PCS | Mod: CPTII,S$GLB,, | Performed by: STUDENT IN AN ORGANIZED HEALTH CARE EDUCATION/TRAINING PROGRAM

## 2023-12-13 PROCEDURE — 3079F PR MOST RECENT DIASTOLIC BLOOD PRESSURE 80-89 MM HG: ICD-10-PCS | Mod: CPTII,S$GLB,, | Performed by: STUDENT IN AN ORGANIZED HEALTH CARE EDUCATION/TRAINING PROGRAM

## 2023-12-13 PROCEDURE — 3075F SYST BP GE 130 - 139MM HG: CPT | Mod: CPTII,S$GLB,, | Performed by: STUDENT IN AN ORGANIZED HEALTH CARE EDUCATION/TRAINING PROGRAM

## 2023-12-13 NOTE — TELEPHONE ENCOUNTER
----- Message from Kirti Carter sent at 12/13/2023  4:39 PM CST -----  Contact: Patient  Type:  Patient Returning Call    Who Called:Patient     Who Left Message for Patient:Unknown    Does the patient know what this is regarding?:No    Would the patient rather a call back or a response via [facility]? Call    Best Call Back Number: [phone]    Additional Information: Please return call

## 2023-12-15 ENCOUNTER — PATIENT MESSAGE (OUTPATIENT)
Dept: GASTROENTEROLOGY | Facility: CLINIC | Age: 53
End: 2023-12-15
Payer: COMMERCIAL

## 2023-12-15 ENCOUNTER — PATIENT MESSAGE (OUTPATIENT)
Dept: PSYCHIATRY | Facility: CLINIC | Age: 53
End: 2023-12-15
Payer: COMMERCIAL

## 2023-12-17 NOTE — PROGRESS NOTES
SUBJECTIVE     Chief Complaint   Patient presents with    Migraine       HPI  Marilee Simons is a 53 y.o. female with RA, SLE, GERD, Depression, MDD, PTSDand IBS that presents for migraine follow up    Migraine headaches:   No longer has a neurologist-upcoming appt planned  Complains of having 2 headache days per month.  Currently on amitriptyline, tramadol, and Fioricet as prescribed by previous physician.      IBS-D:  Previous Diarrhea prominent but now more episodes of constipation  Fiber and Miralax started by GI to titrate to correct consistency.  PPI daily    MDD/CHANTE:   Lamictal 200mg daily,   Down to Klonopin 1mg daily (titrating down with psychiatry-down from 1bid), amitriptyline 10mg daily, venlafaxine 300 mg daily, and trazodone 50mg nightly prn. Follows regularly with Psychiatry who manages.    PAST MEDICAL HISTORY:  Past Medical History:   Diagnosis Date    Acid reflux     Allergy     Alopecia     Anxiety     Arthralgia     Back pain     Depression     Dry eyes     from meds    Fever blister     Fibromyalgia     Interstitial cystitis     Irritable bowel syndrome     Kidney stone     Major depressive disorder, recurrent episode, in partial or unspecified remission 2013    OAB (overactive bladder) 2015    PTSD (post-traumatic stress disorder)     Pure hypercholesterolemia 2022    Rheumatoid arthritis     Rheumatoid arthritis 2022    Systemic lupus erythematosus     Thyroid disease     Urinary tract infection     Vaginal infection        PAST SURGICAL HISTORY:  Past Surgical History:   Procedure Laterality Date    BLADDER SURGERY       SECTION, LOW TRANSVERSE      x 2    COLONOSCOPY      COLONOSCOPY N/A 10/05/2017    Procedure: COLONOSCOPY;  Surgeon: Venkat He MD;  Location: [address];  Service: Endoscopy;  Laterality: N/A;    ESOPHAGOGASTRODUODENOSCOPY      ESOPHAGOGASTRODUODENOSCOPY N/A 10/25/2021    Procedure: EGD (ESOPHAGOGASTRODUODENOSCOPY);  Surgeon:  Venkat He MD;  Location: [facility] ENDO (4TH FLR);  Service: Endoscopy;  Laterality: N/A;  Covid test on 10/22 at [email or web address]    10/19 lvm to confirm appt-rb    EXCISIONAL BIOPSY Right 10/14/2022    Procedure: EXCISIONAL BIOPSY-right with radiological marker;  Surgeon: PALLAVI Oseguera MD;  Location: [facility];  Service: General;  Laterality: Right;    EYE SURGERY      Lasik-bilateral    HYSTERECTOMY      IMPLANTATION OF PERMANENT SACRAL NERVE STIMULATOR N/A 04/27/2022    Procedure: INSERTION, NEUROSTIMULATOR, PERMANENT, SACRAL;  Surgeon: Bandar Garcia MD;  Location: [facility] OR 2ND FLR;  Service: Urology;  Laterality: N/A;  2hr    INJECTION OF BOTULINUM TOXIN TYPE A N/A 09/12/2018    Procedure: INJECTION, BOTULINUM TOXIN, TYPE A  200 UNITS;  Surgeon: Bandar Garcia MD;  Location: [facility] OR 1ST FLR;  Service: Urology;  Laterality: N/A;    INSTILLATION OF URINARY BLADDER N/A 09/12/2018    Procedure: INSTILLATION, URINARY BLADDER;  Surgeon: Bandar Garcia MD;  [facility];  Service: Urology;  Laterality: N/A;    PARTIAL HYSTERECTOMY      REMOVAL OF ELECTRODE LEAD OF SACRAL NERVE STIMULATOR N/A 04/27/2022    Procedure: REMOVAL, ELECTRODE LEAD, SACRAL NERVE STIMULATOR;  Surgeon: Bandar Garcia MD;  Location: [facility] OR 2ND FLR;  Service: Urology;  Laterality: N/A;    SKIN TAG REMOVAL N/A 10/14/2022    Procedure: REMOVAL, SKIN TAGS;  Surgeon: PALLAVI Oseguera MD;  Location: [facility];  Service: General;  Laterality: N/A;    TRANSFORAMINAL EPIDURAL INJECTION OF STEROID Left 02/15/2021    Procedure: LUMBAR TRANSFORAMINAL LEFT L5 AND S1 DIRECT REFERRAL;  Surgeon: Marquez Nesbitt MD;  [place] [facility] [place];  Service: Pain Management;  Laterality: Left;  NEEDS CONSENT, PT REQUESTING IV SEDATION       SOCIAL HISTORY:  Social History     Socioeconomic History    Marital status:    Occupational History     Employer: OTHER   Tobacco Use    Smoking status: Former    Smokeless tobacco: Never    Tobacco comments:     14  years old-18 years old   Substance and Sexual Activity    Alcohol use: No    Drug use: No    Sexual activity: Yes     Partners: Male     Birth control/protection: See Surgical Hx, Other-see comments     Comment: Hysterectomy   Other Topics Concern    Are you pregnant or think you may be? No    Breast-feeding No     Social Determinants of Health     Financial Resource Strain: Patient Declined (12/15/2023)    Overall Financial Resource Strain (CARDIA)     Difficulty of Paying Living Expenses: Patient declined   Food Insecurity: Patient Declined (12/15/2023)    Hunger Vital Sign     Worried About Running Out of Food in the Last Year: Patient declined     Ran Out of Food in the Last Year: Patient declined   Transportation Needs: Patient Declined (12/15/2023)    PRAPARE - Transportation     Lack of Transportation (Medical): Patient declined     Lack of Transportation (Non-Medical): Patient declined   Physical Activity: Unknown (12/15/2023)    Exercise Vital Sign     Days of Exercise per Week: Patient declined     Minutes of Exercise per Session: 0 min   Stress: Patient Declined (12/7/2023)    [employer] - Occupational Stress Questionnaire     Feeling of Stress : Patient declined   Social Connections: Unknown (12/15/2023)    Social Connection and Isolation Panel [NHANES]     Frequency of Communication with Friends and Family: Patient declined     Frequency of Social Gatherings with Friends and Family: Patient declined     Active Member of Clubs or Organizations: Patient declined     Attends Club or Organization Meetings: Patient declined     Marital Status: Patient declined   Housing Stability: Patient Declined (12/15/2023)    Housing Stability Vital Sign     Unable to Pay for Housing in the Last Year: Patient declined     Unstable Housing in the Last Year: Patient declined       FAMILY HISTORY:  Family History   Problem Relation Age of Onset    Irritable bowel syndrome Mother     Arthritis  Mother     Hypertension Mother     Irritable bowel syndrome Sister     Lupus Sister     Rheum arthritis Sister     Fibromyalgia Sister     Irritable bowel syndrome Sister     Celiac disease Neg Hx     Cirrhosis Neg Hx     Colon cancer Neg Hx     Colon polyps Neg Hx     Crohn's disease Neg Hx     Cystic fibrosis Neg Hx     Esophageal cancer Neg Hx     Hemochromatosis Neg Hx     Inflammatory bowel disease Neg Hx     Liver cancer Neg Hx     Liver disease Neg Hx     Rectal cancer Neg Hx     Stomach cancer Neg Hx     Ulcerative colitis Neg Hx     Boris's disease Neg Hx     Melanoma Neg Hx     Amblyopia Neg Hx     Blindness Neg Hx     Cataracts Neg Hx     Glaucoma Neg Hx     Macular degeneration Neg Hx     Retinal detachment Neg Hx     Strabismus Neg Hx        ALLERGIES AND MEDICATIONS: updated and reviewed.  Review of patient's allergies indicates:   Allergen Reactions    Cephalexin Itching    Cephalosporins     Zofran [ondansetron hcl (pf)] Hives    Penicillins Rash     Current Outpatient Medications   Medication Sig Dispense Refill    alosetron (LOTRONEX) 0.5 MG tablet TAKE 1 TABLET(0.5 MG) BY MOUTH DAILY AS NEEDED 30 tablet 0    amitriptyline (ELAVIL) 10 MG tablet TAKE 1 TABLET(10 MG) BY MOUTH EVERY NIGHT AS NEEDED 90 tablet 3    butalbitaL-acetaminophen (BUPAP)  mg Tab Take 1 tablet by mouth every 4 (four) hours. 60 tablet 5    calcium glycerophosphate (PRELIEF) 65 mg Tab Take 2 tablets by mouth before meals and at bedtime as needed. (Patient taking differently: Take 2 tablets by mouth 3 (three) times daily with meals.) 100 each prn    cetirizine (ZYRTEC) 10 MG tablet Take 1 tablet (10 mg total) by mouth once daily. 120 tablet 2    clobetasoL (TEMOVATE) 0.05 % external solution Apply topically 2 (two) times daily. Use to affected areas for up to 2 weeks then take a 1 week break or decrease to 3 times weekly. Do not apply to groin or face. Use on scalp 50 mL 2    clonazePAM (KLONOPIN) 0.5 MG tablet Take 1  tablet (0.5 mg total) by mouth 2 (two) times daily as needed for Anxiety. 30 tablet 0    cyclobenzaprine (FLEXERIL) 10 MG tablet Take 1 tablet (10 mg total) by mouth 3 (three) times daily as needed for Muscle spasms. 90 tablet 6    famotidine (PEPCID) 40 MG tablet TAKE 1 TABLET(40 MG) BY MOUTH EVERY NIGHT AS NEEDED FOR HEARTBURN 30 tablet 5    FLUoxetine 20 MG capsule Take 1 capsule (20 mg total) by mouth once daily. 30 capsule 2    fluticasone propionate (FLONASE) 50 mcg/actuation nasal spray 2 sprays (100 mcg total) by Each Nostril route 2 (two) times daily. 31.6 mL 1    hydrOXYzine pamoate (VISTARIL) 25 MG Cap Take 1 capsule (25 mg total) by mouth 3 (three) times daily. 90 capsule 1    ketoconazole (NIZORAL) 2 % shampoo Apply topically 3 (three) times a week. Leave on scalp for 5-10 minutes then wash off 240 mL 3    lamoTRIgine (LAMICTAL) 200 MG tablet Take 1 tablet (200 mg total) by mouth once daily. 30 tablet 11    levothyroxine (SYNTHROID) 50 MCG tablet TAKE 1 TABLET BY MOUTH MONDAY THROUGH SATURDAY AND TAKE 2 TABLETS ON SUN ONLY 34 tablet 2    lifitegrast (XIIDRA) 5 % Dpet Apply 1 drop to eye every 12 (twelve) hours. 180 each 4    methen-mWarrenblue-s.phos-phsal-hyo (URIBEL) 118-10-40.8-36 mg Cap Take 1 capsule by mouth 3 (three) times daily. 30 capsule 11    methylPREDNISolone (MEDROL DOSEPACK) 4 mg tablet use as directed 21 each 0    oxybutynin (DITROPAN) 5 MG Tab Take 1 tablet (5 mg total) by mouth 3 (three) times daily. 90 tablet 3    phenazopyridine (PYRIDIUM) 200 MG tablet TAKE 1 TABLET BY MOUTH THREE TIMES DAILY AS NEEDED FOR PAIN 60 tablet 3    predniSONE (DELTASONE) 2.5 MG tablet 1 to 3 tabs PO daily prn for arthritis flare 90 tablet 0    pregabalin (LYRICA) 150 MG capsule Take 1 capsule (150 mg total) by mouth 3 (three) times daily. 90 capsule 5    RABEprazole (ACIPHEX) 20 mg tablet Take 1 tablet (20 mg total) by mouth once daily. 30 tablet 11    traMADoL (ULTRAM) 50 mg tablet TAKE 1 TABLET(50 MG) BY  "MOUTH EVERY 6 HOURS 90 tablet 4     No current facility-administered medications for this visit.       ROS  Review of Systems   Constitutional:  Negative for fever and weight loss.   HENT:  Negative for hearing loss.    Eyes:  Negative for discharge.   Respiratory:  Negative for cough, shortness of breath and wheezing.    Cardiovascular:  Negative for chest pain and palpitations.   Gastrointestinal:  Negative for abdominal pain, blood in stool, constipation, diarrhea, nausea and vomiting.   Genitourinary:  Negative for dysuria and hematuria.   Musculoskeletal:  Negative for back pain, joint pain and neck pain.   Skin:  Negative for rash.   Neurological:  Positive for headaches. Negative for dizziness and weakness.   Endo/Heme/Allergies:  Negative for polydipsia.   Psychiatric/Behavioral:  Negative for depression. The patient is not nervous/anxious.          OBJECTIVE     Physical Exam  Vitals:    12/13/23 1349   BP: 138/86   Pulse: 97    Body mass index is 32.95 kg/m².  Weight: 74 kg (163 lb 2.3 oz)   Height: 4' 11" (149.9 cm)     Physical Exam  HENT:      Head: Normocephalic and atraumatic.      Nose: Nose normal.      Mouth/Throat:      Mouth: Mucous membranes are moist.      Pharynx: Oropharynx is clear.   Eyes:      Extraocular Movements: Extraocular movements intact.      Conjunctiva/sclera: Conjunctivae normal.      Pupils: Pupils are equal, round, and reactive to light.   Pulmonary:      Effort: Pulmonary effort is normal.   Musculoskeletal:         General: No swelling. Normal range of motion.      Cervical back: Normal range of motion.      Right lower leg: No edema.      Left lower leg: No edema.   Skin:     General: Skin is warm.      Findings: No lesion or rash.   Neurological:      General: No focal deficit present.      Mental Status: She is alert and oriented to person, place, and time.      Motor: No weakness.   Psychiatric:         Mood and Affect: Mood normal.         Thought Content: Thought " content normal.               ASSESSMENT     53 y.o. female with     1. Migraine with aura and without status migrainosus, not intractable          PLAN:       1. Migraine with aura and without status migrainosus, not intractable  -     Ambulatory referral/consult to Neurology; Future; Expected date: 12/20/2023  Continue with plans for neurology follow up    2. Major depressive disorder, recurrent episode, moderate  Overview:   Lamictal 200mg daily, Klonopin 1mg daily, amitriptyline 10mg daily, venlafaxine 75 mg daily, and trazodone 50mg nightly prn. Follows regularly with Psychiatry who manages.            RTC in 6 months    Leti Howe MD

## 2023-12-20 ENCOUNTER — PATIENT MESSAGE (OUTPATIENT)
Dept: PSYCHIATRY | Facility: CLINIC | Age: 53
End: 2023-12-20
Payer: COMMERCIAL

## 2023-12-20 DIAGNOSIS — F41.1 GENERALIZED ANXIETY DISORDER: ICD-10-CM

## 2023-12-21 ENCOUNTER — PATIENT MESSAGE (OUTPATIENT)
Dept: PSYCHIATRY | Facility: CLINIC | Age: 53
End: 2023-12-21
Payer: COMMERCIAL

## 2023-12-21 RX ORDER — CLONAZEPAM 0.5 MG/1
0.5 TABLET ORAL 2 TIMES DAILY PRN
Qty: 30 TABLET | Refills: 0 | OUTPATIENT
Start: 2023-12-21 | End: 2024-01-20

## 2023-12-22 ENCOUNTER — DOCUMENTATION ONLY (OUTPATIENT)
Dept: PSYCHIATRY | Facility: CLINIC | Age: 53
End: 2023-12-22
Payer: COMMERCIAL

## 2023-12-22 DIAGNOSIS — R39.89 BLADDER PAIN: ICD-10-CM

## 2023-12-23 DIAGNOSIS — N30.10 IC (INTERSTITIAL CYSTITIS): ICD-10-CM

## 2023-12-26 NOTE — TELEPHONE ENCOUNTER
Pharmacy requesting refill on Budesonide 3mg  Pt's LOV 11/08/2023  Pt's NOV 03/14/2023  Medication pending    
- - -

## 2023-12-27 RX ORDER — AMITRIPTYLINE HYDROCHLORIDE 10 MG/1
TABLET, FILM COATED ORAL
Qty: 90 TABLET | Refills: 1 | Status: SHIPPED | OUTPATIENT
Start: 2023-12-27 | End: 2024-04-03

## 2023-12-28 DIAGNOSIS — M25.521 RIGHT ELBOW PAIN: Primary | ICD-10-CM

## 2023-12-28 RX ORDER — PHENAZOPYRIDINE HYDROCHLORIDE 200 MG/1
200 TABLET, FILM COATED ORAL
Qty: 60 TABLET | Refills: 3 | Status: SHIPPED | OUTPATIENT
Start: 2023-12-28 | End: 2024-03-14

## 2023-12-29 RX ORDER — BUDESONIDE 3 MG/1
9 CAPSULE, COATED PELLETS ORAL DAILY
Qty: 30 CAPSULE | Refills: 0 | Status: SHIPPED | OUTPATIENT
Start: 2023-12-29 | End: 2024-01-08

## 2024-01-05 ENCOUNTER — PATIENT MESSAGE (OUTPATIENT)
Dept: HEMATOLOGY/ONCOLOGY | Facility: CLINIC | Age: 54
End: 2024-01-05
Payer: COMMERCIAL

## 2024-01-05 ENCOUNTER — PATIENT MESSAGE (OUTPATIENT)
Dept: SURGERY | Facility: CLINIC | Age: 54
End: 2024-01-05
Payer: COMMERCIAL

## 2024-01-08 DIAGNOSIS — M25.511 RIGHT SHOULDER PAIN, UNSPECIFIED CHRONICITY: Primary | ICD-10-CM

## 2024-01-08 DIAGNOSIS — N60.91 ATYPICAL DUCTAL HYPERPLASIA OF RIGHT BREAST: ICD-10-CM

## 2024-01-09 ENCOUNTER — PATIENT MESSAGE (OUTPATIENT)
Dept: PSYCHIATRY | Facility: CLINIC | Age: 54
End: 2024-01-09
Payer: COMMERCIAL

## 2024-01-09 DIAGNOSIS — E03.9 HYPOTHYROIDISM, UNSPECIFIED TYPE: ICD-10-CM

## 2024-01-09 RX ORDER — LEVOTHYROXINE SODIUM 50 UG/1
TABLET ORAL
Qty: 34 TABLET | Refills: 2 | Status: SHIPPED | OUTPATIENT
Start: 2024-01-09

## 2024-01-10 ENCOUNTER — OFFICE VISIT (OUTPATIENT)
Dept: PSYCHOLOGY | Facility: CLINIC | Age: 54
End: 2024-01-10
Payer: COMMERCIAL

## 2024-01-10 DIAGNOSIS — G47.00 INSOMNIA, UNSPECIFIED TYPE: ICD-10-CM

## 2024-01-10 DIAGNOSIS — F60.89 CLUSTER B PERSONALITY DISORDER: ICD-10-CM

## 2024-01-10 DIAGNOSIS — F39 MOOD DISORDER: ICD-10-CM

## 2024-01-10 DIAGNOSIS — F33.1 MAJOR DEPRESSIVE DISORDER, RECURRENT EPISODE, MODERATE: ICD-10-CM

## 2024-01-10 DIAGNOSIS — F41.1 GENERALIZED ANXIETY DISORDER: Primary | ICD-10-CM

## 2024-01-10 DIAGNOSIS — F43.10 PTSD (POST-TRAUMATIC STRESS DISORDER): ICD-10-CM

## 2024-01-10 PROCEDURE — 99215 OFFICE O/P EST HI 40 MIN: CPT | Mod: 95,,, | Performed by: NURSE PRACTITIONER

## 2024-01-10 RX ORDER — LEVOTHYROXINE SODIUM 50 UG/1
TABLET ORAL
Qty: 30 TABLET | Refills: 6 | Status: SHIPPED | OUTPATIENT
Start: 2024-01-10

## 2024-01-10 RX ORDER — CLONAZEPAM 0.5 MG/1
0.25 TABLET ORAL 2 TIMES DAILY PRN
Qty: 30 TABLET | Refills: 0 | Status: SHIPPED | OUTPATIENT
Start: 2024-01-10 | End: 2024-02-05 | Stop reason: SDUPTHER

## 2024-01-10 RX ORDER — TRAZODONE HYDROCHLORIDE 50 MG/1
100 TABLET ORAL NIGHTLY PRN
Qty: 30 TABLET | Refills: 2 | Status: SHIPPED | OUTPATIENT
Start: 2024-01-10 | End: 2024-02-05 | Stop reason: SDUPTHER

## 2024-01-10 RX ORDER — CLONAZEPAM 0.5 MG/1
0.25 TABLET ORAL 2 TIMES DAILY PRN
Qty: 30 TABLET | Refills: 0 | Status: SHIPPED | OUTPATIENT
Start: 2024-01-10 | End: 2024-02-09

## 2024-01-10 RX ORDER — FLUOXETINE HYDROCHLORIDE 40 MG/1
40 CAPSULE ORAL DAILY
Qty: 30 CAPSULE | Refills: 2 | Status: SHIPPED | OUTPATIENT
Start: 2024-01-10 | End: 2024-02-05 | Stop reason: SDUPTHER

## 2024-01-10 RX ORDER — HYDROXYZINE PAMOATE 25 MG/1
25 CAPSULE ORAL 3 TIMES DAILY
Qty: 90 CAPSULE | Refills: 2 | Status: SHIPPED | OUTPATIENT
Start: 2024-01-10 | End: 2024-04-09

## 2024-01-10 RX ORDER — LAMOTRIGINE 200 MG/1
200 TABLET ORAL DAILY
Qty: 30 TABLET | Refills: 2 | Status: SHIPPED | OUTPATIENT
Start: 2024-01-10 | End: 2024-02-05 | Stop reason: SDUPTHER
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
## 2024-01-11 ENCOUNTER — PATIENT MESSAGE (OUTPATIENT)
Dept: RHEUMATOLOGY | Facility: CLINIC | Age: 54
End: 2024-01-11
Payer: COMMERCIAL

## 2024-01-22 ENCOUNTER — PATIENT MESSAGE (OUTPATIENT)
Dept: PSYCHOLOGY | Facility: CLINIC | Age: 54
End: 2024-01-22
Payer: COMMERCIAL

## 2024-01-22 ENCOUNTER — PATIENT MESSAGE (OUTPATIENT)
Dept: RHEUMATOLOGY | Facility: CLINIC | Age: 54
End: 2024-01-22
Payer: COMMERCIAL

## 2024-01-22 ENCOUNTER — PATIENT MESSAGE (OUTPATIENT)
Dept: PSYCHIATRY | Facility: CLINIC | Age: 54
End: 2024-01-22
Payer: COMMERCIAL

## 2024-01-23 ENCOUNTER — OFFICE VISIT (OUTPATIENT)
Dept: ORTHOPEDICS | Facility: CLINIC | Age: 54
End: 2024-01-23
Payer: COMMERCIAL

## 2024-01-23 ENCOUNTER — TELEPHONE (OUTPATIENT)
Dept: RHEUMATOLOGY | Facility: CLINIC | Age: 54
End: 2024-01-23
Payer: COMMERCIAL

## 2024-01-23 ENCOUNTER — HOSPITAL ENCOUNTER (OUTPATIENT)
Dept: RADIOLOGY | Facility: HOSPITAL | Age: 54
Discharge: HOME | End: 2024-01-23
Attending: ORTHOPAEDIC SURGERY
Payer: COMMERCIAL

## 2024-01-23 VITALS
DIASTOLIC BLOOD PRESSURE: 57 MMHG | HEIGHT: 59 IN | HEART RATE: 103 BPM | SYSTOLIC BLOOD PRESSURE: 103 MMHG | WEIGHT: 159.63 LBS | BODY MASS INDEX: 32.18 KG/M2

## 2024-01-23 DIAGNOSIS — M25.521 RIGHT ELBOW PAIN: ICD-10-CM

## 2024-01-23 DIAGNOSIS — M48.02 SPINAL STENOSIS, CERVICAL REGION: Primary | ICD-10-CM

## 2024-01-23 PROCEDURE — 99204 OFFICE O/P NEW MOD 45 MIN: CPT | Mod: S$GLB,,, | Performed by: ORTHOPAEDIC SURGERY

## 2024-01-23 PROCEDURE — 1160F RVW MEDS BY RX/DR IN RCRD: CPT | Mod: CPTII,S$GLB,, | Performed by: ORTHOPAEDIC SURGERY

## 2024-01-23 PROCEDURE — 99999 PR PBB SHADOW E&M-EST. PATIENT-LVL III: CPT | Mod: PBBFAC,,, | Performed by: ORTHOPAEDIC SURGERY

## 2024-01-23 PROCEDURE — 3078F DIAST BP <80 MM HG: CPT | Mod: CPTII,S$GLB,, | Performed by: ORTHOPAEDIC SURGERY

## 2024-01-23 PROCEDURE — 73080 X-RAY EXAM OF ELBOW: CPT | Mod: TC,FY,RT

## 2024-01-23 PROCEDURE — 73080 X-RAY EXAM OF ELBOW: CPT | Mod: 26,RT,, | Performed by: INTERNAL MEDICINE

## 2024-01-23 PROCEDURE — 3074F SYST BP LT 130 MM HG: CPT | Mod: CPTII,S$GLB,, | Performed by: ORTHOPAEDIC SURGERY

## 2024-01-23 PROCEDURE — 1159F MED LIST DOCD IN RCRD: CPT | Mod: CPTII,S$GLB,, | Performed by: ORTHOPAEDIC SURGERY

## 2024-01-23 PROCEDURE — 3008F BODY MASS INDEX DOCD: CPT | Mod: CPTII,S$GLB,, | Performed by: ORTHOPAEDIC SURGERY

## 2024-01-23 NOTE — TELEPHONE ENCOUNTER
----- Message from Paty Patrick sent at 1/23/2024  4:43 PM CST -----  Contact: self  Type:  Needs Medical Advice    Who Called: Pt  Symptoms (please be specific):  Hair loss   Pharmacy name and phone #:     [id number] - [place] - [address] AT [facility]  [address] [address]  Phone: [phone] Fax: [phone]  Best Call Back Number:  [phone]  Additional Information:  Pt states she has sent 2 messages (1/11 & 1/22) in the portal w/photos for review. Additionally, pt states that she hasn't felt right since starting Lyrica  Please call to advise... Thank you...

## 2024-01-23 NOTE — PROGRESS NOTES
"Patient ID:   Marilee Simons is a 53 y.o. female.    Chief Complaint:   Right upper extremity pain    HPI:   Mrs. Simons is a 53 year old RHD female presenting with a 2 week history of severe burning pain in her right upper extremity. She states that it feels like she has "sciatica in her upper extremity". She denies any known injury to the right upper extremity. Pain starts in the right paraspinal/trapezial region and radiates to the palm of her hand and is associated with numbness and paresthesias. She has had a history of neck pain in the past. She has been previously treated with Lyrica, Elavil, Bupap, Flexeril, and Tramadol. She denies bowel or bladder incontinence. She denies gait disturbances.     Medications:    Current Outpatient Medications:     alosetron (LOTRONEX) 0.5 MG tablet, TAKE 1 TABLET(0.5 MG) BY MOUTH DAILY AS NEEDED, Disp: 30 tablet, Rfl: 0    amitriptyline (ELAVIL) 10 MG tablet, TAKE 1 TABLET(10 MG) BY MOUTH EVERY NIGHT AS NEEDED, Disp: 90 tablet, Rfl: 1    butalbitaL-acetaminophen (BUPAP)  mg Tab, Take 1 tablet by mouth every 4 (four) hours., Disp: 60 tablet, Rfl: 5    calcium glycerophosphate (PRELIEF) 65 mg Tab, Take 2 tablets by mouth before meals and at bedtime as needed. (Patient taking differently: Take 2 tablets by mouth 3 (three) times daily with meals.), Disp: 100 each, Rfl: prn    cetirizine (ZYRTEC) 10 MG tablet, Take 1 tablet (10 mg total) by mouth once daily., Disp: 120 tablet, Rfl: 2    clobetasoL (TEMOVATE) 0.05 % external solution, Apply topically 2 (two) times daily. Use to affected areas for up to 2 weeks then take a 1 week break or decrease to 3 times weekly. Do not apply to groin or face. Use on scalp, Disp: 50 mL, Rfl: 2    clonazePAM (KLONOPIN) 0.5 MG tablet, Take 0.5 tablets (0.25 mg total) by mouth 2 (two) times daily as needed for Anxiety., Disp: 30 tablet, Rfl: 0    clonazePAM (KLONOPIN) 0.5 MG tablet, Take 0.5 tablets (0.25 mg total) by mouth 2 (two) times " daily as needed for Anxiety., Disp: 30 tablet, Rfl: 0    cyclobenzaprine (FLEXERIL) 10 MG tablet, Take 1 tablet (10 mg total) by mouth 3 (three) times daily as needed for Muscle spasms., Disp: 90 tablet, Rfl: 6    famotidine (PEPCID) 40 MG tablet, TAKE 1 TABLET(40 MG) BY MOUTH EVERY NIGHT AS NEEDED FOR HEARTBURN, Disp: 30 tablet, Rfl: 5    FLUoxetine 40 MG capsule, Take 1 capsule (40 mg total) by mouth once daily., Disp: 30 capsule, Rfl: 2    fluticasone propionate (FLONASE) 50 mcg/actuation nasal spray, 2 sprays (100 mcg total) by Each Nostril route 2 (two) times daily., Disp: 31.6 mL, Rfl: 1    hydrOXYzine pamoate (VISTARIL) 25 MG Cap, Take 1 capsule (25 mg total) by mouth 3 (three) times daily., Disp: 90 capsule, Rfl: 2    ketoconazole (NIZORAL) 2 % shampoo, Apply topically 3 (three) times a week. Leave on scalp for 5-10 minutes then wash off, Disp: 240 mL, Rfl: 3    lamoTRIgine (LAMICTAL) 200 MG tablet, Take 1 tablet (200 mg total) by mouth once daily., Disp: 30 tablet, Rfl: 2    levothyroxine (SYNTHROID) 50 MCG tablet, TAKE 1 TABLET(50 MCG) BY MOUTH EVERY DAY, Disp: 30 tablet, Rfl: 6    levothyroxine (SYNTHROID) 50 MCG tablet, TAKE 1 TABLET BY MOUTH MONDAY THROUGH SATURDAY AND TAKE 2 TABLETS ON SUN ONLY, Disp: 34 tablet, Rfl: 2    lifitegrast (XIIDRA) 5 % Dpet, Apply 1 drop to eye every 12 (twelve) hours., Disp: 180 each, Rfl: 4    methen-m.blue-s.phos-phsal-hyo (URIBEL) 118-10-40.8-36 mg Cap, Take 1 capsule by mouth 3 (three) times daily., Disp: 30 capsule, Rfl: 11    oxybutynin (DITROPAN) 5 MG Tab, Take 1 tablet (5 mg total) by mouth 3 (three) times daily., Disp: 90 tablet, Rfl: 3    phenazopyridine (PYRIDIUM) 200 MG tablet, TAKE 1 TABLET BY MOUTH THREE TIMES DAILY AS NEEDED FOR PAIN, Disp: 60 tablet, Rfl: 3    predniSONE (DELTASONE) 2.5 MG tablet, 1 to 3 tabs PO daily prn for arthritis flare, Disp: 90 tablet, Rfl: 0    pregabalin (LYRICA) 150 MG capsule, Take 1 capsule (150 mg total) by mouth 3 (three) times  daily., Disp: 90 capsule, Rfl: 5    RABEprazole (ACIPHEX) 20 mg tablet, Take 1 tablet (20 mg total) by mouth once daily., Disp: 30 tablet, Rfl: 11    traMADoL (ULTRAM) 50 mg tablet, TAKE 1 TABLET(50 MG) BY MOUTH EVERY 6 HOURS, Disp: 90 tablet, Rfl: 4    traZODone (DESYREL) 50 MG tablet, Take 2 tablets (100 mg total) by mouth nightly as needed for Insomnia., Disp: 30 tablet, Rfl: 2    Allergies:  Review of patient's allergies indicates:   Allergen Reactions    Cephalexin Itching    Cephalosporins     Zofran [ondansetron hcl (pf)] Hives    Penicillins Rash       Past Medical History:  Past Medical History:   Diagnosis Date    Acid reflux     Allergy     Alopecia     Anxiety     Arthralgia     Back pain     Depression     Dry eyes     from meds    Fever blister     Fibromyalgia     Interstitial cystitis     Irritable bowel syndrome     Kidney stone     Major depressive disorder, recurrent episode, in partial or unspecified remission 2013    OAB (overactive bladder) 2015    PTSD (post-traumatic stress disorder)     Pure hypercholesterolemia 2022    Rheumatoid arthritis     Rheumatoid arthritis 2022    Systemic lupus erythematosus     Thyroid disease     Urinary tract infection     Vaginal infection         Past Surgical History:  Past Surgical History:   Procedure Laterality Date    BLADDER SURGERY       SECTION, LOW TRANSVERSE      x 2    COLONOSCOPY      COLONOSCOPY N/A 10/05/2017    Procedure: COLONOSCOPY;  Surgeon: Venkat He MD;  Location: [address]);  Service: Endoscopy;  Laterality: N/A;    ESOPHAGOGASTRODUODENOSCOPY      ESOPHAGOGASTRODUODENOSCOPY N/A 10/25/2021    Procedure: EGD (ESOPHAGOGASTRODUODENOSCOPY);  Surgeon: Venkat He MD;  Location: [address]);  Service: Endoscopy;  Laterality: N/A;  Covid test on 10/22 at [facility]    10/19 lvm to confirm appt-rb    EXCISIONAL BIOPSY Right 10/14/2022    Procedure: EXCISIONAL BIOPSY-right with radiological  marker;  Surgeon: PALLAVI Oseguera MD;  Location: [facility] OR;  Service: General;  Laterality: Right;    EYE SURGERY      Lasik-bilateral    HYSTERECTOMY      IMPLANTATION OF PERMANENT SACRAL NERVE STIMULATOR N/A 04/27/2022    Procedure: INSERTION, NEUROSTIMULATOR, PERMANENT, SACRAL;  Surgeon: Bandar Garcia MD;  Location: [facility] OR 2ND FLR;  Service: Urology;  Laterality: N/A;  2hr    INJECTION OF BOTULINUM TOXIN TYPE A N/A 09/12/2018    Procedure: INJECTION, BOTULINUM TOXIN, TYPE A  200 UNITS;  Surgeon: Bandar Garcia MD;  [facility];  Service: Urology;  Laterality: N/A;    INSTILLATION OF URINARY BLADDER N/A 09/12/2018    Procedure: INSTILLATION, URINARY BLADDER;  Surgeon: Bandar Garcia MD;  [facility];  Service: Urology;  Laterality: N/A;    PARTIAL HYSTERECTOMY      REMOVAL OF ELECTRODE LEAD OF SACRAL NERVE STIMULATOR N/A 04/27/2022    Procedure: REMOVAL, ELECTRODE LEAD, SACRAL NERVE STIMULATOR;  Surgeon: Bandar Garcia MD;  [facility];  Service: Urology;  Laterality: N/A;    SKIN TAG REMOVAL N/A 10/14/2022    Procedure: REMOVAL, SKIN TAGS;  Surgeon: PALLAVI Oseguera MD;  Location: [facility];  Service: General;  Laterality: N/A;    TRANSFORAMINAL EPIDURAL INJECTION OF STEROID Left 02/15/2021    Procedure: LUMBAR TRANSFORAMINAL LEFT L5 AND S1 DIRECT REFERRAL;  Surgeon: Marquez Nesbitt MD;  Location: [facility];  Service: Pain Management;  Laterality: Left;  NEEDS CONSENT, PT REQUESTING IV SEDATION       Social History:  Social History     Occupational History     Employer: OTHER   Tobacco Use    Smoking status: Former    Smokeless tobacco: Never    Tobacco comments:     14 years old-18 years old   Substance and Sexual Activity    Alcohol use: No    Drug use: No    Sexual activity: Yes     Partners: Male     Birth control/protection: See Surgical Hx, Other-see comments     Comment: Hysterectomy       Family History:  Family History   Problem Relation Age of Onset     "Irritable bowel syndrome Mother     Arthritis Mother     Hypertension Mother     Irritable bowel syndrome Sister     Lupus Sister     Rheum arthritis Sister     Fibromyalgia Sister     Irritable bowel syndrome Sister     Celiac disease Neg Hx     Cirrhosis Neg Hx     Colon cancer Neg Hx     Colon polyps Neg Hx     Crohn's disease Neg Hx     Cystic fibrosis Neg Hx     Esophageal cancer Neg Hx     Hemochromatosis Neg Hx     Inflammatory bowel disease Neg Hx     Liver cancer Neg Hx     Liver disease Neg Hx     Rectal cancer Neg Hx     Stomach cancer Neg Hx     Ulcerative colitis Neg Hx     Boris's disease Neg Hx     Melanoma Neg Hx     Amblyopia Neg Hx     Blindness Neg Hx     Cataracts Neg Hx     Glaucoma Neg Hx     Macular degeneration Neg Hx     Retinal detachment Neg Hx     Strabismus Neg Hx         ROS:  Review of Systems   Musculoskeletal:  Positive for arthritis, joint pain, muscle weakness, myalgias and neck pain.   Neurological:  Positive for numbness, paresthesias and sensory change.   All other systems reviewed and are negative.    Vitals:  BP (!) 103/57   Pulse 103   Ht 4' 11" (1.499 m)   Wt 72.4 kg (159 lb 9.8 oz)   LMP  (LMP Unknown)   BMI 32.24 kg/m²     Physical Examination:  Comprehensive Orthopaedic Musculoskeletal Exam    General      Constitutional: appears stated age, mildly obese, well-developed and well-nourished    Scleral icterus: no    Labored breathing: no    Psychiatric: normal mood and affect and no acute distress    Neurological: alert and oriented x3    Skin: intact    Lymphadenopathy: none     Ortho Exam   Cervical exam:  Positive Spurlings maneuver.   Neck ROM decreased    Right shoulder exam:  No visible deformity or atrophy.   RTC strength 5/5  ROM full    Neurologic:  Manual motor testing demonstrates 4/5 weakness in the right triceps. Light touch is decreased in the palm of the hand and ulnar three digits.   DTRs are 4+ at the bilateral biceps, 4+ at the bilateral BR, 1+ at " the right triceps, 2+ at the left triceps.  Positive Hoffmans bilaterally.    Imaging:    I have ordered and independently reviewed the following imaging studies performed at [facility] today    X-Ray Elbow Complete Right  Narrative: EXAMINATION:  XR ELBOW COMPLETE 3 VIEW RIGHT    CLINICAL HISTORY:  . Pain in right elbow    TECHNIQUE:  AP, lateral, and oblique views of the right elbow were performed.    COMPARISON:  None    FINDINGS:  The osseous structures are intact without evidence of fracture or osseous destruction.  There are no significant degenerative changes.  Impression: As above    Electronically signed by: Alka You MD  Date:    01/23/2024  Time:    16:18    EXAMINATION:  CT CERVICAL SPINE WITHOUT CONTRAST     CLINICAL HISTORY:  Neck trauma, dangerous injury mechanism (Age 16-64y);     TECHNIQUE:  Low dose axial CT images through the cervical spine, with sagittal and coronal reformations.  Contrast was not administered.     COMPARISON:  None     FINDINGS:  No acute fractures of the cervical spine.  Alignment is satisfactory.     Mild disc space narrowing at C5-6.  Mild posterior disc osteophyte complex at C5-6 and to a lesser degree C4-5.  Mild facet arthropathy at C4-5 on the left.     Central canal is adequately maintained.  Moderate foraminal narrowing at C5-6 on the right.     Limited evaluation of the intraspinal contents demonstrates no hematoma or mass.Paraspinal soft tissues exhibit no acute abnormalities.     Impression:     1. No acute abnormality.  2. Mild degenerative changes, most prominent at C5-6.        Electronically signed by: Mina Alan  Date:                                            12/28/2022  Time:                                           18:14    Assessment:  1. Spinal stenosis, cervical region      Plan:  The patient's presentation is consistent with a cervical etiology. Her shoulder exam is normal. I have recommended further work-up of her cervical spine to include plain  x-rays and MRI given her symptoms, hyperreflexia, and prior evidence of disc space narrowing at C5-6 per CT in 2022. I will contact her with the results once completed.     Orders Placed This Encounter    X-Ray Cervical Spine Complete 5 view    MRI Cervical Spine Without Contrast     No follow-ups on file.

## 2024-01-24 ENCOUNTER — HOSPITAL ENCOUNTER (OUTPATIENT)
Dept: RADIOLOGY | Facility: OTHER | Age: 54
Discharge: HOME | End: 2024-01-24
Attending: INTERNAL MEDICINE
Payer: COMMERCIAL

## 2024-01-24 ENCOUNTER — OFFICE VISIT (OUTPATIENT)
Dept: NEUROLOGY | Facility: CLINIC | Age: 54
End: 2024-01-24
Payer: COMMERCIAL

## 2024-01-24 ENCOUNTER — TELEPHONE (OUTPATIENT)
Dept: RHEUMATOLOGY | Facility: CLINIC | Age: 54
End: 2024-01-24
Payer: COMMERCIAL

## 2024-01-24 VITALS — BODY MASS INDEX: 32.05 KG/M2 | HEIGHT: 59 IN | WEIGHT: 159 LBS

## 2024-01-24 DIAGNOSIS — G43.019 INTRACTABLE MIGRAINE WITHOUT AURA AND WITHOUT STATUS MIGRAINOSUS: Primary | ICD-10-CM

## 2024-01-24 DIAGNOSIS — N60.91 ATYPICAL DUCTAL HYPERPLASIA OF RIGHT BREAST: ICD-10-CM

## 2024-01-24 PROBLEM — G43.909 MIGRAINE: Status: RESOLVED | Noted: 2023-12-13 | Resolved: 2024-01-24

## 2024-01-24 PROCEDURE — 77049 MRI BREAST C-+ W/CAD BI: CPT | Mod: 26,,, | Performed by: RADIOLOGY

## 2024-01-24 PROCEDURE — 77049 MRI BREAST C-+ W/CAD BI: CPT | Mod: TC

## 2024-01-24 PROCEDURE — 99214 OFFICE O/P EST MOD 30 MIN: CPT | Mod: S$GLB,,, | Performed by: STUDENT IN AN ORGANIZED HEALTH CARE EDUCATION/TRAINING PROGRAM

## 2024-01-24 PROCEDURE — 1159F MED LIST DOCD IN RCRD: CPT | Mod: CPTII,S$GLB,, | Performed by: STUDENT IN AN ORGANIZED HEALTH CARE EDUCATION/TRAINING PROGRAM

## 2024-01-24 PROCEDURE — 3008F BODY MASS INDEX DOCD: CPT | Mod: CPTII,S$GLB,, | Performed by: STUDENT IN AN ORGANIZED HEALTH CARE EDUCATION/TRAINING PROGRAM

## 2024-01-24 PROCEDURE — A9577 INJ MULTIHANCE: HCPCS | Performed by: INTERNAL MEDICINE

## 2024-01-24 PROCEDURE — 99999 PR PBB SHADOW E&M-EST. PATIENT-LVL IV: CPT | Mod: PBBFAC,,, | Performed by: STUDENT IN AN ORGANIZED HEALTH CARE EDUCATION/TRAINING PROGRAM

## 2024-01-24 PROCEDURE — 25500020 PHARM REV CODE 255: Performed by: INTERNAL MEDICINE

## 2024-01-24 RX ADMIN — GADOBENATE DIMEGLUMINE 17 ML: 529 INJECTION, SOLUTION INTRAVENOUS at 02:01

## 2024-01-24 NOTE — TELEPHONE ENCOUNTER
----- Message from Paty Patrick sent at 1/23/2024  4:43 PM CST -----  Contact: self  Type:  Needs Medical Advice    Who Called: Pt  Symptoms (please be specific):  Hair loss   Pharmacy name and phone #:     [id number] - [place] - [address] AT [facility], [place], operated by [facility]  [address] [address]  Phone: [phone] Fax: [phone]  Best Call Back Number:  [phone]  Additional Information:  Pt states she has sent 2 messages (1/11 & 1/22) in the portal w/photos for review. Additionally, pt states that she hasn't felt right since starting Lyrica  Please call to advise... Thank you...

## 2024-01-24 NOTE — PROGRESS NOTES
Neurology Clinic Note      Date: 24  Patient Name: Marilee Simons   MRN: [id number]   PCP: Leti Howe  Referring Provider: Leti Howe MD    Assessment and Plan:   Marilee Simons is a 53 y.o. female presenting for evaluation of intractable migraines without aura.  She has failed TCAs and SNRIs.  Beta-blockers are contraindicated due to history of depression.    -- Preventive: Submitted prior authorization for Ajovy.  Discussed expectations regarding response to therapy and the fact that it may take at least 3 doses before she starts to see some benefit.  -- Abortive:  Triptans are contraindicated as she is already on tramadol, Prozac and amitriptyline (risk of serotonin syndrome).  Advised to use Excedrin migraine as needed for severe attacks and restrict to < 5/month.  -- Consider the following supplements: Mg Oxide 400mg daily or Riboflavin (Vit B2) 400mg daily or CoQ 200mg daily  -- Headache diary  -- Counseled on the followin) Medication overuse headache  2) Trigger avoidance      RTC 3 months      Problem List Items Addressed This Visit          Neuro    Intractable migraine without aura and without status migrainosus - Primary    Relevant Medications    erenumab-aooe (AIMOVIG) 140 mg/mL autoinjector         Subjective:          HPI:   Ms. Marilee Simons is a 53 y.o. female with a history of fibromyalgia, RA, lupus, chronic pain, OAB, neurostimulator device in place, anxiety, depression, hypothyroidism presenting for evaluation of headaches.    Headaches have been present since she was a teenager.  Describes them as bifrontal, throbbing headaches associated with pain in the right eye along with nausea/vomiting, photophobia and phonophobia.  Her headaches used to cluster around her periods.  Over the last 3 years, they have gotten more frequent and severe.  Currently she has 4/month with each attack typically lasting > 48 hours.  Triggers include cheese, bread.    She has tried and  failed/unable to tolerate the following medications - gabapentin, tizanidine, amitriptyline, Flexeril, venlafaxine, Cymbalta    Her current medication regimen includes tramadol, Prozac, tizanidine, Fioricet, gabapentin, amitriptyline.  She is on this medication for chronic pain and fibromyalgia    Recently underwent an MRI of the brain which was marred by significant artifact from braces but showed no obvious abnormality.    Sister has migraines.    PAST MEDICAL HISTORY:  Past Medical History:   Diagnosis Date    Acid reflux     Allergy     Alopecia     Anxiety     Arthralgia     Back pain     Depression     Dry eyes     from meds    Fever blister     Fibromyalgia     Interstitial cystitis     Irritable bowel syndrome     Kidney stone     Major depressive disorder, recurrent episode, in partial or unspecified remission 2013    OAB (overactive bladder) 2015    PTSD (post-traumatic stress disorder)     Pure hypercholesterolemia 2022    Rheumatoid arthritis     Rheumatoid arthritis 2022    Systemic lupus erythematosus     Thyroid disease     Urinary tract infection     Vaginal infection        PAST SURGICAL HISTORY:  Past Surgical History:   Procedure Laterality Date    BLADDER SURGERY       SECTION, LOW TRANSVERSE      x 2    COLONOSCOPY      COLONOSCOPY N/A 10/05/2017    Procedure: COLONOSCOPY;  Surgeon: Venkat He MD;  Location: [address]);  Service: Endoscopy;  Laterality: N/A;    ESOPHAGOGASTRODUODENOSCOPY      ESOPHAGOGASTRODUODENOSCOPY N/A 10/25/2021    Procedure: EGD (ESOPHAGOGASTRODUODENOSCOPY);  Surgeon: Venkat He MD;  Location: [address]);  Service: Endoscopy;  Laterality: N/A;  Covid test on 10/22 at [place]    10/19 lvm to confirm appt-rb    EXCISIONAL BIOPSY Right 10/14/2022    Procedure: EXCISIONAL BIOPSY-right with radiological marker;  Surgeon: PALLAVI Oseguera MD;  Location: [facility];  Service: General;  Laterality: Right;    EYE SURGERY       Lasik-bilateral    HYSTERECTOMY      IMPLANTATION OF PERMANENT SACRAL NERVE STIMULATOR N/A 04/27/2022    Procedure: INSERTION, NEUROSTIMULATOR, PERMANENT, SACRAL;  Surgeon: Bandar Garcia MD;  Location: [facility] OR 2ND FLR;  Service: Urology;  Laterality: N/A;  2hr    INJECTION OF BOTULINUM TOXIN TYPE A N/A 09/12/2018    Procedure: INJECTION, BOTULINUM TOXIN, TYPE A  200 UNITS;  Surgeon: Bandar Garcia MD;  Location: [facility] OR 1ST FLR;  Service: Urology;  Laterality: N/A;    INSTILLATION OF URINARY BLADDER N/A 09/12/2018    Procedure: INSTILLATION, URINARY BLADDER;  Surgeon: Bandar Garcia MD;  Location: [facility] OR 1ST FLR;  Service: Urology;  Laterality: N/A;    PARTIAL HYSTERECTOMY      REMOVAL OF ELECTRODE LEAD OF SACRAL NERVE STIMULATOR N/A 04/27/2022    Procedure: REMOVAL, ELECTRODE LEAD, SACRAL NERVE STIMULATOR;  Surgeon: Bandar Garcia MD;  Location: [facility] OR 2ND FLR;  Service: Urology;  Laterality: N/A;    SKIN TAG REMOVAL N/A 10/14/2022    Procedure: REMOVAL, SKIN TAGS;  Surgeon: PALLAVI Oseguera MD;  Location: [facility] OR;  Service: General;  Laterality: N/A;    TRANSFORAMINAL EPIDURAL INJECTION OF STEROID Left 02/15/2021    Procedure: LUMBAR TRANSFORAMINAL LEFT L5 AND S1 DIRECT REFERRAL;  Surgeon: Marquez Nesbitt MD;  Location: [facility] PAIN MGT;  Service: Pain Management;  Laterality: Left;  NEEDS CONSENT, PT REQUESTING IV SEDATION       CURRENT MEDS:  Current Outpatient Medications   Medication Sig Dispense Refill    alosetron (LOTRONEX) 0.5 MG tablet TAKE 1 TABLET(0.5 MG) BY MOUTH DAILY AS NEEDED 30 tablet 0    amitriptyline (ELAVIL) 10 MG tablet TAKE 1 TABLET(10 MG) BY MOUTH EVERY NIGHT AS NEEDED 90 tablet 1    butalbitaL-acetaminophen (BUPAP)  mg Tab Take 1 tablet by mouth every 4 (four) hours. 60 tablet 5    calcium glycerophosphate (PRELIEF) 65 mg Tab Take 2 tablets by mouth before meals and at bedtime as needed. (Patient taking differently: Take 2 tablets by mouth 3 (three) times daily with meals.) 100  each prn    cetirizine (ZYRTEC) 10 MG tablet Take 1 tablet (10 mg total) by mouth once daily. 120 tablet 2    clobetasoL (TEMOVATE) 0.05 % external solution Apply topically 2 (two) times daily. Use to affected areas for up to 2 weeks then take a 1 week break or decrease to 3 times weekly. Do not apply to groin or face. Use on scalp 50 mL 2    clonazePAM (KLONOPIN) 0.5 MG tablet Take 0.5 tablets (0.25 mg total) by mouth 2 (two) times daily as needed for Anxiety. 30 tablet 0    clonazePAM (KLONOPIN) 0.5 MG tablet Take 0.5 tablets (0.25 mg total) by mouth 2 (two) times daily as needed for Anxiety. 30 tablet 0    cyclobenzaprine (FLEXERIL) 10 MG tablet Take 1 tablet (10 mg total) by mouth 3 (three) times daily as needed for Muscle spasms. 90 tablet 6    erenumab-aooe (AIMOVIG) 140 mg/mL autoinjector Inject 1 mL (140 mg total) into the skin every 28 days. 1 mL 6    famotidine (PEPCID) 40 MG tablet TAKE 1 TABLET(40 MG) BY MOUTH EVERY NIGHT AS NEEDED FOR HEARTBURN 30 tablet 5    FLUoxetine 40 MG capsule Take 1 capsule (40 mg total) by mouth once daily. 30 capsule 2    fluticasone propionate (FLONASE) 50 mcg/actuation nasal spray 2 sprays (100 mcg total) by Each Nostril route 2 (two) times daily. 31.6 mL 1    hydrOXYzine pamoate (VISTARIL) 25 MG Cap Take 1 capsule (25 mg total) by mouth 3 (three) times daily. 90 capsule 2    ketoconazole (NIZORAL) 2 % shampoo Apply topically 3 (three) times a week. Leave on scalp for 5-10 minutes then wash off 240 mL 3    lamoTRIgine (LAMICTAL) 200 MG tablet Take 1 tablet (200 mg total) by mouth once daily. 30 tablet 2    levothyroxine (SYNTHROID) 50 MCG tablet TAKE 1 TABLET(50 MCG) BY MOUTH EVERY DAY 30 tablet 6    levothyroxine (SYNTHROID) 50 MCG tablet TAKE 1 TABLET BY MOUTH MONDAY THROUGH SATURDAY AND TAKE 2 TABLETS ON SUN ONLY 34 tablet 2    lifitegrast (XIIDRA) 5 % Dpet Apply 1 drop to eye every 12 (twelve) hours. 180 each 4    methen-m.blue-s.phos-arya-hyo (URIBEL) 118-10-40.8-36 mg  Cap Take 1 capsule by mouth 3 (three) times daily. 30 capsule 11    oxybutynin (DITROPAN) 5 MG Tab Take 1 tablet (5 mg total) by mouth 3 (three) times daily. 90 tablet 3    phenazopyridine (PYRIDIUM) 200 MG tablet TAKE 1 TABLET BY MOUTH THREE TIMES DAILY AS NEEDED FOR PAIN 60 tablet 3    predniSONE (DELTASONE) 2.5 MG tablet 1 to 3 tabs PO daily prn for arthritis flare 90 tablet 0    pregabalin (LYRICA) 150 MG capsule Take 1 capsule (150 mg total) by mouth 3 (three) times daily. 90 capsule 5    RABEprazole (ACIPHEX) 20 mg tablet Take 1 tablet (20 mg total) by mouth once daily. 30 tablet 11    traMADoL (ULTRAM) 50 mg tablet TAKE 1 TABLET(50 MG) BY MOUTH EVERY 6 HOURS 90 tablet 4    traZODone (DESYREL) 50 MG tablet Take 2 tablets (100 mg total) by mouth nightly as needed for Insomnia. 30 tablet 2     No current facility-administered medications for this visit.       ALLERGIES:  Review of patient's allergies indicates:   Allergen Reactions    Cephalexin Itching    Cephalosporins     Zofran [ondansetron hcl (pf)] Hives    Penicillins Rash       FAMILY HISTORY:  Family History   Problem Relation Age of Onset    Irritable bowel syndrome Mother     Arthritis Mother     Hypertension Mother     Irritable bowel syndrome Sister     Lupus Sister     Rheum arthritis Sister     Fibromyalgia Sister     Irritable bowel syndrome Sister     Celiac disease Neg Hx     Cirrhosis Neg Hx     Colon cancer Neg Hx     Colon polyps Neg Hx     Crohn's disease Neg Hx     Cystic fibrosis Neg Hx     Esophageal cancer Neg Hx     Hemochromatosis Neg Hx     Inflammatory bowel disease Neg Hx     Liver cancer Neg Hx     Liver disease Neg Hx     Rectal cancer Neg Hx     Stomach cancer Neg Hx     Ulcerative colitis Neg Hx     Boris's disease Neg Hx     Melanoma Neg Hx     Amblyopia Neg Hx     Blindness Neg Hx     Cataracts Neg Hx     Glaucoma Neg Hx     Macular degeneration Neg Hx     Retinal detachment Neg Hx     Strabismus Neg Hx        SOCIAL  "HISTORY:  Social History     Tobacco Use    Smoking status: Former    Smokeless tobacco: Never    Tobacco comments:     14 years old-18 years old   Substance Use Topics    Alcohol use: No    Drug use: No       Review of Systems:  12 system review of systems is negative except for the symptoms mentioned in HPI.      Objective:     Vitals:    01/24/24 1436   Weight: 72.1 kg (159 lb)   Height: 4' 11" (1.499 m)     General: NAD, well nourished   Eyes: no tearing, discharge, no erythema   Neck: Supple, full range of motion  Cardiovascular: Warm and well perfused  Lungs: Normal work of breathing  Skin: No rash, lesions, or breakdown on exposed skin  Psychiatry: Mood and affect are appropriate       NEUROLOGICAL EXAMINATION:     MENTAL STATUS   Oriented to person, place, and time.   Follows 2 step commands.   Speech: speech is normal   Level of consciousness: alert    CRANIAL NERVES     CN II   Visual fields full to confrontation.     CN III, IV, VI   Extraocular motions are normal.   Nystagmus: none   Ophthalmoparesis: none    CN V   Facial sensation intact.     CN VII   Facial expression full, symmetric.     CN XI   CN XI normal.     CN XII   CN XII normal.     MOTOR EXAM   Right arm pronator drift: absent  Left arm pronator drift: absent       5/5 in bilateral upper and lower extremities.     REFLEXES     Reflexes   Right brachioradialis: 2+  Left brachioradialis: 2+  Right biceps: 2+  Left biceps: 2+  Right patellar: 2+  Left patellar: 2+  Right achilles: 2+  Left achilles: 2+  Right plantar: normal  Left plantar: normal    SENSORY EXAM   Light touch normal.     GAIT AND COORDINATION     Gait  Gait: normal        Images:    Other Studies:          Delmar Márquez MD  Department of Neurology  [facility]      "

## 2024-01-25 RX ORDER — METHYLPREDNISOLONE 4 MG/1
TABLET ORAL
Qty: 21 EACH | Refills: 0 | Status: SHIPPED | OUTPATIENT
Start: 2024-01-25 | End: 2024-02-15

## 2024-01-29 ENCOUNTER — PATIENT MESSAGE (OUTPATIENT)
Dept: NEUROLOGY | Facility: CLINIC | Age: 54
End: 2024-01-29
Payer: COMMERCIAL

## 2024-01-30 DIAGNOSIS — G43.019 INTRACTABLE MIGRAINE WITHOUT AURA AND WITHOUT STATUS MIGRAINOSUS: Primary | ICD-10-CM

## 2024-01-30 RX ORDER — FREMANEZUMAB-VFRM 225 MG/1.5ML
225 INJECTION SUBCUTANEOUS
Qty: 1.5 ML | Refills: 6 | Status: SHIPPED | OUTPATIENT
Start: 2024-01-30 | End: 2024-04-03

## 2024-01-31 ENCOUNTER — PATIENT MESSAGE (OUTPATIENT)
Dept: NEUROLOGY | Facility: CLINIC | Age: 54
End: 2024-01-31
Payer: COMMERCIAL

## 2024-02-01 ENCOUNTER — TELEPHONE (OUTPATIENT)
Dept: ADMINISTRATIVE | Facility: OTHER | Age: 54
End: 2024-02-01
Payer: COMMERCIAL

## 2024-02-01 ENCOUNTER — PATIENT MESSAGE (OUTPATIENT)
Dept: ADMINISTRATIVE | Facility: OTHER | Age: 54
End: 2024-02-01
Payer: COMMERCIAL

## 2024-02-01 NOTE — TELEPHONE ENCOUNTER
BENI with scheduled Neurology appointment of 4-3-2024, and contact number provided for any questions. My Chart message to be sent.

## 2024-02-01 NOTE — TELEPHONE ENCOUNTER
LM that appointment of 4-3-24 has been cancelled as it was scheduled incorrectly. A referral message has been sent to  staff for assistance with appointment for Botox.

## 2024-02-05 ENCOUNTER — OFFICE VISIT (OUTPATIENT)
Dept: PSYCHOLOGY | Facility: CLINIC | Age: 54
End: 2024-02-05
Payer: COMMERCIAL

## 2024-02-05 DIAGNOSIS — F60.89 CLUSTER B PERSONALITY DISORDER: ICD-10-CM

## 2024-02-05 DIAGNOSIS — F41.1 GENERALIZED ANXIETY DISORDER: Primary | ICD-10-CM

## 2024-02-05 DIAGNOSIS — F39 MOOD DISORDER: ICD-10-CM

## 2024-02-05 DIAGNOSIS — F43.10 PTSD (POST-TRAUMATIC STRESS DISORDER): ICD-10-CM

## 2024-02-05 DIAGNOSIS — F33.1 MAJOR DEPRESSIVE DISORDER, RECURRENT EPISODE, MODERATE: ICD-10-CM

## 2024-02-05 DIAGNOSIS — G47.00 INSOMNIA, UNSPECIFIED TYPE: ICD-10-CM

## 2024-02-05 PROCEDURE — 99214 OFFICE O/P EST MOD 30 MIN: CPT | Mod: 95,,, | Performed by: NURSE PRACTITIONER

## 2024-02-05 RX ORDER — CLONAZEPAM 0.5 MG/1
0.25 TABLET ORAL DAILY
Qty: 15 TABLET | Refills: 0 | Status: SHIPPED | OUTPATIENT
Start: 2024-03-01 | End: 2024-03-31

## 2024-02-05 RX ORDER — FLUOXETINE HYDROCHLORIDE 40 MG/1
40 CAPSULE ORAL DAILY
Qty: 30 CAPSULE | Refills: 2 | Status: SHIPPED | OUTPATIENT
Start: 2024-02-05 | End: 2024-04-03 | Stop reason: SDUPTHER

## 2024-02-05 RX ORDER — LAMOTRIGINE 200 MG/1
200 TABLET ORAL DAILY
Qty: 30 TABLET | Refills: 2 | Status: SHIPPED | OUTPATIENT
Start: 2024-02-05 | End: 2024-04-03 | Stop reason: SDUPTHER

## 2024-02-05 RX ORDER — TRAZODONE HYDROCHLORIDE 50 MG/1
100 TABLET ORAL NIGHTLY PRN
Qty: 30 TABLET | Refills: 2 | Status: SHIPPED | OUTPATIENT
Start: 2024-02-05 | End: 2024-04-03

## 2024-02-05 NOTE — PROGRESS NOTES
The patient location is: [place], [place]  The chief complaint leading to consultation is: Anxiety, Side effects    Visit type: audiovisual    Face to Face time with patient: 20  30 minutes of total time spent on the encounter, which includes face to face time and non-face to face time preparing to see the patient (eg, review of tests), Obtaining and/or reviewing separately obtained history, Documenting clinical information in the electronic or other health record, Independently interpreting results (not separately reported) and communicating results to the patient/family/caregiver, or Care coordination (not separately reported).     Each patient to whom he or she provides medical services by telemedicine is:  (1) informed of the relationship between the physician and patient and the respective role of any other health care provider with respect to management of the patient; and (2) notified that he or she may decline to receive medical services by telemedicine and may withdraw from such care at any time.    Notes:     Outpatient Psychiatry Follow-Up Visit ([facility]MNP-BC)    02/05/2024    Clinical Status of Patient:  Outpatient (Ambulatory)    Chief Complaint:  Marilee Simons is a 53 y.o. female who presents today for follow-up of depression, anxiety, and PTSD .  Met with patient.     Patient reports suffering from SLE, RA, and Hashimoto's Thyroiditis    Current Medications:   Lamictal 200 mg Daily  Trazodone 50 mg at bedtime PRN  Klonopin 1 mg Twice daily  Gabapentin 800 mg TID - prescribed by her rheumatologist  Amitriptyline 10 mg at bedtime for Cystitis - (Prescribed by urologist)  Tramadol 50 mg PRN   Hydroxyzine 25 mg TID PRN for anxiety  Prozac 40 mg Daily    Past Medication Trials:  Cymbalta- situational depression   Effexor- Migraines headaches    Interval History and Content of Current Session:  Patient seen and chart reviewed. Last seen on 1/10/24    At the previous visit the patient was started on Prozac 40  "mg Daily.  Inbetween the visit patient messaeged stating that he hair had starting thinning again. This has happened in the past. Patient states "I've been okay, but I wish you could give me a pill for this personality disorder" Reports when she gets angry its like a top flies off, "I scream, fight, cry non stop, and I want to just kill my self." She reports this happens with her  and her children.   She reports that her rheumatologist weaned her off the Lyrica to gabapentin due to increased brain fogginess. She reports she might have to have back and neck surgery soon which worries her.  Patient reports decreasing klonopin to taking 1/2 tablet Daily. Reports that the increase of Prozac has made her feel better and that not crying as often. But she reports being afraid of leaving her house and not having the will to get up and go out to eat.     Denies SI/HI/AVH. Denies adverse effects from medication  Pt reports taking medications as prescribed and behaving appropriately during interview today.    Pt appears:  Appropriate affect and attire    Mood:  sad    Sleep:  Mild improvement, Reports sleeping 6 hrs/night    Appetite:  Normal    Self Rates Depression: 8-9/10  Self Rated Anxiety:  8/10      Psychotherapy:  Target symptoms: depression, distractability, anxiety   Why chosen therapy is appropriate versus another modality: relevant to diagnosis  Outcome monitoring methods: self-report  Therapeutic intervention type: supportive psychotherapy  Topics discussed/themes: relationships difficulties, illness/death of a loved one, symptom recognition  The patient's response to the intervention is accepting. The patient's progress toward treatment goals is fair.   Duration of intervention: 20 minutes.    Review of Systems   PSYCHIATRIC: Pertinant items are noted in the narrative.        Past Medical, Family and Social History: The patient's past medical, family and social history have been reviewed and updated as " appropriate within the electronic medical record - see encounter notes.    Compliance: no    Side effects: None    Risk Parameters:  Patient reports no suicidal ideation  Patient reports no homicidal ideation  Patient reports no self-injurious behavior  Patient reports no violent behavior    Exam (detailed: at least 9 elements; comprehensive: all 15 elements)   Constitutional  Vitals:  Most recent vital signs, dated less than 90 days prior to this appointment, were reviewed.   There were no vitals filed for this visit.     General:  unremarkable, age appropriate     Musculoskeletal  Muscle Strength/Tone:  no spasicity, no rigidity, no cogwheeling, no flaccidity, no paratonia, no dyskinesia, no dystonia, no tremor, no tic, no choreoathetosis, no atrophy   Gait & Station:  non-ataxic     Psychiatric  Speech:  no latency; no press   Mood & Affect:  depressed, sad  congruent and appropriate, labile, sad   Thought Process:  normal and logical   Associations:  intact, tangential   Thought Content:  normal, no suicidality, no homicidality, delusions, or paranoia   Insight:  intact, has awareness of illness   Judgement: behavior is adequate to circumstances, age appropriate   Orientation:  grossly intact, person, place, situation, time/date, day of week   Memory: intact for content of interview, grossly intact   Language: grossly intact, able to name, able to repeat   Attention Span & Concentration:  able to focus, completed tasks   Fund of Knowledge:  intact and appropriate to age and level of education, familiar with aspects of current personal life     Assessment and Diagnosis   Status/Progress: Based on the examination today, the patient's problem(s) is/are inadequately controlled.  New problems have not been presented today.   Co-morbidities, Diagnostic uncertainty, and Lack of compliance are not complicating management of the primary condition.  There are no active rule-out diagnoses for this patient at this time.      General Impression: Marilee Simons, a 53 y.o. female, for follow up PTSD, MDD, CHANTE, Cluster B Personality Disorder r/o Bipolar Disorder.  Presents 8/17/23- Increased significant anxiety and depression, with PTSD flashback. Increase Effexor to 300 mg Daily. Start hydroxyzine 25 mg TID PRN.  Presents 9/13/23 - Patient appears to be in anxious crisis, Increase Effexor to 225 mg Daily, IOP referral.   Presents 11/3/21- D/C Effexor, Start Cymbalta   Presents 12/8/23- d/c Cymbalta. Continue tapering off Effexor 37.5 mg Daily, then every other day, Start Prozac 20 mg daily this is to to be cross taperd.  Presents 1/10/24- Increase Prozac to 40 mg daily and trazodone to 100 mg PRN nightly.   Presents 2/5/24- Continue meds       ICD-10-CM ICD-9-CM    1. Generalized anxiety disorder  F41.1 300.02 FLUoxetine 40 MG capsule      clonazePAM (KLONOPIN) 0.5 MG tablet      2. PTSD (post-traumatic stress disorder)  F43.10 309.81 FLUoxetine 40 MG capsule      clonazePAM (KLONOPIN) 0.5 MG tablet      3. Major depressive disorder, recurrent episode, moderate  F33.1 296.32 FLUoxetine 40 MG capsule      4. Cluster B personality disorder  F60.89 301.89 lamoTRIgine (LAMICTAL) 200 MG tablet      5. Mood disorder  F39 296.90 lamoTRIgine (LAMICTAL) 200 MG tablet      6. Insomnia, unspecified type  G47.00 780.52 traZODone (DESYREL) 50 MG tablet            Intervention/Counseling/Treatment Plan   Medication Management: The risks and benefits of medication were discussed with the patient.  Continue Lamictal 200 mg Daily  Continue Trazodone 50 mg at bedtime PRN  Continue Prozac 40 mg Daily  Continue Hydroxyzine 25 mg TID PRN for anxiety  Decrease Klonopin 0.25 mg daily for Anxiety  Checked [place]  and no irregularities were noted.  Last refill picked up on 01/10/24  1 refill starting 3/1/24  Discussed diagnosis, risk and benefits of proposed treatment above vs alternative treatment vs no treatment, and potential side effects of these  treatments, and the inherent unpredictability of individual responses to these treatments. The patient expresses understanding and gives informed consent to pursue treatment at this time, believing that the potential benefits outweigh the potential risks. Patient has no other questions. Risks/adverse effects at this time include but are not limited to: GI side effects, sexual dysfunction, activation vs sedation, triggering of suicidal ideation, and serotonin syndrome.   Patient voices understanding and agreement with this plan  Provided crisis numbers  Encouraged patient to keep future appointments  Instruct patient to call or message with questions  In the event of an emergency, including suicidal ideation, patient was advised to go to the emergency room      Return to Clinic: 3 months    Total time: 40 minutes with more than 50% of time spent counseling and/or coordinating care.  (which included pts differential diagnosis and prognosis for psychiatric conditions, risks, benefits of treatments, instructions and adherence to treatment plan, risk reduction, reviewing current psychiatric medication regimen, medical problems and social stressors. In addition to possible discussion with other healthcare provider/s)    Melanie Gant DNP, PMNEELP, CASEP

## 2024-02-06 ENCOUNTER — PATIENT MESSAGE (OUTPATIENT)
Dept: NEUROLOGY | Facility: CLINIC | Age: 54
End: 2024-02-06
Payer: COMMERCIAL

## 2024-02-06 ENCOUNTER — HOSPITAL ENCOUNTER (OUTPATIENT)
Dept: RADIOLOGY | Facility: HOSPITAL | Age: 54
Discharge: HOME | End: 2024-02-06
Attending: ORTHOPAEDIC SURGERY
Payer: COMMERCIAL

## 2024-02-06 DIAGNOSIS — M48.02 SPINAL STENOSIS, CERVICAL REGION: ICD-10-CM

## 2024-02-06 PROCEDURE — 72141 MRI NECK SPINE W/O DYE: CPT | Mod: TC

## 2024-02-06 PROCEDURE — 72141 MRI NECK SPINE W/O DYE: CPT | Mod: 26,,, | Performed by: RADIOLOGY

## 2024-02-07 ENCOUNTER — INFUSION (OUTPATIENT)
Dept: INFECTIOUS DISEASES | Facility: HOSPITAL | Age: 54
End: 2024-02-07
Payer: COMMERCIAL

## 2024-02-07 VITALS
HEART RATE: 77 BPM | RESPIRATION RATE: 18 BRPM | SYSTOLIC BLOOD PRESSURE: 124 MMHG | BODY MASS INDEX: 32.67 KG/M2 | HEIGHT: 59 IN | WEIGHT: 162.06 LBS | OXYGEN SATURATION: 97 % | TEMPERATURE: 98 F | DIASTOLIC BLOOD PRESSURE: 66 MMHG

## 2024-02-07 DIAGNOSIS — M06.00 SERONEGATIVE RHEUMATOID ARTHRITIS: ICD-10-CM

## 2024-02-07 DIAGNOSIS — L40.50 PSORIATIC ARTHRITIS: Primary | ICD-10-CM

## 2024-02-07 PROCEDURE — 96375 TX/PRO/DX INJ NEW DRUG ADDON: CPT

## 2024-02-07 PROCEDURE — 63600175 PHARM REV CODE 636 W HCPCS: Mod: JZ,JG | Performed by: INTERNAL MEDICINE

## 2024-02-07 PROCEDURE — 96365 THER/PROPH/DIAG IV INF INIT: CPT

## 2024-02-07 PROCEDURE — 25000003 PHARM REV CODE 250: Performed by: INTERNAL MEDICINE

## 2024-02-07 RX ORDER — HEPARIN 100 UNIT/ML
500 SYRINGE INTRAVENOUS
Status: CANCELLED | OUTPATIENT
Start: 2024-03-06

## 2024-02-07 RX ORDER — ACETAMINOPHEN 325 MG/1
650 TABLET ORAL
Status: CANCELLED | OUTPATIENT
Start: 2024-03-06

## 2024-02-07 RX ORDER — SODIUM CHLORIDE 0.9 % (FLUSH) 0.9 %
10 SYRINGE (ML) INJECTION
Status: CANCELLED | OUTPATIENT
Start: 2024-03-06

## 2024-02-07 RX ORDER — DIPHENHYDRAMINE HYDROCHLORIDE 50 MG/ML
50 INJECTION INTRAMUSCULAR; INTRAVENOUS ONCE AS NEEDED
Status: CANCELLED | OUTPATIENT
Start: 2024-03-06

## 2024-02-07 RX ORDER — METHYLPREDNISOLONE SOD SUCC 125 MG
100 VIAL (EA) INJECTION
Status: CANCELLED | OUTPATIENT
Start: 2024-03-06

## 2024-02-07 RX ORDER — EPINEPHRINE 0.3 MG/.3ML
0.3 INJECTION SUBCUTANEOUS ONCE AS NEEDED
Status: CANCELLED | OUTPATIENT
Start: 2024-03-06

## 2024-02-07 RX ORDER — METHYLPREDNISOLONE SOD SUCC 125 MG
100 VIAL (EA) INJECTION
Status: COMPLETED | OUTPATIENT
Start: 2024-02-07 | End: 2024-02-07

## 2024-02-07 RX ORDER — DIPHENHYDRAMINE HYDROCHLORIDE 50 MG/ML
50 INJECTION INTRAMUSCULAR; INTRAVENOUS
Status: CANCELLED | OUTPATIENT
Start: 2024-03-06

## 2024-02-07 RX ORDER — SODIUM CHLORIDE 0.9 % (FLUSH) 0.9 %
10 SYRINGE (ML) INJECTION
Status: DISCONTINUED | OUTPATIENT
Start: 2024-02-07 | End: 2024-02-07 | Stop reason: HOSPADM

## 2024-02-07 RX ADMIN — SODIUM CHLORIDE: 900 INJECTION INTRAVENOUS at 03:02

## 2024-02-07 RX ADMIN — GOLIMUMAB 150 MG: 50 SOLUTION INTRAVENOUS at 03:02

## 2024-02-07 RX ADMIN — METHYLPREDNISOLONE SODIUM SUCCINATE 100 MG: 125 INJECTION, POWDER, FOR SOLUTION INTRAMUSCULAR; INTRAVENOUS at 03:02

## 2024-02-07 NOTE — PROGRESS NOTES
Patient arrived to infusion clinic for Simponi. Received premedication of Methylprednisolone IVP 30 minutes before infusion started. Patient tolerating infusion well. Return appointment provided to patient.    Limited head-to-toe assessment due to privacy issues and visit reason though the opportunity was given for patient to express any concerns.

## 2024-02-14 ENCOUNTER — TELEPHONE (OUTPATIENT)
Dept: ORTHOPEDICS | Facility: CLINIC | Age: 54
End: 2024-02-14
Payer: COMMERCIAL

## 2024-02-14 DIAGNOSIS — M48.02 SPINAL STENOSIS, CERVICAL REGION: Primary | ICD-10-CM

## 2024-02-14 NOTE — TELEPHONE ENCOUNTER
[facility] for patient.  Appointment canceled today.  MRI showed cervical issue.  Dr. Person tried to call patient, but no answer.  Pt referred to neurology.

## 2024-02-20 ENCOUNTER — PATIENT MESSAGE (OUTPATIENT)
Dept: UROLOGY | Facility: CLINIC | Age: 54
End: 2024-02-20
Payer: COMMERCIAL

## 2024-02-23 DIAGNOSIS — K21.9 GASTROESOPHAGEAL REFLUX DISEASE, UNSPECIFIED WHETHER ESOPHAGITIS PRESENT: ICD-10-CM

## 2024-02-25 ENCOUNTER — PATIENT MESSAGE (OUTPATIENT)
Dept: RHEUMATOLOGY | Facility: CLINIC | Age: 54
End: 2024-02-25
Payer: COMMERCIAL

## 2024-02-25 DIAGNOSIS — Z78.0 ASYMPTOMATIC POSTMENOPAUSAL STATE: Primary | ICD-10-CM

## 2024-02-25 RX ORDER — FAMOTIDINE 40 MG/1
40 TABLET, FILM COATED ORAL NIGHTLY PRN
Qty: 30 TABLET | Refills: 5 | Status: SHIPPED | OUTPATIENT
Start: 2024-02-25

## 2024-03-05 ENCOUNTER — OFFICE VISIT (OUTPATIENT)
Dept: PSYCHIATRY | Facility: CLINIC | Age: 54
End: 2024-03-05
Payer: COMMERCIAL

## 2024-03-05 DIAGNOSIS — F41.1 GENERALIZED ANXIETY DISORDER: Primary | ICD-10-CM

## 2024-03-05 DIAGNOSIS — F33.1 MAJOR DEPRESSIVE DISORDER, RECURRENT EPISODE, MODERATE: ICD-10-CM

## 2024-03-05 PROCEDURE — 90834 PSYTX W PT 45 MINUTES: CPT | Mod: 95,,, | Performed by: SOCIAL WORKER

## 2024-03-05 NOTE — PROGRESS NOTES
Individual Psychotherapy (PhD/LCSW)    3/5/2024    Site:  [facility]         Therapeutic Intervention: Met with patient.  Outpatient - Insight oriented psychotherapy 45 min - CPT code 84196    Chief complaint/reason for encounter: depression, anxiety and ptsd     Interval history and content of current session: The patient location is: home in [place]  The chief complaint leading to consultation is: depression and BPD    Visit type: audiovisual    Face to Face time with patient: 45  45 minutes of total time spent on the encounter, which includes face to face time and non-face to face time preparing to see the patient (eg, review of tests), Obtaining and/or reviewing separately obtained history, Documenting clinical information in the electronic or other health record, Independently interpreting results (not separately reported) and communicating results to the patient/family/caregiver, or Care coordination (not separately reported).         Each patient to whom he or she provides medical services by telemedicine is:  (1) informed of the relationship between the physician and patient and the respective role of any other health care provider with respect to management of the patient; and (2) notified that he or she may decline to receive medical services by telemedicine and may withdraw from such care at any time.    Notes: Pt seen for follow-up.  Last seen in December.  She says her son is getting  in September and she is anxious about being presentable and doing what is expected of her.  She has been stable overall for her although her anxiety continues.  She is getting along with sister, taking care of her mother, doing OK with .    Treatment plan:  Target symptoms: depression, anxiety , PTSD  Why chosen therapy is appropriate versus another modality: relevant to diagnosis  Outcome monitoring methods: self-report, observation  Therapeutic intervention type: insight oriented  psychotherapy    Risk parameters:  Patient reports no suicidal ideation  Patient reports no homicidal ideation  Patient reports no self-injurious behavior  Patient reports no violent behavior    Verbal deficits: None    Patient's response to intervention:  The patient's response to intervention is motivated.    Progress toward goals and other mental status changes:  The patient's progress toward goals is fair .    Diagnosis:     ICD-10-CM ICD-9-CM   1. Generalized anxiety disorder  F41.1 300.02   2. Major depressive disorder, recurrent episode, moderate  F33.1 296.32       Plan:  individual psychotherapy and medication management by physician    Return to clinic: as scheduled    Length of Service (minutes): 45

## 2024-03-14 ENCOUNTER — OFFICE VISIT (OUTPATIENT)
Dept: RHEUMATOLOGY | Facility: CLINIC | Age: 54
End: 2024-03-14
Payer: COMMERCIAL

## 2024-03-14 ENCOUNTER — LAB VISIT (OUTPATIENT)
Dept: LAB | Facility: HOSPITAL | Age: 54
End: 2024-03-14
Attending: INTERNAL MEDICINE
Payer: COMMERCIAL

## 2024-03-14 VITALS
WEIGHT: 160 LBS | SYSTOLIC BLOOD PRESSURE: 133 MMHG | DIASTOLIC BLOOD PRESSURE: 87 MMHG | HEART RATE: 92 BPM | BODY MASS INDEX: 32.32 KG/M2

## 2024-03-14 DIAGNOSIS — Z78.0 ASYMPTOMATIC POSTMENOPAUSAL STATE: ICD-10-CM

## 2024-03-14 DIAGNOSIS — D84.821 IMMUNOCOMPROMISED STATE DUE TO DRUG THERAPY: ICD-10-CM

## 2024-03-14 DIAGNOSIS — M06.00 SERONEGATIVE RHEUMATOID ARTHRITIS: ICD-10-CM

## 2024-03-14 DIAGNOSIS — L40.50 PSORIATIC ARTHRITIS: Primary | ICD-10-CM

## 2024-03-14 DIAGNOSIS — G89.4 CHRONIC PAIN SYNDROME: ICD-10-CM

## 2024-03-14 DIAGNOSIS — M47.812 CERVICAL ARTHRITIS: ICD-10-CM

## 2024-03-14 DIAGNOSIS — M79.7 FIBROMYALGIA: ICD-10-CM

## 2024-03-14 DIAGNOSIS — Z79.899 IMMUNOCOMPROMISED STATE DUE TO DRUG THERAPY: ICD-10-CM

## 2024-03-14 LAB
FSH SERPL-ACNC: 102.51 MIU/ML
LH SERPL-ACNC: 39.6 MIU/ML
PROGEST SERPL-MCNC: 0.2 NG/ML
TESTOST SERPL-MCNC: 26 NG/DL (ref 5–73)

## 2024-03-14 PROCEDURE — 84403 ASSAY OF TOTAL TESTOSTERONE: CPT | Performed by: INTERNAL MEDICINE

## 2024-03-14 PROCEDURE — 3075F SYST BP GE 130 - 139MM HG: CPT | Mod: CPTII,S$GLB,, | Performed by: PHYSICIAN ASSISTANT

## 2024-03-14 PROCEDURE — 84144 ASSAY OF PROGESTERONE: CPT | Performed by: INTERNAL MEDICINE

## 2024-03-14 PROCEDURE — 82672 ASSAY OF ESTROGEN: CPT | Performed by: INTERNAL MEDICINE

## 2024-03-14 PROCEDURE — 83002 ASSAY OF GONADOTROPIN (LH): CPT | Performed by: INTERNAL MEDICINE

## 2024-03-14 PROCEDURE — 83001 ASSAY OF GONADOTROPIN (FSH): CPT | Performed by: INTERNAL MEDICINE

## 2024-03-14 PROCEDURE — 96372 THER/PROPH/DIAG INJ SC/IM: CPT | Mod: S$GLB,,, | Performed by: PHYSICIAN ASSISTANT

## 2024-03-14 PROCEDURE — 3079F DIAST BP 80-89 MM HG: CPT | Mod: CPTII,S$GLB,, | Performed by: PHYSICIAN ASSISTANT

## 2024-03-14 PROCEDURE — 1159F MED LIST DOCD IN RCRD: CPT | Mod: CPTII,S$GLB,, | Performed by: PHYSICIAN ASSISTANT

## 2024-03-14 PROCEDURE — 36415 COLL VENOUS BLD VENIPUNCTURE: CPT | Mod: PO | Performed by: INTERNAL MEDICINE

## 2024-03-14 PROCEDURE — 99214 OFFICE O/P EST MOD 30 MIN: CPT | Mod: 25,S$GLB,, | Performed by: PHYSICIAN ASSISTANT

## 2024-03-14 PROCEDURE — 99999 PR PBB SHADOW E&M-EST. PATIENT-LVL IV: CPT | Mod: PBBFAC,,, | Performed by: PHYSICIAN ASSISTANT

## 2024-03-14 PROCEDURE — 3008F BODY MASS INDEX DOCD: CPT | Mod: CPTII,S$GLB,, | Performed by: PHYSICIAN ASSISTANT

## 2024-03-14 PROCEDURE — 1160F RVW MEDS BY RX/DR IN RCRD: CPT | Mod: CPTII,S$GLB,, | Performed by: PHYSICIAN ASSISTANT

## 2024-03-14 PROCEDURE — 82626 DEHYDROEPIANDROSTERONE: CPT | Performed by: INTERNAL MEDICINE

## 2024-03-14 RX ORDER — PREDNISONE 2.5 MG/1
TABLET ORAL
Qty: 90 TABLET | Refills: 0 | Status: SHIPPED | OUTPATIENT
Start: 2024-03-14

## 2024-03-14 RX ORDER — CYANOCOBALAMIN 1000 UG/ML
1000 INJECTION, SOLUTION INTRAMUSCULAR; SUBCUTANEOUS
Status: COMPLETED | OUTPATIENT
Start: 2024-03-14 | End: 2024-03-14

## 2024-03-14 RX ORDER — GABAPENTIN 800 MG/1
800 TABLET ORAL 3 TIMES DAILY
Qty: 90 TABLET | Refills: 5 | Status: SHIPPED | OUTPATIENT
Start: 2024-03-14 | End: 2025-03-14

## 2024-03-14 RX ORDER — CYCLOBENZAPRINE HCL 10 MG
10 TABLET ORAL 3 TIMES DAILY PRN
Qty: 90 TABLET | Refills: 6 | Status: SHIPPED | OUTPATIENT
Start: 2024-03-14

## 2024-03-14 RX ORDER — KETOROLAC TROMETHAMINE 30 MG/ML
60 INJECTION, SOLUTION INTRAMUSCULAR; INTRAVENOUS
Status: COMPLETED | OUTPATIENT
Start: 2024-03-14 | End: 2024-03-14

## 2024-03-14 RX ORDER — METHYLPREDNISOLONE ACETATE 80 MG/ML
80 INJECTION, SUSPENSION INTRA-ARTICULAR; INTRALESIONAL; INTRAMUSCULAR; SOFT TISSUE
Status: COMPLETED | OUTPATIENT
Start: 2024-03-14 | End: 2024-03-14

## 2024-03-14 RX ADMIN — CYANOCOBALAMIN 1000 MCG: 1000 INJECTION, SOLUTION INTRAMUSCULAR; SUBCUTANEOUS at 02:03

## 2024-03-14 RX ADMIN — KETOROLAC TROMETHAMINE 60 MG: 30 INJECTION, SOLUTION INTRAMUSCULAR; INTRAVENOUS at 02:03

## 2024-03-14 RX ADMIN — METHYLPREDNISOLONE ACETATE 80 MG: 80 INJECTION, SUSPENSION INTRA-ARTICULAR; INTRALESIONAL; INTRAMUSCULAR; SOFT TISSUE at 02:03

## 2024-03-14 NOTE — PROGRESS NOTES
Subjective:       Patient ID: Marilee Simons is a 54 y.o. female.    Chief Complaint: Disease Management    Mrs. Simons is a 54 year old female who presents to clinic for follow up on psoriatic arthritis. She started simponi Aria infusion 2/7/24 and she had to cancel 2nd infusion due to migraine. She tolerated 1st infusion without any issues.    She has generalized pain throughout her body. She has joint pain in her hands, wrists, elbows, shoulders, knee, and low back. Pain is constant and aching. She has hypersensitivity of her elbows unable to rest her arms on a surface. She has been experiencing increased neck pain with radicular type pain down the R arm. MRI cervical spine was consistently with degenerative arthritis. She is open to pain management intervention. She is taking flexeril and tramadol only as needed for severe pain, but would like to avoid pain medication. She can not take NSAIDs due to GI issues. She was switched from gabapentin to lyrica last year to see if this would help with FM, but she could not tolerate lyrica due to memory/brain fog. Needs new RX of gabapentin to continue.     Current tx:  Simponi Aria    Prior tx:  1. Humira        Review of Systems   Constitutional:  Negative for fever and unexpected weight change.   HENT:  Positive for mouth sores. Negative for trouble swallowing.    Eyes:  Negative for redness.   Respiratory:  Negative for cough and shortness of breath.    Cardiovascular:  Negative for chest pain.   Gastrointestinal:  Positive for diarrhea. Negative for constipation.   Genitourinary:  Negative for dysuria and genital sores.   Musculoskeletal:  Positive for arthralgias, joint swelling and myalgias.   Skin:  Negative for rash.   Allergic/Immunologic: Positive for immunocompromised state.   Neurological:  Positive for headaches.   Hematological:  Bruises/bleeds easily.         Objective:     Vitals:    03/14/24 1317   BP: 133/87   Pulse: 92       Past Medical History:    Diagnosis Date    Acid reflux     Allergy     Alopecia     Anxiety     Arthralgia     Back pain     Depression     Dry eyes     from meds    Fever blister     Fibromyalgia     Interstitial cystitis     Irritable bowel syndrome     Kidney stone     Major depressive disorder, recurrent episode, in partial or unspecified remission 2013    OAB (overactive bladder) 2015    PTSD (post-traumatic stress disorder)     Pure hypercholesterolemia 2022    Rheumatoid arthritis     Rheumatoid arthritis 2022    Systemic lupus erythematosus     Thyroid disease     Urinary tract infection     Vaginal infection      Past Surgical History:   Procedure Laterality Date    BLADDER SURGERY       SECTION, LOW TRANSVERSE      x 2    COLONOSCOPY      COLONOSCOPY N/A 10/05/2017    Procedure: COLONOSCOPY;  Surgeon: Venkat He MD;  Location: [facility] (4TH FLR);  Service: Endoscopy;  Laterality: N/A;    ESOPHAGOGASTRODUODENOSCOPY      ESOPHAGOGASTRODUODENOSCOPY N/A 10/25/2021    Procedure: EGD (ESOPHAGOGASTRODUODENOSCOPY);  Surgeon: Venkat eH MD;  Location: [facility] (4TH FLR);  Service: Endoscopy;  Laterality: N/A;  Covid test on 10/22 at [email or web address]    10/19 lv to confirm appt-rb    EXCISIONAL BIOPSY Right 10/14/2022    Procedure: EXCISIONAL BIOPSY-right with radiological marker;  Surgeon: PALLAVI Oseguera MD;  Location: [facility];  Service: General;  Laterality: Right;    EYE SURGERY      Lasik-bilateral    HYSTERECTOMY      IMPLANTATION OF PERMANENT SACRAL NERVE STIMULATOR N/A 2022    Procedure: INSERTION, NEUROSTIMULATOR, PERMANENT, SACRAL;  Surgeon: Bandar Garcia MD;  Location: [facility] OR 2ND FLR;  Service: Urology;  Laterality: N/A;  2hr    INJECTION OF BOTULINUM TOXIN TYPE A N/A 2018    Procedure: INJECTION, BOTULINUM TOXIN, TYPE A  200 UNITS;  Surgeon: Bandar Garcia MD;  Location: [facility] OR 1ST FLR;  Service: Urology;  Laterality: N/A;    INSTILLATION OF URINARY BLADDER N/A 2018     Procedure: INSTILLATION, URINARY BLADDER;  Surgeon: Bandar Garcia MD;  Location: [facility] OR 1ST FLR;  Service: Urology;  Laterality: N/A;    PARTIAL HYSTERECTOMY      REMOVAL OF ELECTRODE LEAD OF SACRAL NERVE STIMULATOR N/A 04/27/2022    Procedure: REMOVAL, ELECTRODE LEAD, SACRAL NERVE STIMULATOR;  Surgeon: Bandar Garcia MD;  Location: [facility] OR 2ND FLR;  Service: Urology;  Laterality: N/A;    SKIN TAG REMOVAL N/A 10/14/2022    Procedure: REMOVAL, SKIN TAGS;  Surgeon: PALLAVI Oseguera MD;  Location: [facility] OR;  Service: General;  Laterality: N/A;    TRANSFORAMINAL EPIDURAL INJECTION OF STEROID Left 02/15/2021    Procedure: LUMBAR TRANSFORAMINAL LEFT L5 AND S1 DIRECT REFERRAL;  Surgeon: Marquez Nesbitt MD;  [facility];  Service: Pain Management;  Laterality: Left;  NEEDS CONSENT, PT REQUESTING IV SEDATION          Physical Exam   Eyes: Right conjunctiva is not injected. Left conjunctiva is not injected.   Neck: No JVD present. No thyromegaly present.   Cardiovascular: Normal rate and regular rhythm. Exam reveals no decreased pulses.   Pulmonary/Chest: Effort normal.   Musculoskeletal:      Right shoulder: Tenderness present.      Left shoulder: Tenderness present.      Right elbow: Tenderness present.      Left elbow: Tenderness present.      Right wrist: Tenderness present.      Left wrist: Tenderness present.      Right knee: Tenderness present.      Left knee: Tenderness present.   Lymphadenopathy:     She has no cervical adenopathy.   Neurological: Gait normal.   Skin: No rash noted.   Psychiatric: Affect normal. Her mood appears anxious. She exhibits a depressed mood.       Right Side Rheumatological Exam     The patient is tender to palpation of the shoulder, elbow, wrist, knee, 1st PIP, 1st MCP, 2nd PIP, 2nd MCP, 3rd PIP, 3rd MCP, 4th PIP, 4th MCP, 5th PIP and 5th MCP    She has swelling of the 1st PIP, 2nd PIP, 3rd PIP, 4th PIP and 5th PIP    Left Side Rheumatological Exam     Examination finds the  3rd MCP normal.    The patient is tender to palpation of the shoulder, elbow, wrist, knee, 1st PIP, 1st MCP, 2nd PIP, 2nd MCP, 3rd PIP, 4th PIP, 4th MCP, 5th PIP and 5th MCP.    She has swelling of the 1st PIP, 2nd PIP, 3rd PIP, 4th PIP and 5th PIP        Labs reviewed:  Component      Latest Ref Rng 9/26/2023   Iron      30 - 160 ug/dL 121    Transferrin      200 - 375 mg/dL 262    TIBC      250 - 450 ug/dL 388    Saturated Iron      20 - 50 % 31    Ferritin      20.0 - 300.0 ng/mL 61    Folate      4.0 - 24.0 ng/mL 5.2    Vitamin B12      210 - 950 pg/mL 343      Imaging:     Narrative & Impression  EXAMINATION:  MRI CERVICAL SPINE WITHOUT CONTRAST     CLINICAL HISTORY:  Spinal stenosis, cervical;  Spinal stenosis, cervical region     FINDINGS:  Comparison is CT cervical spine 12/28/2022.     No marrow replacement, marrow edema, infection, or neoplasm is seen.  The cord is normal in course, caliber, and signal including craniocervical junction.  There is mild DJD at C4-C5, C5-C6, and C6-C7.     No soft tissue injury or whiplash injury seen.     C2-C3 demonstrates minimal DJD.  The canal and neural foramina are patent.     C3-C4 demonstrates minimal DJD.  The canal and neural foramina are patent.     C4-C5 demonstrates a mild disc bulge.  There is mild neural foraminal narrowing.     C5-C6 demonstrates a mild disc bulge.  The canal is patent.  There is mild neural foraminal narrowing.     C6-C7 demonstrates a mild disc bulge.  The canal is patent.  There is mild neural foraminal narrowing.     C7-T1 and upper thoracic levels are unremarkable.  The cord is normal in signal.     Impression:     Degenerative changes as above.  Assessment:       1. Psoriatic arthritis    2. Seronegative rheumatoid arthritis    3. Cervical arthritis    4. Fibromyalgia    5. Immunocompromised state due to drug therapy    6. Chronic pain syndrome                Plan:       Psoriatic arthritis  -     cyclobenzaprine (FLEXERIL) 10 MG  tablet; Take 1 tablet (10 mg total) by mouth 3 (three) times daily as needed for Muscle spasms.  Dispense: 90 tablet; Refill: 6  -     ketorolac injection 60 mg  -     methylPREDNISolone acetate injection 80 mg  -     cyanocobalamin injection 1,000 mcg    Seronegative rheumatoid arthritis  -     predniSONE (DELTASONE) 2.5 MG tablet; 1 to 3 tabs PO daily prn for arthritis flare  Dispense: 90 tablet; Refill: 0  -     gabapentin (NEURONTIN) 800 MG tablet; Take 1 tablet (800 mg total) by mouth 3 (three) times daily.  Dispense: 90 tablet; Refill: 5  -     ketorolac injection 60 mg  -     methylPREDNISolone acetate injection 80 mg  -     cyanocobalamin injection 1,000 mcg    Cervical arthritis  -     Ambulatory referral/consult to Pain Clinic; Future; Expected date: 03/21/2024    Fibromyalgia  -     Ambulatory referral/consult to Physical/Occupational Therapy; Future; Expected date: 03/21/2024    Immunocompromised state due to drug therapy    Chronic pain syndrome            54 year old female with  Psoriatic arthritis, seronegative RA  --chronic pain syndrome on tramadol  --uncontrolled major depression, anxiety followed by Psychiatry  --osteopenia  --hashimoto's thyroiditis followed by Endocrinology      1. toradol 60, depo 80, b12 given today for flare  2. Cont Simponi Aria-- pt given phone number to call [facility]  3. Tramadol, gabapentin per MD.  I have checked [place] prescription monitoring program site and no unusual or abnormal behavior has occurred pt understand the risk and benefits of taking opioid medications and has decided to continue the medication.  4. Cont flexeril prn  5. Cont prednisone PRN  6. Referral to pain management [facility] for cervical arthritis  7. Referral to aquatic therapy for fibromyalgia    Follow up:  4 mo Dr. Samaniego

## 2024-03-15 ENCOUNTER — PATIENT MESSAGE (OUTPATIENT)
Dept: RHEUMATOLOGY | Facility: CLINIC | Age: 54
End: 2024-03-15
Payer: COMMERCIAL

## 2024-03-15 DIAGNOSIS — E66.9 OBESITY (BMI 30.0-34.9): Primary | ICD-10-CM

## 2024-03-18 LAB — ESTROGEN SERPL-MCNC: 42 PG/ML

## 2024-03-18 RX ORDER — SEMAGLUTIDE 0.25 MG/.5ML
0.25 INJECTION, SOLUTION SUBCUTANEOUS
Qty: 4 EACH | Refills: 3 | Status: SHIPPED | OUTPATIENT
Start: 2024-03-18

## 2024-03-20 LAB — DHEA SERPL-MCNC: 1.74 NG/ML (ref 0.63–4.7)

## 2024-03-22 ENCOUNTER — OFFICE VISIT (OUTPATIENT)
Dept: NEUROLOGY | Facility: CLINIC | Age: 54
End: 2024-03-22
Payer: COMMERCIAL

## 2024-03-22 ENCOUNTER — TELEPHONE (OUTPATIENT)
Dept: NEUROLOGY | Facility: CLINIC | Age: 54
End: 2024-03-22
Payer: COMMERCIAL

## 2024-03-22 VITALS
HEART RATE: 100 BPM | BODY MASS INDEX: 32.09 KG/M2 | HEIGHT: 59 IN | SYSTOLIC BLOOD PRESSURE: 126 MMHG | DIASTOLIC BLOOD PRESSURE: 77 MMHG | WEIGHT: 159.19 LBS

## 2024-03-22 DIAGNOSIS — E66.01 SEVERE OBESITY (BMI 35.0-39.9) WITH COMORBIDITY: ICD-10-CM

## 2024-03-22 DIAGNOSIS — G43.019 INTRACTABLE MIGRAINE WITHOUT AURA AND WITHOUT STATUS MIGRAINOSUS: ICD-10-CM

## 2024-03-22 DIAGNOSIS — G43.709 CHRONIC MIGRAINE W/O AURA, NOT INTRACTABLE, W/O STAT MIGR: Primary | ICD-10-CM

## 2024-03-22 PROCEDURE — 3008F BODY MASS INDEX DOCD: CPT | Mod: CPTII,S$GLB,, | Performed by: PSYCHIATRY & NEUROLOGY

## 2024-03-22 PROCEDURE — 3078F DIAST BP <80 MM HG: CPT | Mod: CPTII,S$GLB,, | Performed by: PSYCHIATRY & NEUROLOGY

## 2024-03-22 PROCEDURE — 99999 PR PBB SHADOW E&M-EST. PATIENT-LVL V: CPT | Mod: PBBFAC,,, | Performed by: PSYCHIATRY & NEUROLOGY

## 2024-03-22 PROCEDURE — 3074F SYST BP LT 130 MM HG: CPT | Mod: CPTII,S$GLB,, | Performed by: PSYCHIATRY & NEUROLOGY

## 2024-03-22 PROCEDURE — 1159F MED LIST DOCD IN RCRD: CPT | Mod: CPTII,S$GLB,, | Performed by: PSYCHIATRY & NEUROLOGY

## 2024-03-22 PROCEDURE — 99215 OFFICE O/P EST HI 40 MIN: CPT | Mod: S$GLB,,, | Performed by: PSYCHIATRY & NEUROLOGY

## 2024-03-22 RX ORDER — RIZATRIPTAN BENZOATE 10 MG/1
10 TABLET ORAL
Qty: 9 TABLET | Refills: 12 | Status: SHIPPED | OUTPATIENT
Start: 2024-03-22 | End: 2024-04-26 | Stop reason: SDUPTHER

## 2024-03-22 RX ORDER — MEMANTINE HYDROCHLORIDE 10 MG/1
10 TABLET ORAL DAILY
Qty: 30 TABLET | Refills: 11 | Status: SHIPPED | OUTPATIENT
Start: 2024-03-22 | End: 2025-03-22

## 2024-03-22 NOTE — TELEPHONE ENCOUNTER
----- Message from Jimmie Pineda sent at 3/22/2024  3:56 PM CDT -----  Regarding: Pharmacy frequency  Contact: Amebr/ [phone]  Called from [facility] pharmacy regarding pt medication rizatriptan (MAXALT) 10 MG tablet that is missing the directions and frequency. Please call [facility] pharmacy.  Fax number [phone]    [id number] - [place] - [address] AT [facility] [address]  [address] [place] [address]  Phone: [phone] Fax: [phone]
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
## 2024-03-24 ENCOUNTER — PATIENT MESSAGE (OUTPATIENT)
Dept: RHEUMATOLOGY | Facility: CLINIC | Age: 54
End: 2024-03-24
Payer: COMMERCIAL

## 2024-03-24 ENCOUNTER — PATIENT MESSAGE (OUTPATIENT)
Dept: UROLOGY | Facility: CLINIC | Age: 54
End: 2024-03-24
Payer: COMMERCIAL

## 2024-04-03 ENCOUNTER — OFFICE VISIT (OUTPATIENT)
Dept: PSYCHIATRY | Facility: CLINIC | Age: 54
End: 2024-04-03
Payer: COMMERCIAL

## 2024-04-03 DIAGNOSIS — T74.22XS SEXUAL ABUSE OF ADOLESCENT, SEQUELA: ICD-10-CM

## 2024-04-03 DIAGNOSIS — F33.1 MAJOR DEPRESSIVE DISORDER, RECURRENT EPISODE, MODERATE: Primary | ICD-10-CM

## 2024-04-03 DIAGNOSIS — F41.9 ANXIETY DISORDER, UNSPECIFIED TYPE: ICD-10-CM

## 2024-04-03 DIAGNOSIS — M06.9 RHEUMATOID ARTHRITIS INVOLVING BOTH SHOULDERS, UNSPECIFIED WHETHER RHEUMATOID FACTOR PRESENT: ICD-10-CM

## 2024-04-03 DIAGNOSIS — F43.10 PTSD (POST-TRAUMATIC STRESS DISORDER): ICD-10-CM

## 2024-04-03 DIAGNOSIS — T74.32XS EMOTIONAL/PSYCHOLOGICAL ABUSE OF CHILD, SEQUELA: ICD-10-CM

## 2024-04-03 DIAGNOSIS — F60.89 CLUSTER B PERSONALITY DISORDER: ICD-10-CM

## 2024-04-03 DIAGNOSIS — T74.12XS PHYSICAL ABUSE OF CHILD, SEQUELA: ICD-10-CM

## 2024-04-03 DIAGNOSIS — M32.9 LUPUS: ICD-10-CM

## 2024-04-03 PROBLEM — F39 MOOD DISORDER: Status: ACTIVE | Noted: 2024-04-03

## 2024-04-03 PROBLEM — T74.22XA SEXUAL ABUSE OF ADOLESCENT: Status: ACTIVE | Noted: 2024-04-03

## 2024-04-03 PROCEDURE — 99999 PR PBB SHADOW E&M-EST. PATIENT-LVL II: CPT | Mod: PBBFAC,,, | Performed by: PSYCHIATRY & NEUROLOGY

## 2024-04-03 PROCEDURE — 99417 PROLNG OP E/M EACH 15 MIN: CPT | Mod: S$GLB,,, | Performed by: PSYCHIATRY & NEUROLOGY

## 2024-04-03 PROCEDURE — 99215 OFFICE O/P EST HI 40 MIN: CPT | Mod: S$GLB,,, | Performed by: PSYCHIATRY & NEUROLOGY

## 2024-04-03 RX ORDER — FLUOXETINE HYDROCHLORIDE 40 MG/1
40 CAPSULE ORAL DAILY
Qty: 90 CAPSULE | Refills: 1 | Status: SHIPPED | OUTPATIENT
Start: 2024-04-03 | End: 2024-06-05 | Stop reason: SDUPTHER

## 2024-04-03 RX ORDER — LAMOTRIGINE 200 MG/1
200 TABLET ORAL DAILY
Qty: 90 TABLET | Refills: 1 | Status: SHIPPED | OUTPATIENT
Start: 2024-04-03 | End: 2024-06-05 | Stop reason: SDUPTHER

## 2024-04-03 RX ORDER — FLUOXETINE HYDROCHLORIDE 20 MG/1
20 CAPSULE ORAL DAILY
Qty: 90 CAPSULE | Refills: 1 | Status: SHIPPED | OUTPATIENT
Start: 2024-04-03 | End: 2024-06-05 | Stop reason: SDUPTHER

## 2024-04-03 NOTE — PATIENT INSTRUCTIONS
PLAN:     FOLLOW UP June 5 2024 @ 9 am  TELEHEALTH    Counselor continue with Billie Chavira PhD LCSW     Psyc Meds:   Prozac 40 mg daily   Add Prozac 20 mg (total Prozac dose 60 mg a day)  Lamictal 200 mg daily / if any rash stop and tell dena CRUZ    References:     Relaxation stress reduction workbook: PARVEZ Francisco PhD ( used: $7-10)    Feeling Good Website: Vahid Clark MD / www.Bizweb.vn website (free) / javier. PODCASTS    Anxiety &  phobia workbook by CHLOE Morgan PhD  (web retailers: used: $ 7-10)    [place]: Path to Better Sleep : https://www.veterantraining.[place].gov/insomnia/ (free)       If interested: [facility], [facility]: ([phone])   and ask to set up an intake eval    Website: (https://www[facility]/services/brief-[facility]-psychotherapy   (Copy and paste to your browser)     Current Conditions Treated:  PTSD  Insomnia  Panic Attacks  Depression  Anxiety  Chronic Pain  Stress    The [facility] is ideal for patients who:  Desire short-term rather than long-term psychotherapy  Want structured psychotherapy sessions for specific targets  Are willing to engage in daily practice assignments  Can commit to weekly psychotherapy for a time-limited period (usually between 6-12 weeks)    Note: Possibility of virtual  (telehealth) participation may exist    call for more information ([phone])     Pt expressed appreciation for the visit today and did not have further question at this time though pt  was still informed to:     Call  if problems.    Call / Report Side Effects to Psjaylan CRUZ     Encouraged to follow up with primary care / Gen Med MD for continued monitoring of general health and wellness.    Understanding was expressed; and no further concerns nor questions were raised at this time.     Remember healthy self care:   eat right  attempt adequate rest   HANDWASHING / encourage such javier. During this corona virus time   walk or light exercise within  reason and as your general med team approves  read or explore any of reference materials / homework mentioned  reach out (I.e.,  connect with)  others who nuture and bring out best in you  avoid risky behaviors    Keep your appointments:    IF you  cannot make your appt THEN please call [phone]  or go online (via My Chart [facility]on) to reschedule.    It is the responsibility of the patient to reschedule an appointment if an appointment has been canceled or missed.    Avoid  alcohol and illicit substances.  Look for the positive.  All is often relative-seek balance  Call sooner if needed : [phone]  Call 911 or go to Emergency Room  (ER)  if  any acute concerns
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
## 2024-04-04 ENCOUNTER — PATIENT MESSAGE (OUTPATIENT)
Dept: NEUROLOGY | Facility: CLINIC | Age: 54
End: 2024-04-04
Payer: COMMERCIAL

## 2024-04-23 ENCOUNTER — OFFICE VISIT (OUTPATIENT)
Dept: PSYCHIATRY | Facility: CLINIC | Age: 54
End: 2024-04-23
Payer: COMMERCIAL

## 2024-04-23 DIAGNOSIS — F41.9 ANXIETY DISORDER, UNSPECIFIED TYPE: ICD-10-CM

## 2024-04-23 DIAGNOSIS — F60.89 CLUSTER B PERSONALITY DISORDER: ICD-10-CM

## 2024-04-23 DIAGNOSIS — F33.1 MAJOR DEPRESSIVE DISORDER, RECURRENT EPISODE, MODERATE: Primary | ICD-10-CM

## 2024-04-23 PROCEDURE — 90834 PSYTX W PT 45 MINUTES: CPT | Mod: 95,,, | Performed by: SOCIAL WORKER

## 2024-04-24 NOTE — PROGRESS NOTES
"Individual Psychotherapy (PhD/LCSW)    4/23/2024    Site:  [facility]         Therapeutic Intervention: Met with patient.  Outpatient - Insight oriented psychotherapy 45 min - CPT code 78625    Chief complaint/reason for encounter: depression, anxiety and ptsd     Interval history and content of current session: The patient location is: home in [place]  The chief complaint leading to consultation is: depression and BPD    Visit type: audiovisual    Face to Face time with patient: 45  45 minutes of total time spent on the encounter, which includes face to face time and non-face to face time preparing to see the patient (eg, review of tests), Obtaining and/or reviewing separately obtained history, Documenting clinical information in the electronic or other health record, Independently interpreting results (not separately reported) and communicating results to the patient/family/caregiver, or Care coordination (not separately reported).         Each patient to whom he or she provides medical services by telemedicine is:  (1) informed of the relationship between the physician and patient and the respective role of any other health care provider with respect to management of the patient; and (2) notified that he or she may decline to receive medical services by telemedicine and may withdraw from such care at any time.    Notes: Pt seen for follow-up.  She talks about her over emotionality and how she just wants to be "normal".  Discussed DBT website she can use to develop skills in emotional regulation and we will work on that, however, she is erratic in her attendance at sessions due to various things that seem to happen (no car available, feeling ill, mother needing sudden assistance).  Emphasized the need to attend more regularly.    Treatment plan:  Target symptoms: depression, anxiety , PTSD  Why chosen therapy is appropriate versus another modality: relevant to diagnosis  Outcome monitoring methods: " self-report, observation  Therapeutic intervention type: insight oriented psychotherapy    Risk parameters:  Patient reports no suicidal ideation  Patient reports no homicidal ideation  Patient reports no self-injurious behavior  Patient reports no violent behavior    Verbal deficits: None    Patient's response to intervention:  The patient's response to intervention is motivated.    Progress toward goals and other mental status changes:  The patient's progress toward goals is fair .    Diagnosis:     ICD-10-CM ICD-9-CM   1. Major depressive disorder, recurrent episode, moderate  F33.1 296.32   2. Anxiety disorder, unspecified type  F41.9 300.00   3. Borderline Personality Features (per pt herself)  F60.89 301.89       Plan:  individual psychotherapy and medication management by physician    Return to clinic: as scheduled    Length of Service (minutes): 45

## 2024-04-26 ENCOUNTER — PATIENT MESSAGE (OUTPATIENT)
Dept: NEUROLOGY | Facility: CLINIC | Age: 54
End: 2024-04-26
Payer: COMMERCIAL

## 2024-04-26 ENCOUNTER — PATIENT MESSAGE (OUTPATIENT)
Dept: UROLOGY | Facility: CLINIC | Age: 54
End: 2024-04-26
Payer: COMMERCIAL

## 2024-04-26 DIAGNOSIS — G43.019 INTRACTABLE MIGRAINE WITHOUT AURA AND WITHOUT STATUS MIGRAINOSUS: ICD-10-CM

## 2024-04-26 RX ORDER — RIZATRIPTAN BENZOATE 10 MG/1
10 TABLET ORAL
Qty: 9 TABLET | Refills: 12 | Status: ACTIVE | OUTPATIENT
Start: 2024-04-26 | End: 2024-04-30

## 2024-04-30 DIAGNOSIS — G43.709 CHRONIC MIGRAINE W/O AURA, NOT INTRACTABLE, W/O STAT MIGR: Primary | ICD-10-CM

## 2024-04-30 RX ORDER — ELETRIPTAN HYDROBROMIDE 40 MG/1
40 TABLET, FILM COATED ORAL 2 TIMES DAILY PRN
Qty: 9 TABLET | Refills: 12 | Status: ACTIVE | OUTPATIENT
Start: 2024-04-30 | End: 2024-05-30

## 2024-05-07 ENCOUNTER — INFUSION (OUTPATIENT)
Dept: INFECTIOUS DISEASES | Facility: HOSPITAL | Age: 54
End: 2024-05-07
Payer: COMMERCIAL

## 2024-05-07 VITALS
RESPIRATION RATE: 20 BRPM | DIASTOLIC BLOOD PRESSURE: 73 MMHG | TEMPERATURE: 98 F | BODY MASS INDEX: 31.71 KG/M2 | WEIGHT: 157.31 LBS | HEART RATE: 81 BPM | SYSTOLIC BLOOD PRESSURE: 143 MMHG | OXYGEN SATURATION: 95 % | HEIGHT: 59 IN

## 2024-05-07 DIAGNOSIS — L40.50 PSORIATIC ARTHRITIS: Primary | ICD-10-CM

## 2024-05-07 DIAGNOSIS — M06.00 SERONEGATIVE RHEUMATOID ARTHRITIS: ICD-10-CM

## 2024-05-07 PROCEDURE — 96375 TX/PRO/DX INJ NEW DRUG ADDON: CPT

## 2024-05-07 PROCEDURE — 96365 THER/PROPH/DIAG IV INF INIT: CPT

## 2024-05-07 PROCEDURE — 25000003 PHARM REV CODE 250: Performed by: INTERNAL MEDICINE

## 2024-05-07 PROCEDURE — 63600175 PHARM REV CODE 636 W HCPCS: Performed by: INTERNAL MEDICINE

## 2024-05-07 RX ORDER — ACETAMINOPHEN 325 MG/1
650 TABLET ORAL
OUTPATIENT
Start: 2024-05-28

## 2024-05-07 RX ORDER — METHYLPREDNISOLONE SOD SUCC 125 MG
100 VIAL (EA) INJECTION
OUTPATIENT
Start: 2024-05-28

## 2024-05-07 RX ORDER — EPINEPHRINE 0.3 MG/.3ML
0.3 INJECTION SUBCUTANEOUS ONCE AS NEEDED
OUTPATIENT
Start: 2024-05-28

## 2024-05-07 RX ORDER — SODIUM CHLORIDE 0.9 % (FLUSH) 0.9 %
10 SYRINGE (ML) INJECTION
OUTPATIENT
Start: 2024-05-28

## 2024-05-07 RX ORDER — DIPHENHYDRAMINE HYDROCHLORIDE 50 MG/ML
50 INJECTION INTRAMUSCULAR; INTRAVENOUS
OUTPATIENT
Start: 2024-05-28

## 2024-05-07 RX ORDER — DIPHENHYDRAMINE HYDROCHLORIDE 50 MG/ML
50 INJECTION INTRAMUSCULAR; INTRAVENOUS ONCE AS NEEDED
OUTPATIENT
Start: 2024-05-28

## 2024-05-07 RX ORDER — METHYLPREDNISOLONE SOD SUCC 125 MG
100 VIAL (EA) INJECTION
Status: COMPLETED | OUTPATIENT
Start: 2024-05-07 | End: 2024-05-07

## 2024-05-07 RX ORDER — HEPARIN 100 UNIT/ML
500 SYRINGE INTRAVENOUS
OUTPATIENT
Start: 2024-05-28

## 2024-05-07 RX ORDER — SODIUM CHLORIDE 0.9 % (FLUSH) 0.9 %
10 SYRINGE (ML) INJECTION
Status: DISCONTINUED | OUTPATIENT
Start: 2024-05-07 | End: 2024-05-07 | Stop reason: HOSPADM

## 2024-05-07 RX ADMIN — GOLIMUMAB 137.5 MG: 50 SOLUTION INTRAVENOUS at 02:05

## 2024-05-07 RX ADMIN — METHYLPREDNISOLONE SODIUM SUCCINATE 100 MG: 125 INJECTION, POWDER, FOR SOLUTION INTRAMUSCULAR; INTRAVENOUS at 02:05

## 2024-05-07 NOTE — PROGRESS NOTES
Patient arrived to infusion clinic for Simponi infusion 2nd dose today. Received premedication of Methylprednisolone IVP 30 minutes before infusion started. Patient tolerating infusion well. Return appointment provided to patient.    Limited head-to-toe assessment due to privacy issues and visit reason though the opportunity was given for patient to express any concerns.    Patient arrives for infusion of Simponi as ordered by Dr Samaniego. All benefits, risks, requirements, and alternatives should have been discussed with patient by ordering provider at the time the order was placed.

## 2024-05-15 ENCOUNTER — PATIENT MESSAGE (OUTPATIENT)
Dept: UROLOGY | Facility: CLINIC | Age: 54
End: 2024-05-15
Payer: COMMERCIAL

## 2024-05-15 ENCOUNTER — PATIENT MESSAGE (OUTPATIENT)
Dept: SURGERY | Facility: CLINIC | Age: 54
End: 2024-05-15
Payer: COMMERCIAL

## 2024-05-17 DIAGNOSIS — N63.10 MASS OF RIGHT BREAST, UNSPECIFIED QUADRANT: Primary | ICD-10-CM

## 2024-05-23 ENCOUNTER — PATIENT MESSAGE (OUTPATIENT)
Dept: NEUROLOGY | Facility: CLINIC | Age: 54
End: 2024-05-23
Payer: COMMERCIAL

## 2024-05-27 ENCOUNTER — PATIENT MESSAGE (OUTPATIENT)
Dept: PSYCHIATRY | Facility: CLINIC | Age: 54
End: 2024-05-27
Payer: COMMERCIAL

## 2024-05-29 ENCOUNTER — TELEPHONE (OUTPATIENT)
Dept: OPTOMETRY | Facility: CLINIC | Age: 54
End: 2024-05-29
Payer: COMMERCIAL

## 2024-05-29 ENCOUNTER — OFFICE VISIT (OUTPATIENT)
Dept: OPTOMETRY | Facility: CLINIC | Age: 54
End: 2024-05-29
Payer: COMMERCIAL

## 2024-05-29 ENCOUNTER — OFFICE VISIT (OUTPATIENT)
Dept: PAIN MEDICINE | Facility: CLINIC | Age: 54
End: 2024-05-29
Payer: COMMERCIAL

## 2024-05-29 VITALS
BODY MASS INDEX: 31.69 KG/M2 | WEIGHT: 157.19 LBS | HEART RATE: 103 BPM | SYSTOLIC BLOOD PRESSURE: 107 MMHG | HEIGHT: 59 IN | DIASTOLIC BLOOD PRESSURE: 74 MMHG

## 2024-05-29 DIAGNOSIS — N63.10 MASS OF RIGHT BREAST, UNSPECIFIED QUADRANT: Primary | ICD-10-CM

## 2024-05-29 DIAGNOSIS — M51.37 DDD (DEGENERATIVE DISC DISEASE), LUMBOSACRAL: ICD-10-CM

## 2024-05-29 DIAGNOSIS — M54.17 LUMBOSACRAL RADICULOPATHY: ICD-10-CM

## 2024-05-29 DIAGNOSIS — H04.123 DRY EYE SYNDROME OF BILATERAL LACRIMAL GLANDS: Primary | ICD-10-CM

## 2024-05-29 DIAGNOSIS — M54.12 CERVICAL RADICULOPATHY: ICD-10-CM

## 2024-05-29 DIAGNOSIS — M54.9 DORSALGIA, UNSPECIFIED: Primary | ICD-10-CM

## 2024-05-29 PROCEDURE — 1159F MED LIST DOCD IN RCRD: CPT | Mod: CPTII,S$GLB,, | Performed by: OPTOMETRIST

## 2024-05-29 PROCEDURE — 92015 DETERMINE REFRACTIVE STATE: CPT | Mod: S$GLB,,, | Performed by: OPTOMETRIST

## 2024-05-29 PROCEDURE — 99999 PR PBB SHADOW E&M-EST. PATIENT-LVL III: CPT | Mod: PBBFAC,,, | Performed by: OPTOMETRIST

## 2024-05-29 PROCEDURE — 3008F BODY MASS INDEX DOCD: CPT | Mod: CPTII,S$GLB,, | Performed by: EMERGENCY MEDICINE

## 2024-05-29 PROCEDURE — 99204 OFFICE O/P NEW MOD 45 MIN: CPT | Mod: S$GLB,,, | Performed by: EMERGENCY MEDICINE

## 2024-05-29 PROCEDURE — 92014 COMPRE OPH EXAM EST PT 1/>: CPT | Mod: S$GLB,,, | Performed by: OPTOMETRIST

## 2024-05-29 PROCEDURE — 99999 PR PBB SHADOW E&M-EST. PATIENT-LVL V: CPT | Mod: PBBFAC,,, | Performed by: EMERGENCY MEDICINE

## 2024-05-29 PROCEDURE — 3074F SYST BP LT 130 MM HG: CPT | Mod: CPTII,S$GLB,, | Performed by: EMERGENCY MEDICINE

## 2024-05-29 PROCEDURE — 3078F DIAST BP <80 MM HG: CPT | Mod: CPTII,S$GLB,, | Performed by: EMERGENCY MEDICINE

## 2024-05-29 RX ORDER — METHENAMINE, SODIUM PHOSPHATE, MONOBASIC, ANHYDROUS, PHENYL SALICYLATE, METHYLENE BLUE AND HYOSCYAMINE SULFATE 118; 40.8; 36; 10; .12 MG/1; MG/1; MG/1; MG/1; MG/1
1 CAPSULE ORAL 3 TIMES DAILY
COMMUNITY
Start: 2024-05-10

## 2024-05-29 RX ORDER — DULOXETIN HYDROCHLORIDE 30 MG/1
30 CAPSULE, DELAYED RELEASE ORAL
COMMUNITY

## 2024-05-29 NOTE — PROGRESS NOTES
HPI    Pt is here today for routine eye exam. Patient denies pain/discomfort.   Patient would like to update glasses and contact rx. States that she was   never able to wear last rx from Dr. Smith due to dryness.   DLS: 4/2023 Dr. Smith  (-)Flashes   (-)Floaters   (-)Diplopia   (+)Headaches   (-)Itching   (-)Tearing  (-)Burning  (+)Dryness: She has tried Xiidra but it did not help  (+)Photophobia  (+)Glare   (+)Blurred VA  Past Eye Sx: Lasik OU 2001  Eye Meds: (-)   Last edited by Anahy Gonzalez, OD on 5/29/2024  2:34 PM.            Assessment /Plan     For exam results, see Encounter Report.    Dry eye syndrome of bilateral lacrimal glands      Educated pt on today's findings of moderate SEEMA OU affecting VA. Pt has Lupus and RA but also increasingly dry eyes and mouth. Pt to inquire about Sjogren's testing at next rheumatology visit. Begin Tyrvaya 1 spray via nasal route twice daily. Pt did not think Xiidra was effective.    Hold off on CL fit until dryness better controlled    Continue use of OTC reading glasses prn. Monitor yearly.

## 2024-05-29 NOTE — PROGRESS NOTES
..  Chief Complaint   Patient presents with    Neck Pain    Headache    Low-back Pain        Original HPI 05/29/24: Marilee Simons is a 54 y.o. year old female patient who has a past medical history of Acid reflux, Allergy, Alopecia, Anxiety, Arthralgia, Back pain, Depression, Dry eyes, Fever blister, Fibromyalgia, Interstitial cystitis, Irritable bowel syndrome, Kidney stone, Major depressive disorder, recurrent episode, in partial or unspecified remission, OAB (overactive bladder), PTSD (post-traumatic stress disorder), Pure hypercholesterolemia, Rheumatoid arthritis, Rheumatoid arthritis, Systemic lupus erythematosus, Thyroid disease, Urinary tract infection, and Vaginal infection. She presents in referral from No ref. provider found for 05/29/24    Original Pain Description:  The pain is located in the neck that radiates to the right 2-4th fingers. The pain is described as burning and tingling.  She has not identified any exacerbating factors. Mitigating factors heat and ice. Symptoms interfere with daily activity. The patient feels like symptoms have been improving in that the numbness in her arm has decreased from constant to intermittent. Pain from a 10 down to a 7/10, but is still constant. Patient denies loss of sensations.    Patient reports pain in her lower back and in left buttock and left ankle. The pain is described as sharp and throbbing. Exacerbating factors: Sitting, Standing, Laying, and Walking. The patient feels like symptoms have been unchanged. Patient has a Sacral stimulator which is in her left buttock as well.     PAIN SCORES:  Best: Pain is 5  Current: Pain is 6  Worst: Pain is 10        5/29/2024     1:13 PM   Last 3 PDI Scores   Pain Disability Index (PDI) 63       6 weeks of Conservative therapy:  PT: Not yet  Chiro:  HEP: Participating    Treatments / Medications: (Ice/Heat/NSAIDS/APAP/etc):  Flexeril 10mg  Linette 800mg TID   Tramadol  50mg    Antiplatelets/Anticoagulants:  NA    Interventional Pain Procedures: (Previous injections)  24 L5/S1 TFESI Left    IMAGING:    MRI CERVICAL SPINE WITHOUT CONTRAST  2024  C2-C3 minimal DJD   C3-C4 minimal DJD   C4-C5 mild disc bulge. Mild DJD    C5-C6 mild disc bulge. Mild DJD. Mild neural foraminal narrowing.  C6-C7 mild disc bulge. Mild DJD. Mild neural foraminal narrowing.     CT LUMBAR SPINE WITHOUT CONTRAST   2021  Mild multilevel circumferential bulge without significant bony central canal stenosis.    L5-S1 Left paracentral protrusion with contact + encroachment of Left S1 NR. Mild neural foraminal narrowing left      Past Surgical History:   Procedure Laterality Date    BLADDER SURGERY       SECTION, LOW TRANSVERSE      x 2    COLONOSCOPY      COLONOSCOPY N/A 10/05/2017    Procedure: COLONOSCOPY;  Surgeon: Venkat He MD;  [facility]  Service: Endoscopy;  Laterality: N/A;    ESOPHAGOGASTRODUODENOSCOPY      ESOPHAGOGASTRODUODENOSCOPY N/A 10/25/2021    Procedure: EGD (ESOPHAGOGASTRODUODENOSCOPY);  Surgeon: Venkat He MD;  Location: [facility] (4TH FLR);  Service: Endoscopy;  Laterality: N/A;  Covid test on 10/22 at [email or web address]    10/19 [facility] to confirm appt-rb    EXCISIONAL BIOPSY Right 10/14/2022    Procedure: EXCISIONAL BIOPSY-right with radiological marker;  Surgeon: PALLAVI Oseguera MD;  Location: [facility];  Service: General;  Laterality: Right;    EYE SURGERY      Lasik-bilateral    HYSTERECTOMY      IMPLANTATION OF PERMANENT SACRAL NERVE STIMULATOR N/A 2022    Procedure: INSERTION, NEUROSTIMULATOR, PERMANENT, SACRAL;  Surgeon: Bandar Garcia MD;  Location: [facility] OR 2ND FLR;  Service: Urology;  Laterality: N/A;  2hr    INJECTION OF BOTULINUM TOXIN TYPE A N/A 2018    Procedure: INJECTION, BOTULINUM TOXIN, TYPE A  200 UNITS;  Surgeon: Bandar Garcia MD;  Location: [facility] OR 1ST FLR;  Service: Urology;  Laterality: N/A;    INSTILLATION OF  URINARY BLADDER N/A 09/12/2018    Procedure: INSTILLATION, URINARY BLADDER;  Surgeon: Bandar Garcia MD;  Location: [facility] OR 1ST FLR;  Service: Urology;  Laterality: N/A;    PARTIAL HYSTERECTOMY      REMOVAL OF ELECTRODE LEAD OF SACRAL NERVE STIMULATOR N/A 04/27/2022    Procedure: REMOVAL, ELECTRODE LEAD, SACRAL NERVE STIMULATOR;  Surgeon: Bandar Garcia MD;  Location: [facility] OR 2ND FLR;  Service: Urology;  Laterality: N/A;    SKIN TAG REMOVAL N/A 10/14/2022    Procedure: REMOVAL, SKIN TAGS;  Surgeon: PALLAVI Oseguera MD;  Location: [facility] OR;  Service: General;  Laterality: N/A;    TRANSFORAMINAL EPIDURAL INJECTION OF STEROID Left 02/15/2021    Procedure: LUMBAR TRANSFORAMINAL LEFT L5 AND S1 DIRECT REFERRAL;  Surgeon: Marquez Nesbitt MD;  Location: [facility] PAIN MGT;  Service: Pain Management;  Laterality: Left;  NEEDS CONSENT, PT REQUESTING IV SEDATION       Social History     Socioeconomic History    Marital status:    Occupational History     Employer: OTHER   Tobacco Use    Smoking status: Former    Smokeless tobacco: Never    Tobacco comments:     14 years old-18 years old   Substance and Sexual Activity    Alcohol use: No    Drug use: No    Sexual activity: Yes     Partners: Male     Birth control/protection: See Surgical Hx, Other-see comments     Comment: Hysterectomy   Other Topics Concern    Are you pregnant or think you may be? No    Breast-feeding No     Social Determinants of Health     Financial Resource Strain: Patient Declined (12/15/2023)    Overall Financial Resource Strain (CARDIA)     Difficulty of Paying Living Expenses: Patient declined   Food Insecurity: Patient Declined (12/15/2023)    Hunger Vital Sign     Worried About Running Out of Food in the Last Year: Patient declined     Ran Out of Food in the Last Year: Patient declined   Transportation Needs: Patient Declined (12/15/2023)    PRAPARE - Transportation     Lack of Transportation (Medical): Patient declined     Lack of Transportation  "(Non-Medical): Patient declined   Physical Activity: Unknown (12/15/2023)    Exercise Vital Sign     Days of Exercise per Week: Patient declined     Minutes of Exercise per Session: 0 min   Stress: Patient Declined (12/7/2023)    [employer] [place] [employer] - Occupational Stress Questionnaire     Feeling of Stress : Patient declined   Housing Stability: Patient Declined (12/15/2023)    Housing Stability Vital Sign     Unable to Pay for Housing in the Last Year: Patient declined     Unstable Housing in the Last Year: Patient declined       Medications/Allergies: See med card    ROS:  GENERAL: No fever. No chills. No fatigue. Denies weight loss. Denies weight gain.  Back / musculoskeletal / neuro : See HPI    VITALS:   Vitals:    05/29/24 1313   BP: 107/74   Pulse: 103   Weight: 71.3 kg (157 lb 3 oz)   Height: 4' 11" (1.499 m)   PainSc:   7   PainLoc: Neck     Body mass index is 31.75 kg/m².      5/29/2024     1:13 PM   Last 3 PDI Scores   Pain Disability Index (PDI) 63       PHYSICAL EXAM:   GENERAL: Well appearing, in no acute distress, alert and oriented x3.  PSYCH:  Mood and affect appropriate.  SKIN: Skin color, texture, turgor normal, no rashes or lesions.  HEENT:  Normocephalic, atraumatic. Cranial nerves grossly intact.  NECK: No pain to palpation over the cervical paraspinous muscles. No pain to palpation over facets. No pain with neck flexion, extension, or lateral flexion.  No pain with axial loading.  Negative Spurling's bilaterally.  PULM: No evidence of respiratory difficulty, symmetric chest rise.  GI:  Non-distended  BACK: Normal range of motion. No pain to palpation over the spinous processes. No pain to palpation over facet joints.  Pain with axial loading bilaterally.  There is no pain with palpation over the sacroiliac joints bilaterally.   EXTREMITIES: No deformities, edema, or skin discoloration.   MUSCULOSKELETAL: Shoulder, hip, and knee provocative maneuvers are negative. No " atrophy is noted.  NEURO: Sensation is equal and appropriate bilaterally. Bilateral upper and lower extremity strength is normal and symmetric. Bilateral upper and lower extremity coordination and muscle stretch reflexes are physiologic and symmetric. Plantar response are downgoing. Straight leg raising in the supine position is negative to radicular pain.   GAIT: normal.       LABS:    Lab Results   Component Value Date    HGBA1C 5.4 08/18/2022       Lab Results   Component Value Date    WBC 5.35 08/22/2023    HGB 10.8 (L) 08/22/2023    HCT 32.9 (L) 08/22/2023    MCV 99 (H) 08/22/2023     08/22/2023           ASSESSMENT: 54 y.o. year old female with pain, consistent with:    Encounter Diagnoses   Name Primary?    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 Her cervical MRI has mild findings, but her symptoms are consistent with cervical radiculopathy.  Will obtain EMG to further evaluate.  She has persistent recurrent radiculopathy, her last MRI was from 2016 so will update in order to better address her lumbar radiculopathy and possible lumbar spondylosis.      PLAN:  - I have stressed the importance of physical activity and a home exercise plan to help with pain and improve health.  - Patient can continue with medications for now since they are providing benefits, using them appropriately, and without side effects.  - Counseled patient regarding the importance of activity modification and constant sleeping habits.  - Therapy: Referral to Physical therapy for Cervical ROM, stretching, strengthening, and a home exercise program.   - Will order EMG for RUE to further evaluate cause  - Imaging: Reviewed available imaging with  patient and answered any questions they had regarding study.Order MRI of the Lumbar Spine to help evaluate possible source of pain.  - The patient's pathophysiology was explained in detail with reference to x-rays, models, other visual aids as appropriate.   - Follow up visit: return to clinic in 1 week after imaging      Ritesh Padilla MD  05/29/2024

## 2024-06-04 ENCOUNTER — PATIENT MESSAGE (OUTPATIENT)
Dept: RHEUMATOLOGY | Facility: CLINIC | Age: 54
End: 2024-06-04
Payer: COMMERCIAL

## 2024-06-05 ENCOUNTER — TELEPHONE (OUTPATIENT)
Dept: SURGERY | Facility: CLINIC | Age: 54
End: 2024-06-05
Payer: COMMERCIAL

## 2024-06-05 ENCOUNTER — OFFICE VISIT (OUTPATIENT)
Dept: PSYCHIATRY | Facility: CLINIC | Age: 54
End: 2024-06-05
Payer: COMMERCIAL

## 2024-06-05 DIAGNOSIS — T74.22XS SEXUAL ABUSE OF ADOLESCENT, SEQUELA: ICD-10-CM

## 2024-06-05 DIAGNOSIS — T74.32XS EMOTIONAL/PSYCHOLOGICAL ABUSE OF CHILD, SEQUELA: ICD-10-CM

## 2024-06-05 DIAGNOSIS — M32.9 LUPUS: ICD-10-CM

## 2024-06-05 DIAGNOSIS — F33.1 MAJOR DEPRESSIVE DISORDER, RECURRENT EPISODE, MODERATE: Primary | ICD-10-CM

## 2024-06-05 DIAGNOSIS — F43.10 PTSD (POST-TRAUMATIC STRESS DISORDER): ICD-10-CM

## 2024-06-05 DIAGNOSIS — F60.89 CLUSTER B PERSONALITY DISORDER: ICD-10-CM

## 2024-06-05 DIAGNOSIS — T74.12XS PHYSICAL ABUSE OF CHILD, SEQUELA: ICD-10-CM

## 2024-06-05 DIAGNOSIS — F41.9 ANXIETY DISORDER, UNSPECIFIED TYPE: ICD-10-CM

## 2024-06-05 DIAGNOSIS — M06.9 RHEUMATOID ARTHRITIS INVOLVING BOTH SHOULDERS, UNSPECIFIED WHETHER RHEUMATOID FACTOR PRESENT: ICD-10-CM

## 2024-06-05 PROCEDURE — 99215 OFFICE O/P EST HI 40 MIN: CPT | Mod: 95,,, | Performed by: PSYCHIATRY & NEUROLOGY

## 2024-06-05 RX ORDER — LAMOTRIGINE 200 MG/1
200 TABLET ORAL DAILY
Qty: 90 TABLET | Refills: 1 | Status: SHIPPED | OUTPATIENT
Start: 2024-06-05 | End: 2024-12-02

## 2024-06-05 RX ORDER — FLUOXETINE HYDROCHLORIDE 20 MG/1
20 CAPSULE ORAL DAILY
Qty: 90 CAPSULE | Refills: 1 | Status: SHIPPED | OUTPATIENT
Start: 2024-06-05 | End: 2024-12-02

## 2024-06-05 RX ORDER — FLUOXETINE HYDROCHLORIDE 40 MG/1
40 CAPSULE ORAL DAILY
Qty: 90 CAPSULE | Refills: 1 | Status: SHIPPED | OUTPATIENT
Start: 2024-06-05 | End: 2024-12-02

## 2024-06-05 NOTE — PROGRESS NOTES
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 education Some college   Patient spiritual/Confucianist? Yes   Gnosticist denomination [employer]   Last/current job [employer]   Longest job patient has worked [employer] 2 years   Is patient on disability No   Patient applied for disability No      Son:           Moe 27 y lives [place] / getting  Fall   Daughter:  Dilcia 22 y lives [place]     Allan: 5 y on drug   Charles:  29y  // 4 or 5 of 10 // master plumber       Briefly Describe  your Mom: chaotic depressed  Briefly Describe your Dad: abusive alcoholic / [facility] / came as teen; from [place] [place] family tho stuff taken away     Bio Mom: Occupation: after he  / mngr retail clothing stoew  Bio Dad:  Occupation: [employer] then electronic school TV and radio repair

## 2024-06-05 NOTE — PATIENT INSTRUCTIONS
PLAN:     Aug 29 2024 @ 3 pm In Clinic     Counselor continue with Billie Chavira PhD LCSW     Psyc Meds:   Prozac 40 mg daily    Prozac 20 mg (total Prozac dose 60 mg a day)  Lamictal 200 mg daily / if any rash stop and tell cesia CRUZ    References:     Relaxation stress reduction workbook: PARVEZ Francisco PhD ( used: $7-10)    Feeling Good Website: Vahid Clark MD / www.[employer] website (free) / javier. PODCASTS    Anxiety &  phobia workbook by CHLOE Morgan PhD  (web retailers: used: $ 7-10)    [place]: Path to Better Sleep : https://www.veterantraining.va.gov/insomnia/ (free)       If interested: [facility] program, [facility]: ([phone])   and ask to set up an intake eval    Website: (https://www[facility]/services/brief-evidence-based-psychotherapy   (Copy and paste to your browser)     Current Conditions Treated:  PTSD  Insomnia  Panic Attacks  Depression  Anxiety  Chronic Pain  Stress    The [facility] is ideal for patients who:  Desire short-term rather than long-term psychotherapy  Want structured psychotherapy sessions for specific targets  Are willing to engage in daily practice assignments  Can commit to weekly psychotherapy for a time-limited period (usually between 6-12 weeks)    Note: Possibility of virtual  (telehealth) participation may exist    call for more information ([phone])       Cesia CRUZ explained  Intensive Outpatient Program (IOP) services and encouraged patient to call [phone] to learn about [facility] as well  as other programs in community / coordinators: María Lindo LCSW ; Nelly Demarco       Pt expressed appreciation for the visit today and did not have further question at this time though pt  was still informed to:     Call  if problems.    Call / Report Side Effects to Cesia CRUZ     Encouraged to follow up with primary care / Gen Med MD for continued monitoring of general health and wellness.    Understanding was expressed; and no  further concerns nor questions were raised at this time.     Remember healthy self care:   eat right  attempt adequate rest   HANDWASHING / encourage such javier. During this corona virus time   walk or light exercise within reason and as your general med team approves  read or explore any of reference materials / homework mentioned  reach out (I.e.,  connect with)  others who nuture and bring out best in you  avoid risky behaviors    Keep your appointments:    IF you  cannot make your appt THEN please call [phone]  or go online (via My Chart davon) to reschedule.    It is the responsibility of the patient to reschedule an appointment if an appointment has been canceled or missed.    Avoid  alcohol and illicit substances.  Look for the positive.  All is often relative-seek balance  Call sooner if needed : [phone]  Call [phone] or go to Emergency Room  (ER)  if  any acute concerns

## 2024-06-06 ENCOUNTER — PATIENT MESSAGE (OUTPATIENT)
Dept: UROLOGY | Facility: CLINIC | Age: 54
End: 2024-06-06
Payer: COMMERCIAL

## 2024-06-07 ENCOUNTER — HOSPITAL ENCOUNTER (OUTPATIENT)
Dept: RADIOLOGY | Facility: OTHER | Age: 54
Discharge: HOME | End: 2024-06-07
Attending: SURGERY
Payer: COMMERCIAL

## 2024-06-07 DIAGNOSIS — N63.10 MASS OF RIGHT BREAST, UNSPECIFIED QUADRANT: ICD-10-CM

## 2024-06-07 PROCEDURE — 77066 DX MAMMO INCL CAD BI: CPT | Mod: 26,,, | Performed by: RADIOLOGY

## 2024-06-07 PROCEDURE — 77062 BREAST TOMOSYNTHESIS BI: CPT | Mod: 26,,, | Performed by: RADIOLOGY

## 2024-06-07 PROCEDURE — 77062 BREAST TOMOSYNTHESIS BI: CPT | Mod: TC

## 2024-06-08 DIAGNOSIS — K21.9 GASTROESOPHAGEAL REFLUX DISEASE, UNSPECIFIED WHETHER ESOPHAGITIS PRESENT: ICD-10-CM

## 2024-06-10 ENCOUNTER — OFFICE VISIT (OUTPATIENT)
Dept: PSYCHIATRY | Facility: CLINIC | Age: 54
End: 2024-06-10
Payer: COMMERCIAL

## 2024-06-10 DIAGNOSIS — F33.1 MAJOR DEPRESSIVE DISORDER, RECURRENT EPISODE, MODERATE: Primary | ICD-10-CM

## 2024-06-10 DIAGNOSIS — F43.10 PTSD (POST-TRAUMATIC STRESS DISORDER): ICD-10-CM

## 2024-06-10 PROCEDURE — 90834 PSYTX W PT 45 MINUTES: CPT | Mod: 95,,, | Performed by: SOCIAL WORKER

## 2024-06-10 RX ORDER — RABEPRAZOLE SODIUM 20 MG/1
20 TABLET, DELAYED RELEASE ORAL
Qty: 30 TABLET | Refills: 11 | Status: SHIPPED | OUTPATIENT
Start: 2024-06-10

## 2024-06-12 NOTE — PROGRESS NOTES
Individual Psychotherapy (PhD/LCSW)    6/10/2024    Site:  [facility]         Therapeutic Intervention: Met with patient.  Outpatient - Insight oriented psychotherapy 45 min - CPT code 90068    Chief complaint/reason for encounter: depression, anxiety and ptsd     Interval history and content of current session: The patient location is: home in [place]  The chief complaint leading to consultation is: depression and BPD    Visit type: audiovisual    Face to Face time with patient: 45  45 minutes of total time spent on the encounter, which includes face to face time and non-face to face time preparing to see the patient (eg, review of tests), Obtaining and/or reviewing separately obtained history, Documenting clinical information in the electronic or other health record, Independently interpreting results (not separately reported) and communicating results to the patient/family/caregiver, or Care coordination (not separately reported).         Each patient to whom he or she provides medical services by telemedicine is:  (1) informed of the relationship between the physician and patient and the respective role of any other health care provider with respect to management of the patient; and (2) notified that he or she may decline to receive medical services by telemedicine and may withdraw from such care at any time.    Notes: Pt seen for follow-up.  She states she is exhausted from taking care of her 85 year old mother day and night.  Her sister refuses to help, sister lives next door to mother.  Pt is able to go home for a few hours each day but that is all.  Discussed how she is afraid to tell her sister she needs a night off or more time to take care of herself.  Discussed some of her trauma history and how she got to be so afraid of confronting others.    Treatment plan:  Target symptoms: depression, anxiety , PTSD  Why chosen therapy is appropriate versus another modality: relevant to diagnosis  Outcome  monitoring methods: self-report, observation  Therapeutic intervention type: insight oriented psychotherapy    Risk parameters:  Patient reports no suicidal ideation  Patient reports no homicidal ideation  Patient reports no self-injurious behavior  Patient reports no violent behavior    Verbal deficits: None    Patient's response to intervention:  The patient's response to intervention is motivated.    Progress toward goals and other mental status changes:  The patient's progress toward goals is fair .    Diagnosis:     ICD-10-CM ICD-9-CM   1. Major depressive disorder, recurrent episode, moderate  F33.1 296.32   2. PTSD (post-traumatic stress disorder)  F43.10 309.81       Plan:  individual psychotherapy and medication management by physician    Return to clinic: as scheduled    Length of Service (minutes): 45

## 2024-06-20 ENCOUNTER — HOSPITAL ENCOUNTER (OUTPATIENT)
Dept: RADIOLOGY | Facility: HOSPITAL | Age: 54
Discharge: HOME | End: 2024-06-20
Attending: EMERGENCY MEDICINE
Payer: COMMERCIAL

## 2024-06-20 DIAGNOSIS — M54.9 DORSALGIA, UNSPECIFIED: ICD-10-CM

## 2024-06-20 PROCEDURE — 72148 MRI LUMBAR SPINE W/O DYE: CPT | Mod: 26,,, | Performed by: RADIOLOGY

## 2024-06-20 PROCEDURE — 72148 MRI LUMBAR SPINE W/O DYE: CPT | Mod: TC

## 2024-06-25 ENCOUNTER — PATIENT MESSAGE (OUTPATIENT)
Dept: OPTOMETRY | Facility: CLINIC | Age: 54
End: 2024-06-25
Payer: COMMERCIAL

## 2024-06-25 DIAGNOSIS — H04.123 DRY EYE SYNDROME, BILATERAL: Primary | ICD-10-CM

## 2024-06-25 RX ORDER — VARENICLINE 0.03 MG/.05ML
0.03 SPRAY NASAL 2 TIMES DAILY
Qty: 8.4 ML | Refills: 11 | Status: SHIPPED | OUTPATIENT
Start: 2024-06-25 | End: 2024-07-25

## 2024-07-05 ENCOUNTER — PATIENT MESSAGE (OUTPATIENT)
Dept: PSYCHIATRY | Facility: CLINIC | Age: 54
End: 2024-07-05
Payer: COMMERCIAL

## 2024-07-08 ENCOUNTER — TELEPHONE (OUTPATIENT)
Dept: RHEUMATOLOGY | Facility: CLINIC | Age: 54
End: 2024-07-08
Payer: COMMERCIAL

## 2024-07-08 ENCOUNTER — OFFICE VISIT (OUTPATIENT)
Dept: RHEUMATOLOGY | Facility: CLINIC | Age: 54
End: 2024-07-08
Payer: COMMERCIAL

## 2024-07-08 DIAGNOSIS — G43.809 OTHER MIGRAINE WITHOUT STATUS MIGRAINOSUS, NOT INTRACTABLE: ICD-10-CM

## 2024-07-08 DIAGNOSIS — B02.9 HERPES ZOSTER WITHOUT COMPLICATION: ICD-10-CM

## 2024-07-08 DIAGNOSIS — N95.1 HOT FLASH, MENOPAUSAL: ICD-10-CM

## 2024-07-08 DIAGNOSIS — M79.7 FIBROMYALGIA: ICD-10-CM

## 2024-07-08 DIAGNOSIS — N39.0 URINARY TRACT INFECTION WITHOUT HEMATURIA, SITE UNSPECIFIED: ICD-10-CM

## 2024-07-08 DIAGNOSIS — L40.50 PSORIATIC ARTHRITIS: Primary | ICD-10-CM

## 2024-07-08 PROCEDURE — 1160F RVW MEDS BY RX/DR IN RCRD: CPT | Mod: CPTII,95,, | Performed by: INTERNAL MEDICINE

## 2024-07-08 PROCEDURE — 1159F MED LIST DOCD IN RCRD: CPT | Mod: CPTII,95,, | Performed by: INTERNAL MEDICINE

## 2024-07-08 PROCEDURE — 99215 OFFICE O/P EST HI 40 MIN: CPT | Mod: 95,,, | Performed by: INTERNAL MEDICINE

## 2024-07-08 RX ORDER — GRANULES FOR ORAL 3 G/1
3 POWDER ORAL ONCE
Qty: 3 G | Refills: 0 | Status: SHIPPED | OUTPATIENT
Start: 2024-07-08 | End: 2024-07-08

## 2024-07-08 RX ORDER — PROGESTERONE 100 MG/1
100 CAPSULE ORAL NIGHTLY
Qty: 30 CAPSULE | Refills: 3 | Status: SHIPPED | OUTPATIENT
Start: 2024-07-08 | End: 2025-07-08

## 2024-07-08 RX ORDER — OSELTAMIVIR PHOSPHATE 75 MG/1
75 CAPSULE ORAL 2 TIMES DAILY
Qty: 10 CAPSULE | Refills: 0 | Status: SHIPPED | OUTPATIENT
Start: 2024-07-08 | End: 2024-07-13

## 2024-07-08 RX ORDER — VALACYCLOVIR HYDROCHLORIDE 1 G/1
1000 TABLET, FILM COATED ORAL 3 TIMES DAILY
Qty: 21 TABLET | Refills: 0 | Status: SHIPPED | OUTPATIENT
Start: 2024-07-08 | End: 2024-07-15

## 2024-07-08 RX ORDER — TIRZEPATIDE 2.5 MG/.5ML
2.5 INJECTION, SOLUTION SUBCUTANEOUS
Qty: 2 ML | Refills: 5 | Status: SHIPPED | OUTPATIENT
Start: 2024-07-08 | End: 2025-01-04

## 2024-07-08 RX ORDER — PREGABALIN 75 MG/1
75 CAPSULE ORAL 3 TIMES DAILY
Qty: 90 CAPSULE | Refills: 5 | Status: SHIPPED | OUTPATIENT
Start: 2024-07-08 | End: 2025-01-06

## 2024-07-08 RX ORDER — TIRZEPATIDE 5 MG/.5ML
5 INJECTION, SOLUTION SUBCUTANEOUS
Qty: 2 ML | Refills: 5 | Status: SHIPPED | OUTPATIENT
Start: 2024-07-08 | End: 2025-01-04

## 2024-07-08 NOTE — PATIENT INSTRUCTIONS
Start lyrica 75 mg three times a day  Drop gabapentin 400 mg bid x 2 weeks  Then  400 mg x 2 weeks then every other day x 2 week
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
## 2024-07-08 NOTE — TELEPHONE ENCOUNTER
----- Message from Wanda Gooden sent at 7/8/2024  9:38 AM CDT -----  Regarding: Needs to change appt to virtual  Type: Needs Medical Advice  Who Called:  Pt    Best Call Back Number: [phone]    Additional Information: Pt is running fever and is sick, but she wanted to keep her appt and just do it virtually please call to confirm

## 2024-07-11 ENCOUNTER — PATIENT MESSAGE (OUTPATIENT)
Dept: OPTOMETRY | Facility: CLINIC | Age: 54
End: 2024-07-11
Payer: COMMERCIAL

## 2024-07-11 NOTE — PATIENT INSTRUCTIONS
Self-Care for Skin Rashes     Pat your skin dry. Do not rub.     When your skin reacts to a substance your body is sensitive to, it can cause a rash. You can treat most rashes at home by keeping the skin clean and dry. Many rashes may get better on their own within 2 to 3 days. You may need medical attention if your rash itches, drains, or hurts, particularly if the rash is getting worse.  What can cause a skin rash?  · Sun poisoning, caused by too much exposure to the sun  · An irritant or allergic reaction to a certain type of food, plant, or chemical, such as  shellfish, poison ivy, and or cleaning products  · An infection caused by a fungus (ringworm), virus (chickenpox), or bacteria (strep)  · Bites or infestation caused by insects or pests, such as ticks, lice, or mites  · Dry skin, which is often seen during the winter months and in older people  How can I control itching and skin damage?  · Take soothing lukewarm baths in a colloidal oatmeal product. You can buy this at the [employer].  · Do your best not to scratch. Clip fingernails short, especially in young children, to reduce skin damage if scratching does occur.  · Use moisturizing skin lotion instead of scratching your dry skin.  · Use sunscreen whenever going out into direct sun.  · Use only mild cleansing agents whenever possible.  · Wash with mild, nonirritating soap and warm water.  · Wear clothing that breathes, such as cotton shirts or canvas shoes.  · If fluid is seeping from the rash, cover it loosely with clean gauze to absorb the discharge.  · Many rashes are contagious. Prevent the rash from spreading to others by washing your hands often before or after touching others with any skin rash.  Use medicine  · Antihistamines such as diphenhydramine can help control itching. But use with caution because they can make you drowsy.  · Using over-the-counter hydrocortisone cream on small rashes may help reduce swelling and itching  · Most  over-the-counter antifungal medicines can treat athletes foot and many other fungal infections of the skin.  Check with your healthcare provider  Call your healthcare provider if:  · You were told that you have a fungal infection on your skin to make sure you have the correct type of medicine.  · You have questions or concerns about medicines or their side effects.     Call 911  Call 911 if either of these occur:  · Your tongue or lips start to swell  · You have difficulty breathing      Call your healthcare provider  Call your healthcare provider if any of these occur:  · Temperature of more than 101.0°F (38.3°C), or as directed  · Sore throat, a cough, or unusual fatigue  · Red, oozy, or painful rash gets worse. These are signs of infection.  · Rash covers your face, genitals, or most of your body  · Crusty sores or red rings that begin to spread  · You were exposed to someone who has a contagious rash, such as scabies or lice.  · Red bulls-eye rash with a white center (a sign of Lyme disease)  · You were told that you have resistant bacteria (MRSA) on your skin.   Date Last Reviewed: 5/12/2015  © 8107-0863 [employer]. [address] [place] [address], [place] [address]. All rights reserved. This information is not intended as a substitute for professional medical care. Always follow your healthcare professional's instructions.      Follow up with your doctor in a few days as needed.  Return to the urgent care or go to the ER if symptoms get worse.    Sunil Frausto MD   Detail Level: Zone Plan: Recommended patient avoid hot tubs and saunas until issue resolves Modify Regimen: Increase Claritin to TID until clear Initiate Treatment: Clobetasol 0.05% cream BID x 2 weeks, then decrease to PRN for flares

## 2024-07-12 ENCOUNTER — TELEPHONE (OUTPATIENT)
Dept: RHEUMATOLOGY | Facility: CLINIC | Age: 54
End: 2024-07-12
Payer: COMMERCIAL

## 2024-07-12 NOTE — TELEPHONE ENCOUNTER
Returned pharmacy call regarding questions about mounjaro. Pharmacist stated this has already been taken care of.

## 2024-07-12 NOTE — TELEPHONE ENCOUNTER
----- Message from Timbo Guadarrama sent at 7/8/2024  2:54 PM CDT -----  Contact: Martha  Type:  Pharmacy Calling to Clarify an RX    Name of Caller:  Martha  Pharmacy Name:    [facility] - [place] - [address], [address]  [address], [address]  [place] [address]  Phone: [phone] Fax: [phone]    Prescription Name:  tirzepatide (MOUNJARO) 2.5 mg/0.5 mL PnIj  tirzepatide (MOUNJARO) 5 mg/0.5 mL PnIj         What do they need to clarify?:  Need physician to call and confirm the Rx, Pharmacy is [facility]  Best Call Back Number:  [phone]

## 2024-07-16 ENCOUNTER — DOCUMENTATION ONLY (OUTPATIENT)
Dept: PSYCHIATRY | Facility: CLINIC | Age: 54
End: 2024-07-16
Payer: COMMERCIAL

## 2024-07-18 ENCOUNTER — PATIENT MESSAGE (OUTPATIENT)
Dept: GASTROENTEROLOGY | Facility: CLINIC | Age: 54
End: 2024-07-18
Payer: COMMERCIAL

## 2024-07-18 ENCOUNTER — PATIENT MESSAGE (OUTPATIENT)
Dept: PSYCHIATRY | Facility: CLINIC | Age: 54
End: 2024-07-18
Payer: COMMERCIAL

## 2024-07-18 DIAGNOSIS — N32.81 OAB (OVERACTIVE BLADDER): ICD-10-CM

## 2024-07-19 RX ORDER — OXYBUTYNIN CHLORIDE 5 MG/1
5 TABLET ORAL 3 TIMES DAILY
Qty: 90 TABLET | Refills: 3 | Status: SHIPPED | OUTPATIENT
Start: 2024-07-19

## 2024-07-23 ENCOUNTER — PATIENT MESSAGE (OUTPATIENT)
Dept: INFECTIOUS DISEASES | Facility: HOSPITAL | Age: 54
End: 2024-07-23
Payer: COMMERCIAL

## 2024-07-31 ENCOUNTER — TELEPHONE (OUTPATIENT)
Dept: UROLOGY | Facility: CLINIC | Age: 54
End: 2024-07-31
Payer: COMMERCIAL

## 2024-07-31 NOTE — TELEPHONE ENCOUNTER
Attempted to contact pt to confirm appt on 8/1/24 at 1:40 pm w/Dr Garcia. No answer, left detailed voicemail for pt.

## 2024-08-01 ENCOUNTER — HOSPITAL ENCOUNTER (OUTPATIENT)
Dept: RADIOLOGY | Facility: HOSPITAL | Age: 54
Discharge: HOME | End: 2024-08-01
Attending: UROLOGY
Payer: COMMERCIAL

## 2024-08-01 ENCOUNTER — OFFICE VISIT (OUTPATIENT)
Dept: UROLOGY | Facility: CLINIC | Age: 54
End: 2024-08-01
Payer: COMMERCIAL

## 2024-08-01 VITALS
SYSTOLIC BLOOD PRESSURE: 125 MMHG | BODY MASS INDEX: 28.58 KG/M2 | DIASTOLIC BLOOD PRESSURE: 87 MMHG | HEART RATE: 98 BPM | WEIGHT: 141.75 LBS | HEIGHT: 59 IN

## 2024-08-01 DIAGNOSIS — R10.2 PELVIC PAIN IN FEMALE: ICD-10-CM

## 2024-08-01 DIAGNOSIS — Z96.82 NEUROSTIMULATOR DEVICE IN SITU: ICD-10-CM

## 2024-08-01 DIAGNOSIS — N32.81 OAB (OVERACTIVE BLADDER): ICD-10-CM

## 2024-08-01 DIAGNOSIS — N39.41 URGENCY INCONTINENCE: ICD-10-CM

## 2024-08-01 DIAGNOSIS — M79.7 FIBROMYALGIA: ICD-10-CM

## 2024-08-01 DIAGNOSIS — R39.82 CHRONIC BLADDER PAIN: ICD-10-CM

## 2024-08-01 DIAGNOSIS — Z96.82 NEUROSTIMULATOR DEVICE IN SITU: Primary | ICD-10-CM

## 2024-08-01 DIAGNOSIS — K21.9 GASTROESOPHAGEAL REFLUX DISEASE, UNSPECIFIED WHETHER ESOPHAGITIS PRESENT: ICD-10-CM

## 2024-08-01 PROCEDURE — 72170 X-RAY EXAM OF PELVIS: CPT | Mod: TC

## 2024-08-01 PROCEDURE — 72220 X-RAY EXAM SACRUM TAILBONE: CPT | Mod: 26,,, | Performed by: RADIOLOGY

## 2024-08-01 PROCEDURE — 72220 X-RAY EXAM SACRUM TAILBONE: CPT | Mod: TC

## 2024-08-01 PROCEDURE — 99999 PR PBB SHADOW E&M-EST. PATIENT-LVL IV: CPT | Mod: PBBFAC,,, | Performed by: UROLOGY

## 2024-08-01 PROCEDURE — 72170 X-RAY EXAM OF PELVIS: CPT | Mod: 26,,, | Performed by: RADIOLOGY

## 2024-08-01 RX ORDER — METHENAMINE, SODIUM PHOSPHATE, MONOBASIC, ANHYDROUS, PHENYL SALICYLATE, METHYLENE BLUE AND HYOSCYAMINE SULFATE 118; 40.8; 36; 10; .12 MG/1; MG/1; MG/1; MG/1; MG/1
1 CAPSULE ORAL 3 TIMES DAILY
Qty: 90 CAPSULE | Refills: 3 | Status: SHIPPED | OUTPATIENT
Start: 2024-08-01

## 2024-08-01 RX ORDER — FAMOTIDINE 40 MG/1
40 TABLET, FILM COATED ORAL NIGHTLY PRN
Qty: 90 TABLET | Refills: 3 | Status: SHIPPED | OUTPATIENT
Start: 2024-08-01

## 2024-08-01 RX ORDER — HYDROXYZINE HYDROCHLORIDE 25 MG/1
25 TABLET, FILM COATED ORAL 3 TIMES DAILY
Qty: 90 TABLET | Refills: 3 | Status: SHIPPED | OUTPATIENT
Start: 2024-08-01 | End: 2025-08-01

## 2024-08-01 NOTE — PROGRESS NOTES
HPI:   CC: post op pain following InterStim Therapy replacement    Marilee Simons is a 54 y.o. woman c/o pain over the InterStim stimulation site.  Since InterStim Therapy, it helped her bladder symptoms tremendously.  However, she c/o that her right perineal and rectal area is sensitive.  It is sensitive to touch, can't lay on her side.  He fell in tub recently.  Also has a problem with Interstitial cystitis.  She is on hydroxyzine, pepcid ( anti-histamine blockers), elavil, and Uribel prn.    She has a hx of fibromyalgia and has seen Dr. Ben Samaniego.  She is on neurontin 800 mg a day.    Hx of urgency, frequency and urge urinary incontinence.  This has been refractory to medical management. She presents for explantation of her original interstim device with replacement with interstim X.   Date: 04/27/2022  Pre-Op Diagnosis: urinary urgency, frequency, urge urinary incontinence  Post-Op Diagnosis: same  Procedure(s) Performed:  1.  Explantation of InterStim generator  2.  Removal of InterStim tined lead  3.  Incision for implantation of neurostimulator electrodes, sacral nerve (transforamenal placement)  4.  Insertion of peripheral neurostimulator pulse generator  5.  Fluoro < 1 h  6.  Electronic analysis of implanted neurostimulator pulse generator system with intraoperative programming  Findings:  Old lead removed from right side and generator removed from left side  New lead placed on left side  Generator placed on left side  Good sensory and motor function c/w S3 stimulation seen  The rolling pen technique was used to localize S3      Procedure(s) Performed: 9/12/18  1.  Cystoscopy with hydrodistension  2.  Botox injections into detrusor muscle  Findings:   - Initial bladder capacity 900 mL with terminal hematuria.   - Glomerulations were diffusely seen, Hunner's ulcers seen overlying bilateral UOs, diffuse mild degree of hyperemia  - Subsequent bladder capacity was 950 mL.    Since the procedure, she has  done very well.  She needs refills on meds.    s/p InterStim Therapy for refractory frequency and urgency on 8/3/16.  Lead placed on right side  Generator placed on left side  Good sensory and motor function c/w S3 stimulation seen  She is doing great.  Is very pleased with the bladder control outcome.      She used to use valium crushed in the vagina to help her with her pelvic pain.    SUDS cysto on 1/20/15  --- Bladder ---   CYSTOMETROGRAM ( Filling Phase ):   Cystometric Numeric Data:   - First Desire (Sensation): 68 mL at 1cm of water.   - Normal Desire: 75mL at 1 cm of water.   - Strong Desire: 96 mL at 2 cm of water.   - Urgency (Imminent Void) : 108 mL at 1cm of water.   - Maximum Cystometric Capacity: 109 mL.   Compliance:   - low.   Leak Point Pressure:   - Valsalva ( Abdominal ) Leak Point Pressure: none.   UROFLOW:   - Voided Volume: 134 mL.   - Residual Urine: 5 mL.   - Maximum Flow Rate: 8 mL/sec.   - Flow Pattern: low amplitude  VOIDING PRESSURE STUDY ( Voiding Phase ):   Detrusor Pressure:   - Maximum Detrusor Pressure: 32 cm of water.   - Detrusor Pressure at Maximum Flow: 2 cm of water.   - Detrusor Contraction Characteristics: Sustained contraction(s).     ELECTROMYOGRAM:   - incomplete relaxation.     ---Diagnostic Cystourethroscopy ---   Normal urethra.    minimal hyperemic area of the bladder  Width of Bladder Neck Opening: Approximately 18 Fr.   Normal bladder.   Normal ureteral orifices bilaterally.     CONCLUSIONS:   1.  Decreased bladder capacity with sensory urgency  2.  IC  3.  OAB    Past Medical History:   Diagnosis Date    Acid reflux     Allergy     Alopecia     Anxiety     Arthralgia     Atypical hyperplasia of breast     Back pain     Depression     Dry eyes     from meds    Fever blister     Fibromyalgia     Interstitial cystitis     Irritable bowel syndrome     Kidney stone     Major depressive disorder, recurrent episode, in partial or unspecified remission 06/25/2013    OAB  (overactive bladder) 2015    PTSD (post-traumatic stress disorder)     Pure hypercholesterolemia 2022    Rheumatoid arthritis     Rheumatoid arthritis 2022    Systemic lupus erythematosus     Thyroid disease     Urinary tract infection     Vaginal infection      Past Surgical History:   Procedure Laterality Date    BLADDER SURGERY      BREAST BIOPSY Right      SECTION, LOW TRANSVERSE      x 2    COLONOSCOPY      COLONOSCOPY N/A 10/05/2017    Procedure: COLONOSCOPY;  Surgeon: Venkat He MD;  Location: [facility] (4TH FLR);  Service: Endoscopy;  Laterality: N/A;    ESOPHAGOGASTRODUODENOSCOPY      ESOPHAGOGASTRODUODENOSCOPY N/A 10/25/2021    Procedure: EGD (ESOPHAGOGASTRODUODENOSCOPY);  Surgeon: Venkat He MD;  Location: [facility] (4TH FLR);  Service: Endoscopy;  Laterality: N/A;  Covid test on 10/22 at [email or web address]    10/19 lvm to confirm appt-rb    EXCISIONAL BIOPSY Right 10/14/2022    Procedure: EXCISIONAL BIOPSY-right with radiological marker;  Surgeon: PALLAVI Oseguera MD;  Location: [facility];  Service: General;  Laterality: Right;    EYE SURGERY      Lasik-bilateral    HYSTERECTOMY      IMPLANTATION OF PERMANENT SACRAL NERVE STIMULATOR N/A 2022    Procedure: INSERTION, NEUROSTIMULATOR, PERMANENT, SACRAL;  Surgeon: Bandar Garcia MD;  Location: [facility] OR 2ND FLR;  Service: Urology;  Laterality: N/A;  2hr    INJECTION OF BOTULINUM TOXIN TYPE A N/A 2018    Procedure: INJECTION, BOTULINUM TOXIN, TYPE A  200 UNITS;  Surgeon: Bandar Garcia MD;  Location: [facility] OR 1ST FLR;  Service: Urology;  Laterality: N/A;    INSTILLATION OF URINARY BLADDER N/A 2018    Procedure: INSTILLATION, URINARY BLADDER;  Surgeon: Bandar Garcia MD;  Location: [facility] OR 1ST FLR;  Service: Urology;  Laterality: N/A;    PARTIAL HYSTERECTOMY      REMOVAL OF ELECTRODE LEAD OF SACRAL NERVE STIMULATOR N/A 2022    Procedure: REMOVAL, ELECTRODE LEAD, SACRAL NERVE STIMULATOR;  Surgeon: Bandar HART  MD Jose;  Location: [facility] OR 2ND FLR;  Service: Urology;  Laterality: N/A;    SKIN TAG REMOVAL N/A 10/14/2022    Procedure: REMOVAL, SKIN TAGS;  Surgeon: PALLAVI Oseguera MD;  Location: [facility] OR;  Service: General;  Laterality: N/A;    TRANSFORAMINAL EPIDURAL INJECTION OF STEROID Left 02/15/2021    Procedure: LUMBAR TRANSFORAMINAL LEFT L5 AND S1 DIRECT REFERRAL;  Surgeon: Marquez Nesbitt MD;  Location: [facility] PAIN MGT;  Service: Pain Management;  Laterality: Left;  NEEDS CONSENT, PT REQUESTING IV SEDATION     Social History     Tobacco Use    Smoking status: Former    Smokeless tobacco: Never    Tobacco comments:     14 years old-18 years old   Substance Use Topics    Alcohol use: No    Drug use: No     Family History   Problem Relation Name Age of Onset    Irritable bowel syndrome Mother Maryam     Arthritis Mother Maryam     Hypertension Mother Maryam     Irritable bowel syndrome Sister      Lupus Sister      Rheum arthritis Sister      Fibromyalgia Sister      Irritable bowel syndrome Sister      Celiac disease Neg Hx      Cirrhosis Neg Hx      Colon cancer Neg Hx      Colon polyps Neg Hx      Crohn's disease Neg Hx      Cystic fibrosis Neg Hx      Esophageal cancer Neg Hx      Hemochromatosis Neg Hx      Inflammatory bowel disease Neg Hx      Liver cancer Neg Hx      Liver disease Neg Hx      Rectal cancer Neg Hx      Stomach cancer Neg Hx      Ulcerative colitis Neg Hx      Boris's disease Neg Hx      Melanoma Neg Hx      Amblyopia Neg Hx      Blindness Neg Hx      Cataracts Neg Hx      Glaucoma Neg Hx      Macular degeneration Neg Hx      Retinal detachment Neg Hx      Strabismus Neg Hx       Allergy:  Review of patient's allergies indicates:   Allergen Reactions    Cephalexin Itching    Zofran [ondansetron hcl (pf)] Hives    Penicillins Rash     Outpatient Encounter Medications as of 8/1/2024   Medication Sig Dispense Refill    calcium glycerophosphate (PRELIEF) 65 mg Tab Take 2 tablets by mouth before meals and at  bedtime as needed. (Patient taking differently: Take 2 tablets by mouth 3 (three) times daily with meals.) 100 each prn    cyclobenzaprine (FLEXERIL) 10 MG tablet Take 1 tablet (10 mg total) by mouth 3 (three) times daily as needed for Muscle spasms. 90 tablet 6    FLUoxetine 20 MG capsule Take 1 capsule (20 mg total) by mouth once daily. Take IN ADDITION to Prozac 40mg 90 capsule 1    FLUoxetine 40 MG capsule Take 1 capsule (40 mg total) by mouth once daily. 90 capsule 1    fluticasone propionate (FLONASE) 50 mcg/actuation nasal spray 2 sprays (100 mcg total) by Each Nostril route 2 (two) times daily. 31.6 mL 1    gabapentin (NEURONTIN) 800 MG tablet Take 1 tablet (800 mg total) by mouth 3 (three) times daily. 90 tablet 5    lamoTRIgine (LAMICTAL) 200 MG tablet Take 1 tablet (200 mg total) by mouth once daily. IF any RASH, STOP All Lamictal and Inform Psyc MD 90 tablet 1    levothyroxine (SYNTHROID) 50 MCG tablet TAKE 1 TABLET(50 MCG) BY MOUTH EVERY DAY 30 tablet 6    memantine (NAMENDA) 10 MG Tab Take 1 tablet (10 mg total) by mouth once daily. 30 tablet 11    oxybutynin (DITROPAN) 5 MG Tab TAKE 1 TABLET(5 MG) BY MOUTH THREE TIMES DAILY 90 tablet 3    predniSONE (DELTASONE) 2.5 MG tablet 1 to 3 tabs PO daily prn for arthritis flare 90 tablet 0    pregabalin (LYRICA) 75 MG capsule Take 1 capsule (75 mg total) by mouth 3 (three) times daily. 90 capsule 5    progesterone (PROMETRIUM) 100 MG capsule Take 1 capsule (100 mg total) by mouth every evening. 30 capsule 3    RABEprazole (ACIPHEX) 20 mg tablet TAKE 1 TABLET(20 MG) BY MOUTH EVERY DAY 30 tablet 11    tirzepatide (MOUNJARO) 2.5 mg/0.5 mL PnIj Inject 2.5 mg into the skin every 7 days. 2 mL 5    tirzepatide (MOUNJARO) 5 mg/0.5 mL PnIj Inject 5 mg into the skin every 7 days. 2 mL 5    traMADoL (ULTRAM) 50 mg tablet TAKE 1 TABLET(50 MG) BY MOUTH EVERY 6 HOURS 90 tablet 4    [DISCONTINUED] famotidine (PEPCID) 40 MG tablet Take 1 tablet (40 mg total) by mouth nightly  as needed for Heartburn. 30 tablet 5    [DISCONTINUED] URO--10-40.8-36 mg Cap Take 1 capsule by mouth 3 (three) times daily.      eletriptan (RELPAX) 40 MG tablet Take 1 tablet (40 mg total) by mouth 2 (two) times daily as needed (migraine). may repeat in 2 hours if necessary 9 tablet 12    famotidine (PEPCID) 40 MG tablet Take 1 tablet (40 mg total) by mouth nightly as needed for Heartburn. 90 tablet 3    [] fosfomycin (MONUROL) 3 gram Pack Take 3 g by mouth once. for 1 dose 3 g 0    hydrOXYzine HCL (ATARAX) 25 MG tablet Take 1 tablet (25 mg total) by mouth 3 (three) times daily. 90 tablet 3    [] oseltamivir (TAMIFLU) 75 MG capsule Take 1 capsule (75 mg total) by mouth 2 (two) times daily. for 5 days 10 capsule 0    [] rimegepant 75 mg odt Take 1 tablet (75 mg total) by mouth once as needed for Migraine. Place ODT tablet on the tongue; alternatively the ODT tablet may be placed under the tongue 8 tablet 2    URO--10-40.8-36 mg Cap Take 1 capsule by mouth 3 (three) times daily. 90 capsule 3    valACYclovir (VALTREX) 1000 MG tablet Take 1 tablet (1,000 mg total) by mouth 3 (three) times daily. for 7 days 21 tablet 0    [] varenicline (TYRVAYA) 0.03 mg/spray sprm 0.03 mg by Nasal route 2 (two) times daily. 8.4 mL 11    [DISCONTINUED] clonazePAM (KLONOPIN) 0.5 MG tablet Take 0.5 tablets (0.25 mg total) by mouth once daily. 15 tablet 0    [DISCONTINUED] DULoxetine (CYMBALTA) 30 MG capsule Take 30 mg by mouth.      [DISCONTINUED] hyoscyamine (ANASPAZ,LEVSIN) 0.125 mg Tab Take 1 tablet (125 mcg total) by mouth every 8 (eight) hours as needed (abdominal cramps). 60 tablet 0    [DISCONTINUED] lifitegrast (XIIDRA) 5 % Dpet Apply 1 drop to eye every 12 (twelve) hours. 180 each 4    [DISCONTINUED] oxybutynin (DITROPAN) 5 MG Tab Take 1 tablet (5 mg total) by mouth 3 (three) times daily. 90 tablet 3    [DISCONTINUED] semaglutide, weight loss, (WEGOVY) 0.25 mg/0.5 mL PnIj Inject 0.25 mg  into the skin every 7 days. 4 each 3     No facility-administered encounter medications on file as of 8/1/2024.     Review of Systems   General ROS: GENERAL:  No weight gain or loss  SKIN:  No rashes or lacerations  HEAD:  No headaches  EYES:  No exophthalmos, jaundice or blindness  EARS:  No dizziness, tinnitus or hearing loss  NOSE:  No changes in smell  MOUTH & THROAT:  No dyskinetic movements or obvious goiter  CHEST:  No shortness of breath, hyperventilation or cough  CARDIOVASCULAR:  No tachycardia or chest pain  ABDOMEN:  No nausea, vomiting, pain, constipation or diarrhea  URINARY:  No frequency, dysuria or sexual dysfunction  ENDOCRINE:  No polydipsia, polyuria  MUSCULOSKELETAL:  No pain or stiffness of the joints  NEUROLOGIC:  No weakness, sensory changes, seizures, confusion, memory loss, tremor or other abnormal movements  Physical Exam   wound well healed.  No evidence of cellulites.  No tenderness noted.    Vitals:    08/01/24 1355   BP: 125/87   Pulse: 98     Physical Examination:   n/a  LABS:  Lab Results   Component Value Date    CREATININE 0.9 05/29/2024    CREATININE 0.9 08/22/2023    CREATININE 1.1 07/19/2023     Urine Culture, Routine   Date Value Ref Range Status   07/19/2023   Final    Multiple organisms isolated. None in predominance.  Repeat if   07/19/2023 clinically necessary.  Final     Procedure:  InterStim Analysis and Program  InterStim Reprogram:  PW decreased to 160 from 210.  She feels stimulations in the lower buttock area on the left side.  P1  ( 0.5), P2 ( 0.9), P4 ( 0.9) and P3 (0.7)  All stimulations were well WNL  However, all her stimulations from different programs resulted in a similar stimulation at the left butt cheek.  She desires vaginal stimulation if possible.      Has mild JAYNE        Assessment and Plan:  Marilee was seen today for follow-up.    Diagnoses and all orders for this visit:    Neurostimulator device in situ  -     X-Ray Sacrum And Coccyx; Future  -     X-Ray  Pelvis Routine AP; Future    OAB (overactive bladder)    Chronic bladder pain  -     URO--10-40.8-36 mg Cap; Take 1 capsule by mouth 3 (three) times daily.  -     famotidine (PEPCID) 40 MG tablet; Take 1 tablet (40 mg total) by mouth nightly as needed for Heartburn.  -     hydrOXYzine HCL (ATARAX) 25 MG tablet; Take 1 tablet (25 mg total) by mouth 3 (three) times daily.    Gastroesophageal reflux disease, unspecified whether esophagitis present  -     famotidine (PEPCID) 40 MG tablet; Take 1 tablet (40 mg total) by mouth nightly as needed for Heartburn.    Urgency incontinence    Pelvic pain in female    Fibromyalgia      Pt lost significant wt on Mounjaro.  Her firbromyalgia is better controlled after her doctor decreased Lyrica dose.  She is better alert and less problem with chronic pain.    I sent all her IC meds to [facility].    Interstim reprogramming done today ( decreased PW and amplitude).  The device is working well and she has responded to InterStim Therapy.  However, she is wondering whether she can get the stimulation more towards the vaginal area.  Will get x-rays.  The discomfort she feels is that I think it is related to flare up of fibromyalgia.  Now she is better.    Since her last visit she was diagnosed with breast cancer and was treated.  Her mother fell and broke her bones.  She has been on a lot of stress.  I think she needs to contact Dr. Samaniego, her PCP and psychiatry if needed.  I spent 40 minutes with the patient of which more than half was spent in direct consultation with the patient in regards to our treatment and plan.     Follow-up:  Follow up in about 6 months (around 2/1/2025).       Addendum  Sacral x-ray obtained today showed Interstim generator on the left upper hip area and its electrode in good position at left S3 foramen.  So I recommended that she should leave the device alone where it is.  As long as it helps to control hier bladder symptoms, I recommend  no need for revision at this time.

## 2024-08-13 ENCOUNTER — PATIENT MESSAGE (OUTPATIENT)
Dept: UROLOGY | Facility: CLINIC | Age: 54
End: 2024-08-13
Payer: COMMERCIAL

## 2024-08-13 DIAGNOSIS — R39.82 CHRONIC BLADDER PAIN: ICD-10-CM

## 2024-08-13 DIAGNOSIS — N32.81 OAB (OVERACTIVE BLADDER): ICD-10-CM

## 2024-08-13 RX ORDER — METHENAMINE, SODIUM PHOSPHATE, MONOBASIC, ANHYDROUS, PHENYL SALICYLATE, METHYLENE BLUE AND HYOSCYAMINE SULFATE 118; 40.8; 36; 10; .12 MG/1; MG/1; MG/1; MG/1; MG/1
1 CAPSULE ORAL 3 TIMES DAILY
Qty: 270 CAPSULE | Refills: 3 | Status: SHIPPED | OUTPATIENT
Start: 2024-08-13 | End: 2024-08-14

## 2024-08-13 RX ORDER — OXYBUTYNIN CHLORIDE 5 MG/1
5 TABLET ORAL 3 TIMES DAILY
Qty: 270 TABLET | Refills: 3 | Status: SHIPPED | OUTPATIENT
Start: 2024-08-13 | End: 2024-08-14 | Stop reason: SDUPTHER

## 2024-08-14 ENCOUNTER — PATIENT MESSAGE (OUTPATIENT)
Dept: RHEUMATOLOGY | Facility: CLINIC | Age: 54
End: 2024-08-14
Payer: COMMERCIAL

## 2024-08-14 DIAGNOSIS — R39.82 CHRONIC BLADDER PAIN: ICD-10-CM

## 2024-08-14 DIAGNOSIS — N32.81 OAB (OVERACTIVE BLADDER): ICD-10-CM

## 2024-08-14 RX ORDER — OXYBUTYNIN CHLORIDE 5 MG/1
5 TABLET ORAL 3 TIMES DAILY
Qty: 270 TABLET | Refills: 3 | Status: SHIPPED | OUTPATIENT
Start: 2024-08-14

## 2024-08-14 RX ORDER — HYDROXYZINE HYDROCHLORIDE 25 MG/1
25 TABLET, FILM COATED ORAL 3 TIMES DAILY
Qty: 90 TABLET | Refills: 3 | Status: SHIPPED | OUTPATIENT
Start: 2024-08-14 | End: 2024-08-16

## 2024-08-14 RX ORDER — HYDROXYZINE HYDROCHLORIDE 25 MG/1
25 TABLET, FILM COATED ORAL 3 TIMES DAILY
Qty: 90 TABLET | Refills: 3 | Status: SHIPPED | OUTPATIENT
Start: 2024-08-14 | End: 2024-08-14 | Stop reason: SDUPTHER

## 2024-08-14 RX ORDER — METHENAMINE, SODIUM PHOSPHATE, MONOBASIC, ANHYDROUS, PHENYL SALICYLATE, METHYLENE BLUE AND HYOSCYAMINE SULFATE 118; 40.8; 36; 10; .12 MG/1; MG/1; MG/1; MG/1; MG/1
1 CAPSULE ORAL 3 TIMES DAILY
Qty: 270 CAPSULE | Refills: 3 | Status: SHIPPED | OUTPATIENT
Start: 2024-08-14 | End: 2025-08-14

## 2024-08-14 NOTE — PROGRESS NOTES
Chronic bladder pain  -     Discontinue: hydrOXYzine HCL (ATARAX) 25 MG tablet; Take 1 tablet (25 mg total) by mouth 3 (three) times daily.  Dispense: 90 tablet; Refill: 3  -     hydrOXYzine HCL (ATARAX) 25 MG tablet; Take 1 tablet (25 mg total) by mouth 3 (three) times daily.  Dispense: 90 tablet; Refill: 3

## 2024-08-14 NOTE — PROGRESS NOTES
Chronic bladder pain  -     URO--10-40.8-36 mg Cap; Take 1 capsule by mouth 3 (three) times daily.  Dispense: 270 capsule; Refill: 3    OAB (overactive bladder)  -     oxybutynin (DITROPAN) 5 MG Tab; Take 1 tablet (5 mg total) by mouth 3 (three) times daily.  Dispense: 270 tablet; Refill: 3

## 2024-08-15 ENCOUNTER — PATIENT MESSAGE (OUTPATIENT)
Dept: UROLOGY | Facility: CLINIC | Age: 54
End: 2024-08-15
Payer: COMMERCIAL

## 2024-08-16 DIAGNOSIS — R39.82 CHRONIC BLADDER PAIN: ICD-10-CM

## 2024-08-16 RX ORDER — HYDROXYZINE HYDROCHLORIDE 25 MG/1
25 TABLET, FILM COATED ORAL 3 TIMES DAILY
Qty: 270 TABLET | Refills: 3 | Status: SHIPPED | OUTPATIENT
Start: 2024-08-16 | End: 2025-08-16

## 2024-08-26 ENCOUNTER — LAB VISIT (OUTPATIENT)
Dept: LAB | Facility: HOSPITAL | Age: 54
End: 2024-08-26
Attending: INTERNAL MEDICINE
Payer: COMMERCIAL

## 2024-08-26 ENCOUNTER — OFFICE VISIT (OUTPATIENT)
Dept: OPTOMETRY | Facility: CLINIC | Age: 54
End: 2024-08-26
Payer: COMMERCIAL

## 2024-08-26 DIAGNOSIS — M79.7 FIBROMYALGIA: ICD-10-CM

## 2024-08-26 DIAGNOSIS — N39.0 URINARY TRACT INFECTION WITHOUT HEMATURIA, SITE UNSPECIFIED: ICD-10-CM

## 2024-08-26 DIAGNOSIS — B02.9 HERPES ZOSTER WITHOUT COMPLICATION: ICD-10-CM

## 2024-08-26 DIAGNOSIS — Z46.0 FITTING AND ADJUSTMENT OF SPECTACLES AND CONTACT LENSES: Primary | ICD-10-CM

## 2024-08-26 DIAGNOSIS — L40.50 PSORIATIC ARTHRITIS: ICD-10-CM

## 2024-08-26 DIAGNOSIS — N95.1 HOT FLASH, MENOPAUSAL: ICD-10-CM

## 2024-08-26 DIAGNOSIS — H04.123 DRY EYE SYNDROME OF BILATERAL LACRIMAL GLANDS: Primary | ICD-10-CM

## 2024-08-26 DIAGNOSIS — H52.4 PRESBYOPIA OF BOTH EYES: ICD-10-CM

## 2024-08-26 DIAGNOSIS — Z97.3 WEARS CONTACT LENSES: ICD-10-CM

## 2024-08-26 DIAGNOSIS — G43.809 OTHER MIGRAINE WITHOUT STATUS MIGRAINOSUS, NOT INTRACTABLE: ICD-10-CM

## 2024-08-26 LAB
ALBUMIN SERPL BCP-MCNC: 4.2 G/DL (ref 3.5–5.2)
ALP SERPL-CCNC: 104 U/L (ref 55–135)
ALT SERPL W/O P-5'-P-CCNC: 19 U/L (ref 10–44)
ANION GAP SERPL CALC-SCNC: 14 MMOL/L (ref 8–16)
AST SERPL-CCNC: 20 U/L (ref 10–40)
BASOPHILS # BLD AUTO: 0.06 K/UL (ref 0–0.2)
BASOPHILS NFR BLD: 0.9 % (ref 0–1.9)
BILIRUB SERPL-MCNC: 0.3 MG/DL (ref 0.1–1)
BUN SERPL-MCNC: 13 MG/DL (ref 6–20)
CALCIUM SERPL-MCNC: 10 MG/DL (ref 8.7–10.5)
CHLORIDE SERPL-SCNC: 104 MMOL/L (ref 95–110)
CO2 SERPL-SCNC: 20 MMOL/L (ref 23–29)
CREAT SERPL-MCNC: 1 MG/DL (ref 0.5–1.4)
DIFFERENTIAL METHOD BLD: NORMAL
EOSINOPHIL # BLD AUTO: 0.2 K/UL (ref 0–0.5)
EOSINOPHIL NFR BLD: 2.3 % (ref 0–8)
ERYTHROCYTE [DISTWIDTH] IN BLOOD BY AUTOMATED COUNT: 13 % (ref 11.5–14.5)
ERYTHROCYTE [SEDIMENTATION RATE] IN BLOOD BY PHOTOMETRIC METHOD: 8 MM/HR (ref 0–36)
EST. GFR  (NO RACE VARIABLE): >60 ML/MIN/1.73 M^2
GLUCOSE SERPL-MCNC: 80 MG/DL (ref 70–110)
HCT VFR BLD AUTO: 39.3 % (ref 37–48.5)
HGB BLD-MCNC: 12.7 G/DL (ref 12–16)
IMM GRANULOCYTES # BLD AUTO: 0.01 K/UL (ref 0–0.04)
IMM GRANULOCYTES NFR BLD AUTO: 0.2 % (ref 0–0.5)
LYMPHOCYTES # BLD AUTO: 2.3 K/UL (ref 1–4.8)
LYMPHOCYTES NFR BLD: 35.3 % (ref 18–48)
MCH RBC QN AUTO: 29.6 PG (ref 27–31)
MCHC RBC AUTO-ENTMCNC: 32.3 G/DL (ref 32–36)
MCV RBC AUTO: 92 FL (ref 82–98)
MONOCYTES # BLD AUTO: 0.6 K/UL (ref 0.3–1)
MONOCYTES NFR BLD: 9.4 % (ref 4–15)
NEUTROPHILS # BLD AUTO: 3.4 K/UL (ref 1.8–7.7)
NEUTROPHILS NFR BLD: 51.9 % (ref 38–73)
NRBC BLD-RTO: 0 /100 WBC
PLATELET # BLD AUTO: 293 K/UL (ref 150–450)
PMV BLD AUTO: 9.6 FL (ref 9.2–12.9)
POTASSIUM SERPL-SCNC: 3.9 MMOL/L (ref 3.5–5.1)
PROT SERPL-MCNC: 7.2 G/DL (ref 6–8.4)
RBC # BLD AUTO: 4.29 M/UL (ref 4–5.4)
SODIUM SERPL-SCNC: 138 MMOL/L (ref 136–145)
T3FREE SERPL-MCNC: 2.7 PG/ML (ref 2.3–4.2)
T4 FREE SERPL-MCNC: 0.94 NG/DL (ref 0.71–1.51)
TSH SERPL DL<=0.005 MIU/L-ACNC: 0.64 UIU/ML (ref 0.4–4)
WBC # BLD AUTO: 6.62 K/UL (ref 3.9–12.7)

## 2024-08-26 PROCEDURE — 84439 ASSAY OF FREE THYROXINE: CPT | Performed by: INTERNAL MEDICINE

## 2024-08-26 PROCEDURE — 36415 COLL VENOUS BLD VENIPUNCTURE: CPT | Performed by: INTERNAL MEDICINE

## 2024-08-26 PROCEDURE — 99999 PR PBB SHADOW E&M-EST. PATIENT-LVL III: CPT | Mod: PBBFAC,,, | Performed by: OPTOMETRIST

## 2024-08-26 PROCEDURE — 92015 DETERMINE REFRACTIVE STATE: CPT | Mod: ,,, | Performed by: OPTOMETRIST

## 2024-08-26 PROCEDURE — 99999 PR PBB SHADOW E&M-EST. PATIENT-LVL I: CPT | Mod: PBBFAC,,, | Performed by: OPTOMETRIST

## 2024-08-26 PROCEDURE — 92310 CONTACT LENS FITTING OU: CPT | Mod: CSM,,, | Performed by: OPTOMETRIST

## 2024-08-26 PROCEDURE — 84443 ASSAY THYROID STIM HORMONE: CPT | Performed by: INTERNAL MEDICINE

## 2024-08-26 PROCEDURE — 85025 COMPLETE CBC W/AUTO DIFF WBC: CPT | Performed by: INTERNAL MEDICINE

## 2024-08-26 PROCEDURE — 92012 INTRM OPH EXAM EST PATIENT: CPT | Mod: ,,, | Performed by: OPTOMETRIST

## 2024-08-26 PROCEDURE — 85652 RBC SED RATE AUTOMATED: CPT | Performed by: INTERNAL MEDICINE

## 2024-08-26 PROCEDURE — 86235 NUCLEAR ANTIGEN ANTIBODY: CPT | Performed by: INTERNAL MEDICINE

## 2024-08-26 PROCEDURE — 99499 UNLISTED E&M SERVICE: CPT | Mod: S$GLB,,, | Performed by: OPTOMETRIST

## 2024-08-26 PROCEDURE — 86235 NUCLEAR ANTIGEN ANTIBODY: CPT | Mod: 59 | Performed by: INTERNAL MEDICINE

## 2024-08-26 PROCEDURE — 84481 FREE ASSAY (FT-3): CPT | Performed by: INTERNAL MEDICINE

## 2024-08-26 PROCEDURE — 80053 COMPREHEN METABOLIC PANEL: CPT | Performed by: INTERNAL MEDICINE

## 2024-08-26 RX ORDER — CLINDAMYCIN HYDROCHLORIDE 150 MG/1
CAPSULE ORAL
COMMUNITY
Start: 2024-07-15

## 2024-08-26 RX ORDER — HYDROCODONE BITARTRATE AND ACETAMINOPHEN 10; 325 MG/1; MG/1
TABLET ORAL
COMMUNITY
Start: 2024-07-15

## 2024-08-26 RX ORDER — HYDROXYCHLOROQUINE SULFATE 200 MG/1
TABLET, FILM COATED ORAL
COMMUNITY

## 2024-08-26 RX ORDER — GRANULES FOR ORAL 3 G/1
POWDER ORAL
COMMUNITY
Start: 2024-07-08

## 2024-08-26 RX ORDER — VARENICLINE 0.03 MG/.05ML
SPRAY NASAL
COMMUNITY
Start: 2024-08-02

## 2024-08-26 RX ORDER — OMEPRAZOLE 20 MG/1
TABLET, DELAYED RELEASE ORAL
COMMUNITY

## 2024-08-26 NOTE — PROGRESS NOTES
HPI    Patient is here today for dry eye eval and CL fit. States that dryness   helped since using Tyrvaya but states that when she missed two days, her   eyes reverted back to original dryness. Also reports problems seeing at   night while driving.  Tyrvaya BID OU   Last edited by Anahy Gonzalez, OD on 8/26/2024  2:18 PM.            Assessment /Plan     For exam results, see Encounter Report.    Dry eye syndrome of bilateral lacrimal glands    Presbyopia of both eyes    Wears contact lenses      1. Continue Tyrvaya 1 spray via nasal route twice daily. OK to also use Systane Complete PF PRN    2.   Eyeglass Final Rx       Eyeglass Final Rx         Sphere Cylinder Axis Add    Right -0.50 +0.75 105 +2.00    Left [place] +0.50 105 +2.00      Type: PAL *first time    Expiration Date: 8/26/2025                   3. Updated CL Rx. Initially good comfort and vision. Given CL trials to take home. If happy with comfort and vision of trial lenses, may order annual supply. If issues arise, RTC for CL f/u. Reviewed proper CL care and hygiene. Monitor yearly unless changes noted sooner.      Contact Lens Current Rx       Current Contact Lens Rx (Trial Lens, Dispensed)         Brand Base Curve Diameter Sphere Cylinder Centration Movement Over-Sphere Over-Cyl    Right Dailies Total 1 8.5 14.1 +2.25 Sphere Well-centered Adequate -0.25 Sphere    Left NO LENS                      Current Contact Lens Rx #2 (Trial Lens, Dispensed)         Brand Base Curve Diameter Sphere Cylinder Centration Movement Over-Sphere Over-Cyl    Right Dailies Total 1 8.5 14.1 +2.00 Sphere Well-centered Adequate      Left NO LENS                           RTC in 1 year for annual eye exam unless changes noted sooner.

## 2024-08-27 LAB
ANTI-SSA ANTIBODY: 0.07 RATIO (ref 0–0.99)
ANTI-SSA INTERPRETATION: NEGATIVE
ANTI-SSB ANTIBODY: 0.07 RATIO (ref 0–0.99)
ANTI-SSB INTERPRETATION: NEGATIVE

## 2024-08-30 ENCOUNTER — PATIENT MESSAGE (OUTPATIENT)
Dept: UROLOGY | Facility: CLINIC | Age: 54
End: 2024-08-30
Payer: COMMERCIAL

## 2024-08-30 DIAGNOSIS — N32.81 OAB (OVERACTIVE BLADDER): Primary | ICD-10-CM

## 2024-08-30 RX ORDER — HYOSCYAMINE SULFATE 0.125 MG
125 TABLET ORAL EVERY 6 HOURS PRN
Qty: 120 TABLET | Refills: 3 | Status: SHIPPED | OUTPATIENT
Start: 2024-08-30

## 2024-08-30 NOTE — PROGRESS NOTES
OAB (overactive bladder)  -     hyoscyamine (ANASPAZ,LEVSIN) 0.125 mg Tab; Take 1 tablet (125 mcg total) by mouth every 6 (six) hours as needed (urinary frequency, urgency and bladdr spasm).  Dispense: 120 tablet; Refill: 3

## 2024-09-03 DIAGNOSIS — M47.817 LUMBAR AND SACRAL OSTEOARTHRITIS: ICD-10-CM

## 2024-09-03 DIAGNOSIS — M06.00 SERONEGATIVE RHEUMATOID ARTHRITIS: ICD-10-CM

## 2024-09-03 DIAGNOSIS — M81.0 OSTEOPOROSIS, UNSPECIFIED OSTEOPOROSIS TYPE, UNSPECIFIED PATHOLOGICAL FRACTURE PRESENCE: ICD-10-CM

## 2024-09-03 DIAGNOSIS — N30.10 IC (INTERSTITIAL CYSTITIS): ICD-10-CM

## 2024-09-03 DIAGNOSIS — Z79.899 IMMUNOCOMPROMISED STATE DUE TO DRUG THERAPY: ICD-10-CM

## 2024-09-03 DIAGNOSIS — G89.4 CHRONIC PAIN SYNDROME: ICD-10-CM

## 2024-09-03 DIAGNOSIS — L40.50 PSORIATIC ARTHRITIS: ICD-10-CM

## 2024-09-03 DIAGNOSIS — D84.821 IMMUNOCOMPROMISED STATE DUE TO DRUG THERAPY: ICD-10-CM

## 2024-09-03 DIAGNOSIS — F43.10 PTSD (POST-TRAUMATIC STRESS DISORDER): ICD-10-CM

## 2024-09-03 DIAGNOSIS — M79.7 FIBROMYALGIA: ICD-10-CM

## 2024-09-05 ENCOUNTER — OFFICE VISIT (OUTPATIENT)
Dept: PSYCHIATRY | Facility: CLINIC | Age: 54
End: 2024-09-05
Payer: COMMERCIAL

## 2024-09-05 ENCOUNTER — PATIENT MESSAGE (OUTPATIENT)
Dept: PSYCHIATRY | Facility: CLINIC | Age: 54
End: 2024-09-05

## 2024-09-05 DIAGNOSIS — D84.821 IMMUNOCOMPROMISED STATE DUE TO DRUG THERAPY: ICD-10-CM

## 2024-09-05 DIAGNOSIS — M06.9 RHEUMATOID ARTHRITIS INVOLVING BOTH SHOULDERS, UNSPECIFIED WHETHER RHEUMATOID FACTOR PRESENT: ICD-10-CM

## 2024-09-05 DIAGNOSIS — L40.50 PSORIATIC ARTHRITIS: ICD-10-CM

## 2024-09-05 DIAGNOSIS — F41.9 ANXIETY DISORDER, UNSPECIFIED TYPE: ICD-10-CM

## 2024-09-05 DIAGNOSIS — M79.7 FIBROMYALGIA: ICD-10-CM

## 2024-09-05 DIAGNOSIS — F43.10 PTSD (POST-TRAUMATIC STRESS DISORDER): ICD-10-CM

## 2024-09-05 DIAGNOSIS — F60.89 CLUSTER B PERSONALITY DISORDER: ICD-10-CM

## 2024-09-05 DIAGNOSIS — M47.817 LUMBAR AND SACRAL OSTEOARTHRITIS: ICD-10-CM

## 2024-09-05 DIAGNOSIS — T74.32XS EMOTIONAL/PSYCHOLOGICAL ABUSE OF CHILD, SEQUELA: ICD-10-CM

## 2024-09-05 DIAGNOSIS — M06.00 SERONEGATIVE RHEUMATOID ARTHRITIS: ICD-10-CM

## 2024-09-05 DIAGNOSIS — T74.22XS SEXUAL ABUSE OF ADOLESCENT, SEQUELA: ICD-10-CM

## 2024-09-05 DIAGNOSIS — G89.4 CHRONIC PAIN SYNDROME: ICD-10-CM

## 2024-09-05 DIAGNOSIS — Z79.899 IMMUNOCOMPROMISED STATE DUE TO DRUG THERAPY: ICD-10-CM

## 2024-09-05 DIAGNOSIS — M32.9 LUPUS: ICD-10-CM

## 2024-09-05 DIAGNOSIS — T74.12XS PHYSICAL ABUSE OF CHILD, SEQUELA: ICD-10-CM

## 2024-09-05 DIAGNOSIS — M81.0 OSTEOPOROSIS, UNSPECIFIED OSTEOPOROSIS TYPE, UNSPECIFIED PATHOLOGICAL FRACTURE PRESENCE: ICD-10-CM

## 2024-09-05 DIAGNOSIS — N30.10 IC (INTERSTITIAL CYSTITIS): ICD-10-CM

## 2024-09-05 DIAGNOSIS — F33.1 MAJOR DEPRESSIVE DISORDER, RECURRENT EPISODE, MODERATE: Primary | ICD-10-CM

## 2024-09-05 DIAGNOSIS — U07.1 COVID: ICD-10-CM

## 2024-09-05 RX ORDER — FLUOXETINE HYDROCHLORIDE 20 MG/1
20 CAPSULE ORAL DAILY
Qty: 90 CAPSULE | Refills: 1 | Status: SHIPPED | OUTPATIENT
Start: 2024-09-05 | End: 2025-03-04

## 2024-09-05 RX ORDER — TRAMADOL HYDROCHLORIDE 50 MG/1
TABLET ORAL
Qty: 90 TABLET | Refills: 4 | Status: SHIPPED | OUTPATIENT
Start: 2024-09-05

## 2024-09-05 RX ORDER — FLUOXETINE HYDROCHLORIDE 40 MG/1
40 CAPSULE ORAL DAILY
Qty: 90 CAPSULE | Refills: 1 | Status: SHIPPED | OUTPATIENT
Start: 2024-09-05 | End: 2025-03-04

## 2024-09-05 RX ORDER — LAMOTRIGINE 200 MG/1
200 TABLET ORAL DAILY
Qty: 90 TABLET | Refills: 1 | Status: SHIPPED | OUTPATIENT
Start: 2024-09-05 | End: 2025-03-04
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
## 2024-09-05 NOTE — PATIENT INSTRUCTIONS
PLAN:   11/5/2024  4:00 PM ESTABLISHED PATIENT EXTENDED [facility] - [facility] [address] [facility], Vahid OLIVO MD     Counselor continue with Billie Chavira PhD LCSW     Psyc Meds:   Prozac 40 mg daily    Prozac 20 mg (total Prozac dose 60 mg a day)  Lamictal 200 mg daily / if any rash stop and tell cesia CRUZ    References:     Relaxation stress reduction workbook: PARVEZ Francisco PhD ( used: $7-10)    Feeling Good Website: Vahid Clark MD / www.ubigrate website (free) / javier. PODCASTS    Anxiety &  phobia workbook by CHLOE Morgan PhD  (web retailers: used: $ 7-10)    [place]: Path to Better Sleep : https://www.veterantraining.[place].gov[place]insomnia/ (free)       If interested: [facility] program, [facility]: ([phone])   and ask to set up an intake eval    Website: ([email or web address]://[email or web address]-evidence-[email or web address]-psychotherapy   (Copy and paste to your browser)     Current Conditions Treated:  PTSD  Insomnia  Panic Attacks  Depression  Anxiety  Chronic Pain  Stress    The [facility] is ideal for patients who:  Desire short-term rather than long-term psychotherapy  Want structured psychotherapy sessions for specific targets  Are willing to engage in daily practice assignments  Can commit to weekly psychotherapy for a time-limited period (usually between 6-12 weeks)    Note: Possibility of virtual  (telehealth) participation may exist    call for more information ([phone])       Cesia CRUZ explained  Intensive Outpatient Program (IOP) services and encouraged patient to call [phone] to learn about [facility] as well  as other programs in community / coordinators: María Lindo LCSW ; Nelly Demarco       Pt expressed appreciation for the visit today and did not have further question at this time though pt  was still informed to:     Call  if problems.    Call / Report Side Effects to Cesia CRUZ     Encouraged to follow up with primary care / Gen Med MD for continued monitoring of  general health and wellness.    Understanding was expressed; and no further concerns nor questions were raised at this time.     Remember healthy self care:   eat right  attempt adequate rest   HANDWASHING / encourage such javier. During this corona virus time   walk or light exercise within reason and as your general med team approves  read or explore any of reference materials / homework mentioned  reach out (I.e.,  connect with)  others who nuture and bring out best in you  avoid risky behaviors    Keep your appointments:    IF you  cannot make your appt THEN please call [phone]  or go online (via My Chart davon) to reschedule.    It is the responsibility of the patient to reschedule an appointment if an appointment has been canceled or missed.    Avoid  alcohol and illicit substances.  Look for the positive.  All is often relative-seek balance  Call sooner if needed : [phone]  Call 301 or go to Emergency Room  (ER)  if  any acute concerns

## 2024-09-07 RX ORDER — TRAMADOL HYDROCHLORIDE 50 MG/1
TABLET ORAL
Qty: 90 TABLET | OUTPATIENT
Start: 2024-09-07

## 2024-09-18 ENCOUNTER — PATIENT MESSAGE (OUTPATIENT)
Dept: PSYCHIATRY | Facility: CLINIC | Age: 54
End: 2024-09-18
Payer: COMMERCIAL

## 2024-09-18 ENCOUNTER — TELEPHONE (OUTPATIENT)
Dept: PSYCHIATRY | Facility: CLINIC | Age: 54
End: 2024-09-18
Payer: COMMERCIAL

## 2024-09-22 ENCOUNTER — PATIENT MESSAGE (OUTPATIENT)
Dept: PSYCHIATRY | Facility: CLINIC | Age: 54
End: 2024-09-22
Payer: COMMERCIAL

## 2024-09-30 ENCOUNTER — PATIENT MESSAGE (OUTPATIENT)
Dept: PRIMARY CARE CLINIC | Facility: CLINIC | Age: 54
End: 2024-09-30
Payer: COMMERCIAL

## 2024-10-07 ENCOUNTER — PATIENT MESSAGE (OUTPATIENT)
Dept: UROLOGY | Facility: CLINIC | Age: 54
End: 2024-10-07
Payer: COMMERCIAL

## 2024-10-08 ENCOUNTER — PATIENT MESSAGE (OUTPATIENT)
Dept: RHEUMATOLOGY | Facility: CLINIC | Age: 54
End: 2024-10-08
Payer: COMMERCIAL

## 2024-10-10 DIAGNOSIS — N95.1 HOT FLASH, MENOPAUSAL: ICD-10-CM

## 2024-10-10 RX ORDER — PROGESTERONE 100 MG/1
100 CAPSULE ORAL
Qty: 30 CAPSULE | Refills: 3 | Status: SHIPPED | OUTPATIENT
Start: 2024-10-10

## 2024-10-10 NOTE — TELEPHONE ENCOUNTER
Pharmacy requesting refill on Progesterone 100 mg  Pt's LOV 7/8/24  Pt's NOV 11/8/24  Medication pending

## 2024-10-23 ENCOUNTER — DOCUMENTATION ONLY (OUTPATIENT)
Dept: PSYCHIATRY | Facility: CLINIC | Age: 54
End: 2024-10-23
Payer: COMMERCIAL

## 2024-10-29 ENCOUNTER — PATIENT MESSAGE (OUTPATIENT)
Dept: PSYCHIATRY | Facility: CLINIC | Age: 54
End: 2024-10-29
Payer: COMMERCIAL

## 2024-11-04 ENCOUNTER — PATIENT MESSAGE (OUTPATIENT)
Dept: PSYCHIATRY | Facility: CLINIC | Age: 54
End: 2024-11-04
Payer: COMMERCIAL

## 2024-11-05 ENCOUNTER — OFFICE VISIT (OUTPATIENT)
Dept: PSYCHIATRY | Facility: CLINIC | Age: 54
End: 2024-11-05
Payer: COMMERCIAL

## 2024-11-05 DIAGNOSIS — F41.9 ANXIETY DISORDER, UNSPECIFIED TYPE: ICD-10-CM

## 2024-11-05 DIAGNOSIS — T74.22XS SEXUAL ABUSE OF ADOLESCENT, SEQUELA: ICD-10-CM

## 2024-11-05 DIAGNOSIS — T74.32XS EMOTIONAL/PSYCHOLOGICAL ABUSE OF CHILD, SEQUELA: ICD-10-CM

## 2024-11-05 DIAGNOSIS — T74.12XS PHYSICAL ABUSE OF CHILD, SEQUELA: ICD-10-CM

## 2024-11-05 DIAGNOSIS — F33.1 MAJOR DEPRESSIVE DISORDER, RECURRENT EPISODE, MODERATE: Primary | ICD-10-CM

## 2024-11-05 DIAGNOSIS — M06.9 RHEUMATOID ARTHRITIS INVOLVING BOTH SHOULDERS, UNSPECIFIED WHETHER RHEUMATOID FACTOR PRESENT: ICD-10-CM

## 2024-11-05 DIAGNOSIS — F43.10 PTSD (POST-TRAUMATIC STRESS DISORDER): ICD-10-CM

## 2024-11-05 DIAGNOSIS — F60.89 CLUSTER B PERSONALITY DISORDER: ICD-10-CM

## 2024-11-05 PROCEDURE — 90833 PSYTX W PT W E/M 30 MIN: CPT | Mod: 95,,, | Performed by: PSYCHIATRY & NEUROLOGY

## 2024-11-05 PROCEDURE — 1160F RVW MEDS BY RX/DR IN RCRD: CPT | Mod: CPTII,95,, | Performed by: PSYCHIATRY & NEUROLOGY

## 2024-11-05 PROCEDURE — 1159F MED LIST DOCD IN RCRD: CPT | Mod: CPTII,95,, | Performed by: PSYCHIATRY & NEUROLOGY

## 2024-11-05 PROCEDURE — 99215 OFFICE O/P EST HI 40 MIN: CPT | Mod: 95,,, | Performed by: PSYCHIATRY & NEUROLOGY

## 2024-11-05 RX ORDER — FLUOXETINE HYDROCHLORIDE 20 MG/1
20 CAPSULE ORAL DAILY
Qty: 90 CAPSULE | Refills: 1 | Status: SHIPPED | OUTPATIENT
Start: 2024-11-05 | End: 2025-05-04

## 2024-11-05 RX ORDER — FLUOXETINE HYDROCHLORIDE 40 MG/1
40 CAPSULE ORAL DAILY
Qty: 90 CAPSULE | Refills: 1 | Status: SHIPPED | OUTPATIENT
Start: 2024-11-05 | End: 2025-05-04

## 2024-11-05 RX ORDER — LAMOTRIGINE 200 MG/1
200 TABLET ORAL DAILY
Qty: 90 TABLET | Refills: 1 | Status: SHIPPED | OUTPATIENT
Start: 2024-11-05 | End: 2025-05-04

## 2024-11-05 RX ORDER — BUSPIRONE HYDROCHLORIDE 10 MG/1
10 TABLET ORAL 3 TIMES DAILY
Qty: 270 TABLET | Refills: 0 | Status: SHIPPED | OUTPATIENT
Start: 2024-11-05 | End: 2025-02-03

## 2024-11-05 NOTE — PATIENT INSTRUCTIONS
"  PLAN:     Follow Up Feb 5 2024 @ 2:30p Telehealth     Counselor continue with Billie Chavira PhD LCSW     Psyc Meds:   Prozac 40 mg daily    Prozac 20 mg (total Prozac dose 60 mg a day)  Lamictal 200 mg daily / if any rash stop and tell cesia CRUZ  Mutually agreed to addition of BuSpar 10 mg 3 times a day (11-5-24)    References:     Relaxation stress reduction workbook: PARVEZ Francisco PhD ( used: $7-10)    Feeling Good Website: Vahid Clark MD / www.[employer] website (free) / javier. PODCASTS    Anxiety &  phobia workbook by CHLOE Morgan PhD  (web retailers: used: $ 7-10)    [place]: Path to Better Sleep : https://www.veterantraining.[place].gov/insomnia/ (free)     Pt tends to have  "negative thinking  > Cesia CRUZ has recommended that she look into [facility] /  If interested: [facility] program, [facility]: ([phone])  and ask to set up an intake eval    Website: (https://[email or web address]-evidence-[email or web address]   (Copy and paste to your browser)     Current Conditions Treated:  PTSD  Insomnia  Panic Attacks  Depression  Anxiety  Chronic Pain  Stress    The [facility] is ideal for patients who:  Desire short-term rather than long-term psychotherapy  Want structured psychotherapy sessions for specific targets  Are willing to engage in daily practice assignments  Can commit to weekly psychotherapy for a time-limited period (usually between 6-12 weeks)    Note: Possibility of virtual  (telehealth) participation may exist    call for more information ([phone])       Cesia CRUZ explained  Intensive Outpatient Program (IOP) services and encouraged patient to call [phone] to learn about [facility] as well  as other programs in community / coordinators: María Lindo LCSW ; Nelly Demarco       Pt expressed appreciation for the visit today and did not have further question at this time though pt  was still informed to:     Call  if " problems.    Call / Report Side Effects to Psyc MD     Encouraged to follow up with primary care / Gen Med MD for continued monitoring of general health and wellness.    Understanding was expressed; and no further concerns nor questions were raised at this time.     Remember healthy self care:   eat right  attempt adequate rest   HANDWASHING / encourage such javier. During this corona virus time   walk or light exercise within reason and as your general med team approves  read or explore any of reference materials / homework mentioned  reach out (I.e.,  connect with)  others who nuture and bring out best in you  avoid risky behaviors    Keep your appointments:    IF you  cannot make your appt THEN please call [phone]  or go online (via My Chart davon) to reschedule.    It is the responsibility of the patient to reschedule an appointment if an appointment has been canceled or missed.    Avoid  alcohol and illicit substances.  Look for the positive.  All is often relative-seek balance  Call sooner if needed : [phone]  Call 911 or go to Emergency Room  (ER)  if  any acute concerns
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
## 2024-11-07 ENCOUNTER — PATIENT MESSAGE (OUTPATIENT)
Dept: RHEUMATOLOGY | Facility: CLINIC | Age: 54
End: 2024-11-07
Payer: COMMERCIAL

## 2024-11-08 ENCOUNTER — OFFICE VISIT (OUTPATIENT)
Dept: RHEUMATOLOGY | Facility: CLINIC | Age: 54
End: 2024-11-08
Payer: COMMERCIAL

## 2024-11-08 DIAGNOSIS — E03.9 HYPOTHYROIDISM, UNSPECIFIED TYPE: ICD-10-CM

## 2024-11-08 DIAGNOSIS — L40.50 PSORIATIC ARTHRITIS: Primary | ICD-10-CM

## 2024-11-08 DIAGNOSIS — G89.4 CHRONIC PAIN SYNDROME: ICD-10-CM

## 2024-11-08 DIAGNOSIS — M79.7 FIBROMYALGIA: ICD-10-CM

## 2024-11-08 RX ORDER — LEVOTHYROXINE SODIUM 50 UG/1
TABLET ORAL
Qty: 30 TABLET | Refills: 6 | Status: SHIPPED | OUTPATIENT
Start: 2024-11-08

## 2024-11-08 NOTE — Clinical Note
Can you make sure therapy plan is still up to date. Pt started Simponi Aria 2/24 and then next infusion 5/24 and none since then due to transportation and illness. Does she need to reload?

## 2024-11-08 NOTE — PROGRESS NOTES
The patient location is: home  The chief complaint leading to consultation is: psa    Visit type: audiovisual    Face to Face time with patient: 25 minutes  35 minutes of total time spent on the encounter, which includes face to face time and non-face to face time preparing to see the patient (eg, review of tests), Obtaining and/or reviewing separately obtained history, Documenting clinical information in the electronic or other health record, Independently interpreting results (not separately reported) and communicating results to the patient/family/caregiver, or Care coordination (not separately reported).   Each patient to whom he or she provides medical services by telemedicine is:  (1) informed of the relationship between the physician and patient and the respective role of any other health care provider with respect to management of the patient; and (2) notified that he or she may decline to receive medical services by telemedicine and may withdraw from such care at any time.    Notes:     Subjective:       Patient ID: Marilee Simons is a 54 y.o. female.    Chief Complaint: Disease Management    Mrs. Simons is a 54 year old female who presents to clinic for follow up on psoriatic arthritis. She has been off Simponi since May 2024. She missed her infusion in July because of infection and did not hear back from infusion center to reschedule. She is not sure if Simponi was helping with her joint pain since she only took 2 infusion total this year. She has joint pain in her hands, wrists, elbows, shoulders, knee, and low back. Pain is constant and aching. She complains of increased pain specifically in her toes.    She is taking flexeril and tramadol only as needed for severe pain, but would like to avoid pain medication. She can not take NSAIDs due to GI issues.     Nurtec has been helpful in managing her migraines.     Her son got  in September.     I reviewed her recent labs from 8/24.     Current  tx:  Samina Antonya (off since May)    Prior tx:  1. Humira        Review of Systems   Constitutional:  Negative for fever and unexpected weight change.   HENT:  Negative for mouth sores and trouble swallowing.    Eyes:  Negative for redness.   Respiratory:  Negative for cough and shortness of breath.    Cardiovascular:  Negative for chest pain.   Gastrointestinal:  Negative for constipation and diarrhea.   Genitourinary:  Negative for dysuria and genital sores.   Musculoskeletal:  Positive for arthralgias, joint swelling and myalgias.   Skin:  Negative for rash.   Allergic/Immunologic: Positive for immunocompromised state.   Hematological:  Bruises/bleeds easily.         Objective:     There were no vitals filed for this visit.      Past Medical History:   Diagnosis Date    Acid reflux     Allergy     Alopecia     Anxiety     Arthralgia     Atypical hyperplasia of breast     Back pain     Depression     Dry eyes     from meds    Fever blister     Fibromyalgia     Interstitial cystitis     Irritable bowel syndrome     Kidney stone     Major depressive disorder, recurrent episode, in partial or unspecified remission 2013    OAB (overactive bladder) 2015    PTSD (post-traumatic stress disorder)     Pure hypercholesterolemia 2022    Rheumatoid arthritis     Rheumatoid arthritis 2022    Systemic lupus erythematosus     Thyroid disease     Urinary tract infection     Vaginal infection      Past Surgical History:   Procedure Laterality Date    BLADDER SURGERY      BREAST BIOPSY Right      SECTION, LOW TRANSVERSE      x 2    COLONOSCOPY      COLONOSCOPY N/A 10/05/2017    Procedure: COLONOSCOPY;  Surgeon: Venkat He MD;  Location: [address]);  Service: Endoscopy;  Laterality: N/A;    ESOPHAGOGASTRODUODENOSCOPY      ESOPHAGOGASTRODUODENOSCOPY N/A 10/25/2021    Procedure: EGD (ESOPHAGOGASTRODUODENOSCOPY);  Surgeon: Venkat He MD;  Location: [address]);  Service:  Endoscopy;  Laterality: N/A;  Covid test on 10/22 at [facility]    10/19 lvm to confirm appt-rb    EXCISIONAL BIOPSY Right 10/14/2022    Procedure: EXCISIONAL BIOPSY-right with radiological marker;  Surgeon: PALLAVI Oseguera MD;  Location: [facility];  Service: General;  Laterality: Right;    EYE SURGERY      Lasik-bilateral    HYSTERECTOMY      IMPLANTATION OF PERMANENT SACRAL NERVE STIMULATOR N/A 04/27/2022    Procedure: INSERTION, NEUROSTIMULATOR, PERMANENT, SACRAL;  Surgeon: Bandar Garcia MD;  [facility];  Service: Urology;  Laterality: N/A;  2hr    INJECTION OF BOTULINUM TOXIN TYPE A N/A 09/12/2018    Procedure: INJECTION, BOTULINUM TOXIN, TYPE A  200 UNITS;  Surgeon: Bandar Garcia MD;  [facility];  Service: Urology;  Laterality: N/A;    INSTILLATION OF URINARY BLADDER N/A 09/12/2018    Procedure: INSTILLATION, URINARY BLADDER;  Surgeon: Bandar Garcia MD;  [facility];  Service: Urology;  Laterality: N/A;    PARTIAL HYSTERECTOMY      REMOVAL OF ELECTRODE LEAD OF SACRAL NERVE STIMULATOR N/A 04/27/2022    Procedure: REMOVAL, ELECTRODE LEAD, SACRAL NERVE STIMULATOR;  Surgeon: Bandar Garcia MD;  [facility];  Service: Urology;  Laterality: N/A;    SKIN TAG REMOVAL N/A 10/14/2022    Procedure: REMOVAL, SKIN TAGS;  Surgeon: PALLAVI Oseguera MD;  [facility];  Service: General;  Laterality: N/A;    TRANSFORAMINAL EPIDURAL INJECTION OF STEROID Left 02/15/2021    Procedure: LUMBAR TRANSFORAMINAL LEFT L5 AND S1 DIRECT REFERRAL;  Surgeon: Marquez Nesbitt MD;  [facility]: [facility];  Service: Pain Management;  Laterality: Left;  NEEDS CONSENT, PT REQUESTING IV SEDATION          Physical Exam   Constitutional: She is oriented to person, place, and time.   Neurological: She is oriented to person, place, and time.         Labs reviewed:  Component      Latest Ref Rng 8/26/2024   WBC      3.90 - 12.70 K/uL 6.62    RBC      4.00 - 5.40 M/uL 4.29    Hemoglobin       12.0 - 16.0 g/dL 12.7    Hematocrit      37.0 - 48.5 % 39.3    MCV      82 - 98 fL 92    MCH      27.0 - 31.0 pg 29.6    MCHC      32.0 - 36.0 g/dL 32.3    RDW      11.5 - 14.5 % 13.0    Platelet Count      150 - 450 K/uL 293    MPV      9.2 - 12.9 fL 9.6    Immature Granulocytes      0.0 - 0.5 % 0.2    Gran # (ANC)      1.8 - 7.7 K/uL 3.4    Immature Grans (Abs)      0.00 - 0.04 K/uL 0.01    Lymph #      1.0 - 4.8 K/uL 2.3    Mono #      0.3 - 1.0 K/uL 0.6    Eos #      0.0 - 0.5 K/uL 0.2    Baso #      0.00 - 0.20 K/uL 0.06    nRBC      0 /100 WBC 0    Gran %      38.0 - 73.0 % 51.9    Lymph %      18.0 - 48.0 % 35.3    Mono %      4.0 - 15.0 % 9.4    Eos %      0.0 - 8.0 % 2.3    Basophil %      0.0 - 1.9 % 0.9    Differential Method Automated    Sodium      136 - 145 mmol/L 138    Potassium      3.5 - 5.1 mmol/L 3.9    Chloride      95 - 110 mmol/L 104    CO2      23 - 29 mmol/L 20 (L)    Glucose      70 - 110 mg/dL 80    BUN      6 - 20 mg/dL 13    Creatinine      0.5 - 1.4 mg/dL 1.0    Calcium      8.7 - 10.5 mg/dL 10.0    PROTEIN TOTAL      6.0 - 8.4 g/dL 7.2    Albumin      3.5 - 5.2 g/dL 4.2    BILIRUBIN TOTAL      0.1 - 1.0 mg/dL 0.3    ALP      55 - 135 U/L 104    AST      10 - 40 U/L 20    ALT      10 - 44 U/L 19    eGFR      >60 mL/min/1.73 m^2 >60.0    Anion Gap      8 - 16 mmol/L 14    Anti-SSA Antibody      0.00 - 0.99 Ratio 0.07    Anti-SSA Interpretation      Negative  Negative    Anti-SSB Antibody      0.00 - 0.99 Ratio 0.07    Anti-SSB Interpretation      Negative  Negative    Sed Rate      0 - 36 mm/Hr 8    TSH      0.400 - 4.000 uIU/mL 0.643    T3, Free      2.3 - 4.2 pg/mL 2.7    Free T4      0.71 - 1.51 ng/dL 0.94      Imaging:     Narrative & Impression  EXAMINATION:  MRI CERVICAL SPINE WITHOUT CONTRAST     CLINICAL HISTORY:  Spinal stenosis, cervical;  Spinal stenosis, cervical region     FINDINGS:  Comparison is CT cervical spine 12/28/2022.     No marrow replacement, marrow edema,  infection, or neoplasm is seen.  The cord is normal in course, caliber, and signal including craniocervical junction.  There is mild DJD at C4-C5, C5-C6, and C6-C7.     No soft tissue injury or whiplash injury seen.     C2-C3 demonstrates minimal DJD.  The canal and neural foramina are patent.     C3-C4 demonstrates minimal DJD.  The canal and neural foramina are patent.     C4-C5 demonstrates a mild disc bulge.  There is mild neural foraminal narrowing.     C5-C6 demonstrates a mild disc bulge.  The canal is patent.  There is mild neural foraminal narrowing.     C6-C7 demonstrates a mild disc bulge.  The canal is patent.  There is mild neural foraminal narrowing.     C7-T1 and upper thoracic levels are unremarkable.  The cord is normal in signal.     Impression:     Degenerative changes as above.  Assessment:       1. Psoriatic arthritis    2. Fibromyalgia    3. Hypothyroidism, unspecified type    4. Chronic pain syndrome          Plan:       Psoriatic arthritis  -     CBC Auto Differential; Future; Expected date: 11/08/2024  -     Comprehensive Metabolic Panel; Future; Expected date: 11/08/2024  -     C-Reactive Protein; Future; Expected date: 11/08/2024  -     Sedimentation rate; Future; Expected date: 11/08/2024  -     X-Ray Foot Complete Bilateral; Future; Expected date: 11/08/2024  -     Hepatitis B Surface Antigen; Future; Expected date: 11/08/2024  -     Hepatitis B Core Antibody, Total; Future; Expected date: 11/08/2024  -     Hepatitis C Antibody; Future; Expected date: 11/08/2024  -     Quantiferon Gold TB; Future; Expected date: 11/08/2024    Fibromyalgia  -     CBC Auto Differential; Future; Expected date: 11/08/2024  -     Comprehensive Metabolic Panel; Future; Expected date: 11/08/2024  -     C-Reactive Protein; Future; Expected date: 11/08/2024  -     Sedimentation rate; Future; Expected date: 11/08/2024  -     X-Ray Foot Complete Bilateral; Future; Expected date: 11/08/2024  -     Hepatitis B  Surface Antigen; Future; Expected date: 11/08/2024  -     Hepatitis B Core Antibody, Total; Future; Expected date: 11/08/2024  -     Hepatitis C Antibody; Future; Expected date: 11/08/2024  -     Quantiferon Gold TB; Future; Expected date: 11/08/2024    Hypothyroidism, unspecified type  -     levothyroxine (SYNTHROID) 50 MCG tablet; TAKE 1 TABLET(50 MCG) BY MOUTH EVERY DAY  Dispense: 30 tablet; Refill: 6    Chronic pain syndrome            Psoriatic arthritis, seronegative RA  --chronic pain syndrome on tramadol  --uncontrolled major depression, anxiety followed by Psychiatry  --osteopenia  --hashimoto's thyroiditis followed by Endocrinology      1. Restart Simponi Aria   2. Cont Tramadol, lyrica per MD.  I have checked [place] prescription monitoring program site and no unusual or abnormal behavior has occurred pt understand the risk and benefits of taking opioid medications and has decided to continue the medication.  3. Cont flexeril prn  4. Cont prednisone PRN  5. Cont levothyroxine  6. Check labs  7. X-ray feet    Follow up:  4 mo Dr. Samaniego

## 2024-11-18 ENCOUNTER — OFFICE VISIT (OUTPATIENT)
Dept: PSYCHIATRY | Facility: CLINIC | Age: 54
End: 2024-11-18
Payer: COMMERCIAL

## 2024-11-18 DIAGNOSIS — F41.9 ANXIETY DISORDER, UNSPECIFIED TYPE: Primary | ICD-10-CM

## 2024-11-18 PROCEDURE — 90834 PSYTX W PT 45 MINUTES: CPT | Mod: 95,,, | Performed by: SOCIAL WORKER

## 2024-11-19 NOTE — PROGRESS NOTES
Individual Psychotherapy (PhD/LCSW)    11/18/2024    Site:  [facility]         Therapeutic Intervention: Met with patient.  Outpatient - Insight oriented psychotherapy 45 min - CPT code 83344    Chief complaint/reason for encounter: depression, anxiety and ptsd     Interval history and content of current session: The patient location is: home in [place]  The chief complaint leading to consultation is: depression and BPD    Visit type: audiovisual    Face to Face time with patient: 45  45 minutes of total time spent on the encounter, which includes face to face time and non-face to face time preparing to see the patient (eg, review of tests), Obtaining and/or reviewing separately obtained history, Documenting clinical information in the electronic or other health record, Independently interpreting results (not separately reported) and communicating results to the patient/family/caregiver, or Care coordination (not separately reported).         Each patient to whom he or she provides medical services by telemedicine is:  (1) informed of the relationship between the physician and patient and the respective role of any other health care provider with respect to management of the patient; and (2) notified that he or she may decline to receive medical services by telemedicine and may withdraw from such care at any time.    Notes: Pt seen for follow-up.  Not seen since June.  She is doing better as her son got  and she survived this despite being highly anxious about it all.  They have hired someone to help her mother out so she is not having to spend as much time with her.  Her main issue is anxiety and we discussed ways to deal with those feelings.  She has not had a car for a while and  promises to get her one soon so she thinks she will be better when she can get out.    Treatment plan:  Target symptoms: depression, anxiety , PTSD  Why chosen therapy is appropriate versus another modality:  relevant to diagnosis  Outcome monitoring methods: self-report, observation  Therapeutic intervention type: insight oriented psychotherapy    Risk parameters:  Patient reports no suicidal ideation  Patient reports no homicidal ideation  Patient reports no self-injurious behavior  Patient reports no violent behavior    Verbal deficits: None    Patient's response to intervention:  The patient's response to intervention is motivated.    Progress toward goals and other mental status changes:  The patient's progress toward goals is fair .    Diagnosis:     ICD-10-CM ICD-9-CM   1. Anxiety disorder, unspecified type  F41.9 300.00       Plan:  individual psychotherapy and medication management by physician    Return to clinic: as scheduled    Length of Service (minutes): 45

## 2024-11-25 DIAGNOSIS — L40.50 PSORIATIC ARTHRITIS: ICD-10-CM

## 2024-11-25 NOTE — TELEPHONE ENCOUNTER
Pharmacy requesting refill on Cyclobenzaprine 10mg  Pt's LOV 07/08/2024  Pt's NOV 03/10/2025  Medication pending

## 2024-11-26 RX ORDER — CYCLOBENZAPRINE HCL 10 MG
10 TABLET ORAL 3 TIMES DAILY PRN
Qty: 90 TABLET | Refills: 6 | Status: SHIPPED | OUTPATIENT
Start: 2024-11-26

## 2024-11-27 ENCOUNTER — PATIENT MESSAGE (OUTPATIENT)
Dept: RHEUMATOLOGY | Facility: CLINIC | Age: 54
End: 2024-11-27
Payer: COMMERCIAL

## 2024-11-29 ENCOUNTER — TELEPHONE (OUTPATIENT)
Dept: RHEUMATOLOGY | Facility: CLINIC | Age: 54
End: 2024-11-29
Payer: COMMERCIAL

## 2024-11-29 RX ORDER — ACETAMINOPHEN 325 MG/1
650 TABLET ORAL
OUTPATIENT
Start: 2024-11-29

## 2024-11-29 RX ORDER — METHYLPREDNISOLONE SOD SUCC 125 MG
100 VIAL (EA) INJECTION
OUTPATIENT
Start: 2024-11-29

## 2024-11-29 RX ORDER — DIPHENHYDRAMINE HYDROCHLORIDE 50 MG/ML
50 INJECTION INTRAMUSCULAR; INTRAVENOUS ONCE AS NEEDED
OUTPATIENT
Start: 2024-11-29

## 2024-11-29 RX ORDER — EPINEPHRINE 0.3 MG/.3ML
0.3 INJECTION SUBCUTANEOUS ONCE AS NEEDED
OUTPATIENT
Start: 2024-11-29

## 2024-11-29 RX ORDER — SODIUM CHLORIDE 0.9 % (FLUSH) 0.9 %
10 SYRINGE (ML) INJECTION
OUTPATIENT
Start: 2024-11-29

## 2024-11-29 RX ORDER — DIPHENHYDRAMINE HYDROCHLORIDE 50 MG/ML
50 INJECTION INTRAMUSCULAR; INTRAVENOUS
OUTPATIENT
Start: 2024-11-29

## 2024-11-29 RX ORDER — HEPARIN 100 UNIT/ML
500 SYRINGE INTRAVENOUS
OUTPATIENT
Start: 2024-11-29

## 2024-11-29 NOTE — TELEPHONE ENCOUNTER
Can try to see if insurance will pay for reload again    Re-entered therapy plan. Previous had  orders

## 2024-11-29 NOTE — TELEPHONE ENCOUNTER
----- Message from Maggy Hagen PA-C sent at 11/8/2024  1:29 PM CST -----  Can you make sure therapy plan is still up to date. Pt started Simponi Aria 2/24 and then next infusion 5/24 and none since then due to transportation and illness. Does she need to reload?

## 2024-12-18 ENCOUNTER — OFFICE VISIT (OUTPATIENT)
Dept: PSYCHIATRY | Facility: CLINIC | Age: 54
End: 2024-12-18
Payer: COMMERCIAL

## 2024-12-18 DIAGNOSIS — F41.9 ANXIETY DISORDER, UNSPECIFIED TYPE: Primary | ICD-10-CM

## 2024-12-18 DIAGNOSIS — F33.1 MAJOR DEPRESSIVE DISORDER, RECURRENT EPISODE, MODERATE: ICD-10-CM

## 2024-12-18 PROCEDURE — 90834 PSYTX W PT 45 MINUTES: CPT | Mod: 95,,, | Performed by: SOCIAL WORKER

## 2024-12-18 NOTE — PROGRESS NOTES
Individual Psychotherapy (PhD/LCSW)    12/18/2024    Site:  [facility]         Therapeutic Intervention: Met with patient.  Outpatient - Insight oriented psychotherapy 45 min - CPT code 22584    Chief complaint/reason for encounter: depression, anxiety and ptsd     Interval history and content of current session: The patient location is: home in [place]  The chief complaint leading to consultation is: depression and BPD    Visit type: audiovisual    Face to Face time with patient: 45  45 minutes of total time spent on the encounter, which includes face to face time and non-face to face time preparing to see the patient (eg, review of tests), Obtaining and/or reviewing separately obtained history, Documenting clinical information in the electronic or other health record, Independently interpreting results (not separately reported) and communicating results to the patient/family/caregiver, or Care coordination (not separately reported).         Each patient to whom he or she provides medical services by telemedicine is:  (1) informed of the relationship between the physician and patient and the respective role of any other health care provider with respect to management of the patient; and (2) notified that he or she may decline to receive medical services by telemedicine and may withdraw from such care at any time.    Notes: Pt seen for follow-up.  She talks about a variety of issues with her highly dysfunctional family and how to handle them.  She wants help on dealing with her BPD and it was suggested that she look online at the DBT self help website. This has been suggested to her many times but she does not follow through.    Treatment plan:  Target symptoms: depression, anxiety , PTSD  Why chosen therapy is appropriate versus another modality: relevant to diagnosis  Outcome monitoring methods: self-report, observation  Therapeutic intervention type: insight oriented psychotherapy    Risk  parameters:  Patient reports no suicidal ideation  Patient reports no homicidal ideation  Patient reports no self-injurious behavior  Patient reports no violent behavior    Verbal deficits: None    Patient's response to intervention:  The patient's response to intervention is motivated.    Progress toward goals and other mental status changes:  The patient's progress toward goals is fair .    Diagnosis:     ICD-10-CM ICD-9-CM   1. Anxiety disorder, unspecified type  F41.9 300.00   2. Major depressive disorder, recurrent episode, moderate  F33.1 296.32       Plan:  individual psychotherapy and medication management by physician    Return to clinic: as scheduled    Length of Service (minutes): 45

## 2024-12-27 ENCOUNTER — PATIENT OUTREACH (OUTPATIENT)
Dept: INTERNAL MEDICINE | Facility: CLINIC | Age: 54
End: 2024-12-27
Payer: COMMERCIAL

## 2024-12-27 VITALS — DIASTOLIC BLOOD PRESSURE: 87 MMHG | SYSTOLIC BLOOD PRESSURE: 125 MMHG

## 2025-01-02 ENCOUNTER — PATIENT MESSAGE (OUTPATIENT)
Dept: RHEUMATOLOGY | Facility: CLINIC | Age: 55
End: 2025-01-02
Payer: COMMERCIAL

## 2025-01-04 DIAGNOSIS — N32.81 OAB (OVERACTIVE BLADDER): ICD-10-CM

## 2025-01-06 ENCOUNTER — PATIENT MESSAGE (OUTPATIENT)
Dept: UROLOGY | Facility: CLINIC | Age: 55
End: 2025-01-06
Payer: COMMERCIAL

## 2025-01-06 ENCOUNTER — TELEPHONE (OUTPATIENT)
Dept: RHEUMATOLOGY | Facility: CLINIC | Age: 55
End: 2025-01-06
Payer: COMMERCIAL

## 2025-01-06 RX ORDER — HYOSCYAMINE SULFATE 0.125 MG
TABLET ORAL
Qty: 120 TABLET | Refills: 3 | Status: SHIPPED | OUTPATIENT
Start: 2025-01-06

## 2025-01-06 NOTE — TELEPHONE ENCOUNTER
"Left message to call us back    ----- Message from Pharmacist Kristen sent at 1/6/2025 10:40 AM CST -----  Patient needs consent form her Simponi Aria infusion    Please complete and call patient to come to office to sign infusion form. Can place at the   ----- Message -----  From: Anahy Schwarz MA  Sent: 12/30/2024   4:10 PM CST  To: Kristen Rodriguez PharmD      ----- Message -----  From: Kristy Wilks MA  Sent: 12/30/2024   3:38 PM CST  To: Ben Samaniego MD; Aden Contreras Staff      The attached patient has been approved for Simponi Aria but the required consent has not yet been uploaded.  To avoid any delays in scheduling, please obtain and UPLOAD the infusion consent so that we can begin the process of scheduling the medication. This may be most convenient if the consent is obtained upon ordering the infusions while the patient is at your appointment, but a telephone consent can be obtained as outlined below.  ________________________________________________________________________     According to [place] state law ([place] RS 40:1157.1) and [facility] Policy OHS.MED.033 (Informed Consent), all patients receiving infusions should have an informed consent initiated by the ordering provider. The consent form has been revised to include all types of infusions and can be found by following these steps:      · WebLink Resources in the Epic Drop-down (My Toolbar Default Items)  · Physicians  · Consent/Refusal of Treatment (you can pin this to your toolbar or drop-down menu for future reference)  · Search "1873" - or -  Scroll to find: 1873 Non-Chemotherapy Ambulatory Infusion Consent (page 3) - print and fill in the medication specific blanks  ________________________________________________________________________    If the patient is not able to return to your office, a telephone consent is compliant with verification of patient by patient spelling their first and last name and stating their date of " "birth. Telephone consents require the provider's signature (as usual) with two staff members to witness the telephone consent procedure.     This should be uploaded to "Media" under "Consents" and labelled "Infusion Consent" with the name of the medication.     For issues or concerns, please reply to [facility] Clinical Support or call ext [phone] or we can be reached immediately using Epic Secure chat: [facility] Ambulatory Infusion Staff (group).    We also have a staff email: [email or web address]  "

## 2025-01-07 DIAGNOSIS — Z96.82 PRESENCE OF NEUROSTIMULATOR: Primary | ICD-10-CM

## 2025-01-07 NOTE — PROGRESS NOTES
Presence of neurostimulator  -     X-Ray Sacrum And Coccyx; Future; Expected date: 01/07/2025  -     X-Ray Pelvis Routine AP; Future; Expected date: 01/07/2025

## 2025-01-14 ENCOUNTER — TELEPHONE (OUTPATIENT)
Dept: RHEUMATOLOGY | Facility: CLINIC | Age: 55
End: 2025-01-14
Payer: COMMERCIAL

## 2025-01-14 ENCOUNTER — TELEPHONE (OUTPATIENT)
Dept: OBSTETRICS AND GYNECOLOGY | Facility: CLINIC | Age: 55
End: 2025-01-14
Payer: COMMERCIAL

## 2025-01-14 NOTE — TELEPHONE ENCOUNTER
called pt / no answer     - left voice message informing pt the call is regarding appointment request . Advised pt to give us a call back or message us via Helpstream portal so we can get her scheduled .

## 2025-01-14 NOTE — TELEPHONE ENCOUNTER
----- Message from Melvi sent at 1/14/2025  3:09 PM CST -----  Regarding: Patient advice  Contact: Pt  [phone]          Name of Caller:   Marilee      Contact Preference: [phone]    Nature of Call:  Requesting a call back pt have questions about pending status of referral

## 2025-01-15 ENCOUNTER — OFFICE VISIT (OUTPATIENT)
Dept: PSYCHIATRY | Facility: CLINIC | Age: 55
End: 2025-01-15
Payer: COMMERCIAL

## 2025-01-15 ENCOUNTER — PATIENT MESSAGE (OUTPATIENT)
Dept: PSYCHIATRY | Facility: CLINIC | Age: 55
End: 2025-01-15
Payer: COMMERCIAL

## 2025-01-15 ENCOUNTER — DOCUMENTATION ONLY (OUTPATIENT)
Dept: PSYCHIATRY | Facility: CLINIC | Age: 55
End: 2025-01-15
Payer: COMMERCIAL

## 2025-01-15 DIAGNOSIS — F33.1 MAJOR DEPRESSIVE DISORDER, RECURRENT EPISODE, MODERATE: Primary | ICD-10-CM

## 2025-01-15 PROCEDURE — 99499 UNLISTED E&M SERVICE: CPT | Mod: 95,,, | Performed by: SOCIAL WORKER

## 2025-01-15 NOTE — PROGRESS NOTES
Pt checked in for appointment but dropped and broke phone before visit was initiated so there was no visit.

## 2025-02-06 ENCOUNTER — PATIENT MESSAGE (OUTPATIENT)
Dept: UROLOGY | Facility: CLINIC | Age: 55
End: 2025-02-06
Payer: COMMERCIAL

## 2025-02-06 ENCOUNTER — TELEPHONE (OUTPATIENT)
Dept: RHEUMATOLOGY | Facility: CLINIC | Age: 55
End: 2025-02-06
Payer: COMMERCIAL

## 2025-02-06 NOTE — TELEPHONE ENCOUNTER
----- Message from Hao Higginbotham sent at 2/6/2025  2:37 PM CST -----    ----- Message -----  From: Kristy Wilks MA  Sent: 2/3/2025   3:54 PM CST  To: Fiorella Diaz RN; Ben Samaniego MD; #    Please see the message below about the required consent for infusions.      We have made the process as streamlined as possible and offer various ways to send the consent to us. There are also detailed instructions below on how to upload same.    We are unable to schedule this patient without first receiving the required consent.  ----- Message -----  From: Kristy Wilks MA  Sent: 1/6/2025   9:00 AM CST  To: Ben Samaniego MD; Aden Contreras Staff    Good morning,     This message was sent on 12/30. We are unable to schedule this patient's Simponi Aria infusion until we have the consent uploaded into media.     If you have any questions or concerns, you can reply to this message or call our unit at [phone].     Thanks!     Kristy Wilks MA  ----- Message -----  From: Kristy Wilks MA  Sent: 12/30/2024   3:38 PM CST  To: Ben Samaniego MD; Aden Contreras Staff      The attached patient has been approved for Simponi Aria but the required consent has not yet been uploaded.  To avoid any delays in scheduling, please obtain and UPLOAD the infusion consent so that we can begin the process of scheduling the medication. This may be most convenient if the consent is obtained upon ordering the infusions while the patient is at your appointment, but a telephone consent can be obtained as outlined below.  ________________________________________________________________________     According to [place] law ([place] 40:1157.1) and [facility] Policy OHS.MED.033 (Informed Consent), all patients receiving infusions should have an informed consent initiated by the ordering provider. The consent form has been revised to include all types of infusions and can be found by following these steps:      · WebLink Resources  "in the Epic Drop-down (My Toolbar Default Items)  · Physicians  · Consent/Refusal of Treatment (you can pin this to your toolbar or drop-down menu for future reference)  · Search "1873" - or -  Scroll to find: 1873 Non-Chemotherapy Ambulatory Infusion Consent (page 3) - print and fill in the medication specific blanks  ________________________________________________________________________    If the patient is not able to return to your office, a telephone consent is compliant with verification of patient by patient spelling their first and last name and stating their date of birth. Telephone consents require the provider's signature (as usual) with two staff members to witness the telephone consent procedure.     This should be uploaded to "Media" under "Consents" and labelled "Infusion Consent" with the name of the medication.     For issues or concerns, please reply to [facility] Clinical Support or call ext [phone] or we can be reached immediately using Epic Secure chat: [facility] Ambulatory Infusion Staff (group).    We also have a staff email: [email or web address]  "

## 2025-02-07 NOTE — TELEPHONE ENCOUNTER
----- Message from Hao Higginbotham sent at 2/6/2025  2:37 PM CST -----    ----- Message -----  From: Kristy Wilks MA  Sent: 2/3/2025   3:54 PM CST  To: Fiorella Diaz RN; Ben Samaniego MD; #    Please see the message below about the required consent for infusions.      We have made the process as streamlined as possible and offer various ways to send the consent to us. There are also detailed instructions below on how to upload same.    We are unable to schedule this patient without first receiving the required consent.  ----- Message -----  From: Kristy Wilks MA  Sent: 1/6/2025   9:00 AM CST  To: Ben Samaniego MD; Aden Contreras Staff    Good morning,     This message was sent on 12/30. We are unable to schedule this patient's Simponi Aria infusion until we have the consent uploaded into media.     If you have any questions or concerns, you can reply to this message or call our unit at [phone].     Thanks!     Kristy Wilks MA  ----- Message -----  From: Kristy Wilks MA  Sent: 12/30/2024   3:38 PM CST  To: Ben Samaniego MD; Aden Contreras Staff      The attached patient has been approved for Simponi Aria but the required consent has not yet been uploaded.  To avoid any delays in scheduling, please obtain and UPLOAD the infusion consent so that we can begin the process of scheduling the medication. This may be most convenient if the consent is obtained upon ordering the infusions while the patient is at your appointment, but a telephone consent can be obtained as outlined below.  ________________________________________________________________________     According to [place] law ([place] 40:1157.1) and [facility] Policy OHS.MED.033 (Informed Consent), all patients receiving infusions should have an informed consent initiated by the ordering provider. The consent form has been revised to include all types of infusions and can be found by following these steps:      · WebLink Resources  "in the Epic Drop-down (My Toolbar Default Items)  · Physicians  · Consent/Refusal of Treatment (you can pin this to your toolbar or drop-down menu for future reference)  · Search "1873" - or -  Scroll to find: 1873 Non-Chemotherapy Ambulatory Infusion Consent (page 3) - print and fill in the medication specific blanks  ________________________________________________________________________    If the patient is not able to return to your office, a telephone consent is compliant with verification of patient by patient spelling their first and last name and stating their date of birth. Telephone consents require the provider's signature (as usual) with two staff members to witness the telephone consent procedure.     This should be uploaded to "Media" under "Consents" and labelled "Infusion Consent" with the name of the medication.     For issues or concerns, please reply to [facility] Clinical Support or call ext [phone] or we can be reached immediately using Epic Secure chat: [facility] Ambulatory Infusion Staff (group).    We also have a staff email: [email or web address]  "

## 2025-02-07 NOTE — TELEPHONE ENCOUNTER
Sent patient a [employer] message and called patient twice and left message on her voicemail to contact our office.  Ana LIND

## 2025-02-07 NOTE — TELEPHONE ENCOUNTER
Left another message on patient VM.  Need IV consent before she can have her infusion.  Sent patient a message in [employer] since left 2-messages so far.  Ana LIND

## 2025-02-11 DIAGNOSIS — M79.7 FIBROMYALGIA: ICD-10-CM

## 2025-02-12 RX ORDER — PREGABALIN 75 MG/1
CAPSULE ORAL
Qty: 90 CAPSULE | Refills: 3 | Status: SHIPPED | OUTPATIENT
Start: 2025-02-12

## 2025-02-17 ENCOUNTER — TELEPHONE (OUTPATIENT)
Dept: INFECTIOUS DISEASES | Facility: HOSPITAL | Age: 55
End: 2025-02-17
Payer: COMMERCIAL

## 2025-02-18 ENCOUNTER — PATIENT MESSAGE (OUTPATIENT)
Dept: PSYCHIATRY | Facility: CLINIC | Age: 55
End: 2025-02-18
Payer: COMMERCIAL

## 2025-02-18 ENCOUNTER — OFFICE VISIT (OUTPATIENT)
Dept: PSYCHIATRY | Facility: CLINIC | Age: 55
End: 2025-02-18
Payer: COMMERCIAL

## 2025-02-18 DIAGNOSIS — F51.5 NIGHTMARE: ICD-10-CM

## 2025-02-18 DIAGNOSIS — F41.9 ANXIETY DISORDER, UNSPECIFIED TYPE: ICD-10-CM

## 2025-02-18 DIAGNOSIS — T74.32XS EMOTIONAL/PSYCHOLOGICAL ABUSE OF CHILD, SEQUELA: ICD-10-CM

## 2025-02-18 DIAGNOSIS — G43.019 INTRACTABLE MIGRAINE WITHOUT AURA AND WITHOUT STATUS MIGRAINOSUS: ICD-10-CM

## 2025-02-18 DIAGNOSIS — F60.89 CLUSTER B PERSONALITY DISORDER: ICD-10-CM

## 2025-02-18 DIAGNOSIS — F33.1 MAJOR DEPRESSIVE DISORDER, RECURRENT EPISODE, MODERATE: Primary | ICD-10-CM

## 2025-02-18 DIAGNOSIS — E06.3 HYPOTHYROIDISM DUE TO HASHIMOTO'S THYROIDITIS: ICD-10-CM

## 2025-02-18 DIAGNOSIS — Z96.82 NEUROSTIMULATOR DEVICE IN SITU: ICD-10-CM

## 2025-02-18 DIAGNOSIS — F43.89 OTHER REACTIONS TO SEVERE STRESS: ICD-10-CM

## 2025-02-18 DIAGNOSIS — Z63.9 FAMILY DYNAMICS PROBLEM: ICD-10-CM

## 2025-02-18 DIAGNOSIS — M79.7 FIBROMYALGIA: ICD-10-CM

## 2025-02-18 DIAGNOSIS — T74.22XS SEXUAL ABUSE OF ADOLESCENT, SEQUELA: ICD-10-CM

## 2025-02-18 DIAGNOSIS — K58.9 IRRITABLE BOWEL SYNDROME, UNSPECIFIED TYPE: ICD-10-CM

## 2025-02-18 DIAGNOSIS — M06.9 RHEUMATOID ARTHRITIS INVOLVING BOTH SHOULDERS, UNSPECIFIED WHETHER RHEUMATOID FACTOR PRESENT: ICD-10-CM

## 2025-02-18 DIAGNOSIS — T74.12XS PHYSICAL ABUSE OF CHILD, SEQUELA: ICD-10-CM

## 2025-02-18 PROCEDURE — 90833 PSYTX W PT W E/M 30 MIN: CPT | Mod: 95,,, | Performed by: PSYCHIATRY & NEUROLOGY

## 2025-02-18 PROCEDURE — 98007 SYNCH AUDIO-VIDEO EST HI 40: CPT | Mod: 95,,, | Performed by: PSYCHIATRY & NEUROLOGY

## 2025-02-18 RX ORDER — LAMOTRIGINE 200 MG/1
200 TABLET ORAL DAILY
Qty: 90 TABLET | Refills: 1 | Status: SHIPPED | OUTPATIENT
Start: 2025-02-18 | End: 2025-08-17

## 2025-02-18 RX ORDER — FLUOXETINE HYDROCHLORIDE 20 MG/1
20 CAPSULE ORAL DAILY
Qty: 90 CAPSULE | Refills: 1 | Status: SHIPPED | OUTPATIENT
Start: 2025-02-18 | End: 2025-08-17

## 2025-02-18 RX ORDER — FLUOXETINE HYDROCHLORIDE 40 MG/1
40 CAPSULE ORAL DAILY
Qty: 90 CAPSULE | Refills: 1 | Status: SHIPPED | OUTPATIENT
Start: 2025-02-18 | End: 2025-08-17

## 2025-02-18 RX ORDER — PRAZOSIN HYDROCHLORIDE 1 MG/1
1 CAPSULE ORAL NIGHTLY
Qty: 30 CAPSULE | Refills: 2 | Status: SHIPPED | OUTPATIENT
Start: 2025-02-18 | End: 2026-02-18

## 2025-02-18 RX ORDER — BUSPIRONE HYDROCHLORIDE 15 MG/1
15 TABLET ORAL 3 TIMES DAILY
Qty: 90 TABLET | Refills: 4 | Status: SHIPPED | OUTPATIENT
Start: 2025-02-18 | End: 2025-07-18

## 2025-02-18 SDOH — SOCIAL DETERMINANTS OF HEALTH (SDOH): PROBLEM RELATED TO PRIMARY SUPPORT GROUP, UNSPECIFIED: Z63.9

## 2025-02-18 NOTE — PATIENT INSTRUCTIONS
"  PLAN:     Follow Up     4/3/2025  3:00 PM CDT ESTABLISHED PATIENT - VIRTUAL [facility] - [facility] 4th Fl  Arrive at: Telehealth Post, Vahid OLIVO MD       Encouraged to reach back out to Billie Chavira PhD LCSW     Psyc Meds:   Prozac 40 mg daily    Prozac 20 mg (total Prozac dose 60 mg a day)  Lamictal 200 mg daily / if any rash stop and tell cesia CRUZ  BuSpar advance to 15 mg 3 times a day (2-18-25)    [birth year]Olertpmw (Minipress 1 mg) at bed / trial re nightmares   Risks benefits discussed mutually agreed a trial    References:     Relaxation stress reduction workbook: PARVEZ Francisco PhD ( used: $7-10)    Feeling Good Website: Vahid Clark MD / www.[employer] website (free) / javier. PODCASTS    Anxiety &  phobia workbook by CHLOE Morgan PhD  (web retailers: used: $ 7-10)    [place]: Path to Better Sleep : https://www.veterantraining.[place].gov/insomnia/ (free)     Pt tends to have  "negative thinking  > Cesia CRUZ has recommended that she look into [facility] /  If interested: [facility] program, [facility]: ([phone])  and ask to set up an intake eval    Website: (https://www.[facility].org/services/brief-evidence-based-psychotherapy   (Copy and paste to your browser)     Current Conditions Treated:  PTSD  Insomnia  Panic Attacks  Depression  Anxiety  Chronic Pain  Stress    The [facility] is ideal for patients who:  Desire short-term rather than long-term psychotherapy  Want structured psychotherapy sessions for specific targets  Are willing to engage in daily practice assignments  Can commit to weekly psychotherapy for a time-limited period (usually between 6-12 weeks)    Note: Possibility of virtual  (telehealth) participation may exist    call for more information ([phone])       Cesia CRUZ explained  Intensive Outpatient Program (IOP) services and encouraged patient to call [phone] to learn about [facility] as well  as other programs in " community / coordinators: María Lindo LCSW ; Nelly Demarco       Pt expressed appreciation for the visit today and did not have further question at this time though pt  was still informed to:     Call  if problems.    Call / Report Side Effects to Psyc MD     Encouraged to follow up with primary care / Gen Med MD for continued monitoring of general health and wellness.    Understanding was expressed; and no further concerns nor questions were raised at this time.     Remember healthy self care:   eat right  attempt adequate rest   HANDWASHING / encourage such javier. During this corona virus time   walk or light exercise within reason and as your general med team approves  read or explore any of reference materials / homework mentioned  reach out (I.e.,  connect with)  others who nuture and bring out best in you  avoid risky behaviors    Keep your appointments:    IF you  cannot make your appt THEN please call [phone]  or go online (via My Chart davon) to reschedule.    It is the responsibility of the patient to reschedule an appointment if an appointment has been canceled or missed.    Avoid  alcohol and illicit substances.  Look for the positive.  All is often relative-seek balance  Call sooner if needed : [phone]  Call 911 or go to Emergency Room  (ER)  if  any acute concerns
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
## 2025-02-23 PROBLEM — F51.5 NIGHTMARE: Status: ACTIVE | Noted: 2025-02-23

## 2025-02-23 PROBLEM — Z63.9 FAMILY DYNAMICS PROBLEM: Status: ACTIVE | Noted: 2025-02-23

## 2025-02-23 PROBLEM — F43.89 OTHER REACTIONS TO SEVERE STRESS: Status: ACTIVE | Noted: 2025-02-23

## 2025-03-06 DIAGNOSIS — M06.00 SERONEGATIVE RHEUMATOID ARTHRITIS: ICD-10-CM

## 2025-03-06 RX ORDER — PREDNISONE 2.5 MG/1
TABLET ORAL
Qty: 90 TABLET | Refills: 0 | Status: CANCELLED | OUTPATIENT
Start: 2025-03-06

## 2025-03-07 ENCOUNTER — PATIENT MESSAGE (OUTPATIENT)
Dept: RHEUMATOLOGY | Facility: CLINIC | Age: 55
End: 2025-03-07
Payer: COMMERCIAL

## 2025-03-07 ENCOUNTER — PATIENT MESSAGE (OUTPATIENT)
Dept: PSYCHIATRY | Facility: CLINIC | Age: 55
End: 2025-03-07
Payer: COMMERCIAL

## 2025-03-07 DIAGNOSIS — M06.00 SERONEGATIVE RHEUMATOID ARTHRITIS: ICD-10-CM

## 2025-03-07 RX ORDER — PREDNISONE 2.5 MG/1
TABLET ORAL
Qty: 90 TABLET | Refills: 3 | Status: SHIPPED | OUTPATIENT
Start: 2025-03-07

## 2025-03-10 ENCOUNTER — OFFICE VISIT (OUTPATIENT)
Dept: RHEUMATOLOGY | Facility: CLINIC | Age: 55
End: 2025-03-10
Payer: COMMERCIAL

## 2025-03-10 VITALS
HEART RATE: 88 BPM | DIASTOLIC BLOOD PRESSURE: 69 MMHG | HEIGHT: 59 IN | BODY MASS INDEX: 30 KG/M2 | SYSTOLIC BLOOD PRESSURE: 129 MMHG | WEIGHT: 148.81 LBS

## 2025-03-10 DIAGNOSIS — M06.00 SERONEGATIVE RHEUMATOID ARTHRITIS: ICD-10-CM

## 2025-03-10 DIAGNOSIS — D84.821 IMMUNOCOMPROMISED STATE DUE TO DRUG THERAPY: ICD-10-CM

## 2025-03-10 DIAGNOSIS — L40.50 PSORIATIC ARTHRITIS: ICD-10-CM

## 2025-03-10 DIAGNOSIS — M81.0 OSTEOPOROSIS, UNSPECIFIED OSTEOPOROSIS TYPE, UNSPECIFIED PATHOLOGICAL FRACTURE PRESENCE: ICD-10-CM

## 2025-03-10 DIAGNOSIS — G89.4 CHRONIC PAIN SYNDROME: ICD-10-CM

## 2025-03-10 DIAGNOSIS — N30.10 IC (INTERSTITIAL CYSTITIS): ICD-10-CM

## 2025-03-10 DIAGNOSIS — M79.7 FIBROMYALGIA: ICD-10-CM

## 2025-03-10 DIAGNOSIS — Z79.899 IMMUNOCOMPROMISED STATE DUE TO DRUG THERAPY: ICD-10-CM

## 2025-03-10 DIAGNOSIS — D84.9 IMMUNOCOMPROMISED STATE: ICD-10-CM

## 2025-03-10 DIAGNOSIS — N95.1 HOT FLASH, MENOPAUSAL: ICD-10-CM

## 2025-03-10 DIAGNOSIS — F43.10 PTSD (POST-TRAUMATIC STRESS DISORDER): ICD-10-CM

## 2025-03-10 DIAGNOSIS — M35.00 SJOGREN'S SYNDROME, WITH UNSPECIFIED ORGAN INVOLVEMENT: ICD-10-CM

## 2025-03-10 DIAGNOSIS — M47.817 LUMBAR AND SACRAL OSTEOARTHRITIS: ICD-10-CM

## 2025-03-10 DIAGNOSIS — L40.50 PSORIATIC ARTHRITIS: Primary | ICD-10-CM

## 2025-03-10 PROBLEM — E66.01 SEVERE OBESITY (BMI 35.0-39.9) WITH COMORBIDITY: Status: RESOLVED | Noted: 2022-08-04 | Resolved: 2025-03-10

## 2025-03-10 PROCEDURE — 96372 THER/PROPH/DIAG INJ SC/IM: CPT | Mod: S$GLB,,, | Performed by: INTERNAL MEDICINE

## 2025-03-10 PROCEDURE — 3078F DIAST BP <80 MM HG: CPT | Mod: CPTII,S$GLB,, | Performed by: INTERNAL MEDICINE

## 2025-03-10 PROCEDURE — 1159F MED LIST DOCD IN RCRD: CPT | Mod: CPTII,S$GLB,, | Performed by: INTERNAL MEDICINE

## 2025-03-10 PROCEDURE — 99999 PR PBB SHADOW E&M-EST. PATIENT-LVL IV: CPT | Mod: PBBFAC,,, | Performed by: INTERNAL MEDICINE

## 2025-03-10 PROCEDURE — 99214 OFFICE O/P EST MOD 30 MIN: CPT | Mod: 25,S$GLB,, | Performed by: INTERNAL MEDICINE

## 2025-03-10 PROCEDURE — 3074F SYST BP LT 130 MM HG: CPT | Mod: CPTII,S$GLB,, | Performed by: INTERNAL MEDICINE

## 2025-03-10 PROCEDURE — 3008F BODY MASS INDEX DOCD: CPT | Mod: CPTII,S$GLB,, | Performed by: INTERNAL MEDICINE

## 2025-03-10 RX ORDER — KETOROLAC TROMETHAMINE 30 MG/ML
60 INJECTION, SOLUTION INTRAMUSCULAR; INTRAVENOUS
Status: COMPLETED | OUTPATIENT
Start: 2025-03-10 | End: 2025-03-10

## 2025-03-10 RX ORDER — TIRZEPATIDE 10 MG/.5ML
10 INJECTION, SOLUTION SUBCUTANEOUS
Qty: 2 ML | Refills: 0 | Status: SHIPPED | OUTPATIENT
Start: 2025-06-11 | End: 2025-12-08

## 2025-03-10 RX ORDER — TRAMADOL HYDROCHLORIDE 50 MG/1
TABLET ORAL
Qty: 90 TABLET | Refills: 0 | OUTPATIENT
Start: 2025-03-10

## 2025-03-10 RX ORDER — TRAMADOL HYDROCHLORIDE 50 MG/1
TABLET ORAL
Qty: 90 TABLET | Refills: 4 | Status: SHIPPED | OUTPATIENT
Start: 2025-03-10

## 2025-03-10 RX ORDER — TIRZEPATIDE 10 MG/.5ML
10 INJECTION, SOLUTION SUBCUTANEOUS
Qty: 2 ML | Refills: 5 | Status: SHIPPED | OUTPATIENT
Start: 2025-03-10 | End: 2025-09-06

## 2025-03-10 RX ORDER — METHYLPREDNISOLONE ACETATE 80 MG/ML
160 INJECTION, SUSPENSION INTRA-ARTICULAR; INTRALESIONAL; INTRAMUSCULAR; SOFT TISSUE
Status: COMPLETED | OUTPATIENT
Start: 2025-03-10 | End: 2025-03-10

## 2025-03-10 RX ORDER — CYCLOBENZAPRINE HCL 10 MG
10 TABLET ORAL 3 TIMES DAILY PRN
Qty: 90 TABLET | Refills: 6 | Status: SHIPPED | OUTPATIENT
Start: 2025-03-10

## 2025-03-10 RX ORDER — TIRZEPATIDE 12.5 MG/.5ML
12.5 INJECTION, SOLUTION SUBCUTANEOUS
Qty: 2 ML | Refills: 5 | Status: SHIPPED | OUTPATIENT
Start: 2025-03-10 | End: 2025-09-06

## 2025-03-10 RX ORDER — CYANOCOBALAMIN 1000 UG/ML
1000 INJECTION, SOLUTION INTRAMUSCULAR; SUBCUTANEOUS
Status: COMPLETED | OUTPATIENT
Start: 2025-03-10 | End: 2025-03-10

## 2025-03-10 RX ORDER — TIRZEPATIDE 15 MG/.5ML
15 INJECTION, SOLUTION SUBCUTANEOUS
Qty: 2 ML | Refills: 5 | Status: SHIPPED | OUTPATIENT
Start: 2025-08-12 | End: 2026-02-08

## 2025-03-10 RX ORDER — TIRZEPATIDE 12.5 MG/.5ML
12.5 INJECTION, SOLUTION SUBCUTANEOUS
Qty: 2 ML | Refills: 6 | Status: SHIPPED | OUTPATIENT
Start: 2025-07-12 | End: 2026-01-08

## 2025-03-10 RX ADMIN — KETOROLAC TROMETHAMINE 60 MG: 30 INJECTION, SOLUTION INTRAMUSCULAR; INTRAVENOUS at 04:03

## 2025-03-10 RX ADMIN — CYANOCOBALAMIN 1000 MCG: 1000 INJECTION, SOLUTION INTRAMUSCULAR; SUBCUTANEOUS at 04:03

## 2025-03-10 RX ADMIN — METHYLPREDNISOLONE ACETATE 160 MG: 80 INJECTION, SUSPENSION INTRA-ARTICULAR; INTRALESIONAL; INTRAMUSCULAR; SOFT TISSUE at 04:03

## 2025-03-10 ASSESSMENT — ROUTINE ASSESSMENT OF PATIENT INDEX DATA (RAPID3)
TOTAL RAPID3 SCORE: 6.61
PSYCHOLOGICAL DISTRESS SCORE: 4.4
PATIENT GLOBAL ASSESSMENT SCORE: 7.5
PAIN SCORE: 8
MDHAQ FUNCTION SCORE: 1.3
FATIGUE SCORE: 2.2
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
## 2025-03-11 ENCOUNTER — INFUSION (OUTPATIENT)
Dept: INFECTIOUS DISEASES | Facility: HOSPITAL | Age: 55
End: 2025-03-11
Payer: COMMERCIAL

## 2025-03-11 VITALS
RESPIRATION RATE: 20 BRPM | DIASTOLIC BLOOD PRESSURE: 72 MMHG | HEIGHT: 59 IN | BODY MASS INDEX: 30.32 KG/M2 | WEIGHT: 150.38 LBS | TEMPERATURE: 98 F | SYSTOLIC BLOOD PRESSURE: 144 MMHG | HEART RATE: 84 BPM | OXYGEN SATURATION: 96 %

## 2025-03-11 DIAGNOSIS — L40.50 PSORIATIC ARTHRITIS: Primary | ICD-10-CM

## 2025-03-11 DIAGNOSIS — M06.00 SERONEGATIVE RHEUMATOID ARTHRITIS: ICD-10-CM

## 2025-03-11 PROCEDURE — 25000003 PHARM REV CODE 250: Performed by: INTERNAL MEDICINE

## 2025-03-11 PROCEDURE — 63600175 PHARM REV CODE 636 W HCPCS: Mod: JW,TB | Performed by: INTERNAL MEDICINE

## 2025-03-11 RX ORDER — DIPHENHYDRAMINE HYDROCHLORIDE 50 MG/ML
50 INJECTION, SOLUTION INTRAMUSCULAR; INTRAVENOUS
OUTPATIENT
Start: 2025-04-08

## 2025-03-11 RX ORDER — HEPARIN 100 UNIT/ML
500 SYRINGE INTRAVENOUS
Status: DISCONTINUED | OUTPATIENT
Start: 2025-03-11 | End: 2025-03-11 | Stop reason: HOSPADM

## 2025-03-11 RX ORDER — EPINEPHRINE 0.3 MG/.3ML
0.3 INJECTION SUBCUTANEOUS ONCE AS NEEDED
OUTPATIENT
Start: 2025-04-08

## 2025-03-11 RX ORDER — METHYLPREDNISOLONE SOD SUCC 125 MG
100 VIAL (EA) INJECTION
OUTPATIENT
Start: 2025-04-08

## 2025-03-11 RX ORDER — ACETAMINOPHEN 325 MG/1
650 TABLET ORAL
OUTPATIENT
Start: 2025-04-08

## 2025-03-11 RX ORDER — DIPHENHYDRAMINE HYDROCHLORIDE 50 MG/ML
50 INJECTION, SOLUTION INTRAMUSCULAR; INTRAVENOUS ONCE AS NEEDED
OUTPATIENT
Start: 2025-04-08

## 2025-03-11 RX ORDER — SODIUM CHLORIDE 0.9 % (FLUSH) 0.9 %
10 SYRINGE (ML) INJECTION
OUTPATIENT
Start: 2025-04-08

## 2025-03-11 RX ORDER — HEPARIN 100 UNIT/ML
500 SYRINGE INTRAVENOUS
OUTPATIENT
Start: 2025-04-08

## 2025-03-11 RX ORDER — SODIUM CHLORIDE 0.9 % (FLUSH) 0.9 %
10 SYRINGE (ML) INJECTION
Status: DISCONTINUED | OUTPATIENT
Start: 2025-03-11 | End: 2025-03-11 | Stop reason: HOSPADM

## 2025-03-11 RX ORDER — METHYLPREDNISOLONE SOD SUCC 125 MG
100 VIAL (EA) INJECTION
Status: COMPLETED | OUTPATIENT
Start: 2025-03-11 | End: 2025-03-11

## 2025-03-11 RX ADMIN — GOLIMUMAB 137.5 MG: 50 SOLUTION INTRAVENOUS at 03:03

## 2025-03-11 RX ADMIN — METHYLPREDNISOLONE SODIUM SUCCINATE 100 MG: 125 INJECTION, POWDER, FOR SOLUTION INTRAMUSCULAR; INTRAVENOUS at 02:03

## 2025-03-26 ENCOUNTER — TELEPHONE (OUTPATIENT)
Dept: RHEUMATOLOGY | Facility: CLINIC | Age: 55
End: 2025-03-26
Payer: COMMERCIAL

## 2025-03-26 NOTE — TELEPHONE ENCOUNTER
----- Message from Sydnie sent at 3/25/2025 10:30 AM CDT -----  Regarding: advice  Contact: patient  Type: Needs Medical AdviceWho Called:  patientSymptoms (please be specific):  How long has patient had these symptoms:  Pharmacy name and phone #:  Best Call Back Number: [phone] Information: Please call patient concerning Zepbound.  Thanks!

## 2025-03-26 NOTE — TELEPHONE ENCOUNTER
Returned patient call earlier and while on the phone she had to hang up with me due to a family crisis.  Called patient back again in afternoon and left message on patient voicemail that we were calling her back.  Ana LIND

## 2025-03-27 ENCOUNTER — PATIENT MESSAGE (OUTPATIENT)
Dept: PSYCHIATRY | Facility: CLINIC | Age: 55
End: 2025-03-27
Payer: COMMERCIAL

## 2025-04-03 ENCOUNTER — PATIENT MESSAGE (OUTPATIENT)
Dept: PSYCHIATRY | Facility: CLINIC | Age: 55
End: 2025-04-03
Payer: COMMERCIAL

## 2025-04-03 ENCOUNTER — OFFICE VISIT (OUTPATIENT)
Dept: PSYCHIATRY | Facility: CLINIC | Age: 55
End: 2025-04-03
Payer: COMMERCIAL

## 2025-04-03 DIAGNOSIS — F33.1 MAJOR DEPRESSIVE DISORDER, RECURRENT EPISODE, MODERATE: Primary | ICD-10-CM

## 2025-04-03 DIAGNOSIS — F43.89 OTHER REACTIONS TO SEVERE STRESS: ICD-10-CM

## 2025-04-03 DIAGNOSIS — T74.12XS PHYSICAL ABUSE OF CHILD, SEQUELA: ICD-10-CM

## 2025-04-03 DIAGNOSIS — M79.7 FIBROMYALGIA: ICD-10-CM

## 2025-04-03 DIAGNOSIS — F41.9 ANXIETY DISORDER, UNSPECIFIED TYPE: ICD-10-CM

## 2025-04-03 DIAGNOSIS — T74.32XS EMOTIONAL/PSYCHOLOGICAL ABUSE OF CHILD, SEQUELA: ICD-10-CM

## 2025-04-03 DIAGNOSIS — M06.9 RHEUMATOID ARTHRITIS INVOLVING BOTH SHOULDERS, UNSPECIFIED WHETHER RHEUMATOID FACTOR PRESENT: ICD-10-CM

## 2025-04-03 DIAGNOSIS — F51.5 NIGHTMARE: ICD-10-CM

## 2025-04-03 DIAGNOSIS — F60.89 CLUSTER B PERSONALITY DISORDER: ICD-10-CM

## 2025-04-03 DIAGNOSIS — Z63.9 FAMILY DYNAMICS PROBLEM: ICD-10-CM

## 2025-04-03 DIAGNOSIS — Z96.82 NEUROSTIMULATOR DEVICE IN SITU: ICD-10-CM

## 2025-04-03 DIAGNOSIS — G43.019 INTRACTABLE MIGRAINE WITHOUT AURA AND WITHOUT STATUS MIGRAINOSUS: ICD-10-CM

## 2025-04-03 RX ORDER — LAMOTRIGINE 200 MG/1
200 TABLET ORAL DAILY
Qty: 90 TABLET | Refills: 1 | Status: SHIPPED | OUTPATIENT
Start: 2025-04-03 | End: 2025-09-30

## 2025-04-03 RX ORDER — PRAZOSIN HYDROCHLORIDE 1 MG/1
1 CAPSULE ORAL NIGHTLY
Qty: 30 CAPSULE | Refills: 3 | Status: SHIPPED | OUTPATIENT
Start: 2025-04-03 | End: 2025-08-01

## 2025-04-03 RX ORDER — FLUOXETINE HYDROCHLORIDE 20 MG/1
20 CAPSULE ORAL DAILY
Qty: 90 CAPSULE | Refills: 1 | Status: SHIPPED | OUTPATIENT
Start: 2025-04-03 | End: 2025-09-30

## 2025-04-03 RX ORDER — BUSPIRONE HYDROCHLORIDE 15 MG/1
15 TABLET ORAL 3 TIMES DAILY
Qty: 90 TABLET | Refills: 4 | Status: SHIPPED | OUTPATIENT
Start: 2025-04-03 | End: 2025-08-31

## 2025-04-03 RX ORDER — FLUOXETINE HYDROCHLORIDE 40 MG/1
40 CAPSULE ORAL DAILY
Qty: 90 CAPSULE | Refills: 1 | Status: SHIPPED | OUTPATIENT
Start: 2025-04-03 | End: 2025-09-30

## 2025-04-03 SDOH — SOCIAL DETERMINANTS OF HEALTH (SDOH): PROBLEM RELATED TO PRIMARY SUPPORT GROUP, UNSPECIFIED: Z63.9

## 2025-04-03 NOTE — PATIENT INSTRUCTIONS
PLAN:     Follow UP July 23 2025 IN CLINIC  @ 3 pm    Folow up  Billie Chavira PhD LCSW     Psyc Meds:   Prozac 40 mg daily    Prozac 20 mg (total Prozac dose 60 mg a day)  Lamictal 200 mg daily / if any rash stop and tell cesia CRUZ  BuSpar  15 mg 3 times a day   Prazosin (Minipress 1 mg) at bed / trial re nightmares       References:     Relaxation stress reduction workbook: PARVEZ Francisco PhD ( used: $7-10)    Feeling Good Website: Vahid Clark MD / www.[employer] website (free) / javier. PODCASTS    Anxiety &  phobia workbook by CHLOE Morgan PhD  (web retailers: used: $ 7-10)    [place]: Path to Better Sleep : https://www.veterantraining.[place].gov[place]insomnia/ (free)     Cesia CRUZ explained  Intensive Outpatient Program (IOP) services and encouraged patient to call [phone] to learn about [facility] as well  as other programs in community / coordinators: María Lindo LCSW ; Nelly Demarco     Pt expressed appreciation for the visit today and did not have further question at this time though pt  was still informed to:     Call  if problems.    Call / Report Side Effects to Psjaylan CRUZ     Encouraged to follow up with primary care / Gen Med MD for continued monitoring of general health and wellness.    Understanding was expressed; and no further concerns nor questions were raised at this time.     Remember healthy self care:   eat right  attempt adequate rest   HANDWASHING / encourage such javier. During this corona virus time   walk or light exercise within reason and as your general med team approves  read or explore any of reference materials / homework mentioned  reach out (I.e.,  connect with)  others who nuture and bring out best in you  avoid risky behaviors    Keep your appointments:    IF you  cannot make your appt THEN please call [phone]  or go online (via My Chart davon) to reschedule.    It is the responsibility of the patient to reschedule an appointment if an appointment has been canceled or missed.    Avoid   alcohol and illicit substances.  Look for the positive.  All is often relative-seek balance  Call sooner if needed : [phone]  Call 911 or go to Emergency Room  (ER)  if  any acute concerns
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
## 2025-04-08 ENCOUNTER — INFUSION (OUTPATIENT)
Dept: INFECTIOUS DISEASES | Facility: HOSPITAL | Age: 55
End: 2025-04-08
Payer: COMMERCIAL

## 2025-04-08 VITALS
WEIGHT: 140.63 LBS | DIASTOLIC BLOOD PRESSURE: 79 MMHG | RESPIRATION RATE: 20 BRPM | OXYGEN SATURATION: 96 % | BODY MASS INDEX: 28.35 KG/M2 | TEMPERATURE: 99 F | SYSTOLIC BLOOD PRESSURE: 165 MMHG | HEIGHT: 59 IN | HEART RATE: 98 BPM

## 2025-04-08 DIAGNOSIS — M06.00 SERONEGATIVE RHEUMATOID ARTHRITIS: ICD-10-CM

## 2025-04-08 DIAGNOSIS — L40.50 PSORIATIC ARTHRITIS: Primary | ICD-10-CM

## 2025-04-08 PROCEDURE — 96365 THER/PROPH/DIAG IV INF INIT: CPT

## 2025-04-08 PROCEDURE — 63600175 PHARM REV CODE 636 W HCPCS: Mod: JZ,TB | Performed by: INTERNAL MEDICINE

## 2025-04-08 PROCEDURE — 25000003 PHARM REV CODE 250: Performed by: INTERNAL MEDICINE

## 2025-04-08 RX ORDER — SODIUM CHLORIDE 0.9 % (FLUSH) 0.9 %
10 SYRINGE (ML) INJECTION
OUTPATIENT
Start: 2025-05-30

## 2025-04-08 RX ORDER — EPINEPHRINE 0.3 MG/.3ML
0.3 INJECTION SUBCUTANEOUS ONCE AS NEEDED
OUTPATIENT
Start: 2025-05-30

## 2025-04-08 RX ORDER — DIPHENHYDRAMINE HYDROCHLORIDE 50 MG/ML
50 INJECTION, SOLUTION INTRAMUSCULAR; INTRAVENOUS
OUTPATIENT
Start: 2025-05-30

## 2025-04-08 RX ORDER — METHYLPREDNISOLONE SOD SUCC 125 MG
100 VIAL (EA) INJECTION
Status: COMPLETED | OUTPATIENT
Start: 2025-04-08 | End: 2025-04-08

## 2025-04-08 RX ORDER — METHYLPREDNISOLONE SOD SUCC 125 MG
100 VIAL (EA) INJECTION
OUTPATIENT
Start: 2025-05-30

## 2025-04-08 RX ORDER — DIPHENHYDRAMINE HYDROCHLORIDE 50 MG/ML
50 INJECTION, SOLUTION INTRAMUSCULAR; INTRAVENOUS ONCE AS NEEDED
OUTPATIENT
Start: 2025-05-30

## 2025-04-08 RX ORDER — ACETAMINOPHEN 325 MG/1
650 TABLET ORAL
OUTPATIENT
Start: 2025-05-30

## 2025-04-08 RX ORDER — HEPARIN 100 UNIT/ML
500 SYRINGE INTRAVENOUS
OUTPATIENT
Start: 2025-05-30

## 2025-04-08 RX ORDER — SODIUM CHLORIDE 0.9 % (FLUSH) 0.9 %
10 SYRINGE (ML) INJECTION
Status: DISCONTINUED | OUTPATIENT
Start: 2025-04-08 | End: 2025-04-08 | Stop reason: HOSPADM

## 2025-04-08 RX ADMIN — METHYLPREDNISOLONE SODIUM SUCCINATE 100 MG: 125 INJECTION, POWDER, FOR SOLUTION INTRAMUSCULAR; INTRAVENOUS at 02:04

## 2025-04-08 RX ADMIN — GOLIMUMAB 125 MG: 50 SOLUTION INTRAVENOUS at 03:04

## 2025-04-11 DIAGNOSIS — N95.1 HOT FLASH, MENOPAUSAL: ICD-10-CM

## 2025-04-11 DIAGNOSIS — G43.019 INTRACTABLE MIGRAINE WITHOUT AURA AND WITHOUT STATUS MIGRAINOSUS: ICD-10-CM

## 2025-04-11 RX ORDER — MEMANTINE HYDROCHLORIDE 10 MG/1
10 TABLET ORAL
Qty: 30 TABLET | Refills: 11 | Status: SHIPPED | OUTPATIENT
Start: 2025-04-11

## 2025-04-16 RX ORDER — PROGESTERONE 100 MG/1
100 CAPSULE ORAL
Qty: 30 CAPSULE | Refills: 3 | Status: SHIPPED | OUTPATIENT
Start: 2025-04-16

## 2025-04-23 ENCOUNTER — PATIENT MESSAGE (OUTPATIENT)
Dept: RHEUMATOLOGY | Facility: CLINIC | Age: 55
End: 2025-04-23
Payer: COMMERCIAL

## 2025-04-29 ENCOUNTER — PROCEDURE VISIT (OUTPATIENT)
Dept: DERMATOLOGY | Facility: CLINIC | Age: 55
End: 2025-04-29
Payer: COMMERCIAL

## 2025-04-29 DIAGNOSIS — Z41.1 ENCOUNTER FOR COSMETIC PROCEDURE: Primary | ICD-10-CM

## 2025-04-29 DIAGNOSIS — L82.1 SEBORRHEIC KERATOSIS: ICD-10-CM

## 2025-04-29 PROCEDURE — 17110 DESTRUCTION B9 LES UP TO 14: CPT | Mod: CSM,,, | Performed by: DERMATOLOGY

## 2025-04-29 PROCEDURE — 99499 UNLISTED E&M SERVICE: CPT | Mod: CSM,,, | Performed by: DERMATOLOGY

## 2025-04-29 NOTE — PROGRESS NOTES
PROCEDURE NOTE: DESTRUCTION VIA HYFRECATION     DIAGNOSIS:  Seborrheic keratosis     LOCATION: face    Discussed with the patient that this procedure is not covered by insurance because treatment of these benign lesions is considered cosmetic. The patient would like to pursue treatment. She understands that she is responsible for the bill and agrees to pay.     Benefits, risks (infection, scarring, hypo- or hyperpigmentation, recurrence, and development of more lesions), and alternatives of treatment, including no treatment, are discussed with the patient who verbally agrees to the procedure.     Any topical anesthetic cream is removed, and the affected areas are cleaned with alcohol. 3 SKs are then lightly hyfrecated (on low 1.0-1.2) to remove the clinically-visible lesions.     The patient tolerated the procedure well without adverse event and with negligible blood loss. The patient is advised to keep the sites covered with plain Vaseline to minimize tenderness and to promote healing. Instructions on wound care are given to the patient, who is to call for any bleeding or signs of infection, such as redness or purulent discharge.    Recommend using a broad-spectrum, water-resistant sunscreen with SPF of 30 or higher--reapply every 2 hours. Seek shade and wear sun-protective clothing/hat.    Follow up as needed.

## 2025-04-30 ENCOUNTER — OFFICE VISIT (OUTPATIENT)
Dept: PSYCHIATRY | Facility: CLINIC | Age: 55
End: 2025-04-30
Payer: COMMERCIAL

## 2025-04-30 ENCOUNTER — PATIENT MESSAGE (OUTPATIENT)
Dept: RHEUMATOLOGY | Facility: CLINIC | Age: 55
End: 2025-04-30
Payer: COMMERCIAL

## 2025-04-30 DIAGNOSIS — F33.1 MAJOR DEPRESSIVE DISORDER, RECURRENT EPISODE, MODERATE: Primary | ICD-10-CM

## 2025-04-30 DIAGNOSIS — F41.9 ANXIETY DISORDER, UNSPECIFIED TYPE: ICD-10-CM

## 2025-04-30 PROCEDURE — 90834 PSYTX W PT 45 MINUTES: CPT | Mod: 95,,, | Performed by: SOCIAL WORKER

## 2025-04-30 NOTE — PROGRESS NOTES
Individual Psychotherapy (PhD/LCSW)    4/30/2025    Site:  [facility]         Therapeutic Intervention: Met with patient.  Outpatient - Insight oriented psychotherapy 45 min - CPT code 89373    Chief complaint/reason for encounter: depression, anxiety and ptsd     Interval history and content of current session: The patient location is: home in [place]  The chief complaint leading to consultation is: depression and BPD    Visit type: audiovisual    Face to Face time with patient: 45  45 minutes of total time spent on the encounter, which includes face to face time and non-face to face time preparing to see the patient (eg, review of tests), Obtaining and/or reviewing separately obtained history, Documenting clinical information in the electronic or other health record, Independently interpreting results (not separately reported) and communicating results to the patient/family/caregiver, or Care coordination (not separately reported).         Each patient to whom he or she provides medical services by telemedicine is:  (1) informed of the relationship between the physician and patient and the respective role of any other health care provider with respect to management of the patient; and (2) notified that he or she may decline to receive medical services by telemedicine and may withdraw from such care at any time.    Notes: Pt seen for follow-up. She has not been seen in 5 months.  She returns due to her daughter being pregnant unexpectedly.  Her boyfriend who she has been with for several years is -American.  Pt is worried about telling her mother and mother in law as they are racist and will be upset.  She wanted to process what to do.  Also her son and his new wife are pregnant and she is dealing with that also as they are putting a lot of restrictions on how they want their baby treated.    Treatment plan:  Target symptoms: depression, anxiety , PTSD  Why chosen therapy is appropriate versus  another modality: relevant to diagnosis  Outcome monitoring methods: self-report, observation  Therapeutic intervention type: insight oriented psychotherapy    Risk parameters:  Patient reports no suicidal ideation  Patient reports no homicidal ideation  Patient reports no self-injurious behavior  Patient reports no violent behavior    Verbal deficits: None    Patient's response to intervention:  The patient's response to intervention is motivated.    Progress toward goals and other mental status changes:  The patient's progress toward goals is fair .    Diagnosis:     ICD-10-CM ICD-9-CM   1. Major depressive disorder, recurrent episode, moderate  F33.1 296.32   2. Anxiety disorder, unspecified type  F41.9 300.00       Plan:  individual psychotherapy and medication management by physician    Return to clinic: as scheduled    Length of Service (minutes): 45

## 2025-05-08 ENCOUNTER — TELEPHONE (OUTPATIENT)
Dept: INFECTIOUS DISEASES | Facility: HOSPITAL | Age: 55
End: 2025-05-08
Payer: COMMERCIAL

## 2025-05-08 NOTE — TELEPHONE ENCOUNTER
Called Pt to reschedule cancelled Simponi infusion. Left voicemail with call back number [phone]

## 2025-05-09 DIAGNOSIS — M79.7 FIBROMYALGIA: ICD-10-CM

## 2025-05-09 DIAGNOSIS — L40.50 PSORIATIC ARTHRITIS: Primary | ICD-10-CM

## 2025-05-09 DIAGNOSIS — M47.817 LUMBAR AND SACRAL OSTEOARTHRITIS: ICD-10-CM

## 2025-05-09 DIAGNOSIS — M06.00 SERONEGATIVE RHEUMATOID ARTHRITIS: ICD-10-CM

## 2025-05-10 RX ORDER — BUTALBITAL, ACETAMINOPHEN AND CAFFEINE 50; 325; 40 MG/1; MG/1; MG/1
1 TABLET ORAL EVERY 4 HOURS PRN
Qty: 30 TABLET | Refills: 0 | Status: SHIPPED | OUTPATIENT
Start: 2025-05-10 | End: 2025-06-09

## 2025-05-13 ENCOUNTER — PATIENT MESSAGE (OUTPATIENT)
Dept: INFECTIOUS DISEASES | Facility: HOSPITAL | Age: 55
End: 2025-05-13
Payer: COMMERCIAL

## 2025-05-13 DIAGNOSIS — N32.81 OAB (OVERACTIVE BLADDER): ICD-10-CM

## 2025-05-13 RX ORDER — HYOSCYAMINE SULFATE 0.125 MG
TABLET ORAL
Qty: 120 TABLET | Refills: 3 | Status: SHIPPED | OUTPATIENT
Start: 2025-05-13

## 2025-05-20 ENCOUNTER — PATIENT MESSAGE (OUTPATIENT)
Dept: RHEUMATOLOGY | Facility: CLINIC | Age: 55
End: 2025-05-20
Payer: COMMERCIAL

## 2025-05-27 ENCOUNTER — PATIENT MESSAGE (OUTPATIENT)
Dept: RHEUMATOLOGY | Facility: CLINIC | Age: 55
End: 2025-05-27
Payer: COMMERCIAL

## 2025-06-02 ENCOUNTER — OFFICE VISIT (OUTPATIENT)
Dept: PSYCHIATRY | Facility: CLINIC | Age: 55
End: 2025-06-02
Payer: COMMERCIAL

## 2025-06-02 VITALS
DIASTOLIC BLOOD PRESSURE: 82 MMHG | WEIGHT: 142.19 LBS | SYSTOLIC BLOOD PRESSURE: 125 MMHG | HEART RATE: 90 BPM | BODY MASS INDEX: 28.72 KG/M2

## 2025-06-02 DIAGNOSIS — F43.89 OTHER REACTIONS TO SEVERE STRESS: ICD-10-CM

## 2025-06-02 DIAGNOSIS — F41.9 ANXIETY DISORDER, UNSPECIFIED TYPE: ICD-10-CM

## 2025-06-02 DIAGNOSIS — M06.9 RHEUMATOID ARTHRITIS INVOLVING BOTH SHOULDERS, UNSPECIFIED WHETHER RHEUMATOID FACTOR PRESENT: ICD-10-CM

## 2025-06-02 DIAGNOSIS — G43.019 INTRACTABLE MIGRAINE WITHOUT AURA AND WITHOUT STATUS MIGRAINOSUS: ICD-10-CM

## 2025-06-02 DIAGNOSIS — Z63.9 FAMILY DYNAMICS PROBLEM: ICD-10-CM

## 2025-06-02 DIAGNOSIS — M79.7 FIBROMYALGIA: ICD-10-CM

## 2025-06-02 DIAGNOSIS — F33.1 MAJOR DEPRESSIVE DISORDER, RECURRENT EPISODE, MODERATE: Primary | ICD-10-CM

## 2025-06-02 PROCEDURE — 3079F DIAST BP 80-89 MM HG: CPT | Mod: CPTII,S$GLB,, | Performed by: PSYCHIATRY & NEUROLOGY

## 2025-06-02 PROCEDURE — 99417 PROLNG OP E/M EACH 15 MIN: CPT | Mod: S$GLB,,, | Performed by: PSYCHIATRY & NEUROLOGY

## 2025-06-02 PROCEDURE — 1160F RVW MEDS BY RX/DR IN RCRD: CPT | Mod: CPTII,S$GLB,, | Performed by: PSYCHIATRY & NEUROLOGY

## 2025-06-02 PROCEDURE — 99215 OFFICE O/P EST HI 40 MIN: CPT | Mod: S$GLB,,, | Performed by: PSYCHIATRY & NEUROLOGY

## 2025-06-02 PROCEDURE — 3008F BODY MASS INDEX DOCD: CPT | Mod: CPTII,S$GLB,, | Performed by: PSYCHIATRY & NEUROLOGY

## 2025-06-02 PROCEDURE — 90833 PSYTX W PT W E/M 30 MIN: CPT | Mod: S$GLB,,, | Performed by: PSYCHIATRY & NEUROLOGY

## 2025-06-02 PROCEDURE — G2211 COMPLEX E/M VISIT ADD ON: HCPCS | Mod: S$GLB,,, | Performed by: PSYCHIATRY & NEUROLOGY

## 2025-06-02 PROCEDURE — 3074F SYST BP LT 130 MM HG: CPT | Mod: CPTII,S$GLB,, | Performed by: PSYCHIATRY & NEUROLOGY

## 2025-06-02 PROCEDURE — 99999 PR PBB SHADOW E&M-EST. PATIENT-LVL III: CPT | Mod: PBBFAC,,, | Performed by: PSYCHIATRY & NEUROLOGY

## 2025-06-02 PROCEDURE — 1159F MED LIST DOCD IN RCRD: CPT | Mod: CPTII,S$GLB,, | Performed by: PSYCHIATRY & NEUROLOGY

## 2025-06-02 RX ORDER — BUSPIRONE HYDROCHLORIDE 15 MG/1
15 TABLET ORAL 3 TIMES DAILY
Qty: 270 TABLET | Refills: 0 | Status: SHIPPED | OUTPATIENT
Start: 2025-06-02 | End: 2025-08-31

## 2025-06-02 RX ORDER — FLUOXETINE 20 MG/1
20 CAPSULE ORAL DAILY
Qty: 90 CAPSULE | Refills: 0 | Status: SHIPPED | OUTPATIENT
Start: 2025-06-02 | End: 2025-08-31

## 2025-06-02 RX ORDER — LAMOTRIGINE 200 MG/1
200 TABLET ORAL DAILY
Qty: 90 TABLET | Refills: 0 | Status: SHIPPED | OUTPATIENT
Start: 2025-06-02 | End: 2025-08-31

## 2025-06-02 RX ORDER — FLUOXETINE HYDROCHLORIDE 40 MG/1
40 CAPSULE ORAL DAILY
Qty: 90 CAPSULE | Refills: 0 | Status: SHIPPED | OUTPATIENT
Start: 2025-06-02 | End: 2025-08-31

## 2025-06-02 SDOH — SOCIAL DETERMINANTS OF HEALTH (SDOH): PROBLEM RELATED TO PRIMARY SUPPORT GROUP, UNSPECIFIED: Z63.9

## 2025-06-03 ENCOUNTER — PATIENT MESSAGE (OUTPATIENT)
Dept: ADMINISTRATIVE | Facility: HOSPITAL | Age: 55
End: 2025-06-03
Payer: COMMERCIAL

## 2025-06-10 ENCOUNTER — TELEPHONE (OUTPATIENT)
Dept: OBSTETRICS AND GYNECOLOGY | Facility: CLINIC | Age: 55
End: 2025-06-10
Payer: COMMERCIAL

## 2025-06-10 ENCOUNTER — PATIENT MESSAGE (OUTPATIENT)
Dept: OBSTETRICS AND GYNECOLOGY | Facility: CLINIC | Age: 55
End: 2025-06-10
Payer: COMMERCIAL

## 2025-06-10 NOTE — TELEPHONE ENCOUNTER
Called patient to assist with scheduling annual exam and hormone assessment due to patient scheduling to be seen for HRT. No answer, left voicemail.

## 2025-06-13 DIAGNOSIS — K21.9 GASTROESOPHAGEAL REFLUX DISEASE, UNSPECIFIED WHETHER ESOPHAGITIS PRESENT: ICD-10-CM

## 2025-06-13 RX ORDER — RABEPRAZOLE SODIUM 20 MG/1
20 TABLET, DELAYED RELEASE ORAL
Qty: 30 TABLET | Refills: 11 | Status: SHIPPED | OUTPATIENT
Start: 2025-06-13

## 2025-06-23 ENCOUNTER — TELEPHONE (OUTPATIENT)
Dept: INFECTIOUS DISEASES | Facility: HOSPITAL | Age: 55
End: 2025-06-23
Payer: COMMERCIAL

## 2025-06-23 NOTE — TELEPHONE ENCOUNTER
Pt calling inquiring about her Simponi infusions. Her apt for 7/1 was cancelled as she did not complete her loading doses. Message sent to provider to see if she needs to be reloaded, then will reach back out to Pt to reschedule.

## 2025-06-23 NOTE — TELEPHONE ENCOUNTER
Chronic bladder pain  -     hydrOXYzine HCL (ATARAX) 25 MG tablet; Take 1 tablet (25 mg total) by mouth 3 (three) times daily.  Dispense: 270 tablet; Refill: 3       
show

## 2025-07-15 ENCOUNTER — PATIENT MESSAGE (OUTPATIENT)
Dept: RHEUMATOLOGY | Facility: CLINIC | Age: 55
End: 2025-07-15
Payer: COMMERCIAL

## 2025-07-16 ENCOUNTER — TELEPHONE (OUTPATIENT)
Dept: INFECTIOUS DISEASES | Facility: HOSPITAL | Age: 55
End: 2025-07-16
Payer: COMMERCIAL

## 2025-07-16 NOTE — TELEPHONE ENCOUNTER
Copied from CRM #[id number]. Topic: Appointments - Amb Referral  >> Jul 15, 2025  4:40 PM Rios wrote:  Type: Needs Medical Advice  Who Called:  Ana w/[facility] rheum    Best Call Back Number: [phone] ext [phone]    Additional Information:  Speak to staff in ref to r/s #rd dose Simponi  Please advise   Thanks  >> Jul 16, 2025 11:12 AM Med Assistant Kristy wrote:  Called Pt schedule Simponi infusion. Left voicemail with callback number [phone]

## 2025-07-21 DIAGNOSIS — E03.9 HYPOTHYROIDISM, UNSPECIFIED TYPE: ICD-10-CM

## 2025-07-23 ENCOUNTER — TELEPHONE (OUTPATIENT)
Dept: INFECTIOUS DISEASES | Facility: HOSPITAL | Age: 55
End: 2025-07-23
Payer: COMMERCIAL

## 2025-07-24 ENCOUNTER — TELEPHONE (OUTPATIENT)
Dept: HEMATOLOGY/ONCOLOGY | Facility: CLINIC | Age: 55
End: 2025-07-24
Payer: COMMERCIAL

## 2025-07-24 NOTE — TELEPHONE ENCOUNTER
Copied from CRM #[id number]. Topic: Medications - Pharmacy  >> Jul 24, 2025  9:05 AM Debbie wrote:  Type:  Pharmacy Calling to Clarify an RX    Name of Caller:  Lg  Pharmacy Name:    [id number] - [place] - [address] AT [facility] [address]  [address] [address]  Phone: [phone] Fax: [phone]    Prescription Name:  levothyroxine (SYNTHROID) 50 MCG tablet  What do they need to clarify?:  needs refill  Best Call Back Number:  [phone]

## 2025-07-25 RX ORDER — LEVOTHYROXINE SODIUM 50 UG/1
TABLET ORAL
Qty: 30 TABLET | Refills: 6 | Status: SHIPPED | OUTPATIENT
Start: 2025-07-25

## 2025-08-11 ENCOUNTER — PATIENT MESSAGE (OUTPATIENT)
Dept: INFECTIOUS DISEASES | Facility: HOSPITAL | Age: 55
End: 2025-08-11
Payer: COMMERCIAL

## 2025-08-13 DIAGNOSIS — N32.81 OAB (OVERACTIVE BLADDER): ICD-10-CM

## 2025-08-14 RX ORDER — OXYBUTYNIN CHLORIDE 5 MG/1
5 TABLET ORAL 3 TIMES DAILY
Qty: 270 TABLET | Refills: 3 | Status: SHIPPED | OUTPATIENT
Start: 2025-08-14

## 2025-08-18 ENCOUNTER — OFFICE VISIT (OUTPATIENT)
Dept: PSYCHIATRY | Facility: CLINIC | Age: 55
End: 2025-08-18
Payer: COMMERCIAL

## 2025-08-18 VITALS
SYSTOLIC BLOOD PRESSURE: 132 MMHG | DIASTOLIC BLOOD PRESSURE: 78 MMHG | WEIGHT: 143.31 LBS | BODY MASS INDEX: 28.94 KG/M2 | HEART RATE: 82 BPM

## 2025-08-18 DIAGNOSIS — F33.1 MAJOR DEPRESSIVE DISORDER, RECURRENT EPISODE, MODERATE: ICD-10-CM

## 2025-08-18 DIAGNOSIS — G47.00 INSOMNIA, UNSPECIFIED TYPE: Primary | ICD-10-CM

## 2025-08-18 PROCEDURE — G2211 COMPLEX E/M VISIT ADD ON: HCPCS | Mod: S$GLB,,, | Performed by: PSYCHIATRY & NEUROLOGY

## 2025-08-18 PROCEDURE — 99999 PR PBB SHADOW E&M-EST. PATIENT-LVL III: CPT | Mod: PBBFAC,,, | Performed by: PSYCHIATRY & NEUROLOGY

## 2025-08-18 PROCEDURE — 90833 PSYTX W PT W E/M 30 MIN: CPT | Mod: S$GLB,,, | Performed by: PSYCHIATRY & NEUROLOGY

## 2025-08-18 PROCEDURE — 1160F RVW MEDS BY RX/DR IN RCRD: CPT | Mod: CPTII,S$GLB,, | Performed by: PSYCHIATRY & NEUROLOGY

## 2025-08-18 PROCEDURE — 3078F DIAST BP <80 MM HG: CPT | Mod: CPTII,S$GLB,, | Performed by: PSYCHIATRY & NEUROLOGY

## 2025-08-18 PROCEDURE — 99215 OFFICE O/P EST HI 40 MIN: CPT | Mod: S$GLB,,, | Performed by: PSYCHIATRY & NEUROLOGY

## 2025-08-18 PROCEDURE — 3075F SYST BP GE 130 - 139MM HG: CPT | Mod: CPTII,S$GLB,, | Performed by: PSYCHIATRY & NEUROLOGY

## 2025-08-18 PROCEDURE — 1159F MED LIST DOCD IN RCRD: CPT | Mod: CPTII,S$GLB,, | Performed by: PSYCHIATRY & NEUROLOGY

## 2025-08-18 PROCEDURE — 3008F BODY MASS INDEX DOCD: CPT | Mod: CPTII,S$GLB,, | Performed by: PSYCHIATRY & NEUROLOGY

## 2025-08-18 RX ORDER — LAMOTRIGINE 200 MG/1
200 TABLET ORAL DAILY
Qty: 90 TABLET | Refills: 0 | Status: SHIPPED | OUTPATIENT
Start: 2025-08-18 | End: 2025-11-16

## 2025-08-18 RX ORDER — FLUOXETINE HYDROCHLORIDE 40 MG/1
40 CAPSULE ORAL DAILY
Qty: 90 CAPSULE | Refills: 0 | Status: SHIPPED | OUTPATIENT
Start: 2025-08-18 | End: 2025-11-16

## 2025-08-18 RX ORDER — FLUOXETINE 20 MG/1
20 CAPSULE ORAL DAILY
Qty: 90 CAPSULE | Refills: 0 | Status: SHIPPED | OUTPATIENT
Start: 2025-08-18 | End: 2025-11-16

## 2025-08-18 RX ORDER — BUSPIRONE HYDROCHLORIDE 30 MG/1
30 TABLET ORAL 2 TIMES DAILY
Qty: 180 TABLET | Refills: 0 | Status: SHIPPED | OUTPATIENT
Start: 2025-08-18 | End: 2025-11-16

## 2025-08-19 ENCOUNTER — PATIENT MESSAGE (OUTPATIENT)
Dept: PSYCHIATRY | Facility: CLINIC | Age: 55
End: 2025-08-19
Payer: COMMERCIAL

## 2025-08-20 RX ORDER — HYDROXYZINE HYDROCHLORIDE 25 MG/1
25 TABLET, FILM COATED ORAL 3 TIMES DAILY PRN
Qty: 90 TABLET | Refills: 3 | Status: SHIPPED | OUTPATIENT
Start: 2025-08-20 | End: 2025-12-18

## (undated) DEVICE — GLOVE BIOGEL PI MICRO SZ 6.5

## (undated) DEVICE — APPLICATOR CHLORAPREP ORN 26ML

## (undated) DEVICE — NDL WILLIAMS CYSTOSCOPIC

## (undated) DEVICE — SYR 10CC LUER LOCK

## (undated) DEVICE — SEE MEDLINE ITEM 154981

## (undated) DEVICE — DRESSING TRANS 2X2 TEGADERM

## (undated) DEVICE — MARKER SKIN STND TIP BLUE BARR

## (undated) DEVICE — ADHESIVE DERMABOND ADVANCED

## (undated) DEVICE — SEE MEDLINE ITEM 157148

## (undated) DEVICE — NDL SPINAL 22GA X 2 1/2 IN

## (undated) DEVICE — SPRAY MASTISOL

## (undated) DEVICE — ELECTRODE NEEDLE 2.8IN

## (undated) DEVICE — SUT VICRYL 3-0 27 SH

## (undated) DEVICE — DRAPE C-ARMOR EQUIPMENT COVER

## (undated) DEVICE — GOWN POLY REINF X-LONG XL

## (undated) DEVICE — SET CYSTO IRRIGATION UNIV SPIK

## (undated) DEVICE — SOL IRR WATER STRL 3000 ML

## (undated) DEVICE — ELECTRODE BLADE INSULATED 1 IN

## (undated) DEVICE — TRAY MINOR GEN SURG

## (undated) DEVICE — TRAY MINOR GEN SURG OMC

## (undated) DEVICE — NDL HYPO REG 25G X 1 1/2

## (undated) DEVICE — SUT 2/0 30IN SILK BLK BRAI

## (undated) DEVICE — DRESSING LEUKOPLAST FLEX 1X3IN

## (undated) DEVICE — TIP YANKAUERS BULB NO VENT

## (undated) DEVICE — SCALPEL #11 BLADE STRL DISP

## (undated) DEVICE — DRAPE STERI INSTRUMENT 1018

## (undated) DEVICE — NDL 18GA X1 1/2 REG BEVEL

## (undated) DEVICE — SPONGE LAP 18X18 PREWASHED

## (undated) DEVICE — CONTAINER SPECIMEN STRL 4OZ

## (undated) DEVICE — DRAPE C ARM 42 X 120 10/BX

## (undated) DEVICE — DRAPE ABDOMINAL TIBURON 14X11

## (undated) DEVICE — DRAPE STERI-DRAPE 1000 17X11IN

## (undated) DEVICE — SUT 2-0 VICRYL / SH (J417)

## (undated) DEVICE — HANDSET INTERSTIM X COMM

## (undated) DEVICE — DRESSING TRANS 4X4 TEGADERM

## (undated) DEVICE — YANKAUER OPEN TIP W/O VENT

## (undated) DEVICE — SOL WATER STRL IRR 1000ML

## (undated) DEVICE — SUT SILK 2-0 BLK BR PS-2 18

## (undated) DEVICE — PACK UNIVERSAL SPLIT II

## (undated) DEVICE — PACK CYSTO

## (undated) DEVICE — UNDERGLOVE BIOGEL PI SZ 6.5 LF

## (undated) DEVICE — ELECTRODE REM PLYHSV RETURN 9

## (undated) DEVICE — DRESSING ANTIMICROBIAL 1 INCH

## (undated) DEVICE — SUT MCRYL PLUS 4-0 PS2 27IN

## (undated) DEVICE — BRA POST SURGICAL WHT 40-42IN

## (undated) DEVICE — DRAPE THREE-QTR REINF 53X77IN

## (undated) DEVICE — GLOVE BIOGEL SKINSENSE PI 6.5

## (undated) DEVICE — SUT 2-0 12-18IN SILK

## (undated) DEVICE — GOWN SURGICAL X-LARGE

## (undated) DEVICE — TRAY CYSTO BASIN

## (undated) DEVICE — DRAPE C-ARM ELAS CLIP 42X120IN

## (undated) DEVICE — SEE MEDLINE ITEM 157116

## (undated) DEVICE — SHEATH GUIDE SCOUT SURG RADAR

## (undated) DEVICE — DRESSING TELFA STRL 4X3 LF

## (undated) DEVICE — ADHESIVE MASTISOL VIAL 48/BX

## (undated) DEVICE — SYRINGE 1 ML TB 25GA 5/8IN

## (undated) DEVICE — TAPE SILK 3IN

## (undated) DEVICE — KIT PROBE COVER WITH GEL

## (undated) DEVICE — GAUGE FLUFF X-SUPER 36X36 2PLY

## (undated) DEVICE — COVERS PROBE NR-48 STERILE